# Patient Record
Sex: MALE | Race: WHITE | NOT HISPANIC OR LATINO | Employment: OTHER | URBAN - METROPOLITAN AREA
[De-identification: names, ages, dates, MRNs, and addresses within clinical notes are randomized per-mention and may not be internally consistent; named-entity substitution may affect disease eponyms.]

---

## 2017-01-03 NOTE — TELEPHONE ENCOUNTER
----- Message from Luh Antoine sent at 1/3/2017  8:48 AM CST -----  Contact: Ms Lund/   wife  Patient need refill on medication Fioricet called into [facility] [phone]   Pt experiencing headaches   Pt out of medication    Please call Ms Lund [phone]

## 2017-01-06 RX ORDER — BUTALBITAL, ACETAMINOPHEN AND CAFFEINE 50; 325; 40 MG/1; MG/1; MG/1
TABLET ORAL
Qty: 60 TABLET | Refills: 5 | Status: SHIPPED | OUTPATIENT
Start: 2017-01-06 | End: 2018-01-16 | Stop reason: SDUPTHER

## 2017-01-24 ENCOUNTER — OFFICE VISIT (OUTPATIENT)
Dept: FAMILY MEDICINE | Facility: CLINIC | Age: 47
End: 2017-01-24
Payer: COMMERCIAL

## 2017-01-24 VITALS
TEMPERATURE: 98 F | SYSTOLIC BLOOD PRESSURE: 132 MMHG | HEART RATE: 72 BPM | HEIGHT: 71 IN | RESPIRATION RATE: 20 BRPM | DIASTOLIC BLOOD PRESSURE: 100 MMHG | BODY MASS INDEX: 41.63 KG/M2 | OXYGEN SATURATION: 97 % | WEIGHT: 297.38 LBS

## 2017-01-24 DIAGNOSIS — I10 HYPERTENSION, ESSENTIAL, BENIGN: Primary | ICD-10-CM

## 2017-01-24 DIAGNOSIS — R53.83 FATIGUE, UNSPECIFIED TYPE: ICD-10-CM

## 2017-01-24 DIAGNOSIS — D64.9 ANEMIA, UNSPECIFIED TYPE: ICD-10-CM

## 2017-01-24 DIAGNOSIS — G43.009 MIGRAINE WITHOUT AURA AND WITHOUT STATUS MIGRAINOSUS, NOT INTRACTABLE: ICD-10-CM

## 2017-01-24 PROCEDURE — 99204 OFFICE O/P NEW MOD 45 MIN: CPT | Mod: S$GLB,,, | Performed by: INTERNAL MEDICINE

## 2017-01-24 PROCEDURE — 1159F MED LIST DOCD IN RCRD: CPT | Mod: S$GLB,,, | Performed by: INTERNAL MEDICINE

## 2017-01-24 PROCEDURE — 99999 PR PBB SHADOW E&M-EST. PATIENT-LVL III: CPT | Mod: PBBFAC,,, | Performed by: INTERNAL MEDICINE

## 2017-01-24 PROCEDURE — 3075F SYST BP GE 130 - 139MM HG: CPT | Mod: S$GLB,,, | Performed by: INTERNAL MEDICINE

## 2017-01-24 PROCEDURE — 3080F DIAST BP >= 90 MM HG: CPT | Mod: S$GLB,,, | Performed by: INTERNAL MEDICINE

## 2017-01-24 RX ORDER — OXYCODONE HYDROCHLORIDE 15 MG/1
TABLET, FILM COATED, EXTENDED RELEASE ORAL
Refills: 0 | COMMUNITY
Start: 2017-01-19 | End: 2017-04-28

## 2017-01-24 RX ORDER — OXYCODONE AND ACETAMINOPHEN 10; 325 MG/1; MG/1
TABLET ORAL
Refills: 0 | COMMUNITY
Start: 2017-01-04 | End: 2017-04-28

## 2017-01-24 RX ORDER — DULOXETIN HYDROCHLORIDE 60 MG/1
60 CAPSULE, DELAYED RELEASE ORAL NIGHTLY
Refills: 5 | COMMUNITY
Start: 2016-12-24 | End: 2017-06-30 | Stop reason: SDUPTHER

## 2017-01-24 RX ORDER — SUMATRIPTAN 50 MG/1
TABLET, FILM COATED ORAL
Qty: 10 TABLET | Refills: 0 | Status: SHIPPED | OUTPATIENT
Start: 2017-01-24 | End: 2017-04-28

## 2017-01-24 RX ORDER — GABAPENTIN 300 MG/1
300 CAPSULE ORAL 3 TIMES DAILY
Refills: 11 | COMMUNITY
Start: 2017-01-02 | End: 2017-04-28

## 2017-01-24 RX ORDER — HYDROCHLOROTHIAZIDE 25 MG/1
25 TABLET ORAL DAILY
Qty: 30 TABLET | Refills: 1 | Status: SHIPPED | OUTPATIENT
Start: 2017-01-24 | End: 2017-04-21 | Stop reason: SDUPTHER
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
## 2017-02-01 ENCOUNTER — LAB VISIT (OUTPATIENT)
Dept: LAB | Facility: HOSPITAL | Age: 47
End: 2017-02-01
Attending: INTERNAL MEDICINE
Payer: COMMERCIAL

## 2017-02-01 DIAGNOSIS — I10 HYPERTENSION, ESSENTIAL, BENIGN: ICD-10-CM

## 2017-02-01 DIAGNOSIS — D64.9 ANEMIA, UNSPECIFIED TYPE: ICD-10-CM

## 2017-02-01 DIAGNOSIS — R53.83 FATIGUE, UNSPECIFIED TYPE: ICD-10-CM

## 2017-02-01 LAB
25(OH)D3+25(OH)D2 SERPL-MCNC: 31 NG/ML
ALBUMIN SERPL BCP-MCNC: 4.2 G/DL
ALP SERPL-CCNC: 95 U/L
ALT SERPL W/O P-5'-P-CCNC: 15 U/L
ANION GAP SERPL CALC-SCNC: 8 MMOL/L
AST SERPL-CCNC: 21 U/L
BASOPHILS # BLD AUTO: 0.05 K/UL
BASOPHILS NFR BLD: 1 %
BILIRUB SERPL-MCNC: 0.5 MG/DL
BUN SERPL-MCNC: 15 MG/DL
CALCIUM SERPL-MCNC: 9.6 MG/DL
CHLORIDE SERPL-SCNC: 102 MMOL/L
CHOLEST/HDLC SERPL: 3.4 {RATIO}
CO2 SERPL-SCNC: 31 MMOL/L
CREAT SERPL-MCNC: 0.8 MG/DL
DIFFERENTIAL METHOD: NORMAL
EOSINOPHIL # BLD AUTO: 0.1 K/UL
EOSINOPHIL NFR BLD: 2.2 %
ERYTHROCYTE [DISTWIDTH] IN BLOOD BY AUTOMATED COUNT: 12.8 %
EST. GFR  (AFRICAN AMERICAN): >60 ML/MIN/1.73 M^2
EST. GFR  (NON AFRICAN AMERICAN): >60 ML/MIN/1.73 M^2
FERRITIN SERPL-MCNC: 82 NG/ML
FOLATE SERPL-MCNC: 15 NG/ML
GLUCOSE SERPL-MCNC: 83 MG/DL
HCT VFR BLD AUTO: 43.3 %
HDL/CHOLESTEROL RATIO: 29.1 %
HDLC SERPL-MCNC: 189 MG/DL
HDLC SERPL-MCNC: 55 MG/DL
HGB BLD-MCNC: 14.4 G/DL
IRON SERPL-MCNC: 59 UG/DL
LDLC SERPL CALC-MCNC: 110 MG/DL
LYMPHOCYTES # BLD AUTO: 1.3 K/UL
LYMPHOCYTES NFR BLD: 26.4 %
MCH RBC QN AUTO: 29.9 PG
MCHC RBC AUTO-ENTMCNC: 33.3 %
MCV RBC AUTO: 90 FL
MONOCYTES # BLD AUTO: 0.4 K/UL
MONOCYTES NFR BLD: 7.7 %
NEUTROPHILS # BLD AUTO: 3.2 K/UL
NEUTROPHILS NFR BLD: 62.7 %
NONHDLC SERPL-MCNC: 134 MG/DL
PLATELET # BLD AUTO: 285 K/UL
PMV BLD AUTO: 10.1 FL
POTASSIUM SERPL-SCNC: 3.8 MMOL/L
PROT SERPL-MCNC: 7.7 G/DL
RBC # BLD AUTO: 4.82 M/UL
SATURATED IRON: 16 %
SODIUM SERPL-SCNC: 141 MMOL/L
TOTAL IRON BINDING CAPACITY: 360 UG/DL
TRANSFERRIN SERPL-MCNC: 243 MG/DL
TRIGL SERPL-MCNC: 120 MG/DL
TSH SERPL DL<=0.005 MIU/L-ACNC: 0.63 UIU/ML
VIT B12 SERPL-MCNC: >2000 PG/ML
WBC # BLD AUTO: 5.04 K/UL

## 2017-02-01 PROCEDURE — 83540 ASSAY OF IRON: CPT

## 2017-02-01 PROCEDURE — 82728 ASSAY OF FERRITIN: CPT

## 2017-02-01 PROCEDURE — 83036 HEMOGLOBIN GLYCOSYLATED A1C: CPT

## 2017-02-01 PROCEDURE — 82306 VITAMIN D 25 HYDROXY: CPT

## 2017-02-01 PROCEDURE — 36415 COLL VENOUS BLD VENIPUNCTURE: CPT

## 2017-02-01 PROCEDURE — 82607 VITAMIN B-12: CPT

## 2017-02-01 PROCEDURE — 84466 ASSAY OF TRANSFERRIN: CPT

## 2017-02-01 PROCEDURE — 84443 ASSAY THYROID STIM HORMONE: CPT

## 2017-02-01 PROCEDURE — 80053 COMPREHEN METABOLIC PANEL: CPT

## 2017-02-01 PROCEDURE — 85025 COMPLETE CBC W/AUTO DIFF WBC: CPT

## 2017-02-01 PROCEDURE — 82746 ASSAY OF FOLIC ACID SERUM: CPT

## 2017-02-01 PROCEDURE — 80061 LIPID PANEL: CPT

## 2017-02-02 LAB
ESTIMATED AVG GLUCOSE: 88 MG/DL
HBA1C MFR BLD HPLC: 4.7 %

## 2017-02-20 DIAGNOSIS — I10 HYPERTENSION, ESSENTIAL, BENIGN: ICD-10-CM

## 2017-02-20 RX ORDER — HYDROCHLOROTHIAZIDE 25 MG/1
TABLET ORAL
Qty: 30 TABLET | OUTPATIENT
Start: 2017-02-20

## 2017-03-28 ENCOUNTER — OFFICE VISIT (OUTPATIENT)
Dept: PAIN MEDICINE | Facility: CLINIC | Age: 47
End: 2017-03-28
Attending: ANESTHESIOLOGY
Payer: COMMERCIAL

## 2017-03-28 VITALS
WEIGHT: 299.81 LBS | HEIGHT: 71 IN | RESPIRATION RATE: 20 BRPM | DIASTOLIC BLOOD PRESSURE: 98 MMHG | SYSTOLIC BLOOD PRESSURE: 148 MMHG | TEMPERATURE: 99 F | BODY MASS INDEX: 41.97 KG/M2 | HEART RATE: 58 BPM

## 2017-03-28 DIAGNOSIS — M47.816 FACET ARTHROPATHY, LUMBAR: ICD-10-CM

## 2017-03-28 DIAGNOSIS — G89.4 CHRONIC PAIN SYNDROME: ICD-10-CM

## 2017-03-28 DIAGNOSIS — M96.1 POSTLAMINECTOMY SYNDROME OF LUMBAR REGION: ICD-10-CM

## 2017-03-28 PROCEDURE — 3080F DIAST BP >= 90 MM HG: CPT | Mod: S$GLB,,, | Performed by: ANESTHESIOLOGY

## 2017-03-28 PROCEDURE — 1160F RVW MEDS BY RX/DR IN RCRD: CPT | Mod: S$GLB,,, | Performed by: ANESTHESIOLOGY

## 2017-03-28 PROCEDURE — 99999 PR PBB SHADOW E&M-EST. PATIENT-LVL IV: CPT | Mod: PBBFAC,,, | Performed by: ANESTHESIOLOGY

## 2017-03-28 PROCEDURE — 99204 OFFICE O/P NEW MOD 45 MIN: CPT | Mod: S$GLB,,, | Performed by: ANESTHESIOLOGY

## 2017-03-28 PROCEDURE — 3077F SYST BP >= 140 MM HG: CPT | Mod: S$GLB,,, | Performed by: ANESTHESIOLOGY

## 2017-03-28 NOTE — PROGRESS NOTES
Chronic Pain - New Consult    Referring Physician: Referral, Self    Chief Complaint:   Chief Complaint   Patient presents with    Low-back Pain    Leg Pain        SUBJECTIVE: Disclaimer: This note has been generated using voice-recognition software. There may be typographical errors that have been missed during proof-reading    Initial encounter:    Jaime Lund presents to the clinic for the evaluation of lower back pain and neuropathic lower extremity pain. The pain started years ago insidiously and symptoms have been worsening.    Brief history:  Patient has been in pain management with an outside provider and has been managed for his postlaminectomy syndrome with a combination of extended release gabapentin, Robaxin, meloxicam, OxyContin, oxycodone acetaminophen.  He has had limited response to caudal epidurals of the records are not available and is currently being considered for radio frequency ablation lumbar spine and is coming for second opinion.    Patient has had a history of fusion of the lumbar spine which was consultative with postprocedural MRSA infection sepsis and seizures.    Additionally patient has had history of gastric bypass in 2011 with a greater than 200 pound weight loss.  Currently BMI is 41 and on today's visit patient weighs 136 kg.  Pain Description:    The pain is located in the lower back area and radiates to bilateral lower extremities in the L4/5 distribution.      At BEST  5/10     At WORST  10/10 on the WORST day.      On average pain is rated as 7/10.     Today the pain is rated as 7/10    The pain is described as aching, sharp, tight band and constant      Symptoms interfere with daily activity, sleeping and work.     Exacerbating factors: Standing, Laying, Bending, Night Time, Morning and Getting out of bed/chair.      Mitigating factors laying down, massage, medications and rest.     Patient denies urinary incontinence, bowel incontinence and significant motor  weakness.  Patient denies any suicidal or homicidal ideations    Pain Medications:  Current:  oxycontin 15 mg  Oxycodone/acetaminophen 7.5/325 -increased from 5/325  gralise 600mg q 24 hr  cymbalta qday  meloxicam 15 mg q day  Methocarbamol 750 q day  zanaflex 2 mg q 8 PO PRN    Physical Therapy/Home Exercise: no       report:  Reviewed and consistent with medication use as prescribed.    Pain Procedures: previous caudal epidural injections, no records for review today    Chiropractor -currently for the neck, not for the lumbar spine  Acupuncture - never  TENS unit -never  Spinal decompression -previous decompression with fusion L4/5  Joint replacement -never    Imaging:   MRI lumbar spine 4/21/2016:  Post surgical changes with fusion at L4 and L5 with L4-5 intervertebral disc spacer.  Worsening mild to moderate multilevel discogenic degeneration throughout the lower thoracic and lumbar spine most at L3-4 and L5-S1 with mild to moderate left greater than right bilateral L3-4 neuroforaminal narrowing and mild L5-S1 neuroforaminal narrowing.  No significant central stenosis.  (Full report scanned into [employer])      Past Medical History:   Diagnosis Date    Bulging discs     Degenerative disc disease     Hypertension      Past Surgical History:   Procedure Laterality Date    CHOLECYSTECTOMY      gastric sleeve  2011    HERNIA REPAIR      SPINE SURGERY      lumbar fusion     Social History     Social History    Marital status:      Spouse name: N/A    Number of children: N/A    Years of education: N/A     Occupational History    Not on file.     Social History Main Topics    Smoking status: Never Smoker    Smokeless tobacco: Never Used    Alcohol use No    Drug use: No    Sexual activity: Not on file     Other Topics Concern    Not on file     Social History Narrative     Family History   Problem Relation Age of Onset    Heart disease Mother     Breast cancer Mother     Arthritis Mother      Hypertension Mother     Throat cancer Father     Hypertension Father        Review of patient's allergies indicates:   Allergen Reactions    Klonopin [clonazepam] Anxiety       Current Outpatient Prescriptions   Medication Sig    acyclovir (ZOVIRAX) 1,000 mg injection Inject 800 mg into the vein every 8 (eight) hours.    butalbital-acetaminophen-caffeine -40 mg (FIORICET, ESGIC) -40 mg per tablet TAKE ONE TABLET BY MOUTH EVERY 4 HOURS AS NEEDED    duloxetine (CYMBALTA) 30 MG capsule TAKE 1 CAPSULE BY MOUTH ONCE DAILY IN THE EVENING    duloxetine (CYMBALTA) 60 MG capsule Take 60 mg by mouth every evening.    gabapentin (GRALISE) 600 mg Tb24 Take 600 mg by mouth 2 (two) times daily.     gabapentin (NEURONTIN) 300 MG capsule Take 300 mg by mouth 3 (three) times daily.     hydrochlorothiazide (HYDRODIURIL) 25 MG tablet Take 1 tablet (25 mg total) by mouth once daily.    ibuprofen (ADVIL,MOTRIN) 800 MG tablet TAKE ONE TABLET BY MOUTH EVERY 8 HOURS WITH A MEAL    meloxicam (MOBIC) 15 MG tablet Take 15 mg by mouth once daily.    methocarbamol (ROBAXIN) 750 MG Tab     oxycodone-acetaminophen (PERCOCET)  mg per tablet TAKE 1 TABLET EVERY 6 HOURS AS NEEDED FOR PAIN MAY CAUSE DROWSINESS  NO ALCOHOL    oxycodone-acetaminophen (PERCOCET) 5-325 mg per tablet     OXYCONTIN 15 mg TR12 12 hr tablet TAKE ONE TABLET BY MOUTH EVERY TWELVE HOURS FOR PAIN    ranitidine (ZANTAC) 150 MG capsule Take 150 mg by mouth 2 (two) times daily.    sumatriptan (IMITREX) 50 MG tablet Take 1 tablet by mouth and if no resolution of headache then take 1 more tablet 2 hours later    tizanidine (ZANAFLEX) 2 MG tablet Take 2 mg by mouth 3 (three) times daily.     No current facility-administered medications for this visit.        REVIEW OF SYSTEMS:    GENERAL:  No weight loss, malaise or fevers.  HEENT:   No recent changes in vision or hearing  NECK:  Negative for lumps, no difficulty with swallowing.  RESPIRATORY:   "Negative for cough, wheezing or shortness of breath, patient denies any recent URI.  CARDIOVASCULAR:  Negative for chest pain, leg swelling or palpitations.  GI:  Negative for abdominal discomfort, blood in stools or black stools or change in bowel habits.  MUSCULOSKELETAL:  See HPI.  SKIN:  Negative for lesions, rash, and itching.  PSYCH:  No mood disorder or recent psychosocial stressors.  Patients sleep is disturbed secondary to pain.  HEMATOLOGY/LYMPHOLOGY:  Negative for prolonged bleeding, bruising easily or swollen nodes.  Patient is not currently taking any anti-coagulants  ENDO: No history of diabetes or thyroid dysfunction  NEURO:  History of headaches  All other reviewed and negative other than HPI.    OBJECTIVE:    BP (!) 148/98 (BP Location: Left arm, Patient Position: Sitting)  Pulse (!) 58  Temp 98.7 °F (37.1 °C) (Oral)   Resp 20  Ht 5' 11" (1.803 m)  Wt 136 kg (299 lb 13.2 oz)  BMI 41.82 kg/m2    PHYSICAL EXAMINATION:    GENERAL: Well appearing, in no acute distress, alert and oriented x3.  PSYCH:  Mood and affect appropriate.  SKIN: Skin color, texture, turgor normal, no rashes or lesions.  HEAD/FACE:  Normocephalic, atraumatic. Cranial nerves grossly intact.  CV: RRR with palpation of the radial artery.  PULM: No evidence of respiratory difficulty, symmetric chest rise.  BACK: Straight leg raising in the sitting and supine positions is negative to radicular pain. There is pain with palpation over the facet joints of the lumbar spine bilaterally. There is decreased range of motion with extension to 15 degrees, and facet loading maneuvers cause reproducible pain.    EXTREMITIES: Peripheral joint ROM is full and pain free without obvious instability or laxity in all four extremities. No deformities, edema, or skin discoloration. Good capillary refill.  MUSCULOSKELETAL: Hip, and knee provocative maneuvers are negative.  There is  pain with palpation over the sacroiliac joints bilaterally.  There " is no pain to palpation over the greater trochanteric bursa bilaterally.  FABERs test is positive.  FADIRs test is negative.   Bilateral lower extremity strength is normal and symmetric.  No atrophy or tone abnormalities are noted.  NEURO: Bilateral lower extremity coordination and muscle stretch reflexes are physiologic and symmetric.  Plantar response are downgoing. No clonus.  No loss of sensation is noted.  GAIT: Antalgic, ambulates without assistance     ASSESSMENT: 47 y.o. year old male with pain, consistent with     Encounter Diagnoses   Name Primary?    Postlaminectomy syndrome of lumbar region     Facet arthropathy, lumbar     Chronic pain syndrome        PLAN:     Upon review the records of previous imaging it appears that the patient is on appropriate couple of adjuvant medications and opioids.  We discussed following up with 's recommendations of radio frequency ablation for the lumbar spine.    We also emphasized the importance of continued exercises physical therapy course thickening and stretching    I expect the patient given his lack of response to caudal epidural injections radio frequency ablation to address the joints of his lower back would be the most appropriate course of action and confirmed that this is a good plan with his pain physician.    Additionally if there is limited response to any frequency ablation I recommend spinal cord simulation in the future for post laminectomy syndrome  I provided the patient with a informational DVD from [employer] to review and discuss with his pain management physician    Follow-up as needed    Greater than 25 minutes spent in total in todays visit with the patient, with more than half that time direct face to face counseling and education with the patient today. We discussed the disease process, prognosis, treatment plan, and risks and benefits.     Jeri Garza  03/28/2017

## 2017-03-28 NOTE — MR AVS SNAPSHOT
[facility]  [address]  [place] [address]  Phone: [phone]  Fax: [phone]                  Jaime Lund   3/28/2017 9:30 AM   Office Visit    Description:  Male : 1970   Provider:  Jeri Garza MD   Department:  [facility]           Reason for Visit     Low-back Pain     Leg Pain                To Do List           Goals (5 Years of Data)     None      [facility]     [facility] -  Assistance  Registered nurses in the [facility] Center provide clinical advisement, health education, appointment booking, and other advisory services.  Call for this free service at [phone].             Medications           Message regarding Medications     Verify the changes and/or additions to your medication regime listed below are the same as discussed with your clinician today.  If any of these changes or additions are incorrect, please notify your healthcare provider.             Verify that the below list of medications is an accurate representation of the medications you are currently taking.  If none reported, the list may be blank. If incorrect, please contact your healthcare provider. Carry this list with you in case of emergency.           Current Medications     acyclovir (ZOVIRAX) 1,000 mg injection Inject 800 mg into the vein every 8 (eight) hours.    butalbital-acetaminophen-caffeine -40 mg (FIORICET, ESGIC) -40 mg per tablet TAKE ONE TABLET BY MOUTH EVERY 4 HOURS AS NEEDED    duloxetine (CYMBALTA) 30 MG capsule TAKE 1 CAPSULE BY MOUTH ONCE DAILY IN THE EVENING    duloxetine (CYMBALTA) 60 MG capsule Take 60 mg by mouth every evening.    gabapentin (GRALISE) 600 mg Tb24 Take 600 mg by mouth 2 (two) times daily.     gabapentin (NEURONTIN) 300 MG capsule Take 300 mg by mouth 3 (three) times daily.     hydrochlorothiazide (HYDRODIURIL) 25 MG tablet Take 1 tablet (25 mg total) by mouth once daily.  "   ibuprofen (ADVIL,MOTRIN) 800 MG tablet TAKE ONE TABLET BY MOUTH EVERY 8 HOURS WITH A MEAL    meloxicam (MOBIC) 15 MG tablet Take 15 mg by mouth once daily.    methocarbamol (ROBAXIN) 750 MG Tab     oxycodone-acetaminophen (PERCOCET)  mg per tablet TAKE 1 TABLET EVERY 6 HOURS AS NEEDED FOR PAIN MAY CAUSE DROWSINESS  NO ALCOHOL    oxycodone-acetaminophen (PERCOCET) 5-325 mg per tablet     OXYCONTIN 15 mg TR12 12 hr tablet TAKE ONE TABLET BY MOUTH EVERY TWELVE HOURS FOR PAIN    ranitidine (ZANTAC) 150 MG capsule Take 150 mg by mouth 2 (two) times daily.    sumatriptan (IMITREX) 50 MG tablet Take 1 tablet by mouth and if no resolution of headache then take 1 more tablet 2 hours later    tizanidine (ZANAFLEX) 2 MG tablet Take 2 mg by mouth 3 (three) times daily.           Clinical Reference Information           Your Vitals Were     BP Pulse Temp Resp Height Weight    148/98 (BP Location: Left arm, Patient Position: Sitting) 58 98.7 °F (37.1 °C) (Oral) 20 5' 11" (1.803 m) 136 kg (299 lb 13.2 oz)    BMI                41.82 kg/m2          Blood Pressure          Most Recent Value    BP  (!)  148/98      Allergies as of 3/28/2017     Klonopin [Clonazepam]      Immunizations Administered on Date of Encounter - 3/28/2017     None      [facility] Sign-Up     Activating your [facility] account is as easy as 1-2-3!     1) Visit [email or web address], select Sign Up Now, enter this activation code and your date of birth, then select Next.  [email or web address]  Expires: 5/12/2017 10:20 AM      2) Create a username and password to use when you visit [facility] in the future and select a security question in case you lose your password and select Next.    3) Enter your e-mail address and click Sign Up!    Additional Information  If you have questions, please e-mail [email or web address] or call [phone] to talk to our [facility] staff. Remember, [facility] is NOT to be used for urgent needs. For medical emergencies, dial 911.       "   Language Assistance Services     ATTENTION: Language assistance services are available, free of charge. Please call [phone].      ATENCIÓN: Si habla wong, tiene a rosenbaum disposición servicios gratuitos de asistencia lingüística. Llame al [phone].     CHÚ Ý: N?u b?n nói Ti?ng Vi?t, có các d?ch v? h? tr? ngôn ng? mi?n phí dành cho b?n. G?i s? [phone].         [facility] complies with applicable Federal civil rights laws and does not discriminate on the basis of race, color, national origin, age, disability, or sex.

## 2017-04-21 DIAGNOSIS — I10 HYPERTENSION, ESSENTIAL, BENIGN: ICD-10-CM

## 2017-04-21 RX ORDER — HYDROCHLOROTHIAZIDE 25 MG/1
TABLET ORAL
Qty: 30 TABLET | Refills: 0 | Status: SHIPPED | OUTPATIENT
Start: 2017-04-21 | End: 2017-06-06 | Stop reason: SDUPTHER

## 2017-04-28 ENCOUNTER — OFFICE VISIT (OUTPATIENT)
Dept: FAMILY MEDICINE | Facility: CLINIC | Age: 47
End: 2017-04-28
Payer: COMMERCIAL

## 2017-04-28 VITALS
DIASTOLIC BLOOD PRESSURE: 80 MMHG | BODY MASS INDEX: 41.85 KG/M2 | TEMPERATURE: 99 F | WEIGHT: 298.94 LBS | HEART RATE: 69 BPM | HEIGHT: 71 IN | SYSTOLIC BLOOD PRESSURE: 130 MMHG | OXYGEN SATURATION: 98 %

## 2017-04-28 DIAGNOSIS — M79.89 PAIN AND SWELLING OF LOWER LEG, UNSPECIFIED LATERALITY: Primary | ICD-10-CM

## 2017-04-28 DIAGNOSIS — M25.469 JOINT SWELLING OF LOWER LEG: ICD-10-CM

## 2017-04-28 DIAGNOSIS — M79.669 PAIN AND SWELLING OF LOWER LEG, UNSPECIFIED LATERALITY: Primary | ICD-10-CM

## 2017-04-28 DIAGNOSIS — M47.816 FACET ARTHROPATHY, LUMBAR: ICD-10-CM

## 2017-04-28 DIAGNOSIS — G89.4 CHRONIC PAIN SYNDROME: ICD-10-CM

## 2017-04-28 DIAGNOSIS — R06.09 DOE (DYSPNEA ON EXERTION): ICD-10-CM

## 2017-04-28 PROCEDURE — 3079F DIAST BP 80-89 MM HG: CPT | Mod: S$GLB,,, | Performed by: INTERNAL MEDICINE

## 2017-04-28 PROCEDURE — 3075F SYST BP GE 130 - 139MM HG: CPT | Mod: S$GLB,,, | Performed by: INTERNAL MEDICINE

## 2017-04-28 PROCEDURE — 99999 PR PBB SHADOW E&M-EST. PATIENT-LVL III: CPT | Mod: PBBFAC,,, | Performed by: INTERNAL MEDICINE

## 2017-04-28 PROCEDURE — 1160F RVW MEDS BY RX/DR IN RCRD: CPT | Mod: S$GLB,,, | Performed by: INTERNAL MEDICINE

## 2017-04-28 PROCEDURE — 99214 OFFICE O/P EST MOD 30 MIN: CPT | Mod: S$GLB,,, | Performed by: INTERNAL MEDICINE

## 2017-04-28 RX ORDER — OXYCODONE HYDROCHLORIDE 15 MG/1
15 TABLET, FILM COATED, EXTENDED RELEASE ORAL EVERY 12 HOURS
Qty: 60 TABLET | Refills: 0 | Status: SHIPPED | OUTPATIENT
Start: 2017-04-28 | End: 2017-05-29 | Stop reason: SDUPTHER

## 2017-04-28 RX ORDER — OXYCODONE AND ACETAMINOPHEN 7.5; 325 MG/1; MG/1
1 TABLET ORAL EVERY 6 HOURS PRN
Refills: 0 | COMMUNITY
Start: 2017-04-08 | End: 2017-05-29 | Stop reason: DRUGHIGH

## 2017-04-28 RX ORDER — FUROSEMIDE 40 MG/1
40 TABLET ORAL DAILY
Qty: 30 TABLET | Refills: 1 | Status: SHIPPED | OUTPATIENT
Start: 2017-04-28 | End: 2017-05-13 | Stop reason: SDUPTHER

## 2017-04-28 NOTE — PROGRESS NOTES
SUBJECTIVE     Chief Complaint   Patient presents with    Edema       HPI  Jaime Lund is a 47 y.o. male with multiple medical diagnoses as listed in the medical history and problem list that presents for evaluation of edema in BLE x 1 week. Pt also reports SEGURA and sleeps on ~2 pillows at night. He has also gained about 30 lbs in the last month. 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     PAST MEDICAL HISTORY:  Past Medical History:   Diagnosis Date    Bulging discs     Degenerative disc disease     Hypertension        PAST SURGICAL HISTORY:  Past Surgical History:   Procedure Laterality Date    CHOLECYSTECTOMY      gastric sleeve  2011    HERNIA REPAIR      SPINE SURGERY      lumbar fusion       SOCIAL HISTORY:  Social History     Social History    Marital status:      Spouse name: N/A    Number of children: N/A    Years of education: N/A     Occupational History    Not on file.     Social History Main Topics    Smoking status: Never Smoker    Smokeless tobacco: Never Used    Alcohol use No    Drug use: No    Sexual activity: Not on file     Other Topics Concern    Not on file     Social History Narrative       FAMILY HISTORY:  Family History   Problem Relation Age of Onset    Heart disease Mother     Breast cancer Mother     Arthritis Mother     Hypertension Mother     Throat cancer Father     Hypertension Father        ALLERGIES AND MEDICATIONS: updated and reviewed.  Review of patient's allergies indicates:   Allergen Reactions    Klonopin [clonazepam] Anxiety     Current Outpatient Prescriptions   Medication Sig Dispense Refill    butalbital-acetaminophen-caffeine -40 mg (FIORICET, ESGIC) -40 mg per tablet TAKE ONE TABLET BY MOUTH EVERY 4 HOURS AS NEEDED 60 tablet 5    duloxetine (CYMBALTA) 60 MG capsule Take 60 mg by mouth every evening.  5    gabapentin (GRALISE) 600 mg Tb24 Take 600 mg by mouth 2 (two) times daily.        hydrochlorothiazide (HYDRODIURIL) 25 MG tablet TAKE ONE TABLET BY MOUTH ONCE DAILY FOR BLOOD PRESSURE AND OR FOR FLUID 30 tablet 0    ibuprofen (ADVIL,MOTRIN) 800 MG tablet TAKE ONE TABLET BY MOUTH EVERY 8 HOURS WITH A MEAL  0    meloxicam (MOBIC) 15 MG tablet Take 15 mg by mouth once daily.      methocarbamol (ROBAXIN) 750 MG Tab       oxycodone-acetaminophen (PERCOCET) 7.5-325 mg per tablet Take 1 tablet by mouth every 6 (six) hours as needed.  0    ranitidine (ZANTAC) 150 MG capsule Take 150 mg by mouth 2 (two) times daily.      tizanidine (ZANAFLEX) 2 MG tablet Take 2 mg by mouth 3 (three) times daily.  2    acyclovir (ZOVIRAX) 1,000 mg injection Inject 800 mg into the vein every 8 (eight) hours. 3200 mg 0    furosemide (LASIX) 40 MG tablet Take 1 tablet (40 mg total) by mouth once daily. 30 tablet 1    oxycodone (OXYCONTIN) 15 mg TR12 12 hr tablet Take 1 tablet (15 mg total) by mouth every 12 (twelve) hours. 60 tablet 0     No current facility-administered medications for this visit.        ROS  Review of Systems   Constitutional: Negative for chills and fever.   HENT: Negative for hearing loss and sore throat.    Eyes: Negative for visual disturbance.   Respiratory: Positive for shortness of breath. Negative for cough.    Cardiovascular: Negative for chest pain, palpitations and leg swelling.   Gastrointestinal: Negative for abdominal pain, constipation, diarrhea, nausea and vomiting.   Genitourinary: Negative for dysuria, frequency and urgency.   Musculoskeletal: Negative for arthralgias, joint swelling and myalgias.        BLE edema   Skin: Negative for rash and wound.   Neurological: Negative for headaches.   Psychiatric/Behavioral: Negative for agitation and confusion. The patient is not nervous/anxious.          OBJECTIVE     Physical Exam  Vitals:    04/28/17 1609   BP: 130/80   Pulse: 69   Temp: 98.7 °F (37.1 °C)    Body mass index is 41.69 kg/(m^2).  Weight: 135.6 kg (298 lb 15.1 oz)   Height:  "5' 11" (180.3 cm)     Physical Exam   Constitutional: He is oriented to person, place, and time. He appears well-developed and well-nourished. No distress.   HENT:   Head: Normocephalic and atraumatic.   Right Ear: External ear normal.   Left Ear: External ear normal.   Nose: Nose normal.   Mouth/Throat: Oropharynx is clear and moist.   Eyes: Conjunctivae and EOM are normal. Right eye exhibits no discharge. Left eye exhibits no discharge. No scleral icterus.   Neck: Normal range of motion. Neck supple. No JVD present. No tracheal deviation present.   Cardiovascular: Normal rate, regular rhythm, normal heart sounds and intact distal pulses.  Exam reveals no gallop and no friction rub.    No murmur heard.  Pulmonary/Chest: Effort normal and breath sounds normal. No respiratory distress. He has no wheezes.   Abdominal: Soft. Bowel sounds are normal. He exhibits no distension and no mass. There is no tenderness. There is no rebound and no guarding.   Musculoskeletal: Normal range of motion. He exhibits edema (2+ pitting edema to BLE). He exhibits no tenderness or deformity.   Neurological: He is alert and oriented to person, place, and time. He exhibits normal muscle tone. Coordination normal.   Skin: Skin is warm and dry. No rash noted. No erythema.   Psychiatric: He has a normal mood and affect. His behavior is normal. Judgment and thought content normal.         Health Maintenance       Date Due Completion Date    TETANUS VACCINE 3/8/1987 ---    Influenza Vaccine 8/1/2016 ---    Lipid Panel 2/1/2022 2/1/2017            ASSESSMENT     47 y.o. male with     1. Pain and swelling of lower leg, unspecified laterality    2. Joint swelling of lower leg    3. BMI 40.0-44.9, adult    4. Facet arthropathy, lumbar    5. Chronic pain syndrome    6. SEGURA (dyspnea on exertion)        PLAN:     1. Pain and swelling of lower leg, unspecified laterality  - 2D echo with color flow doppler; Future  - US Lower Extrem Arteries Bilat with " GISELLE; Future  - US Lower Extremity Veins Bilateral; Future  - furosemide (LASIX) 40 MG tablet; Take 1 tablet (40 mg total) by mouth once daily.  Dispense: 30 tablet; Refill: 1  - CBC auto differential; Future  - Comprehensive metabolic panel; Future  - Brain natriuretic peptide; Future    2. Joint swelling of lower leg  - As above    3. BMI 40.0-44.9, adult  - Discussed importance of eating a prudent diet and exercising    4. Facet arthropathy, lumbar  - Stable; no acute issues  - The current medical regimen is effective;  continue present plan and medications.  - Will give 1 month supply of Oxycontin as below and then pt is to f/u with Dr. Julien-Neuro for continued pain management   - oxycodone (OXYCONTIN) 15 mg TR12 12 hr tablet; Take 1 tablet (15 mg total) by mouth every 12 (twelve) hours.  Dispense: 60 tablet; Refill: 0    5. Chronic pain syndrome  - oxycodone (OXYCONTIN) 15 mg TR12 12 hr tablet; Take 1 tablet (15 mg total) by mouth every 12 (twelve) hours.  Dispense: 60 tablet; Refill: 0    6. SEGURA (dyspnea on exertion)  - 2D echo with color flow doppler; Future  - Brain natriuretic peptide; Future      RTC in 2 weeks for repeat assessment of current treatment plan     Gabby Dean MD  04/28/2017 4:27 PM        No Follow-up on file.

## 2017-05-05 ENCOUNTER — HOSPITAL ENCOUNTER (OUTPATIENT)
Dept: RADIOLOGY | Facility: HOSPITAL | Age: 47
Discharge: HOME | End: 2017-05-05
Attending: INTERNAL MEDICINE
Payer: COMMERCIAL

## 2017-05-05 ENCOUNTER — HOSPITAL ENCOUNTER (OUTPATIENT)
Dept: CARDIOLOGY | Facility: HOSPITAL | Age: 47
Discharge: HOME | End: 2017-05-05
Attending: INTERNAL MEDICINE
Payer: COMMERCIAL

## 2017-05-05 DIAGNOSIS — M79.89 PAIN AND SWELLING OF LOWER LEG, UNSPECIFIED LATERALITY: ICD-10-CM

## 2017-05-05 DIAGNOSIS — M79.669 PAIN AND SWELLING OF LOWER LEG, UNSPECIFIED LATERALITY: ICD-10-CM

## 2017-05-05 DIAGNOSIS — I73.9 PERIPHERAL ARTERY DISEASE: Primary | ICD-10-CM

## 2017-05-05 DIAGNOSIS — R06.09 DOE (DYSPNEA ON EXERTION): ICD-10-CM

## 2017-05-05 PROCEDURE — 93306 TTE W/DOPPLER COMPLETE: CPT

## 2017-05-05 PROCEDURE — 93925 LOWER EXTREMITY STUDY: CPT | Mod: 26,,, | Performed by: RADIOLOGY

## 2017-05-05 PROCEDURE — 93922 UPR/L XTREMITY ART 2 LEVELS: CPT | Mod: 26,59,, | Performed by: RADIOLOGY

## 2017-05-05 PROCEDURE — 93306 TTE W/DOPPLER COMPLETE: CPT | Mod: 26,,, | Performed by: INTERNAL MEDICINE

## 2017-05-05 PROCEDURE — 93970 EXTREMITY STUDY: CPT | Mod: TC

## 2017-05-05 PROCEDURE — 93925 LOWER EXTREMITY STUDY: CPT | Mod: TC

## 2017-05-05 PROCEDURE — 93970 EXTREMITY STUDY: CPT | Mod: 26,,, | Performed by: RADIOLOGY

## 2017-05-05 NOTE — PROGRESS NOTES
Called pt to give results of ultrasounds. Informed him that I would be sending him for f/u with Vascular Surgery. All questions/concerns addressed. Pt voiced understanding.

## 2017-05-06 LAB
DIASTOLIC DYSFUNCTION: NO
ESTIMATED PA SYSTOLIC PRESSURE: 35.28
GLOBAL PERICARDIAL EFFUSION: NORMAL
RETIRED EF AND QEF - SEE NOTES: 55 (ref 55–65)
TRICUSPID VALVE REGURGITATION: NORMAL

## 2017-05-08 ENCOUNTER — TELEPHONE (OUTPATIENT)
Dept: FAMILY MEDICINE | Facility: CLINIC | Age: 47
End: 2017-05-08

## 2017-05-08 NOTE — TELEPHONE ENCOUNTER
----- Message from Jaky Chavarria sent at 5/8/2017  4:12 PM CDT -----  REFERRAL: Pt scheduled to see Dr. Stevens on 6/6/17.

## 2017-05-13 DIAGNOSIS — M79.669 PAIN AND SWELLING OF LOWER LEG, UNSPECIFIED LATERALITY: ICD-10-CM

## 2017-05-13 DIAGNOSIS — M79.89 PAIN AND SWELLING OF LOWER LEG, UNSPECIFIED LATERALITY: ICD-10-CM

## 2017-05-15 RX ORDER — FUROSEMIDE 40 MG/1
TABLET ORAL
Qty: 30 TABLET | Refills: 0 | Status: SHIPPED | OUTPATIENT
Start: 2017-05-15 | End: 2017-08-08 | Stop reason: SDUPTHER

## 2017-05-29 ENCOUNTER — OFFICE VISIT (OUTPATIENT)
Dept: FAMILY MEDICINE | Facility: CLINIC | Age: 47
End: 2017-05-29
Payer: COMMERCIAL

## 2017-05-29 VITALS
RESPIRATION RATE: 16 BRPM | HEART RATE: 66 BPM | HEIGHT: 71 IN | DIASTOLIC BLOOD PRESSURE: 80 MMHG | OXYGEN SATURATION: 97 % | SYSTOLIC BLOOD PRESSURE: 122 MMHG | BODY MASS INDEX: 41.95 KG/M2 | TEMPERATURE: 98 F | WEIGHT: 299.63 LBS

## 2017-05-29 DIAGNOSIS — M47.816 FACET ARTHROPATHY, LUMBAR: ICD-10-CM

## 2017-05-29 DIAGNOSIS — G89.4 CHRONIC PAIN SYNDROME: ICD-10-CM

## 2017-05-29 PROCEDURE — 99999 PR PBB SHADOW E&M-EST. PATIENT-LVL III: CPT | Mod: PBBFAC,,, | Performed by: INTERNAL MEDICINE

## 2017-05-29 PROCEDURE — 99214 OFFICE O/P EST MOD 30 MIN: CPT | Mod: S$GLB,,, | Performed by: INTERNAL MEDICINE

## 2017-05-29 RX ORDER — IBUPROFEN 800 MG/1
TABLET ORAL
Qty: 90 TABLET | Refills: 1 | Status: SHIPPED | OUTPATIENT
Start: 2017-05-29 | End: 2017-06-27 | Stop reason: SDUPTHER

## 2017-05-29 RX ORDER — MELOXICAM 15 MG/1
15 TABLET ORAL DAILY
Qty: 60 TABLET | Refills: 3 | Status: SHIPPED | OUTPATIENT
Start: 2017-05-29 | End: 2017-12-04 | Stop reason: SDUPTHER

## 2017-05-29 RX ORDER — OXYCODONE HYDROCHLORIDE 15 MG/1
15 TABLET, FILM COATED, EXTENDED RELEASE ORAL EVERY 12 HOURS
Qty: 60 TABLET | Refills: 0 | Status: SHIPPED | OUTPATIENT
Start: 2017-05-29 | End: 2018-04-02 | Stop reason: DRUGHIGH

## 2017-05-29 RX ORDER — TAMSULOSIN HYDROCHLORIDE 0.4 MG/1
CAPSULE ORAL
Refills: 6 | COMMUNITY
Start: 2017-05-11 | End: 2018-08-09 | Stop reason: SDUPTHER

## 2017-05-29 RX ORDER — OXYCODONE AND ACETAMINOPHEN 10; 325 MG/1; MG/1
1 TABLET ORAL EVERY 6 HOURS PRN
Refills: 0 | COMMUNITY
Start: 2017-05-11 | End: 2018-04-19

## 2017-05-29 NOTE — PROGRESS NOTES
SUBJECTIVE     Chief Complaint   Patient presents with    Medication Refill       HPI  Jaime Lund is a 47 y.o. male with multiple medical diagnoses as listed in the medical history and problem list that presents for medication refill. Pt is out of his meds and reports having pain. He has been seen by Dr. Julien and had a CT of the lumbar spine and she believes he would benefit from Pain Management so will have him f/u with Dr. Wilkerson at [facility] for stem cell implants. Pt reports back pain is at a 7-8/10 and constant in nature. He requires 3-4 Percocet per day.     PAST MEDICAL HISTORY:  Past Medical History:   Diagnosis Date    Bulging discs     Degenerative disc disease     Hypertension        PAST SURGICAL HISTORY:  Past Surgical History:   Procedure Laterality Date    CHOLECYSTECTOMY      gastric sleeve  2011    HERNIA REPAIR      SPINE SURGERY      lumbar fusion       SOCIAL HISTORY:  Social History     Social History    Marital status: Single     Spouse name: N/A    Number of children: N/A    Years of education: N/A     Occupational History    Not on file.     Social History Main Topics    Smoking status: Never Smoker    Smokeless tobacco: Never Used    Alcohol use No    Drug use: No    Sexual activity: Not on file     Other Topics Concern    Not on file     Social History Narrative    No narrative on file       FAMILY HISTORY:  Family History   Problem Relation Age of Onset    Heart disease Mother     Breast cancer Mother     Arthritis Mother     Hypertension Mother     Throat cancer Father     Hypertension Father        ALLERGIES AND MEDICATIONS: updated and reviewed.  Review of patient's allergies indicates:   Allergen Reactions    Klonopin [clonazepam] Anxiety     Current Outpatient Prescriptions   Medication Sig Dispense Refill    butalbital-acetaminophen-caffeine -40 mg (FIORICET, ESGIC) -40 mg per tablet TAKE ONE TABLET BY MOUTH EVERY 4 HOURS AS NEEDED 60  tablet 5    duloxetine (CYMBALTA) 60 MG capsule Take 60 mg by mouth every evening.  5    furosemide (LASIX) 40 MG tablet TAKE ONE TABLET BY MOUTH ONCE DAILY FOR FLUID. 30 tablet 0    gabapentin (GRALISE) 600 mg Tb24 One tablet every morning and two tablets at night      hydrochlorothiazide (HYDRODIURIL) 25 MG tablet TAKE ONE TABLET BY MOUTH ONCE DAILY FOR BLOOD PRESSURE AND OR FOR FLUID 30 tablet 0    ibuprofen (ADVIL,MOTRIN) 800 MG tablet TAKE ONE TABLET BY MOUTH EVERY 8 HOURS WITH A MEAL 90 tablet 1    meloxicam (MOBIC) 15 MG tablet Take 1 tablet (15 mg total) by mouth once daily. 60 tablet 3    methocarbamol (ROBAXIN) 750 MG Tab       oxycodone (OXYCONTIN) 15 mg TR12 12 hr tablet Take 1 tablet (15 mg total) by mouth every 12 (twelve) hours. 60 tablet 0    oxycodone-acetaminophen (PERCOCET)  mg per tablet Take 1 tablet by mouth every 6 (six) hours as needed.  0    ranitidine (ZANTAC) 150 MG capsule Take 150 mg by mouth 2 (two) times daily.      tamsulosin (FLOMAX) 0.4 mg Cp24 TAKE 1 CAPSULE BY MOUTH ONCE DAILY FOR PROSTATE  6    tizanidine (ZANAFLEX) 2 MG tablet Take 2 mg by mouth 3 (three) times daily.  2     No current facility-administered medications for this visit.        ROS  Review of Systems   Constitutional: Negative for chills and fever.   HENT: Negative for hearing loss and sore throat.    Eyes: Negative for visual disturbance.   Respiratory: Negative for cough and shortness of breath.    Cardiovascular: Negative for chest pain, palpitations and leg swelling.   Gastrointestinal: Negative for abdominal pain, constipation, diarrhea, nausea and vomiting.   Genitourinary: Negative for dysuria, frequency and urgency.   Musculoskeletal: Positive for back pain. Negative for arthralgias, joint swelling and myalgias.   Skin: Negative for rash and wound.   Neurological: Negative for headaches.   Psychiatric/Behavioral: Negative for agitation and confusion. The patient is not nervous/anxious.   "        OBJECTIVE     Physical Exam  Vitals:    05/29/17 0802   BP: 122/80   Pulse: 66   Resp: 16   Temp: 97.6 °F (36.4 °C)    Body mass index is 41.79 kg/m².  Weight: 135.9 kg (299 lb 9.7 oz)   Height: 5' 11" (180.3 cm)     Physical Exam   Constitutional: He is oriented to person, place, and time. He appears well-developed and well-nourished. No distress.   HENT:   Head: Normocephalic and atraumatic.   Right Ear: External ear normal.   Left Ear: External ear normal.   Nose: Nose normal.   Mouth/Throat: Oropharynx is clear and moist.   Eyes: Conjunctivae and EOM are normal. Right eye exhibits no discharge. Left eye exhibits no discharge. No scleral icterus.   Neck: Normal range of motion. Neck supple. No JVD present. No tracheal deviation present.   Cardiovascular: Normal rate, regular rhythm, normal heart sounds and intact distal pulses.  Exam reveals no gallop and no friction rub.    No murmur heard.  Pulmonary/Chest: Effort normal and breath sounds normal. No respiratory distress. He has no wheezes.   Abdominal: Soft. Bowel sounds are normal. He exhibits no distension and no mass. There is no tenderness. There is no rebound and no guarding.   Musculoskeletal: Normal range of motion. He exhibits no edema, tenderness or deformity.   Neurological: He is alert and oriented to person, place, and time. He exhibits normal muscle tone. Coordination normal.   Skin: Skin is warm and dry. No rash noted. No erythema.   Psychiatric: He has a normal mood and affect. His behavior is normal. Judgment and thought content normal.         Health Maintenance       Date Due Completion Date    TETANUS VACCINE 03/08/1987 ---    Influenza Vaccine 08/01/2017 ---    Lipid Panel 02/01/2022 2/1/2017            ASSESSMENT     47 y.o. male with     1. Facet arthropathy, lumbar    2. Chronic pain syndrome        PLAN:     1. Facet arthropathy, lumbar  - Stable; no acute issues  - The current medical regimen is effective;  continue present plan " and medications.  - meloxicam (MOBIC) 15 MG tablet; Take 1 tablet (15 mg total) by mouth once daily.  Dispense: 60 tablet; Refill: 3  - ibuprofen (ADVIL,MOTRIN) 800 MG tablet; TAKE ONE TABLET BY MOUTH EVERY 8 HOURS WITH A MEAL  Dispense: 90 tablet; Refill: 1  - oxycodone (OXYCONTIN) 15 mg TR12 12 hr tablet; Take 1 tablet (15 mg total) by mouth every 12 (twelve) hours.  Dispense: 60 tablet; Refill: 0    2. Chronic pain syndrome  - Stable; no acute issues  - The current medical regimen is effective;  continue present plan and medications.  - oxycodone (OXYCONTIN) 15 mg TR12 12 hr tablet; Take 1 tablet (15 mg total) by mouth every 12 (twelve) hours.  Dispense: 60 tablet; Refill: 0  - Pt to f/u with Pain Management-Dr. Wilkerson for further management with stem cell implant        RTC in 3 months     Gabby Dean MD  05/29/2017 8:15 AM        No Follow-up on file.

## 2017-06-06 ENCOUNTER — INITIAL CONSULT (OUTPATIENT)
Dept: VASCULAR SURGERY | Facility: CLINIC | Age: 47
End: 2017-06-06
Payer: COMMERCIAL

## 2017-06-06 VITALS
DIASTOLIC BLOOD PRESSURE: 76 MMHG | SYSTOLIC BLOOD PRESSURE: 128 MMHG | BODY MASS INDEX: 43.08 KG/M2 | HEIGHT: 71 IN | WEIGHT: 307.75 LBS

## 2017-06-06 DIAGNOSIS — I10 HYPERTENSION, ESSENTIAL, BENIGN: ICD-10-CM

## 2017-06-06 DIAGNOSIS — M79.89 LEG SWELLING: ICD-10-CM

## 2017-06-06 PROCEDURE — 99244 OFF/OP CNSLTJ NEW/EST MOD 40: CPT | Mod: S$GLB,,, | Performed by: SURGERY

## 2017-06-06 PROCEDURE — 99999 PR PBB SHADOW E&M-EST. PATIENT-LVL II: CPT | Mod: PBBFAC,,, | Performed by: SURGERY

## 2017-06-06 RX ORDER — HYDROCHLOROTHIAZIDE 25 MG/1
TABLET ORAL
Qty: 30 TABLET | Refills: 2 | Status: SHIPPED | OUTPATIENT
Start: 2017-06-06 | End: 2017-08-07 | Stop reason: SDUPTHER

## 2017-06-06 NOTE — LETTER
June 7, 2017      Gabby Dean MD  [address] [address]  [address]  [place] [address]           [facility]  [address] [facility] [address]  [place] [address]  Phone: [phone]  Fax: [phone]          Patient: Jaime Lund   MR Number: [id number]   YOB: 1970   Date of Visit: 6/6/2017       Dear Dr. Gabby Dean:    Thank you for referring Jaime Lund to me for evaluation. Attached you will find relevant portions of my assessment and plan of care.    If you have questions, please do not hesitate to call me. I look forward to following Jaime Lund along with you.    Sincerely,    Therese Stevens MD    Enclosure  CC:  No Recipients    If you would like to receive this communication electronically, please contact [email or web address] or [phone] to request more information on [email or web address] Link access.    For providers and/or their staff who would like to refer a patient to [facility], please contact us through our one-stop-shop provider referral line, [facility], at [phone].    If you feel you have received this communication in error or would no longer like to receive these types of communications, please e-mail [email or web address]

## 2017-06-07 PROBLEM — M79.89 LEG SWELLING: Status: ACTIVE | Noted: 2017-06-07

## 2017-06-07 NOTE — PROGRESS NOTES
Patient ID: Jaime Lund is a 47 y.o. male.    I. HISTORY     Chief Complaint: Establish Care (consult from Dr. Dean)      HPI Jaime Lund is a 47 y.o. male referred from Dr Dean for evaluation of persistent bilateral leg pain and swelling. Pain interfers with daily activities including exercise; has attempted elevation and analgesics with minimal relief and pain persists. No history of DVT or prior venous ablation procedures. No history of venous ulceration. Denies claudication or rest pain. Has not tried compression stockings. Swelling occurs with prolonged standing or sitting and is located in the lower leg and ankles. Swelling improves with leg elevation overnight. Pain described as cramping and aching.    Works on a shrimp troller. Accompanied by his wife.    Review of Systems   Constitution: Negative.   HENT: Negative.    Eyes: Negative.    Cardiovascular: Positive for leg swelling. Negative for chest pain, claudication and dyspnea on exertion.   Respiratory: Negative for cough and shortness of breath.    Endocrine: Negative.    Hematologic/Lymphatic: Negative.    Skin: Negative for color change, nail changes and poor wound healing.   Musculoskeletal: Negative.    Gastrointestinal: Negative for abdominal pain, nausea and vomiting.   Neurological: Negative.    Psychiatric/Behavioral: Negative.    Allergic/Immunologic: Negative.        II. PHYSICAL EXAM     Physical Exam   Constitutional: He is oriented to person, place, and time. He appears well-developed and well-nourished.   BMI 42.92 kg/m².   HENT:   Head: Normocephalic and atraumatic.   Eyes: Conjunctivae and EOM are normal.   Neck: No JVD present.   Cardiovascular: Normal rate and regular rhythm.    Pulses:       Radial pulses are 2+ on the right side, and 2+ on the left side.        Dorsalis pedis pulses are 2+ on the right side, and 2+ on the left side.   Pulmonary/Chest: No respiratory distress.   Musculoskeletal: Normal range of  motion. He exhibits edema. He exhibits no tenderness.   Neurological: He is alert and oriented to person, place, and time.   Skin: Skin is warm and dry. No rash noted. No erythema.   Psychiatric: He has a normal mood and affect.   Vitals reviewed.      III. ASSESSMENT & PLAN (MEDICAL DECISION MAKING)     1. BMI 40.0-44.9, adult    2. Leg swelling        Imaging Results:  Venous duplex - No DVT, reflux not evaluated    ECHO - normal cardiac function, EF 55%    Labs reviewed - normal renal functon    Assessment/Diagnosis and Plan:  Jaime Lund is a 47 y.o. male with significant leg swelling. Normal cardiac and renal function. Swelling likely due to venous insufficiency and morbid obesity.   Recommend compression stocking and follow-up in 3 months. Discussed EVLT if symptoms persist despite compression therapy. Continue regular exercise.    Venous duplex to evaluate reflux on follow up visit.     Therese Stevens MD FACS [facility]  Vascular & Endovascular Surgery

## 2017-06-09 DIAGNOSIS — I87.2 VENOUS INSUFFICIENCY: Primary | ICD-10-CM

## 2017-06-27 DIAGNOSIS — M47.816 FACET ARTHROPATHY, LUMBAR: ICD-10-CM

## 2017-06-27 RX ORDER — IBUPROFEN 800 MG/1
TABLET ORAL
Qty: 90 TABLET | Refills: 2 | Status: SHIPPED | OUTPATIENT
Start: 2017-06-27 | End: 2017-12-04 | Stop reason: ALTCHOICE

## 2017-06-30 RX ORDER — DULOXETIN HYDROCHLORIDE 60 MG/1
60 CAPSULE, DELAYED RELEASE ORAL NIGHTLY
Qty: 30 CAPSULE | Refills: 5 | Status: SHIPPED | OUTPATIENT
Start: 2017-06-30 | End: 2017-12-14 | Stop reason: SDUPTHER

## 2017-06-30 RX ORDER — METHOCARBAMOL 500 MG/1
500 TABLET, FILM COATED ORAL 3 TIMES DAILY PRN
Qty: 60 TABLET | Refills: 2 | Status: SHIPPED | OUTPATIENT
Start: 2017-06-30 | End: 2018-03-12

## 2017-06-30 RX ORDER — GABAPENTIN 600 MG/1
600 TABLET ORAL DAILY
Qty: 90 TABLET | Refills: 3 | Status: SHIPPED | OUTPATIENT
Start: 2017-06-30 | End: 2017-08-10 | Stop reason: DRUGHIGH

## 2017-06-30 NOTE — TELEPHONE ENCOUNTER
----- Message from Nayeli Goodwin sent at 6/30/2017  9:57 AM CDT -----  Contact: Pt's wife Claudia  Pt needs a refill on methocarbamol (ROBAXIN) 750 MG Tab, gabapentin (GRALISE) 600 mg Tb24, and duloxetine (CYMBALTA) 60 MG capsule called into [facility] at [phone].        Please call pt at [phone].      Thanks!

## 2017-08-07 DIAGNOSIS — I10 HYPERTENSION, ESSENTIAL, BENIGN: ICD-10-CM

## 2017-08-07 RX ORDER — HYDROCHLOROTHIAZIDE 25 MG/1
TABLET ORAL
Qty: 30 TABLET | Refills: 5 | Status: SHIPPED | OUTPATIENT
Start: 2017-08-07 | End: 2018-03-02 | Stop reason: SDUPTHER

## 2017-08-08 DIAGNOSIS — M79.89 PAIN AND SWELLING OF LOWER LEG, UNSPECIFIED LATERALITY: ICD-10-CM

## 2017-08-08 DIAGNOSIS — M79.669 PAIN AND SWELLING OF LOWER LEG, UNSPECIFIED LATERALITY: ICD-10-CM

## 2017-08-08 RX ORDER — FUROSEMIDE 40 MG/1
TABLET ORAL
Qty: 30 TABLET | Refills: 2 | Status: SHIPPED | OUTPATIENT
Start: 2017-08-08 | End: 2017-10-05 | Stop reason: SDUPTHER

## 2017-08-10 ENCOUNTER — TELEPHONE (OUTPATIENT)
Dept: FAMILY MEDICINE | Facility: CLINIC | Age: 47
End: 2017-08-10

## 2017-08-10 DIAGNOSIS — G89.4 CHRONIC PAIN SYNDROME: Primary | ICD-10-CM

## 2017-08-10 RX ORDER — GABAPENTIN 300 MG/1
300 CAPSULE ORAL 3 TIMES DAILY
Qty: 90 CAPSULE | Refills: 5 | Status: SHIPPED | OUTPATIENT
Start: 2017-08-10 | End: 2018-03-01

## 2017-08-10 NOTE — TELEPHONE ENCOUNTER
----- Message from Dunia Rodriguez sent at 8/10/2017  2:59 PM CDT -----  Contact: spouse - Claudia  Calling regarding prescription for --  gabapentin (GRALISE) 600 mg Tb24 , He was taking 300 mg , now with the new prescription the insurance requires prior auth .    [facility] .             pts #   [phone]     LL

## 2017-10-05 DIAGNOSIS — M79.89 PAIN AND SWELLING OF LOWER LEG, UNSPECIFIED LATERALITY: ICD-10-CM

## 2017-10-05 DIAGNOSIS — M79.669 PAIN AND SWELLING OF LOWER LEG, UNSPECIFIED LATERALITY: ICD-10-CM

## 2017-10-05 RX ORDER — FUROSEMIDE 40 MG/1
TABLET ORAL
Qty: 30 TABLET | Refills: 2 | Status: SHIPPED | OUTPATIENT
Start: 2017-10-05 | End: 2018-05-15 | Stop reason: SDUPTHER

## 2017-12-04 DIAGNOSIS — M47.816 FACET ARTHROPATHY, LUMBAR: ICD-10-CM

## 2017-12-04 RX ORDER — MELOXICAM 15 MG/1
TABLET ORAL
Qty: 60 TABLET | Refills: 1 | Status: SHIPPED | OUTPATIENT
Start: 2017-12-04 | End: 2018-03-26 | Stop reason: SDUPTHER

## 2017-12-14 RX ORDER — DULOXETIN HYDROCHLORIDE 60 MG/1
CAPSULE, DELAYED RELEASE ORAL
Qty: 90 CAPSULE | Refills: 0 | Status: ON HOLD | OUTPATIENT
Start: 2017-12-14 | End: 2018-04-03 | Stop reason: SDUPTHER

## 2018-01-08 DIAGNOSIS — M79.669 PAIN AND SWELLING OF LOWER LEG, UNSPECIFIED LATERALITY: ICD-10-CM

## 2018-01-08 DIAGNOSIS — M79.89 PAIN AND SWELLING OF LOWER LEG, UNSPECIFIED LATERALITY: ICD-10-CM

## 2018-01-08 RX ORDER — FUROSEMIDE 40 MG/1
TABLET ORAL
Qty: 30 TABLET | OUTPATIENT
Start: 2018-01-08

## 2018-01-16 ENCOUNTER — TELEPHONE (OUTPATIENT)
Dept: NEUROLOGY | Facility: CLINIC | Age: 48
End: 2018-01-16

## 2018-01-16 RX ORDER — BUTALBITAL, ACETAMINOPHEN AND CAFFEINE 50; 325; 40 MG/1; MG/1; MG/1
TABLET ORAL
Qty: 60 TABLET | Refills: 11 | Status: SHIPPED | OUTPATIENT
Start: 2018-01-16 | End: 2018-01-17 | Stop reason: SDUPTHER

## 2018-01-17 NOTE — TELEPHONE ENCOUNTER
----- Message from Shannen Carr sent at 1/16/2018 10:34 AM CST -----  Contact: Pt wife Claudia Taylor is requesting to schedule an appt with Dr Holly     Annual visit    Attempted to schedule no available appts    Claudia can be reached  at [phone]    Thanks

## 2018-01-17 NOTE — TELEPHONE ENCOUNTER
----- Message from Jaylyn Stevens sent at 1/16/2018  6:09 PM CST -----  Contact: Pt wife   Pt needs a refill on butalbital-acetaminophen-caffeine -40 mg (FIORICET, ESGIC) -40 mg per tablet called into pharmacy    Pt can be reached at [phone]

## 2018-01-22 RX ORDER — BUTALBITAL, ACETAMINOPHEN AND CAFFEINE 50; 325; 40 MG/1; MG/1; MG/1
1 TABLET ORAL EVERY 4 HOURS PRN
Qty: 60 TABLET | Refills: 11 | Status: SHIPPED | OUTPATIENT
Start: 2018-01-22 | End: 2019-01-28 | Stop reason: SDUPTHER

## 2018-02-05 ENCOUNTER — TELEPHONE (OUTPATIENT)
Dept: NEUROLOGY | Facility: CLINIC | Age: 48
End: 2018-02-05

## 2018-02-05 NOTE — TELEPHONE ENCOUNTER
----- Message from Delmy Smith sent at 2/3/2018 11:08 AM CST -----  Contact: Pt's wife Claudia   Pt's wife is calling in regards to scheduling an appt for pt. The first available appt is 05/02. Per wife he needs to be seen sooner, due to terrible headaches. Pt's states that medication is working.    Pt's wife can be reached at [phone].    Thank you

## 2018-02-26 ENCOUNTER — OFFICE VISIT (OUTPATIENT)
Dept: NEUROSURGERY | Facility: CLINIC | Age: 48
End: 2018-02-26
Payer: COMMERCIAL

## 2018-02-26 VITALS
BODY MASS INDEX: 43.71 KG/M2 | DIASTOLIC BLOOD PRESSURE: 74 MMHG | HEART RATE: 58 BPM | TEMPERATURE: 98 F | SYSTOLIC BLOOD PRESSURE: 115 MMHG | HEIGHT: 71 IN | WEIGHT: 312.19 LBS

## 2018-02-26 DIAGNOSIS — G89.4 CHRONIC PAIN SYNDROME: Primary | ICD-10-CM

## 2018-02-26 DIAGNOSIS — M96.1 POSTLAMINECTOMY SYNDROME OF LUMBAR REGION: ICD-10-CM

## 2018-02-26 DIAGNOSIS — M47.816 FACET ARTHROPATHY, LUMBAR: ICD-10-CM

## 2018-02-26 PROCEDURE — 99244 OFF/OP CNSLTJ NEW/EST MOD 40: CPT | Mod: S$GLB,,, | Performed by: NEUROLOGICAL SURGERY

## 2018-02-26 PROCEDURE — 99999 PR PBB SHADOW E&M-EST. PATIENT-LVL III: CPT | Mod: PBBFAC,,, | Performed by: NEUROLOGICAL SURGERY

## 2018-02-26 NOTE — PROGRESS NOTES
History & Physical    SUBJECTIVE:     Chief Complaint: Chronic low back pain and lumbar postlaminectomy syndrome.    History of Present Illness:  Mr. Lund is a 47-year-old male who is referred to me by Dr. Jermain Wilkerson.  Past medical history is significant for hypertension, seizure, obesity, peripheral artery disease, and chronic pain.  He has had low back pain and leg pain for a significant amount of time.  He describes this pain as burning and stabbing.  It is mainly in his lower back but also encompasses his calves bilaterally.  His back pain is worse than his leg pain.  Any type of activity or movement such as lying down, sitting, standing, walking, or bending makes the pain worse.  Nothing makes the pain better.  He has been seen by vascular surgery for his calf pain.  This was determined to be non-claudication.  They recommended compression stockings which have not helped.  He has undergone multiple conservative therapies in addition to a lumbar surgery.  The surgery was not helpful for his pain and he ended up with meningitis and/or encephalitis postoperatively.  Epidural steroid injections and physical therapy have not helped either.  Ultimately, he underwent a single shot intrathecal opioid trial.  This significantly helped his pain.  States that for the first day after the trial he did not require any pain medications for either his back pain nor his leg pain.  Therefore, he is here today to discuss permanent implantation of an intrathecal pain pump.    Review of patient's allergies indicates:   Allergen Reactions    Klonopin [clonazepam] Anxiety       Current Outpatient Prescriptions   Medication Sig Dispense Refill    butalbital-acetaminophen-caffeine -40 mg (FIORICET, ESGIC) -40 mg per tablet Take 1 tablet by mouth every 4 (four) hours as needed. 60 tablet 11    DULoxetine (CYMBALTA) 60 MG capsule TAKE 1 CAPSULE BY MOUTH EVERY EVENING 90 capsule 0    furosemide (LASIX) 40 MG tablet TAKE  ONE TABLET BY MOUTH ONCE DAILY FOR FLUID 30 tablet 2    gabapentin (NEURONTIN) 300 MG capsule Take 1 capsule (300 mg total) by mouth 3 (three) times daily. 90 capsule 5    hydrochlorothiazide (HYDRODIURIL) 25 MG tablet TAKE ONE TABLET BY MOUTH ONCE DAILY FOR BLOOD PRESSURE AND OR FOR FLUID 30 tablet 5    meloxicam (MOBIC) 15 MG tablet TAKE ONE TABLET BY MOUTH ONCE DAILY WITH A MEAL FOR INFLAMMATION AND OR PAIN 60 tablet 1    methocarbamol (ROBAXIN) 500 MG Tab Take 1 tablet (500 mg total) by mouth 3 (three) times daily as needed. 60 tablet 2    oxycodone (OXYCONTIN) 15 mg TR12 12 hr tablet Take 1 tablet (15 mg total) by mouth every 12 (twelve) hours. 60 tablet 0    oxycodone-acetaminophen (PERCOCET)  mg per tablet Take 1 tablet by mouth every 6 (six) hours as needed.  0    ranitidine (ZANTAC) 150 MG capsule Take 150 mg by mouth 2 (two) times daily.      tamsulosin (FLOMAX) 0.4 mg Cp24 TAKE 1 CAPSULE BY MOUTH ONCE DAILY FOR PROSTATE  6    tizanidine (ZANAFLEX) 2 MG tablet Take 2 mg by mouth 3 (three) times daily.  2     No current facility-administered medications for this visit.        Past Medical History:   Diagnosis Date    Bulging discs     Degenerative disc disease     Hypertension      Past Surgical History:   Procedure Laterality Date    CHOLECYSTECTOMY      GASTRIC BYPASS      SLEEVE    gastric sleeve  2011    HERNIA REPAIR      SKIN SURGERY      EXCESS SKIN REMOVAL    SPINE SURGERY      lumbar fusion     Family History     Problem Relation (Age of Onset)    Arthritis Mother    Breast cancer Mother    Heart disease Mother    Hypertension Mother, Father    Throat cancer Father        Social History     Social History    Marital status: Single     Spouse name: N/A    Number of children: N/A    Years of education: N/A     Social History Main Topics    Smoking status: Never Smoker    Smokeless tobacco: Never Used    Alcohol use No    Drug use: No    Sexual activity: Not Asked  "    Other Topics Concern    None     Social History Narrative    None       Review of Systems:  Review of Systems   Constitutional: Positive for fatigue and unexpected weight change. Negative for activity change, diaphoresis and fever.   Eyes: Positive for visual disturbance.   Respiratory: Negative for cough, shortness of breath and wheezing.    Cardiovascular: Negative for chest pain and palpitations.   Gastrointestinal: Negative for abdominal distention, abdominal pain, blood in stool, constipation, diarrhea, nausea and vomiting.   Genitourinary: Negative for difficulty urinating, enuresis, frequency and urgency.   Musculoskeletal: Positive for back pain. Negative for gait problem, joint swelling, myalgias, neck pain and neck stiffness.   Skin: Negative for color change, rash and wound.   Allergic/Immunologic: Negative for environmental allergies and food allergies.   Neurological: Positive for numbness and headaches. Negative for dizziness, seizures, facial asymmetry, speech difficulty, weakness and light-headedness.   Hematological: Does not bruise/bleed easily.   Psychiatric/Behavioral: Negative for agitation, behavioral problems, dysphoric mood and hallucinations. The patient is not nervous/anxious.        OBJECTIVE:     Vital Signs  Temp: 97.6 °F (36.4 °C)  Pulse: (!) 58  BP: 115/74  Pain Score:   8 (LOWER BACK LEFT CALF)  Height: 5' 11" (180.3 cm)  Weight: (!) 141.6 kg (312 lb 2.7 oz)  Body mass index is 43.54 kg/m².      Physical Exam:  Physical Exam:    Constitutional: He appears well-developed and well-nourished. No distress.     Eyes: Pupils are equal, round, and reactive to light. Conjunctivae and EOM are normal.     Cardiovascular: Normal rate, regular rhythm, normal pulses and no edema.     Abdominal: Soft. Bowel sounds are normal.     Skin: Skin displays no rash on trunk and no rash on extremities. Skin displays no lesions on trunk and no lesions on extremities.     Psych/Behavior: He is alert. He " is oriented to person, place, and time. He has a normal mood and affect.     Musculoskeletal: Gait is normal.        Neck: Range of motion is full. There is no tenderness. Muscle strength is 5/5. Tone is normal.        Back: Range of motion is full. There is no tenderness. Muscle strength is 5/5. Tone is normal.        Right Upper Extremities: Range of motion is full. There is no tenderness. Muscle strength is 5/5. Tone is normal.        Left Upper Extremities: Range of motion is full. There is no tenderness. Muscle strength is 5/5. Tone is normal.       Right Lower Extremities: Range of motion is full. There is no tenderness. Muscle strength is 5/5. Tone is normal.        Left Lower Extremities: Range of motion is full. There is no tenderness. Muscle strength is 5/5. Tone is normal.     Neurological:        Coordination: He has a normal Romberg Test, normal finger to nose coordination and normal tandem walking coordination.        Sensory: There is no sensory deficit in the trunk. There is no sensory deficit in the extremities.        DTRs: DTRs are DTRS NORMAL AND SYMMETRICnormal and symmetric. He displays no Babinski's sign on the right side. He displays no Babinski's sign on the left side.        Cranial nerves: Cranial nerve(s) II, III, IV, V, VI, VII, VIII, IX, X, XI and XII are intact.         Diagnostic Results:  He has no updated imaging studies available for review.    ASSESSMENT/PLAN:     Mr Lnud 47-year-old male with chronic low back pain and lumbar postlaminectomy syndrome.  He has tried surgical and conservative therapies and failed.  He underwent a single shot intrathecal opioid trial which she responded very well to.  He states that all of his pain was gone for 23 hours after the trial.  He is interested in proceeding with a permanent implantation of an intrathecal catheter and pain pump.  I explained the procedure in detail to the patient as well as the risks and benefits and pros and cons.  He  wishes to proceed at this time.  He knows that there is significant risk for pump flipping given his morbid obesity and body habitus.  We will likely use a Dacron pouch.  He knows that he needs to be off of all anticoagulants for 1 week prior to surgery.  We will get all the appropriate preoperative testing and schedule surgery in the near future.  He knows he can call with any further questions or concerns.        Note dictated with voice recognition software, please excuse any grammatical errors.

## 2018-03-01 ENCOUNTER — OFFICE VISIT (OUTPATIENT)
Dept: FAMILY MEDICINE | Facility: CLINIC | Age: 48
End: 2018-03-01
Payer: COMMERCIAL

## 2018-03-01 ENCOUNTER — HOSPITAL ENCOUNTER (OUTPATIENT)
Dept: RADIOLOGY | Facility: HOSPITAL | Age: 48
Discharge: HOME | End: 2018-03-01
Attending: INTERNAL MEDICINE
Payer: COMMERCIAL

## 2018-03-01 VITALS
HEIGHT: 71 IN | BODY MASS INDEX: 42.47 KG/M2 | HEART RATE: 66 BPM | DIASTOLIC BLOOD PRESSURE: 100 MMHG | SYSTOLIC BLOOD PRESSURE: 126 MMHG | TEMPERATURE: 98 F | OXYGEN SATURATION: 97 % | WEIGHT: 303.38 LBS

## 2018-03-01 DIAGNOSIS — R07.89 CHEST TIGHTNESS: Primary | ICD-10-CM

## 2018-03-01 DIAGNOSIS — R07.89 CHEST TIGHTNESS: ICD-10-CM

## 2018-03-01 DIAGNOSIS — R34 DECREASED URINE OUTPUT: ICD-10-CM

## 2018-03-01 DIAGNOSIS — I10 ESSENTIAL HYPERTENSION: ICD-10-CM

## 2018-03-01 DIAGNOSIS — R06.02 SHORTNESS OF BREATH: ICD-10-CM

## 2018-03-01 DIAGNOSIS — R53.1 WEAKNESS: ICD-10-CM

## 2018-03-01 PROCEDURE — 3080F DIAST BP >= 90 MM HG: CPT | Mod: S$GLB,,, | Performed by: INTERNAL MEDICINE

## 2018-03-01 PROCEDURE — 3074F SYST BP LT 130 MM HG: CPT | Mod: S$GLB,,, | Performed by: INTERNAL MEDICINE

## 2018-03-01 PROCEDURE — 99999 PR PBB SHADOW E&M-EST. PATIENT-LVL III: CPT | Mod: PBBFAC,,, | Performed by: INTERNAL MEDICINE

## 2018-03-01 PROCEDURE — 99214 OFFICE O/P EST MOD 30 MIN: CPT | Mod: S$GLB,,, | Performed by: INTERNAL MEDICINE

## 2018-03-01 PROCEDURE — 71046 X-RAY EXAM CHEST 2 VIEWS: CPT | Mod: TC,FY

## 2018-03-01 PROCEDURE — 93010 ELECTROCARDIOGRAM REPORT: CPT | Mod: S$GLB,,, | Performed by: INTERNAL MEDICINE

## 2018-03-01 PROCEDURE — 93005 ELECTROCARDIOGRAM TRACING: CPT | Mod: S$GLB,,, | Performed by: INTERNAL MEDICINE

## 2018-03-01 PROCEDURE — 81003 URINALYSIS AUTO W/O SCOPE: CPT

## 2018-03-01 PROCEDURE — 71046 X-RAY EXAM CHEST 2 VIEWS: CPT | Mod: 26,,, | Performed by: RADIOLOGY

## 2018-03-01 RX ORDER — GABAPENTIN 400 MG/1
400 CAPSULE ORAL 3 TIMES DAILY
COMMUNITY
Start: 2018-02-26 | End: 2018-04-19

## 2018-03-01 NOTE — PROGRESS NOTES
SUBJECTIVE     Chief Complaint   Patient presents with    Tightness in Chest/ Difficulty Breathing    Headache    Fatigue       HPI  Jaime Lund is a 47 y.o. male with multiple medical diagnoses as listed in the medical history and problem list that presents for evaluation of Migraines x 3-4 days. Pt reports his typical migraines at the B/L occipital lobe. He has been taking Fioricet without any improvement in pain. Pt reports some difficulty breathing today associated with chest tightness. Pt also reports feeling very weak and fatigue. He has been pushing himself in the past few weeks to finish work on a boat.    PAST MEDICAL HISTORY:  Past Medical History:   Diagnosis Date    Bulging discs     Degenerative disc disease     Hypertension        PAST SURGICAL HISTORY:  Past Surgical History:   Procedure Laterality Date    CHOLECYSTECTOMY      GASTRIC BYPASS      SLEEVE    gastric sleeve  2011    HERNIA REPAIR      SKIN SURGERY      EXCESS SKIN REMOVAL    SPINE SURGERY      lumbar fusion       SOCIAL HISTORY:  Social History     Social History    Marital status: Single     Spouse name: N/A    Number of children: N/A    Years of education: N/A     Occupational History    Not on file.     Social History Main Topics    Smoking status: Never Smoker    Smokeless tobacco: Never Used    Alcohol use No    Drug use: No    Sexual activity: Not on file     Other Topics Concern    Not on file     Social History Narrative    No narrative on file       FAMILY HISTORY:  Family History   Problem Relation Age of Onset    Heart disease Mother     Breast cancer Mother     Arthritis Mother     Hypertension Mother     Throat cancer Father     Hypertension Father        ALLERGIES AND MEDICATIONS: updated and reviewed.  Review of patient's allergies indicates:   Allergen Reactions    Klonopin [clonazepam] Anxiety     Current Outpatient Prescriptions   Medication Sig Dispense Refill     butalbital-acetaminophen-caffeine -40 mg (FIORICET, ESGIC) -40 mg per tablet Take 1 tablet by mouth every 4 (four) hours as needed. 60 tablet 11    DULoxetine (CYMBALTA) 60 MG capsule TAKE 1 CAPSULE BY MOUTH EVERY EVENING 90 capsule 0    furosemide (LASIX) 40 MG tablet TAKE ONE TABLET BY MOUTH ONCE DAILY FOR FLUID 30 tablet 2    gabapentin (NEURONTIN) 400 MG capsule 3 (three) times daily.       hydrochlorothiazide (HYDRODIURIL) 25 MG tablet TAKE ONE TABLET BY MOUTH ONCE DAILY FOR BLOOD PRESSURE AND OR FOR FLUID 30 tablet 5    meloxicam (MOBIC) 15 MG tablet TAKE ONE TABLET BY MOUTH ONCE DAILY WITH A MEAL FOR INFLAMMATION AND OR PAIN 60 tablet 1    methocarbamol (ROBAXIN) 500 MG Tab Take 1 tablet (500 mg total) by mouth 3 (three) times daily as needed. 60 tablet 2    oxycodone (OXYCONTIN) 15 mg TR12 12 hr tablet Take 1 tablet (15 mg total) by mouth every 12 (twelve) hours. 60 tablet 0    oxycodone-acetaminophen (PERCOCET)  mg per tablet Take 1 tablet by mouth every 6 (six) hours as needed.  0    ranitidine (ZANTAC) 150 MG capsule Take 150 mg by mouth 2 (two) times daily.      tamsulosin (FLOMAX) 0.4 mg Cp24 TAKE 1 CAPSULE BY MOUTH ONCE DAILY FOR PROSTATE  6    tizanidine (ZANAFLEX) 2 MG tablet Take 2 mg by mouth 3 (three) times daily.  2     No current facility-administered medications for this visit.        ROS  Review of Systems   Constitutional: Negative for chills and fever.   HENT: Negative for hearing loss and sore throat.    Eyes: Negative for visual disturbance.   Respiratory: Positive for chest tightness and shortness of breath. Negative for cough.    Cardiovascular: Negative for chest pain, palpitations and leg swelling.   Gastrointestinal: Negative for abdominal pain, constipation, diarrhea, nausea and vomiting.   Genitourinary: Negative for dysuria, frequency and urgency.   Musculoskeletal: Negative for arthralgias, joint swelling and myalgias.   Skin: Negative for rash and  "wound.   Neurological: Positive for headaches.   Psychiatric/Behavioral: Negative for agitation and confusion. The patient is not nervous/anxious.          OBJECTIVE     Physical Exam  Vitals:    03/01/18 1517   BP: (!) 126/100   Pulse: 66   Temp: 97.8 °F (36.6 °C)    Body mass index is 42.31 kg/m².  Weight: (!) 137.6 kg (303 lb 5.7 oz)   Height: 5' 11" (180.3 cm)     Physical Exam   Constitutional: He is oriented to person, place, and time. He appears well-developed and well-nourished. No distress.   HENT:   Head: Normocephalic and atraumatic.   Right Ear: External ear normal.   Left Ear: External ear normal.   Nose: Nose normal.   Mouth/Throat: Oropharynx is clear and moist.   Eyes: Conjunctivae and EOM are normal. Right eye exhibits no discharge. Left eye exhibits no discharge. No scleral icterus.   Neck: Normal range of motion. Neck supple. No JVD present. No tracheal deviation present.   Cardiovascular: Normal rate, regular rhythm, normal heart sounds and intact distal pulses.  Exam reveals no gallop and no friction rub.    No murmur heard.  Pulmonary/Chest: Effort normal and breath sounds normal. No respiratory distress. He has no wheezes.   Abdominal: Soft. Bowel sounds are normal. He exhibits no distension and no mass. There is no tenderness. There is no rebound and no guarding.   Musculoskeletal: Normal range of motion. He exhibits no edema, tenderness or deformity.   Neurological: He is alert and oriented to person, place, and time. He exhibits normal muscle tone. Coordination normal.   Skin: Skin is warm and dry. No rash noted. No erythema.   Psychiatric: He has a normal mood and affect. His behavior is normal. Judgment and thought content normal.         Health Maintenance       Date Due Completion Date    Influenza Vaccine 04/26/2018 (Originally 8/1/2017) ---    TETANUS VACCINE 11/30/2018 (Originally 3/8/1987) ---    Lipid Panel 02/01/2022 2/1/2017            ASSESSMENT     47 y.o. male with     1. " Chest tightness    2. Shortness of breath    3. Weakness    4. Decreased urine output    5. Essential hypertension        PLAN:     1. Chest tightness  - Concern that current presentation is due to dehydration, but will plan for EKG and CXR to r/o acute cardiac or pulmonary pathology  - EKG 12-lead; NSR, rate 60, axis ~-10, normal intervals, +LVH, no L atrial enlargement, no ST elevation/depression  - X-Ray Chest PA And Lateral; Future  - CBC auto differential; Future  - Comprehensive metabolic panel; Future  - TSH; Future  - Brain natriuretic peptide; Future    2. Shortness of breath  - X-Ray Chest PA And Lateral; Future  - CBC auto differential; Future  - Comprehensive metabolic panel; Future  - TSH; Future  - Brain natriuretic peptide; Future    3. Weakness  - CBC auto differential; Future  - Comprehensive metabolic panel; Future  - Urinalysis  - CK; Future    4. Decreased urine output  - Pt advised to increase fluid hydration  - Urinalysis  - CK; Future    5. Essential hypertension  - BP elevated above goal of <140/90  - Monitor        RTC in 1-2 weeks for continued symptoms; go straight to ED for any acute worsening of current condition    Gabby Dean MD  03/01/2018 3:27 PM        No Follow-up on file.

## 2018-03-02 ENCOUNTER — TELEPHONE (OUTPATIENT)
Dept: FAMILY MEDICINE | Facility: CLINIC | Age: 48
End: 2018-03-02

## 2018-03-02 DIAGNOSIS — E87.6 HYPOKALEMIA: Primary | ICD-10-CM

## 2018-03-02 DIAGNOSIS — I10 HYPERTENSION, ESSENTIAL, BENIGN: ICD-10-CM

## 2018-03-02 DIAGNOSIS — E05.90 SUBCLINICAL HYPERTHYROIDISM: ICD-10-CM

## 2018-03-02 LAB
BILIRUB UR QL STRIP: ABNORMAL
CLARITY UR: CLEAR
COLOR UR: ABNORMAL
GLUCOSE UR QL STRIP: NEGATIVE
HGB UR QL STRIP: NEGATIVE
KETONES UR QL STRIP: NEGATIVE
LEUKOCYTE ESTERASE UR QL STRIP: NEGATIVE
NITRITE UR QL STRIP: NEGATIVE
PH UR STRIP: 5 [PH] (ref 5–8)
PROT UR QL STRIP: NEGATIVE
SP GR UR STRIP: >1.03 (ref 1–1.03)
URN SPEC COLLECT METH UR: ABNORMAL
UROBILINOGEN UR STRIP-ACNC: NEGATIVE EU/DL

## 2018-03-02 RX ORDER — POTASSIUM CHLORIDE 750 MG/1
10 TABLET, EXTENDED RELEASE ORAL DAILY
Qty: 90 TABLET | Refills: 0 | Status: SHIPPED | OUTPATIENT
Start: 2018-03-02 | End: 2018-04-19

## 2018-03-02 RX ORDER — HYDROCHLOROTHIAZIDE 25 MG/1
TABLET ORAL
Qty: 30 TABLET | Refills: 5 | Status: SHIPPED | OUTPATIENT
Start: 2018-03-02 | End: 2018-07-06 | Stop reason: SDUPTHER

## 2018-03-02 NOTE — TELEPHONE ENCOUNTER
----- Message from Katie Rolle sent at 3/2/2018  4:26 PM CST -----  Contact: Kat with [facility]  Which meds does doctor want patient to get? potassium chloride (KLOR-CON) 10 or Potassium 99mg over the counter?     Kat can be reached at [phone].    thanks

## 2018-03-02 NOTE — TELEPHONE ENCOUNTER
Spoke to patient advised him of his normal CXR results. Patient also would like his lab results.  He states that he is not feeling any better, he is feeling drained and would like to know what is the next step.  Please advise.

## 2018-03-02 NOTE — TELEPHONE ENCOUNTER
----- Message from Isabel Serrano sent at 3/2/2018  2:43 PM CST -----  Contact: [facility] urgent care - Asha Barry requesting pt EKG Xray report.  Fax # [phone].

## 2018-03-02 NOTE — TELEPHONE ENCOUNTER
----- Message from Deidra Schwartz sent at 3/2/2018 10:58 AM CST -----  Contact: Self   Patient is calling to discuss test results. Please call at [phone]

## 2018-03-05 NOTE — TELEPHONE ENCOUNTER
Called patient and LVM to check to see if patient got meds already  And if so which one did he get. Informed patient to give us a callback.

## 2018-03-12 ENCOUNTER — OFFICE VISIT (OUTPATIENT)
Dept: NEUROLOGY | Facility: CLINIC | Age: 48
End: 2018-03-12
Payer: COMMERCIAL

## 2018-03-12 VITALS
WEIGHT: 315 LBS | HEIGHT: 71 IN | SYSTOLIC BLOOD PRESSURE: 151 MMHG | BODY MASS INDEX: 44.1 KG/M2 | HEART RATE: 68 BPM | DIASTOLIC BLOOD PRESSURE: 91 MMHG

## 2018-03-12 DIAGNOSIS — G04.90 ENCEPHALITIS: Primary | ICD-10-CM

## 2018-03-12 PROCEDURE — 3077F SYST BP >= 140 MM HG: CPT | Mod: CPTII,S$GLB,, | Performed by: PSYCHIATRY & NEUROLOGY

## 2018-03-12 PROCEDURE — 99215 OFFICE O/P EST HI 40 MIN: CPT | Mod: S$GLB,,, | Performed by: PSYCHIATRY & NEUROLOGY

## 2018-03-12 PROCEDURE — 3080F DIAST BP >= 90 MM HG: CPT | Mod: CPTII,S$GLB,, | Performed by: PSYCHIATRY & NEUROLOGY

## 2018-03-12 PROCEDURE — 99999 PR PBB SHADOW E&M-EST. PATIENT-LVL III: CPT | Mod: PBBFAC,,, | Performed by: PSYCHIATRY & NEUROLOGY

## 2018-03-12 RX ORDER — PROPRANOLOL HYDROCHLORIDE 10 MG/1
10 TABLET ORAL 3 TIMES DAILY
Qty: 90 TABLET | Refills: 11 | Status: SHIPPED | OUTPATIENT
Start: 2018-03-12 | End: 2019-04-11 | Stop reason: SDUPTHER

## 2018-03-12 RX ORDER — AMITRIPTYLINE HYDROCHLORIDE 50 MG/1
50 TABLET, FILM COATED ORAL NIGHTLY
COMMUNITY
Start: 2018-03-05 | End: 2019-04-11 | Stop reason: SDUPTHER

## 2018-03-12 RX ORDER — METHOCARBAMOL 750 MG/1
750 TABLET, FILM COATED ORAL 2 TIMES DAILY
COMMUNITY
Start: 2018-03-06 | End: 2018-06-29 | Stop reason: ALTCHOICE

## 2018-03-12 RX ORDER — PREDNISONE 20 MG/1
20 TABLET ORAL DAILY
Qty: 3 TABLET | Refills: 0 | Status: SHIPPED | OUTPATIENT
Start: 2018-03-12 | End: 2018-03-15

## 2018-03-12 NOTE — PROGRESS NOTES
"Name: Jaime Lund  MRN: [id number]   CSN: [id number]      Date: 03/12/2018    Chief Complaint / Interval History:   - fairly new and persistent headaches  - posterior, deep, throbbing at times  - can last for days, 5-7 days in a row  - no other focal features  - making sleep worse, wasn't an issue before  - been taking fioricet 3x per day, never more but almost always 3 a day   - no    - seeing pain management     History of Present Illness (HPI):    Early march, had low grade fever.  No source found.  U/A normal.  Just feeling drained and malaise.  On March 17, he was "a little off".  Romel said he sat in the same spot for 5 hours.  That night while eating ice cream, he had a "seizure."  Head versive to R, dramatic ictal cry, had witnessed tonic and clonic movements for 1-2 minutes, then combative with no memory.  Went to ER, was in a daze for "hours".  In the ER, had a second witnessed seizure.    MRI done:        After dx with encephalitis, was sent to [place].  LP done there, confirmed HSV per report.  Got Acyclovir.    Now with some short term memory loss.  A bit more mood instability.  Some stiff neck and headache when stressed or overherated.    No current fevers, chills, sob, cp, rash, bruising, const, urinary issues, other mood changes.    Past Medical History: The patient  has a past medical history of Bulging discs; Degenerative disc disease; and Hypertension.    Social History: The patient  reports that he has never smoked. He has never used smokeless tobacco. He reports that he does not drink alcohol or use drugs.    Family History: Their family history includes Arthritis in his mother; Breast cancer in his mother; Heart disease in his mother; Hypertension in his father and mother; Throat cancer in his father.    Allergies: Klonopin [clonazepam]     Meds:   Current Outpatient Prescriptions on File Prior to Visit   Medication Sig Dispense Refill    butalbital-acetaminophen-caffeine -40 mg " "(FIORICET, ESGIC) -40 mg per tablet Take 1 tablet by mouth every 4 (four) hours as needed. 60 tablet 11    DULoxetine (CYMBALTA) 60 MG capsule TAKE 1 CAPSULE BY MOUTH EVERY EVENING 90 capsule 0    furosemide (LASIX) 40 MG tablet TAKE ONE TABLET BY MOUTH ONCE DAILY FOR FLUID 30 tablet 2    gabapentin (NEURONTIN) 400 MG capsule 3 (three) times daily.       hydroCHLOROthiazide (HYDRODIURIL) 25 MG tablet TAKE ONE TABLET BY MOUTH ONCE DAILY FOR BLOOD PRESSURE AND OR FOR FLUID 30 tablet 5    meloxicam (MOBIC) 15 MG tablet TAKE ONE TABLET BY MOUTH ONCE DAILY WITH A MEAL FOR INFLAMMATION AND OR PAIN 60 tablet 1    oxycodone (OXYCONTIN) 15 mg TR12 12 hr tablet Take 1 tablet (15 mg total) by mouth every 12 (twelve) hours. 60 tablet 0    oxycodone-acetaminophen (PERCOCET)  mg per tablet Take 1 tablet by mouth every 6 (six) hours as needed.  0    potassium chloride (KLOR-CON) 10 MEQ TbSR Take 1 tablet (10 mEq total) by mouth once daily. 90 tablet 0    ranitidine (ZANTAC) 150 MG capsule Take 150 mg by mouth 2 (two) times daily.      tamsulosin (FLOMAX) 0.4 mg Cp24 TAKE 1 CAPSULE BY MOUTH ONCE DAILY FOR PROSTATE  6    tizanidine (ZANAFLEX) 2 MG tablet Take 2 mg by mouth 3 (three) times daily.  2    [DISCONTINUED] methocarbamol (ROBAXIN) 500 MG Tab Take 1 tablet (500 mg total) by mouth 3 (three) times daily as needed. 60 tablet 2     No current facility-administered medications on file prior to visit.        Exam:  BP (!) 151/91   Pulse 68   Ht 5' 11" (1.803 m)   Wt (!) 143.9 kg (317 lb 3.9 oz)   BMI 44.25 kg/m²   Alert, oriented, conversant.  Follows commands.  Naming and repetition is intact.  Good mood, congruent affect.      Laboratory/Radiological:  - Results:  Lab Visit on 03/01/2018   Component Date Value Ref Range Status    WBC 03/01/2018 7.93  3.90 - 12.70 K/uL Final    RBC 03/01/2018 4.64  4.60 - 6.20 M/uL Final    Hemoglobin 03/01/2018 14.0  14.0 - 18.0 g/dL Final    Hematocrit 03/01/2018 " 41.0  40.0 - 54.0 % Final    MCV 03/01/2018 88  82 - 98 fL Final    MCH 03/01/2018 30.2  27.0 - 31.0 pg Final    MCHC 03/01/2018 34.1  32.0 - 36.0 g/dL Final    RDW 03/01/2018 14.2  11.5 - 14.5 % Final    Platelets 03/01/2018 320  150 - 350 K/uL Final    MPV 03/01/2018 9.4  9.2 - 12.9 fL Final    Gran # (ANC) 03/01/2018 4.6  1.8 - 7.7 K/uL Final    Lymph # 03/01/2018 2.5  1.0 - 4.8 K/uL Final    Mono # 03/01/2018 0.5  0.3 - 1.0 K/uL Final    Eos # 03/01/2018 0.2  0.0 - 0.5 K/uL Final    Baso # 03/01/2018 0.06  0.00 - 0.20 K/uL Final    Gran% 03/01/2018 57.8  38.0 - 73.0 % Final    Lymph% 03/01/2018 31.9  18.0 - 48.0 % Final    Mono% 03/01/2018 6.4  4.0 - 15.0 % Final    Eosinophil% 03/01/2018 3.0  0.0 - 8.0 % Final    Basophil% 03/01/2018 0.8  0.0 - 1.9 % Final    Differential Method 03/01/2018 Automated   Final    Sodium 03/01/2018 140  136 - 145 mmol/L Final    Potassium 03/01/2018 3.3* 3.5 - 5.1 mmol/L Final    Chloride 03/01/2018 101  95 - 110 mmol/L Final    CO2 03/01/2018 30* 23 - 29 mmol/L Final    Glucose 03/01/2018 85  70 - 110 mg/dL Final    BUN, Bld 03/01/2018 15  6 - 20 mg/dL Final    Creatinine 03/01/2018 1.0  0.5 - 1.4 mg/dL Final    Calcium 03/01/2018 9.8  8.7 - 10.5 mg/dL Final    Total Protein 03/01/2018 7.9  6.0 - 8.4 g/dL Final    Albumin 03/01/2018 4.3  3.5 - 5.2 g/dL Final    Total Bilirubin 03/01/2018 0.2  0.1 - 1.0 mg/dL Final    Alkaline Phosphatase 03/01/2018 90  55 - 135 U/L Final    AST 03/01/2018 26  10 - 40 U/L Final    ALT 03/01/2018 19  10 - 44 U/L Final    Anion Gap 03/01/2018 9  8 - 16 mmol/L Final    eGFR if African American 03/01/2018 >60  >60 mL/min/1.73 m^2 Final    eGFR if non African American 03/01/2018 >60  >60 mL/min/1.73 m^2 Final    TSH 03/01/2018 0.288* 0.400 - 4.000 uIU/mL Final    BNP 03/01/2018 15  0 - 99 pg/mL Final    CPK 03/01/2018 203* 20 - 200 U/L Final    Free T4 03/01/2018 0.93  0.71 - 1.51 ng/dL Final   Office Visit on  03/01/2018   Component Date Value Ref Range Status    Specimen UA 03/01/2018 Urine, Unspecified   Final    Color, UA 03/01/2018 Carol  Yellow, Straw, Carol Final    Appearance, UA 03/01/2018 Clear  Clear Final    pH, UA 03/01/2018 5.0  5.0 - 8.0 Final    Specific Gravity, UA 03/01/2018 >1.030* 1.005 - 1.030 Final    Protein, UA 03/01/2018 Negative  Negative Final    Glucose, UA 03/01/2018 Negative  Negative Final    Ketones, UA 03/01/2018 Negative  Negative Final    Bilirubin (UA) 03/01/2018 2+* Negative Final    Occult Blood UA 03/01/2018 Negative  Negative Final    Nitrite, UA 03/01/2018 Negative  Negative Final    Urobilinogen, UA 03/01/2018 Negative  <2.0 EU/dL Final    Leukocytes, UA 03/01/2018 Negative  Negative Final     - Independent review of images:  new images on 11/13/14:        Diagnoses:          1) HSV encephalitis.  Impressive recovery and treatment via neurology on the WB.  HAS REALLY DONE REMARKABLY WELL.  AWARE OF LONG TERM RISK OF MEMORY LOSS AND SEIZURE.    HA has gotten worse, some of which might be rebound from the other pain meds (unintentional).    Medical Decision Making:  - adding inderal 10 TID, will go to 60 ER  - prednisone 20mg x 3 days for abortive care  - exercise  - close follow-up    Jt Holly MD, MPH  Division of Movement and Memory Disorders  [facility]  [phone]

## 2018-03-13 ENCOUNTER — TELEPHONE (OUTPATIENT)
Dept: NEUROSURGERY | Facility: CLINIC | Age: 48
End: 2018-03-13

## 2018-03-13 DIAGNOSIS — M96.1 POST LAMINECTOMY SYNDROME: ICD-10-CM

## 2018-03-13 DIAGNOSIS — G89.4 CHRONIC PAIN SYNDROME: Primary | ICD-10-CM

## 2018-03-26 DIAGNOSIS — M47.816 FACET ARTHROPATHY, LUMBAR: ICD-10-CM

## 2018-03-26 RX ORDER — MELOXICAM 15 MG/1
TABLET ORAL
Qty: 60 TABLET | Refills: 0 | Status: SHIPPED | OUTPATIENT
Start: 2018-03-26 | End: 2018-06-09 | Stop reason: SDUPTHER

## 2018-03-27 ENCOUNTER — LAB VISIT (OUTPATIENT)
Dept: LAB | Facility: HOSPITAL | Age: 48
End: 2018-03-27
Attending: INTERNAL MEDICINE
Payer: COMMERCIAL

## 2018-03-27 ENCOUNTER — OFFICE VISIT (OUTPATIENT)
Dept: FAMILY MEDICINE | Facility: CLINIC | Age: 48
End: 2018-03-27
Payer: COMMERCIAL

## 2018-03-27 VITALS
HEIGHT: 71 IN | OXYGEN SATURATION: 97 % | HEART RATE: 62 BPM | DIASTOLIC BLOOD PRESSURE: 74 MMHG | BODY MASS INDEX: 43.71 KG/M2 | TEMPERATURE: 98 F | WEIGHT: 312.19 LBS | RESPIRATION RATE: 16 BRPM | SYSTOLIC BLOOD PRESSURE: 104 MMHG

## 2018-03-27 DIAGNOSIS — M96.1 POSTLAMINECTOMY SYNDROME OF LUMBAR REGION: ICD-10-CM

## 2018-03-27 DIAGNOSIS — E87.6 HYPOKALEMIA: ICD-10-CM

## 2018-03-27 DIAGNOSIS — E05.90 SUBCLINICAL HYPERTHYROIDISM: ICD-10-CM

## 2018-03-27 DIAGNOSIS — I10 ESSENTIAL HYPERTENSION: ICD-10-CM

## 2018-03-27 DIAGNOSIS — Z01.818 PRE-OP EXAM: ICD-10-CM

## 2018-03-27 DIAGNOSIS — Z01.818 PRE-OP EXAM: Primary | ICD-10-CM

## 2018-03-27 DIAGNOSIS — G89.4 CHRONIC PAIN SYNDROME: ICD-10-CM

## 2018-03-27 LAB
ANION GAP SERPL CALC-SCNC: 9 MMOL/L
APTT BLDCRRT: 27.3 SEC
BASOPHILS # BLD AUTO: 0.04 K/UL
BASOPHILS NFR BLD: 0.8 %
BILIRUB UR QL STRIP: ABNORMAL
BUN SERPL-MCNC: 15 MG/DL
CALCIUM SERPL-MCNC: 9.3 MG/DL
CHLORIDE SERPL-SCNC: 100 MMOL/L
CLARITY UR: CLEAR
CO2 SERPL-SCNC: 31 MMOL/L
COLOR UR: ABNORMAL
CREAT SERPL-MCNC: 0.9 MG/DL
DIFFERENTIAL METHOD: ABNORMAL
EOSINOPHIL # BLD AUTO: 0.3 K/UL
EOSINOPHIL NFR BLD: 6 %
ERYTHROCYTE [DISTWIDTH] IN BLOOD BY AUTOMATED COUNT: 13.9 %
EST. GFR  (AFRICAN AMERICAN): >60 ML/MIN/1.73 M^2
EST. GFR  (NON AFRICAN AMERICAN): >60 ML/MIN/1.73 M^2
GLUCOSE SERPL-MCNC: 75 MG/DL
GLUCOSE UR QL STRIP: NEGATIVE
HCT VFR BLD AUTO: 40.1 %
HGB BLD-MCNC: 13.2 G/DL
HGB UR QL STRIP: NEGATIVE
INR PPP: 1
KETONES UR QL STRIP: NEGATIVE
LEUKOCYTE ESTERASE UR QL STRIP: NEGATIVE
LYMPHOCYTES # BLD AUTO: 1.6 K/UL
LYMPHOCYTES NFR BLD: 33.1 %
MCH RBC QN AUTO: 30.1 PG
MCHC RBC AUTO-ENTMCNC: 32.9 G/DL
MCV RBC AUTO: 91 FL
MONOCYTES # BLD AUTO: 0.4 K/UL
MONOCYTES NFR BLD: 7.7 %
NEUTROPHILS # BLD AUTO: 2.5 K/UL
NEUTROPHILS NFR BLD: 52.2 %
NITRITE UR QL STRIP: NEGATIVE
PH UR STRIP: 7 [PH] (ref 5–8)
PLATELET # BLD AUTO: 315 K/UL
PMV BLD AUTO: 10.1 FL
POTASSIUM SERPL-SCNC: 3.6 MMOL/L
POTASSIUM SERPL-SCNC: 3.6 MMOL/L
PROT UR QL STRIP: NEGATIVE
PROTHROMBIN TIME: 10 SEC
RBC # BLD AUTO: 4.39 M/UL
SODIUM SERPL-SCNC: 140 MMOL/L
SP GR UR STRIP: 1.03 (ref 1–1.03)
T4 FREE SERPL-MCNC: 0.92 NG/DL
TSH SERPL DL<=0.005 MIU/L-ACNC: 0.37 UIU/ML
URN SPEC COLLECT METH UR: ABNORMAL
UROBILINOGEN UR STRIP-ACNC: NEGATIVE EU/DL
WBC # BLD AUTO: 4.81 K/UL

## 2018-03-27 PROCEDURE — 84439 ASSAY OF FREE THYROXINE: CPT

## 2018-03-27 PROCEDURE — 85730 THROMBOPLASTIN TIME PARTIAL: CPT

## 2018-03-27 PROCEDURE — 80048 BASIC METABOLIC PNL TOTAL CA: CPT

## 2018-03-27 PROCEDURE — 85025 COMPLETE CBC W/AUTO DIFF WBC: CPT

## 2018-03-27 PROCEDURE — 84443 ASSAY THYROID STIM HORMONE: CPT

## 2018-03-27 PROCEDURE — 3078F DIAST BP <80 MM HG: CPT | Mod: CPTII,S$GLB,, | Performed by: INTERNAL MEDICINE

## 2018-03-27 PROCEDURE — 36415 COLL VENOUS BLD VENIPUNCTURE: CPT | Mod: PO

## 2018-03-27 PROCEDURE — 99214 OFFICE O/P EST MOD 30 MIN: CPT | Mod: S$GLB,,, | Performed by: INTERNAL MEDICINE

## 2018-03-27 PROCEDURE — 3074F SYST BP LT 130 MM HG: CPT | Mod: CPTII,S$GLB,, | Performed by: INTERNAL MEDICINE

## 2018-03-27 PROCEDURE — 99999 PR PBB SHADOW E&M-EST. PATIENT-LVL III: CPT | Mod: PBBFAC,,, | Performed by: INTERNAL MEDICINE

## 2018-03-27 PROCEDURE — 85610 PROTHROMBIN TIME: CPT

## 2018-03-27 PROCEDURE — 81003 URINALYSIS AUTO W/O SCOPE: CPT

## 2018-03-27 RX ORDER — TIZANIDINE 2 MG/1
2 TABLET ORAL 3 TIMES DAILY
Qty: 90 TABLET | Refills: 3 | Status: SHIPPED | OUTPATIENT
Start: 2018-03-27 | End: 2018-06-28 | Stop reason: SDUPTHER

## 2018-03-27 NOTE — PROGRESS NOTES
SUBJECTIVE     Chief Complaint   Patient presents with    Pre-op Exam       HPI  Jaime Lund is a 48 y.o. male with multiple medical diagnoses as listed in the medical history and problem list that presents for evaluation for pre-op exam for upcoming surgery on 4/3/18. Pt is having a pain pump placed with Dr. Saavedra-Neurosurgery. Pt's most strenuous activity is climbing the stairs. He reports ability to climb the stairwell without any chest pain or SOB. He is without any complaints today and just needs clearance from surgery.    PAST MEDICAL HISTORY:  Past Medical History:   Diagnosis Date    Bulging discs     Degenerative disc disease     Hypertension        PAST SURGICAL HISTORY:  Past Surgical History:   Procedure Laterality Date    CHOLECYSTECTOMY      GASTRIC BYPASS      SLEEVE    gastric sleeve  2011    HERNIA REPAIR      SKIN SURGERY      EXCESS SKIN REMOVAL    SPINE SURGERY      lumbar fusion       SOCIAL HISTORY:  Social History     Social History    Marital status: Single     Spouse name: N/A    Number of children: N/A    Years of education: N/A     Occupational History    Not on file.     Social History Main Topics    Smoking status: Never Smoker    Smokeless tobacco: Never Used    Alcohol use No    Drug use: No    Sexual activity: Not on file     Other Topics Concern    Not on file     Social History Narrative    No narrative on file       FAMILY HISTORY:  Family History   Problem Relation Age of Onset    Heart disease Mother     Breast cancer Mother     Arthritis Mother     Hypertension Mother     Throat cancer Father     Hypertension Father        ALLERGIES AND MEDICATIONS: updated and reviewed.  Review of patient's allergies indicates:   Allergen Reactions    Klonopin [clonazepam] Anxiety     Current Outpatient Prescriptions   Medication Sig Dispense Refill    amitriptyline (ELAVIL) 50 MG tablet       butalbital-acetaminophen-caffeine -40 mg (FIORICET, ESGIC)  -40 mg per tablet Take 1 tablet by mouth every 4 (four) hours as needed. 60 tablet 11    DULoxetine (CYMBALTA) 60 MG capsule TAKE 1 CAPSULE BY MOUTH EVERY EVENING 90 capsule 0    furosemide (LASIX) 40 MG tablet TAKE ONE TABLET BY MOUTH ONCE DAILY FOR FLUID 30 tablet 2    gabapentin (NEURONTIN) 400 MG capsule 3 (three) times daily.       hydroCHLOROthiazide (HYDRODIURIL) 25 MG tablet TAKE ONE TABLET BY MOUTH ONCE DAILY FOR BLOOD PRESSURE AND OR FOR FLUID 30 tablet 5    meloxicam (MOBIC) 15 MG tablet TAKE ONE TABLET BY MOUTH ONCE DAILY WITH A MEAL FOR INFLAMMATION AND OR PAIN 60 tablet 0    methocarbamol (ROBAXIN) 750 MG Tab       oxycodone (OXYCONTIN) 15 mg TR12 12 hr tablet Take 1 tablet (15 mg total) by mouth every 12 (twelve) hours. 60 tablet 0    oxycodone-acetaminophen (PERCOCET)  mg per tablet Take 1 tablet by mouth every 6 (six) hours as needed.  0    potassium chloride (KLOR-CON) 10 MEQ TbSR Take 1 tablet (10 mEq total) by mouth once daily. 90 tablet 0    propranolol (INDERAL) 10 MG tablet Take 1 tablet (10 mg total) by mouth 3 (three) times daily. 90 tablet 11    ranitidine (ZANTAC) 150 MG capsule Take 150 mg by mouth 2 (two) times daily.      tamsulosin (FLOMAX) 0.4 mg Cp24 TAKE 1 CAPSULE BY MOUTH ONCE DAILY FOR PROSTATE  6    tiZANidine (ZANAFLEX) 2 MG tablet Take 1 tablet (2 mg total) by mouth 3 (three) times daily. 90 tablet 3     No current facility-administered medications for this visit.        ROS  Review of Systems   Constitutional: Negative for chills and fever.   HENT: Negative for hearing loss and sore throat.    Eyes: Negative for visual disturbance.   Respiratory: Negative for cough and shortness of breath.    Cardiovascular: Negative for chest pain, palpitations and leg swelling.   Gastrointestinal: Negative for abdominal pain, constipation, diarrhea, nausea and vomiting.   Genitourinary: Negative for dysuria, frequency and urgency.   Musculoskeletal: Positive for back  "pain. Negative for arthralgias, joint swelling and myalgias.   Skin: Negative for rash and wound.   Neurological: Negative for headaches.   Psychiatric/Behavioral: Negative for agitation and confusion. The patient is not nervous/anxious.          OBJECTIVE     Physical Exam  Vitals:    03/27/18 0810   BP: 104/74   Pulse: 62   Resp: 16   Temp: 98.2 °F (36.8 °C)    Body mass index is 43.54 kg/m².  Weight: (!) 141.6 kg (312 lb 2.7 oz)   Height: 5' 11" (180.3 cm)     Physical Exam   Constitutional: He is oriented to person, place, and time. He appears well-developed and well-nourished. No distress.   HENT:   Head: Normocephalic and atraumatic.   Right Ear: External ear normal.   Left Ear: External ear normal.   Nose: Nose normal.   Mouth/Throat: Oropharynx is clear and moist.   Eyes: Conjunctivae and EOM are normal. Right eye exhibits no discharge. Left eye exhibits no discharge. No scleral icterus.   Neck: Normal range of motion. Neck supple. No JVD present. No tracheal deviation present.   Cardiovascular: Normal rate, regular rhythm, normal heart sounds and intact distal pulses.  Exam reveals no gallop and no friction rub.    No murmur heard.  Pulmonary/Chest: Effort normal and breath sounds normal. No respiratory distress. He has no wheezes.   Abdominal: Soft. Bowel sounds are normal. He exhibits no distension and no mass. There is no tenderness. There is no rebound and no guarding.   Musculoskeletal: Normal range of motion. He exhibits no edema, tenderness or deformity.   Neurological: He is alert and oriented to person, place, and time. He exhibits normal muscle tone. Coordination normal.   Skin: Skin is warm and dry. No rash noted. No erythema.   Psychiatric: He has a normal mood and affect. His behavior is normal. Judgment and thought content normal.         Health Maintenance       Date Due Completion Date    Influenza Vaccine 04/26/2018 (Originally 8/1/2017) ---    TETANUS VACCINE 11/30/2018 (Originally " 3/8/1987) ---    Lipid Panel 02/01/2022 2/1/2017            ASSESSMENT     48 y.o. male with     1. Pre-op exam    2. Chronic pain syndrome    3. Postlaminectomy syndrome of lumbar region    4. Essential hypertension        PLAN:     1. Pre-op exam  - Pt has a functional capacity of 4-10 METS and is at low risk for an intermediate risk procedure  - Pt is clear for surgery  - APTT; Future  - Protime-INR; Future  - Urinalysis  - CBC auto differential; Future  - Basic metabolic panel; Future    2. Chronic pain syndrome  - Stable; no acute issues  - The current medical regimen is effective;  continue present plan and medications.  - tiZANidine (ZANAFLEX) 2 MG tablet; Take 1 tablet (2 mg total) by mouth 3 (three) times daily.  Dispense: 90 tablet; Refill: 3    3. Postlaminectomy syndrome of lumbar region  - Stable; no acute issues  - The current medical regimen is effective;  continue present plan and medications.  - tiZANidine (ZANAFLEX) 2 MG tablet; Take 1 tablet (2 mg total) by mouth 3 (three) times daily.  Dispense: 90 tablet; Refill: 3    4. Essential hypertension  - BP well controlled; at goal of <140/90  - The current medical regimen is effective;  continue present plan and medications.      RTC in 3 months     Gabby Dean MD  03/27/2018 8:32 AM        No Follow-up on file.

## 2018-04-02 RX ORDER — OXYCODONE HYDROCHLORIDE 30 MG/1
30 TABLET ORAL EVERY 12 HOURS
Status: ON HOLD | COMMUNITY
End: 2018-04-12

## 2018-04-02 NOTE — PRE-PROCEDURE INSTRUCTIONS
"Spoke with Patient's Wife - Claudia.  NPO, medication, and pre-op instructions reviewed.  Denies previous problems with Anesthesia.  Wife stated that he has slight problems with his short-term memory and has occasional "anger issues."   Fioricet is on Hold.  Wife stated that he is in chronic pian.  Stated that he does not have any problems with Versed.  Wife verbalized understanding of instructions.    "

## 2018-04-03 ENCOUNTER — HOSPITAL ENCOUNTER (OUTPATIENT)
Facility: HOSPITAL | Age: 48
LOS: 1 days | Discharge: HOME | End: 2018-04-03
Attending: NEUROLOGICAL SURGERY | Admitting: NEUROLOGICAL SURGERY
Payer: COMMERCIAL

## 2018-04-03 ENCOUNTER — ANESTHESIA (OUTPATIENT)
Dept: SURGERY | Facility: HOSPITAL | Age: 48
End: 2018-04-03
Payer: COMMERCIAL

## 2018-04-03 ENCOUNTER — ANESTHESIA EVENT (OUTPATIENT)
Dept: SURGERY | Facility: HOSPITAL | Age: 48
End: 2018-04-03
Payer: COMMERCIAL

## 2018-04-03 VITALS
HEIGHT: 71 IN | HEART RATE: 87 BPM | WEIGHT: 312 LBS | DIASTOLIC BLOOD PRESSURE: 94 MMHG | BODY MASS INDEX: 43.68 KG/M2 | OXYGEN SATURATION: 98 % | TEMPERATURE: 98 F | SYSTOLIC BLOOD PRESSURE: 145 MMHG | RESPIRATION RATE: 16 BRPM

## 2018-04-03 DIAGNOSIS — G89.29 CHRONIC PAIN: ICD-10-CM

## 2018-04-03 LAB
ABO + RH BLD: NORMAL
ANION GAP SERPL CALC-SCNC: 8 MMOL/L
APTT BLDCRRT: 25.4 SEC
BASOPHILS # BLD AUTO: 0.05 K/UL
BASOPHILS NFR BLD: 1 %
BLD GP AB SCN CELLS X3 SERPL QL: NORMAL
BUN SERPL-MCNC: 18 MG/DL
CALCIUM SERPL-MCNC: 9 MG/DL
CHLORIDE SERPL-SCNC: 101 MMOL/L
CO2 SERPL-SCNC: 30 MMOL/L
CREAT SERPL-MCNC: 0.7 MG/DL
DIFFERENTIAL METHOD: ABNORMAL
EOSINOPHIL # BLD AUTO: 0.3 K/UL
EOSINOPHIL NFR BLD: 5 %
ERYTHROCYTE [DISTWIDTH] IN BLOOD BY AUTOMATED COUNT: 13.3 %
EST. GFR  (AFRICAN AMERICAN): >60 ML/MIN/1.73 M^2
EST. GFR  (NON AFRICAN AMERICAN): >60 ML/MIN/1.73 M^2
GLUCOSE SERPL-MCNC: 88 MG/DL
HCT VFR BLD AUTO: 36.8 %
HGB BLD-MCNC: 12.2 G/DL
IMM GRANULOCYTES # BLD AUTO: 0.01 K/UL
IMM GRANULOCYTES NFR BLD AUTO: 0.2 %
INR PPP: 0.9
LYMPHOCYTES # BLD AUTO: 1.9 K/UL
LYMPHOCYTES NFR BLD: 38 %
MCH RBC QN AUTO: 29.9 PG
MCHC RBC AUTO-ENTMCNC: 33.2 G/DL
MCV RBC AUTO: 90 FL
MONOCYTES # BLD AUTO: 0.4 K/UL
MONOCYTES NFR BLD: 8.5 %
NEUTROPHILS # BLD AUTO: 2.4 K/UL
NEUTROPHILS NFR BLD: 47.3 %
NRBC BLD-RTO: 0 /100 WBC
PLATELET # BLD AUTO: 270 K/UL
PMV BLD AUTO: 9.5 FL
POTASSIUM SERPL-SCNC: 3.3 MMOL/L
PROTHROMBIN TIME: 10 SEC
RBC # BLD AUTO: 4.08 M/UL
SODIUM SERPL-SCNC: 139 MMOL/L
WBC # BLD AUTO: 5.05 K/UL

## 2018-04-03 PROCEDURE — 63600175 PHARM REV CODE 636 W HCPCS: Performed by: STUDENT IN AN ORGANIZED HEALTH CARE EDUCATION/TRAINING PROGRAM

## 2018-04-03 PROCEDURE — 36000707: Performed by: NEUROLOGICAL SURGERY

## 2018-04-03 PROCEDURE — 85025 COMPLETE CBC W/AUTO DIFF WBC: CPT

## 2018-04-03 PROCEDURE — 25000003 PHARM REV CODE 250

## 2018-04-03 PROCEDURE — 63600175 PHARM REV CODE 636 W HCPCS: Performed by: ANESTHESIOLOGY

## 2018-04-03 PROCEDURE — 63600175 PHARM REV CODE 636 W HCPCS: Performed by: NEUROLOGICAL SURGERY

## 2018-04-03 PROCEDURE — C1772 INFUSION PUMP, PROGRAMMABLE: HCPCS | Performed by: NEUROLOGICAL SURGERY

## 2018-04-03 PROCEDURE — 37000009 HC ANESTHESIA EA ADD 15 MINS: Performed by: NEUROLOGICAL SURGERY

## 2018-04-03 PROCEDURE — 37000008 HC ANESTHESIA 1ST 15 MINUTES: Performed by: NEUROLOGICAL SURGERY

## 2018-04-03 PROCEDURE — 25000003 PHARM REV CODE 250: Performed by: STUDENT IN AN ORGANIZED HEALTH CARE EDUCATION/TRAINING PROGRAM

## 2018-04-03 PROCEDURE — 85730 THROMBOPLASTIN TIME PARTIAL: CPT

## 2018-04-03 PROCEDURE — 27201423 OPTIME MED/SURG SUP & DEVICES STERILE SUPPLY: Performed by: NEUROLOGICAL SURGERY

## 2018-04-03 PROCEDURE — D9220A PRA ANESTHESIA: Mod: ANES,,, | Performed by: ANESTHESIOLOGY

## 2018-04-03 PROCEDURE — C1755 CATHETER, INTRASPINAL: HCPCS | Performed by: NEUROLOGICAL SURGERY

## 2018-04-03 PROCEDURE — 63600175 PHARM REV CODE 636 W HCPCS: Performed by: NURSE ANESTHETIST, CERTIFIED REGISTERED

## 2018-04-03 PROCEDURE — 36000706: Performed by: NEUROLOGICAL SURGERY

## 2018-04-03 PROCEDURE — 71000044 HC DOSC ROUTINE RECOVERY FIRST HOUR: Performed by: NEUROLOGICAL SURGERY

## 2018-04-03 PROCEDURE — 71000016 HC POSTOP RECOV ADDL HR: Performed by: NEUROLOGICAL SURGERY

## 2018-04-03 PROCEDURE — 85610 PROTHROMBIN TIME: CPT

## 2018-04-03 PROCEDURE — 71000015 HC POSTOP RECOV 1ST HR: Performed by: NEUROLOGICAL SURGERY

## 2018-04-03 PROCEDURE — 25000003 PHARM REV CODE 250: Performed by: NURSE ANESTHETIST, CERTIFIED REGISTERED

## 2018-04-03 PROCEDURE — 25000003 PHARM REV CODE 250: Performed by: NEUROLOGICAL SURGERY

## 2018-04-03 PROCEDURE — 62350 IMPLANT SPINAL CANAL CATH: CPT | Mod: 22,,, | Performed by: NEUROLOGICAL SURGERY

## 2018-04-03 PROCEDURE — 86901 BLOOD TYPING SEROLOGIC RH(D): CPT

## 2018-04-03 PROCEDURE — 80048 BASIC METABOLIC PNL TOTAL CA: CPT

## 2018-04-03 PROCEDURE — D9220A PRA ANESTHESIA: Mod: CRNA,,, | Performed by: NURSE ANESTHETIST, CERTIFIED REGISTERED

## 2018-04-03 PROCEDURE — 62362 IMPLANT SPINE INFUSION PUMP: CPT | Mod: 51,,, | Performed by: NEUROLOGICAL SURGERY

## 2018-04-03 DEVICE — PUMP PAIN CNTRL INF 40ML: Type: IMPLANTABLE DEVICE | Site: BACK | Status: FUNCTIONAL

## 2018-04-03 RX ORDER — SODIUM CHLORIDE 9 MG/ML
10 INJECTION, SOLUTION INTRAVENOUS CONTINUOUS
Status: DISCONTINUED | OUTPATIENT
Start: 2018-04-03 | End: 2018-04-03 | Stop reason: HOSPADM

## 2018-04-03 RX ORDER — MEPERIDINE HYDROCHLORIDE 50 MG/ML
12.5 INJECTION INTRAMUSCULAR; INTRAVENOUS; SUBCUTANEOUS EVERY 5 MIN PRN
Status: COMPLETED | OUTPATIENT
Start: 2018-04-03 | End: 2018-04-03

## 2018-04-03 RX ORDER — FENTANYL CITRATE 50 UG/ML
INJECTION, SOLUTION INTRAMUSCULAR; INTRAVENOUS
Status: DISCONTINUED | OUTPATIENT
Start: 2018-04-03 | End: 2018-04-03

## 2018-04-03 RX ORDER — OXYCODONE AND ACETAMINOPHEN 10; 325 MG/1; MG/1
TABLET ORAL
Status: COMPLETED
Start: 2018-04-03 | End: 2018-04-03

## 2018-04-03 RX ORDER — FENTANYL CITRATE 50 UG/ML
25 INJECTION, SOLUTION INTRAMUSCULAR; INTRAVENOUS EVERY 5 MIN PRN
Status: COMPLETED | OUTPATIENT
Start: 2018-04-03 | End: 2018-04-03

## 2018-04-03 RX ORDER — GLYCOPYRROLATE 0.2 MG/ML
INJECTION INTRAMUSCULAR; INTRAVENOUS
Status: DISCONTINUED | OUTPATIENT
Start: 2018-04-03 | End: 2018-04-03

## 2018-04-03 RX ORDER — ROCURONIUM BROMIDE 10 MG/ML
INJECTION, SOLUTION INTRAVENOUS
Status: DISCONTINUED | OUTPATIENT
Start: 2018-04-03 | End: 2018-04-03

## 2018-04-03 RX ORDER — VANCOMYCIN HYDROCHLORIDE 1 G/20ML
INJECTION, POWDER, LYOPHILIZED, FOR SOLUTION INTRAVENOUS
Status: DISCONTINUED | OUTPATIENT
Start: 2018-04-03 | End: 2018-04-03 | Stop reason: HOSPADM

## 2018-04-03 RX ORDER — DIPHENHYDRAMINE HYDROCHLORIDE 50 MG/ML
25 INJECTION INTRAMUSCULAR; INTRAVENOUS EVERY 6 HOURS PRN
Status: DISCONTINUED | OUTPATIENT
Start: 2018-04-03 | End: 2018-04-03 | Stop reason: HOSPADM

## 2018-04-03 RX ORDER — MUPIROCIN 20 MG/G
OINTMENT TOPICAL
Status: DISCONTINUED | OUTPATIENT
Start: 2018-04-03 | End: 2018-04-03 | Stop reason: HOSPADM

## 2018-04-03 RX ORDER — ONDANSETRON 2 MG/ML
4 INJECTION INTRAMUSCULAR; INTRAVENOUS ONCE AS NEEDED
Status: DISCONTINUED | OUTPATIENT
Start: 2018-04-03 | End: 2018-04-03 | Stop reason: HOSPADM

## 2018-04-03 RX ORDER — BACITRACIN 50000 [IU]/1
INJECTION, POWDER, FOR SOLUTION INTRAMUSCULAR
Status: DISCONTINUED | OUTPATIENT
Start: 2018-04-03 | End: 2018-04-03 | Stop reason: HOSPADM

## 2018-04-03 RX ORDER — MIDAZOLAM HYDROCHLORIDE 1 MG/ML
INJECTION, SOLUTION INTRAMUSCULAR; INTRAVENOUS
Status: DISCONTINUED | OUTPATIENT
Start: 2018-04-03 | End: 2018-04-03

## 2018-04-03 RX ORDER — SUCCINYLCHOLINE CHLORIDE 20 MG/ML
INJECTION INTRAMUSCULAR; INTRAVENOUS
Status: DISCONTINUED | OUTPATIENT
Start: 2018-04-03 | End: 2018-04-03

## 2018-04-03 RX ORDER — CEPHALEXIN 500 MG/1
500 CAPSULE ORAL EVERY 6 HOURS
Status: DISCONTINUED | OUTPATIENT
Start: 2018-04-03 | End: 2018-04-03 | Stop reason: HOSPADM

## 2018-04-03 RX ORDER — CEFAZOLIN SODIUM 1 G/3ML
2 INJECTION, POWDER, FOR SOLUTION INTRAMUSCULAR; INTRAVENOUS
Status: COMPLETED | OUTPATIENT
Start: 2018-04-03 | End: 2018-04-03

## 2018-04-03 RX ORDER — PROPOFOL 10 MG/ML
VIAL (ML) INTRAVENOUS
Status: DISCONTINUED | OUTPATIENT
Start: 2018-04-03 | End: 2018-04-03

## 2018-04-03 RX ORDER — OXYCODONE AND ACETAMINOPHEN 10; 325 MG/1; MG/1
TABLET ORAL
Status: DISCONTINUED
Start: 2018-04-03 | End: 2018-04-03 | Stop reason: HOSPADM

## 2018-04-03 RX ORDER — CEPHALEXIN 500 MG/1
500 CAPSULE ORAL EVERY 6 HOURS
Qty: 12 CAPSULE | Refills: 0 | Status: ON HOLD | OUTPATIENT
Start: 2018-04-03 | End: 2018-04-12

## 2018-04-03 RX ORDER — DULOXETIN HYDROCHLORIDE 60 MG/1
CAPSULE, DELAYED RELEASE ORAL
Qty: 90 CAPSULE | Refills: 1 | Status: SHIPPED | OUTPATIENT
Start: 2018-04-03 | End: 2018-06-28 | Stop reason: SDUPTHER

## 2018-04-03 RX ORDER — OXYCODONE AND ACETAMINOPHEN 10; 325 MG/1; MG/1
2 TABLET ORAL ONCE
Status: COMPLETED | OUTPATIENT
Start: 2018-04-03 | End: 2018-04-03

## 2018-04-03 RX ORDER — MUPIROCIN 20 MG/G
1 OINTMENT TOPICAL
Status: COMPLETED | OUTPATIENT
Start: 2018-04-03 | End: 2018-04-03

## 2018-04-03 RX ORDER — DEXAMETHASONE SODIUM PHOSPHATE 4 MG/ML
INJECTION, SOLUTION INTRA-ARTICULAR; INTRALESIONAL; INTRAMUSCULAR; INTRAVENOUS; SOFT TISSUE
Status: DISCONTINUED | OUTPATIENT
Start: 2018-04-03 | End: 2018-04-03

## 2018-04-03 RX ORDER — PHENYLEPHRINE HYDROCHLORIDE 10 MG/ML
INJECTION INTRAVENOUS
Status: DISCONTINUED | OUTPATIENT
Start: 2018-04-03 | End: 2018-04-03

## 2018-04-03 RX ORDER — TIZANIDINE HYDROCHLORIDE 6 MG/1
6 CAPSULE, GELATIN COATED ORAL 3 TIMES DAILY
Qty: 30 CAPSULE | Refills: 0 | Status: SHIPPED | OUTPATIENT
Start: 2018-04-03 | End: 2018-04-03

## 2018-04-03 RX ORDER — OXYCODONE AND ACETAMINOPHEN 10; 325 MG/1; MG/1
1 TABLET ORAL EVERY 4 HOURS PRN
Qty: 30 TABLET | Refills: 0 | Status: ON HOLD | OUTPATIENT
Start: 2018-04-03 | End: 2020-11-17

## 2018-04-03 RX ORDER — LIDOCAINE HCL/PF 100 MG/5ML
SYRINGE (ML) INTRAVENOUS
Status: DISCONTINUED | OUTPATIENT
Start: 2018-04-03 | End: 2018-04-03

## 2018-04-03 RX ORDER — TIZANIDINE HYDROCHLORIDE 6 MG/1
6 CAPSULE, GELATIN COATED ORAL 3 TIMES DAILY
Qty: 30 CAPSULE | Refills: 0 | Status: ON HOLD | OUTPATIENT
Start: 2018-04-03 | End: 2018-04-16

## 2018-04-03 RX ORDER — LIDOCAINE HYDROCHLORIDE 10 MG/ML
1 INJECTION, SOLUTION EPIDURAL; INFILTRATION; INTRACAUDAL; PERINEURAL ONCE
Status: COMPLETED | OUTPATIENT
Start: 2018-04-03 | End: 2018-04-03

## 2018-04-03 RX ORDER — LIDOCAINE HYDROCHLORIDE AND EPINEPHRINE 10; 10 MG/ML; UG/ML
INJECTION, SOLUTION INFILTRATION; PERINEURAL
Status: DISCONTINUED | OUTPATIENT
Start: 2018-04-03 | End: 2018-04-03 | Stop reason: HOSPADM

## 2018-04-03 RX ORDER — OXYCODONE AND ACETAMINOPHEN 10; 325 MG/1; MG/1
1 TABLET ORAL EVERY 4 HOURS PRN
Qty: 30 TABLET | Refills: 0 | Status: SHIPPED | OUTPATIENT
Start: 2018-04-03 | End: 2018-04-03

## 2018-04-03 RX ADMIN — PROPOFOL 200 MG: 10 INJECTION, EMULSION INTRAVENOUS at 07:04

## 2018-04-03 RX ADMIN — PHENYLEPHRINE HYDROCHLORIDE 300 MCG: 10 INJECTION INTRAVENOUS at 08:04

## 2018-04-03 RX ADMIN — SUCCINYLCHOLINE CHLORIDE 120 MG: 20 INJECTION, SOLUTION INTRAMUSCULAR; INTRAVENOUS at 07:04

## 2018-04-03 RX ADMIN — ROCURONIUM BROMIDE 20 MG: 10 INJECTION, SOLUTION INTRAVENOUS at 07:04

## 2018-04-03 RX ADMIN — PHENYLEPHRINE HYDROCHLORIDE 200 MCG: 10 INJECTION INTRAVENOUS at 08:04

## 2018-04-03 RX ADMIN — MIDAZOLAM HYDROCHLORIDE 2 MG: 1 INJECTION, SOLUTION INTRAMUSCULAR; INTRAVENOUS at 07:04

## 2018-04-03 RX ADMIN — DEXAMETHASONE SODIUM PHOSPHATE 4 MG: 4 INJECTION, SOLUTION INTRAMUSCULAR; INTRAVENOUS at 10:04

## 2018-04-03 RX ADMIN — FENTANYL CITRATE 25 MCG: 50 INJECTION, SOLUTION INTRAMUSCULAR; INTRAVENOUS at 03:04

## 2018-04-03 RX ADMIN — PROPOFOL 90 MG: 10 INJECTION, EMULSION INTRAVENOUS at 11:04

## 2018-04-03 RX ADMIN — FENTANYL CITRATE 50 MCG: 50 INJECTION, SOLUTION INTRAMUSCULAR; INTRAVENOUS at 07:04

## 2018-04-03 RX ADMIN — LIDOCAINE HYDROCHLORIDE 50 MG: 20 INJECTION, SOLUTION INTRAVENOUS at 08:04

## 2018-04-03 RX ADMIN — PHENYLEPHRINE HYDROCHLORIDE 100 MCG: 10 INJECTION INTRAVENOUS at 09:04

## 2018-04-03 RX ADMIN — LIDOCAINE HYDROCHLORIDE 50 MG: 20 INJECTION, SOLUTION INTRAVENOUS at 07:04

## 2018-04-03 RX ADMIN — VANCOMYCIN HYDROCHLORIDE 1.5 G: 1 INJECTION, POWDER, LYOPHILIZED, FOR SOLUTION INTRAVENOUS at 07:04

## 2018-04-03 RX ADMIN — GLYCOPYRROLATE 0.2 MG: 0.2 INJECTION, SOLUTION INTRAMUSCULAR; INTRAVENOUS at 08:04

## 2018-04-03 RX ADMIN — LIDOCAINE HYDROCHLORIDE 0.1 MG: 10 INJECTION, SOLUTION EPIDURAL; INFILTRATION; INTRACAUDAL; PERINEURAL at 06:04

## 2018-04-03 RX ADMIN — MEPERIDINE HYDROCHLORIDE 12.5 MG: 50 INJECTION INTRAMUSCULAR; INTRAVENOUS; SUBCUTANEOUS at 01:04

## 2018-04-03 RX ADMIN — MEPERIDINE HYDROCHLORIDE 12.5 MG: 50 INJECTION INTRAMUSCULAR; INTRAVENOUS; SUBCUTANEOUS at 04:04

## 2018-04-03 RX ADMIN — OXYCODONE AND ACETAMINOPHEN 2 TABLET: 10; 325 TABLET ORAL at 02:04

## 2018-04-03 RX ADMIN — FENTANYL CITRATE 50 MCG: 50 INJECTION, SOLUTION INTRAMUSCULAR; INTRAVENOUS at 11:04

## 2018-04-03 RX ADMIN — FENTANYL CITRATE 50 MCG: 50 INJECTION, SOLUTION INTRAMUSCULAR; INTRAVENOUS at 09:04

## 2018-04-03 RX ADMIN — SODIUM CHLORIDE 10 ML/HR: 0.9 INJECTION, SOLUTION INTRAVENOUS at 06:04

## 2018-04-03 RX ADMIN — CEFAZOLIN 2 G: 330 INJECTION, POWDER, FOR SOLUTION INTRAMUSCULAR; INTRAVENOUS at 07:04

## 2018-04-03 RX ADMIN — SODIUM CHLORIDE, SODIUM GLUCONATE, SODIUM ACETATE, POTASSIUM CHLORIDE, MAGNESIUM CHLORIDE, SODIUM PHOSPHATE, DIBASIC, AND POTASSIUM PHOSPHATE: .53; .5; .37; .037; .03; .012; .00082 INJECTION, SOLUTION INTRAVENOUS at 11:04

## 2018-04-03 RX ADMIN — FENTANYL CITRATE 25 MCG: 50 INJECTION, SOLUTION INTRAMUSCULAR; INTRAVENOUS at 08:04

## 2018-04-03 RX ADMIN — SODIUM CHLORIDE, SODIUM GLUCONATE, SODIUM ACETATE, POTASSIUM CHLORIDE, MAGNESIUM CHLORIDE, SODIUM PHOSPHATE, DIBASIC, AND POTASSIUM PHOSPHATE: .53; .5; .37; .037; .03; .012; .00082 INJECTION, SOLUTION INTRAVENOUS at 08:04

## 2018-04-03 RX ADMIN — MUPIROCIN 1 G: 20 OINTMENT TOPICAL at 06:04

## 2018-04-03 RX ADMIN — MEPERIDINE HYDROCHLORIDE 12.5 MG: 50 INJECTION INTRAMUSCULAR; INTRAVENOUS; SUBCUTANEOUS at 12:04

## 2018-04-03 RX ADMIN — PHENYLEPHRINE HYDROCHLORIDE 100 MCG: 10 INJECTION INTRAVENOUS at 08:04

## 2018-04-03 RX ADMIN — FENTANYL CITRATE 50 MCG: 50 INJECTION, SOLUTION INTRAMUSCULAR; INTRAVENOUS at 10:04

## 2018-04-03 NOTE — DISCHARGE INSTRUCTIONS
Understanding an Intrathecal Pain Pump Implant    An intrathecal pain pump implant is a way to relieve some kinds of long-term (chronic) pain or cancer pain. It sends pain medicine through a thin, flexible tube. The tube is inserted into the space around the spinal cord. The area between the spinal cord and the tissue (membrane) covering the cord is called the intrathecal space. This space contains a liquid called cerebrospinal fluid (CSF).  The tube is connected to a small, round pump. Both are implanted under your skin in a minor surgery. A small electronic device controls the pump. The device stays outside your body. The pump contains medicine and sends it through the tube into the CSF. The medicine reaches nerves along the spine. It helps prevent them from sending pain signals to the brain.  How to say it  in-[facility]-yevgeniy   Why an intrathecal pain pump implant is used  An intrathecal pain pump implant may be used if you have chronic pain or cancer pain from an injury or a disease. It can help ease pain when other types of pain care have not worked or have caused severe side effects. It may be used after you have tried pain medicine by pill, liquid, or injection. Or it may be used if surgery to treat the source of pain is not an option.  How an intrathecal pain pump is implanted  Before having an intrathecal pain pump implanted, this type of pain care is tested to make sure it will work for you. This is called a trial. You may have an injection of pain medicine. Or you may have a short-term (temporary) test of an intrathecal pain pump. During this procedure:  · You lie face-down on a medical table. An area in your back over your spine is numbed. You may be given medicine to relax you or make you sleep.  · The healthcare provider makes a small cut (incision) in your skin over part of your spine. He or she puts a stiff tube through the skin. It goes into the space around the spinal cord (intrathecal  space).  · The provider then puts a thin, flexible tube (catheter) through the first tube and moves it farther into the intrathecal space. Pain medicine is sent through this tube for a few days to see if it helps your pain.  If the pain relief trial works for you, a pump will be implanted. For this procedure:  · You lie face-down on a medical table. You are given medicine to relax you or make you sleep through the procedure.  · The healthcare provider removes the first catheter you had. He or she puts a new catheter under your skin.  · The provider then puts a pump about 1 inch under your skin on one side of your lower belly (abdomen). The pump is a disk about 1 inch thick and 3 inches wide. It contains medicine and has a battery that lasts for 5 to 7 years. The catheter is connected to the pump.  · The provider connects the pump to a small device outside your body. The provider uses this device to control the pump.  The pump may be programmed by the healthcare provider. Or it may be set to a certain amount. The type of medicine used in the pump will depend on your type of pain and other factors. The pump will need to be refilled with medicine when needed. This may happen every 1 to 3 months.  Risks of an intrathecal pain pump implant  · Infection  · Small growth of tissue near the catheter (granuloma)  · Mistakes in programming the device that may make the medicine dose too high or too low  · Tear in the catheter that stops medicine from getting to nerves  · Side effects of opioid or other medicine  · Damage to the nerves on the spine  · Leaking of cerebrospinal fluid (CSF) that causes headache and other symptoms  · A pocket of CSF under the skin (hygroma)  · A pocket of other fluid under the skin (seroma)  · Changes in endocrine function  · Need for higher doses of medicine over time  Date Last Reviewed: 6/1/2016  © 9729-7842 [employer]. [address] [place] [address], [place] [address]. All rights  reserved. This information is not intended as a substitute for professional medical care. Always follow your healthcare professional's instructions.

## 2018-04-03 NOTE — ANESTHESIA RELEASE NOTE
Anesthesia Release from PACU Note    Patient: Jaime Lund    Procedure(s) Performed: Procedure(s) (LRB):  IMPLANT-PUMP-INTRATHECAL (N/A)    Anesthesia type: General    Post pain: Adequate analgesia    Post assessment: no apparent anesthetic complications    Last Vitals:   Vitals:    04/03/18 1225   BP: 106/61   Pulse: 65   Resp: 13   Temp:    SpO2: 95%       Post vital signs: stable    Level of consciousness: awake    Complications: none    Airway Patency: patent    Respiratory: spontaneous    Cardiovascular: stable    Hydration: euvolemic

## 2018-04-03 NOTE — PROGRESS NOTES
Dr Sears notified patient requesting to go home. Ok per Dr Sears, will print prescriptions and bring to bedside.

## 2018-04-03 NOTE — PROGRESS NOTES
Pt continues to rate pain 10/10 resting quietly with eyes closed on stretcher. Dr. Sears notified and states he will put in an admit order when he finishes the case he is in.

## 2018-04-03 NOTE — PLAN OF CARE
Pt tolerated procedure and anesthesia well, denies nausea, pain controlled, VSS, NAD noted or reported, d/c instructions reviewed with pt and spouse, v/u. Consents with chart

## 2018-04-03 NOTE — TRANSFER OF CARE
"Anesthesia Transfer of Care Note    Patient: Jaime Lund    Procedure(s) Performed: Procedure(s) (LRB):  IMPLANT-PUMP-INTRATHECAL (N/A)    Patient location: PACU    Anesthesia Type: general    Transport from OR: Transported from OR on room air with adequate spontaneous ventilation    Post pain: adequate analgesia    Post assessment: no apparent anesthetic complications and tolerated procedure well    Post vital signs: stable    Level of consciousness: awake, alert and oriented    Nausea/Vomiting: no nausea/vomiting    Complications: none    Transfer of care protocol was followed      Last vitals:   Visit Vitals  BP (!) 145/76   Pulse 63   Temp 36.6 °C (97.8 °F) (Oral)   Resp 20   Ht 5' 11" (1.803 m)   Wt (!) 141.5 kg (312 lb)   SpO2 97%   BMI 43.52 kg/m²     "

## 2018-04-03 NOTE — BRIEF OP NOTE
[facility]  Brief Operative Note    SUMMARY     Surgery Date: 4/3/2018     Surgeon(s) and Role:     * Meena Saavedra MD - Primary     * Raffy Sears MD - Resident - Assisting        Pre-op Diagnosis:  Chronic pain syndrome [G89.4]  Post laminectomy syndrome [M96.1]    Post-op Diagnosis:  Post-Op Diagnosis Codes:     * Chronic pain syndrome [G89.4]     * Post laminectomy syndrome [M96.1]    Procedure(s) (LRB):  IMPLANT-PUMP-INTRATHECAL (N/A)    Anesthesia: General    Description of Procedure: insertion of pain pump and intrathecal catheter    Description of the findings of the procedure: radiographic insertion into thecal sac    Estimated Blood Loss: * No values recorded between 4/3/2018  8:42 AM and 4/3/2018 12:13 PM *         Specimens:   Specimen (12h ago through future)    None

## 2018-04-03 NOTE — ANESTHESIA POSTPROCEDURE EVALUATION
"Anesthesia Post Evaluation    Patient: Jaime Lund    Procedure(s) Performed: Procedure(s) (LRB):  IMPLANT-PUMP-INTRATHECAL (N/A)    Final Anesthesia Type: general  Patient location during evaluation: PACU  Patient participation: Yes- Able to Participate  Level of consciousness: awake and alert  Post-procedure vital signs: reviewed and stable  Pain management: adequate  Airway patency: patent  PONV status at discharge: No PONV  Anesthetic complications: no      Cardiovascular status: blood pressure returned to baseline  Respiratory status: unassisted  Hydration status: euvolemic  Follow-up not needed.        Visit Vitals  /61 (BP Location: Right arm, Patient Position: Lying)   Pulse 65   Temp 36.4 °C (97.5 °F) (Temporal)   Resp 13   Ht 5' 11" (1.803 m)   Wt (!) 141.5 kg (312 lb)   SpO2 95%   BMI 43.52 kg/m²       Pain/Margi Score: Pain Assessment Performed: Yes (4/3/2018 12:11 PM)  Presence of Pain: denies (4/3/2018 12:11 PM)  Pain Rating Prior to Med Admin: 10 (4/3/2018  1:01 PM)  Margi Score: 9 (4/3/2018 12:11 PM)      "

## 2018-04-03 NOTE — ANESTHESIA PREPROCEDURE EVALUATION
04/03/2018  Jaime Lund is a 48 y.o., male.    Anesthesia Evaluation         Review of Systems  Anesthesia Hx:  No problems with previous Anesthesia   Social:  Non-Smoker    Cardiovascular:   Exercise tolerance: good Hypertension Denies CAD.     Denies Angina.  Functional Capacity Can you climb two flights of stairs? ==> Yes    Pulmonary:   Denies Asthma.  Denies Recent URI.  Denies Sleep Apnea.    Renal/:  Renal/ Normal     Hepatic/GI:   Denies PUD. Denies Hiatal Hernia.  Denies GERD. Denies Liver Disease.  Denies Hepatitis.    Neurological:   Denies CVA. Denies Seizures.    Endocrine:   Denies Diabetes. Denies Hypothyroidism.        Physical Exam  General:  Well nourished, Obesity    Airway/Jaw/Neck:  Airway Findings: Mouth Opening: Normal Tongue: Normal  General Airway Assessment: Adult  Mallampati: III  Improves to II with phonation.  TM Distance: Normal, at least 6 cm  Jaw/Neck Findings:  Neck ROM: Normal ROM  Neck Findings:     Eyes/Ears/Nose:  EYES/EARS/NOSE FINDINGS: Normal   Dental:  Dental Findings: In tact   Chest/Lungs:  Chest/Lungs Findings: Clear to auscultation     Heart/Vascular:  Heart Findings: Rate: Normal  Rhythm: Regular Rhythm  Sounds: Normal        Mental Status:  Mental Status Findings:  Alert and Oriented         Anesthesia Plan  Type of Anesthesia, risks & benefits discussed:  Anesthesia Type:  general  Patient's Preference: Proceed with anesthesia understanding that the risks are very small but could be serious or life threatening.  Intra-op Monitoring Plan: standard ASA monitors  Intra-op Monitoring Plan Comments:   Post Op Pain Control Plan:   Post Op Pain Control Plan Comments:   Induction:   IV  Beta Blocker:  Patient is on a Beta-Blocker and has received one dose within the past 24 hours (No further documentation required).       Informed Consent: Patient  understands risks and agrees with Anesthesia plan.  Questions answered. Anesthesia consent signed with patient.  ASA Score: 2     Day of Surgery Review of History & Physical: I have interviewed and examined the patient. I have reviewed the patient's H&P dated:            Ready For Surgery From Anesthesia Perspective.

## 2018-04-04 NOTE — DISCHARGE SUMMARY
[facility]  Neurosurgery  Discharge Summary      Patient Name: Jaime Lund  MRN: [id number]  Admission Date: 4/3/2018  Hospital Length of Stay: 1 days  Discharge Date and Time:  04/03/2018 8:00 PM  Attending Physician: No att. providers found   Discharging Provider: Raffy Sears MD  Primary Care Provider: Gabby Dean MD     HPI: Patient presented for elective outpatient placement of pain pump and intrathecal catheter.     Procedure(s) (LRB):  IMPLANT-PUMP-INTRATHECAL (N/A)     Hospital Course: 4/3: to OR for placement of pain pump and intrathecal catheter. Tolerated the procedure well and there were no complications    Consults:     Significant Diagnostic Studies: Labs:   BMP:   Recent Labs  Lab 04/03/18  0600   GLU 88      K 3.3*      CO2 30*   BUN 18   CREATININE 0.7   CALCIUM 9.0   , CBC   Recent Labs  Lab 04/03/18  0600   WBC 5.05   HGB 12.2*   HCT 36.8*       and INR   Lab Results   Component Value Date    INR 0.9 04/03/2018    INR 1.0 03/27/2018    INR 1.0 03/17/2014       Pending Diagnostic Studies:     None        Final Active Diagnoses:    Diagnosis Date Noted POA    Chronic pain [G89.29] 04/03/2018 Yes      Problems Resolved During this Admission:    Diagnosis Date Noted Date Resolved POA      Discharged Condition: stable    Disposition: Home or Self Care    Follow Up:  Follow-up Information     Meena Saavedra MD In 2 weeks.    Specialty:  Neurosurgery  Why:  For suture removal, For wound re-check  Contact information:  [address] [place] [id number]  [phone]                 Patient Instructions:     Diet Adult Regular     Activity as tolerated     Shower on day dressing removed (No bath)     Notify your health care provider if you experience any of the following:  temperature >100.4     Notify your health care provider if you experience any of the following:  persistent nausea and vomiting or diarrhea     Notify your health care provider  if you experience any of the following:  severe uncontrolled pain     Notify your health care provider if you experience any of the following:  redness, tenderness, or signs of infection (pain, swelling, redness, odor or green/yellow discharge around incision site)     Notify your health care provider if you experience any of the following:  difficulty breathing or increased cough     Notify your health care provider if you experience any of the following:  severe persistent headache     Notify your health care provider if you experience any of the following:  worsening rash     Notify your health care provider if you experience any of the following:  increased confusion or weakness     Notify your health care provider if you experience any of the following:  persistent dizziness, light-headedness, or visual disturbances     Remove dressing in 48 hours       Medications:  Reconciled Home Medications:      Medication List      START taking these medications    cephALEXin 500 MG capsule  Commonly known as:  KEFLEX  Take 1 capsule (500 mg total) by mouth every 6 (six) hours.        CHANGE how you take these medications    furosemide 40 MG tablet  Commonly known as:  LASIX  TAKE ONE TABLET BY MOUTH ONCE DAILY FOR FLUID  What changed:  See the new instructions.     hydroCHLOROthiazide 25 MG tablet  Commonly known as:  HYDRODIURIL  TAKE ONE TABLET BY MOUTH ONCE DAILY FOR BLOOD PRESSURE AND OR FOR FLUID  What changed:  See the new instructions.     meloxicam 15 MG tablet  Commonly known as:  MOBIC  TAKE ONE TABLET BY MOUTH ONCE DAILY WITH A MEAL FOR INFLAMMATION AND OR PAIN  What changed:  See the new instructions.     * oxyCODONE-acetaminophen  mg per tablet  Commonly known as:  PERCOCET  What changed:  Another medication with the same name was added. Make sure you understand how and when to take each.     * oxyCODONE-acetaminophen  mg per tablet  Commonly known as:  PERCOCET  Take 1 tablet by mouth every 4  (four) hours as needed for Pain (.).  What changed:  You were already taking a medication with the same name, and this prescription was added. Make sure you understand how and when to take each.     potassium chloride 10 MEQ Tbsr  Commonly known as:  KLOR-CON  Take 1 tablet (10 mEq total) by mouth once daily.  What changed:  when to take this     * tiZANidine 2 MG tablet  Commonly known as:  ZANAFLEX  Take 1 tablet (2 mg total) by mouth 3 (three) times daily.  What changed:  · when to take this  · reasons to take this     * tiZANidine 6 mg capsule  Commonly known as:  ZANAFLEX  Take 1 capsule (6 mg total) by mouth 3 (three) times daily.  What changed:  You were already taking a medication with the same name, and this prescription was added. Make sure you understand how and when to take each.        * This list has 4 medication(s) that are the same as other medications prescribed for you. Read the directions carefully, and ask your doctor or other care provider to review them with you.            CONTINUE taking these medications    amitriptyline 50 MG tablet  Commonly known as:  ELAVIL     butalbital-acetaminophen-caffeine -40 mg -40 mg per tablet  Commonly known as:  FIORICET, ESGIC  Take 1 tablet by mouth every 4 (four) hours as needed.     gabapentin 400 MG capsule  Commonly known as:  NEURONTIN     methocarbamol 750 MG Tab  Commonly known as:  ROBAXIN     oxyCODONE 30 MG Tab  Commonly known as:  ROXICODONE     propranolol 10 MG tablet  Commonly known as:  INDERAL  Take 1 tablet (10 mg total) by mouth 3 (three) times daily.     ranitidine 150 MG capsule  Commonly known as:  ZANTAC     tamsulosin 0.4 mg Cp24  Commonly known as:  FLOMAX        ASK your doctor about these medications    DULoxetine 60 MG capsule  Commonly known as:  CYMBALTA  TAKE 1 CAPSULE BY MOUTH EVERY EVENING  Ask about: Which instructions should I use?           Where to Get Your Medications      These medications were sent to  [facility] - [place], [place] - [address]  [address], [place] [facility] [address]    Phone:  [phone]   · DULoxetine 60 MG capsule     You can get these medications from any pharmacy    Bring a paper prescription for each of these medications  · cephALEXin 500 MG capsule  · oxyCODONE-acetaminophen  mg per tablet  · tiZANidine 6 mg capsule         Raffy Sears MD  Neurosurgery  [facility]

## 2018-04-06 NOTE — OP NOTE
DATE OF PROCEDURE:  04/03/2018     PREOPERATIVE DIAGNOSIS:  Chronic pain syndrome and lumbar   postlaminectomy syndrome.     POSTOPERATIVE DIAGNOSIS:  Chronic pain syndrome and lumbar  postlaminectomy syndrome.     OPERATIVE PROCEDURE:  Intrathecal opioid pump placement as   well as intrathecal catheter placement with programming.     SURGEON:  Meena Saavedra M.D.     ASSISTANT: Raffy Sears M.D.     ANESTHESIA:  General.     ESTIMATED BLOOD LOSS:  25 mL     INDICATION FOR PROCEDURE:  Mr Lund is a 48-year-old male with  A long-standing history of low back pain and leg pain.  He has undergone   multiple interventions including lumbar fusion, epidural steroid injections,   and physical therapy.  None of these interventions have significantly helped   his pain.  Ultimately, he had an intrathecal opioid trial done with good   response; therefore, the decision was made to bring her to the Operating   Room for permanent inthathecal opioid pump placement.     OPERATIVE NOTE:  After obtaining informed consent, the patient was brought   into the Operating Room.  He was intubated and anesthetized by Anesthesia.    Preoperative antibiotics were given.  The patient was placed in the lateral   decubitus position with the left side down with his hips over the break in bed.    The bed was flexed, exposing the back and flank on the right side.  The back,   flank and abdomen were prepped and draped in the standard sterile fashion.  Skin   incision was made first in the back using a #15 blade.  This was carried down   to the level of the lumbodorsal fascia using Bovie electrocautery.  The spinal   needle was introduced at the L3-L4 level under fluoroscopic guidance given his   previous L4-5 fusion.  Using anatomic landmarks, the needle was introduced into   the intrathecal space.  Spinal fluid was obtained.  We then passed the catheter   up to approximately the T9 level.  The spinal needle and stylet were removed and  the anchoring  device was put into position and anchored down using 4-0 Nurolon.    At this point, however, there was no spinal fluid noted coming from the intrathecal   catheter.  We attempted to aspirate from the catheter with no return of spinal fluid.    Therefore, the decision was made to remove the anchoring device and the   catheter and reattempt intrathecal access.  This time, the spinal needle was   introduced at the L2-3 level.  We made several attempts under fluoroscopic   guidance again intrathecal access.  We did get some CSF flow, but this was not   as brisk as it was previously.  It was assumed that this was due to decompression   of CSF in the lumbar cistern, so therefore we decided to thread the catheter into   the intrathecal space.  Under fluoroscopic guidance we threaded the catheter to   the T9 level.  The spinal needle and stylet were removed.  The anchoring device   was put into position and anchored down using 4-0 Nurolon's.  This time, brisk CSF   flow was seen coming from the catheter.  Spinal fluid access was much more   difficult and time consuming in this patient given his body habitus and morbid obesity   BMI of 44.  We then turned our attention to the abdomen.  Again, skin incision was   made just underneath the ribs using a #15 blade.  A transverse incision was made   in the right upper quadrant.  We dissected down to the rectus fascia using Bovie   electrocautery.  This was more difficult than normal given the significant scarring   of the fat to the fascia from the patient's previous pannus removal.  This is also   more difficult than normal given the patient's body habitus and BMI of 44.  Ultimately,   we created a pocket for the baclofen pump above the rectus fascia.  The pump   was then prepared on the back table.  It was loaded sterile saline into a 40 mL   pump.  The catheter was then tunneled from the back to the abdomen with a skip   incision made on the right flank.  at the right flank  skip incision, the catheter was   connected to an extended connection piece using the sutureless connector piece.    This was tunneled to the abdomen and attached to the pump. The pump was placed  in a Dacron pouch above the fascia and anchored down using 2-0 silk sutures. All  wounds were copiously irrigated.  Vancomycin powder was placed in the wounds and   the wounds were closed in layers.  Sterile dressings were put in place.  The pump   was programmed to 0.0062 mg per day with no bolus. The final catheter length was   110.9 cm.  The patient was extubated by Anesthesia and brought to the Recovery   Room in stable condition.  All counts were correct at the end of the case.  This case   warranted a 22 modifier due to the patient's body habitus, morbid obesity, previous   surgery, and BMI of 44.  These factors made the case much more difficult difficult and   more time consuming than usual.

## 2018-04-09 ENCOUNTER — TELEPHONE (OUTPATIENT)
Dept: NEUROSURGERY | Facility: CLINIC | Age: 48
End: 2018-04-09

## 2018-04-09 ENCOUNTER — HOSPITAL ENCOUNTER (EMERGENCY)
Facility: HOSPITAL | Age: 48
Discharge: HOME | End: 2018-04-09
Attending: EMERGENCY MEDICINE
Payer: COMMERCIAL

## 2018-04-09 VITALS
OXYGEN SATURATION: 100 % | BODY MASS INDEX: 48.65 KG/M2 | RESPIRATION RATE: 16 BRPM | TEMPERATURE: 98 F | SYSTOLIC BLOOD PRESSURE: 152 MMHG | HEIGHT: 67 IN | DIASTOLIC BLOOD PRESSURE: 77 MMHG | WEIGHT: 310 LBS | HEART RATE: 60 BPM

## 2018-04-09 DIAGNOSIS — Z97.8 PRESENCE OF INTRATHECAL PUMP: Primary | ICD-10-CM

## 2018-04-09 DIAGNOSIS — Z98.890 S/P INSERTION OF INTRATHECAL PUMP: ICD-10-CM

## 2018-04-09 DIAGNOSIS — I10 ESSENTIAL HYPERTENSION: ICD-10-CM

## 2018-04-09 PROCEDURE — 99283 EMERGENCY DEPT VISIT LOW MDM: CPT | Mod: ,,, | Performed by: EMERGENCY MEDICINE

## 2018-04-09 PROCEDURE — 99283 EMERGENCY DEPT VISIT LOW MDM: CPT | Mod: 25

## 2018-04-09 PROCEDURE — 25000003 PHARM REV CODE 250: Performed by: EMERGENCY MEDICINE

## 2018-04-09 RX ORDER — OXYCODONE HYDROCHLORIDE 5 MG/1
15 TABLET ORAL
Status: COMPLETED | OUTPATIENT
Start: 2018-04-09 | End: 2018-04-09

## 2018-04-09 RX ADMIN — OXYCODONE HYDROCHLORIDE 15 MG: 5 TABLET ORAL at 08:04

## 2018-04-09 NOTE — TELEPHONE ENCOUNTER
----- Message from Heidi Potts sent at 4/9/2018  3:29 PM CDT -----  Contact: pt wife Claudia Lund  Pt's wife Claudia Lund would like to be called back regarding care.    Pt can be reached at [phone]

## 2018-04-09 NOTE — ED NOTES
"Pt reports "had pain pump installed on 4/3/18, 2 days ago started draining, today swollen red and stopped draining, skin above it feels tight, and you can see the outline of in in abd".     LOC: The patient is awake, alert and aware of environment with an appropriate affect, the patient is oriented x 3 and speaking appropriately.  APPEARANCE: Patient resting comfortably and in no acute distress, patient is clean and well groomed, patient's clothing is properly fastened.  SKIN: The skin is warm and dry, intact, patient has normal skin turgor and moist mucus membranes.   RESPIRATORY: Airway is open and patent, respirations are spontaneous, patient has a normal effort and rate.  CARDIAC: Patient has a normal rate and rhythm, no periphreal edema noted, capillary refill < 3 seconds. Bilateral radial pulses +2  ABDOMEN: Soft and non tender to palpation, no distention noted. Bowel sounds present  NEUROLOGIC: PERRL, facial expression is symmetrical, patient moving all extremities spontaneously, normal sensation in all extremities when touched with a finger. Follows all commands appropriately  MUSCULOSKELETAL: No obvious deformities. Full ROM in all 4 extremities.   "

## 2018-04-09 NOTE — ED PROVIDER NOTES
SCRIBE #1 NOTE: I, Larissa Bragg, am scribing for, and in the presence of,  Radha Mao MD. I have scribed the following portions of the note - Other sections scribed: HPI & ROS.        CC: Post-op Problem (pain and swelling to pain pump insertion R uppper abd, didn't get here in time to see md in clinic)      HPI: Jaime Lund is a 48 y.o. male with chronic back pain, HTN, periferal artery disease, and history of sepsis, seizures, and lumbar fusion who presents to the ED complaining of abdominal swelling above the site of implantation of his morphine pump 6 days ago. He complains of the sensation of tightness surrounding the pocket, but denies pain. There is currently no medication running through the pump, just saline. He and his significant other also deny fever, chills, discharge for the last 24 hrs, and new injury. He claims full compliance with post-op medications. He has an extensive history of post-op infections.       Past Medical History:   Diagnosis Date    Back pain, chronic     Bulging discs     Chronic pain following surgery or procedure     Degenerative disc disease     Hypertension     Hypokalemia     PAD (peripheral artery disease)     Post laminectomy syndrome     Seizures     Severe sepsis     Short-term memory loss     Thyroid disease     Subclinical hyperthyroidism     Past Surgical History:   Procedure Laterality Date    CHOLECYSTECTOMY      GASTRIC BYPASS      SLEEVE    gastric sleeve  2011    HERNIA REPAIR      pain pump      SKIN SURGERY      EXCESS SKIN REMOVAL    SPINE SURGERY      lumbar fusion     Family History   Problem Relation Age of Onset    Heart disease Mother     Breast cancer Mother     Arthritis Mother     Hypertension Mother     Throat cancer Father     Hypertension Father      No current facility-administered medications on file prior to encounter.      Current Outpatient Prescriptions on File Prior to Encounter   Medication Sig Dispense  Refill    amitriptyline (ELAVIL) 50 MG tablet Take 50 mg by mouth nightly.       butalbital-acetaminophen-caffeine -40 mg (FIORICET, ESGIC) -40 mg per tablet Take 1 tablet by mouth every 4 (four) hours as needed. 60 tablet 11    DULoxetine (CYMBALTA) 60 MG capsule TAKE 1 CAPSULE BY MOUTH EVERY EVENING 90 capsule 1    furosemide (LASIX) 40 MG tablet TAKE ONE TABLET BY MOUTH ONCE DAILY FOR FLUID (Patient taking differently: TAKE ONE TABLET BY MOUTH ONCE DAILY FOR FLUID, morning) 30 tablet 2    gabapentin (NEURONTIN) 400 MG capsule Take 400 mg by mouth 3 (three) times daily.       hydroCHLOROthiazide (HYDRODIURIL) 25 MG tablet TAKE ONE TABLET BY MOUTH ONCE DAILY FOR BLOOD PRESSURE AND OR FOR FLUID (Patient taking differently: TAKE ONE TABLET BY MOUTH ONCE DAILY FOR BLOOD PRESSURE AND OR FOR FLUID, morning) 30 tablet 5    meloxicam (MOBIC) 15 MG tablet TAKE ONE TABLET BY MOUTH ONCE DAILY WITH A MEAL FOR INFLAMMATION AND OR PAIN (Patient taking differently: TAKE ONE TABLET BY MOUTH ONCE DAILY WITH A MEAL FOR INFLAMMATION AND OR PAIN, bedtime) 60 tablet 0    methocarbamol (ROBAXIN) 750 MG Tab Take 750 mg by mouth 2 (two) times daily.       oxyCODONE (ROXICODONE) 30 MG Tab Take 30 mg by mouth every 12 (twelve) hours.      oxycodone-acetaminophen (PERCOCET)  mg per tablet Take 1 tablet by mouth every 6 (six) hours as needed (15 MG Percocet).   0    oxyCODONE-acetaminophen (PERCOCET)  mg per tablet Take 1 tablet by mouth every 4 (four) hours as needed for Pain (.). 30 tablet 0    potassium chloride (KLOR-CON) 10 MEQ TbSR Take 1 tablet (10 mEq total) by mouth once daily. (Patient taking differently: Take 10 mEq by mouth every morning. ) 90 tablet 0    propranolol (INDERAL) 10 MG tablet Take 1 tablet (10 mg total) by mouth 3 (three) times daily. 90 tablet 11    ranitidine (ZANTAC) 150 MG capsule Take 150 mg by mouth 2 (two) times daily.      tamsulosin (FLOMAX) 0.4 mg Cp24 TAKE 1 CAPSULE  BY MOUTH ONCE DAILY FOR PROSTATE, morning  6    tiZANidine (ZANAFLEX) 2 MG tablet Take 1 tablet (2 mg total) by mouth 3 (three) times daily. (Patient taking differently: Take 2 mg by mouth 3 (three) times daily as needed. ) 90 tablet 3    tiZANidine (ZANAFLEX) 6 mg capsule Take 1 capsule (6 mg total) by mouth 3 (three) times daily. 30 capsule 0    cephALEXin (KEFLEX) 500 MG capsule Take 1 capsule (500 mg total) by mouth every 6 (six) hours. 12 capsule 0     Klonopin [clonazepam]; Valium [diazepam]; and Xanax [alprazolam]  Social History     Social History    Marital status: Single     Spouse name: N/A    Number of children: N/A    Years of education: N/A     Social History Main Topics    Smoking status: Never Smoker    Smokeless tobacco: Never Used    Alcohol use No    Drug use: No    Sexual activity: Not Asked     Other Topics Concern    None     Social History Narrative    None       ROS:     Constitutional : neg  Resp neg  Cardiac  neg  Gi: (+) Area of swelling above indwelling morphine pump.   neg  Neuro neg  Heme/Immune: neg  Skin neg    PHYSICAL EXAM:  Vitals:    04/09/18 2031   BP: (!) 152/77   Pulse: 60   Resp: 16   Temp: 98 °F (36.7 °C)         PHYSICAL EXAM:   general: comfortable, in no acute distress  VS: triage VS reviewed  ABD:  ND, + normal BS, NT, + edema at the site of the right side abdominal incision w/ staples and w/ mild diego incisional erythema, no drainage, no TTP  Neuro: AAO x 3, face symmetric, speech normal          DATA & INTERVENTIONS:    LABS reviewed:  Labs Reviewed - No data to display    RADIOLOGY reviewed:  Imaging Results          X-Ray Abdomen AP 1 View (KUB) (Final result)  Result time 04/09/18 19:33:35    Final result by Avel Shanks MD (04/09/18 19:33:35)                 Impression:      Postoperative changes as described above.      Electronically signed by: Avel Shanks MD  Date:    04/09/2018  Time:    19:33             Narrative:    EXAMINATION:  XR  ABDOMEN AP 1 VIEW    CLINICAL HISTORY:  Other specified postprocedural states    TECHNIQUE:  Single AP View of the abdomen was performed.    COMPARISON:  None.    FINDINGS:  Evaluation is significantly limited by underpenetration of the x-ray beam.  There are postoperative changes overlying the lumbar spine, right flank, and right lower quadrant.  Electronic pump device is present and partially obscures the lumbar spine.  There are postoperative changes at L4-L5. visualized bowel gas pattern is unremarkable.                                MEDICATIONS/FLUIDS:  Medications   oxyCODONE immediate release tablet 15 mg (15 mg Oral Given 4/9/18 2036)         MDM:   49 yo m w/ intrathecal pump placed on 4/3/2018, finished Keflex this Sat, today he noticed swelling at the pump site after he bent to tie his shoe, no pain, no drainage    No cellulitis or drainage on my exam, no ttp    bedside US w/ minimal amount of fluid around the pump  KUB reviewed by NSGY w/ no concerns re: position of the pump  Discussed w/ neurosurg, appearance not concerning for infection, to f/u w/ NSGY in the clinic. Signs and symptoms that would require return to the ED were thoroughly reviewed with the patient and his wife    IMPRESSION:  1.) edema at the intrathecal pump site      Dispo: Discharge    Critical Care Time: N/A     Radha Mao MD  04/10/18 0102

## 2018-04-10 ENCOUNTER — TELEPHONE (OUTPATIENT)
Dept: NEUROSURGERY | Facility: CLINIC | Age: 48
End: 2018-04-10

## 2018-04-10 DIAGNOSIS — T81.40XA POSTOPERATIVE INFECTION, INITIAL ENCOUNTER: Primary | ICD-10-CM

## 2018-04-10 NOTE — DISCHARGE INSTRUCTIONS
Please return to the Emergency Department if you notice pain, redness, purulent drainage at the site of the intrathecal pump or if you develop fever, nausea, vomiting. Please follow up with your neurosurgeon, Dr. Saavedra for re-assesment

## 2018-04-10 NOTE — TELEPHONE ENCOUNTER
----- Message from Boyd Shirley MD sent at 4/10/2018  7:52 AM CDT -----  Can we please have mr. Russell come into clinic this week for follow up to check incision with Dr. Saavedra. Thank you

## 2018-04-12 ENCOUNTER — HOSPITAL ENCOUNTER (INPATIENT)
Facility: HOSPITAL | Age: 48
LOS: 4 days | Discharge: HOME | DRG: 902 | End: 2018-04-16
Attending: NEUROLOGICAL SURGERY | Admitting: NEUROLOGICAL SURGERY
Payer: COMMERCIAL

## 2018-04-12 ENCOUNTER — TELEPHONE (OUTPATIENT)
Dept: NEUROSURGERY | Facility: CLINIC | Age: 48
End: 2018-04-12

## 2018-04-12 ENCOUNTER — OFFICE VISIT (OUTPATIENT)
Dept: NEUROSURGERY | Facility: CLINIC | Age: 48
DRG: 902 | End: 2018-04-12
Payer: COMMERCIAL

## 2018-04-12 ENCOUNTER — ANESTHESIA EVENT (OUTPATIENT)
Dept: SURGERY | Facility: HOSPITAL | Age: 48
DRG: 902 | End: 2018-04-12
Payer: COMMERCIAL

## 2018-04-12 VITALS
HEART RATE: 63 BPM | DIASTOLIC BLOOD PRESSURE: 72 MMHG | HEIGHT: 67 IN | SYSTOLIC BLOOD PRESSURE: 113 MMHG | TEMPERATURE: 97 F | BODY MASS INDEX: 49.44 KG/M2 | WEIGHT: 315 LBS

## 2018-04-12 DIAGNOSIS — T81.49XA SURGICAL WOUND INFECTION: ICD-10-CM

## 2018-04-12 LAB
ANION GAP SERPL CALC-SCNC: 7 MMOL/L
BASOPHILS # BLD AUTO: 0.04 K/UL
BASOPHILS NFR BLD: 0.6 %
BUN SERPL-MCNC: 15 MG/DL
CALCIUM SERPL-MCNC: 9.1 MG/DL
CHLORIDE SERPL-SCNC: 100 MMOL/L
CO2 SERPL-SCNC: 31 MMOL/L
CREAT SERPL-MCNC: 0.7 MG/DL
DIFFERENTIAL METHOD: ABNORMAL
EOSINOPHIL # BLD AUTO: 0.3 K/UL
EOSINOPHIL NFR BLD: 3.9 %
ERYTHROCYTE [DISTWIDTH] IN BLOOD BY AUTOMATED COUNT: 12.7 %
EST. GFR  (AFRICAN AMERICAN): >60 ML/MIN/1.73 M^2
EST. GFR  (NON AFRICAN AMERICAN): >60 ML/MIN/1.73 M^2
GLUCOSE SERPL-MCNC: 85 MG/DL
HCT VFR BLD AUTO: 34 %
HGB BLD-MCNC: 11.1 G/DL
IMM GRANULOCYTES # BLD AUTO: 0.01 K/UL
IMM GRANULOCYTES NFR BLD AUTO: 0.1 %
LYMPHOCYTES # BLD AUTO: 1.6 K/UL
LYMPHOCYTES NFR BLD: 22.8 %
MCH RBC QN AUTO: 29.8 PG
MCHC RBC AUTO-ENTMCNC: 32.6 G/DL
MCV RBC AUTO: 91 FL
MONOCYTES # BLD AUTO: 0.8 K/UL
MONOCYTES NFR BLD: 11.1 %
NEUTROPHILS # BLD AUTO: 4.3 K/UL
NEUTROPHILS NFR BLD: 61.5 %
NRBC BLD-RTO: 0 /100 WBC
PLATELET # BLD AUTO: 284 K/UL
PMV BLD AUTO: 9.6 FL
POTASSIUM SERPL-SCNC: 3.6 MMOL/L
RBC # BLD AUTO: 3.72 M/UL
SODIUM SERPL-SCNC: 138 MMOL/L
WBC # BLD AUTO: 6.94 K/UL

## 2018-04-12 PROCEDURE — 87075 CULTR BACTERIA EXCEPT BLOOD: CPT

## 2018-04-12 PROCEDURE — 63600175 PHARM REV CODE 636 W HCPCS: Performed by: PHYSICIAN ASSISTANT

## 2018-04-12 PROCEDURE — 25500020 PHARM REV CODE 255: Performed by: NEUROLOGICAL SURGERY

## 2018-04-12 PROCEDURE — 87070 CULTURE OTHR SPECIMN AEROBIC: CPT

## 2018-04-12 PROCEDURE — 87176 TISSUE HOMOGENIZATION CULTR: CPT

## 2018-04-12 PROCEDURE — 85025 COMPLETE CBC W/AUTO DIFF WBC: CPT

## 2018-04-12 PROCEDURE — 87077 CULTURE AEROBIC IDENTIFY: CPT

## 2018-04-12 PROCEDURE — 87040 BLOOD CULTURE FOR BACTERIA: CPT | Mod: 59

## 2018-04-12 PROCEDURE — 99024 POSTOP FOLLOW-UP VISIT: CPT | Mod: S$GLB,,, | Performed by: PHYSICIAN ASSISTANT

## 2018-04-12 PROCEDURE — 99999 PR PBB SHADOW E&M-EST. PATIENT-LVL IV: CPT | Mod: PBBFAC,,, | Performed by: PHYSICIAN ASSISTANT

## 2018-04-12 PROCEDURE — 11000001 HC ACUTE MED/SURG PRIVATE ROOM

## 2018-04-12 PROCEDURE — 87186 SC STD MICRODIL/AGAR DIL: CPT

## 2018-04-12 PROCEDURE — 80048 BASIC METABOLIC PNL TOTAL CA: CPT

## 2018-04-12 PROCEDURE — 25000003 PHARM REV CODE 250: Performed by: PHYSICIAN ASSISTANT

## 2018-04-12 RX ORDER — PROPRANOLOL HYDROCHLORIDE 10 MG/1
10 TABLET ORAL 3 TIMES DAILY
Status: DISCONTINUED | OUTPATIENT
Start: 2018-04-12 | End: 2018-04-16 | Stop reason: HOSPADM

## 2018-04-12 RX ORDER — PANTOPRAZOLE SODIUM 40 MG/1
40 TABLET, DELAYED RELEASE ORAL DAILY
Status: DISCONTINUED | OUTPATIENT
Start: 2018-04-12 | End: 2018-04-16 | Stop reason: HOSPADM

## 2018-04-12 RX ORDER — HYDROCHLOROTHIAZIDE 25 MG/1
25 TABLET ORAL DAILY
Status: DISCONTINUED | OUTPATIENT
Start: 2018-04-12 | End: 2018-04-16 | Stop reason: HOSPADM

## 2018-04-12 RX ORDER — OXYCODONE AND ACETAMINOPHEN 7.5; 325 MG/1; MG/1
2 TABLET ORAL EVERY 4 HOURS PRN
Status: DISCONTINUED | OUTPATIENT
Start: 2018-04-12 | End: 2018-04-13

## 2018-04-12 RX ORDER — FAMOTIDINE 20 MG/1
20 TABLET, FILM COATED ORAL 2 TIMES DAILY
Status: DISCONTINUED | OUTPATIENT
Start: 2018-04-12 | End: 2018-04-16 | Stop reason: HOSPADM

## 2018-04-12 RX ORDER — IBUPROFEN 200 MG
24 TABLET ORAL
Status: DISCONTINUED | OUTPATIENT
Start: 2018-04-12 | End: 2018-04-16 | Stop reason: HOSPADM

## 2018-04-12 RX ORDER — IBUPROFEN 200 MG
16 TABLET ORAL
Status: DISCONTINUED | OUTPATIENT
Start: 2018-04-12 | End: 2018-04-16 | Stop reason: HOSPADM

## 2018-04-12 RX ORDER — DOCUSATE SODIUM 100 MG/1
100 CAPSULE, LIQUID FILLED ORAL 2 TIMES DAILY
Status: DISCONTINUED | OUTPATIENT
Start: 2018-04-12 | End: 2018-04-16 | Stop reason: HOSPADM

## 2018-04-12 RX ORDER — DULOXETIN HYDROCHLORIDE 60 MG/1
60 CAPSULE, DELAYED RELEASE ORAL NIGHTLY
Status: DISCONTINUED | OUTPATIENT
Start: 2018-04-12 | End: 2018-04-16 | Stop reason: HOSPADM

## 2018-04-12 RX ORDER — AMITRIPTYLINE HYDROCHLORIDE 50 MG/1
50 TABLET, FILM COATED ORAL NIGHTLY
Status: DISCONTINUED | OUTPATIENT
Start: 2018-04-12 | End: 2018-04-16 | Stop reason: HOSPADM

## 2018-04-12 RX ORDER — VANCOMYCIN 2 GRAM/500 ML IN 0.9 % SODIUM CHLORIDE INTRAVENOUS
15
Status: DISCONTINUED | OUTPATIENT
Start: 2018-04-12 | End: 2018-04-14

## 2018-04-12 RX ORDER — OXYCODONE AND ACETAMINOPHEN 10; 325 MG/1; MG/1
1 TABLET ORAL EVERY 6 HOURS PRN
Status: DISCONTINUED | OUTPATIENT
Start: 2018-04-12 | End: 2018-04-12

## 2018-04-12 RX ORDER — GLUCAGON 1 MG
1 KIT INJECTION
Status: DISCONTINUED | OUTPATIENT
Start: 2018-04-12 | End: 2018-04-16 | Stop reason: HOSPADM

## 2018-04-12 RX ORDER — BUTALBITAL, ACETAMINOPHEN AND CAFFEINE 50; 325; 40 MG/1; MG/1; MG/1
1 TABLET ORAL EVERY 6 HOURS PRN
Status: DISCONTINUED | OUTPATIENT
Start: 2018-04-12 | End: 2018-04-12

## 2018-04-12 RX ORDER — METHOCARBAMOL 750 MG/1
750 TABLET, FILM COATED ORAL 2 TIMES DAILY
Status: DISCONTINUED | OUTPATIENT
Start: 2018-04-12 | End: 2018-04-16 | Stop reason: HOSPADM

## 2018-04-12 RX ORDER — TAMSULOSIN HYDROCHLORIDE 0.4 MG/1
0.4 CAPSULE ORAL DAILY
Status: DISCONTINUED | OUTPATIENT
Start: 2018-04-12 | End: 2018-04-16 | Stop reason: HOSPADM

## 2018-04-12 RX ORDER — ONDANSETRON 4 MG/1
4 TABLET, ORALLY DISINTEGRATING ORAL EVERY 6 HOURS PRN
Status: DISCONTINUED | OUTPATIENT
Start: 2018-04-12 | End: 2018-04-16 | Stop reason: HOSPADM

## 2018-04-12 RX ORDER — TIZANIDINE 2 MG/1
2 TABLET ORAL 3 TIMES DAILY PRN
Status: DISCONTINUED | OUTPATIENT
Start: 2018-04-12 | End: 2018-04-16 | Stop reason: HOSPADM

## 2018-04-12 RX ORDER — GABAPENTIN 400 MG/1
400 CAPSULE ORAL 3 TIMES DAILY
Status: DISCONTINUED | OUTPATIENT
Start: 2018-04-12 | End: 2018-04-16 | Stop reason: HOSPADM

## 2018-04-12 RX ORDER — SODIUM CHLORIDE 9 MG/ML
INJECTION, SOLUTION INTRAVENOUS CONTINUOUS
Status: DISCONTINUED | OUTPATIENT
Start: 2018-04-13 | End: 2018-04-16 | Stop reason: HOSPADM

## 2018-04-12 RX ORDER — INSULIN ASPART 100 [IU]/ML
0-5 INJECTION, SOLUTION INTRAVENOUS; SUBCUTANEOUS
Status: DISCONTINUED | OUTPATIENT
Start: 2018-04-12 | End: 2018-04-16 | Stop reason: HOSPADM

## 2018-04-12 RX ADMIN — FAMOTIDINE 20 MG: 20 TABLET, FILM COATED ORAL at 08:04

## 2018-04-12 RX ADMIN — PANTOPRAZOLE SODIUM 40 MG: 40 TABLET, DELAYED RELEASE ORAL at 01:04

## 2018-04-12 RX ADMIN — PROPRANOLOL HYDROCHLORIDE 10 MG: 10 TABLET ORAL at 08:04

## 2018-04-12 RX ADMIN — OXYCODONE HYDROCHLORIDE AND ACETAMINOPHEN 2 TABLET: 7.5; 325 TABLET ORAL at 03:04

## 2018-04-12 RX ADMIN — AMITRIPTYLINE HYDROCHLORIDE 50 MG: 50 TABLET, FILM COATED ORAL at 08:04

## 2018-04-12 RX ADMIN — TAMSULOSIN HYDROCHLORIDE 0.4 MG: 0.4 CAPSULE ORAL at 03:04

## 2018-04-12 RX ADMIN — DOCUSATE SODIUM 100 MG: 100 CAPSULE, LIQUID FILLED ORAL at 01:04

## 2018-04-12 RX ADMIN — OXYCODONE HYDROCHLORIDE AND ACETAMINOPHEN 2 TABLET: 7.5; 325 TABLET ORAL at 07:04

## 2018-04-12 RX ADMIN — GABAPENTIN 400 MG: 100 CAPSULE ORAL at 08:04

## 2018-04-12 RX ADMIN — METHOCARBAMOL 750 MG: 750 TABLET ORAL at 08:04

## 2018-04-12 RX ADMIN — DULOXETINE 60 MG: 60 CAPSULE, DELAYED RELEASE ORAL at 08:04

## 2018-04-12 RX ADMIN — SODIUM CHLORIDE: 0.9 INJECTION, SOLUTION INTRAVENOUS at 11:04

## 2018-04-12 RX ADMIN — GABAPENTIN 400 MG: 100 CAPSULE ORAL at 03:04

## 2018-04-12 RX ADMIN — VANCOMYCIN HYDROCHLORIDE 2000 MG: 10 INJECTION, POWDER, LYOPHILIZED, FOR SOLUTION INTRAVENOUS at 06:04

## 2018-04-12 RX ADMIN — DOCUSATE SODIUM 100 MG: 100 CAPSULE, LIQUID FILLED ORAL at 08:04

## 2018-04-12 RX ADMIN — CEFTRIAXONE SODIUM 2 G: 2 INJECTION, POWDER, FOR SOLUTION INTRAMUSCULAR; INTRAVENOUS at 05:04

## 2018-04-12 RX ADMIN — OXYCODONE HYDROCHLORIDE AND ACETAMINOPHEN 2 TABLET: 7.5; 325 TABLET ORAL at 11:04

## 2018-04-12 RX ADMIN — IOHEXOL 100 ML: 350 INJECTION, SOLUTION INTRAVENOUS at 04:04

## 2018-04-12 NOTE — PROGRESS NOTES
[facility]  Neurosurgery    SUBJECTIVE:     History of Present Illness:  Patient is a 48 y.o. male with chronic pain syndrome & lumbar postlaminectomy syndrome, now s/p intrathecal opioid pump placement on 4/3/18 with Dr. Saavedra, who presents for evaluation of surgical incision.  He reports drainage from the lateral right incision starting yesterday that is creamy & bloody.  He did have drainage from the abdominal pump site a few days ago, but it was clear & has not happened since.  He denies fever, but woke up in a full sweat last night.  His lumbar incision has had no drainage or erythema.  The lateral & abdominal incision became erythematous a couple of days ago.    Review of patient's allergies indicates:   Allergen Reactions    Klonopin [clonazepam] Anxiety and Other (See Comments)     Becomes agitated    Valium [diazepam] Other (See Comments)     Becomes agitated    Xanax [alprazolam] Anxiety and Other (See Comments)     Becomes agitated       Past Medical History:   Diagnosis Date    Back pain, chronic     Bulging discs     Chronic pain following surgery or procedure     Degenerative disc disease     Hypertension     Hypokalemia     PAD (peripheral artery disease)     Post laminectomy syndrome     Seizures     Severe sepsis     Short-term memory loss     Thyroid disease     Subclinical hyperthyroidism     Past Surgical History:   Procedure Laterality Date    CHOLECYSTECTOMY      GASTRIC BYPASS      SLEEVE    gastric sleeve  2011    HERNIA REPAIR      pain pump      SKIN SURGERY      EXCESS SKIN REMOVAL    SPINE SURGERY      lumbar fusion     Family History   Problem Relation Age of Onset    Heart disease Mother     Breast cancer Mother     Arthritis Mother     Hypertension Mother     Throat cancer Father     Hypertension Father      Social History   Substance Use Topics    Smoking status: Never Smoker    Smokeless tobacco: Never Used    Alcohol use No        Review of  Systems:  Constitutional: no fever or chills  Eyes: no visual changes  ENT: no nasal congestion or sore throat  Respiratory: no cough or shortness of breath  Cardiovascular: no chest pain or palpitations  Gastrointestinal: no nausea or vomiting, tolerating diet  Genitourinary: no hematuria or dysuria  Integument/Breast: positive for drainage from incision  Hematologic/Lymphatic: no easy bruising or lymphadenopathy  Musculoskeletal: positive for back pain  Neurological: no seizures or tremors  Behavioral/Psych: no auditory or visual hallucinations  Endocrine: no heat or cold intolerance    OBJECTIVE:     Vital Signs (Most Recent):  Temp: 97.1 °F (36.2 °C) (04/12/18 0911)  Pulse: 63 (04/12/18 0911)  BP: 113/72 (04/12/18 0911)    Physical Exam:  General: well developed, well nourished, no distress  Head: normocephalic, atraumatic  Neurologic: Alert and oriented. Thought content appropriate  GCS: Motor: 6/Verbal: 5/Eyes: 4 GCS Total: 15  Mental Status: Awake, Alert, Oriented x 4  Language: No aphasia  Speech: No dysarthria  Cranial nerves: face symmetric, tongue midline, CN II-XII grossly intact.   Eyes: pupils equal, round, reactive to light with accommodation, EOMI. Unable to appreciate optic disc. Red reflex intact bilaterally.   Pulmonary: normal respirations, not labored, no accessory muscles used  Abdomen: soft, non-distended, not tender to palpation  Sensory: intact to light touch throughout  Motor Strength: Moves all extremities spontaneously with good tone.  Full strength upper and lower extremities. No abnormal movements seen.     Strength  Deltoids Triceps Biceps Wrist Extension Wrist Flexion Hand    Upper: R 5/5 5/5 5/5 5/5 5/5 5/5    L 5/5 5/5 5/5 5/5 5/5 5/5     Iliopsoas Quadriceps Knee  Flexion Tibialis  anterior Gastro- cnemius EHL   Lower: R 5/5 5/5 5/5 5/5 5/5 5/5    L 5/5 5/5 5/5 5/5 5/5 5/5     DTR's - 2 + and symmetric triceps, biceps, brachioradialis, patellar, & achilles  Pronator Drift: no  drift noted  Finger-to-nose: Intact bilaterally  Gomez: absent  Clonus: absent  Babinski: absent  Pulses: 2+ and symmetric radial and dorsalis pedis  Skin: warm, dry and intact, no rashes  Lumbar incision c/d/i with staples, no erythema or drainage  Lateral right incision with creamy bloody drainage when expressed.  Area of induration beneath the staples.  No fluctuance  Abdominal pump incision with staples, erythema surrounding staples.  No induration or fluctuance.  No fluid expressed.    Laboratory:  CBC - 6.9  ESR - 53  CRP - 36    Diagnostic Results:  No new imaging    ASSESSMENT/PLAN:     Patient is a 48 y.o. male with chronic pain syndrome & lumbar postlaminectomy syndrome, now s/p intrathecal opioid pump placement on 4/3/18 with Dr. Saavedra, who presents for evaluation of surgical incision  -Concern for infection at surgical incision sites laterally & abdominal  -Admit to 7th floor, plan for OR tomorrow for washout & possible pump removal  -Wound (lateral) drainage cultured today, f/u cultures  -CBC, BMP, Blood cx ordered  -Will start on broad spectrum abx once blood cx drawn  -CT A/P w/wo contrast today  -NPO at [place], case request placed  -Discussed with Dr. Saavedra    Please feel free to call with any further questions    Ashley Sena PA-C  Neurosurgery  [phone]

## 2018-04-12 NOTE — TELEPHONE ENCOUNTER
----- Message from Verónica Masters sent at 4/12/2018  1:54 PM CDT -----  Contact: Pt's Wife Mrs Lund [phone]  Pt's wife is requesting a call back from the nurse to see if they can just show up for surgery as opposed to checking into the hospital. Pt is having a concern with with the hospital and some things that are going on. They have been there for four hours and has not received any iv antibiotics and there is some confusion with his medications.    Pt may be reached at [phone].    Thank you.  LC

## 2018-04-12 NOTE — LETTER
April 12, 2018      Gabby Dean MD  [address] [address]  [address]  [place] [address]           [facility] - Neurosurgery 7th Fl  [address]  [place] [address]  Phone: [phone]          Patient: Jaime Lund   MR Number: [id number]   YOB: 1970   Date of Visit: 4/12/2018       Dear Dr. Gabby Dean:    Thank you for referring Jaime Lund to me for evaluation. Attached you will find relevant portions of my assessment and plan of care.    If you have questions, please do not hesitate to call me. I look forward to following Jaime Lund along with you.    Sincerely,    Ashley Sena PA-C    Enclosure  CC:  No Recipients    If you would like to receive this communication electronically, please contact [email or web address] or [phone] to request more information on [employer] Link access.    For providers and/or their staff who would like to refer a patient to [facility], please contact us through our one-stop-shop provider referral line, [facility] , at [phone].    If you feel you have received this communication in error or would no longer like to receive these types of communications, please e-mail [email or web address]

## 2018-04-12 NOTE — ANESTHESIA PREPROCEDURE EVALUATION
2018  Jaime Lund is a 48 y.o., male.  Pre-operative evaluation for WASHOUT of intrathecal pump with possible pump removal (N/A)    Chief Complaint:surgical wound infection    PMH:  S/p bariatric surgery  chronic pain syndrome & lumbar postlaminectomy syndrome, now s/p intrathecal opioid pump placement on 4/3/18 with Dr. Saavedra  purulent drainage from incision site  Past Surgical History:   Procedure Laterality Date    CHOLECYSTECTOMY      GASTRIC BYPASS      SLEEVE    gastric sleeve      HERNIA REPAIR      pain pump      SKIN SURGERY      EXCESS SKIN REMOVAL    SPINE SURGERY      lumbar fusion         Vital Signs Range (Last 24H):  Temp:  [36.2 °C (97.1 °F)-36.6 °C (97.8 °F)]   Pulse:  [58-63]   Resp:  [18]   BP: (113-126)/(69-72)   SpO2:  [97 %]       CBC:     Recent Labs  Lab 18  0859 18  0600 18  0839 18  1228   WBC 4.81 5.05 7.61 6.94   RBC 4.39* 4.08* 4.12* 3.72*   HGB 13.2* 12.2* 12.6* 11.1*   HCT 40.1 36.8* 37.6* 34.0*    270 287 284   MCV 91 90 91 91   MCH 30.1 29.9 30.6 29.8   MCHC 32.9 33.2 33.5 32.6       CMP:   Recent Labs  Lab 18  0859 18  0600 18  1228    139 138   K 3.6  3.6 3.3* 3.6    101 100   CO2 31* 30* 31*   BUN 15 18 15   CREATININE 0.9 0.7 0.7   GLU 75 88 85   CALCIUM 9.3 9.0 9.1       INR:    Recent Labs  Lab 18  0859 18  0600   INR 1.0 0.9   APTT 27.3 25.4         Diagnostic Studies:      EKD Echo:    CONCLUSIONS     1 - Normal left ventricular systolic function (EF 55-60%).     2 - Concentric hypertrophy.     This document has been electronically    SIGNED BY: Kelvin Menard MD On: 2017 11:47      Specimen Collected: 17 13:30 Last Resulted: 17 11:51          Anesthesia Evaluation    I have reviewed the Patient Summary Reports.    I have reviewed the Nursing  Notes.   I have reviewed the Medications.     Review of Systems  Anesthesia Hx:  No problems with previous Anesthesia    Social:  Non-Smoker, No Alcohol Use    Cardiovascular:  Cardiovascular Normal     Pulmonary:  Pulmonary Normal    Neurological:  Neurology Normal        Physical Exam  General:  Obesity    Airway/Jaw/Neck:  Airway Findings: Mouth Opening: Normal Tongue: Normal  General Airway Assessment: Good  Mallampati: II  Improves to II with phonation.  TM Distance: Normal, at least 6 cm  Jaw/Neck Findings:  Neck ROM: Normal ROM      Dental:  Dental Findings: In tact   Chest/Lungs:  Chest/Lungs Findings: Clear to auscultation, Normal Respiratory Rate     Heart/Vascular:  Heart Findings:       Mental Status:  Mental Status Findings:  Cooperative, Alert and Oriented         Anesthesia Plan  Type of Anesthesia, risks & benefits discussed:  Anesthesia Type:  general  Patient's Preference:   Intra-op Monitoring Plan: standard ASA monitors  Intra-op Monitoring Plan Comments:   Post Op Pain Control Plan:   Post Op Pain Control Plan Comments:   Induction:   IV  Beta Blocker:         Informed Consent: Patient understands risks and agrees with Anesthesia plan.  Questions answered. Anesthesia consent signed with patient.  ASA Score: 3     Day of Surgery Review of History & Physical:    H&P update referred to the surgeon.         Ready For Surgery From Anesthesia Perspective.

## 2018-04-13 ENCOUNTER — ANESTHESIA (OUTPATIENT)
Dept: SURGERY | Facility: HOSPITAL | Age: 48
DRG: 902 | End: 2018-04-13
Payer: COMMERCIAL

## 2018-04-13 LAB
ABO + RH BLD: NORMAL
ANION GAP SERPL CALC-SCNC: 6 MMOL/L
BASOPHILS # BLD AUTO: 0.05 K/UL
BASOPHILS NFR BLD: 0.8 %
BLD GP AB SCN CELLS X3 SERPL QL: NORMAL
BUN SERPL-MCNC: 14 MG/DL
CALCIUM SERPL-MCNC: 9.3 MG/DL
CHLORIDE SERPL-SCNC: 102 MMOL/L
CO2 SERPL-SCNC: 30 MMOL/L
CREAT SERPL-MCNC: 0.7 MG/DL
DIFFERENTIAL METHOD: ABNORMAL
EOSINOPHIL # BLD AUTO: 0.3 K/UL
EOSINOPHIL NFR BLD: 4.1 %
ERYTHROCYTE [DISTWIDTH] IN BLOOD BY AUTOMATED COUNT: 12.5 %
EST. GFR  (AFRICAN AMERICAN): >60 ML/MIN/1.73 M^2
EST. GFR  (NON AFRICAN AMERICAN): >60 ML/MIN/1.73 M^2
GLUCOSE SERPL-MCNC: 86 MG/DL
GRAM STN SPEC: NORMAL
HCT VFR BLD AUTO: 34.8 %
HGB BLD-MCNC: 11.5 G/DL
IMM GRANULOCYTES # BLD AUTO: 0.02 K/UL
IMM GRANULOCYTES NFR BLD AUTO: 0.3 %
INR PPP: 0.9
LYMPHOCYTES # BLD AUTO: 1.3 K/UL
LYMPHOCYTES NFR BLD: 20.2 %
MCH RBC QN AUTO: 30.3 PG
MCHC RBC AUTO-ENTMCNC: 33 G/DL
MCV RBC AUTO: 92 FL
MONOCYTES # BLD AUTO: 0.6 K/UL
MONOCYTES NFR BLD: 9 %
NEUTROPHILS # BLD AUTO: 4.3 K/UL
NEUTROPHILS NFR BLD: 65.6 %
NRBC BLD-RTO: 0 /100 WBC
PLATELET # BLD AUTO: 286 K/UL
PMV BLD AUTO: 9.7 FL
POTASSIUM SERPL-SCNC: 3.9 MMOL/L
PROTHROMBIN TIME: 9.8 SEC
RBC # BLD AUTO: 3.79 M/UL
SODIUM SERPL-SCNC: 138 MMOL/L
WBC # BLD AUTO: 6.54 K/UL

## 2018-04-13 PROCEDURE — D9220A PRA ANESTHESIA: Mod: ANES,,, | Performed by: ANESTHESIOLOGY

## 2018-04-13 PROCEDURE — 87102 FUNGUS ISOLATION CULTURE: CPT

## 2018-04-13 PROCEDURE — 71000039 HC RECOVERY, EACH ADD'L HOUR: Performed by: NEUROLOGICAL SURGERY

## 2018-04-13 PROCEDURE — 63600175 PHARM REV CODE 636 W HCPCS: Performed by: PHYSICIAN ASSISTANT

## 2018-04-13 PROCEDURE — 0JB80ZZ EXCISION OF ABDOMEN SUBCUTANEOUS TISSUE AND FASCIA, OPEN APPROACH: ICD-10-PCS | Performed by: NEUROLOGICAL SURGERY

## 2018-04-13 PROCEDURE — 36000705 HC OR TIME LEV I EA ADD 15 MIN: Performed by: NEUROLOGICAL SURGERY

## 2018-04-13 PROCEDURE — 25000003 PHARM REV CODE 250: Performed by: PHYSICIAN ASSISTANT

## 2018-04-13 PROCEDURE — 36000704 HC OR TIME LEV I 1ST 15 MIN: Performed by: NEUROLOGICAL SURGERY

## 2018-04-13 PROCEDURE — 94761 N-INVAS EAR/PLS OXIMETRY MLT: CPT

## 2018-04-13 PROCEDURE — 87070 CULTURE OTHR SPECIMN AEROBIC: CPT | Mod: 59

## 2018-04-13 PROCEDURE — 63600175 PHARM REV CODE 636 W HCPCS: Performed by: NURSE ANESTHETIST, CERTIFIED REGISTERED

## 2018-04-13 PROCEDURE — 36415 COLL VENOUS BLD VENIPUNCTURE: CPT

## 2018-04-13 PROCEDURE — 63600175 PHARM REV CODE 636 W HCPCS: Performed by: STUDENT IN AN ORGANIZED HEALTH CARE EDUCATION/TRAINING PROGRAM

## 2018-04-13 PROCEDURE — 25000003 PHARM REV CODE 250: Performed by: NURSE ANESTHETIST, CERTIFIED REGISTERED

## 2018-04-13 PROCEDURE — 37000008 HC ANESTHESIA 1ST 15 MINUTES: Performed by: NEUROLOGICAL SURGERY

## 2018-04-13 PROCEDURE — 11000001 HC ACUTE MED/SURG PRIVATE ROOM

## 2018-04-13 PROCEDURE — 0JWT0VZ REVISION OF INFUSION PUMP IN TRUNK SUBCUTANEOUS TISSUE AND FASCIA, OPEN APPROACH: ICD-10-PCS | Performed by: NEUROLOGICAL SURGERY

## 2018-04-13 PROCEDURE — 62362 IMPLANT SPINE INFUSION PUMP: CPT | Mod: 78,22,, | Performed by: NEUROLOGICAL SURGERY

## 2018-04-13 PROCEDURE — 62362 IMPLANT SPINE INFUSION PUMP: CPT | Mod: 78,AS,, | Performed by: PHYSICIAN ASSISTANT

## 2018-04-13 PROCEDURE — 87186 SC STD MICRODIL/AGAR DIL: CPT | Mod: 59

## 2018-04-13 PROCEDURE — 80048 BASIC METABOLIC PNL TOTAL CA: CPT

## 2018-04-13 PROCEDURE — 25000003 PHARM REV CODE 250: Performed by: NEUROLOGICAL SURGERY

## 2018-04-13 PROCEDURE — 37000009 HC ANESTHESIA EA ADD 15 MINS: Performed by: NEUROLOGICAL SURGERY

## 2018-04-13 PROCEDURE — C1729 CATH, DRAINAGE: HCPCS | Performed by: NEUROLOGICAL SURGERY

## 2018-04-13 PROCEDURE — 27000221 HC OXYGEN, UP TO 24 HOURS

## 2018-04-13 PROCEDURE — 63600175 PHARM REV CODE 636 W HCPCS

## 2018-04-13 PROCEDURE — 63600175 PHARM REV CODE 636 W HCPCS: Performed by: NEUROLOGICAL SURGERY

## 2018-04-13 PROCEDURE — 87015 SPECIMEN INFECT AGNT CONCNTJ: CPT

## 2018-04-13 PROCEDURE — 85025 COMPLETE CBC W/AUTO DIFF WBC: CPT

## 2018-04-13 PROCEDURE — 87205 SMEAR GRAM STAIN: CPT | Mod: 59

## 2018-04-13 PROCEDURE — D9220A PRA ANESTHESIA: Mod: CRNA,,, | Performed by: NURSE ANESTHETIST, CERTIFIED REGISTERED

## 2018-04-13 PROCEDURE — 87075 CULTR BACTERIA EXCEPT BLOOD: CPT | Mod: 59

## 2018-04-13 PROCEDURE — 87206 SMEAR FLUORESCENT/ACID STAI: CPT

## 2018-04-13 PROCEDURE — S0028 INJECTION, FAMOTIDINE, 20 MG: HCPCS | Performed by: NURSE ANESTHETIST, CERTIFIED REGISTERED

## 2018-04-13 PROCEDURE — 86901 BLOOD TYPING SEROLOGIC RH(D): CPT

## 2018-04-13 PROCEDURE — 87077 CULTURE AEROBIC IDENTIFY: CPT | Mod: 59

## 2018-04-13 PROCEDURE — 71000033 HC RECOVERY, INTIAL HOUR: Performed by: NEUROLOGICAL SURGERY

## 2018-04-13 PROCEDURE — 87116 MYCOBACTERIA CULTURE: CPT | Mod: 59

## 2018-04-13 PROCEDURE — 85610 PROTHROMBIN TIME: CPT

## 2018-04-13 PROCEDURE — 27201423 OPTIME MED/SURG SUP & DEVICES STERILE SUPPLY: Performed by: NEUROLOGICAL SURGERY

## 2018-04-13 RX ORDER — FENTANYL CITRATE 50 UG/ML
INJECTION, SOLUTION INTRAMUSCULAR; INTRAVENOUS
Status: COMPLETED
Start: 2018-04-13 | End: 2018-04-13

## 2018-04-13 RX ORDER — BACITRACIN ZINC 500 UNIT/G
OINTMENT (GRAM) TOPICAL
Status: DISCONTINUED | OUTPATIENT
Start: 2018-04-13 | End: 2018-04-13 | Stop reason: HOSPADM

## 2018-04-13 RX ORDER — HYDRALAZINE HYDROCHLORIDE 20 MG/ML
INJECTION INTRAMUSCULAR; INTRAVENOUS
Status: DISCONTINUED | OUTPATIENT
Start: 2018-04-13 | End: 2018-04-13

## 2018-04-13 RX ORDER — DEXAMETHASONE SODIUM PHOSPHATE 4 MG/ML
INJECTION, SOLUTION INTRA-ARTICULAR; INTRALESIONAL; INTRAMUSCULAR; INTRAVENOUS; SOFT TISSUE
Status: DISCONTINUED | OUTPATIENT
Start: 2018-04-13 | End: 2018-04-13

## 2018-04-13 RX ORDER — MIDAZOLAM HYDROCHLORIDE 1 MG/ML
INJECTION, SOLUTION INTRAMUSCULAR; INTRAVENOUS
Status: DISCONTINUED | OUTPATIENT
Start: 2018-04-13 | End: 2018-04-13

## 2018-04-13 RX ORDER — LABETALOL HYDROCHLORIDE 5 MG/ML
INJECTION, SOLUTION INTRAVENOUS
Status: DISCONTINUED | OUTPATIENT
Start: 2018-04-13 | End: 2018-04-13

## 2018-04-13 RX ORDER — OXYCODONE HYDROCHLORIDE 5 MG/1
10 TABLET ORAL EVERY 4 HOURS PRN
Status: DISCONTINUED | OUTPATIENT
Start: 2018-04-13 | End: 2018-04-16 | Stop reason: HOSPADM

## 2018-04-13 RX ORDER — LIDOCAINE HCL/PF 100 MG/5ML
SYRINGE (ML) INTRAVENOUS
Status: DISCONTINUED | OUTPATIENT
Start: 2018-04-13 | End: 2018-04-13

## 2018-04-13 RX ORDER — ONDANSETRON 2 MG/ML
INJECTION INTRAMUSCULAR; INTRAVENOUS
Status: DISCONTINUED | OUTPATIENT
Start: 2018-04-13 | End: 2018-04-13

## 2018-04-13 RX ORDER — FAMOTIDINE 10 MG/ML
INJECTION INTRAVENOUS
Status: DISCONTINUED | OUTPATIENT
Start: 2018-04-13 | End: 2018-04-13

## 2018-04-13 RX ORDER — SODIUM CHLORIDE 0.9 % (FLUSH) 0.9 %
3 SYRINGE (ML) INJECTION
Status: DISCONTINUED | OUTPATIENT
Start: 2018-04-13 | End: 2018-04-16 | Stop reason: HOSPADM

## 2018-04-13 RX ORDER — NEOSTIGMINE METHYLSULFATE 1 MG/ML
INJECTION, SOLUTION INTRAVENOUS
Status: DISCONTINUED | OUTPATIENT
Start: 2018-04-13 | End: 2018-04-13

## 2018-04-13 RX ORDER — FENTANYL CITRATE 50 UG/ML
25 INJECTION, SOLUTION INTRAMUSCULAR; INTRAVENOUS EVERY 5 MIN PRN
Status: COMPLETED | OUTPATIENT
Start: 2018-04-13 | End: 2018-04-13

## 2018-04-13 RX ORDER — ROCURONIUM BROMIDE 10 MG/ML
INJECTION, SOLUTION INTRAVENOUS
Status: DISCONTINUED | OUTPATIENT
Start: 2018-04-13 | End: 2018-04-13

## 2018-04-13 RX ORDER — BACITRACIN 50000 [IU]/1
INJECTION, POWDER, FOR SOLUTION INTRAMUSCULAR
Status: DISCONTINUED | OUTPATIENT
Start: 2018-04-13 | End: 2018-04-13 | Stop reason: HOSPADM

## 2018-04-13 RX ORDER — GLYCOPYRROLATE 0.2 MG/ML
INJECTION INTRAMUSCULAR; INTRAVENOUS
Status: DISCONTINUED | OUTPATIENT
Start: 2018-04-13 | End: 2018-04-13

## 2018-04-13 RX ORDER — SUCCINYLCHOLINE CHLORIDE 20 MG/ML
INJECTION INTRAMUSCULAR; INTRAVENOUS
Status: DISCONTINUED | OUTPATIENT
Start: 2018-04-13 | End: 2018-04-13

## 2018-04-13 RX ORDER — SODIUM CHLORIDE 9 MG/ML
INJECTION, SOLUTION INTRAVENOUS CONTINUOUS PRN
Status: DISCONTINUED | OUTPATIENT
Start: 2018-04-13 | End: 2018-04-13

## 2018-04-13 RX ORDER — VANCOMYCIN HYDROCHLORIDE 1 G/20ML
INJECTION, POWDER, LYOPHILIZED, FOR SOLUTION INTRAVENOUS
Status: DISCONTINUED | OUTPATIENT
Start: 2018-04-13 | End: 2018-04-13 | Stop reason: HOSPADM

## 2018-04-13 RX ORDER — ACETAMINOPHEN 10 MG/ML
INJECTION, SOLUTION INTRAVENOUS
Status: DISCONTINUED | OUTPATIENT
Start: 2018-04-13 | End: 2018-04-13

## 2018-04-13 RX ORDER — PROPOFOL 10 MG/ML
VIAL (ML) INTRAVENOUS
Status: DISCONTINUED | OUTPATIENT
Start: 2018-04-13 | End: 2018-04-13

## 2018-04-13 RX ORDER — FENTANYL CITRATE 50 UG/ML
25 INJECTION, SOLUTION INTRAMUSCULAR; INTRAVENOUS EVERY 5 MIN PRN
Status: DISCONTINUED | OUTPATIENT
Start: 2018-04-13 | End: 2018-04-13 | Stop reason: HOSPADM

## 2018-04-13 RX ORDER — FENTANYL CITRATE 50 UG/ML
INJECTION, SOLUTION INTRAMUSCULAR; INTRAVENOUS
Status: DISCONTINUED | OUTPATIENT
Start: 2018-04-13 | End: 2018-04-13

## 2018-04-13 RX ORDER — ONDANSETRON 2 MG/ML
4 INJECTION INTRAMUSCULAR; INTRAVENOUS DAILY PRN
Status: DISCONTINUED | OUTPATIENT
Start: 2018-04-13 | End: 2018-04-13 | Stop reason: HOSPADM

## 2018-04-13 RX ADMIN — FENTANYL CITRATE 25 MCG: 50 INJECTION INTRAMUSCULAR; INTRAVENOUS at 11:04

## 2018-04-13 RX ADMIN — OXYCODONE HYDROCHLORIDE 10 MG: 5 TABLET ORAL at 04:04

## 2018-04-13 RX ADMIN — DULOXETINE 60 MG: 60 CAPSULE, DELAYED RELEASE ORAL at 08:04

## 2018-04-13 RX ADMIN — PROPOFOL 50 MG: 10 INJECTION, EMULSION INTRAVENOUS at 10:04

## 2018-04-13 RX ADMIN — TIZANIDINE 2 MG: 2 TABLET ORAL at 11:04

## 2018-04-13 RX ADMIN — OXYCODONE HYDROCHLORIDE 10 MG: 5 TABLET ORAL at 11:04

## 2018-04-13 RX ADMIN — FENTANYL CITRATE 50 MCG: 50 INJECTION, SOLUTION INTRAMUSCULAR; INTRAVENOUS at 08:04

## 2018-04-13 RX ADMIN — CEFTRIAXONE SODIUM 2 G: 2 INJECTION, POWDER, FOR SOLUTION INTRAMUSCULAR; INTRAVENOUS at 05:04

## 2018-04-13 RX ADMIN — OXYCODONE HYDROCHLORIDE AND ACETAMINOPHEN 2 TABLET: 7.5; 325 TABLET ORAL at 04:04

## 2018-04-13 RX ADMIN — FAMOTIDINE 20 MG: 20 TABLET, FILM COATED ORAL at 08:04

## 2018-04-13 RX ADMIN — ROCURONIUM BROMIDE 40 MG: 10 INJECTION, SOLUTION INTRAVENOUS at 07:04

## 2018-04-13 RX ADMIN — DEXAMETHASONE SODIUM PHOSPHATE 8 MG: 4 INJECTION, SOLUTION INTRAMUSCULAR; INTRAVENOUS at 08:04

## 2018-04-13 RX ADMIN — OXYCODONE HYDROCHLORIDE 10 MG: 5 TABLET ORAL at 08:04

## 2018-04-13 RX ADMIN — GLYCOPYRROLATE 0.3 MG: 0.2 INJECTION, SOLUTION INTRAMUSCULAR; INTRAVENOUS at 10:04

## 2018-04-13 RX ADMIN — PROPOFOL 50 MG: 10 INJECTION, EMULSION INTRAVENOUS at 09:04

## 2018-04-13 RX ADMIN — ACETAMINOPHEN 1000 MG: 10 INJECTION, SOLUTION INTRAVENOUS at 08:04

## 2018-04-13 RX ADMIN — ROCURONIUM BROMIDE 20 MG: 10 INJECTION, SOLUTION INTRAVENOUS at 08:04

## 2018-04-13 RX ADMIN — DOCUSATE SODIUM 100 MG: 100 CAPSULE, LIQUID FILLED ORAL at 11:04

## 2018-04-13 RX ADMIN — VANCOMYCIN HYDROCHLORIDE 2000 MG: 10 INJECTION, POWDER, LYOPHILIZED, FOR SOLUTION INTRAVENOUS at 05:04

## 2018-04-13 RX ADMIN — PROPRANOLOL HYDROCHLORIDE 10 MG: 10 TABLET ORAL at 01:04

## 2018-04-13 RX ADMIN — SODIUM CHLORIDE: 0.9 INJECTION, SOLUTION INTRAVENOUS at 07:04

## 2018-04-13 RX ADMIN — HYDROCHLOROTHIAZIDE 25 MG: 25 TABLET ORAL at 11:04

## 2018-04-13 RX ADMIN — NEOSTIGMINE METHYLSULFATE 2.5 MG: 1 INJECTION INTRAVENOUS at 10:04

## 2018-04-13 RX ADMIN — GABAPENTIN 400 MG: 100 CAPSULE ORAL at 11:04

## 2018-04-13 RX ADMIN — CEFTRIAXONE SODIUM 2 G: 2 INJECTION, POWDER, FOR SOLUTION INTRAMUSCULAR; INTRAVENOUS at 06:04

## 2018-04-13 RX ADMIN — METHOCARBAMOL 750 MG: 750 TABLET ORAL at 08:04

## 2018-04-13 RX ADMIN — TAMSULOSIN HYDROCHLORIDE 0.4 MG: 0.4 CAPSULE ORAL at 11:04

## 2018-04-13 RX ADMIN — HYDRALAZINE HYDROCHLORIDE 5 MG: 20 INJECTION INTRAMUSCULAR; INTRAVENOUS at 10:04

## 2018-04-13 RX ADMIN — FENTANYL CITRATE 50 MCG: 50 INJECTION, SOLUTION INTRAMUSCULAR; INTRAVENOUS at 07:04

## 2018-04-13 RX ADMIN — ROCURONIUM BROMIDE 10 MG: 10 INJECTION, SOLUTION INTRAVENOUS at 07:04

## 2018-04-13 RX ADMIN — PROPRANOLOL HYDROCHLORIDE 10 MG: 10 TABLET ORAL at 08:04

## 2018-04-13 RX ADMIN — FENTANYL CITRATE 25 MCG: 50 INJECTION INTRAMUSCULAR; INTRAVENOUS at 12:04

## 2018-04-13 RX ADMIN — VANCOMYCIN HYDROCHLORIDE 2000 MG: 10 INJECTION, POWDER, LYOPHILIZED, FOR SOLUTION INTRAVENOUS at 07:04

## 2018-04-13 RX ADMIN — LABETALOL HYDROCHLORIDE 5 MG: 5 INJECTION, SOLUTION INTRAVENOUS at 10:04

## 2018-04-13 RX ADMIN — AMITRIPTYLINE HYDROCHLORIDE 50 MG: 50 TABLET, FILM COATED ORAL at 08:04

## 2018-04-13 RX ADMIN — FAMOTIDINE 20 MG: 10 INJECTION, SOLUTION INTRAVENOUS at 09:04

## 2018-04-13 RX ADMIN — PROPOFOL 150 MG: 10 INJECTION, EMULSION INTRAVENOUS at 07:04

## 2018-04-13 RX ADMIN — LIDOCAINE HYDROCHLORIDE 60 MG: 20 INJECTION, SOLUTION INTRAVENOUS at 07:04

## 2018-04-13 RX ADMIN — METHOCARBAMOL 750 MG: 750 TABLET ORAL at 11:04

## 2018-04-13 RX ADMIN — SODIUM CHLORIDE, SODIUM GLUCONATE, SODIUM ACETATE, POTASSIUM CHLORIDE, MAGNESIUM CHLORIDE, SODIUM PHOSPHATE, DIBASIC, AND POTASSIUM PHOSPHATE: .53; .5; .37; .037; .03; .012; .00082 INJECTION, SOLUTION INTRAVENOUS at 09:04

## 2018-04-13 RX ADMIN — DOCUSATE SODIUM 100 MG: 100 CAPSULE, LIQUID FILLED ORAL at 08:04

## 2018-04-13 RX ADMIN — PANTOPRAZOLE SODIUM 40 MG: 40 TABLET, DELAYED RELEASE ORAL at 11:04

## 2018-04-13 RX ADMIN — LABETALOL HYDROCHLORIDE 10 MG: 5 INJECTION, SOLUTION INTRAVENOUS at 10:04

## 2018-04-13 RX ADMIN — TIZANIDINE 2 MG: 2 TABLET ORAL at 01:04

## 2018-04-13 RX ADMIN — HYDRALAZINE HYDROCHLORIDE 5 MG: 20 INJECTION INTRAMUSCULAR; INTRAVENOUS at 09:04

## 2018-04-13 RX ADMIN — SUCCINYLCHOLINE CHLORIDE 140 MG: 20 INJECTION, SOLUTION INTRAMUSCULAR; INTRAVENOUS at 07:04

## 2018-04-13 RX ADMIN — ONDANSETRON 4 MG: 2 INJECTION INTRAMUSCULAR; INTRAVENOUS at 09:04

## 2018-04-13 RX ADMIN — GLYCOPYRROLATE 0.2 MG: 0.2 INJECTION, SOLUTION INTRAMUSCULAR; INTRAVENOUS at 08:04

## 2018-04-13 RX ADMIN — GABAPENTIN 400 MG: 100 CAPSULE ORAL at 08:04

## 2018-04-13 RX ADMIN — MIDAZOLAM HYDROCHLORIDE 2 MG: 1 INJECTION, SOLUTION INTRAMUSCULAR; INTRAVENOUS at 07:04

## 2018-04-13 NOTE — PROGRESS NOTES
Certification of Assistant at Surgery       Surgery Date: 4/13/2018     Participating Surgeons:  Surgeon(s) and Role:     * Pablo Yang MD - Primary    Procedures:  Procedure(s) (LRB):  WASHOUT of intrathecal pump (N/A)    Assistant Surgeon's Certification of Necessity:  I understand that section 1842 (b) (6) (d) of the Social Security Act generally prohibits Medicare Part B reasonable charge payment for the services of assistants at surgery in teaching hospitals when qualified residents are available to furnish such services. I certify that the services for which payment is claimed were medically necessary, and that no qualified resident was available to perform the services. I further understand that these services are subject to post-payment review by the Medicare carrier.      Nisa John PA-C    04/13/2018  10:33 AM

## 2018-04-13 NOTE — ANESTHESIA POSTPROCEDURE EVALUATION
"Anesthesia Post Evaluation    Patient: Jaime Lund    Procedure(s) Performed: Procedure(s) (LRB):  WASHOUT of intrathecal pump (N/A)    Final Anesthesia Type: general  Patient location during evaluation: PACU  Patient participation: Yes- Able to Participate  Level of consciousness: awake and alert and oriented  Post-procedure vital signs: reviewed and stable  Pain management: adequate  Airway patency: patent  PONV status at discharge: No PONV  Anesthetic complications: no      Cardiovascular status: hemodynamically stable  Respiratory status: unassisted  Hydration status: euvolemic  Follow-up not needed.        Visit Vitals  BP (!) 144/76 (BP Location: Left arm, Patient Position: Lying)   Pulse 79   Temp 36.2 °C (97.2 °F) (Oral)   Resp 16   Ht 5' 11" (1.803 m)   Wt (!) 145.2 kg (320 lb 3.2 oz)   SpO2 (!) 93%   BMI 44.66 kg/m²       Pain/Margi Score: Pain Assessment Performed: Yes (4/13/2018  1:00 PM)  Presence of Pain: complains of pain/discomfort (4/13/2018  1:00 PM)  Pain Rating Prior to Med Admin: 7 (4/13/2018 12:25 PM)  Pain Rating Post Med Admin: 6 (4/13/2018  5:03 AM)  Margi Score: 10 (4/13/2018  1:00 PM)      "

## 2018-04-13 NOTE — PLAN OF CARE
Pt AAOx4. Complaints of moderate pain to abdomen and back. PRN IV and PO pain medication administered as ordered. Neuro checks intact. VSS. Tolerating sips of water. Dressings x3 remains CDI; drains with minimal output. Safety maintained.

## 2018-04-13 NOTE — H&P (VIEW-ONLY)
[facility]  Neurosurgery    SUBJECTIVE:     History of Present Illness:  Patient is a 48 y.o. male with chronic pain syndrome & lumbar postlaminectomy syndrome, now s/p intrathecal opioid pump placement on 4/3/18 with Dr. Saavedra, who presents for evaluation of surgical incision.  He reports drainage from the lateral right incision starting yesterday that is creamy & bloody.  He did have drainage from the abdominal pump site a few days ago, but it was clear & has not happened since.  He denies fever, but woke up in a full sweat last night.  His lumbar incision has had no drainage or erythema.  The lateral & abdominal incision became erythematous a couple of days ago.    Review of patient's allergies indicates:   Allergen Reactions    Klonopin [clonazepam] Anxiety and Other (See Comments)     Becomes agitated    Valium [diazepam] Other (See Comments)     Becomes agitated    Xanax [alprazolam] Anxiety and Other (See Comments)     Becomes agitated       Past Medical History:   Diagnosis Date    Back pain, chronic     Bulging discs     Chronic pain following surgery or procedure     Degenerative disc disease     Hypertension     Hypokalemia     PAD (peripheral artery disease)     Post laminectomy syndrome     Seizures     Severe sepsis     Short-term memory loss     Thyroid disease     Subclinical hyperthyroidism     Past Surgical History:   Procedure Laterality Date    CHOLECYSTECTOMY      GASTRIC BYPASS      SLEEVE    gastric sleeve  2011    HERNIA REPAIR      pain pump      SKIN SURGERY      EXCESS SKIN REMOVAL    SPINE SURGERY      lumbar fusion     Family History   Problem Relation Age of Onset    Heart disease Mother     Breast cancer Mother     Arthritis Mother     Hypertension Mother     Throat cancer Father     Hypertension Father      Social History   Substance Use Topics    Smoking status: Never Smoker    Smokeless tobacco: Never Used    Alcohol use No        Review of  Systems:  Constitutional: no fever or chills  Eyes: no visual changes  ENT: no nasal congestion or sore throat  Respiratory: no cough or shortness of breath  Cardiovascular: no chest pain or palpitations  Gastrointestinal: no nausea or vomiting, tolerating diet  Genitourinary: no hematuria or dysuria  Integument/Breast: positive for drainage from incision  Hematologic/Lymphatic: no easy bruising or lymphadenopathy  Musculoskeletal: positive for back pain  Neurological: no seizures or tremors  Behavioral/Psych: no auditory or visual hallucinations  Endocrine: no heat or cold intolerance    OBJECTIVE:     Vital Signs (Most Recent):  Temp: 97.1 °F (36.2 °C) (04/12/18 0911)  Pulse: 63 (04/12/18 0911)  BP: 113/72 (04/12/18 0911)    Physical Exam:  General: well developed, well nourished, no distress  Head: normocephalic, atraumatic  Neurologic: Alert and oriented. Thought content appropriate  GCS: Motor: 6/Verbal: 5/Eyes: 4 GCS Total: 15  Mental Status: Awake, Alert, Oriented x 4  Language: No aphasia  Speech: No dysarthria  Cranial nerves: face symmetric, tongue midline, CN II-XII grossly intact.   Eyes: pupils equal, round, reactive to light with accommodation, EOMI. Unable to appreciate optic disc. Red reflex intact bilaterally.   Pulmonary: normal respirations, not labored, no accessory muscles used  Abdomen: soft, non-distended, not tender to palpation  Sensory: intact to light touch throughout  Motor Strength: Moves all extremities spontaneously with good tone.  Full strength upper and lower extremities. No abnormal movements seen.     Strength  Deltoids Triceps Biceps Wrist Extension Wrist Flexion Hand    Upper: R 5/5 5/5 5/5 5/5 5/5 5/5    L 5/5 5/5 5/5 5/5 5/5 5/5     Iliopsoas Quadriceps Knee  Flexion Tibialis  anterior Gastro- cnemius EHL   Lower: R 5/5 5/5 5/5 5/5 5/5 5/5    L 5/5 5/5 5/5 5/5 5/5 5/5     DTR's - 2 + and symmetric triceps, biceps, brachioradialis, patellar, & achilles  Pronator Drift: no  drift noted  Finger-to-nose: Intact bilaterally  Gomez: absent  Clonus: absent  Babinski: absent  Pulses: 2+ and symmetric radial and dorsalis pedis  Skin: warm, dry and intact, no rashes  Lumbar incision c/d/i with staples, no erythema or drainage  Lateral right incision with creamy bloody drainage when expressed.  Area of induration beneath the staples.  No fluctuance  Abdominal pump incision with staples, erythema surrounding staples.  No induration or fluctuance.  No fluid expressed.    Laboratory:  CBC - 6.9  ESR - 53  CRP - 36    Diagnostic Results:  No new imaging    ASSESSMENT/PLAN:     Patient is a 48 y.o. male with chronic pain syndrome & lumbar postlaminectomy syndrome, now s/p intrathecal opioid pump placement on 4/3/18 with Dr. Saavedra, who presents for evaluation of surgical incision  -Concern for infection at surgical incision sites laterally & abdominal  -Admit to 7th floor, plan for OR tomorrow for washout & possible pump removal  -Wound (lateral) drainage cultured today, f/u cultures  -CBC, BMP, Blood cx ordered  -Will start on broad spectrum abx once blood cx drawn  -CT A/P w/wo contrast today  -NPO at [facility], case request placed  -Discussed with Dr. Saavedra    Please feel free to call with any further questions    Ashley Sena PA-C  Neurosurgery  [phone]

## 2018-04-13 NOTE — TRANSFER OF CARE
"Anesthesia Transfer of Care Note    Patient: Jaime Lund    Procedure(s) Performed: Procedure(s) (LRB):  WASHOUT of intrathecal pump (N/A)    Patient location: PACU    Anesthesia Type: general    Transport from OR: Transported from OR on 6-10 L/min O2 by face mask with adequate spontaneous ventilation    Post pain: adequate analgesia    Post assessment: no apparent anesthetic complications and tolerated procedure well    Post vital signs: stable    Level of consciousness: awake, alert and oriented    Nausea/Vomiting: no nausea/vomiting    Complications: none    Transfer of care protocol was followed      Last vitals:   Visit Vitals  /62 (BP Location: Left arm, Patient Position: Lying)   Pulse 73   Temp 36.3 °C (97.3 °F) (Axillary)   Resp 20   Ht 5' 11" (1.803 m)   Wt (!) 145.2 kg (320 lb 3.2 oz)   SpO2 98%   BMI 44.66 kg/m²     "

## 2018-04-13 NOTE — INTERVAL H&P NOTE
The patient has been examined and the H&P has been reviewed:    I concur with the findings and no changes have occurred since H&P was written.    Anesthesia/Surgery risks, benefits and alternative options discussed and understood by patient/family.          Active Hospital Problems    Diagnosis  POA    Surgical wound infection [T81.4XXA]  Yes      Resolved Hospital Problems    Diagnosis Date Resolved POA   No resolved problems to display.

## 2018-04-13 NOTE — NURSING TRANSFER
Nursing Transfer Note      4/13/2018     Transfer to: 924    Transfer via: Stretcher    Transfer with: N/A    Transported by: PCT    Medicines sent: Yes    Chart send with patient: Yes    Notified: spouse; Report called to JANAE Nelson    Patient reassessed at: 1300

## 2018-04-13 NOTE — OP NOTE
DATE OF PROCEDURE: 4/13/2018     PREOPERATIVE DIAGNOSES:   Surgical wound infection [T81.4XXA]  Morbid obesity.     POSTOPERATIVE DIAGNOSES:   Surgical wound infection [T81.4XXA]  Morbid obesity.     PROCEDURES PERFORMED:   Procedure(s) (LRB):  WASHOUT of intrathecal pump (N/A)    Surgeon(s) and Role:     * Pablo Yagn MD - Primary    Assistant: Edward John PA-C (there was no qualified resident available for this case).    ANESTHESIA: General    ESTIMATED BLOOD LOSS: 100 cc    CLINICAL HISTORY AND INDICATIONS FOR SURGERY: Jaime Lund is a 48 y.o. male who developed signs and symptoms of a possible wound infection from a morphine pump placed several days ago. After the patient was seen in clinic with drainage from his wound, it was decided to wash the wounds with possible removal of the pump, if completely infected.     OPERATIVE PROCEDURE: The patient was brought into the Operating Room,   identified and general anesthesia was induced using endotracheal intubation. Patient was positioned in the lateral position with the right side up, on a bean bag. The areas of the previous incisions were prepped and draped under sterile conditions. Time out was carried, diego-operative antibiotics were given.     The previous skin staples were removed. The previous stitches were removed and the side wound was opened initially. There was copious amounts of red, fatty fluid coming out. We sent it for culture. At this point, we decided to open also the frontal abdominal wound. Staples were removed, previous sutures cut. There was copious amounts of red fluid (sermoma like) coming under pressure. Fluid was sent for culture. At this point, we decided to open the back wound also. There was minimal fluid collection under this wound. At this point, the pump was removed from its pocket in the abdominal wall. The aileen shirt around the pump was cut and removed.    Attention was put to debridement of all wounds. A #10  blade was used for debridement. 3 litters of bacitracin pulsovacc irrigation were used to wash out the wounds. Bipolars were used to create hemostasis.     The pump was put back in place with anchor 0 prolene sutures over the 4 securing quadrants of the pump to secure it with the abdominal fascia. We spent at least an additional 40 minutes trying to secure the pump to the abdominal wall fascia, due to the morbid obesity of the patient and large amounts of adipose tissue.  There was fat necrosis and oozing over the abdominal wall wound. We decided to place a Hemovac drain over the wound to prevent recurrence of the previous seroma. Deep fat fascia was closed using 0 Prolene.  Subcutaneous fat was closed using 2-0 Vicryl. Subcuticular tissue was approximated using 3-0 Vicryl and Skin was closed with interrupted 3-0 Nylon stiches.     The lateral abdominal wall incision was also oozing. We decided to place a TLS drain to prevent a seroma formation (considering the large amounts of fat tissue necrosis seen during opening. Deep fat fascia was closed using 2-0 Vicryl. Subcuticular tissue was approximated using 3-0 Vicryl and Skin was closed with interrupted 3-0 Nylon stiches.    The back wound was also closed. Deep fat fascia was closed using 2-0 Vicryl. Subcuticular tissue was approximated using 3-0 Vicryl and Skin was closed with interrupted 3-0 Nylon stiches.    Drains were secured in place using 2-0 vicryl sutures.     The patient was repositioned supine on the hospital bed, weaned off general   anesthesia and extubated. he  was moving both upper and lower extremities   symmetrically and strongly and was transferred to the Recovery Room in stable   condition.     Dr. Moran was present during the entire procedure. We spent an additional 1 hour in this complex wound wash out due to the morbid obesity of the patient (debridement of fat necrosis and placement of pump within the abdominal wall pocket).

## 2018-04-13 NOTE — PLAN OF CARE
Problem: Pain, Chronic (Adult)  Intervention: Manage Persistent Pain Analgesia   04/13/18 0458   Manage Acute Burn Pain   Bowel Intervention ambulation promoted;privacy promoted   Safety Interventions   Medication Review/Management medications reviewed

## 2018-04-14 LAB
ANION GAP SERPL CALC-SCNC: 10 MMOL/L
BACTERIA SPEC AEROBE CULT: NORMAL
BASOPHILS # BLD AUTO: 0.03 K/UL
BASOPHILS NFR BLD: 0.4 %
BUN SERPL-MCNC: 12 MG/DL
CALCIUM SERPL-MCNC: 9.2 MG/DL
CHLORIDE SERPL-SCNC: 103 MMOL/L
CO2 SERPL-SCNC: 26 MMOL/L
CREAT SERPL-MCNC: 0.6 MG/DL
DIFFERENTIAL METHOD: ABNORMAL
EOSINOPHIL # BLD AUTO: 0.1 K/UL
EOSINOPHIL NFR BLD: 0.6 %
ERYTHROCYTE [DISTWIDTH] IN BLOOD BY AUTOMATED COUNT: 12.3 %
EST. GFR  (AFRICAN AMERICAN): >60 ML/MIN/1.73 M^2
EST. GFR  (NON AFRICAN AMERICAN): >60 ML/MIN/1.73 M^2
GLUCOSE SERPL-MCNC: 97 MG/DL
HCT VFR BLD AUTO: 32.9 %
HGB BLD-MCNC: 11 G/DL
IMM GRANULOCYTES # BLD AUTO: 0.03 K/UL
IMM GRANULOCYTES NFR BLD AUTO: 0.4 %
LYMPHOCYTES # BLD AUTO: 1.5 K/UL
LYMPHOCYTES NFR BLD: 19.2 %
MCH RBC QN AUTO: 30.4 PG
MCHC RBC AUTO-ENTMCNC: 33.4 G/DL
MCV RBC AUTO: 91 FL
MONOCYTES # BLD AUTO: 0.6 K/UL
MONOCYTES NFR BLD: 7.7 %
NEUTROPHILS # BLD AUTO: 5.8 K/UL
NEUTROPHILS NFR BLD: 71.7 %
NRBC BLD-RTO: 0 /100 WBC
PLATELET # BLD AUTO: 300 K/UL
PMV BLD AUTO: 9.7 FL
POTASSIUM SERPL-SCNC: 3.8 MMOL/L
RBC # BLD AUTO: 3.62 M/UL
SODIUM SERPL-SCNC: 139 MMOL/L
VANCOMYCIN TROUGH SERPL-MCNC: 13.9 UG/ML
WBC # BLD AUTO: 8.03 K/UL

## 2018-04-14 PROCEDURE — 11000001 HC ACUTE MED/SURG PRIVATE ROOM

## 2018-04-14 PROCEDURE — 99255 IP/OBS CONSLTJ NEW/EST HI 80: CPT | Mod: ,,, | Performed by: INTERNAL MEDICINE

## 2018-04-14 PROCEDURE — 85025 COMPLETE CBC W/AUTO DIFF WBC: CPT

## 2018-04-14 PROCEDURE — 25000003 PHARM REV CODE 250: Performed by: STUDENT IN AN ORGANIZED HEALTH CARE EDUCATION/TRAINING PROGRAM

## 2018-04-14 PROCEDURE — 25000003 PHARM REV CODE 250: Performed by: PHYSICIAN ASSISTANT

## 2018-04-14 PROCEDURE — 80048 BASIC METABOLIC PNL TOTAL CA: CPT

## 2018-04-14 PROCEDURE — 36415 COLL VENOUS BLD VENIPUNCTURE: CPT

## 2018-04-14 PROCEDURE — 63600175 PHARM REV CODE 636 W HCPCS: Performed by: PHYSICIAN ASSISTANT

## 2018-04-14 PROCEDURE — 80202 ASSAY OF VANCOMYCIN: CPT

## 2018-04-14 RX ORDER — DIPHENHYDRAMINE HCL 25 MG
25 CAPSULE ORAL ONCE
Status: COMPLETED | OUTPATIENT
Start: 2018-04-14 | End: 2018-04-14

## 2018-04-14 RX ADMIN — METHOCARBAMOL 750 MG: 750 TABLET ORAL at 08:04

## 2018-04-14 RX ADMIN — DIPHENHYDRAMINE HYDROCHLORIDE 25 MG: 25 CAPSULE ORAL at 08:04

## 2018-04-14 RX ADMIN — AMITRIPTYLINE HYDROCHLORIDE 50 MG: 50 TABLET, FILM COATED ORAL at 08:04

## 2018-04-14 RX ADMIN — GABAPENTIN 400 MG: 100 CAPSULE ORAL at 02:04

## 2018-04-14 RX ADMIN — VANCOMYCIN HYDROCHLORIDE 2000 MG: 10 INJECTION, POWDER, LYOPHILIZED, FOR SOLUTION INTRAVENOUS at 06:04

## 2018-04-14 RX ADMIN — OXYCODONE HYDROCHLORIDE 10 MG: 5 TABLET ORAL at 08:04

## 2018-04-14 RX ADMIN — GABAPENTIN 400 MG: 100 CAPSULE ORAL at 08:04

## 2018-04-14 RX ADMIN — OXYCODONE HYDROCHLORIDE 10 MG: 5 TABLET ORAL at 12:04

## 2018-04-14 RX ADMIN — CEFTRIAXONE SODIUM 2 G: 2 INJECTION, POWDER, FOR SOLUTION INTRAMUSCULAR; INTRAVENOUS at 04:04

## 2018-04-14 RX ADMIN — PANTOPRAZOLE SODIUM 40 MG: 40 TABLET, DELAYED RELEASE ORAL at 08:04

## 2018-04-14 RX ADMIN — FAMOTIDINE 20 MG: 20 TABLET, FILM COATED ORAL at 08:04

## 2018-04-14 RX ADMIN — OXYCODONE HYDROCHLORIDE 10 MG: 5 TABLET ORAL at 04:04

## 2018-04-14 RX ADMIN — TIZANIDINE 2 MG: 2 TABLET ORAL at 06:04

## 2018-04-14 RX ADMIN — PROPRANOLOL HYDROCHLORIDE 10 MG: 10 TABLET ORAL at 02:04

## 2018-04-14 RX ADMIN — OXYCODONE HYDROCHLORIDE 10 MG: 5 TABLET ORAL at 05:04

## 2018-04-14 RX ADMIN — TAMSULOSIN HYDROCHLORIDE 0.4 MG: 0.4 CAPSULE ORAL at 08:04

## 2018-04-14 RX ADMIN — OXYCODONE HYDROCHLORIDE 10 MG: 5 TABLET ORAL at 09:04

## 2018-04-14 RX ADMIN — CEFTRIAXONE SODIUM 2 G: 2 INJECTION, POWDER, FOR SOLUTION INTRAMUSCULAR; INTRAVENOUS at 05:04

## 2018-04-14 RX ADMIN — DULOXETINE 60 MG: 60 CAPSULE, DELAYED RELEASE ORAL at 08:04

## 2018-04-14 RX ADMIN — DOCUSATE SODIUM 100 MG: 100 CAPSULE, LIQUID FILLED ORAL at 08:04

## 2018-04-14 RX ADMIN — PROPRANOLOL HYDROCHLORIDE 10 MG: 10 TABLET ORAL at 08:04

## 2018-04-14 RX ADMIN — HYDROCHLOROTHIAZIDE 25 MG: 25 TABLET ORAL at 08:04

## 2018-04-14 NOTE — PROGRESS NOTES
[facility]  Neurosurgery  Progress Note    Subjective:     History of Present Illness: No notes on file    Post-Op Info:  Procedure(s) (LRB):  WASHOUT of intrathecal pump (N/A)   1 Day Post-Op     Interval History: OR yesterday for exploration and washout.    Medications:  Continuous Infusions:   sodium chloride 0.9% 100 mL/hr at 04/12/18 2325     Scheduled Meds:   amitriptyline  50 mg Oral Nightly    cefTRIAXone (ROCEPHIN) IVPB  2 g Intravenous Q12H    docusate sodium  100 mg Oral BID    DULoxetine  60 mg Oral QHS    famotidine  20 mg Oral BID    gabapentin  400 mg Oral TID    hydroCHLOROthiazide  25 mg Oral Daily    methocarbamol  750 mg Oral BID    pantoprazole  40 mg Oral Daily    propranolol  10 mg Oral TID    tamsulosin  0.4 mg Oral Daily    vancomycin (VANCOCIN) IVPB  15 mg/kg Intravenous Q12H     PRN Meds:dextrose 50%, dextrose 50%, glucagon (human recombinant), glucose, glucose, glucose, insulin aspart U-100, ondansetron, oxyCODONE, sodium chloride 0.9%, tiZANidine     Review of Systems  Objective:     Weight: (!) 145.2 kg (320 lb 3.2 oz)  Body mass index is 44.66 kg/m².  Vital Signs (Most Recent):  Temp: 96.7 °F (35.9 °C) (04/14/18 0818)  Pulse: 62 (04/14/18 0818)  Resp: 12 (04/14/18 0818)  BP: 126/68 (04/14/18 0818)  SpO2: 99 % (04/14/18 0818) Vital Signs (24h Range):  Temp:  [96.7 °F (35.9 °C)-98.6 °F (37 °C)] 96.7 °F (35.9 °C)  Pulse:  [57-85] 62  Resp:  [10-18] 12  SpO2:  [87 %-99 %] 99 %  BP: (105-144)/(52-76) 126/68            Closed/Suction Drain 04/13/18 0922 Midline Abdomen Accordion 10 Fr. (Active)   Site Description Unable to view 4/13/2018  1:30 PM   Dressing Type [place];Gauze 4/13/2018  8:32 PM   Dressing Status Clean;Dry;Intact 4/13/2018  8:32 PM   Drainage Serosanguineous 4/13/2018  1:30 PM   Output (mL) 25 mL 4/14/2018  5:00 AM            Closed/Suction Drain 04/13/18 1002 Right Abdomen Other (Comment) 7 Fr. (Active)   Site Description Unable to view  4/13/2018  1:30 PM   Dressing Type Gauze;[place] 4/13/2018  8:32 PM   Dressing Status Clean;Dry;Intact 4/13/2018  8:32 PM   Drainage Serosanguineous 4/13/2018  1:30 PM   Status Other (Comment) 4/13/2018  1:30 PM   Output (mL) 7.5 mL 4/13/2018  5:00 PM       Neurosurgery Physical Exam    AAOx3, PERRL, EOMI, FS, TM, 5/5 throughout, SILT.  DTR 2+ throughout, no Gomez's, no clonus.   Incisions with dressings in place, drains in place    Significant Labs:    Recent Labs  Lab 04/12/18  1228 04/13/18  0459 04/14/18  0636   GLU 85 86 97    138 139   K 3.6 3.9 3.8    102 103   CO2 31* 30* 26   BUN 15 14 12   CREATININE 0.7 0.7 0.6   CALCIUM 9.1 9.3 9.2       Recent Labs  Lab 04/12/18 1228 04/13/18  0459 04/14/18  0636   WBC 6.94 6.54 8.03   HGB 11.1* 11.5* 11.0*   HCT 34.0* 34.8* 32.9*    286 300       Recent Labs  Lab 04/13/18  0459   INR 0.9     Microbiology Results (last 7 days)     Procedure Component Value Units Date/Time    Aerobic culture [[id number]] Collected:  04/13/18 0929    Order Status:  Completed Specimen:  Wound from Abdomen Updated:  04/14/18 1043     Aerobic Bacterial Culture --     GRAM NEGATIVE PRIYANK, LACTOSE   Moderate  Identification and susceptibility pending      Narrative:       1. LATERAL ABDOMEN    Aerobic culture [[id number]] Collected:  04/13/18 0930    Order Status:  Completed Specimen:  Wound from Abdomen Updated:  04/14/18 0740     Aerobic Bacterial Culture --     GRAM NEGATIVE PRIYANK, LACTOSE   Moderate  Identification and susceptibility pending      Narrative:       2. ABDOMEN    Gram stain [[id number]] Collected:  04/13/18 0929    Order Status:  Completed Specimen:  Wound from Abdomen Updated:  04/13/18 1432     Gram Stain Result Few WBC's      No organisms seen    Narrative:       1. LATERAL ABDOMEN    Gram stain [[id number]] Collected:  04/13/18 0930    Order Status:  Completed Specimen:  Wound from Abdomen Updated:  04/13/18 1431     Gram Stain Result  No WBC's      No organisms seen    Narrative:       2. ABDOMEN    Blood culture [[id number]] Collected:  04/12/18 1232    Order Status:  Completed Specimen:  Blood Updated:  04/13/18 1412     Blood Culture, Routine No Growth to date     Blood Culture, Routine No Growth to date    Blood culture [[id number]] Collected:  04/12/18 1228    Order Status:  Completed Specimen:  Blood Updated:  04/13/18 1412     Blood Culture, Routine No Growth to date     Blood Culture, Routine No Growth to date    Culture, Anaerobe [[id number]] Collected:  04/13/18 0930    Order Status:  Sent Specimen:  Wound from Abdomen Updated:  04/13/18 1054    Fungus culture [[id number]] Collected:  04/13/18 0930    Order Status:  Sent Specimen:  Wound from Abdomen Updated:  04/13/18 1053    AFB Culture & Smear [[id number]] Collected:  04/13/18 0930    Order Status:  Sent Specimen:  Wound from Abdomen Updated:  04/13/18 1053    AFB Culture & Smear [[id number]] Collected:  04/13/18 0929    Order Status:  Sent Specimen:  Wound from Abdomen Updated:  04/13/18 1052    Culture, Anaerobe [[id number]] Collected:  04/13/18 0929    Order Status:  Sent Specimen:  Wound from Abdomen Updated:  04/13/18 1051    Fungus culture [[id number]] Collected:  04/13/18 0929    Order Status:  Sent Specimen:  Wound from Abdomen Updated:  04/13/18 1051        All pertinent labs from the last 24 hours have been reviewed.    Significant Diagnostics:  I have reviewed all pertinent imaging results/findings within the past 24 hours.    Assessment/Plan:     Surgical wound infection    48M with possible infection of intrathecal pain pump now s/p washout.    -- Neurostable on exam  -- Cultures growning GNRs.  -- ID consult  -- Continue vanc and rocephin.  -- Drains to continue.  -- Pain control.  -- OK to be up.            Boyd Shirley MD  Neurosurgery  [facility]

## 2018-04-14 NOTE — ASSESSMENT & PLAN NOTE
48M with possible infection of intrathecal pain pump now s/p washout.    -- Neurostable on exam  -- Cultures growning GNRs.  -- ID consult  -- Continue vanc and rocephin.  -- Drains to continue.  -- Pain control.  -- OK to be up.

## 2018-04-14 NOTE — CONSULTS
[facility]  Infectious Disease  Consult Note    Patient Name: Jaime Lund  MRN: [id number]  Admission Date: 4/12/2018  Hospital Length of Stay: 2 days  Attending Physician: Meena Saavedra MD  Primary Care Provider: Gabby Dean MD     Isolation Status: No active isolations        Inpatient consult to Infectious Diseases  Consult performed by: RODOLFO DIAS  Consult ordered by: SARAVANAN HERRERA        Assessment/Plan:     No new Assessment & Plan notes have been filed under this hospital service since the last note was generated.  Service: Infectious Diseases      Consult received, full consult to follow    Rodolfo Dias MD

## 2018-04-14 NOTE — SUBJECTIVE & OBJECTIVE
Interval History: OR yesterday for exploration and washout.    Medications:  Continuous Infusions:   sodium chloride 0.9% 100 mL/hr at 04/12/18 2325     Scheduled Meds:   amitriptyline  50 mg Oral Nightly    cefTRIAXone (ROCEPHIN) IVPB  2 g Intravenous Q12H    docusate sodium  100 mg Oral BID    DULoxetine  60 mg Oral QHS    famotidine  20 mg Oral BID    gabapentin  400 mg Oral TID    hydroCHLOROthiazide  25 mg Oral Daily    methocarbamol  750 mg Oral BID    pantoprazole  40 mg Oral Daily    propranolol  10 mg Oral TID    tamsulosin  0.4 mg Oral Daily    vancomycin (VANCOCIN) IVPB  15 mg/kg Intravenous Q12H     PRN Meds:dextrose 50%, dextrose 50%, glucagon (human recombinant), glucose, glucose, glucose, insulin aspart U-100, ondansetron, oxyCODONE, sodium chloride 0.9%, tiZANidine     Review of Systems  Objective:     Weight: (!) 145.2 kg (320 lb 3.2 oz)  Body mass index is 44.66 kg/m².  Vital Signs (Most Recent):  Temp: 96.7 °F (35.9 °C) (04/14/18 0818)  Pulse: 62 (04/14/18 0818)  Resp: 12 (04/14/18 0818)  BP: 126/68 (04/14/18 0818)  SpO2: 99 % (04/14/18 0818) Vital Signs (24h Range):  Temp:  [96.7 °F (35.9 °C)-98.6 °F (37 °C)] 96.7 °F (35.9 °C)  Pulse:  [57-85] 62  Resp:  [10-18] 12  SpO2:  [87 %-99 %] 99 %  BP: (105-144)/(52-76) 126/68            Closed/Suction Drain 04/13/18 0922 Midline Abdomen Accordion 10 Fr. (Active)   Site Description Unable to view 4/13/2018  1:30 PM   Dressing Type [place];Gauze 4/13/2018  8:32 PM   Dressing Status Clean;Dry;Intact 4/13/2018  8:32 PM   Drainage Serosanguineous 4/13/2018  1:30 PM   Output (mL) 25 mL 4/14/2018  5:00 AM            Closed/Suction Drain 04/13/18 1002 Right Abdomen Other (Comment) 7 Fr. (Active)   Site Description Unable to view 4/13/2018  1:30 PM   Dressing Type Gauze;[place] 4/13/2018  8:32 PM   Dressing Status Clean;Dry;Intact 4/13/2018  8:32 PM   Drainage Serosanguineous 4/13/2018  1:30 PM   Status Other (Comment) 4/13/2018  1:30 PM    Output (mL) 7.5 mL 4/13/2018  5:00 PM       Neurosurgery Physical Exam    AAOx3, PERRL, EOMI, FS, TM, 5/5 throughout, SILT.  DTR 2+ throughout, no Gomez's, no clonus.   Incisions with dressings in place, drains in place    Significant Labs:    Recent Labs  Lab 04/12/18  1228 04/13/18  0459 04/14/18  0636   GLU 85 86 97    138 139   K 3.6 3.9 3.8    102 103   CO2 31* 30* 26   BUN 15 14 12   CREATININE 0.7 0.7 0.6   CALCIUM 9.1 9.3 9.2       Recent Labs  Lab 04/12/18  1228 04/13/18  0459 04/14/18  0636   WBC 6.94 6.54 8.03   HGB 11.1* 11.5* 11.0*   HCT 34.0* 34.8* 32.9*    286 300       Recent Labs  Lab 04/13/18  0459   INR 0.9     Microbiology Results (last 7 days)     Procedure Component Value Units Date/Time    Aerobic culture [[id number]] Collected:  04/13/18 0929    Order Status:  Completed Specimen:  Wound from Abdomen Updated:  04/14/18 1043     Aerobic Bacterial Culture --     GRAM NEGATIVE PRIYANK, LACTOSE   Moderate  Identification and susceptibility pending      Narrative:       1. LATERAL ABDOMEN    Aerobic culture [[id number]] Collected:  04/13/18 0930    Order Status:  Completed Specimen:  Wound from Abdomen Updated:  04/14/18 0740     Aerobic Bacterial Culture --     GRAM NEGATIVE PRIYANK, LACTOSE   Moderate  Identification and susceptibility pending      Narrative:       2. ABDOMEN    Gram stain [[id number]] Collected:  04/13/18 0929    Order Status:  Completed Specimen:  Wound from Abdomen Updated:  04/13/18 1432     Gram Stain Result Few WBC's      No organisms seen    Narrative:       1. LATERAL ABDOMEN    Gram stain [[id number]] Collected:  04/13/18 0930    Order Status:  Completed Specimen:  Wound from Abdomen Updated:  04/13/18 1431     Gram Stain Result No WBC's      No organisms seen    Narrative:       2. ABDOMEN    Blood culture [[id number]] Collected:  04/12/18 1232    Order Status:  Completed Specimen:  Blood Updated:  04/13/18 1412     Blood Culture, Routine No  Growth to date     Blood Culture, Routine No Growth to date    Blood culture [[id number]] Collected:  04/12/18 1228    Order Status:  Completed Specimen:  Blood Updated:  04/13/18 1412     Blood Culture, Routine No Growth to date     Blood Culture, Routine No Growth to date    Culture, Anaerobe [[id number]] Collected:  04/13/18 0930    Order Status:  Sent Specimen:  Wound from Abdomen Updated:  04/13/18 1054    Fungus culture [[id number]] Collected:  04/13/18 0930    Order Status:  Sent Specimen:  Wound from Abdomen Updated:  04/13/18 1053    AFB Culture & Smear [[id number]] Collected:  04/13/18 0930    Order Status:  Sent Specimen:  Wound from Abdomen Updated:  04/13/18 1053    AFB Culture & Smear [[id number]] Collected:  04/13/18 0929    Order Status:  Sent Specimen:  Wound from Abdomen Updated:  04/13/18 1052    Culture, Anaerobe [[id number]] Collected:  04/13/18 0929    Order Status:  Sent Specimen:  Wound from Abdomen Updated:  04/13/18 1051    Fungus culture [[id number]] Collected:  04/13/18 0929    Order Status:  Sent Specimen:  Wound from Abdomen Updated:  04/13/18 1051        All pertinent labs from the last 24 hours have been reviewed.    Significant Diagnostics:  I have reviewed all pertinent imaging results/findings within the past 24 hours.

## 2018-04-14 NOTE — PLAN OF CARE
Problem: Patient Care Overview  Goal: Plan of Care Review  Outcome: Ongoing (interventions implemented as appropriate)  Reviewed plan of care with patient, patient primary concern is pain control at this time. Dressings CDI, drains with small amount of sanguinous output, no acute events overnight, see flowsheets for detailed assessment information.

## 2018-04-15 LAB
ANION GAP SERPL CALC-SCNC: 7 MMOL/L
BASOPHILS # BLD AUTO: 0.05 K/UL
BASOPHILS NFR BLD: 0.6 %
BUN SERPL-MCNC: 14 MG/DL
CALCIUM SERPL-MCNC: 9.1 MG/DL
CHLORIDE SERPL-SCNC: 102 MMOL/L
CO2 SERPL-SCNC: 29 MMOL/L
CREAT SERPL-MCNC: 0.7 MG/DL
DIFFERENTIAL METHOD: ABNORMAL
EOSINOPHIL # BLD AUTO: 0.3 K/UL
EOSINOPHIL NFR BLD: 3.5 %
ERYTHROCYTE [DISTWIDTH] IN BLOOD BY AUTOMATED COUNT: 12.4 %
EST. GFR  (AFRICAN AMERICAN): >60 ML/MIN/1.73 M^2
EST. GFR  (NON AFRICAN AMERICAN): >60 ML/MIN/1.73 M^2
GLUCOSE SERPL-MCNC: 82 MG/DL
HCT VFR BLD AUTO: 32.9 %
HGB BLD-MCNC: 10.9 G/DL
IMM GRANULOCYTES # BLD AUTO: 0.01 K/UL
IMM GRANULOCYTES NFR BLD AUTO: 0.1 %
LYMPHOCYTES # BLD AUTO: 2.4 K/UL
LYMPHOCYTES NFR BLD: 31.5 %
MCH RBC QN AUTO: 29.6 PG
MCHC RBC AUTO-ENTMCNC: 33.1 G/DL
MCV RBC AUTO: 89 FL
MONOCYTES # BLD AUTO: 0.5 K/UL
MONOCYTES NFR BLD: 6.5 %
NEUTROPHILS # BLD AUTO: 4.5 K/UL
NEUTROPHILS NFR BLD: 57.8 %
NRBC BLD-RTO: 0 /100 WBC
PLATELET # BLD AUTO: 337 K/UL
PMV BLD AUTO: 9.4 FL
POTASSIUM SERPL-SCNC: 3.7 MMOL/L
RBC # BLD AUTO: 3.68 M/UL
SODIUM SERPL-SCNC: 138 MMOL/L
WBC # BLD AUTO: 7.75 K/UL

## 2018-04-15 PROCEDURE — 36415 COLL VENOUS BLD VENIPUNCTURE: CPT

## 2018-04-15 PROCEDURE — 25000003 PHARM REV CODE 250: Performed by: PHYSICIAN ASSISTANT

## 2018-04-15 PROCEDURE — 63600175 PHARM REV CODE 636 W HCPCS: Performed by: PHYSICIAN ASSISTANT

## 2018-04-15 PROCEDURE — 25000003 PHARM REV CODE 250: Performed by: NEUROLOGICAL SURGERY

## 2018-04-15 PROCEDURE — 99232 SBSQ HOSP IP/OBS MODERATE 35: CPT | Mod: ,,, | Performed by: NURSE PRACTITIONER

## 2018-04-15 PROCEDURE — 80048 BASIC METABOLIC PNL TOTAL CA: CPT

## 2018-04-15 PROCEDURE — 85025 COMPLETE CBC W/AUTO DIFF WBC: CPT

## 2018-04-15 PROCEDURE — 11000001 HC ACUTE MED/SURG PRIVATE ROOM

## 2018-04-15 RX ORDER — MELOXICAM 15 MG/1
15 TABLET ORAL NIGHTLY
Status: DISCONTINUED | OUTPATIENT
Start: 2018-04-15 | End: 2018-04-16 | Stop reason: HOSPADM

## 2018-04-15 RX ORDER — ZOLPIDEM TARTRATE 5 MG/1
5 TABLET ORAL NIGHTLY PRN
Status: DISCONTINUED | OUTPATIENT
Start: 2018-04-15 | End: 2018-04-16 | Stop reason: HOSPADM

## 2018-04-15 RX ADMIN — OXYCODONE HYDROCHLORIDE 10 MG: 5 TABLET ORAL at 10:04

## 2018-04-15 RX ADMIN — OXYCODONE HYDROCHLORIDE 10 MG: 5 TABLET ORAL at 09:04

## 2018-04-15 RX ADMIN — TAMSULOSIN HYDROCHLORIDE 0.4 MG: 0.4 CAPSULE ORAL at 08:04

## 2018-04-15 RX ADMIN — AMITRIPTYLINE HYDROCHLORIDE 50 MG: 50 TABLET, FILM COATED ORAL at 08:04

## 2018-04-15 RX ADMIN — TIZANIDINE 2 MG: 2 TABLET ORAL at 02:04

## 2018-04-15 RX ADMIN — PROPRANOLOL HYDROCHLORIDE 10 MG: 10 TABLET ORAL at 08:04

## 2018-04-15 RX ADMIN — ZOLPIDEM TARTRATE 5 MG: 5 TABLET ORAL at 10:04

## 2018-04-15 RX ADMIN — MELOXICAM 15 MG: 15 TABLET ORAL at 08:04

## 2018-04-15 RX ADMIN — OXYCODONE HYDROCHLORIDE 10 MG: 5 TABLET ORAL at 01:04

## 2018-04-15 RX ADMIN — CEFTRIAXONE SODIUM 2 G: 2 INJECTION, POWDER, FOR SOLUTION INTRAMUSCULAR; INTRAVENOUS at 05:04

## 2018-04-15 RX ADMIN — CEFTRIAXONE SODIUM 2 G: 2 INJECTION, POWDER, FOR SOLUTION INTRAMUSCULAR; INTRAVENOUS at 04:04

## 2018-04-15 RX ADMIN — FAMOTIDINE 20 MG: 20 TABLET, FILM COATED ORAL at 08:04

## 2018-04-15 RX ADMIN — GABAPENTIN 400 MG: 100 CAPSULE ORAL at 08:04

## 2018-04-15 RX ADMIN — HYDROCHLOROTHIAZIDE 25 MG: 25 TABLET ORAL at 08:04

## 2018-04-15 RX ADMIN — TIZANIDINE 2 MG: 2 TABLET ORAL at 05:04

## 2018-04-15 RX ADMIN — METHOCARBAMOL 750 MG: 750 TABLET ORAL at 08:04

## 2018-04-15 RX ADMIN — SODIUM CHLORIDE: 0.9 INJECTION, SOLUTION INTRAVENOUS at 04:04

## 2018-04-15 RX ADMIN — PANTOPRAZOLE SODIUM 40 MG: 40 TABLET, DELAYED RELEASE ORAL at 08:04

## 2018-04-15 RX ADMIN — GABAPENTIN 400 MG: 100 CAPSULE ORAL at 02:04

## 2018-04-15 RX ADMIN — OXYCODONE HYDROCHLORIDE 10 MG: 5 TABLET ORAL at 05:04

## 2018-04-15 RX ADMIN — DULOXETINE 60 MG: 60 CAPSULE, DELAYED RELEASE ORAL at 08:04

## 2018-04-15 RX ADMIN — PROPRANOLOL HYDROCHLORIDE 10 MG: 10 TABLET ORAL at 02:04

## 2018-04-15 RX ADMIN — DOCUSATE SODIUM 100 MG: 100 CAPSULE, LIQUID FILLED ORAL at 08:04

## 2018-04-15 NOTE — HPI
48-year-old male with history of morbid obesity, HTN, seizure, peripheral artery disease, chronic pain, lumbar postlaminectomy syndrome, s/p intrathecal opioid pump placement 4/3/2018 presents for surgical site infection. About a week after surgery, he had drainage from surgical site as well as swelling over the pump site.  He reports an episode of drenching sweats.  Drainage from incision growing Klebsiella. On 4/13/2018, he was taken to the OR for washout of the pump site with immediate reimplantation of the pump. Per OP note - the side wound, frontal abdominal wound, and back wound were opened - red serous fluid was drained and cultures - now growing GNR. Pump was removed, surgical site debrided and washed out. The pump was replaced and secured to the abdominal fascia. A Hemovac drain was placed over the wound to prevent recurrence of the previous seroma. Patient was placed on vanc/ceftriaxone.

## 2018-04-15 NOTE — ASSESSMENT & PLAN NOTE
48-year-old male   - Morbid obesity  - HTN  - Seizure  - Peripheral artery disease  - Chronic pain, lumbar postlaminectomy syndrome  - s/p intrathecal opioid pump placement 4/3/2018   - c/b Klebsiella surgical site infection  - s/p washout and debridement 4/12/2018    Recommendations:  - Discontinue vancomycin given no evidence of MRSA on cultures  - Continue ceftriaxone 2g IV q24 hours  - Follow-up OR ID and sens  - Will need prolonged course of antibiotics given concern for hardware infection

## 2018-04-15 NOTE — PROGRESS NOTES
[facility]  Neurosurgery  Progress Note    Subjective:     History of Present Illness: No notes on file    Post-Op Info:  Procedure(s) (LRB):  WASHOUT of intrathecal pump (N/A)   2 Days Post-Op     Interval History: NAEON. Cultures growing GNRs    Medications:  Continuous Infusions:   sodium chloride 0.9% 100 mL/hr at 04/12/18 2325     Scheduled Meds:   amitriptyline  50 mg Oral Nightly    cefTRIAXone (ROCEPHIN) IVPB  2 g Intravenous Q12H    docusate sodium  100 mg Oral BID    DULoxetine  60 mg Oral QHS    famotidine  20 mg Oral BID    gabapentin  400 mg Oral TID    hydroCHLOROthiazide  25 mg Oral Daily    methocarbamol  750 mg Oral BID    pantoprazole  40 mg Oral Daily    propranolol  10 mg Oral TID    tamsulosin  0.4 mg Oral Daily     PRN Meds:dextrose 50%, dextrose 50%, glucagon (human recombinant), glucose, glucose, glucose, insulin aspart U-100, ondansetron, oxyCODONE, sodium chloride 0.9%, tiZANidine     Review of Systems    Objective:     Weight: (!) 145.2 kg (320 lb 3.2 oz)  Body mass index is 44.66 kg/m².  Vital Signs (Most Recent):  Temp: 98.2 °F (36.8 °C) (04/15/18 0846)  Pulse: 63 (04/15/18 0846)  Resp: 17 (04/15/18 0846)  BP: 126/86 (04/15/18 0846)  SpO2: (!) 93 % (04/15/18 0846) Vital Signs (24h Range):  Temp:  [97.9 °F (36.6 °C)-98.5 °F (36.9 °C)] 98.2 °F (36.8 °C)  Pulse:  [55-66] 63  Resp:  [16-18] 17  SpO2:  [93 %-98 %] 93 %  BP: (100-141)/(55-86) 126/86            Closed/Suction Drain 04/13/18 0922 Midline Abdomen Accordion 10 Fr. (Active)   Site Description Unable to view 4/13/2018  1:30 PM   Dressing Type [place];Gauze 4/13/2018  8:32 PM   Dressing Status Clean;Dry;Intact 4/13/2018  8:32 PM   Drainage Serosanguineous 4/13/2018  1:30 PM   Output (mL) 25 mL 4/14/2018  5:00 AM            Closed/Suction Drain 04/13/18 1002 Right Abdomen Other (Comment) 7 Fr. (Active)   Site Description Unable to view 4/13/2018  1:30 PM   Dressing Type Gauze;[place] 4/13/2018   8:32 PM   Dressing Status Clean;Dry;Intact 4/13/2018  8:32 PM   Drainage Serosanguineous 4/13/2018  1:30 PM   Status Other (Comment) 4/13/2018  1:30 PM   Output (mL) 7.5 mL 4/13/2018  5:00 PM       Neurosurgery Physical Exam      AAOx3, PERRL, EOMI, FS, TM, 5/5 throughout, SILT.  DTR 2+ throughout, no Gomez's, no clonus.   Incisions with dressings in place, drains in place    Significant Labs:    Recent Labs  Lab 04/14/18  0636 04/15/18  0405   GLU 97 82    138   K 3.8 3.7    102   CO2 26 29   BUN 12 14   CREATININE 0.6 0.7   CALCIUM 9.2 9.1       Recent Labs  Lab 04/14/18  0636 04/15/18  0405   WBC 8.03 7.75   HGB 11.0* 10.9*   HCT 32.9* 32.9*    337     No results for input(s): LABPT, INR, APTT in the last 48 hours.  Microbiology Results (last 7 days)     Procedure Component Value Units Date/Time    AFB Culture & Smear [[id number]] Collected:  04/13/18 0930    Order Status:  Completed Specimen:  Wound from Abdomen Updated:  04/14/18 2127     AFB Culture & Smear Culture in progress    Narrative:       2. ABDOMEN    AFB Culture & Smear [[id number]] Collected:  04/13/18 0929    Order Status:  Completed Specimen:  Wound from Abdomen Updated:  04/14/18 2127     AFB Culture & Smear Culture in progress    Narrative:       1. LATERAL ABDOMEN    Blood culture [[id number]] Collected:  04/12/18 1232    Order Status:  Completed Specimen:  Blood Updated:  04/14/18 1412     Blood Culture, Routine No Growth to date     Blood Culture, Routine No Growth to date     Blood Culture, Routine No Growth to date    Blood culture [[id number]] Collected:  04/12/18 1228    Order Status:  Completed Specimen:  Blood Updated:  04/14/18 1412     Blood Culture, Routine No Growth to date     Blood Culture, Routine No Growth to date     Blood Culture, Routine No Growth to date    Aerobic culture [[id number]] Collected:  04/13/18 0929    Order Status:  Completed Specimen:  Wound from Abdomen Updated:  04/14/18 1043     Aerobic  Bacterial Culture --     GRAM NEGATIVE PRIYANK, LACTOSE   Moderate  Identification and susceptibility pending      Narrative:       1. LATERAL ABDOMEN    Aerobic culture [[id number]] Collected:  04/13/18 0930    Order Status:  Completed Specimen:  Wound from Abdomen Updated:  04/14/18 0740     Aerobic Bacterial Culture --     GRAM NEGATIVE PRIYANK, LACTOSE   Moderate  Identification and susceptibility pending      Narrative:       2. ABDOMEN    Gram stain [[id number]] Collected:  04/13/18 0929    Order Status:  Completed Specimen:  Wound from Abdomen Updated:  04/13/18 1432     Gram Stain Result Few WBC's      No organisms seen    Narrative:       1. LATERAL ABDOMEN    Gram stain [[id number]] Collected:  04/13/18 0930    Order Status:  Completed Specimen:  Wound from Abdomen Updated:  04/13/18 1431     Gram Stain Result No WBC's      No organisms seen    Narrative:       2. ABDOMEN    Culture, Anaerobe [[id number]] Collected:  04/13/18 0930    Order Status:  Sent Specimen:  Wound from Abdomen Updated:  04/13/18 1054    Fungus culture [[id number]] Collected:  04/13/18 0930    Order Status:  Sent Specimen:  Wound from Abdomen Updated:  04/13/18 1053    Culture, Anaerobe [[id number]] Collected:  04/13/18 0929    Order Status:  Sent Specimen:  Wound from Abdomen Updated:  04/13/18 1051    Fungus culture [[id number]] Collected:  04/13/18 0929    Order Status:  Sent Specimen:  Wound from Abdomen Updated:  04/13/18 1051        All pertinent labs from the last 24 hours have been reviewed.    Significant Diagnostics:  I have reviewed all pertinent imaging results/findings within the past 24 hours.    Assessment/Plan:     Surgical wound infection    48M with possible infection of intrathecal pain pump now s/p washout.    -- Neurostable on exam  -- Cultures growning GNRs.  -- ID consult, appreciate recs  -- Continue rocephin.  -- Drains to continue.  -- Pain control.  -- OK to be up and OOB.            Boyd Shirley,  MD  Neurosurgery  [facility]

## 2018-04-15 NOTE — CONSULTS
[facility]  Infectious Disease  Consult Note    Patient Name: Jaime Lund  MRN: [id number]  Admission Date: 4/12/2018  Hospital Length of Stay: 2 days  Attending Physician: Meena Saavedra MD  Primary Care Provider: Gabby Dean MD     Isolation Status: No active isolations    Patient information was obtained from patient, spouse/SO and ER records.      Consults  Assessment/Plan:     Surgical wound infection    48-year-old male   - Morbid obesity  - HTN  - Seizure  - Peripheral artery disease  - Chronic pain, lumbar postlaminectomy syndrome  - s/p intrathecal opioid pump placement 4/3/2018   - c/b Klebsiella surgical site infection  - s/p washout and debridement 4/12/2018    Recommendations:  - Discontinue vancomycin given no evidence of MRSA on cultures  - Continue ceftriaxone 2g IV q24 hours  - Follow-up OR ID and sens  - Will need prolonged course of antibiotics given concern for hardware infection            Thank you for your consult. I will follow-up with patient. Please contact us if you have any additional questions.    Bailey Dias MD  Infectious Disease  [facility]    Subjective:     Principal Problem: <principal problem not specified>    HPI: 48-year-old male with history of morbid obesity, HTN, seizure, peripheral artery disease, chronic pain, lumbar postlaminectomy syndrome, s/p intrathecal opioid pump placement 4/3/2018 presents for surgical site infection. About a week after surgery, he had drainage from surgical site as well as swelling over the pump site.  He reports an episode of drenching sweats.  Drainage from incision growing Klebsiella. On 4/13/2018, he was taken to the OR for washout of the pump site with immediate reimplantation of the pump. Per OP note - the side wound, frontal abdominal wound, and back wound were opened - red serous fluid was drained and cultures - now growing GNR. Pump was removed, surgical site debrided and washed out. The  pump was replaced and secured to the abdominal fascia. A Hemovac drain was placed over the wound to prevent recurrence of the previous seroma. Patient was placed on vanc/ceftriaxone.         Past Medical History:   Diagnosis Date    Back pain, chronic     Bulging discs     Chronic pain following surgery or procedure     Degenerative disc disease     Hypertension     Hypokalemia     PAD (peripheral artery disease)     Post laminectomy syndrome     Seizures     Severe sepsis     Short-term memory loss     Thyroid disease     Subclinical hyperthyroidism       Past Surgical History:   Procedure Laterality Date    CHOLECYSTECTOMY      GASTRIC BYPASS      SLEEVE    gastric sleeve  2011    HERNIA REPAIR      pain pump      SKIN SURGERY      EXCESS SKIN REMOVAL    SPINE SURGERY      lumbar fusion       Review of patient's allergies indicates:   Allergen Reactions    Klonopin [clonazepam] Anxiety and Other (See Comments)     Becomes agitated    Valium [diazepam] Other (See Comments)     Becomes agitated    Xanax [alprazolam] Anxiety and Other (See Comments)     Becomes agitated       Medications:  Prescriptions Prior to Admission   Medication Sig    amitriptyline (ELAVIL) 50 MG tablet Take 50 mg by mouth nightly.     DULoxetine (CYMBALTA) 60 MG capsule TAKE 1 CAPSULE BY MOUTH EVERY EVENING    furosemide (LASIX) 40 MG tablet TAKE ONE TABLET BY MOUTH ONCE DAILY FOR FLUID (Patient taking differently: TAKE ONE TABLET BY MOUTH ONCE DAILY FOR FLUID, morning)    gabapentin (NEURONTIN) 400 MG capsule Take 400 mg by mouth 3 (three) times daily.     hydroCHLOROthiazide (HYDRODIURIL) 25 MG tablet TAKE ONE TABLET BY MOUTH ONCE DAILY FOR BLOOD PRESSURE AND OR FOR FLUID (Patient taking differently: TAKE ONE TABLET BY MOUTH ONCE DAILY FOR BLOOD PRESSURE AND OR FOR FLUID, morning)    meloxicam (MOBIC) 15 MG tablet TAKE ONE TABLET BY MOUTH ONCE DAILY WITH A MEAL FOR INFLAMMATION AND OR PAIN (Patient taking  differently: TAKE ONE TABLET BY MOUTH ONCE DAILY WITH A MEAL FOR INFLAMMATION AND OR PAIN, bedtime)    methocarbamol (ROBAXIN) 750 MG Tab Take 750 mg by mouth 2 (two) times daily.     oxycodone-acetaminophen (PERCOCET)  mg per tablet Take 1 tablet by mouth every 6 (six) hours as needed (15 MG Percocet).     potassium chloride (KLOR-CON) 10 MEQ TbSR Take 1 tablet (10 mEq total) by mouth once daily. (Patient taking differently: Take 10 mEq by mouth every morning. )    propranolol (INDERAL) 10 MG tablet Take 1 tablet (10 mg total) by mouth 3 (three) times daily.    ranitidine (ZANTAC) 150 MG capsule Take 150 mg by mouth 2 (two) times daily.    tamsulosin (FLOMAX) 0.4 mg Cp24 TAKE 1 CAPSULE BY MOUTH ONCE DAILY FOR PROSTATE, morning    tiZANidine (ZANAFLEX) 2 MG tablet Take 1 tablet (2 mg total) by mouth 3 (three) times daily. (Patient taking differently: Take 2 mg by mouth 3 (three) times daily as needed. )    [] tiZANidine (ZANAFLEX) 6 mg capsule Take 1 capsule (6 mg total) by mouth 3 (three) times daily.    butalbital-acetaminophen-caffeine -40 mg (FIORICET, ESGIC) -40 mg per tablet Take 1 tablet by mouth every 4 (four) hours as needed.    oxyCODONE-acetaminophen (PERCOCET)  mg per tablet Take 1 tablet by mouth every 4 (four) hours as needed for Pain (.).     Antibiotics     Start     Stop Route Frequency Ordered    18 1615  cefTRIAXone (ROCEPHIN) 2 g in dextrose 5 % 50 mL IVPB      -- IV Every 12 hours (non-standard times) 18 1501        Antifungals     None        Antivirals     None           There is no immunization history for the selected administration types on file for this patient.    Family History     Problem Relation (Age of Onset)    Arthritis Mother    Breast cancer Mother    Heart disease Mother    Hypertension Mother, Father    Throat cancer Father        Social History     Social History    Marital status: Single     Spouse name: N/A    Number of  children: N/A    Years of education: N/A     Social History Main Topics    Smoking status: Never Smoker    Smokeless tobacco: Never Used    Alcohol use No    Drug use: No    Sexual activity: Not Asked     Other Topics Concern    None     Social History Narrative    None     Review of Systems   Constitutional: Negative for chills, diaphoresis and fever.   HENT: Negative for rhinorrhea and sore throat.    Respiratory: Negative for cough and shortness of breath.    Cardiovascular: Negative for chest pain and leg swelling.   Gastrointestinal: Negative for abdominal pain, diarrhea, nausea and vomiting.   Genitourinary: Negative for dysuria and hematuria.   Musculoskeletal: Negative for arthralgias and myalgias.   Skin: Negative for rash.   Neurological: Negative for headaches.     Objective:     Vital Signs (Most Recent):  Temp: 98.2 °F (36.8 °C) (04/14/18 2040)  Pulse: 60 (04/14/18 2040)  Resp: 16 (04/14/18 2040)  BP: 124/69 (04/14/18 2040)  SpO2: 95 % (04/14/18 2040) Vital Signs (24h Range):  Temp:  [96.7 °F (35.9 °C)-98.5 °F (36.9 °C)] 98.2 °F (36.8 °C)  Pulse:  [57-65] 60  Resp:  [12-18] 16  SpO2:  [91 %-99 %] 95 %  BP: (100-126)/(55-69) 124/69     Weight: (!) 145.2 kg (320 lb 3.2 oz)  Body mass index is 44.66 kg/m².    Estimated Creatinine Clearance: 217.6 mL/min (based on SCr of 0.6 mg/dL).    Physical Exam   Constitutional: He is oriented to person, place, and time. He appears well-developed and well-nourished. No distress.   HENT:   Head: Normocephalic and atraumatic.   Eyes: Conjunctivae and EOM are normal.   Neck: Normal range of motion. Neck supple.   Cardiovascular: Normal rate.    Pulmonary/Chest: Effort normal. No respiratory distress.   Abdominal: Soft. He exhibits no distension.   RLQ - incisions dressed. Drain with sanguinous fluid.   Musculoskeletal: Normal range of motion. He exhibits no edema.   Neurological: He is alert and oriented to person, place, and time.   Skin: Skin is warm and dry. No  rash noted. He is not diaphoretic. No erythema.   Psychiatric: He has a normal mood and affect. His behavior is normal.   Vitals reviewed.      Significant Labs: All pertinent labs within the past 24 hours have been reviewed.    Significant Imaging: I have reviewed all pertinent imaging results/findings within the past 24 hours.

## 2018-04-15 NOTE — SUBJECTIVE & OBJECTIVE
Interval History: POD #2 washout intrathecal pump. No acute events overnight. Afebrile, no leukocytosis.  Surgical cultures pending - preliminarily showing GNR - lactose .  ID pending.   No complaints.  Awaiting cultures for final recommendations.       Review of Systems   Constitutional: Negative for chills, diaphoresis and fever.   HENT: Negative for rhinorrhea and sore throat.    Respiratory: Negative for cough and shortness of breath.    Cardiovascular: Negative for chest pain and leg swelling.   Gastrointestinal: Negative for abdominal pain, diarrhea, nausea and vomiting.   Genitourinary: Negative for dysuria and hematuria.   Musculoskeletal: Negative for arthralgias and myalgias.   Skin: Positive for wound. Negative for rash.   Neurological: Negative for headaches.     Objective:     Vital Signs (Most Recent):  Temp: 98.2 °F (36.8 °C) (04/15/18 0846)  Pulse: 63 (04/15/18 0846)  Resp: 17 (04/15/18 0846)  BP: 126/86 (04/15/18 0846)  SpO2: (!) 93 % (04/15/18 0846) Vital Signs (24h Range):  Temp:  [97.9 °F (36.6 °C)-98.5 °F (36.9 °C)] 98.2 °F (36.8 °C)  Pulse:  [55-66] 63  Resp:  [16-18] 17  SpO2:  [93 %-98 %] 93 %  BP: (100-141)/(55-86) 126/86     Weight: (!) 145.2 kg (320 lb 3.2 oz)  Body mass index is 44.66 kg/m².    Estimated Creatinine Clearance: 186.5 mL/min (based on SCr of 0.7 mg/dL).    Physical Exam   Constitutional: He is oriented to person, place, and time. He appears well-developed and well-nourished. No distress.   HENT:   Head: Normocephalic and atraumatic.   Eyes: Conjunctivae and EOM are normal.   Neck: Normal range of motion. Neck supple.   Cardiovascular: Normal rate.    Pulmonary/Chest: Effort normal. No respiratory distress.   Abdominal: Soft. He exhibits no distension.   RLQ - incisions dressed. Drain with sero-sanguinous fluid.   Musculoskeletal: Normal range of motion. He exhibits no edema.   Neurological: He is alert and oriented to person, place, and time.   Skin: Skin is warm  and dry. No rash noted. He is not diaphoretic. No erythema.   Psychiatric: He has a normal mood and affect. His behavior is normal.   Vitals reviewed.      Significant Labs:   Blood Culture:   Recent Labs  Lab 04/12/18  1228 04/12/18  1232   LABBLOO No Growth to date  No Growth to date  No Growth to date No Growth to date  No Growth to date  No Growth to date     CBC:   Recent Labs  Lab 04/14/18  0636 04/15/18  0405   WBC 8.03 7.75   HGB 11.0* 10.9*   HCT 32.9* 32.9*    337     CMP:   Recent Labs  Lab 04/14/18  0636 04/15/18  0405    138   K 3.8 3.7    102   CO2 26 29   GLU 97 82   BUN 12 14   CREATININE 0.6 0.7   CALCIUM 9.2 9.1   ANIONGAP 10 7*   EGFRNONAA >60.0 >60.0     Wound Culture:   Recent Labs  Lab 04/12/18  1045 04/13/18  0929 04/13/18  0930   LABAERO KLEBSIELLA PNEUMONIAEModerate GRAM NEGATIVE PRIYANK, LACTOSE FERMENTERModerateIdentification and susceptibility pending GRAM NEGATIVE PRIYANK, LACTOSE FERMENTERModerateIdentification and susceptibility pending       Significant Imaging: None

## 2018-04-15 NOTE — ASSESSMENT & PLAN NOTE
48-year-old male  - Morbid obesity  - HTN  - Seizure  - Peripheral artery disease  - Chronic pain, lumbar postlaminectomy syndrome  - s/p intrathecal opioid pump placement 4/3/2018   - c/b Klebsiella surgical site infection  - s/p washout and debridement 4/12/2018    Recommendations:  - Continue ceftriaxone 2g IV q24 hours pending final ID and sensitivities.    - Follow-up OR ID and sens - preliminarily showing GNR lactose   - Will need prolonged course of antibiotics given concern for hardware infection.  Ideally will be able to go out on oral antibiotic regimen  -  Will follow up tomorrow with final recommendations.     Data reviewed and plan discussed with ID staff

## 2018-04-15 NOTE — PLAN OF CARE
Problem: Patient Care Overview  Goal: Plan of Care Review  Outcome: Ongoing (interventions implemented as appropriate)  Reviewed plan of care with patient, vanc d/c this shift, drain care, pain control, possible d/c today. Accordian drain tubing became dislodged at middle connection site x 1, replaced without adverse events, suction working appropriately - small amount of sanguinous drainage over night. Otherwise no acute events, see flowsheets for detailed assessment information, will continue to monitor.

## 2018-04-15 NOTE — SUBJECTIVE & OBJECTIVE
Past Medical History:   Diagnosis Date    Back pain, chronic     Bulging discs     Chronic pain following surgery or procedure     Degenerative disc disease     Hypertension     Hypokalemia     PAD (peripheral artery disease)     Post laminectomy syndrome     Seizures     Severe sepsis     Short-term memory loss     Thyroid disease     Subclinical hyperthyroidism       Past Surgical History:   Procedure Laterality Date    CHOLECYSTECTOMY      GASTRIC BYPASS      SLEEVE    gastric sleeve  2011    HERNIA REPAIR      pain pump      SKIN SURGERY      EXCESS SKIN REMOVAL    SPINE SURGERY      lumbar fusion       Review of patient's allergies indicates:   Allergen Reactions    Klonopin [clonazepam] Anxiety and Other (See Comments)     Becomes agitated    Valium [diazepam] Other (See Comments)     Becomes agitated    Xanax [alprazolam] Anxiety and Other (See Comments)     Becomes agitated       Medications:  Prescriptions Prior to Admission   Medication Sig    amitriptyline (ELAVIL) 50 MG tablet Take 50 mg by mouth nightly.     DULoxetine (CYMBALTA) 60 MG capsule TAKE 1 CAPSULE BY MOUTH EVERY EVENING    furosemide (LASIX) 40 MG tablet TAKE ONE TABLET BY MOUTH ONCE DAILY FOR FLUID (Patient taking differently: TAKE ONE TABLET BY MOUTH ONCE DAILY FOR FLUID, morning)    gabapentin (NEURONTIN) 400 MG capsule Take 400 mg by mouth 3 (three) times daily.     hydroCHLOROthiazide (HYDRODIURIL) 25 MG tablet TAKE ONE TABLET BY MOUTH ONCE DAILY FOR BLOOD PRESSURE AND OR FOR FLUID (Patient taking differently: TAKE ONE TABLET BY MOUTH ONCE DAILY FOR BLOOD PRESSURE AND OR FOR FLUID, morning)    meloxicam (MOBIC) 15 MG tablet TAKE ONE TABLET BY MOUTH ONCE DAILY WITH A MEAL FOR INFLAMMATION AND OR PAIN (Patient taking differently: TAKE ONE TABLET BY MOUTH ONCE DAILY WITH A MEAL FOR INFLAMMATION AND OR PAIN, bedtime)    methocarbamol (ROBAXIN) 750 MG Tab Take 750 mg by mouth 2 (two) times daily.      oxycodone-acetaminophen (PERCOCET)  mg per tablet Take 1 tablet by mouth every 6 (six) hours as needed (15 MG Percocet).     potassium chloride (KLOR-CON) 10 MEQ TbSR Take 1 tablet (10 mEq total) by mouth once daily. (Patient taking differently: Take 10 mEq by mouth every morning. )    propranolol (INDERAL) 10 MG tablet Take 1 tablet (10 mg total) by mouth 3 (three) times daily.    ranitidine (ZANTAC) 150 MG capsule Take 150 mg by mouth 2 (two) times daily.    tamsulosin (FLOMAX) 0.4 mg Cp24 TAKE 1 CAPSULE BY MOUTH ONCE DAILY FOR PROSTATE, morning    tiZANidine (ZANAFLEX) 2 MG tablet Take 1 tablet (2 mg total) by mouth 3 (three) times daily. (Patient taking differently: Take 2 mg by mouth 3 (three) times daily as needed. )    [] tiZANidine (ZANAFLEX) 6 mg capsule Take 1 capsule (6 mg total) by mouth 3 (three) times daily.    butalbital-acetaminophen-caffeine -40 mg (FIORICET, ESGIC) -40 mg per tablet Take 1 tablet by mouth every 4 (four) hours as needed.    oxyCODONE-acetaminophen (PERCOCET)  mg per tablet Take 1 tablet by mouth every 4 (four) hours as needed for Pain (.).     Antibiotics     Start     Stop Route Frequency Ordered    18 1615  cefTRIAXone (ROCEPHIN) 2 g in dextrose 5 % 50 mL IVPB      -- IV Every 12 hours (non-standard times) 18 1501        Antifungals     None        Antivirals     None           There is no immunization history for the selected administration types on file for this patient.    Family History     Problem Relation (Age of Onset)    Arthritis Mother    Breast cancer Mother    Heart disease Mother    Hypertension Mother, Father    Throat cancer Father        Social History     Social History    Marital status: Single     Spouse name: N/A    Number of children: N/A    Years of education: N/A     Social History Main Topics    Smoking status: Never Smoker    Smokeless tobacco: Never Used    Alcohol use No    Drug use: No    Sexual  activity: Not Asked     Other Topics Concern    None     Social History Narrative    None     Review of Systems   Constitutional: Negative for chills, diaphoresis and fever.   HENT: Negative for rhinorrhea and sore throat.    Respiratory: Negative for cough and shortness of breath.    Cardiovascular: Negative for chest pain and leg swelling.   Gastrointestinal: Negative for abdominal pain, diarrhea, nausea and vomiting.   Genitourinary: Negative for dysuria and hematuria.   Musculoskeletal: Negative for arthralgias and myalgias.   Skin: Negative for rash.   Neurological: Negative for headaches.     Objective:     Vital Signs (Most Recent):  Temp: 98.2 °F (36.8 °C) (04/14/18 2040)  Pulse: 60 (04/14/18 2040)  Resp: 16 (04/14/18 2040)  BP: 124/69 (04/14/18 2040)  SpO2: 95 % (04/14/18 2040) Vital Signs (24h Range):  Temp:  [96.7 °F (35.9 °C)-98.5 °F (36.9 °C)] 98.2 °F (36.8 °C)  Pulse:  [57-65] 60  Resp:  [12-18] 16  SpO2:  [91 %-99 %] 95 %  BP: (100-126)/(55-69) 124/69     Weight: (!) 145.2 kg (320 lb 3.2 oz)  Body mass index is 44.66 kg/m².    Estimated Creatinine Clearance: 217.6 mL/min (based on SCr of 0.6 mg/dL).    Physical Exam   Constitutional: He is oriented to person, place, and time. He appears well-developed and well-nourished. No distress.   HENT:   Head: Normocephalic and atraumatic.   Eyes: Conjunctivae and EOM are normal.   Neck: Normal range of motion. Neck supple.   Cardiovascular: Normal rate.    Pulmonary/Chest: Effort normal. No respiratory distress.   Abdominal: Soft. He exhibits no distension.   RLQ - incisions dressed. Drain with sanguinous fluid.   Musculoskeletal: Normal range of motion. He exhibits no edema.   Neurological: He is alert and oriented to person, place, and time.   Skin: Skin is warm and dry. No rash noted. He is not diaphoretic. No erythema.   Psychiatric: He has a normal mood and affect. His behavior is normal.   Vitals reviewed.      Significant Labs: All pertinent labs  within the past 24 hours have been reviewed.    Significant Imaging: I have reviewed all pertinent imaging results/findings within the past 24 hours.

## 2018-04-15 NOTE — PLAN OF CARE
Problem: Patient Care Overview  Goal: Plan of Care Review  Outcome: Ongoing (interventions implemented as appropriate)  Continues on IV Abx, able to make needs known, no distress noted will continue to monitor.    Problem: Fall Risk (Adult)  Goal: Absence of Falls  Patient will demonstrate the desired outcomes by discharge/transition of care.   Outcome: Ongoing (interventions implemented as appropriate)   04/15/18 7760   Fall Risk (Adult)   Absence of Falls making progress toward outcome

## 2018-04-15 NOTE — SUBJECTIVE & OBJECTIVE
Interval History: NAEON. Cultures growing GNRs    Medications:  Continuous Infusions:   sodium chloride 0.9% 100 mL/hr at 04/12/18 9835     Scheduled Meds:   amitriptyline  50 mg Oral Nightly    cefTRIAXone (ROCEPHIN) IVPB  2 g Intravenous Q12H    docusate sodium  100 mg Oral BID    DULoxetine  60 mg Oral QHS    famotidine  20 mg Oral BID    gabapentin  400 mg Oral TID    hydroCHLOROthiazide  25 mg Oral Daily    methocarbamol  750 mg Oral BID    pantoprazole  40 mg Oral Daily    propranolol  10 mg Oral TID    tamsulosin  0.4 mg Oral Daily     PRN Meds:dextrose 50%, dextrose 50%, glucagon (human recombinant), glucose, glucose, glucose, insulin aspart U-100, ondansetron, oxyCODONE, sodium chloride 0.9%, tiZANidine     Review of Systems    Objective:     Weight: (!) 145.2 kg (320 lb 3.2 oz)  Body mass index is 44.66 kg/m².  Vital Signs (Most Recent):  Temp: 98.2 °F (36.8 °C) (04/15/18 0846)  Pulse: 63 (04/15/18 0846)  Resp: 17 (04/15/18 0846)  BP: 126/86 (04/15/18 0846)  SpO2: (!) 93 % (04/15/18 0846) Vital Signs (24h Range):  Temp:  [97.9 °F (36.6 °C)-98.5 °F (36.9 °C)] 98.2 °F (36.8 °C)  Pulse:  [55-66] 63  Resp:  [16-18] 17  SpO2:  [93 %-98 %] 93 %  BP: (100-141)/(55-86) 126/86            Closed/Suction Drain 04/13/18 0922 Midline Abdomen Accordion 10 Fr. (Active)   Site Description Unable to view 4/13/2018  1:30 PM   Dressing Type [place];Gauze 4/13/2018  8:32 PM   Dressing Status Clean;Dry;Intact 4/13/2018  8:32 PM   Drainage Serosanguineous 4/13/2018  1:30 PM   Output (mL) 25 mL 4/14/2018  5:00 AM            Closed/Suction Drain 04/13/18 1002 Right Abdomen Other (Comment) 7 Fr. (Active)   Site Description Unable to view 4/13/2018  1:30 PM   Dressing Type Gauze;[place] 4/13/2018  8:32 PM   Dressing Status Clean;Dry;Intact 4/13/2018  8:32 PM   Drainage Serosanguineous 4/13/2018  1:30 PM   Status Other (Comment) 4/13/2018  1:30 PM   Output (mL) 7.5 mL 4/13/2018  5:00 PM       Neurosurgery  Physical Exam      AAOx3, PERRL, EOMI, FS, TM, 5/5 throughout, SILT.  DTR 2+ throughout, no Gomez's, no clonus.   Incisions with dressings in place, drains in place    Significant Labs:    Recent Labs  Lab 04/14/18  0636 04/15/18  0405   GLU 97 82    138   K 3.8 3.7    102   CO2 26 29   BUN 12 14   CREATININE 0.6 0.7   CALCIUM 9.2 9.1       Recent Labs  Lab 04/14/18  0636 04/15/18  0405   WBC 8.03 7.75   HGB 11.0* 10.9*   HCT 32.9* 32.9*    337     No results for input(s): LABPT, INR, APTT in the last 48 hours.  Microbiology Results (last 7 days)     Procedure Component Value Units Date/Time    AFB Culture & Smear [[id number]] Collected:  04/13/18 0930    Order Status:  Completed Specimen:  Wound from Abdomen Updated:  04/14/18 2127     AFB Culture & Smear Culture in progress    Narrative:       2. ABDOMEN    AFB Culture & Smear [[id number]] Collected:  04/13/18 0929    Order Status:  Completed Specimen:  Wound from Abdomen Updated:  04/14/18 2127     AFB Culture & Smear Culture in progress    Narrative:       1. LATERAL ABDOMEN    Blood culture [[id number]] Collected:  04/12/18 1232    Order Status:  Completed Specimen:  Blood Updated:  04/14/18 1412     Blood Culture, Routine No Growth to date     Blood Culture, Routine No Growth to date     Blood Culture, Routine No Growth to date    Blood culture [[id number]] Collected:  04/12/18 1228    Order Status:  Completed Specimen:  Blood Updated:  04/14/18 1412     Blood Culture, Routine No Growth to date     Blood Culture, Routine No Growth to date     Blood Culture, Routine No Growth to date    Aerobic culture [[id number]] Collected:  04/13/18 0929    Order Status:  Completed Specimen:  Wound from Abdomen Updated:  04/14/18 1043     Aerobic Bacterial Culture --     GRAM NEGATIVE PRIYANK, LACTOSE   Moderate  Identification and susceptibility pending      Narrative:       1. LATERAL ABDOMEN    Aerobic culture [[id number]] Collected:  04/13/18 0930     Order Status:  Completed Specimen:  Wound from Abdomen Updated:  04/14/18 0740     Aerobic Bacterial Culture --     GRAM NEGATIVE PRIYANK, LACTOSE   Moderate  Identification and susceptibility pending      Narrative:       2. ABDOMEN    Gram stain [[id number]] Collected:  04/13/18 0929    Order Status:  Completed Specimen:  Wound from Abdomen Updated:  04/13/18 1432     Gram Stain Result Few WBC's      No organisms seen    Narrative:       1. LATERAL ABDOMEN    Gram stain [[id number]] Collected:  04/13/18 0930    Order Status:  Completed Specimen:  Wound from Abdomen Updated:  04/13/18 1431     Gram Stain Result No WBC's      No organisms seen    Narrative:       2. ABDOMEN    Culture, Anaerobe [[id number]] Collected:  04/13/18 0930    Order Status:  Sent Specimen:  Wound from Abdomen Updated:  04/13/18 1054    Fungus culture [[id number]] Collected:  04/13/18 0930    Order Status:  Sent Specimen:  Wound from Abdomen Updated:  04/13/18 1053    Culture, Anaerobe [[id number]] Collected:  04/13/18 0929    Order Status:  Sent Specimen:  Wound from Abdomen Updated:  04/13/18 1051    Fungus culture [[id number]] Collected:  04/13/18 0929    Order Status:  Sent Specimen:  Wound from Abdomen Updated:  04/13/18 1051        All pertinent labs from the last 24 hours have been reviewed.    Significant Diagnostics:  I have reviewed all pertinent imaging results/findings within the past 24 hours.

## 2018-04-15 NOTE — ASSESSMENT & PLAN NOTE
48M with possible infection of intrathecal pain pump now s/p washout.    -- Neurostable on exam  -- Cultures growning GNRs.  -- ID consult, appreciate recs  -- Continue rocephin.  -- Drains to continue.  -- Pain control.  -- OK to be up and OOB.

## 2018-04-15 NOTE — PROGRESS NOTES
[facility]  Infectious Disease  Progress Note    Patient Name: Jaime Lund  MRN: [id number]  Admission Date: 4/12/2018  Length of Stay: 3 days  Attending Physician: Meena Saavedra MD  Primary Care Provider: Gabby Dean MD    Isolation Status: No active isolations  Assessment/Plan:      Surgical wound infection    48-year-old male  - Morbid obesity  - HTN  - Seizure  - Peripheral artery disease  - Chronic pain, lumbar postlaminectomy syndrome  - s/p intrathecal opioid pump placement 4/3/2018   - c/b Klebsiella surgical site infection  - s/p washout and debridement 4/12/2018    Recommendations:  - Continue ceftriaxone 2g IV q24 hours pending final ID and sensitivities.    - Follow-up OR ID and sens - preliminarily showing GNR lactose   - Will need prolonged course of antibiotics given concern for hardware infection.  Ideally will be able to go out on oral antibiotic regimen  -  Will follow up tomorrow with final recommendations.     Data reviewed and plan discussed with ID staff            Thank you.   Please call for any questions or concerns.  KATLYN Gomez, ANP-C  [phone]    Subjective:     Principal Problem:<principal problem not specified>    HPI: 48-year-old male with history of morbid obesity, HTN, seizure, peripheral artery disease, chronic pain, lumbar postlaminectomy syndrome, s/p intrathecal opioid pump placement 4/3/2018 presents for surgical site infection. About a week after surgery, he had drainage from surgical site as well as swelling over the pump site.  He reports an episode of drenching sweats.  Drainage from incision growing Klebsiella. On 4/13/2018, he was taken to the OR for washout of the pump site with immediate reimplantation of the pump. Per OP note - the side wound, frontal abdominal wound, and back wound were opened - red serous fluid was drained and cultures - now growing GNR. Pump was removed, surgical site debrided and washed out. The pump was  replaced and secured to the abdominal fascia. A Hemovac drain was placed over the wound to prevent recurrence of the previous seroma. Patient was placed on vanc/ceftriaxone.         Interval History: POD #2 washout intrathecal pump. No acute events overnight. Afebrile, no leukocytosis.  Surgical cultures pending - preliminarily showing GNR - lactose .  ID pending.   No complaints.  Awaiting cultures for final recommendations.       Review of Systems   Constitutional: Negative for chills, diaphoresis and fever.   HENT: Negative for rhinorrhea and sore throat.    Respiratory: Negative for cough and shortness of breath.    Cardiovascular: Negative for chest pain and leg swelling.   Gastrointestinal: Negative for abdominal pain, diarrhea, nausea and vomiting.   Genitourinary: Negative for dysuria and hematuria.   Musculoskeletal: Negative for arthralgias and myalgias.   Skin: Positive for wound. Negative for rash.   Neurological: Negative for headaches.     Objective:     Vital Signs (Most Recent):  Temp: 98.2 °F (36.8 °C) (04/15/18 0846)  Pulse: 63 (04/15/18 0846)  Resp: 17 (04/15/18 0846)  BP: 126/86 (04/15/18 0846)  SpO2: (!) 93 % (04/15/18 0846) Vital Signs (24h Range):  Temp:  [97.9 °F (36.6 °C)-98.5 °F (36.9 °C)] 98.2 °F (36.8 °C)  Pulse:  [55-66] 63  Resp:  [16-18] 17  SpO2:  [93 %-98 %] 93 %  BP: (100-141)/(55-86) 126/86     Weight: (!) 145.2 kg (320 lb 3.2 oz)  Body mass index is 44.66 kg/m².    Estimated Creatinine Clearance: 186.5 mL/min (based on SCr of 0.7 mg/dL).    Physical Exam   Constitutional: He is oriented to person, place, and time. He appears well-developed and well-nourished. No distress.   HENT:   Head: Normocephalic and atraumatic.   Eyes: Conjunctivae and EOM are normal.   Neck: Normal range of motion. Neck supple.   Cardiovascular: Normal rate.    Pulmonary/Chest: Effort normal. No respiratory distress.   Abdominal: Soft. He exhibits no distension.   RLQ - incisions dressed. Drain  with sero-sanguinous fluid.   Musculoskeletal: Normal range of motion. He exhibits no edema.   Neurological: He is alert and oriented to person, place, and time.   Skin: Skin is warm and dry. No rash noted. He is not diaphoretic. No erythema.   Psychiatric: He has a normal mood and affect. His behavior is normal.   Vitals reviewed.      Significant Labs:   Blood Culture:   Recent Labs  Lab 04/12/18  1228 04/12/18  1232   LABBLOO No Growth to date  No Growth to date  No Growth to date No Growth to date  No Growth to date  No Growth to date     CBC:   Recent Labs  Lab 04/14/18  0636 04/15/18  0405   WBC 8.03 7.75   HGB 11.0* 10.9*   HCT 32.9* 32.9*    337     CMP:   Recent Labs  Lab 04/14/18  0636 04/15/18  0405    138   K 3.8 3.7    102   CO2 26 29   GLU 97 82   BUN 12 14   CREATININE 0.6 0.7   CALCIUM 9.2 9.1   ANIONGAP 10 7*   EGFRNONAA >60.0 >60.0     Wound Culture:   Recent Labs  Lab 04/12/18  1045 04/13/18  0929 04/13/18  0930   LABAERO KLEBSIELLA PNEUMONIAEModerate GRAM NEGATIVE PRIYANK, LACTOSE FERMENTERModerateIdentification and susceptibility pending GRAM NEGATIVE PRIYANK, LACTOSE FERMENTERModerateIdentification and susceptibility pending       Significant Imaging: None

## 2018-04-16 VITALS
HEART RATE: 67 BPM | BODY MASS INDEX: 44.1 KG/M2 | TEMPERATURE: 98 F | RESPIRATION RATE: 18 BRPM | WEIGHT: 315 LBS | HEIGHT: 71 IN | SYSTOLIC BLOOD PRESSURE: 117 MMHG | OXYGEN SATURATION: 93 % | DIASTOLIC BLOOD PRESSURE: 56 MMHG

## 2018-04-16 LAB
ANION GAP SERPL CALC-SCNC: 13 MMOL/L
BACTERIA SPEC ANAEROBE CULT: NORMAL
BASOPHILS # BLD AUTO: 0.08 K/UL
BASOPHILS NFR BLD: 1.3 %
BUN SERPL-MCNC: 14 MG/DL
CALCIUM SERPL-MCNC: 8.9 MG/DL
CHLORIDE SERPL-SCNC: 103 MMOL/L
CO2 SERPL-SCNC: 24 MMOL/L
CREAT SERPL-MCNC: 0.7 MG/DL
DIFFERENTIAL METHOD: ABNORMAL
EOSINOPHIL # BLD AUTO: 0.3 K/UL
EOSINOPHIL NFR BLD: 4.5 %
ERYTHROCYTE [DISTWIDTH] IN BLOOD BY AUTOMATED COUNT: 12.3 %
EST. GFR  (AFRICAN AMERICAN): >60 ML/MIN/1.73 M^2
EST. GFR  (NON AFRICAN AMERICAN): >60 ML/MIN/1.73 M^2
GLUCOSE SERPL-MCNC: 75 MG/DL
HCT VFR BLD AUTO: 36.5 %
HGB BLD-MCNC: 11.6 G/DL
IMM GRANULOCYTES # BLD AUTO: 0.02 K/UL
IMM GRANULOCYTES NFR BLD AUTO: 0.3 %
LYMPHOCYTES # BLD AUTO: 2.1 K/UL
LYMPHOCYTES NFR BLD: 32.9 %
MCH RBC QN AUTO: 29.5 PG
MCHC RBC AUTO-ENTMCNC: 31.8 G/DL
MCV RBC AUTO: 93 FL
MONOCYTES # BLD AUTO: 0.6 K/UL
MONOCYTES NFR BLD: 8.8 %
NEUTROPHILS # BLD AUTO: 3.3 K/UL
NEUTROPHILS NFR BLD: 52.2 %
NRBC BLD-RTO: 0 /100 WBC
PLATELET # BLD AUTO: 327 K/UL
PMV BLD AUTO: 9.4 FL
POTASSIUM SERPL-SCNC: 3.6 MMOL/L
RBC # BLD AUTO: 3.93 M/UL
SODIUM SERPL-SCNC: 140 MMOL/L
WBC # BLD AUTO: 6.38 K/UL

## 2018-04-16 PROCEDURE — 99233 SBSQ HOSP IP/OBS HIGH 50: CPT | Mod: ,,, | Performed by: INTERNAL MEDICINE

## 2018-04-16 PROCEDURE — 80048 BASIC METABOLIC PNL TOTAL CA: CPT

## 2018-04-16 PROCEDURE — 99024 POSTOP FOLLOW-UP VISIT: CPT | Mod: ,,, | Performed by: PHYSICIAN ASSISTANT

## 2018-04-16 PROCEDURE — 25000003 PHARM REV CODE 250: Performed by: PHYSICIAN ASSISTANT

## 2018-04-16 PROCEDURE — 36415 COLL VENOUS BLD VENIPUNCTURE: CPT

## 2018-04-16 PROCEDURE — 63600175 PHARM REV CODE 636 W HCPCS: Performed by: PHYSICIAN ASSISTANT

## 2018-04-16 PROCEDURE — 85025 COMPLETE CBC W/AUTO DIFF WBC: CPT

## 2018-04-16 RX ORDER — CIPROFLOXACIN 750 MG/1
750 TABLET, FILM COATED ORAL EVERY 12 HOURS
Qty: 84 TABLET | Refills: 0 | Status: SHIPPED | OUTPATIENT
Start: 2018-04-16 | End: 2018-05-28

## 2018-04-16 RX ORDER — CIPROFLOXACIN 250 MG/1
750 TABLET, FILM COATED ORAL EVERY 12 HOURS
Status: DISCONTINUED | OUTPATIENT
Start: 2018-04-16 | End: 2018-04-16 | Stop reason: HOSPADM

## 2018-04-16 RX ORDER — TIZANIDINE HYDROCHLORIDE 6 MG/1
6 CAPSULE, GELATIN COATED ORAL 3 TIMES DAILY
Qty: 30 CAPSULE | Refills: 0 | Status: SHIPPED | OUTPATIENT
Start: 2018-04-16 | End: 2018-04-26

## 2018-04-16 RX ORDER — OXYCODONE HYDROCHLORIDE 10 MG/1
10 TABLET ORAL EVERY 4 HOURS PRN
Qty: 60 TABLET | Refills: 0 | Status: SHIPPED | OUTPATIENT
Start: 2018-04-16 | End: 2018-04-19 | Stop reason: SDUPTHER

## 2018-04-16 RX ADMIN — GABAPENTIN 400 MG: 100 CAPSULE ORAL at 03:04

## 2018-04-16 RX ADMIN — METHOCARBAMOL 750 MG: 750 TABLET ORAL at 08:04

## 2018-04-16 RX ADMIN — DOCUSATE SODIUM 100 MG: 100 CAPSULE, LIQUID FILLED ORAL at 08:04

## 2018-04-16 RX ADMIN — TAMSULOSIN HYDROCHLORIDE 0.4 MG: 0.4 CAPSULE ORAL at 08:04

## 2018-04-16 RX ADMIN — OXYCODONE HYDROCHLORIDE 10 MG: 5 TABLET ORAL at 04:04

## 2018-04-16 RX ADMIN — HYDROCHLOROTHIAZIDE 25 MG: 25 TABLET ORAL at 08:04

## 2018-04-16 RX ADMIN — FAMOTIDINE 20 MG: 20 TABLET, FILM COATED ORAL at 08:04

## 2018-04-16 RX ADMIN — PANTOPRAZOLE SODIUM 40 MG: 40 TABLET, DELAYED RELEASE ORAL at 08:04

## 2018-04-16 RX ADMIN — GABAPENTIN 400 MG: 100 CAPSULE ORAL at 08:04

## 2018-04-16 RX ADMIN — PROPRANOLOL HYDROCHLORIDE 10 MG: 10 TABLET ORAL at 03:04

## 2018-04-16 RX ADMIN — PROPRANOLOL HYDROCHLORIDE 10 MG: 10 TABLET ORAL at 08:04

## 2018-04-16 RX ADMIN — OXYCODONE HYDROCHLORIDE 10 MG: 5 TABLET ORAL at 01:04

## 2018-04-16 RX ADMIN — CEFTRIAXONE SODIUM 2 G: 2 INJECTION, POWDER, FOR SOLUTION INTRAMUSCULAR; INTRAVENOUS at 04:04

## 2018-04-16 RX ADMIN — OXYCODONE HYDROCHLORIDE 10 MG: 5 TABLET ORAL at 09:04

## 2018-04-16 NOTE — SUBJECTIVE & OBJECTIVE
Interval History: NAEON. Drain output remains low. Pt reports pain well controlled. Denies fever, n/v, numbness/tingling, or weakness. Tolerating diet and voiding appropriately.     Medications:  Continuous Infusions:   sodium chloride 0.9% 100 mL/hr at 04/15/18 1655     Scheduled Meds:   amitriptyline  50 mg Oral Nightly    ciprofloxacin HCl  750 mg Oral Q12H    docusate sodium  100 mg Oral BID    DULoxetine  60 mg Oral QHS    famotidine  20 mg Oral BID    gabapentin  400 mg Oral TID    hydroCHLOROthiazide  25 mg Oral Daily    meloxicam  15 mg Oral QHS    methocarbamol  750 mg Oral BID    pantoprazole  40 mg Oral Daily    propranolol  10 mg Oral TID    tamsulosin  0.4 mg Oral Daily     PRN Meds:dextrose 50%, dextrose 50%, glucagon (human recombinant), glucose, glucose, glucose, insulin aspart U-100, ondansetron, oxyCODONE, sodium chloride 0.9%, tiZANidine, zolpidem     Review of Systems  Objective:     Weight: (!) 145.2 kg (320 lb 3.2 oz)  Body mass index is 44.66 kg/m².  Vital Signs (Most Recent):  Temp: 97.9 °F (36.6 °C) (04/16/18 1124)  Pulse: 76 (04/16/18 1124)  Resp: 18 (04/16/18 1124)  BP: 122/78 (04/16/18 1124)  SpO2: 97 % (04/16/18 1124) Vital Signs (24h Range):  Temp:  [97.8 °F (36.6 °C)-98.9 °F (37.2 °C)] 97.9 °F (36.6 °C)  Pulse:  [60-76] 76  Resp:  [14-18] 18  SpO2:  [94 %-97 %] 97 %  BP: ()/(51-86) 122/78                           Closed/Suction Drain 04/13/18 0922 Midline Abdomen Accordion 10 Fr. (Active)   Site Description Unable to view 4/16/2018  8:36 AM   Dressing Type [place] 4/16/2018  8:36 AM   Dressing Status Clean;Dry;Intact 4/16/2018  8:36 AM   Drainage Serosanguineous 4/15/2018 10:26 PM   Output (mL) 5 mL 4/14/2018  4:00 PM            Closed/Suction Drain 04/13/18 1002 Right Abdomen Other (Comment) 7 Fr. (Active)   Site Description Unable to view 4/16/2018  8:36 AM   Dressing Type Gauze 4/16/2018  8:36 AM   Dressing Status Clean;Dry;Intact 4/16/2018  8:36 AM    Drainage Sanguineous 4/15/2018 10:26 PM   Status Other (Comment) 4/13/2018  1:30 PM   Output (mL) 25 mL 4/14/2018  4:00 PM       Neurosurgery Physical Exam  General: well developed, well nourished, no distress.   Head: normocephalic, atraumatic  Neurologic: Alert and oriented. Thought content appropriate.  GCS: Motor: 6/Verbal: 5/Eyes: 4 GCS Total: 15  Mental Status: Awake, Alert, Oriented x 4  Language: No aphasia  Speech: No dysarthria  Cranial nerves: face symmetric, tongue midline, CN II-XII grossly intact.   Eyes: pupils equal, round, reactive to light with accomodation, EOMI.  Pulmonary: normal respirations, no signs of respiratory distress  Abdomen: soft, non-distended, not tender to palpation  Sensory: intact to light touch throughout    Motor Strength:Moves all extremities spontaneously with good tone.  Full strength upper and lower extremities. No abnormal movements seen.     Strength  Deltoids Triceps Biceps Wrist Extension Wrist Flexion Hand    Upper: R 5/5 5/5 5/5 5/5 5/5 5/5    L 5/5 5/5 5/5 5/5 5/5 5/5     Iliopsoas Quadriceps Knee  Flexion Tibialis  anterior Gastro- cnemius EHL   Lower: R 5/5 5/5 5/5 5/5 5/5 5/5    L 5/5 5/5 5/5 5/5 5/5 5/5     DTR's - 2 + and symmetric in UE and LE  Pronator Drift: no drift noted  Finger-to-nose: Intact bilaterally  Gomez: absent  Clonus: absent  Babinski: absent  Pulses: 2+ and symmetric radial and dorsalis pedis.  Skin: Skin is warm, dry and intact.  Incisions c/d/i with no surrounding erythema, edema, or drainage. Skin edges well approximated with sutures.     Significant Labs:    Recent Labs  Lab 04/15/18  0405 04/16/18  0429   GLU 82 75    140   K 3.7 3.6    103   CO2 29 24   BUN 14 14   CREATININE 0.7 0.7   CALCIUM 9.1 8.9       Recent Labs  Lab 04/15/18  0405 04/16/18 0429   WBC 7.75 6.38   HGB 10.9* 11.6*   HCT 32.9* 36.5*    327     No results for input(s): LABPT, INR, APTT in the last 48 hours.  Microbiology Results (last 7  days)     Procedure Component Value Units Date/Time    Aerobic culture [[id number]]  (Susceptibility) Collected:  04/13/18 0930    Order Status:  Completed Specimen:  Wound from Abdomen Updated:  04/16/18 1450     Aerobic Bacterial Culture No growth     Aerobic Bacterial Culture --     KLEBSIELLA PNEUMONIAE  Moderate       Aerobic Bacterial Culture --     ENTEROBACTER AEROGENES  Rare  Susceptibility pending      Narrative:       2. ABDOMEN    AFB Culture & Smear [[id number]] Collected:  04/13/18 0929    Order Status:  Completed Specimen:  Wound from Abdomen Updated:  04/16/18 1447     AFB Culture & Smear Culture in progress     AFB CULTURE STAIN No acid fast bacilli seen.    Narrative:       1. LATERAL ABDOMEN    AFB Culture & Smear [[id number]] Collected:  04/13/18 0930    Order Status:  Completed Specimen:  Wound from Abdomen Updated:  04/16/18 1447     AFB Culture & Smear Culture in progress     AFB CULTURE STAIN No acid fast bacilli seen.    Narrative:       2. ABDOMEN    Culture, Anaerobe [[id number]] Collected:  04/13/18 0929    Order Status:  Completed Specimen:  Wound from Abdomen Updated:  04/16/18 1444     Anaerobic Culture Culture in progress    Narrative:       1. LATERAL ABDOMEN    Culture, Anaerobe [[id number]] Collected:  04/13/18 0930    Order Status:  Completed Specimen:  Wound from Abdomen Updated:  04/16/18 1444     Anaerobic Culture Culture in progress    Narrative:       2. ABDOMEN    Blood culture [[id number]] Collected:  04/12/18 1232    Order Status:  Completed Specimen:  Blood Updated:  04/16/18 1412     Blood Culture, Routine No Growth to date     Blood Culture, Routine No Growth to date     Blood Culture, Routine No Growth to date     Blood Culture, Routine No Growth to date     Blood Culture, Routine No Growth to date    Blood culture [[id number]] Collected:  04/12/18 1228    Order Status:  Completed Specimen:  Blood Updated:  04/16/18 1412     Blood Culture, Routine No Growth to date     Blood  Culture, Routine No Growth to date     Blood Culture, Routine No Growth to date     Blood Culture, Routine No Growth to date     Blood Culture, Routine No Growth to date    Aerobic culture [[id number]]  (Susceptibility) Collected:  04/13/18 0929    Order Status:  Completed Specimen:  Wound from Abdomen Updated:  04/16/18 1111     Aerobic Bacterial Culture --     KLEBSIELLA PNEUMONIAE  Moderate      Narrative:       1. LATERAL ABDOMEN    Gram stain [[id number]] Collected:  04/13/18 0929    Order Status:  Completed Specimen:  Wound from Abdomen Updated:  04/13/18 1432     Gram Stain Result Few WBC's      No organisms seen    Narrative:       1. LATERAL ABDOMEN    Gram stain [[id number]] Collected:  04/13/18 0930    Order Status:  Completed Specimen:  Wound from Abdomen Updated:  04/13/18 1431     Gram Stain Result No WBC's      No organisms seen    Narrative:       2. ABDOMEN    Fungus culture [[id number]] Collected:  04/13/18 0930    Order Status:  Sent Specimen:  Wound from Abdomen Updated:  04/13/18 1053    Fungus culture [[id number]] Collected:  04/13/18 0929    Order Status:  Sent Specimen:  Wound from Abdomen Updated:  04/13/18 1051        Recent Lab Results       04/16/18  0429      Immature Granulocytes 0.3     Immature Grans (Abs) 0.02  Comment:  Mild elevation in immature granulocytes is non specific and   can be seen in a variety of conditions including stress response,   acute inflammation, trauma and pregnancy. Correlation with other   laboratory and clinical findings is essential.       Anion Gap 13     Baso # 0.08     Basophil% 1.3     BUN, Bld 14     Calcium 8.9     Chloride 103     CO2 24     Creatinine 0.7     Differential Method Automated     eGFR if African American >60.0     eGFR if non  >60.0  Comment:  Calculation used to obtain the estimated glomerular filtration  rate (eGFR) is the CKD-EPI equation.        Eos # 0.3     Eosinophil% 4.5     Glucose 75     Gran # (ANC) 3.3     Gran%  52.2     Hematocrit 36.5(L)     Hemoglobin 11.6(L)     Lymph # 2.1     Lymph% 32.9     MCH 29.5     MCHC 31.8(L)     MCV 93     Mono # 0.6     Mono% 8.8     MPV 9.4     nRBC 0     Platelets 327     Potassium 3.6     RBC 3.93(L)     RDW 12.3     Sodium 140     WBC 6.38         All pertinent labs from the last 24 hours have been reviewed.    Significant Diagnostics:  I have reviewed all pertinent imaging results/findings within the past 24 hours.

## 2018-04-16 NOTE — PROGRESS NOTES
[facility]  Neurosurgery  Progress Note    Subjective:     History of Present Illness: No notes on file    Post-Op Info:  Procedure(s) (LRB):  WASHOUT of intrathecal pump (N/A)   3 Days Post-Op     Interval History: NAEON. Drain output remains low. Pt reports pain well controlled. Denies fever, n/v, numbness/tingling, or weakness. Tolerating diet and voiding appropriately.     Medications:  Continuous Infusions:   sodium chloride 0.9% 100 mL/hr at 04/15/18 1655     Scheduled Meds:   amitriptyline  50 mg Oral Nightly    ciprofloxacin HCl  750 mg Oral Q12H    docusate sodium  100 mg Oral BID    DULoxetine  60 mg Oral QHS    famotidine  20 mg Oral BID    gabapentin  400 mg Oral TID    hydroCHLOROthiazide  25 mg Oral Daily    meloxicam  15 mg Oral QHS    methocarbamol  750 mg Oral BID    pantoprazole  40 mg Oral Daily    propranolol  10 mg Oral TID    tamsulosin  0.4 mg Oral Daily     PRN Meds:dextrose 50%, dextrose 50%, glucagon (human recombinant), glucose, glucose, glucose, insulin aspart U-100, ondansetron, oxyCODONE, sodium chloride 0.9%, tiZANidine, zolpidem     Review of Systems  Objective:     Weight: (!) 145.2 kg (320 lb 3.2 oz)  Body mass index is 44.66 kg/m².  Vital Signs (Most Recent):  Temp: 97.9 °F (36.6 °C) (04/16/18 1124)  Pulse: 76 (04/16/18 1124)  Resp: 18 (04/16/18 1124)  BP: 122/78 (04/16/18 1124)  SpO2: 97 % (04/16/18 1124) Vital Signs (24h Range):  Temp:  [97.8 °F (36.6 °C)-98.9 °F (37.2 °C)] 97.9 °F (36.6 °C)  Pulse:  [60-76] 76  Resp:  [14-18] 18  SpO2:  [94 %-97 %] 97 %  BP: ()/(51-86) 122/78                           Closed/Suction Drain 04/13/18 0922 Midline Abdomen Accordion 10 Fr. (Active)   Site Description Unable to view 4/16/2018  8:36 AM   Dressing Type [place] 4/16/2018  8:36 AM   Dressing Status Clean;Dry;Intact 4/16/2018  8:36 AM   Drainage Serosanguineous 4/15/2018 10:26 PM   Output (mL) 5 mL 4/14/2018  4:00 PM            Closed/Suction  Drain 04/13/18 1002 Right Abdomen Other (Comment) 7 Fr. (Active)   Site Description Unable to view 4/16/2018  8:36 AM   Dressing Type Gauze 4/16/2018  8:36 AM   Dressing Status Clean;Dry;Intact 4/16/2018  8:36 AM   Drainage Sanguineous 4/15/2018 10:26 PM   Status Other (Comment) 4/13/2018  1:30 PM   Output (mL) 25 mL 4/14/2018  4:00 PM       Neurosurgery Physical Exam  General: well developed, well nourished, no distress.   Head: normocephalic, atraumatic  Neurologic: Alert and oriented. Thought content appropriate.  GCS: Motor: 6/Verbal: 5/Eyes: 4 GCS Total: 15  Mental Status: Awake, Alert, Oriented x 4  Language: No aphasia  Speech: No dysarthria  Cranial nerves: face symmetric, tongue midline, CN II-XII grossly intact.   Eyes: pupils equal, round, reactive to light with accomodation, EOMI.  Pulmonary: normal respirations, no signs of respiratory distress  Abdomen: soft, non-distended, not tender to palpation  Sensory: intact to light touch throughout    Motor Strength:Moves all extremities spontaneously with good tone.  Full strength upper and lower extremities. No abnormal movements seen.     Strength  Deltoids Triceps Biceps Wrist Extension Wrist Flexion Hand    Upper: R 5/5 5/5 5/5 5/5 5/5 5/5    L 5/5 5/5 5/5 5/5 5/5 5/5     Iliopsoas Quadriceps Knee  Flexion Tibialis  anterior Gastro- cnemius EHL   Lower: R 5/5 5/5 5/5 5/5 5/5 5/5    L 5/5 5/5 5/5 5/5 5/5 5/5     DTR's - 2 + and symmetric in UE and LE  Pronator Drift: no drift noted  Finger-to-nose: Intact bilaterally  Gomez: absent  Clonus: absent  Babinski: absent  Pulses: 2+ and symmetric radial and dorsalis pedis.  Skin: Skin is warm, dry and intact.  Incisions c/d/i with no surrounding erythema, edema, or drainage. Skin edges well approximated with sutures.     Significant Labs:    Recent Labs  Lab 04/15/18  0405 04/16/18  0429   GLU 82 75    140   K 3.7 3.6    103   CO2 29 24   BUN 14 14   CREATININE 0.7 0.7   CALCIUM 9.1 8.9        Recent Labs  Lab 04/15/18  0405 04/16/18  0429   WBC 7.75 6.38   HGB 10.9* 11.6*   HCT 32.9* 36.5*    327     No results for input(s): LABPT, INR, APTT in the last 48 hours.  Microbiology Results (last 7 days)     Procedure Component Value Units Date/Time    Aerobic culture [[id number]]  (Susceptibility) Collected:  04/13/18 0930    Order Status:  Completed Specimen:  Wound from Abdomen Updated:  04/16/18 1450     Aerobic Bacterial Culture No growth     Aerobic Bacterial Culture --     KLEBSIELLA PNEUMONIAE  Moderate       Aerobic Bacterial Culture --     ENTEROBACTER AEROGENES  Rare  Susceptibility pending      Narrative:       2. ABDOMEN    AFB Culture & Smear [[id number]] Collected:  04/13/18 0929    Order Status:  Completed Specimen:  Wound from Abdomen Updated:  04/16/18 1447     AFB Culture & Smear Culture in progress     AFB CULTURE STAIN No acid fast bacilli seen.    Narrative:       1. LATERAL ABDOMEN    AFB Culture & Smear [[id number]] Collected:  04/13/18 0930    Order Status:  Completed Specimen:  Wound from Abdomen Updated:  04/16/18 1447     AFB Culture & Smear Culture in progress     AFB CULTURE STAIN No acid fast bacilli seen.    Narrative:       2. ABDOMEN    Culture, Anaerobe [[id number]] Collected:  04/13/18 0929    Order Status:  Completed Specimen:  Wound from Abdomen Updated:  04/16/18 1444     Anaerobic Culture Culture in progress    Narrative:       1. LATERAL ABDOMEN    Culture, Anaerobe [[id number]] Collected:  04/13/18 0930    Order Status:  Completed Specimen:  Wound from Abdomen Updated:  04/16/18 1444     Anaerobic Culture Culture in progress    Narrative:       2. ABDOMEN    Blood culture [[id number]] Collected:  04/12/18 1232    Order Status:  Completed Specimen:  Blood Updated:  04/16/18 1412     Blood Culture, Routine No Growth to date     Blood Culture, Routine No Growth to date     Blood Culture, Routine No Growth to date     Blood Culture, Routine No Growth to date      Blood Culture, Routine No Growth to date    Blood culture [[id number]] Collected:  04/12/18 1228    Order Status:  Completed Specimen:  Blood Updated:  04/16/18 1412     Blood Culture, Routine No Growth to date     Blood Culture, Routine No Growth to date     Blood Culture, Routine No Growth to date     Blood Culture, Routine No Growth to date     Blood Culture, Routine No Growth to date    Aerobic culture [[id number]]  (Susceptibility) Collected:  04/13/18 0929    Order Status:  Completed Specimen:  Wound from Abdomen Updated:  04/16/18 1111     Aerobic Bacterial Culture --     KLEBSIELLA PNEUMONIAE  Moderate      Narrative:       1. LATERAL ABDOMEN    Gram stain [[id number]] Collected:  04/13/18 0929    Order Status:  Completed Specimen:  Wound from Abdomen Updated:  04/13/18 1432     Gram Stain Result Few WBC's      No organisms seen    Narrative:       1. LATERAL ABDOMEN    Gram stain [[id number]] Collected:  04/13/18 0930    Order Status:  Completed Specimen:  Wound from Abdomen Updated:  04/13/18 1431     Gram Stain Result No WBC's      No organisms seen    Narrative:       2. ABDOMEN    Fungus culture [[id number]] Collected:  04/13/18 0930    Order Status:  Sent Specimen:  Wound from Abdomen Updated:  04/13/18 1053    Fungus culture [[id number]] Collected:  04/13/18 0929    Order Status:  Sent Specimen:  Wound from Abdomen Updated:  04/13/18 1051        Recent Lab Results       04/16/18  0429      Immature Granulocytes 0.3     Immature Grans (Abs) 0.02  Comment:  Mild elevation in immature granulocytes is non specific and   can be seen in a variety of conditions including stress response,   acute inflammation, trauma and pregnancy. Correlation with other   laboratory and clinical findings is essential.       Anion Gap 13     Baso # 0.08     Basophil% 1.3     BUN, Bld 14     Calcium 8.9     Chloride 103     CO2 24     Creatinine 0.7     Differential Method Automated     eGFR if African American >60.0     eGFR if non   >60.0  Comment:  Calculation used to obtain the estimated glomerular filtration  rate (eGFR) is the CKD-EPI equation.        Eos # 0.3     Eosinophil% 4.5     Glucose 75     Gran # (ANC) 3.3     Gran% 52.2     Hematocrit 36.5(L)     Hemoglobin 11.6(L)     Lymph # 2.1     Lymph% 32.9     MCH 29.5     MCHC 31.8(L)     MCV 93     Mono # 0.6     Mono% 8.8     MPV 9.4     nRBC 0     Platelets 327     Potassium 3.6     RBC 3.93(L)     RDW 12.3     Sodium 140     WBC 6.38         All pertinent labs from the last 24 hours have been reviewed.    Significant Diagnostics:  I have reviewed all pertinent imaging results/findings within the past 24 hours.    Assessment/Plan:     Surgical wound infection    48M with possible infection of intrathecal pain pump now s/p washout.    -- Neurostable on exam  -- Cultures growing Klebsiella  -- ID recs: Ciprofloxacin 750 mg PO q 12 hours for minimum 6 weeks given concerns for hardware infection (estimated end date 5/24/18). Patient will need ESR, CRP, CBC and CMP to be drawn weekly with results faxed to the ID Department at  [phone] .  Will schedule follow up in ID clinic.  -- Drains removed without complication  -- Pain control.  -- OK to be up and OOB.  --Continue abdominal binder  --Stable for dc with PO abx            Erin Chakraborty PA-C  Neurosurgery  [facility]

## 2018-04-16 NOTE — ASSESSMENT & PLAN NOTE
48M with possible infection of intrathecal pain pump now s/p washout.    -- Neurostable on exam  -- Cultures growing Klebsiella  -- ID recs: Ciprofloxacin 750 mg PO q 12 hours for minimum 6 weeks given concerns for hardware infection (estimated end date 5/24/18). Patient will need ESR, CRP, CBC and CMP to be drawn weekly with results faxed to the ID Department at  [phone].  Will schedule follow up in ID clinic.  -- Drains removed without complication  -- Pain control.  -- OK to be up and OOB.  --Continue abdominal binder  --Stable for dc with PO abx

## 2018-04-16 NOTE — ASSESSMENT & PLAN NOTE
48-year-old male with history of lumbar postlaminectomy syndrome s/p intrathecal opioid pump placement 4/3/2018. Post op course complicated by surgical site infection with Klebsiella and CT findings demonstrated 3.9 x 8.2 cm fluid collection encasing the reservoir. He underwent pump removal, washout and debridement followed by replacement of same pump 4/12/2018. Per op note, copious amounts of red, fatty fluid was encountered in OR. Surgical cx again grew Klebsiella. He is currently on Ceftriaxone. Afebrile. No leukocytosis. VSS. Symptomatically improved.       Recommendations:  - Recommend Ciprofloxacin 750 mg PO q 12 hours for minimum 6 weeks given concerns for hardware infection (estimated end date 5/24/18). Assuming the pump is the source of infection, antibiotic therapy alone may not be curative of this infection and removal would be indicated.   - Patient will need ESR, CRP, CBC and CMP to be drawn weekly with results faxed to the ID Department at  [phone]  - Continue further management per neurosurgery team  - Will arrange ID follow up within a few weeks of discharge  - ID will sign off.

## 2018-04-16 NOTE — SUBJECTIVE & OBJECTIVE
Interval History: POD #3 washout of intrathecal pump. Surgical cultures are positive for Klebsiella.  He is on Ceftriaxone. Tolerating antibiotic. Afebrile, no leukocytosis.  No complaints. Drain output decreased.      Review of Systems   Constitutional: Negative for chills, diaphoresis and fever.   HENT: Negative for rhinorrhea and sore throat.    Respiratory: Negative for cough and shortness of breath.    Cardiovascular: Negative for chest pain and leg swelling.   Gastrointestinal: Negative for abdominal pain, diarrhea, nausea and vomiting.   Genitourinary: Negative for dysuria and hematuria.   Musculoskeletal: Negative for arthralgias and myalgias.   Skin: Positive for wound. Negative for rash.   Neurological: Negative for headaches.     Objective:     Vital Signs (Most Recent):  Temp: 97.9 °F (36.6 °C) (04/16/18 1124)  Pulse: 76 (04/16/18 1124)  Resp: 18 (04/16/18 1124)  BP: 122/78 (04/16/18 1124)  SpO2: 97 % (04/16/18 1124) Vital Signs (24h Range):  Temp:  [97.8 °F (36.6 °C)-98.9 °F (37.2 °C)] 97.9 °F (36.6 °C)  Pulse:  [60-76] 76  Resp:  [14-18] 18  SpO2:  [94 %-97 %] 97 %  BP: ()/(51-86) 122/78     Weight: (!) 145.2 kg (320 lb 3.2 oz)  Body mass index is 44.66 kg/m².    Estimated Creatinine Clearance: 186.5 mL/min (based on SCr of 0.7 mg/dL).    Physical Exam   Constitutional: He is oriented to person, place, and time. He appears well-developed and well-nourished. No distress.   HENT:   Head: Normocephalic and atraumatic.   Eyes: Conjunctivae are normal. No scleral icterus.   Cardiovascular: Normal rate and regular rhythm.    Pulmonary/Chest: Effort normal. No respiratory distress.   Abdominal: Soft. He exhibits no distension.   RLQ - incisions dressed. Drain with sero-sanguinous fluid.   Musculoskeletal: He exhibits no edema.   Neurological: He is alert and oriented to person, place, and time.   Skin: Skin is warm and dry. No rash noted. He is not diaphoretic. No erythema.   Psychiatric: He has a  normal mood and affect. His behavior is normal.   Vitals reviewed.      Significant Labs:   Blood Culture:     Recent Labs  Lab 04/12/18  1228 04/12/18  1232   LABBLOO No Growth to date  No Growth to date  No Growth to date  No Growth to date No Growth to date  No Growth to date  No Growth to date  No Growth to date     CBC:     Recent Labs  Lab 04/15/18  0405 04/16/18  0429   WBC 7.75 6.38   HGB 10.9* 11.6*   HCT 32.9* 36.5*    327     CMP:     Recent Labs  Lab 04/15/18  0405 04/16/18  0429    140   K 3.7 3.6    103   CO2 29 24   GLU 82 75   BUN 14 14   CREATININE 0.7 0.7   CALCIUM 9.1 8.9   ANIONGAP 7* 13   EGFRNONAA >60.0 >60.0     Wound Culture:     Recent Labs  Lab 04/12/18  1045 04/13/18  0929 04/13/18  0930   LABAERO KLEBSIELLA PNEUMONIAEModerate KLEBSIELLA PNEUMONIAEModerate No growth  KLEBSIELLA PNEUMONIAEModerate       Significant Imaging: None

## 2018-04-16 NOTE — ASSESSMENT & PLAN NOTE
48M with possible infection of intrathecal pain pump now s/p washout.    -- Neurostable on exam  -- Cultures growing Klebsiella  -- ID recs: Ciprofloxacin 750 mg PO q 12 hours for minimum 6 weeks given concerns for hardware infection (estimated end date 5/24/18). Patient will need ESR, CRP, CBC and CMP to be drawn weekly with results faxed to the ID Department at  [phone].  Will schedule follow up in ID clinic.  -- Drains removed without complication  -- Pain control.  -- OK to be up and OOB.  --Continue abdominal binder  --Stable for dc with PO abx

## 2018-04-16 NOTE — DISCHARGE SUMMARY
[facility]  Neurosurgery  Discharge Summary      Patient Name: Jaime Lund  MRN: [id number]  Admission Date: 4/12/2018  Hospital Length of Stay: 4 days  Discharge Date and Time:  04/16/2018 4:14 PM  Attending Physician: Meena Saavedra MD   Discharging Provider: Erin Chakraborty PA-C  Primary Care Provider: Gabby Dean MD    HPI:   Pt is 47 y/o male with intrathecal pain pump placed 4/03 s/p washout 4/13    Procedure(s) (LRB):  WASHOUT of intrathecal pump (N/A)     Hospital Course: 4/14: cultures growing GNR  4/15: NAEON. On abx  4/16: drains removed, ok to dc on PO abx    Consults:   Consults         Status Ordering Provider     Inpatient consult to Infectious Diseases  Once     Provider:  (Not yet assigned)    Completed SARAVANAN HERRERA     Inpatient consult to PICC team ([facility]AS)  Once     Provider:  (Not yet assigned)    Completed MEENA SAAVEDRA          Significant Diagnostic Studies: Labs:   BMP:   Recent Labs  Lab 04/15/18  0405 04/16/18  0429   GLU 82 75    140   K 3.7 3.6    103   CO2 29 24   BUN 14 14   CREATININE 0.7 0.7   CALCIUM 9.1 8.9    and CBC   Recent Labs  Lab 04/15/18  0405 04/16/18  0429   WBC 7.75 6.38   HGB 10.9* 11.6*   HCT 32.9* 36.5*    327       Pending Diagnostic Studies:     None        Final Active Diagnoses:    Diagnosis Date Noted POA    PRINCIPAL PROBLEM:  Surgical wound infection [T81.4XXA] 04/12/2018 Yes      Problems Resolved During this Admission:    Diagnosis Date Noted Date Resolved POA      Discharged Condition: good    Disposition: Home or Self Care    Follow Up: 2 wk wound check. Post op follow up with Dr. Saavedra on 5/14.    Patient Instructions:     Diet Adult Regular     Activity as tolerated     Notify your health care provider if you experience any of the following:  increased confusion or weakness     Notify your health care provider if you experience any of the following:  persistent dizziness, light-headedness, or visual  disturbances     Notify your health care provider if you experience any of the following:  worsening rash     Notify your health care provider if you experience any of the following:  severe persistent headache     Notify your health care provider if you experience any of the following:  difficulty breathing or increased cough     Notify your health care provider if you experience any of the following:  redness, tenderness, or signs of infection (pain, swelling, redness, odor or green/yellow discharge around incision site)     Notify your health care provider if you experience any of the following:  severe uncontrolled pain     Notify your health care provider if you experience any of the following:  persistent nausea and vomiting or diarrhea     Notify your health care provider if you experience any of the following:  temperature >100.4       Medications:  Reconciled Home Medications:      Medication List      START taking these medications    ciprofloxacin HCl 750 MG tablet  Commonly known as:  CIPRO  Take 1 tablet (750 mg total) by mouth every 12 (twelve) hours.     oxyCODONE 10 mg Tab immediate release tablet  Commonly known as:  ROXICODONE  Take 1 tablet (10 mg total) by mouth every 4 (four) hours as needed.        CHANGE how you take these medications    furosemide 40 MG tablet  Commonly known as:  LASIX  TAKE ONE TABLET BY MOUTH ONCE DAILY FOR FLUID  What changed:  See the new instructions.     hydroCHLOROthiazide 25 MG tablet  Commonly known as:  HYDRODIURIL  TAKE ONE TABLET BY MOUTH ONCE DAILY FOR BLOOD PRESSURE AND OR FOR FLUID  What changed:  See the new instructions.     meloxicam 15 MG tablet  Commonly known as:  MOBIC  TAKE ONE TABLET BY MOUTH ONCE DAILY WITH A MEAL FOR INFLAMMATION AND OR PAIN  What changed:  See the new instructions.     potassium chloride 10 MEQ Tbsr  Commonly known as:  KLOR-CON  Take 1 tablet (10 mEq total) by mouth once daily.  What changed:  when to take this     * tiZANidine 2  MG tablet  Commonly known as:  ZANAFLEX  Take 1 tablet (2 mg total) by mouth 3 (three) times daily.  What changed:  · when to take this  · reasons to take this     * tiZANidine 6 mg capsule  Commonly known as:  ZANAFLEX  Take 1 capsule (6 mg total) by mouth 3 (three) times daily.  What changed:  Another medication with the same name was changed. Make sure you understand how and when to take each.        * This list has 2 medication(s) that are the same as other medications prescribed for you. Read the directions carefully, and ask your doctor or other care provider to review them with you.            CONTINUE taking these medications    amitriptyline 50 MG tablet  Commonly known as:  ELAVIL  Take 50 mg by mouth nightly.     butalbital-acetaminophen-caffeine -40 mg -40 mg per tablet  Commonly known as:  FIORICET, ESGIC  Take 1 tablet by mouth every 4 (four) hours as needed.     DULoxetine 60 MG capsule  Commonly known as:  CYMBALTA  TAKE 1 CAPSULE BY MOUTH EVERY EVENING     gabapentin 400 MG capsule  Commonly known as:  NEURONTIN  Take 400 mg by mouth 3 (three) times daily.     methocarbamol 750 MG Tab  Commonly known as:  ROBAXIN  Take 750 mg by mouth 2 (two) times daily.     * oxyCODONE-acetaminophen  mg per tablet  Commonly known as:  PERCOCET  Take 1 tablet by mouth every 6 (six) hours as needed (15 MG Percocet).     * oxyCODONE-acetaminophen  mg per tablet  Commonly known as:  PERCOCET  Take 1 tablet by mouth every 4 (four) hours as needed for Pain (.).     propranolol 10 MG tablet  Commonly known as:  INDERAL  Take 1 tablet (10 mg total) by mouth 3 (three) times daily.     ranitidine 150 MG capsule  Commonly known as:  ZANTAC  Take 150 mg by mouth 2 (two) times daily.     tamsulosin 0.4 mg Cp24  Commonly known as:  FLOMAX  TAKE 1 CAPSULE BY MOUTH ONCE DAILY FOR PROSTATE, morning        * This list has 2 medication(s) that are the same as other medications prescribed for you. Read the  directions carefully, and ask your doctor or other care provider to review them with you.                Erin Chakraborty PA-C  Neurosurgery  [facility]

## 2018-04-16 NOTE — PLAN OF CARE
SW following for DC needs. SW in communication with CM.    Patient will likely need IVAB. Awaiting final ID recs.     Stephie Cooper LMSW  [id number]

## 2018-04-16 NOTE — PLAN OF CARE
Problem: Patient Care Overview  Goal: Plan of Care Review  Outcome: Ongoing (interventions implemented as appropriate)  Reviewed plan of care with patient. Patient had not been taking home medication meloxicam since admission and suspected that was causing increase in pain - order obtained for this and for sleeping pill as needed. Patient VSS throughout evening, IV fluids at 100ml/hr, no acute events overnight, see flowsheets for detailed assessment information, will continue to monitor.

## 2018-04-16 NOTE — PLAN OF CARE
CM met with patient and S/O this am to obtain discharge planning assessment. Patient is POD 3 for washout of intrathecal pump. Planned discharge is home with family - Plan (A) or home with family and home health - Plan (B).    PCP:  Gabby Dean MD     Payor:  Payor: [facility] SHIELD / Plan: [place]MO / Product Type: HMO /      Pharmacy:    [facility] [address]  [facility] [address]  Phone: [phone] Fax: [phone]       04/16/18 1255   Discharge Assessment   Assessment Type Discharge Planning Assessment   Confirmed/corrected address and phone number on facesheet? Yes   Assessment information obtained from? Patient   Communicated expected length of stay with patient/caregiver no   Prior to hospitilization cognitive status: Alert/Oriented   Prior to hospitalization functional status: Independent   Current cognitive status: Alert/Oriented   Current Functional Status: Independent   Lives With significant other   Able to Return to Prior Arrangements yes   Is patient able to care for self after discharge? Yes   Who are your caregiver(s) and their phone number(s)? Claudia Gandara - S/O [phone]   Patient's perception of discharge disposition home or selfcare   Readmission Within The Last 30 Days no previous admission in last 30 days   Patient currently being followed by outpatient case management? No   Patient currently receives any other outside agency services? No   Equipment Currently Used at Home none   Do you have any problems affording any of your prescribed medications? No   Is the patient taking medications as prescribed? yes   Does the patient have transportation home? Yes   Transportation Available family or friend will provide   Does the patient receive services at the [facility]? No   Discharge Plan A Home with family   Discharge Plan B Home with family;Home Health   Patient/Family In Agreement With Plan yes

## 2018-04-16 NOTE — DISCHARGE INSTRUCTIONS
Please follow ONLY the instructions that are checked below.    Activity Restrictions:  [x]  Return to work will be determined on an individual basis.  [x]  No lifting greater than 10 pounds.  [x]  Avoid bending and twisting the area of your surgery more than 45 degrees from neutral position in any direction.  [x]  No driving or operating machinery:  []  until cleared by your surgeon.  [x]  while taking narcotic pain medications or muscle relaxants.  []  No cervical collar, soft collar, or lumbar brace required.  [x]  Wear your abdominal binder at all times  []  Wear your brace at all times except when flat in bed.  []  Wear brace for comfort only.  [x]  Increase ambulation over the next 2 weeks   [x]  Walk on paved surfaces only. It is okay to walk up and down stairs while holding onto a side rail.      Discharge Medication/Follow-up:  [x]  Please refer to discharge medication reconciliation form.  [x]  Do not take ANY non-steroidal anti-inflammatory drugs (NSAIDS), including the following: ibuprofen, naprosyn, Aleve, Advil, Indocin, Mobic, or Celebrex for:  [x]  4 weeks  []  8 weeks  []  6 months  [x]  Prescriptions for appropriate medication will be given upon discharge.  [x]  Take docusate (Colace 100 mg): take one capsule a day as needed for constipation. You can get this over the counter.  [x]  Follow-up appointment:  [x]  10-14 days post-op for wound check by physician assistant/nurse  [x]  4-6 weeks with Md: 5/14 with Dr. Saavedra  []  with x-rays  []  without x-rays  [x]  An appointment will be mailed to you.  [x] Follow up appointment will be scheduled with Infectious Disease. Please see PCP to have ESR, CRP, CBC and CMP to be drawn weekly with results faxed to the ID Department at  [phone]    Wound Care:  []  Remove dressing or bandaid in    days.  [x]  No bandage required. Keep your incision open to the air.  [x]  You may shower on the 2nd day after your surgery. Have the force of water hit you opposite  from the incision. Pat the incision dry after your shower; do not scrub the incision.  [x]  You cannot take a bath until 8 weeks after surgery.  [x]  Apply bacitracin to incision twice a day    Call your doctor or go to the Emergency Room for any signs of infection, including: increased redness, drainage, pain, or fever (temperature ?101.5 for 24 hours). Call your doctor or go to the Emergency Room if there are any localized neurological changes; problems with speech, vision, numbness, tingling, weakness, or severe headache; or for other concerns.    Special Instructions:  [x]  No use of tobacco products.  [x]  Diet: Please eat a regular diet as tolerated.  []  Other diet:              Specific physician instructions:           Physicians need 3 days' notice for pain medicine to be refilled. Pain medicine will only be refilled between 8 AM and 5 PM, Monday through Friday, due to Food and Drug Administration regulation of documentation.    If you have any questions about this form, please call [phone].    Form No. 87824 (Revised 10/31/2013)

## 2018-04-16 NOTE — PROGRESS NOTES
[facility]  Infectious Disease  Progress Note    Patient Name: Jaime Lund  MRN: [id number]  Admission Date: 4/12/2018  Length of Stay: 4 days  Attending Physician: Meena Saavedra MD  Primary Care Provider: Gabby Dean MD    Isolation Status: No active isolations  Assessment/Plan:      Surgical wound infection    48-year-old male with history of lumbar postlaminectomy syndrome s/p intrathecal opioid pump placement 4/3/2018. Post op course complicated by surgical site infection with Klebsiella and CT findings demonstrated 3.9 x 8.2 cm fluid collection encasing the reservoir. He underwent pump removal, washout and debridement followed by replacement of same pump 4/12/2018. Per op note, copious amounts of red, fatty fluid was encountered in OR. Surgical cx again are positive for Klebsiella and now Enterobacter. He is currently on Ceftriaxone. Afebrile. No leukocytosis. VSS. Symptomatically improved.       Recommendations:  - Recommend Ciprofloxacin 750 mg PO q 12 hours for minimum 6 weeks given concerns for hardware infection (estimated end date 5/24/18). Assuming the pump is the source of infection, antibiotic therapy alone may not be curative of this infection and removal would be indicated.   - Patient will need ESR, CRP, CBC and CMP to be drawn weekly with results faxed to the ID Department at  [phone]  - Continue further management per neurosurgery team  - Will arrange ID follow up within a few weeks of discharge  - ID will sign off. Please re-consult ID for further recommendations if Enterobacter is not susceptible to Ciprofloxacin.              Please call for any questions. Thank you.  Larissa Moore PA-C  Phone: [phone]  Pager: [phone]    Subjective:     Principal Problem:<principal problem not specified>    HPI: 48-year-old male with history of morbid obesity, HTN, seizure, peripheral artery disease, chronic pain, lumbar postlaminectomy syndrome, s/p intrathecal opioid pump  placement 4/3/2018 presents for surgical site infection. About a week after surgery, he had drainage from surgical site as well as swelling over the pump site.  He reports an episode of drenching sweats.  Drainage from incision growing Klebsiella. On 4/13/2018, he was taken to the OR for washout of the pump site with immediate reimplantation of the pump. Per OP note - the side wound, frontal abdominal wound, and back wound were opened - red serous fluid was drained and cultures - now growing GNR. Pump was removed, surgical site debrided and washed out. The pump was replaced and secured to the abdominal fascia. A Hemovac drain was placed over the wound to prevent recurrence of the previous seroma. Patient was placed on vanc/ceftriaxone.         Interval History: POD #3 washout of intrathecal pump. Surgical cultures are positive for Klebsiella.  He is on Ceftriaxone. Tolerating antibiotic. Afebrile, no leukocytosis.  No complaints. Drain output decreased.      Review of Systems   Constitutional: Negative for chills, diaphoresis and fever.   HENT: Negative for rhinorrhea and sore throat.    Respiratory: Negative for cough and shortness of breath.    Cardiovascular: Negative for chest pain and leg swelling.   Gastrointestinal: Negative for abdominal pain, diarrhea, nausea and vomiting.   Genitourinary: Negative for dysuria and hematuria.   Musculoskeletal: Negative for arthralgias and myalgias.   Skin: Positive for wound. Negative for rash.   Neurological: Negative for headaches.     Objective:     Vital Signs (Most Recent):  Temp: 97.9 °F (36.6 °C) (04/16/18 1124)  Pulse: 76 (04/16/18 1124)  Resp: 18 (04/16/18 1124)  BP: 122/78 (04/16/18 1124)  SpO2: 97 % (04/16/18 1124) Vital Signs (24h Range):  Temp:  [97.8 °F (36.6 °C)-98.9 °F (37.2 °C)] 97.9 °F (36.6 °C)  Pulse:  [60-76] 76  Resp:  [14-18] 18  SpO2:  [94 %-97 %] 97 %  BP: ()/(51-86) 122/78     Weight: (!) 145.2 kg (320 lb 3.2 oz)  Body mass index is 44.66  kg/m².    Estimated Creatinine Clearance: 186.5 mL/min (based on SCr of 0.7 mg/dL).    Physical Exam   Constitutional: He is oriented to person, place, and time. He appears well-developed and well-nourished. No distress.   HENT:   Head: Normocephalic and atraumatic.   Eyes: Conjunctivae are normal. No scleral icterus.   Cardiovascular: Normal rate and regular rhythm.    Pulmonary/Chest: Effort normal. No respiratory distress.   Abdominal: Soft. He exhibits no distension.   RLQ - incisions dressed. Drain with sero-sanguinous fluid.   Musculoskeletal: He exhibits no edema.   Neurological: He is alert and oriented to person, place, and time.   Skin: Skin is warm and dry. No rash noted. He is not diaphoretic. No erythema.   Psychiatric: He has a normal mood and affect. His behavior is normal.   Vitals reviewed.      Significant Labs:   Blood Culture:     Recent Labs  Lab 04/12/18  1228 04/12/18  1232   LABBLOO No Growth to date  No Growth to date  No Growth to date  No Growth to date No Growth to date  No Growth to date  No Growth to date  No Growth to date     CBC:     Recent Labs  Lab 04/15/18  0405 04/16/18  0429   WBC 7.75 6.38   HGB 10.9* 11.6*   HCT 32.9* 36.5*    327     CMP:     Recent Labs  Lab 04/15/18  0405 04/16/18  0429    140   K 3.7 3.6    103   CO2 29 24   GLU 82 75   BUN 14 14   CREATININE 0.7 0.7   CALCIUM 9.1 8.9   ANIONGAP 7* 13   EGFRNONAA >60.0 >60.0     Wound Culture:     Recent Labs  Lab 04/12/18  1045 04/13/18  0929 04/13/18  0930   LABAERO KLEBSIELLA PNEUMONIAEModerate KLEBSIELLA PNEUMONIAEModerate No growth  KLEBSIELLA PNEUMONIAEModerate       Significant Imaging: None

## 2018-04-17 ENCOUNTER — TELEPHONE (OUTPATIENT)
Dept: NEUROSURGERY | Facility: CLINIC | Age: 48
End: 2018-04-17

## 2018-04-17 ENCOUNTER — TELEPHONE (OUTPATIENT)
Dept: SPINE | Facility: CLINIC | Age: 48
End: 2018-04-17

## 2018-04-17 LAB
BACTERIA BLD CULT: NORMAL
BACTERIA BLD CULT: NORMAL
BACTERIA SPEC AEROBE CULT: NORMAL
BACTERIA SPEC AEROBE CULT: NORMAL
BACTERIA SPEC ANAEROBE CULT: NORMAL

## 2018-04-17 NOTE — PHYSICIAN QUERY
"PT Name: Jaime Lund  MR #: [id number]    Physician Query Form - Procedure Clarification     CDS/: Salena Newberry RN               Contact information:[email or web address]  This form is a permanent document in the medical record.     Query Date: April 17, 2018  By submitting this query, we are merely seeking further clarification of documentation. Please utilize your independent clinical judgment when addressing the question(s) below.    The Medical record contains the following:     Indicators       Supporting Clinical Findings   Location in Medical Record   x Documentation of "Debridement"   Attention was put to debridement of all wounds. A #10 blade was used for debridement. 3 litters of bacitracin pulsovacc irrigation were used to wash out the wounds. Bipolars were used to create hemostasis. spent an additional 1 hour in this complex wound wash out due to the morbid obesity of the patient (debridement of fat necrosis and placement of pump within the abdominal wall pocket). OP note 4/13    Documentation of "I & D"     x EBL =  cc OP note 4/13   x Other: Surgical wound infection   Procedure(s) (LRB): washout of intrathecal pump.  OP note 4/13     Excisional debridement is a surgical removal of  nonvitalized tissue, necrosis or slough. The use of a sharp instrument does not always indicate that an excisional debridement was performed.  Non excisional debridement is the scraping away or removal of loose tissue fragments.    Provider, please specify type of procedure(s) performed:    [ xx ] Excisional Debridement (Specify site, type, depth of tissue removal ,instrument, size of wound & EBL)   * Site: (Specify)_______back and abdomen fat tissue______________________________   * Depth of tissue excised:    [ xxx ] Skin/Subcutaneous [ ] Soft tissue [ ] Fascia [ ] Muscle [ ] Bone      [  ] Non Excisional Debridement   * Depth of tissue debrided:    [  ] Skin/Subcutaneous [ ] Soft tissue [ ] " Fascia [ ] Muscle [ ] Bone        [  ] Other Procedure (Specify) ________________________________    [  ] Clinically Undetermined

## 2018-04-17 NOTE — PLAN OF CARE
Patient discharged home. The patient does not have any home needs. Family provided transportation home. Neurosurgery clinic to schedule follow up appointment.    Future Appointments  Date Time Provider Department Center   4/19/2018 11:40 AM Carly Hunt RN [facility] NEUROS7 [facility]   5/1/2018 2:00 PM REX Gallegos Jr. [facility] ID [facility]   5/14/2018 3:30 PM Meena Saavedra MD [address]   6/20/2018 11:00 AM Jt Holly MD [facility] NEURO [facility]        04/17/18 0845   Final Note   Assessment Type Final Discharge Note   Discharge Disposition Home   Hospital Follow Up  Appt(s) scheduled? (Neurosurgery clinic to [facility] follow up appointment.)   Discharge plans and expectations educations in teach back method with documentation complete? Yes

## 2018-04-17 NOTE — TELEPHONE ENCOUNTER
----- Message from Beba Cardoza MA sent at 4/17/2018  8:37 AM CDT -----  Contact: Wife  Would you lease call her to see what's the problem with the medications.    Thanks      ----- Message -----  From: Tash Santiago  Sent: 4/17/2018   8:12 AM  To: Keri Robledo Staff    Wife is calling to speak with Staff regarding medication issues.  She was notified by the Pharmacy there is a drug interaction between two medications and was advised to speak with Staff and needs to know what to do?    She can be reached at [phone].    Thank you.

## 2018-04-17 NOTE — TELEPHONE ENCOUNTER
merlin said there is an interaction b/t the zanaflex and cipro. I advised that the patient is already on 1 muscle relaxer robaxin so to stop the zanaflex and continue cipro as that is the most important medication to treat the infection

## 2018-04-17 NOTE — TELEPHONE ENCOUNTER
----- Message from Erin Chakraborty PA-C sent at 4/16/2018  4:11 PM CDT -----  Can we schedule this pt for 2 wk wound check s/p washout?     Thanks,  Erin

## 2018-04-18 ENCOUNTER — PATIENT OUTREACH (OUTPATIENT)
Dept: ADMINISTRATIVE | Facility: CLINIC | Age: 48
End: 2018-04-18

## 2018-04-18 LAB
BACTERIA SPEC AEROBE CULT: NORMAL
BACTERIA SPEC AEROBE CULT: NORMAL

## 2018-04-18 NOTE — PROGRESS NOTES
C3 nurse attempted to contact patient. No answer. The following message was left for the patient to return the call:  Good afternoon I am a nurse calling on behalf of [facility] from the Care Coordination Center.  This is a Transitional Care Call for [facility]. When you have a moment please contact us at [phone] or [phone] Monday through Friday, between the hours of 8 am to 4 pm. We look forward to speaking with you. On behalf of [facility] have a nice day.    The patient does not have a scheduled HOSFU appointment within 7-14 days post hospital discharge date 04/16/18. Message sent to Physician staff to assist with HOSFU appointment scheduling.

## 2018-04-19 ENCOUNTER — LAB VISIT (OUTPATIENT)
Dept: LAB | Facility: HOSPITAL | Age: 48
End: 2018-04-19
Payer: COMMERCIAL

## 2018-04-19 ENCOUNTER — OFFICE VISIT (OUTPATIENT)
Dept: FAMILY MEDICINE | Facility: CLINIC | Age: 48
End: 2018-04-19
Payer: COMMERCIAL

## 2018-04-19 VITALS
HEART RATE: 78 BPM | SYSTOLIC BLOOD PRESSURE: 138 MMHG | TEMPERATURE: 98 F | OXYGEN SATURATION: 97 % | WEIGHT: 313.25 LBS | BODY MASS INDEX: 43.85 KG/M2 | DIASTOLIC BLOOD PRESSURE: 90 MMHG | HEIGHT: 71 IN

## 2018-04-19 DIAGNOSIS — T14.8XXD DELAYED WOUND HEALING: ICD-10-CM

## 2018-04-19 LAB
ALBUMIN SERPL BCP-MCNC: 3.5 G/DL
ALP SERPL-CCNC: 78 U/L
ALT SERPL W/O P-5'-P-CCNC: 15 U/L
ANION GAP SERPL CALC-SCNC: 6 MMOL/L
AST SERPL-CCNC: 18 U/L
BASOPHILS # BLD AUTO: 0.07 K/UL
BASOPHILS NFR BLD: 1.1 %
BILIRUB SERPL-MCNC: 0.4 MG/DL
BUN SERPL-MCNC: 10 MG/DL
CALCIUM SERPL-MCNC: 9.9 MG/DL
CHLORIDE SERPL-SCNC: 101 MMOL/L
CO2 SERPL-SCNC: 33 MMOL/L
CREAT SERPL-MCNC: 0.8 MG/DL
CRP SERPL-MCNC: 10.5 MG/L
DIFFERENTIAL METHOD: ABNORMAL
EOSINOPHIL # BLD AUTO: 0.3 K/UL
EOSINOPHIL NFR BLD: 4.3 %
ERYTHROCYTE [DISTWIDTH] IN BLOOD BY AUTOMATED COUNT: 12.5 %
ERYTHROCYTE [SEDIMENTATION RATE] IN BLOOD BY WESTERGREN METHOD: 52 MM/HR
EST. GFR  (AFRICAN AMERICAN): >60 ML/MIN/1.73 M^2
EST. GFR  (NON AFRICAN AMERICAN): >60 ML/MIN/1.73 M^2
GLUCOSE SERPL-MCNC: 76 MG/DL
HCT VFR BLD AUTO: 37.2 %
HGB BLD-MCNC: 12.3 G/DL
LYMPHOCYTES # BLD AUTO: 1.6 K/UL
LYMPHOCYTES NFR BLD: 25.5 %
MCH RBC QN AUTO: 29.4 PG
MCHC RBC AUTO-ENTMCNC: 33.1 G/DL
MCV RBC AUTO: 89 FL
MONOCYTES # BLD AUTO: 0.6 K/UL
MONOCYTES NFR BLD: 10.2 %
NEUTROPHILS # BLD AUTO: 3.7 K/UL
NEUTROPHILS NFR BLD: 58.7 %
PLATELET # BLD AUTO: 451 K/UL
PMV BLD AUTO: 9.2 FL
POTASSIUM SERPL-SCNC: 4.1 MMOL/L
PROT SERPL-MCNC: 7.5 G/DL
RBC # BLD AUTO: 4.19 M/UL
SODIUM SERPL-SCNC: 140 MMOL/L
WBC # BLD AUTO: 6.28 K/UL

## 2018-04-19 PROCEDURE — 86140 C-REACTIVE PROTEIN: CPT

## 2018-04-19 PROCEDURE — 3080F DIAST BP >= 90 MM HG: CPT | Mod: CPTII,S$GLB,, | Performed by: INTERNAL MEDICINE

## 2018-04-19 PROCEDURE — 85025 COMPLETE CBC W/AUTO DIFF WBC: CPT

## 2018-04-19 PROCEDURE — 99215 OFFICE O/P EST HI 40 MIN: CPT | Mod: S$GLB,,, | Performed by: INTERNAL MEDICINE

## 2018-04-19 PROCEDURE — 85652 RBC SED RATE AUTOMATED: CPT

## 2018-04-19 PROCEDURE — 36415 COLL VENOUS BLD VENIPUNCTURE: CPT | Mod: PO

## 2018-04-19 PROCEDURE — 99999 PR PBB SHADOW E&M-EST. PATIENT-LVL III: CPT | Mod: PBBFAC,,, | Performed by: INTERNAL MEDICINE

## 2018-04-19 PROCEDURE — 80074 ACUTE HEPATITIS PANEL: CPT

## 2018-04-19 PROCEDURE — 80053 COMPREHEN METABOLIC PANEL: CPT

## 2018-04-19 PROCEDURE — 3075F SYST BP GE 130 - 139MM HG: CPT | Mod: CPTII,S$GLB,, | Performed by: INTERNAL MEDICINE

## 2018-04-19 RX ORDER — OXYCODONE HYDROCHLORIDE 15 MG/1
TABLET ORAL
COMMUNITY
Start: 2018-03-26 | End: 2018-10-04 | Stop reason: SDUPTHER

## 2018-04-19 RX ORDER — GABAPENTIN 400 MG/1
400 CAPSULE ORAL 3 TIMES DAILY
COMMUNITY
Start: 2017-10-10

## 2018-04-19 NOTE — PROGRESS NOTES
SUBJECTIVE     Chief Complaint   Patient presents with    Hospital Follow Up    Back Pain     lower back       HPI  Jaime Lund is a 48 y.o. male with multiple medical diagnoses as listed in the medical history and problem list that presents for follow-up after recent hospital discharge for post surgical infection. Pt had a pain pump placed and returned to the hospital 2/2 pain at surgical site. He was found to have a surgical wound infection with ID consulted. Pt was discharged with Cipro for which he has been fully compliant with meds. He denies any adverse side effects. Denies any fever, chills, or night sweats. Pt has some mild erythema at surgical site, but denies any further pain, drainage, or increased warmth to touch. Pt still reports chronic back pain for which he is taking Oxycodone with continued management per Pain Medicine.    PAST MEDICAL HISTORY:  Past Medical History:   Diagnosis Date    Back pain, chronic     Bulging discs     Chronic pain following surgery or procedure     Degenerative disc disease     Hypertension     Hypokalemia     PAD (peripheral artery disease)     Post laminectomy syndrome     Seizures     Severe sepsis     Short-term memory loss     Thyroid disease     Subclinical hyperthyroidism       PAST SURGICAL HISTORY:  Past Surgical History:   Procedure Laterality Date    CHOLECYSTECTOMY      GASTRIC BYPASS      SLEEVE    gastric sleeve  2011    HERNIA REPAIR      pain pump      SKIN SURGERY      EXCESS SKIN REMOVAL    SPINE SURGERY      lumbar fusion       SOCIAL HISTORY:  Social History     Social History    Marital status: Single     Spouse name: N/A    Number of children: N/A    Years of education: N/A     Occupational History    Not on file.     Social History Main Topics    Smoking status: Never Smoker    Smokeless tobacco: Never Used    Alcohol use No    Drug use: No    Sexual activity: Not on file     Other Topics Concern    Not on file      Social History Narrative    No narrative on file       FAMILY HISTORY:  Family History   Problem Relation Age of Onset    Heart disease Mother     Breast cancer Mother     Arthritis Mother     Hypertension Mother     Throat cancer Father     Hypertension Father        ALLERGIES AND MEDICATIONS: updated and reviewed.  Review of patient's allergies indicates:   Allergen Reactions    Klonopin [clonazepam] Anxiety and Other (See Comments)     Becomes agitated    Valium [diazepam] Other (See Comments)     Becomes agitated    Xanax [alprazolam] Anxiety and Other (See Comments)     Becomes agitated     Current Outpatient Prescriptions   Medication Sig Dispense Refill    amitriptyline (ELAVIL) 50 MG tablet Take 50 mg by mouth nightly.       butalbital-acetaminophen-caffeine -40 mg (FIORICET, ESGIC) -40 mg per tablet Take 1 tablet by mouth every 4 (four) hours as needed. 60 tablet 11    ciprofloxacin HCl (CIPRO) 750 MG tablet Take 1 tablet (750 mg total) by mouth every 12 (twelve) hours. 84 tablet 0    DULoxetine (CYMBALTA) 60 MG capsule TAKE 1 CAPSULE BY MOUTH EVERY EVENING 90 capsule 1    furosemide (LASIX) 40 MG tablet TAKE ONE TABLET BY MOUTH ONCE DAILY FOR FLUID (Patient taking differently: TAKE ONE TABLET BY MOUTH ONCE DAILY FOR FLUID, morning) 30 tablet 2    gabapentin (NEURONTIN) 400 MG capsule       hydroCHLOROthiazide (HYDRODIURIL) 25 MG tablet TAKE ONE TABLET BY MOUTH ONCE DAILY FOR BLOOD PRESSURE AND OR FOR FLUID (Patient taking differently: TAKE ONE TABLET BY MOUTH ONCE DAILY FOR BLOOD PRESSURE AND OR FOR FLUID, morning) 30 tablet 5    meloxicam (MOBIC) 15 MG tablet TAKE ONE TABLET BY MOUTH ONCE DAILY WITH A MEAL FOR INFLAMMATION AND OR PAIN (Patient taking differently: TAKE ONE TABLET BY MOUTH ONCE DAILY WITH A MEAL FOR INFLAMMATION AND OR PAIN, bedtime) 60 tablet 0    methocarbamol (ROBAXIN) 750 MG Tab Take 750 mg by mouth 2 (two) times daily.       oxyCODONE (ROXICODONE) 15  "MG Tab       oxyCODONE-acetaminophen (PERCOCET)  mg per tablet Take 1 tablet by mouth every 4 (four) hours as needed for Pain (.). 30 tablet 0    propranolol (INDERAL) 10 MG tablet Take 1 tablet (10 mg total) by mouth 3 (three) times daily. 90 tablet 11    ranitidine (ZANTAC) 150 MG capsule Take 150 mg by mouth 2 (two) times daily.      tamsulosin (FLOMAX) 0.4 mg Cp24 TAKE 1 CAPSULE BY MOUTH ONCE DAILY FOR PROSTATE, morning  6    tiZANidine (ZANAFLEX) 2 MG tablet Take 1 tablet (2 mg total) by mouth 3 (three) times daily. (Patient taking differently: Take 2 mg by mouth 3 (three) times daily as needed. ) 90 tablet 3    tiZANidine (ZANAFLEX) 6 mg capsule Take 1 capsule (6 mg total) by mouth 3 (three) times daily. 30 capsule 0     No current facility-administered medications for this visit.        ROS  Review of Systems   Constitutional: Negative for chills and fever.   HENT: Negative for hearing loss and sore throat.    Eyes: Negative for visual disturbance.   Respiratory: Negative for cough and shortness of breath.    Cardiovascular: Negative for chest pain, palpitations and leg swelling.   Gastrointestinal: Negative for abdominal pain, constipation, diarrhea, nausea and vomiting.   Genitourinary: Negative for dysuria, frequency and urgency.   Musculoskeletal: Positive for back pain. Negative for arthralgias, joint swelling and myalgias.   Skin: Positive for wound (abdominal wound healing). Negative for rash.   Neurological: Negative for headaches.   Psychiatric/Behavioral: Negative for agitation and confusion. The patient is not nervous/anxious.          OBJECTIVE     Physical Exam  Vitals:    04/19/18 1022   BP: (!) 138/90   Pulse: 78   Temp: 98.2 °F (36.8 °C)    Body mass index is 43.69 kg/m².  Weight: (!) 142.1 kg (313 lb 4.4 oz)   Height: 5' 11" (180.3 cm)     Physical Exam   Constitutional: He is oriented to person, place, and time. He appears well-developed and well-nourished. No distress.   HENT: "   Head: Normocephalic and atraumatic.   Right Ear: External ear normal.   Left Ear: External ear normal.   Nose: Nose normal.   Mouth/Throat: Oropharynx is clear and moist.   Eyes: Conjunctivae and EOM are normal. Right eye exhibits no discharge. Left eye exhibits no discharge. No scleral icterus.   Neck: Normal range of motion. Neck supple. No JVD present. No tracheal deviation present.   Pulmonary/Chest: Effort normal. No respiratory distress.   Musculoskeletal: Normal range of motion. He exhibits no edema, tenderness or deformity.   Neurological: He is alert and oriented to person, place, and time. He exhibits normal muscle tone. Coordination normal.   Skin: Skin is warm and dry. No rash noted. There is erythema (mild erythema to R flank surgical incision; no drainage or increased warmth to touch).   Sutures in place at surgical incision site   Psychiatric: He has a normal mood and affect. His behavior is normal. Judgment and thought content normal.         Health Maintenance       Date Due Completion Date    Influenza Vaccine 04/26/2018 (Originally 8/1/2017) ---    TETANUS VACCINE 11/30/2018 (Originally 3/8/1987) ---    Lipid Panel 02/01/2022 2/1/2017            ASSESSMENT     48 y.o. male with     1. Postoperative wound infection, initial encounter    2. Delayed wound healing        PLAN:     1. Postoperative wound infection, initial encounter  - Improved  - Continue Abx as previously ordered and take to completion with labs once weekly per ID recs  - Seek medical attention for any worsening erythema with red streaking, edema, drainage, pain/tenderness, fever, chills, or night sweats  - Continue management per ID and Neurosurg and Pain Medicine  - Independently reviewed all recent cultures/imaging(CT scan and xray)  - Sedimentation rate, manual; Standing  - C-reactive protein; Standing  - CBC auto differential; Standing  - Comprehensive metabolic panel; Standing    2. Delayed wound healing  - Hepatitis panel,  acute; Future        RTC in 3 months     Transitional Care Note    Family and/or Caretaker present at visit?  Yes.  Diagnostic tests reviewed/disposition: I have reviewed all completed as well as pending diagnostic tests at the time of discharge; pt aware of cultures that are still pending  Disease/illness education: Given as above at today's visit  Home health/community services discussion/referrals: Patient does not have home health established from hospital visit.  They do not need home health.  If needed, we will set up home health for the patient.   Establishment or re-establishment of referral orders for community resources: No other necessary community resources.   Discussion with other health care providers: No discussion with other health care providers necessary.         Gabby Dean MD  04/19/2018 10:45 AM        No Follow-up on file.

## 2018-04-20 LAB
BACTERIA SPEC ANAEROBE CULT: NORMAL
HAV IGM SERPL QL IA: NEGATIVE
HBV CORE IGM SERPL QL IA: NEGATIVE
HBV SURFACE AG SERPL QL IA: NEGATIVE
HCV AB SERPL QL IA: NEGATIVE

## 2018-04-23 ENCOUNTER — TELEPHONE (OUTPATIENT)
Dept: FAMILY MEDICINE | Facility: CLINIC | Age: 48
End: 2018-04-23

## 2018-04-23 NOTE — TELEPHONE ENCOUNTER
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 Please call at [phone]

## 2018-04-24 ENCOUNTER — TELEPHONE (OUTPATIENT)
Dept: INFECTIOUS DISEASES | Facility: CLINIC | Age: 48
End: 2018-04-24

## 2018-04-24 NOTE — TELEPHONE ENCOUNTER
I spoke with the patient's wife and discussed Mr. Mandujano pain pump situation.  They had been to pain management yesterday and pump was not refilled out of concern for introducing bacterium into the pump then intrathecally (person discussion with Salena Garcia PA-C at Dr. [place] office).  Wife also concerned so was not refilled. Wife reports patient feeling better.  I communicated that this is a complicated situation and I will have patient follow up with ID staff for further management.  We will notify her of appt change.    Joel Ruiz PA-C [facility]

## 2018-04-25 ENCOUNTER — TELEPHONE (OUTPATIENT)
Dept: NEUROSURGERY | Facility: CLINIC | Age: 48
End: 2018-04-25

## 2018-04-25 NOTE — TELEPHONE ENCOUNTER
----- Message from Esa Jade sent at 4/25/2018 10:36 AM CDT -----  Contact: Claudia (wife) @ [phone]  Claudia is asking to reschedule appt on 04/26 @ 2:20 PM to an earlier time that day. Pls call.

## 2018-04-26 ENCOUNTER — LAB VISIT (OUTPATIENT)
Dept: LAB | Facility: HOSPITAL | Age: 48
End: 2018-04-26
Attending: FAMILY MEDICINE
Payer: COMMERCIAL

## 2018-04-26 ENCOUNTER — CLINICAL SUPPORT (OUTPATIENT)
Dept: NEUROSURGERY | Facility: CLINIC | Age: 48
End: 2018-04-26
Payer: COMMERCIAL

## 2018-04-26 LAB
ALBUMIN SERPL BCP-MCNC: 3.9 G/DL
ALP SERPL-CCNC: 75 U/L
ALT SERPL W/O P-5'-P-CCNC: 17 U/L
ANION GAP SERPL CALC-SCNC: 8 MMOL/L
AST SERPL-CCNC: 22 U/L
BASOPHILS # BLD AUTO: 0.07 K/UL
BASOPHILS NFR BLD: 1.2 %
BILIRUB SERPL-MCNC: 0.4 MG/DL
BUN SERPL-MCNC: 13 MG/DL
CALCIUM SERPL-MCNC: 9.9 MG/DL
CHLORIDE SERPL-SCNC: 99 MMOL/L
CO2 SERPL-SCNC: 31 MMOL/L
CREAT SERPL-MCNC: 0.9 MG/DL
CRP SERPL-MCNC: 11.5 MG/L
DIFFERENTIAL METHOD: ABNORMAL
EOSINOPHIL # BLD AUTO: 0.2 K/UL
EOSINOPHIL NFR BLD: 3.8 %
ERYTHROCYTE [DISTWIDTH] IN BLOOD BY AUTOMATED COUNT: 12.8 %
ERYTHROCYTE [SEDIMENTATION RATE] IN BLOOD BY WESTERGREN METHOD: 1 MM/HR
EST. GFR  (AFRICAN AMERICAN): >60 ML/MIN/1.73 M^2
EST. GFR  (NON AFRICAN AMERICAN): >60 ML/MIN/1.73 M^2
GLUCOSE SERPL-MCNC: 66 MG/DL
HCT VFR BLD AUTO: 38.6 %
HGB BLD-MCNC: 12.8 G/DL
LYMPHOCYTES # BLD AUTO: 1.7 K/UL
LYMPHOCYTES NFR BLD: 28.9 %
MCH RBC QN AUTO: 29.8 PG
MCHC RBC AUTO-ENTMCNC: 33.2 G/DL
MCV RBC AUTO: 90 FL
MONOCYTES # BLD AUTO: 0.5 K/UL
MONOCYTES NFR BLD: 9.1 %
NEUTROPHILS # BLD AUTO: 3.3 K/UL
NEUTROPHILS NFR BLD: 57 %
PLATELET # BLD AUTO: 417 K/UL
PMV BLD AUTO: 9.8 FL
POTASSIUM SERPL-SCNC: 3.8 MMOL/L
PROT SERPL-MCNC: 7.6 G/DL
RBC # BLD AUTO: 4.3 M/UL
SODIUM SERPL-SCNC: 138 MMOL/L
WBC # BLD AUTO: 5.74 K/UL

## 2018-04-26 PROCEDURE — 86140 C-REACTIVE PROTEIN: CPT

## 2018-04-26 PROCEDURE — 85652 RBC SED RATE AUTOMATED: CPT

## 2018-04-26 PROCEDURE — 85025 COMPLETE CBC W/AUTO DIFF WBC: CPT

## 2018-04-26 PROCEDURE — 36415 COLL VENOUS BLD VENIPUNCTURE: CPT | Mod: PO

## 2018-04-26 PROCEDURE — 80053 COMPREHEN METABOLIC PANEL: CPT

## 2018-04-26 NOTE — PROGRESS NOTES
Patient seen in clinic for 2 week post op s/p Pain pump placement with Dr Saavedra on 04/03/2018 and then wound washout with Dr Moran 04/13/2018.  Pain is  6/10  / Patient wearing abdominal binder on Cipro for 4 more weeks for the infection. Pump has not been filled yet bc of the infection, but his pain management doctor is managing his pain medication      Removed abdominal binder, Incision on right side, thoracic area, and RLQ assessed. Sutures intact - removed sutures patient tolerated well, no swelling or purulent drainage, edges well approximated.     Patient was instructed as follows:    Discontinue Bacitracin after tonight.   May shower normally but pat dry after shower.   Do not submerge wound in bath tub or go swimming until released by the physician   Keep incision clean, dry and open to air as much as possible.   Patient encouraged to walk as much as possible but advised to walk with family member or friend and rest as necessary.   No lifting >10lbs.   Post-op instructions reviewed with emphasis on body mechanics.   Return to work will be determined on an individual basis.   No driving or operating machinery while taking narcotic pain medication or muscle relaxants and until cleared by a surgeon.   Continue to wear abdominal binder at all times    All questions were answered. Patient will follow up with Dr Saavedra 05/14/2018   . Patient was encouraged to call clinic with any future concerns prior to follow up appt. If any worsening symptoms, patient should report to ED.       Carly Hunt RN, BSN  Neurosurgery

## 2018-05-05 ENCOUNTER — LAB VISIT (OUTPATIENT)
Dept: LAB | Facility: HOSPITAL | Age: 48
End: 2018-05-05
Attending: INTERNAL MEDICINE
Payer: COMMERCIAL

## 2018-05-05 LAB
ALBUMIN SERPL BCP-MCNC: 3.7 G/DL
ALP SERPL-CCNC: 76 U/L
ALT SERPL W/O P-5'-P-CCNC: 15 U/L
ANION GAP SERPL CALC-SCNC: 8 MMOL/L
AST SERPL-CCNC: 20 U/L
BASOPHILS # BLD AUTO: 0.04 K/UL
BASOPHILS NFR BLD: 0.8 %
BILIRUB SERPL-MCNC: 0.5 MG/DL
BUN SERPL-MCNC: 9 MG/DL
CALCIUM SERPL-MCNC: 9.6 MG/DL
CHLORIDE SERPL-SCNC: 102 MMOL/L
CO2 SERPL-SCNC: 29 MMOL/L
CREAT SERPL-MCNC: 0.9 MG/DL
CRP SERPL-MCNC: 12.8 MG/L
DIFFERENTIAL METHOD: ABNORMAL
EOSINOPHIL # BLD AUTO: 0.2 K/UL
EOSINOPHIL NFR BLD: 3.7 %
ERYTHROCYTE [DISTWIDTH] IN BLOOD BY AUTOMATED COUNT: 12.9 %
ERYTHROCYTE [SEDIMENTATION RATE] IN BLOOD BY WESTERGREN METHOD: 28 MM/HR
EST. GFR  (AFRICAN AMERICAN): >60 ML/MIN/1.73 M^2
EST. GFR  (NON AFRICAN AMERICAN): >60 ML/MIN/1.73 M^2
GLUCOSE SERPL-MCNC: 98 MG/DL
HCT VFR BLD AUTO: 39 %
HGB BLD-MCNC: 12.8 G/DL
LYMPHOCYTES # BLD AUTO: 1 K/UL
LYMPHOCYTES NFR BLD: 20.9 %
MCH RBC QN AUTO: 29.2 PG
MCHC RBC AUTO-ENTMCNC: 32.8 G/DL
MCV RBC AUTO: 89 FL
MONOCYTES # BLD AUTO: 0.4 K/UL
MONOCYTES NFR BLD: 7.1 %
NEUTROPHILS # BLD AUTO: 3.3 K/UL
NEUTROPHILS NFR BLD: 67.5 %
PLATELET # BLD AUTO: 303 K/UL
PMV BLD AUTO: 9.4 FL
POTASSIUM SERPL-SCNC: 3.8 MMOL/L
PROT SERPL-MCNC: 7.2 G/DL
RBC # BLD AUTO: 4.38 M/UL
SODIUM SERPL-SCNC: 139 MMOL/L
WBC # BLD AUTO: 4.93 K/UL

## 2018-05-05 PROCEDURE — 36415 COLL VENOUS BLD VENIPUNCTURE: CPT

## 2018-05-05 PROCEDURE — 86140 C-REACTIVE PROTEIN: CPT

## 2018-05-05 PROCEDURE — 80053 COMPREHEN METABOLIC PANEL: CPT

## 2018-05-05 PROCEDURE — 85652 RBC SED RATE AUTOMATED: CPT

## 2018-05-05 PROCEDURE — 85025 COMPLETE CBC W/AUTO DIFF WBC: CPT

## 2018-05-11 ENCOUNTER — TELEPHONE (OUTPATIENT)
Dept: NEUROSURGERY | Facility: CLINIC | Age: 48
End: 2018-05-11

## 2018-05-11 NOTE — TELEPHONE ENCOUNTER
----- Message from Deep Nobles sent at 5/11/2018 11:08 AM CDT -----  Contact: Claudia (spouse ) @ [phone]  Caller is requesting a return call about r/s the post op to the next available, will it be ok with July which is the next available

## 2018-05-12 ENCOUNTER — LAB VISIT (OUTPATIENT)
Dept: LAB | Facility: HOSPITAL | Age: 48
End: 2018-05-12
Attending: INTERNAL MEDICINE
Payer: COMMERCIAL

## 2018-05-12 LAB
ALBUMIN SERPL BCP-MCNC: 3.9 G/DL
ALP SERPL-CCNC: 75 U/L
ALT SERPL W/O P-5'-P-CCNC: 13 U/L
ANION GAP SERPL CALC-SCNC: 9 MMOL/L
AST SERPL-CCNC: 22 U/L
BASOPHILS # BLD AUTO: 0.04 K/UL
BASOPHILS NFR BLD: 0.8 %
BILIRUB SERPL-MCNC: 0.4 MG/DL
BUN SERPL-MCNC: 15 MG/DL
CALCIUM SERPL-MCNC: 9.6 MG/DL
CHLORIDE SERPL-SCNC: 99 MMOL/L
CO2 SERPL-SCNC: 31 MMOL/L
CREAT SERPL-MCNC: 0.9 MG/DL
CRP SERPL-MCNC: 4.1 MG/L
DIFFERENTIAL METHOD: ABNORMAL
EOSINOPHIL # BLD AUTO: 0.2 K/UL
EOSINOPHIL NFR BLD: 4.9 %
ERYTHROCYTE [DISTWIDTH] IN BLOOD BY AUTOMATED COUNT: 12.8 %
ERYTHROCYTE [SEDIMENTATION RATE] IN BLOOD BY WESTERGREN METHOD: 24 MM/HR
EST. GFR  (AFRICAN AMERICAN): >60 ML/MIN/1.73 M^2
EST. GFR  (NON AFRICAN AMERICAN): >60 ML/MIN/1.73 M^2
GLUCOSE SERPL-MCNC: 128 MG/DL
HCT VFR BLD AUTO: 37.3 %
HGB BLD-MCNC: 12.5 G/DL
LYMPHOCYTES # BLD AUTO: 1.8 K/UL
LYMPHOCYTES NFR BLD: 38 %
MCH RBC QN AUTO: 29.9 PG
MCHC RBC AUTO-ENTMCNC: 33.5 G/DL
MCV RBC AUTO: 89 FL
MONOCYTES # BLD AUTO: 0.4 K/UL
MONOCYTES NFR BLD: 7.9 %
NEUTROPHILS # BLD AUTO: 2.3 K/UL
NEUTROPHILS NFR BLD: 48.4 %
PLATELET # BLD AUTO: 263 K/UL
PMV BLD AUTO: 9.2 FL
POTASSIUM SERPL-SCNC: 3.5 MMOL/L
PROT SERPL-MCNC: 7.3 G/DL
RBC # BLD AUTO: 4.18 M/UL
SODIUM SERPL-SCNC: 139 MMOL/L
WBC # BLD AUTO: 4.71 K/UL

## 2018-05-12 PROCEDURE — 86140 C-REACTIVE PROTEIN: CPT

## 2018-05-12 PROCEDURE — 85025 COMPLETE CBC W/AUTO DIFF WBC: CPT

## 2018-05-12 PROCEDURE — 85652 RBC SED RATE AUTOMATED: CPT

## 2018-05-12 PROCEDURE — 80053 COMPREHEN METABOLIC PANEL: CPT

## 2018-05-12 PROCEDURE — 36415 COLL VENOUS BLD VENIPUNCTURE: CPT

## 2018-05-15 DIAGNOSIS — M79.669 PAIN AND SWELLING OF LOWER LEG, UNSPECIFIED LATERALITY: ICD-10-CM

## 2018-05-15 DIAGNOSIS — M79.89 PAIN AND SWELLING OF LOWER LEG, UNSPECIFIED LATERALITY: ICD-10-CM

## 2018-05-15 LAB
FUNGUS SPEC CULT: NORMAL
FUNGUS SPEC CULT: NORMAL

## 2018-05-16 ENCOUNTER — OFFICE VISIT (OUTPATIENT)
Dept: INFECTIOUS DISEASES | Facility: CLINIC | Age: 48
End: 2018-05-16
Payer: COMMERCIAL

## 2018-05-16 VITALS
TEMPERATURE: 98 F | BODY MASS INDEX: 43.89 KG/M2 | WEIGHT: 313.5 LBS | HEART RATE: 59 BPM | HEIGHT: 71 IN | DIASTOLIC BLOOD PRESSURE: 80 MMHG | SYSTOLIC BLOOD PRESSURE: 134 MMHG

## 2018-05-16 PROCEDURE — 99999 PR PBB SHADOW E&M-EST. PATIENT-LVL III: CPT | Mod: PBBFAC,,, | Performed by: INTERNAL MEDICINE

## 2018-05-16 PROCEDURE — 99214 OFFICE O/P EST MOD 30 MIN: CPT | Mod: S$GLB,,, | Performed by: INTERNAL MEDICINE

## 2018-05-16 PROCEDURE — 3008F BODY MASS INDEX DOCD: CPT | Mod: CPTII,S$GLB,, | Performed by: INTERNAL MEDICINE

## 2018-05-16 RX ORDER — FUROSEMIDE 40 MG/1
TABLET ORAL
Qty: 90 TABLET | Refills: 1 | Status: SHIPPED | OUTPATIENT
Start: 2018-05-16 | End: 2018-07-26 | Stop reason: SDUPTHER

## 2018-05-16 NOTE — PROGRESS NOTES
Subjective:      Patient ID: Jaime Lund is a 48 y.o. male.    Chief Complaint:Hospital Follow Up      History of Present Illness    48-year-old male with history of lumbar postlaminectomy syndrome s/p intrathecal opioid pump placement 4/3/2018. Post op course complicated by surgical site infection with Klebsiella and CT findings demonstrated 3.9 x 8.2 cm fluid collection encasing the reservoir. He underwent pump removal, washout and debridement followed by replacement of same pump 4/12/2018.  Klebsiella was sensitive to cipro and he was discharged on oral cipro 750 bid to complete 6 weeks.  He is doing well.     Review of Systems   Constitution: Positive for decreased appetite and weakness. Negative for chills, fever, malaise/fatigue, night sweats, weight gain and weight loss.   HENT: Negative for congestion, ear pain, hearing loss, hoarse voice, sore throat and tinnitus.    Eyes: Negative for blurred vision, redness and visual disturbance.   Cardiovascular: Negative for chest pain, leg swelling and palpitations.   Respiratory: Negative for cough, hemoptysis, shortness of breath and sputum production.    Hematologic/Lymphatic: Negative for adenopathy. Does not bruise/bleed easily.   Skin: Negative for dry skin, itching, rash and suspicious lesions.   Musculoskeletal: Positive for back pain. Negative for joint pain, myalgias and neck pain.   Gastrointestinal: Negative for abdominal pain, constipation, diarrhea, heartburn, nausea and vomiting.   Genitourinary: Negative for dysuria, flank pain, frequency, hematuria, hesitancy and urgency.   Neurological: Negative for dizziness, headaches, numbness and paresthesias.   Psychiatric/Behavioral: Negative for depression and memory loss. The patient does not have insomnia and is not nervous/anxious.      Objective:   Physical Exam   Constitutional: He is oriented to person, place, and time.   Cardiovascular: Normal rate.    Pulmonary/Chest: Effort normal.    Abdominal:   Incision - well healed   Neurological: He is alert and oriented to person, place, and time.   Skin: Skin is warm and dry.   Vitals reviewed.    Assessment:       1. Postoperative wound infection, subsequent encounter          Plan:       1. Complete 6 weeks as planned.  2. Pump may be used at this time - patient is to follow with pain management - Dr. Wilkerson.   3. RTC prn.

## 2018-05-16 NOTE — PATIENT INSTRUCTIONS
Pump may be used at this time.  Patient is to complete 6 weeks of cipro as planned.  Call if any questions - [phone]

## 2018-05-23 ENCOUNTER — PATIENT MESSAGE (OUTPATIENT)
Dept: NEUROSURGERY | Facility: CLINIC | Age: 48
End: 2018-05-23

## 2018-05-23 ENCOUNTER — TELEPHONE (OUTPATIENT)
Dept: NEUROSURGERY | Facility: CLINIC | Age: 48
End: 2018-05-23

## 2018-05-23 ENCOUNTER — PATIENT MESSAGE (OUTPATIENT)
Dept: FAMILY MEDICINE | Facility: CLINIC | Age: 48
End: 2018-05-23

## 2018-05-23 NOTE — TELEPHONE ENCOUNTER
----- Message from Esa Jade sent at 5/23/2018 11:28 AM CDT -----  Needs Medical Advice    Who Called: Claudia (wife)  Communication Preference ([facility] response to Pt. (or) Call Back # and timeframe): [phone]  Additional Information: Claudia is asking to speak w/ you about getting a report stating the pt was hosptalized for the month of April. Pls call.

## 2018-05-30 ENCOUNTER — OFFICE VISIT (OUTPATIENT)
Dept: NEUROSURGERY | Facility: CLINIC | Age: 48
End: 2018-05-30
Payer: COMMERCIAL

## 2018-05-30 VITALS
TEMPERATURE: 99 F | DIASTOLIC BLOOD PRESSURE: 68 MMHG | WEIGHT: 313.88 LBS | HEART RATE: 62 BPM | BODY MASS INDEX: 43.78 KG/M2 | SYSTOLIC BLOOD PRESSURE: 129 MMHG

## 2018-05-30 DIAGNOSIS — Z96.89 S/P INSERTION OF SPINAL CORD STIMULATOR: ICD-10-CM

## 2018-05-30 DIAGNOSIS — G89.4 CHRONIC PAIN SYNDROME: Primary | ICD-10-CM

## 2018-05-30 DIAGNOSIS — T81.40XD POSTOPERATIVE INFECTION, SUBSEQUENT ENCOUNTER: ICD-10-CM

## 2018-05-30 PROCEDURE — 3008F BODY MASS INDEX DOCD: CPT | Mod: CPTII,S$GLB,, | Performed by: PHYSICIAN ASSISTANT

## 2018-05-30 PROCEDURE — 3074F SYST BP LT 130 MM HG: CPT | Mod: CPTII,S$GLB,, | Performed by: PHYSICIAN ASSISTANT

## 2018-05-30 PROCEDURE — 99999 PR PBB SHADOW E&M-EST. PATIENT-LVL III: CPT | Mod: PBBFAC,,, | Performed by: PHYSICIAN ASSISTANT

## 2018-05-30 PROCEDURE — 99213 OFFICE O/P EST LOW 20 MIN: CPT | Mod: S$GLB,,, | Performed by: PHYSICIAN ASSISTANT

## 2018-05-30 PROCEDURE — 3078F DIAST BP <80 MM HG: CPT | Mod: CPTII,S$GLB,, | Performed by: PHYSICIAN ASSISTANT

## 2018-05-30 RX ORDER — OXYCODONE HYDROCHLORIDE 30 MG/1
TABLET ORAL
Refills: 0 | COMMUNITY
Start: 2018-05-18 | End: 2018-06-07

## 2018-05-31 NOTE — PROGRESS NOTES
[facility] 7th Fl  Neurosurgery  History & Physical    Patient Name: Jaime Lund  MRN: [id number]  Primary Care Provider: Gabby Dean MD    Patient information was obtained from patient and past medical records.     Subjective:     Chief Complaint/Reason for Admission: 8 wk f/u s/p washout    History of Present Illness: Pt is 49 y/o male with chronic pain who had intrathecal pain pump placement 4/3/18 complicated with Klebsiella infection and now s/p washout and replacement of pump 4/12/18.  He presents today for 8 wk follow up appointment. He has completed his 6 wk course of oral Cipro and has been released from ID clinic. He reports he has been doing very well since discharge. He denies increased pain, fever/chills, n/v, numbness/tingling, weakness, or drainage from incision sites. He states he had his pain pump filled by his pain management doctor, Dr. Wilkerson yesterday.       Review of patient's allergies indicates:   Allergen Reactions    Klonopin [clonazepam] Anxiety and Other (See Comments)     Becomes agitated    Valium [diazepam] Other (See Comments)     Becomes agitated    Xanax [alprazolam] Anxiety and Other (See Comments)     Becomes agitated       Past Medical History:   Diagnosis Date    Back pain, chronic     Bulging discs     Chronic pain following surgery or procedure     Degenerative disc disease     Hypertension     Hypokalemia     PAD (peripheral artery disease)     Post laminectomy syndrome     Seizures     Severe sepsis     Short-term memory loss     Thyroid disease     Subclinical hyperthyroidism     Past Surgical History:   Procedure Laterality Date    CHOLECYSTECTOMY      GASTRIC BYPASS      SLEEVE    gastric sleeve  2011    HERNIA REPAIR      pain pump  04/13/2018    SKIN SURGERY      EXCESS SKIN REMOVAL    SPINE SURGERY      lumbar fusion     Family History     Problem Relation (Age of Onset)    Arthritis Mother    Breast cancer Mother    Heart  disease Mother    Hypertension Mother, Father    Throat cancer Father        Social History Main Topics    Smoking status: Never Smoker    Smokeless tobacco: Never Used    Alcohol use No    Drug use: No    Sexual activity: Yes     Partners: Female     Review of Systems   Constitutional: no fever, chills or night sweats. No changes in weight   Eyes: no visual changes   ENT: no nasal congestion or sore throat   Respiratory: no cough or shortness of breath   Cardiovascular: no chest pain or palpitations   Gastrointestinal: no nausea or vomiting   Genitourinary: no hematuria or dysuria   Integument/Breast: no rash or pruritis   Hematologic/Lymphatic: no easy bruising or lymphadenopathy   Musculoskeletal: no arthralgias or myalgias.   Neurological: no seizures or tremors   Behavioral/Psych: no auditory or visual hallucinations   Endocrine: no heat or cold intolerance     Objective:     Weight: (!) 142.4 kg (313 lb 14.4 oz)  Body mass index is 43.78 kg/m².  Vital Signs (Most Recent):  Temp: 98.5 °F (36.9 °C) (05/30/18 1526)  Pulse: 62 (05/30/18 1526)  BP: 129/68 (05/30/18 1526) Vital Signs (24h Range):  [unfilled]              Closed/Suction Drain 04/13/18 0922 Midline Abdomen Accordion 10 Fr. (Active)            Closed/Suction Drain 04/13/18 1002 Right Abdomen Other (Comment) 7 Fr. (Active)       Neurosurgery Physical Exam  General: well developed, well nourished, no distress.   Head: normocephalic, atraumatic  Neurologic: Alert and oriented. Thought content appropriate.  GCS: Motor: 6/Verbal: 5/Eyes: 4 GCS Total: 15  Mental Status: Awake, Alert, Oriented x 4  Language: No aphasia  Speech: No dysarthria  Cranial nerves: face symmetric, tongue midline, CN II-XII grossly intact.   Eyes: pupils equal, round, reactive to light with accomodation, EOMI.   Pulmonary: normal respirations, no signs of respiratory distress  Abdomen: soft, non-distended, not tender to palpation  Sensory: intact to light touch  throughout    Motor Strength:Moves all extremities spontaneously with good tone.  Full strength upper and lower extremities. No abnormal movements seen.     Strength  Deltoids Triceps Biceps Wrist Extension Wrist Flexion Hand    Upper: R 5/5 5/5 5/5 5/5 5/5 5/5    L 5/5 5/5 5/5 5/5 5/5 5/5     Iliopsoas Quadriceps Knee  Flexion Tibialis  anterior Gastro- cnemius EHL   Lower: R 5/5 5/5 5/5 5/5 5/5 5/5    L 5/5 5/5 5/5 5/5 5/5 5/5     DTR's - 2 + and symmetric in UE and LE  Pronator Drift: no drift noted  Finger-to-nose: Intact bilaterally  Gomez: absent  Clonus: absent  Pulses: 2+ and symmetric radial and dorsalis pedis.  Skin: Skin is warm, dry and intact.  Gait: normal   Lumbar, lateral, and abdominal incisions all well healed. No surrounding erythema, edema, or drainage.        Significant Diagnostics:  No new imaging    Assessment/Plan:     Pt is 47 y/o male with chronic pain who had intrathecal pain pump placement 4/3/18 complicated with Klebsiella infection and now s/p washout and replacement of pump 4/12/18. Pt has completed 6 wk course of oral Cipro and has been released from ID clinic. He has been doing well since discharge and states his pain is better controlled. He displays no signs or symptoms of infection. Continue pain pump management per Dr. Wilkerson. He will be scheduled to follow up with Dr. Saavedra in 6 wks for further evaluation. Plan was discussed with pt, and he is agreeable to plan. Pt was advised to call our clinic if they have any questions, concerns, or new/worsening symptoms.       Erin Chakraborty PA-C  Neurosurgery  [facility] 7th Fl

## 2018-06-07 ENCOUNTER — OFFICE VISIT (OUTPATIENT)
Dept: FAMILY MEDICINE | Facility: CLINIC | Age: 48
End: 2018-06-07
Payer: COMMERCIAL

## 2018-06-07 ENCOUNTER — LAB VISIT (OUTPATIENT)
Dept: LAB | Facility: HOSPITAL | Age: 48
End: 2018-06-07
Attending: INTERNAL MEDICINE
Payer: COMMERCIAL

## 2018-06-07 VITALS
HEART RATE: 69 BPM | BODY MASS INDEX: 44.1 KG/M2 | WEIGHT: 315 LBS | OXYGEN SATURATION: 97 % | DIASTOLIC BLOOD PRESSURE: 80 MMHG | TEMPERATURE: 99 F | SYSTOLIC BLOOD PRESSURE: 120 MMHG | HEIGHT: 71 IN

## 2018-06-07 DIAGNOSIS — R53.83 FATIGUE, UNSPECIFIED TYPE: ICD-10-CM

## 2018-06-07 DIAGNOSIS — M25.512 CHRONIC PAIN OF BOTH SHOULDERS: ICD-10-CM

## 2018-06-07 DIAGNOSIS — G89.29 CHRONIC PAIN OF BOTH KNEES: ICD-10-CM

## 2018-06-07 DIAGNOSIS — G89.29 CHRONIC PAIN OF BOTH SHOULDERS: Primary | ICD-10-CM

## 2018-06-07 DIAGNOSIS — M25.562 CHRONIC PAIN OF BOTH KNEES: ICD-10-CM

## 2018-06-07 DIAGNOSIS — M25.511 CHRONIC PAIN OF BOTH SHOULDERS: ICD-10-CM

## 2018-06-07 DIAGNOSIS — M25.512 CHRONIC PAIN OF BOTH SHOULDERS: Primary | ICD-10-CM

## 2018-06-07 DIAGNOSIS — G89.4 CHRONIC PAIN SYNDROME: ICD-10-CM

## 2018-06-07 DIAGNOSIS — M25.561 CHRONIC PAIN OF BOTH KNEES: ICD-10-CM

## 2018-06-07 DIAGNOSIS — M25.511 CHRONIC PAIN OF BOTH SHOULDERS: Primary | ICD-10-CM

## 2018-06-07 DIAGNOSIS — G89.29 CHRONIC PAIN OF BOTH SHOULDERS: ICD-10-CM

## 2018-06-07 LAB
ALBUMIN SERPL BCP-MCNC: 3.5 G/DL
ALP SERPL-CCNC: 80 U/L
ALT SERPL W/O P-5'-P-CCNC: 15 U/L
ANION GAP SERPL CALC-SCNC: 5 MMOL/L
AST SERPL-CCNC: 24 U/L
BASOPHILS # BLD AUTO: 0.05 K/UL
BASOPHILS NFR BLD: 1 %
BILIRUB SERPL-MCNC: 0.4 MG/DL
BUN SERPL-MCNC: 14 MG/DL
CALCIUM SERPL-MCNC: 9.4 MG/DL
CHLORIDE SERPL-SCNC: 100 MMOL/L
CK SERPL-CCNC: 76 U/L
CO2 SERPL-SCNC: 36 MMOL/L
CREAT SERPL-MCNC: 0.8 MG/DL
CRP SERPL-MCNC: 2 MG/L
DIFFERENTIAL METHOD: ABNORMAL
EOSINOPHIL # BLD AUTO: 0.2 K/UL
EOSINOPHIL NFR BLD: 3.5 %
ERYTHROCYTE [DISTWIDTH] IN BLOOD BY AUTOMATED COUNT: 13 %
ERYTHROCYTE [SEDIMENTATION RATE] IN BLOOD BY WESTERGREN METHOD: 21 MM/HR
EST. GFR  (AFRICAN AMERICAN): >60 ML/MIN/1.73 M^2
EST. GFR  (NON AFRICAN AMERICAN): >60 ML/MIN/1.73 M^2
GLUCOSE SERPL-MCNC: 92 MG/DL
HCT VFR BLD AUTO: 37.6 %
HGB BLD-MCNC: 12.2 G/DL
LYMPHOCYTES # BLD AUTO: 1.5 K/UL
LYMPHOCYTES NFR BLD: 29.5 %
MCH RBC QN AUTO: 29.4 PG
MCHC RBC AUTO-ENTMCNC: 32.4 G/DL
MCV RBC AUTO: 91 FL
MONOCYTES # BLD AUTO: 0.3 K/UL
MONOCYTES NFR BLD: 6.9 %
NEUTROPHILS # BLD AUTO: 2.9 K/UL
NEUTROPHILS NFR BLD: 59.1 %
PLATELET # BLD AUTO: 291 K/UL
PMV BLD AUTO: 10.1 FL
POTASSIUM SERPL-SCNC: 3.9 MMOL/L
PROT SERPL-MCNC: 7 G/DL
RBC # BLD AUTO: 4.15 M/UL
SODIUM SERPL-SCNC: 141 MMOL/L
WBC # BLD AUTO: 4.92 K/UL

## 2018-06-07 PROCEDURE — 3074F SYST BP LT 130 MM HG: CPT | Mod: CPTII,S$GLB,, | Performed by: INTERNAL MEDICINE

## 2018-06-07 PROCEDURE — 82746 ASSAY OF FOLIC ACID SERUM: CPT

## 2018-06-07 PROCEDURE — 85025 COMPLETE CBC W/AUTO DIFF WBC: CPT

## 2018-06-07 PROCEDURE — 86431 RHEUMATOID FACTOR QUANT: CPT

## 2018-06-07 PROCEDURE — 99213 OFFICE O/P EST LOW 20 MIN: CPT | Mod: S$GLB,,, | Performed by: INTERNAL MEDICINE

## 2018-06-07 PROCEDURE — 82607 VITAMIN B-12: CPT

## 2018-06-07 PROCEDURE — 3079F DIAST BP 80-89 MM HG: CPT | Mod: CPTII,S$GLB,, | Performed by: INTERNAL MEDICINE

## 2018-06-07 PROCEDURE — 85652 RBC SED RATE AUTOMATED: CPT

## 2018-06-07 PROCEDURE — 86200 CCP ANTIBODY: CPT

## 2018-06-07 PROCEDURE — 3008F BODY MASS INDEX DOCD: CPT | Mod: CPTII,S$GLB,, | Performed by: INTERNAL MEDICINE

## 2018-06-07 PROCEDURE — 86038 ANTINUCLEAR ANTIBODIES: CPT

## 2018-06-07 PROCEDURE — 99999 PR PBB SHADOW E&M-EST. PATIENT-LVL III: CPT | Mod: PBBFAC,,, | Performed by: INTERNAL MEDICINE

## 2018-06-07 PROCEDURE — 36415 COLL VENOUS BLD VENIPUNCTURE: CPT | Mod: PO

## 2018-06-07 PROCEDURE — 82550 ASSAY OF CK (CPK): CPT

## 2018-06-07 PROCEDURE — 86140 C-REACTIVE PROTEIN: CPT

## 2018-06-07 PROCEDURE — 80053 COMPREHEN METABOLIC PANEL: CPT

## 2018-06-07 NOTE — PROGRESS NOTES
SUBJECTIVE     Chief Complaint   Patient presents with    Bilateral Shoulder Pain    Bilateral Knee Pain    Leg Swelling       HPI  Jaime Lund is a 48 y.o. male with multiple medical diagnoses as listed in the medical history and problem list that presents for evaluation of B/L shoulder and knee pain x 3 months. Pt reports pain is burning at a 2-3/10, but increases to a 10/10 on occasion. Pain is worse at night. Denies any radiation. Denies any preceding trauma, falls, or heavy lifting. He has been taking Percocet and the pain pump with minimal improvement. Denies any associated swelling.    PAST MEDICAL HISTORY:  Past Medical History:   Diagnosis Date    Back pain, chronic     Bulging discs     Chronic pain following surgery or procedure     Degenerative disc disease     Hypertension     Hypokalemia     PAD (peripheral artery disease)     Post laminectomy syndrome     Seizures     Severe sepsis     Short-term memory loss     Thyroid disease     Subclinical hyperthyroidism       PAST SURGICAL HISTORY:  Past Surgical History:   Procedure Laterality Date    CHOLECYSTECTOMY      GASTRIC BYPASS      SLEEVE    gastric sleeve  2011    HERNIA REPAIR      pain pump  04/13/2018    SKIN SURGERY      EXCESS SKIN REMOVAL    SPINE SURGERY      lumbar fusion       SOCIAL HISTORY:  Social History     Social History    Marital status:      Spouse name: N/A    Number of children: N/A    Years of education: N/A     Occupational History    Not on file.     Social History Main Topics    Smoking status: Never Smoker    Smokeless tobacco: Never Used    Alcohol use No    Drug use: No    Sexual activity: Yes     Partners: Female     Other Topics Concern    Not on file     Social History Narrative    No narrative on file       FAMILY HISTORY:  Family History   Problem Relation Age of Onset    Heart disease Mother     Breast cancer Mother     Arthritis Mother     Hypertension Mother      Throat cancer Father     Hypertension Father        ALLERGIES AND MEDICATIONS: updated and reviewed.  Review of patient's allergies indicates:   Allergen Reactions    Klonopin [clonazepam] Anxiety and Other (See Comments)     Becomes agitated    Valium [diazepam] Other (See Comments)     Becomes agitated    Xanax [alprazolam] Anxiety and Other (See Comments)     Becomes agitated     Current Outpatient Prescriptions   Medication Sig Dispense Refill    amitriptyline (ELAVIL) 50 MG tablet Take 50 mg by mouth nightly.       butalbital-acetaminophen-caffeine -40 mg (FIORICET, ESGIC) -40 mg per tablet Take 1 tablet by mouth every 4 (four) hours as needed. 60 tablet 11    DULoxetine (CYMBALTA) 60 MG capsule TAKE 1 CAPSULE BY MOUTH EVERY EVENING 90 capsule 1    furosemide (LASIX) 40 MG tablet TAKE ONE TABLET BY MOUTH ONCE DAILY FOR FLUID. 90 tablet 1    gabapentin (NEURONTIN) 400 MG capsule       hydroCHLOROthiazide (HYDRODIURIL) 25 MG tablet TAKE ONE TABLET BY MOUTH ONCE DAILY FOR BLOOD PRESSURE AND OR FOR FLUID (Patient taking differently: TAKE ONE TABLET BY MOUTH ONCE DAILY FOR BLOOD PRESSURE AND OR FOR FLUID, morning) 30 tablet 5    meloxicam (MOBIC) 15 MG tablet TAKE ONE TABLET BY MOUTH ONCE DAILY WITH A MEAL FOR INFLAMMATION AND OR PAIN (Patient taking differently: TAKE ONE TABLET BY MOUTH ONCE DAILY WITH A MEAL FOR INFLAMMATION AND OR PAIN, bedtime) 60 tablet 0    methocarbamol (ROBAXIN) 750 MG Tab Take 750 mg by mouth 2 (two) times daily.       oxyCODONE (ROXICODONE) 15 MG Tab       propranolol (INDERAL) 10 MG tablet Take 1 tablet (10 mg total) by mouth 3 (three) times daily. 90 tablet 11    ranitidine (ZANTAC) 150 MG capsule Take 150 mg by mouth 2 (two) times daily.      tamsulosin (FLOMAX) 0.4 mg Cp24 TAKE 1 CAPSULE BY MOUTH ONCE DAILY FOR PROSTATE, morning  6    tiZANidine (ZANAFLEX) 2 MG tablet Take 1 tablet (2 mg total) by mouth 3 (three) times daily. (Patient taking differently:  "Take 2 mg by mouth 3 (three) times daily as needed. ) 90 tablet 3    oxyCODONE-acetaminophen (PERCOCET)  mg per tablet Take 1 tablet by mouth every 4 (four) hours as needed for Pain (.). 30 tablet 0     No current facility-administered medications for this visit.        ROS  Review of Systems   Constitutional: Negative for chills and fever.   HENT: Negative for hearing loss and sore throat.    Eyes: Negative for visual disturbance.   Respiratory: Negative for cough and shortness of breath.    Cardiovascular: Negative for chest pain, palpitations and leg swelling.   Gastrointestinal: Negative for abdominal pain, constipation, diarrhea, nausea and vomiting.   Genitourinary: Negative for dysuria, frequency and urgency.   Musculoskeletal: Positive for arthralgias (B/L shoulder and knee pain). Negative for joint swelling and myalgias.   Skin: Negative for rash and wound.   Neurological: Negative for headaches.   Psychiatric/Behavioral: Negative for agitation and confusion. The patient is not nervous/anxious.          OBJECTIVE     Physical Exam  Vitals:    06/07/18 0822   BP: 120/80   Pulse: 69   Temp: 98.7 °F (37.1 °C)    Body mass index is 44.18 kg/m².  Weight: (!) 143.7 kg (316 lb 12.8 oz)   Height: 5' 11" (180.3 cm)     Physical Exam   Constitutional: He is oriented to person, place, and time. He appears well-developed and well-nourished. No distress.   HENT:   Head: Normocephalic and atraumatic.   Right Ear: External ear normal.   Left Ear: External ear normal.   Nose: Nose normal.   Mouth/Throat: Oropharynx is clear and moist.   Eyes: Conjunctivae and EOM are normal. Right eye exhibits no discharge. Left eye exhibits no discharge. No scleral icterus.   Neck: Normal range of motion. Neck supple. No JVD present. No tracheal deviation present.   Cardiovascular: Normal rate, regular rhythm, normal heart sounds and intact distal pulses.  Exam reveals no gallop and no friction rub.    No murmur " heard.  Pulmonary/Chest: Effort normal and breath sounds normal. No respiratory distress. He has no wheezes.   Abdominal: Soft. Bowel sounds are normal. He exhibits no distension and no mass. There is no tenderness. There is no rebound and no guarding.   Musculoskeletal: Normal range of motion. He exhibits no edema, tenderness or deformity.   Neurological: He is alert and oriented to person, place, and time. He exhibits normal muscle tone. Coordination normal.   Skin: Skin is warm and dry. No rash noted. No erythema.   Psychiatric: He has a normal mood and affect. His behavior is normal. Judgment and thought content normal.         Health Maintenance       Date Due Completion Date    TETANUS VACCINE 11/30/2018 (Originally 3/8/1988) ---    Influenza Vaccine 08/01/2018 ---    Lipid Panel 02/01/2022 2/1/2017            ASSESSMENT     48 y.o. male with     1. Chronic pain of both shoulders    2. Chronic pain of both knees    3. Chronic pain syndrome    4. Fatigue, unspecified type        PLAN:     1. Chronic pain of both shoulders  - Possibly 2/2 chronic pain syndrome, but will r/o potential muscle vs autoimmune process with labs as below  - Continue pain meds/pump as previously directed per Pain Medicine  - Pt encouraged to apply ice packs 2-3 times daily at 10 minute intervals x 72 hours, then okay to change to heating compress with care not to burn his self; he  voiced understanding   - CK; Future  - RHEUMATOID FACTOR; Future  - CYCLIC CITRUL PEPTIDE ANTIBODY, IGG; Future  - SEDIMENTATION RATE, MANUAL; Future  - C-REACTIVE PROTEIN; Future  - MERVIN; Future    2. Chronic pain of both knees  - As above  - CK; Future  - RHEUMATOID FACTOR; Future  - CYCLIC CITRUL PEPTIDE ANTIBODY, IGG; Future  - SEDIMENTATION RATE, MANUAL; Future  - C-REACTIVE PROTEIN; Future  - MERVIN; Future  - X-Ray Knee 1 or 2 View Bilateral; Future    3. Chronic pain syndrome  - As above  - Continue management per Pain Medicine    4. Fatigue, unspecified  type  - Vitamin B12; Future  - Folate; Future        RTC in 2 weeks for repeat assessment of current treatment plan       Gabby Dean MD  06/07/2018 8:49 AM        No Follow-up on file.

## 2018-06-08 LAB
CCP AB SER IA-ACNC: 0.7 U/ML
FOLATE SERPL-MCNC: 28.7 NG/ML
RHEUMATOID FACT SERPL-ACNC: <10 IU/ML
VIT B12 SERPL-MCNC: 1378 PG/ML

## 2018-06-09 DIAGNOSIS — M47.816 FACET ARTHROPATHY, LUMBAR: ICD-10-CM

## 2018-06-09 RX ORDER — MELOXICAM 15 MG/1
TABLET ORAL
Qty: 60 TABLET | Refills: 5 | Status: SHIPPED | OUTPATIENT
Start: 2018-06-09 | End: 2019-01-14 | Stop reason: SDUPTHER

## 2018-06-11 LAB — ANA SER QL IF: NORMAL

## 2018-06-15 ENCOUNTER — PATIENT MESSAGE (OUTPATIENT)
Dept: FAMILY MEDICINE | Facility: CLINIC | Age: 48
End: 2018-06-15

## 2018-06-15 LAB
ACID FAST MOD KINY STN SPEC: NORMAL
ACID FAST MOD KINY STN SPEC: NORMAL
MYCOBACTERIUM SPEC QL CULT: NORMAL
MYCOBACTERIUM SPEC QL CULT: NORMAL

## 2018-06-28 DIAGNOSIS — M96.1 POSTLAMINECTOMY SYNDROME OF LUMBAR REGION: ICD-10-CM

## 2018-06-28 DIAGNOSIS — G89.4 CHRONIC PAIN SYNDROME: ICD-10-CM

## 2018-06-28 RX ORDER — DULOXETIN HYDROCHLORIDE 60 MG/1
CAPSULE, DELAYED RELEASE ORAL
Qty: 90 CAPSULE | Refills: 1 | Status: SHIPPED | OUTPATIENT
Start: 2018-06-28 | End: 2018-12-10 | Stop reason: SDUPTHER

## 2018-06-29 RX ORDER — TIZANIDINE 2 MG/1
TABLET ORAL
Qty: 90 TABLET | Refills: 3 | Status: SHIPPED | OUTPATIENT
Start: 2018-06-29 | End: 2018-10-19 | Stop reason: SDUPTHER

## 2018-07-06 DIAGNOSIS — I10 HYPERTENSION, ESSENTIAL, BENIGN: ICD-10-CM

## 2018-07-06 RX ORDER — HYDROCHLOROTHIAZIDE 25 MG/1
TABLET ORAL
Qty: 90 TABLET | Refills: 0 | Status: SHIPPED | OUTPATIENT
Start: 2018-07-06 | End: 2019-01-14 | Stop reason: SDUPTHER

## 2018-07-23 ENCOUNTER — OFFICE VISIT (OUTPATIENT)
Dept: NEUROSURGERY | Facility: CLINIC | Age: 48
End: 2018-07-23
Payer: COMMERCIAL

## 2018-07-23 VITALS
BODY MASS INDEX: 42.93 KG/M2 | HEART RATE: 73 BPM | WEIGHT: 306.63 LBS | HEIGHT: 71 IN | TEMPERATURE: 99 F | DIASTOLIC BLOOD PRESSURE: 74 MMHG | SYSTOLIC BLOOD PRESSURE: 134 MMHG

## 2018-07-23 DIAGNOSIS — M96.1 POSTLAMINECTOMY SYNDROME OF LUMBAR REGION: Primary | ICD-10-CM

## 2018-07-23 DIAGNOSIS — G89.4 CHRONIC PAIN SYNDROME: ICD-10-CM

## 2018-07-23 DIAGNOSIS — M47.816 FACET ARTHROPATHY, LUMBAR: ICD-10-CM

## 2018-07-23 PROCEDURE — 99999 PR PBB SHADOW E&M-EST. PATIENT-LVL III: CPT | Mod: PBBFAC,,, | Performed by: NEUROLOGICAL SURGERY

## 2018-07-23 PROCEDURE — 3075F SYST BP GE 130 - 139MM HG: CPT | Mod: CPTII,S$GLB,, | Performed by: NEUROLOGICAL SURGERY

## 2018-07-23 PROCEDURE — 3008F BODY MASS INDEX DOCD: CPT | Mod: CPTII,S$GLB,, | Performed by: NEUROLOGICAL SURGERY

## 2018-07-23 PROCEDURE — 3078F DIAST BP <80 MM HG: CPT | Mod: CPTII,S$GLB,, | Performed by: NEUROLOGICAL SURGERY

## 2018-07-23 PROCEDURE — 99213 OFFICE O/P EST LOW 20 MIN: CPT | Mod: S$GLB,,, | Performed by: NEUROLOGICAL SURGERY

## 2018-07-23 RX ORDER — BACLOFEN 10 MG/1
TABLET ORAL
Refills: 0 | COMMUNITY
Start: 2018-07-11 | End: 2019-04-11 | Stop reason: SDUPTHER

## 2018-07-23 NOTE — PROGRESS NOTES
Established Pateint    SUBJECTIVE:     History of Present Illness:  Mr. Lund is a 48-year-old male who is seeing me today in follow-up.  His last neurosurgery clinic appointment was on May 30, 2018.  He initially underwent an intrathecal pain pump placement on April 3, 2018 for chronic pain syndrome and lumbar post-laminectomy syndrome.  The pain pump was placed after successful single shot intrathecal opioid trial.  His postoperative course was complicated by a Klebsiella infection.  He underwent washout of the pump on April 12, 2018.  He completed a 6 week course of Cipro.  He did well and was [facility].  At the time of his last clinic appointment his pain pump had been filled with opioid medication.  He was doing very well from a pain standpoint.  He is here today to see me in follow-up.  He states that he got about 2 weeks of pain relief with the intrathecal opioid pump.  After that, his pain started to return.  He has had adjustments in dosing of his intrathecal pump.  He has also had increasing doses of his bolus dosing.  None of these adjustments have brought the patient any significant pain relief.  He states that his pain is just as bad if not worse as it was prior to pump implantation.  He is back on oral medications.  He was able to wean off of oral medications when he was getting pain relief from the pump initially.    Review of patient's allergies indicates:   Allergen Reactions    Klonopin [clonazepam] Anxiety and Other (See Comments)     Becomes agitated    Valium [diazepam] Other (See Comments)     Becomes agitated    Xanax [alprazolam] Anxiety and Other (See Comments)     Becomes agitated       Current Outpatient Prescriptions   Medication Sig Dispense Refill    amitriptyline (ELAVIL) 50 MG tablet Take 50 mg by mouth nightly.       baclofen (LIORESAL) 10 MG tablet TAKE 1 OR 2 TABLETS BY MOUTH THREE TIMES DAILY FOR MUSCLE SPASMS.  0    butalbital-acetaminophen-caffeine -40  mg (FIORICET, ESGIC) -40 mg per tablet Take 1 tablet by mouth every 4 (four) hours as needed. 60 tablet 11    DULoxetine (CYMBALTA) 60 MG capsule TAKE 1 CAPSULE BY MOUTH EVERY EVENING 90 capsule 1    furosemide (LASIX) 40 MG tablet TAKE ONE TABLET BY MOUTH ONCE DAILY FOR FLUID. 90 tablet 1    gabapentin (NEURONTIN) 400 MG capsule       hydroCHLOROthiazide (HYDRODIURIL) 25 MG tablet TAKE ONE TABLET BY MOUTH ONCE DAILY FOR BLOOD PRESSURE AND OR FOR FLUID 90 tablet 0    meloxicam (MOBIC) 15 MG tablet TAKE ONE TABLET BY MOUTH ONCE DAILY WITH A MEAL FOR INFLAMMATION AND OR PAIN 60 tablet 5    oxyCODONE (ROXICODONE) 15 MG Tab       oxyCODONE-acetaminophen (PERCOCET)  mg per tablet Take 1 tablet by mouth every 4 (four) hours as needed for Pain (.). 30 tablet 0    propranolol (INDERAL) 10 MG tablet Take 1 tablet (10 mg total) by mouth 3 (three) times daily. 90 tablet 11    ranitidine (ZANTAC) 150 MG capsule Take 150 mg by mouth 2 (two) times daily.      tamsulosin (FLOMAX) 0.4 mg Cp24 TAKE 1 CAPSULE BY MOUTH ONCE DAILY FOR PROSTATE, morning  6    tiZANidine (ZANAFLEX) 2 MG tablet TAKE ONE TABLET BY MOUTH THREE TIMES DAILY TO CALM MUSCLES 90 tablet 3     No current facility-administered medications for this visit.        Past Medical History:   Diagnosis Date    Back pain, chronic     Bulging discs     Chronic pain following surgery or procedure     Degenerative disc disease     Hypertension     Hypokalemia     PAD (peripheral artery disease)     Post laminectomy syndrome     Seizures     Severe sepsis     Short-term memory loss     Thyroid disease     Subclinical hyperthyroidism     Past Surgical History:   Procedure Laterality Date    CHOLECYSTECTOMY      GASTRIC BYPASS      SLEEVE    gastric sleeve  2011    HERNIA REPAIR      pain pump  04/13/2018    SKIN SURGERY      EXCESS SKIN REMOVAL    SPINE SURGERY      lumbar fusion     Family History     Problem Relation (Age of Onset)     "Arthritis Mother    Breast cancer Mother    Heart disease Mother    Hypertension Mother, Father    Throat cancer Father        Social History     Social History    Marital status:      Spouse name: N/A    Number of children: N/A    Years of education: N/A     Social History Main Topics    Smoking status: Never Smoker    Smokeless tobacco: Never Used    Alcohol use No    Drug use: No    Sexual activity: Yes     Partners: Female     Other Topics Concern    None     Social History Narrative    None       Review of Systems:  Review of Systems    OBJECTIVE:     Vital Signs  Temp: 98.7 °F (37.1 °C)  Pulse: 73  BP: 134/74  Pain Score: 10-Worst pain ever  Height: 5' 11" (180.3 cm)  Weight: (!) 139.1 kg (306 lb 9.6 oz)  Body mass index is 42.76 kg/m².    Physical Exam:  Physical Exam:    Constitutional: He appears well-developed and well-nourished. No distress.     Eyes: Pupils are equal, round, and reactive to light. Conjunctivae and EOM are normal.     Cardiovascular: Normal rate, regular rhythm, normal pulses and no edema.     Abdominal: Soft. Bowel sounds are normal.     Skin: Skin displays no rash on trunk and no rash on extremities. Skin displays no lesions on trunk and no lesions on extremities.     Psych/Behavior: He is alert. He is oriented to person, place, and time. He has a normal mood and affect.     Musculoskeletal: Gait is normal.        Neck: Range of motion is full. There is no tenderness. Muscle strength is 5/5. Tone is normal.        Back: Range of motion is full. There is no tenderness. Muscle strength is 5/5. Tone is normal.        Right Upper Extremities: Range of motion is full. There is no tenderness. Muscle strength is 5/5. Tone is normal.        Left Upper Extremities: Range of motion is full. There is no tenderness. Muscle strength is 5/5. Tone is normal.       Right Lower Extremities: Range of motion is full. There is no tenderness. Muscle strength is 5/5. Tone is normal.        " Left Lower Extremities: Range of motion is full. There is no tenderness. Muscle strength is 5/5. Tone is normal.     Neurological:        Coordination: He has a normal Romberg Test, normal finger to nose coordination and normal tandem walking coordination.        Sensory: There is no sensory deficit in the trunk. There is no sensory deficit in the extremities.        DTRs: DTRs are DTRS NORMAL AND SYMMETRICnormal and symmetric. He displays no Babinski's sign on the right side. He displays no Babinski's sign on the left side.        Cranial nerves: Cranial nerve(s) II, III, IV, V, VI, VII, VIII, IX, X, XI and XII are intact.         Diagnostic Results:  He has no updated imaging studies available for review.    ASSESSMENT/PLAN:     Mr. Lund is a 48-year-old male status post intrathecal catheter and opioid pump placement.  He also underwent washout for Klebsiella infection.  He initially got great relief of his pain after the pump was filled with opioid medication.  However, this only lasted for 2 weeks and his pain returned.  He states that it really did seem as if the pump was helping at all after the 1st 2 weeks despite increasing pump dosing as well as bolus dosing.  This story is somewhat concerning for pump failure and catheter malfunction.  Therefore, I would like to get a dye study of the intrathecal catheter and pump to rule out catheter occlusion.  We will get this set up with interventional Radiology.  I will follow up with the patient once that is complete.  We will make a further treatment plan at that time.  The patient knows he can call with any further questions or concerns in the meantime.        Note dictated with voice recognition software, please excuse any grammatical errors.

## 2018-07-25 ENCOUNTER — HOSPITAL ENCOUNTER (OUTPATIENT)
Dept: INTERVENTIONAL RADIOLOGY/VASCULAR | Facility: HOSPITAL | Age: 48
Discharge: HOME | End: 2018-07-25
Attending: NEUROLOGICAL SURGERY
Payer: COMMERCIAL

## 2018-07-25 VITALS — HEART RATE: 82 BPM | OXYGEN SATURATION: 95 % | SYSTOLIC BLOOD PRESSURE: 132 MMHG | DIASTOLIC BLOOD PRESSURE: 62 MMHG

## 2018-07-25 DIAGNOSIS — M96.1 POSTLAMINECTOMY SYNDROME OF LUMBAR REGION: ICD-10-CM

## 2018-07-25 PROCEDURE — 61070 BRAIN CANAL SHUNT PROCEDURE: CPT

## 2018-07-25 PROCEDURE — 75809 NONVASCULAR SHUNT X-RAY: CPT | Mod: TC

## 2018-07-25 PROCEDURE — 61070 BRAIN CANAL SHUNT PROCEDURE: CPT | Mod: ,,, | Performed by: PSYCHIATRY & NEUROLOGY

## 2018-07-25 PROCEDURE — 25500020 PHARM REV CODE 255: Performed by: NEUROLOGICAL SURGERY

## 2018-07-25 PROCEDURE — 75809 NONVASCULAR SHUNT X-RAY: CPT | Mod: 26,,, | Performed by: PSYCHIATRY & NEUROLOGY

## 2018-07-25 RX ADMIN — IOHEXOL 4 ML: 300 INJECTION, SOLUTION INTRAVENOUS at 11:07

## 2018-07-25 NOTE — PROGRESS NOTES
Pt tolerated dye study well. Pt given all d/c instructions, pt verbalizes understanding of this. Dressing to right side of abdomen clean dry and intact. Pt able to ambulate to lobby at this time.

## 2018-07-25 NOTE — DISCHARGE INSTRUCTIONS
For scheduling: Call Karo at [phone]    For questions or concerns call: SU[facility] MON-FRI 8 AM- 5PM [phone]. Radiology resident on call [phone].    For immediate concerns that are not emergent, you may call our radiology clinic at: [phone]

## 2018-07-26 DIAGNOSIS — M79.669 PAIN AND SWELLING OF LOWER LEG, UNSPECIFIED LATERALITY: ICD-10-CM

## 2018-07-26 DIAGNOSIS — M79.89 PAIN AND SWELLING OF LOWER LEG, UNSPECIFIED LATERALITY: ICD-10-CM

## 2018-07-26 RX ORDER — FUROSEMIDE 40 MG/1
TABLET ORAL
Qty: 90 TABLET | Refills: 1 | Status: SHIPPED | OUTPATIENT
Start: 2018-07-26 | End: 2019-01-15 | Stop reason: SDUPTHER

## 2018-08-09 RX ORDER — TAMSULOSIN HYDROCHLORIDE 0.4 MG/1
CAPSULE ORAL
Qty: 90 CAPSULE | Refills: 1 | Status: SHIPPED | OUTPATIENT
Start: 2018-08-09 | End: 2018-11-10 | Stop reason: SDUPTHER

## 2018-08-09 NOTE — TELEPHONE ENCOUNTER
----- Message from Dwayne Bills sent at 8/9/2018  3:04 PM CDT -----  Contact: Mrs. Lund [phone]  REFILL:tamsulosin (FLOMAX) 0.4 mg Cp24    PHARMACY: [facility] - [place] [id number] [place] [id number]

## 2018-10-04 ENCOUNTER — OFFICE VISIT (OUTPATIENT)
Dept: FAMILY MEDICINE | Facility: CLINIC | Age: 48
End: 2018-10-04
Payer: COMMERCIAL

## 2018-10-04 VITALS
TEMPERATURE: 98 F | WEIGHT: 295.44 LBS | HEART RATE: 57 BPM | SYSTOLIC BLOOD PRESSURE: 110 MMHG | BODY MASS INDEX: 41.36 KG/M2 | OXYGEN SATURATION: 97 % | DIASTOLIC BLOOD PRESSURE: 70 MMHG | HEIGHT: 71 IN

## 2018-10-04 DIAGNOSIS — T14.8XXA WOUND INFECTION: Primary | ICD-10-CM

## 2018-10-04 DIAGNOSIS — S61.402A OPEN WOUND OF LEFT HAND WITHOUT FOREIGN BODY, UNSPECIFIED WOUND TYPE, INITIAL ENCOUNTER: ICD-10-CM

## 2018-10-04 DIAGNOSIS — T14.8XXD WOUND HEALING, DELAYED: ICD-10-CM

## 2018-10-04 DIAGNOSIS — S61.401A OPEN WOUND OF RIGHT HAND WITHOUT FOREIGN BODY, UNSPECIFIED WOUND TYPE, INITIAL ENCOUNTER: ICD-10-CM

## 2018-10-04 DIAGNOSIS — L08.9 WOUND INFECTION: Primary | ICD-10-CM

## 2018-10-04 PROCEDURE — 99214 OFFICE O/P EST MOD 30 MIN: CPT | Mod: S$GLB,,, | Performed by: INTERNAL MEDICINE

## 2018-10-04 PROCEDURE — 3074F SYST BP LT 130 MM HG: CPT | Mod: CPTII,S$GLB,, | Performed by: INTERNAL MEDICINE

## 2018-10-04 PROCEDURE — 3008F BODY MASS INDEX DOCD: CPT | Mod: CPTII,S$GLB,, | Performed by: INTERNAL MEDICINE

## 2018-10-04 PROCEDURE — 99999 PR PBB SHADOW E&M-EST. PATIENT-LVL III: CPT | Mod: PBBFAC,,, | Performed by: INTERNAL MEDICINE

## 2018-10-04 PROCEDURE — 3078F DIAST BP <80 MM HG: CPT | Mod: CPTII,S$GLB,, | Performed by: INTERNAL MEDICINE

## 2018-10-04 RX ORDER — METHOCARBAMOL 750 MG/1
750 TABLET, FILM COATED ORAL 3 TIMES DAILY PRN
Refills: 0 | COMMUNITY
Start: 2018-09-11 | End: 2019-04-11 | Stop reason: SDUPTHER

## 2018-10-04 RX ORDER — OXYCODONE HYDROCHLORIDE 30 MG/1
TABLET ORAL
Refills: 0 | Status: ON HOLD | COMMUNITY
Start: 2018-09-10 | End: 2020-11-17

## 2018-10-04 RX ORDER — CEPHALEXIN 500 MG/1
500 CAPSULE ORAL EVERY 12 HOURS
Qty: 20 CAPSULE | Refills: 0 | Status: SHIPPED | OUTPATIENT
Start: 2018-10-04 | End: 2018-10-14

## 2018-10-04 NOTE — PROGRESS NOTES
SUBJECTIVE     Chief Complaint   Patient presents with    Wound Infection     on hands that dont heal       HPI  Jaime Lund is a 48 y.o. male with multiple medical diagnoses as listed in the medical history and problem list that presents for evaluation of B/L hand wounds x 1 month. 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 Some of the wounds have blistered over and drained serous fluid. He has not been applying/taking any meds. Denies any fever, chills, or night sweats.    PAST MEDICAL HISTORY:  Past Medical History:   Diagnosis Date    Back pain, chronic     Bulging discs     Chronic pain following surgery or procedure     Degenerative disc disease     Hypertension     Hypokalemia     PAD (peripheral artery disease)     Post laminectomy syndrome     Seizures     Severe sepsis     Short-term memory loss     Thyroid disease     Subclinical hyperthyroidism       PAST SURGICAL HISTORY:  Past Surgical History:   Procedure Laterality Date    CHOLECYSTECTOMY      GASTRIC BYPASS      SLEEVE    gastric sleeve  2011    HERNIA REPAIR      IMPLANT-PUMP-INTRATHECAL N/A 4/3/2018    Performed by Meena Saavedra MD at [facility] OR [address] [place] [address]    pain pump  04/13/2018    SKIN SURGERY      EXCESS SKIN REMOVAL    SPINE SURGERY      lumbar fusion    WASHOUT of intrathecal pump N/A 4/13/2018    Performed by Pablo Yang MD at [facility] OR [address] [place] [address]       SOCIAL HISTORY:  Social History     Socioeconomic History    Marital status:      Spouse name: Not on file    Number of children: Not on file    Years of education: Not on file    Highest education level: Not on file   Social Needs    Financial resource strain: Not on file    Food insecurity - worry: Not on file    Food insecurity - inability: Not on file    Transportation needs - medical: Not on file    Transportation needs - non-medical: Not on file   Occupational History    Not on file   Tobacco Use    Smoking status: Never Smoker     Smokeless tobacco: Never Used   Substance and Sexual Activity    Alcohol use: No    Drug use: No    Sexual activity: Yes     Partners: Female   Other Topics Concern    Not on file   Social History Narrative    Not on file       FAMILY HISTORY:  Family History   Problem Relation Age of Onset    Heart disease Mother     Breast cancer Mother     Arthritis Mother     Hypertension Mother     Throat cancer Father     Hypertension Father        ALLERGIES AND MEDICATIONS: updated and reviewed.  Review of patient's allergies indicates:   Allergen Reactions    Klonopin [clonazepam] Anxiety and Other (See Comments)     Becomes agitated    Valium [diazepam] Other (See Comments)     Becomes agitated    Xanax [alprazolam] Anxiety and Other (See Comments)     Becomes agitated     Current Outpatient Medications   Medication Sig Dispense Refill    amitriptyline (ELAVIL) 50 MG tablet Take 50 mg by mouth nightly.       baclofen (LIORESAL) 10 MG tablet TAKE 1 OR 2 TABLETS BY MOUTH THREE TIMES DAILY FOR MUSCLE SPASMS.  0    butalbital-acetaminophen-caffeine -40 mg (FIORICET, ESGIC) -40 mg per tablet Take 1 tablet by mouth every 4 (four) hours as needed. 60 tablet 11    DULoxetine (CYMBALTA) 60 MG capsule TAKE 1 CAPSULE BY MOUTH EVERY EVENING 90 capsule 1    furosemide (LASIX) 40 MG tablet TAKE ONE TABLET BY MOUTH ONCE DAILY for fluid 90 tablet 1    gabapentin (NEURONTIN) 400 MG capsule       hydroCHLOROthiazide (HYDRODIURIL) 25 MG tablet TAKE ONE TABLET BY MOUTH ONCE DAILY FOR BLOOD PRESSURE AND OR FOR FLUID 90 tablet 0    meloxicam (MOBIC) 15 MG tablet TAKE ONE TABLET BY MOUTH ONCE DAILY WITH A MEAL FOR INFLAMMATION AND OR PAIN 60 tablet 5    methocarbamol (ROBAXIN) 750 MG Tab Take 750 mg by mouth 3 (three) times daily as needed.  0    oxyCODONE (ROXICODONE) 30 MG Tab TAKE 1/2 TABLET BY MOUTH SIX TIMES A DAY FOR PAIN  0    oxyCODONE-acetaminophen (PERCOCET)  mg per tablet Take 1 tablet by  "mouth every 4 (four) hours as needed for Pain (.). 30 tablet 0    propranolol (INDERAL) 10 MG tablet Take 1 tablet (10 mg total) by mouth 3 (three) times daily. 90 tablet 11    ranitidine (ZANTAC) 150 MG capsule Take 150 mg by mouth 2 (two) times daily.      tamsulosin (FLOMAX) 0.4 mg Cap TAKE 1 CAPSULE BY MOUTH ONCE DAILY FOR PROSTATE, morning 90 capsule 1    tiZANidine (ZANAFLEX) 2 MG tablet TAKE ONE TABLET BY MOUTH THREE TIMES DAILY TO CALM MUSCLES 90 tablet 3    cephALEXin (KEFLEX) 500 MG capsule Take 1 capsule (500 mg total) by mouth every 12 (twelve) hours. for 10 days 20 capsule 0     No current facility-administered medications for this visit.        ROS  Review of Systems   Constitutional: Negative for chills and fever.   HENT: Negative for hearing loss and sore throat.    Eyes: Negative for visual disturbance.   Respiratory: Negative for cough and shortness of breath.    Cardiovascular: Negative for chest pain, palpitations and leg swelling.   Gastrointestinal: Negative for abdominal pain, constipation, diarrhea, nausea and vomiting.   Genitourinary: Negative for dysuria, frequency and urgency.   Musculoskeletal: Negative for arthralgias, joint swelling and myalgias.   Skin: Positive for wound. Negative for rash.   Neurological: Negative for headaches.   Psychiatric/Behavioral: Negative for agitation and confusion. The patient is not nervous/anxious.          OBJECTIVE     Physical Exam  Vitals:    10/04/18 0837   BP: 110/70   Pulse: (!) 57   Temp: 98.3 °F (36.8 °C)    Body mass index is 41.2 kg/m².  Weight: 134 kg (295 lb 6.7 oz)   Height: 5' 11" (180.3 cm)     Physical Exam   Constitutional: He is oriented to person, place, and time. He appears well-developed and well-nourished. No distress.   HENT:   Head: Normocephalic and atraumatic.   Right Ear: External ear normal.   Left Ear: External ear normal.   Nose: Nose normal.   Mouth/Throat: Oropharynx is clear and moist.   Eyes: Conjunctivae and EOM " are normal. Right eye exhibits no discharge. Left eye exhibits no discharge. No scleral icterus.   Neck: Normal range of motion. Neck supple. No JVD present. No tracheal deviation present.   Pulmonary/Chest: Effort normal. No respiratory distress.   Musculoskeletal: Normal range of motion. He exhibits no deformity.   Neurological: He is alert and oriented to person, place, and time. He exhibits normal muscle tone. Coordination normal.   Skin: Skin is warm and dry. No rash noted. No erythema.   B/L open wounds of hands and arms as pictured below; minimal surrounding erythema, no drainage appreciated   Psychiatric: He has a normal mood and affect. His behavior is normal. Judgment and thought content normal.                         Health Maintenance       Date Due Completion Date    Influenza Vaccine 08/01/2018 ---    TETANUS VACCINE 11/30/2018 (Originally [birth year]/1987) ---    Lipid Panel 02/01/2022 2/1/2017            ASSESSMENT     48 y.o. male with     1. Wound infection    2. Open wound of left hand without foreign body, unspecified wound type, initial encounter    3. Open wound of right hand without foreign body, unspecified wound type, initial encounter    4. Wound healing, delayed        PLAN:     1. Wound infection  - Pt advised to take Abx to completion  - cephALEXin (KEFLEX) 500 MG capsule; Take 1 capsule (500 mg total) by mouth every 12 (twelve) hours. for 10 days  Dispense: 20 capsule; Refill: 0    2. Open wound of left hand without foreign body, unspecified wound type, initial encounter  - Seek medical attention for any worsening erythema with red streaking, edema, drainage, pain/tenderness, fever, chills, or night sweats    3. Open wound of right hand without foreign body, unspecified wound type, initial encounter  - Seek medical attention for any worsening erythema with red streaking, edema, drainage, pain/tenderness, fever, chills, or night sweats    4. Wound healing, delayed  - No sign of DM2M Hep C, or  hx of smoking so unknown etiology        RTC in 1-2 weeks as needed for any acute worsening of current condition or failure to improve        Gabby Dean MD  10/04/2018 8:53 AM        No Follow-up on file.

## 2018-10-19 DIAGNOSIS — M96.1 POSTLAMINECTOMY SYNDROME OF LUMBAR REGION: ICD-10-CM

## 2018-10-19 DIAGNOSIS — G89.4 CHRONIC PAIN SYNDROME: ICD-10-CM

## 2018-10-19 RX ORDER — TIZANIDINE 2 MG/1
TABLET ORAL
Qty: 90 TABLET | Refills: 3 | Status: SHIPPED | OUTPATIENT
Start: 2018-10-19 | End: 2020-06-01

## 2018-11-13 RX ORDER — TAMSULOSIN HYDROCHLORIDE 0.4 MG/1
CAPSULE ORAL
Qty: 90 CAPSULE | Refills: 1 | Status: SHIPPED | OUTPATIENT
Start: 2018-11-13 | End: 2019-05-18 | Stop reason: SDUPTHER

## 2018-12-10 RX ORDER — DULOXETIN HYDROCHLORIDE 60 MG/1
CAPSULE, DELAYED RELEASE ORAL
Qty: 90 CAPSULE | Refills: 1 | Status: SHIPPED | OUTPATIENT
Start: 2018-12-10 | End: 2019-10-26 | Stop reason: SDUPTHER

## 2019-01-14 DIAGNOSIS — I10 HYPERTENSION, ESSENTIAL, BENIGN: ICD-10-CM

## 2019-01-14 DIAGNOSIS — M47.816 FACET ARTHROPATHY, LUMBAR: ICD-10-CM

## 2019-01-14 RX ORDER — HYDROCHLOROTHIAZIDE 25 MG/1
TABLET ORAL
Qty: 90 TABLET | Refills: 1 | Status: SHIPPED | OUTPATIENT
Start: 2019-01-14 | End: 2019-04-11

## 2019-01-14 RX ORDER — MELOXICAM 15 MG/1
TABLET ORAL
Qty: 60 TABLET | Refills: 5 | Status: SHIPPED | OUTPATIENT
Start: 2019-01-14 | End: 2019-12-16 | Stop reason: SDUPTHER

## 2019-01-15 DIAGNOSIS — M79.89 PAIN AND SWELLING OF LOWER LEG, UNSPECIFIED LATERALITY: ICD-10-CM

## 2019-01-15 DIAGNOSIS — M79.669 PAIN AND SWELLING OF LOWER LEG, UNSPECIFIED LATERALITY: ICD-10-CM

## 2019-01-15 RX ORDER — FUROSEMIDE 40 MG/1
TABLET ORAL
Qty: 90 TABLET | Refills: 1 | Status: SHIPPED | OUTPATIENT
Start: 2019-01-15 | End: 2019-06-19 | Stop reason: ALTCHOICE

## 2019-01-30 RX ORDER — BUTALBITAL, ACETAMINOPHEN AND CAFFEINE 50; 325; 40 MG/1; MG/1; MG/1
TABLET ORAL
Qty: 60 TABLET | Refills: 5 | Status: SHIPPED | OUTPATIENT
Start: 2019-01-30 | End: 2019-08-05 | Stop reason: SDUPTHER

## 2019-03-12 DIAGNOSIS — Z12.11 COLON CANCER SCREENING: ICD-10-CM

## 2019-03-20 ENCOUNTER — PATIENT MESSAGE (OUTPATIENT)
Dept: FAMILY MEDICINE | Facility: CLINIC | Age: 49
End: 2019-03-20

## 2019-03-20 DIAGNOSIS — E05.90 SUBCLINICAL HYPERTHYROIDISM: ICD-10-CM

## 2019-03-20 DIAGNOSIS — I10 ESSENTIAL HYPERTENSION: Primary | ICD-10-CM

## 2019-04-11 ENCOUNTER — LAB VISIT (OUTPATIENT)
Dept: LAB | Facility: HOSPITAL | Age: 49
End: 2019-04-11
Attending: INTERNAL MEDICINE
Payer: COMMERCIAL

## 2019-04-11 ENCOUNTER — INFUSION (OUTPATIENT)
Dept: INFUSION THERAPY | Facility: HOSPITAL | Age: 49
End: 2019-04-11
Attending: INTERNAL MEDICINE
Payer: COMMERCIAL

## 2019-04-11 ENCOUNTER — OFFICE VISIT (OUTPATIENT)
Dept: FAMILY MEDICINE | Facility: CLINIC | Age: 49
End: 2019-04-11
Payer: COMMERCIAL

## 2019-04-11 ENCOUNTER — TELEPHONE (OUTPATIENT)
Dept: FAMILY MEDICINE | Facility: CLINIC | Age: 49
End: 2019-04-11

## 2019-04-11 VITALS
HEART RATE: 56 BPM | SYSTOLIC BLOOD PRESSURE: 120 MMHG | WEIGHT: 300.5 LBS | OXYGEN SATURATION: 98 % | TEMPERATURE: 98 F | BODY MASS INDEX: 42.07 KG/M2 | DIASTOLIC BLOOD PRESSURE: 84 MMHG | HEIGHT: 71 IN

## 2019-04-11 VITALS
TEMPERATURE: 98 F | OXYGEN SATURATION: 100 % | RESPIRATION RATE: 18 BRPM | SYSTOLIC BLOOD PRESSURE: 136 MMHG | HEART RATE: 58 BPM | DIASTOLIC BLOOD PRESSURE: 71 MMHG

## 2019-04-11 DIAGNOSIS — E16.2 HYPOGLYCEMIA: ICD-10-CM

## 2019-04-11 DIAGNOSIS — I10 ESSENTIAL HYPERTENSION: ICD-10-CM

## 2019-04-11 DIAGNOSIS — E05.90 SUBCLINICAL HYPERTHYROIDISM: ICD-10-CM

## 2019-04-11 DIAGNOSIS — I73.9 PERIPHERAL ARTERY DISEASE: ICD-10-CM

## 2019-04-11 DIAGNOSIS — R53.83 FATIGUE, UNSPECIFIED TYPE: Primary | ICD-10-CM

## 2019-04-11 DIAGNOSIS — E86.0 DEHYDRATION: Primary | ICD-10-CM

## 2019-04-11 DIAGNOSIS — R56.9 SEIZURE: ICD-10-CM

## 2019-04-11 DIAGNOSIS — R19.7 DIARRHEA, UNSPECIFIED TYPE: ICD-10-CM

## 2019-04-11 DIAGNOSIS — R11.0 NAUSEA: ICD-10-CM

## 2019-04-11 LAB
ALBUMIN SERPL BCP-MCNC: 4.1 G/DL (ref 3.5–5.2)
ALBUMIN SERPL BCP-MCNC: 4.1 G/DL (ref 3.5–5.2)
ALP SERPL-CCNC: 90 U/L (ref 55–135)
ALP SERPL-CCNC: 90 U/L (ref 55–135)
ALT SERPL W/O P-5'-P-CCNC: 14 U/L (ref 10–44)
ALT SERPL W/O P-5'-P-CCNC: 14 U/L (ref 10–44)
ANION GAP SERPL CALC-SCNC: 7 MMOL/L (ref 8–16)
ANION GAP SERPL CALC-SCNC: 7 MMOL/L (ref 8–16)
AST SERPL-CCNC: 26 U/L (ref 10–40)
AST SERPL-CCNC: 26 U/L (ref 10–40)
BASOPHILS # BLD AUTO: 0.03 K/UL (ref 0–0.2)
BASOPHILS # BLD AUTO: 0.03 K/UL (ref 0–0.2)
BASOPHILS NFR BLD: 0.6 % (ref 0–1.9)
BASOPHILS NFR BLD: 0.6 % (ref 0–1.9)
BILIRUB SERPL-MCNC: 0.3 MG/DL (ref 0.1–1)
BILIRUB SERPL-MCNC: 0.3 MG/DL (ref 0.1–1)
BUN SERPL-MCNC: 8 MG/DL (ref 6–20)
BUN SERPL-MCNC: 8 MG/DL (ref 6–20)
C PEPTIDE SERPL-MCNC: 4.02 NG/ML (ref 0.78–5.19)
CALCIUM SERPL-MCNC: 10.1 MG/DL (ref 8.7–10.5)
CALCIUM SERPL-MCNC: 10.1 MG/DL (ref 8.7–10.5)
CHLORIDE SERPL-SCNC: 101 MMOL/L (ref 95–110)
CHLORIDE SERPL-SCNC: 101 MMOL/L (ref 95–110)
CHOLEST SERPL-MCNC: 204 MG/DL (ref 120–199)
CHOLEST/HDLC SERPL: 3.5 {RATIO} (ref 2–5)
CO2 SERPL-SCNC: 32 MMOL/L (ref 23–29)
CO2 SERPL-SCNC: 32 MMOL/L (ref 23–29)
CREAT SERPL-MCNC: 0.8 MG/DL (ref 0.5–1.4)
CREAT SERPL-MCNC: 0.8 MG/DL (ref 0.5–1.4)
CRP SERPL-MCNC: 1 MG/L (ref 0–8.2)
DIFFERENTIAL METHOD: ABNORMAL
DIFFERENTIAL METHOD: ABNORMAL
EOSINOPHIL # BLD AUTO: 0.1 K/UL (ref 0–0.5)
EOSINOPHIL # BLD AUTO: 0.1 K/UL (ref 0–0.5)
EOSINOPHIL NFR BLD: 2.5 % (ref 0–8)
EOSINOPHIL NFR BLD: 2.5 % (ref 0–8)
ERYTHROCYTE [DISTWIDTH] IN BLOOD BY AUTOMATED COUNT: 13.8 % (ref 11.5–14.5)
ERYTHROCYTE [DISTWIDTH] IN BLOOD BY AUTOMATED COUNT: 13.8 % (ref 11.5–14.5)
ERYTHROCYTE [SEDIMENTATION RATE] IN BLOOD BY WESTERGREN METHOD: 7 MM/HR (ref 0–10)
EST. GFR  (AFRICAN AMERICAN): >60 ML/MIN/1.73 M^2
EST. GFR  (AFRICAN AMERICAN): >60 ML/MIN/1.73 M^2
EST. GFR  (NON AFRICAN AMERICAN): >60 ML/MIN/1.73 M^2
EST. GFR  (NON AFRICAN AMERICAN): >60 ML/MIN/1.73 M^2
GLUCOSE SERPL-MCNC: 74 MG/DL (ref 70–110)
GLUCOSE SERPL-MCNC: 74 MG/DL (ref 70–110)
HCT VFR BLD AUTO: 41 % (ref 40–54)
HCT VFR BLD AUTO: 41 % (ref 40–54)
HDLC SERPL-MCNC: 59 MG/DL (ref 40–75)
HDLC SERPL: 28.9 % (ref 20–50)
HGB BLD-MCNC: 13.1 G/DL (ref 14–18)
HGB BLD-MCNC: 13.1 G/DL (ref 14–18)
LDLC SERPL CALC-MCNC: 114.6 MG/DL (ref 63–159)
LYMPHOCYTES # BLD AUTO: 1.8 K/UL (ref 1–4.8)
LYMPHOCYTES # BLD AUTO: 1.8 K/UL (ref 1–4.8)
LYMPHOCYTES NFR BLD: 34.2 % (ref 18–48)
LYMPHOCYTES NFR BLD: 34.2 % (ref 18–48)
MCH RBC QN AUTO: 29.1 PG (ref 27–31)
MCH RBC QN AUTO: 29.1 PG (ref 27–31)
MCHC RBC AUTO-ENTMCNC: 32 G/DL (ref 32–36)
MCHC RBC AUTO-ENTMCNC: 32 G/DL (ref 32–36)
MCV RBC AUTO: 91 FL (ref 82–98)
MCV RBC AUTO: 91 FL (ref 82–98)
MONOCYTES # BLD AUTO: 0.4 K/UL (ref 0.3–1)
MONOCYTES # BLD AUTO: 0.4 K/UL (ref 0.3–1)
MONOCYTES NFR BLD: 8 % (ref 4–15)
MONOCYTES NFR BLD: 8 % (ref 4–15)
NEUTROPHILS # BLD AUTO: 2.8 K/UL (ref 1.8–7.7)
NEUTROPHILS # BLD AUTO: 2.8 K/UL (ref 1.8–7.7)
NEUTROPHILS NFR BLD: 54.7 % (ref 38–73)
NEUTROPHILS NFR BLD: 54.7 % (ref 38–73)
NONHDLC SERPL-MCNC: 145 MG/DL
PLATELET # BLD AUTO: 339 K/UL (ref 150–350)
PLATELET # BLD AUTO: 339 K/UL (ref 150–350)
PMV BLD AUTO: 9.9 FL (ref 9.2–12.9)
PMV BLD AUTO: 9.9 FL (ref 9.2–12.9)
POTASSIUM SERPL-SCNC: 3.5 MMOL/L (ref 3.5–5.1)
POTASSIUM SERPL-SCNC: 3.5 MMOL/L (ref 3.5–5.1)
PROT SERPL-MCNC: 7.7 G/DL (ref 6–8.4)
PROT SERPL-MCNC: 7.7 G/DL (ref 6–8.4)
RBC # BLD AUTO: 4.5 M/UL (ref 4.6–6.2)
RBC # BLD AUTO: 4.5 M/UL (ref 4.6–6.2)
SODIUM SERPL-SCNC: 140 MMOL/L (ref 136–145)
SODIUM SERPL-SCNC: 140 MMOL/L (ref 136–145)
T4 FREE SERPL-MCNC: 0.88 NG/DL (ref 0.71–1.51)
TRIGL SERPL-MCNC: 152 MG/DL (ref 30–150)
TSH SERPL DL<=0.005 MIU/L-ACNC: 0.34 UIU/ML (ref 0.4–4)
WBC # BLD AUTO: 5.14 K/UL (ref 3.9–12.7)
WBC # BLD AUTO: 5.14 K/UL (ref 3.9–12.7)

## 2019-04-11 PROCEDURE — 84439 ASSAY OF FREE THYROXINE: CPT

## 2019-04-11 PROCEDURE — 99214 OFFICE O/P EST MOD 30 MIN: CPT | Mod: S$GLB,,, | Performed by: INTERNAL MEDICINE

## 2019-04-11 PROCEDURE — 86140 C-REACTIVE PROTEIN: CPT

## 2019-04-11 PROCEDURE — 99214 PR OFFICE/OUTPT VISIT, EST, LEVL IV, 30-39 MIN: ICD-10-PCS | Mod: S$GLB,,, | Performed by: INTERNAL MEDICINE

## 2019-04-11 PROCEDURE — 3074F SYST BP LT 130 MM HG: CPT | Mod: CPTII,S$GLB,, | Performed by: INTERNAL MEDICINE

## 2019-04-11 PROCEDURE — 3079F PR MOST RECENT DIASTOLIC BLOOD PRESSURE 80-89 MM HG: ICD-10-PCS | Mod: CPTII,S$GLB,, | Performed by: INTERNAL MEDICINE

## 2019-04-11 PROCEDURE — 85652 RBC SED RATE AUTOMATED: CPT

## 2019-04-11 PROCEDURE — 85025 COMPLETE CBC W/AUTO DIFF WBC: CPT

## 2019-04-11 PROCEDURE — 96360 HYDRATION IV INFUSION INIT: CPT

## 2019-04-11 PROCEDURE — 3074F PR MOST RECENT SYSTOLIC BLOOD PRESSURE < 130 MM HG: ICD-10-PCS | Mod: CPTII,S$GLB,, | Performed by: INTERNAL MEDICINE

## 2019-04-11 PROCEDURE — 84443 ASSAY THYROID STIM HORMONE: CPT

## 2019-04-11 PROCEDURE — 3079F DIAST BP 80-89 MM HG: CPT | Mod: CPTII,S$GLB,, | Performed by: INTERNAL MEDICINE

## 2019-04-11 PROCEDURE — 80053 COMPREHEN METABOLIC PANEL: CPT

## 2019-04-11 PROCEDURE — 99999 PR PBB SHADOW E&M-EST. PATIENT-LVL III: CPT | Mod: PBBFAC,,, | Performed by: INTERNAL MEDICINE

## 2019-04-11 PROCEDURE — 3008F BODY MASS INDEX DOCD: CPT | Mod: CPTII,S$GLB,, | Performed by: INTERNAL MEDICINE

## 2019-04-11 PROCEDURE — 99999 PR PBB SHADOW E&M-EST. PATIENT-LVL III: ICD-10-PCS | Mod: PBBFAC,,, | Performed by: INTERNAL MEDICINE

## 2019-04-11 PROCEDURE — 36415 COLL VENOUS BLD VENIPUNCTURE: CPT | Mod: PO

## 2019-04-11 PROCEDURE — 83036 HEMOGLOBIN GLYCOSYLATED A1C: CPT

## 2019-04-11 PROCEDURE — 3008F PR BODY MASS INDEX (BMI) DOCUMENTED: ICD-10-PCS | Mod: CPTII,S$GLB,, | Performed by: INTERNAL MEDICINE

## 2019-04-11 PROCEDURE — 25000003 PHARM REV CODE 250: Performed by: INTERNAL MEDICINE

## 2019-04-11 PROCEDURE — 84681 ASSAY OF C-PEPTIDE: CPT

## 2019-04-11 PROCEDURE — 80061 LIPID PANEL: CPT

## 2019-04-11 RX ORDER — ONDANSETRON 4 MG/1
4 TABLET, FILM COATED ORAL EVERY 6 HOURS PRN
Qty: 20 TABLET | Refills: 0 | Status: SHIPPED | OUTPATIENT
Start: 2019-04-11 | End: 2021-01-07

## 2019-04-11 RX ORDER — METHOCARBAMOL 500 MG/1
TABLET, FILM COATED ORAL
Refills: 2 | COMMUNITY
Start: 2019-02-07 | End: 2019-08-19

## 2019-04-11 RX ADMIN — SODIUM CHLORIDE 1000 ML: 0.9 INJECTION, SOLUTION INTRAVENOUS at 10:04

## 2019-04-11 NOTE — TELEPHONE ENCOUNTER
Spoke with pts' wife and informed her that Dr. Dean has is not in the office this afternoon  And when the results have been reviewed the pt will be contacted. Wife states that since his IV infusion he has had constant nausea and diarrhea, please advise.

## 2019-04-11 NOTE — PROGRESS NOTES
SUBJECTIVE     Chief Complaint   Patient presents with    Excessive Sweating     glucose and bp levels have been in normal range.     Fatigue     did not take htz meds today    Dehydration       HPI  Jaime Lund is a 49 y.o. male with multiple medical diagnoses as listed in the medical history and problem list that presents for evaluation of dehydration since Saturday. 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 He has now had shakes/tremors, fatigue, decreased appetite, and sweating. His blood sugars have been down to 39 at one point. Diarrhea has now resolved. +night sweats; now also resolved. Denies any fever or chills.    PAST MEDICAL HISTORY:  Past Medical History:   Diagnosis Date    Back pain, chronic     Bulging discs     Chronic pain following surgery or procedure     Degenerative disc disease     Hypertension     Hypokalemia     PAD (peripheral artery disease)     Post laminectomy syndrome     Seizures     Severe sepsis     Short-term memory loss     Thyroid disease     Subclinical hyperthyroidism       PAST SURGICAL HISTORY:  Past Surgical History:   Procedure Laterality Date    CHOLECYSTECTOMY      GASTRIC BYPASS      SLEEVE    gastric sleeve  2011    HERNIA REPAIR      IMPLANT-PUMP-INTRATHECAL N/A 4/3/2018    Per[facility] OR [address] [place] [address]    pain pump  04/13/2018    SKIN SURGERY      EXCESS SKIN REMOVAL    SPINE SURGERY      lumbar fusion    WASHOUT of intrathecal pump N/A 4/13/2018    Performed by Pablo Yang MD at [facility] OR [address] [place] [address]       SOCIAL HISTORY:  Social History     Socioeconomic History    Marital status:      Spouse name: Not on file    Number of children: Not on file    Years of education: Not on file    Highest education level: Not on file   Occupational History    Not on file   Social Needs    Financial resource strain: Not on file    Food insecurity:     Worry: Not on file      Inability: Not on file    Transportation needs:     Medical: Not on file     Non-medical: Not on file   Tobacco Use    Smoking status: Never Smoker    Smokeless tobacco: Never Used   Substance and Sexual Activity    Alcohol use: No    Drug use: No    Sexual activity: Yes     Partners: Female   Lifestyle    Physical activity:     Days per week: Not on file     Minutes per session: Not on file    Stress: Not on file   Relationships    Social connections:     Talks on phone: Not on file     Gets together: Not on file     Attends Gn[employer] service: Not on file     Active member of club or organization: Not on file     Attends meetings of clubs or organizations: Not on file     Relationship status: Not on file   Other Topics Concern    Not on file   Social History Narrative    Not on file       FAMILY HISTORY:  Family History   Problem Relation Age of Onset    Heart disease Mother     Breast cancer Mother     Arthritis Mother     Hypertension Mother     Throat cancer Father     Hypertension Father        ALLERGIES AND MEDICATIONS: updated and reviewed.  Review of patient's allergies indicates:   Allergen Reactions    Klonopin [clonazepam] Anxiety and Other (See Comments)     Becomes agitated    Valium [diazepam] Other (See Comments)     Becomes agitated    Xanax [alprazolam] Anxiety and Other (See Comments)     Becomes agitated     Current Outpatient Medications   Medication Sig Dispense Refill    butalbital-acetaminophen-caffeine -40 mg (FIORICET, ESGIC) -40 mg per tablet TAKE ONE TABLET BY MOUTH EVERY 4 HOURS AS NEEDED 60 tablet 5    DULoxetine (CYMBALTA) 60 MG capsule TAKE 1 CAPSULE BY MOUTH EVERY EVENING. 90 capsule 1    furosemide (LASIX) 40 MG tablet TAKE ONE TABLET BY MOUTH ONCE DAILY FOR FLUID. 90 tablet 1    gabapentin (NEURONTIN) 400 MG capsule       meloxicam (MOBIC) 15 MG tablet TAKE ONE TABLET BY MOUTH ONCE DAILY WITH A MEAL FOR INFLAMMATION AND OR PAIN 60 tablet 5  "   methocarbamol (ROBAXIN) 500 MG Tab TAKE 1 AND 1/2 TABLETS BY MOUTH THREE TIMES DAILY TO CALM MUSCLES  2    oxyCODONE (ROXICODONE) 30 MG Tab TAKE 1/2 TABLET BY MOUTH SIX TIMES A DAY FOR PAIN  0    oxyCODONE-acetaminophen (PERCOCET)  mg per tablet Take 1 tablet by mouth every 4 (four) hours as needed for Pain (.). 30 tablet 0    ranitidine (ZANTAC) 150 MG capsule Take 150 mg by mouth 2 (two) times daily.      tamsulosin (FLOMAX) 0.4 mg Cap TAKE 1 CAPSULE BY MOUTH ONCE DAILY IN THE MORNING for prostate. 90 capsule 1    tiZANidine (ZANAFLEX) 2 MG tablet TAKE ONE TABLET BY MOUTH THREE TIMES DAILY TO CALM MUSCLES 90 tablet 3    ondansetron (ZOFRAN) 4 MG tablet Take 1 tablet (4 mg total) by mouth every 6 (six) hours as needed. 20 tablet 0     No current facility-administered medications for this visit.        ROS  Review of Systems   Constitutional: Negative for chills and fever.   HENT: Negative for hearing loss and sore throat.    Eyes: Negative for visual disturbance.   Respiratory: Negative for cough and shortness of breath.    Cardiovascular: Negative for chest pain, palpitations and leg swelling.   Gastrointestinal: Negative for abdominal pain, constipation, diarrhea, nausea and vomiting.   Genitourinary: Negative for dysuria, frequency and urgency.   Musculoskeletal: Negative for arthralgias, joint swelling and myalgias.   Skin: Negative for rash and wound.   Neurological: Negative for headaches.   Psychiatric/Behavioral: Negative for agitation and confusion. The patient is not nervous/anxious.          OBJECTIVE     Physical Exam  Vitals:    04/11/19 0905   BP: 120/84   Pulse: (!) 56   Temp: 97.7 °F (36.5 °C)    Body mass index is 41.91 kg/m².  Weight: (!) 136.3 kg (300 lb 7.8 oz)   Height: 5' 11" (180.3 cm)     Physical Exam   Constitutional: He is oriented to person, place, and time. He appears well-developed and well-nourished. No distress.   HENT:   Head: Normocephalic and atraumatic.   Right " Ear: External ear normal.   Left Ear: External ear normal.   Nose: Nose normal.   Mouth/Throat: Oropharynx is clear and moist.   Eyes: Conjunctivae and EOM are normal. Right eye exhibits no discharge. Left eye exhibits no discharge. No scleral icterus.   Neck: Normal range of motion. Neck supple. No JVD present. No tracheal deviation present.   Cardiovascular: Normal rate, regular rhythm, normal heart sounds and intact distal pulses. Exam reveals no gallop and no friction rub.   No murmur heard.  Pulmonary/Chest: Effort normal and breath sounds normal. No respiratory distress. He has no wheezes.   Abdominal: Soft. Bowel sounds are normal. He exhibits no distension and no mass. There is no tenderness. There is no rebound and no guarding.   Musculoskeletal: Normal range of motion. He exhibits no edema, tenderness or deformity.   Neurological: He is alert and oriented to person, place, and time. He exhibits normal muscle tone. Coordination normal.   Skin: Skin is warm and dry. Capillary refill takes 2 to 3 seconds. No rash noted. No erythema.   Decreased skin turgor   Psychiatric: He has a normal mood and affect. His behavior is normal. Judgment and thought content normal.         Health Maintenance       Date Due Completion Date    TETANUS VACCINE 03/08/1987 ---    Influenza Vaccine 08/01/2018 ---    Colonoscopy 03/08/2019 ---    Lipid Panel 02/01/2022 2/1/2017            ASSESSMENT     49 y.o. male with     1. Fatigue, unspecified type    2. Hypoglycemia    3. Diarrhea, unspecified type    4. Essential hypertension    5. Seizure    6. BMI 40.0-44.9, adult    7. Peripheral artery disease    8. Nausea        PLAN:     1. Fatigue, unspecified type  - Likely 2/2 decreased po intake with excessive sweating causing dehydration  - Will discontinue HCTZ    2. Hypoglycemia  - R/O DM2  - C-PEPTIDE; Future    3. Diarrhea, unspecified type  - Pt advised to increase fluids to keep well hydrated    4. Essential hypertension  - BP  well controlled; at goal of <140/90  - Will discontinue HCTZ 2/2 dehydration    5. Seizure  - Stable; no acute issues  - The current medical regimen is effective;  continue present plan and medications.    6. BMI 40.0-44.9, adult  - Pt aware of importance of eating a prudent diet and exercising    7. Peripheral artery disease  - Stable; no acute issues  - Monitor    8. Nausea  - ondansetron (ZOFRAN) 4 MG tablet; Take 1 tablet (4 mg total) by mouth every 6 (six) hours as needed.  Dispense: 20 tablet; Refill: 0        RTC in 3-5 days as needed for any acute worsening of current condition or failure to improve       Gabby Dean MD  04/11/2019 9:22 AM        No follow-ups on file.

## 2019-04-11 NOTE — PLAN OF CARE
"Problem: Adult Inpatient Plan of Care  Goal: Plan of Care Review  Outcome: Ongoing (interventions implemented as appropriate)  Pt presented to infusion center, from Dr. Dean' office, for 1L NS. Pt reported feeling weak recently and "passing out a couple of times."  Pt thinks this happened because he "works outside and gets overheated, doesn't drink enough water, and that his BP medicine is making him lose too much fluid." VSS in infusion center. Pt tolerated fluids well. Accompanied by spouse. Ambulatory into and out of unit. Distress screening tool completed. Future appt information reviewed with pt.       "

## 2019-04-12 DIAGNOSIS — Z12.11 ENCOUNTER FOR SCREENING COLONOSCOPY: ICD-10-CM

## 2019-04-12 DIAGNOSIS — I10 HYPERTENSION, ESSENTIAL, BENIGN: ICD-10-CM

## 2019-04-12 DIAGNOSIS — E05.90 SUBCLINICAL HYPERTHYROIDISM: Primary | ICD-10-CM

## 2019-04-12 LAB
ESTIMATED AVG GLUCOSE: 94 MG/DL (ref 68–131)
HBA1C MFR BLD HPLC: 4.9 % (ref 4–5.6)

## 2019-04-12 RX ORDER — HYDROCHLOROTHIAZIDE 25 MG/1
TABLET ORAL
Qty: 90 TABLET | Refills: 1 | OUTPATIENT
Start: 2019-04-12

## 2019-04-12 NOTE — TELEPHONE ENCOUNTER
Return call to Pt and informed of Rx called into the pharmacy- Zofran. Pt states, this morning my /60, [id number] and ask that the Provider be informed.

## 2019-04-12 NOTE — TELEPHONE ENCOUNTER
----- Message from Yvette Duarte sent at 4/12/2019  8:28 AM CDT -----  Contact: rashid Taylor [phone]  Type:  Patient Returning Call    Who Called: rashid Taylor     Would the patient rather a call back or a response via [facility]? Call Back    Best Call Back Number: [phone]    Thanks......Neda

## 2019-04-14 ENCOUNTER — PATIENT MESSAGE (OUTPATIENT)
Dept: FAMILY MEDICINE | Facility: CLINIC | Age: 49
End: 2019-04-14

## 2019-04-15 DIAGNOSIS — Z12.11 ENCOUNTER FOR SCREENING COLONOSCOPY: Primary | ICD-10-CM

## 2019-04-22 ENCOUNTER — TELEPHONE (OUTPATIENT)
Dept: FAMILY MEDICINE | Facility: CLINIC | Age: 49
End: 2019-04-22

## 2019-05-03 ENCOUNTER — LAB VISIT (OUTPATIENT)
Dept: LAB | Facility: HOSPITAL | Age: 49
End: 2019-05-03
Attending: INTERNAL MEDICINE
Payer: COMMERCIAL

## 2019-05-03 DIAGNOSIS — Z12.11 COLON CANCER SCREENING: ICD-10-CM

## 2019-05-03 LAB — HEMOCCULT STL QL IA: NEGATIVE

## 2019-05-03 PROCEDURE — 82274 ASSAY TEST FOR BLOOD FECAL: CPT

## 2019-05-19 RX ORDER — TAMSULOSIN HYDROCHLORIDE 0.4 MG/1
CAPSULE ORAL
Qty: 90 CAPSULE | Refills: 1 | Status: ON HOLD | OUTPATIENT
Start: 2019-05-19 | End: 2020-11-17

## 2019-05-28 ENCOUNTER — TELEPHONE (OUTPATIENT)
Dept: FAMILY MEDICINE | Facility: CLINIC | Age: 49
End: 2019-05-28

## 2019-05-28 NOTE — TELEPHONE ENCOUNTER
----- Message from Crystal Gleason LPN sent at 5/28/2019  2:47 PM CDT -----  Regarding: Cancel Colonoscopy Case  ?        05/28/2019  Medical Record # [id number]     Dear Gabby Dean MD,    An order for the following procedure,colonoscopy    ,was placed for Jaime Lund. Several attempts have been made  to schedule your patient at [phone] ([place])  have been made unsuccessfully. A letter was also sent to the patient on (5/16/19 ). Please follow up with your client regarding scheduling their procedure.        Sincerely,  Crystal Gleason LPN [phone]

## 2019-06-11 ENCOUNTER — TELEPHONE (OUTPATIENT)
Dept: NEUROSURGERY | Facility: CLINIC | Age: 49
End: 2019-06-11

## 2019-06-11 NOTE — TELEPHONE ENCOUNTER
----- Message from Heidi Potts sent at 6/11/2019  9:53 AM CDT -----  Contact: pt wife Yony  Type:  Sooner Apoointment Request    Caller is requesting a sooner appointment.  Caller declined first available appointment listed below.  Caller will not accept being placed on the waitlist and is requesting a message be sent to doctor.  Name of Caller yony  When is the first available appointment? 8/2019  Symptoms: pain pump clog not getting medication  Would the patient rather a call back or a response via [facility]?  Call back  Best Call Back Number [phone]  Additional Information:

## 2019-06-11 NOTE — TELEPHONE ENCOUNTER
----- Message from Heidi Potts sent at 6/11/2019  9:53 AM CDT -----  Contact: pt wife Yony  Type:  Sooner Apoointment Request    Caller is requesting a sooner appointment.  Caller declined first available appointment listed below.  Caller will not accept being placed on the waitlist and is requesting a message be sent to doctor.  Name of Caller yony  When is the first available appointment? 8/2019  Symptoms: pain pump clog not getting medication  Would the patient rather a call back or a response via [facility]?  Call back  Best Call Back Number [phone]  Additional Information:

## 2019-06-11 NOTE — TELEPHONE ENCOUNTER
Patients wife has been called. Patient's wife was informed that there was no sooner appointments for her  other then 8/19. Patient's wife was informed that her  will be placed on the high priority cancellation list for a sooner appointment.

## 2019-06-19 ENCOUNTER — LAB VISIT (OUTPATIENT)
Dept: LAB | Facility: HOSPITAL | Age: 49
End: 2019-06-19
Attending: INTERNAL MEDICINE
Payer: COMMERCIAL

## 2019-06-19 ENCOUNTER — TELEPHONE (OUTPATIENT)
Dept: VASCULAR SURGERY | Facility: CLINIC | Age: 49
End: 2019-06-19

## 2019-06-19 ENCOUNTER — OFFICE VISIT (OUTPATIENT)
Dept: FAMILY MEDICINE | Facility: CLINIC | Age: 49
End: 2019-06-19
Payer: COMMERCIAL

## 2019-06-19 VITALS
RESPIRATION RATE: 18 BRPM | WEIGHT: 315 LBS | HEIGHT: 71 IN | OXYGEN SATURATION: 97 % | BODY MASS INDEX: 44.1 KG/M2 | HEART RATE: 73 BPM | DIASTOLIC BLOOD PRESSURE: 80 MMHG | TEMPERATURE: 99 F | SYSTOLIC BLOOD PRESSURE: 144 MMHG

## 2019-06-19 DIAGNOSIS — I73.9 PERIPHERAL ARTERY DISEASE: ICD-10-CM

## 2019-06-19 DIAGNOSIS — I73.9 PVD (PERIPHERAL VASCULAR DISEASE): Primary | ICD-10-CM

## 2019-06-19 DIAGNOSIS — M79.89 LEG SWELLING: Primary | ICD-10-CM

## 2019-06-19 DIAGNOSIS — I10 ESSENTIAL HYPERTENSION: ICD-10-CM

## 2019-06-19 DIAGNOSIS — M79.89 LEG SWELLING: ICD-10-CM

## 2019-06-19 LAB
ALBUMIN SERPL BCP-MCNC: 3.7 G/DL (ref 3.5–5.2)
ALP SERPL-CCNC: 92 U/L (ref 55–135)
ALT SERPL W/O P-5'-P-CCNC: 16 U/L (ref 10–44)
ANION GAP SERPL CALC-SCNC: 8 MMOL/L (ref 8–16)
AST SERPL-CCNC: 20 U/L (ref 10–40)
BILIRUB SERPL-MCNC: 0.2 MG/DL (ref 0.1–1)
BNP SERPL-MCNC: 176 PG/ML (ref 0–99)
BUN SERPL-MCNC: 12 MG/DL (ref 6–20)
CALCIUM SERPL-MCNC: 9.4 MG/DL (ref 8.7–10.5)
CHLORIDE SERPL-SCNC: 102 MMOL/L (ref 95–110)
CO2 SERPL-SCNC: 34 MMOL/L (ref 23–29)
CREAT SERPL-MCNC: 0.8 MG/DL (ref 0.5–1.4)
EST. GFR  (AFRICAN AMERICAN): >60 ML/MIN/1.73 M^2
EST. GFR  (NON AFRICAN AMERICAN): >60 ML/MIN/1.73 M^2
GLUCOSE SERPL-MCNC: 84 MG/DL (ref 70–110)
POTASSIUM SERPL-SCNC: 3.8 MMOL/L (ref 3.5–5.1)
PROT SERPL-MCNC: 7.2 G/DL (ref 6–8.4)
SODIUM SERPL-SCNC: 144 MMOL/L (ref 136–145)

## 2019-06-19 PROCEDURE — 3077F SYST BP >= 140 MM HG: CPT | Mod: CPTII,S$GLB,, | Performed by: PHYSICIAN ASSISTANT

## 2019-06-19 PROCEDURE — 99999 PR PBB SHADOW E&M-EST. PATIENT-LVL V: ICD-10-PCS | Mod: PBBFAC,,, | Performed by: PHYSICIAN ASSISTANT

## 2019-06-19 PROCEDURE — 99999 PR PBB SHADOW E&M-EST. PATIENT-LVL V: CPT | Mod: PBBFAC,,, | Performed by: PHYSICIAN ASSISTANT

## 2019-06-19 PROCEDURE — 3008F PR BODY MASS INDEX (BMI) DOCUMENTED: ICD-10-PCS | Mod: CPTII,S$GLB,, | Performed by: PHYSICIAN ASSISTANT

## 2019-06-19 PROCEDURE — 3079F DIAST BP 80-89 MM HG: CPT | Mod: CPTII,S$GLB,, | Performed by: PHYSICIAN ASSISTANT

## 2019-06-19 PROCEDURE — 3077F PR MOST RECENT SYSTOLIC BLOOD PRESSURE >= 140 MM HG: ICD-10-PCS | Mod: CPTII,S$GLB,, | Performed by: PHYSICIAN ASSISTANT

## 2019-06-19 PROCEDURE — 3079F PR MOST RECENT DIASTOLIC BLOOD PRESSURE 80-89 MM HG: ICD-10-PCS | Mod: CPTII,S$GLB,, | Performed by: PHYSICIAN ASSISTANT

## 2019-06-19 PROCEDURE — 99214 PR OFFICE/OUTPT VISIT, EST, LEVL IV, 30-39 MIN: ICD-10-PCS | Mod: S$GLB,,, | Performed by: PHYSICIAN ASSISTANT

## 2019-06-19 PROCEDURE — 36415 COLL VENOUS BLD VENIPUNCTURE: CPT

## 2019-06-19 PROCEDURE — 3008F BODY MASS INDEX DOCD: CPT | Mod: CPTII,S$GLB,, | Performed by: PHYSICIAN ASSISTANT

## 2019-06-19 PROCEDURE — 83880 ASSAY OF NATRIURETIC PEPTIDE: CPT

## 2019-06-19 PROCEDURE — 99214 OFFICE O/P EST MOD 30 MIN: CPT | Mod: S$GLB,,, | Performed by: PHYSICIAN ASSISTANT

## 2019-06-19 PROCEDURE — 80053 COMPREHEN METABOLIC PANEL: CPT

## 2019-06-19 RX ORDER — POTASSIUM CHLORIDE 750 MG/1
TABLET, EXTENDED RELEASE ORAL
Qty: 30 TABLET | Refills: 0 | Status: SHIPPED | OUTPATIENT
Start: 2019-06-19 | End: 2019-07-27 | Stop reason: SDUPTHER

## 2019-06-19 RX ORDER — TORSEMIDE 10 MG/1
10 TABLET ORAL 2 TIMES DAILY PRN
Qty: 30 TABLET | Refills: 0 | Status: ON HOLD | OUTPATIENT
Start: 2019-06-19 | End: 2020-11-28 | Stop reason: HOSPADM

## 2019-06-19 NOTE — PROGRESS NOTES
Subjective:       Patient ID: Jaime Lund is a 49 y.o. male with multiple medical diagnoses as listed in the medical history and problem list that presents for Follow-up (fluid in bilateral lower extremities)  .    Chief Complaint: Follow-up (fluid in bilateral lower extremities)      Edema   This is a chronic problem. The current episode started in the past 7 days (worse last 3-4 days; states he weighs himself daily and gained about 20 lbs ). The problem has been gradually worsening. Pertinent negatives include no abdominal pain, chest pain, chills, diaphoresis, headaches, nausea or vomiting. Associated symptoms comments:  so works on his feet all day  Does not wear compression stockings  Elevated on recliner so not above head  Granddaughter in hospital so eating out a lot like canes so not watching salt consumption   Has not drank sodas he says in the last couple days    . Treatments tried: lasix 40 mg BID for last 3-4 days  The treatment provided no relief.        Review of Systems   Constitutional: Negative for chills and diaphoresis.   Respiratory: Positive for shortness of breath. Negative for choking, chest tightness and wheezing.    Cardiovascular: Positive for leg swelling. Negative for chest pain and palpitations.   Gastrointestinal: Negative for abdominal pain, nausea and vomiting.   Neurological: Negative for headaches.         PAST MEDICAL HISTORY:  Past Medical History:   Diagnosis Date    Back pain, chronic     Bulging discs     Chronic pain following surgery or procedure     Degenerative disc disease     Hypertension     Hypokalemia     PAD (peripheral artery disease)     Post laminectomy syndrome     Seizures     Severe sepsis     Short-term memory loss     Thyroid disease     Subclinical hyperthyroidism       SOCIAL HISTORY:  Social History     Socioeconomic History    Marital status:      Spouse name: Not on file    Number of children: Not on file    Years of  education: Not on file    Highest education level: Not on file   Occupational History    Not on file   Social Needs    Financial resource strain: Not on file    Food insecurity:     Worry: Not on file     Inability: Not on file    Transportation needs:     Medical: Not on file     Non-medical: Not on file   Tobacco Use    Smoking status: Never Smoker    Smokeless tobacco: Never Used   Substance and Sexual Activity    Alcohol use: No    Drug use: No    Sexual activity: Yes     Partners: Female   Lifestyle    Physical activity:     Days per week: Not on file     Minutes per session: Not on file    Stress: To some extent   Relationships    Social connections:     Talks on phone: Not on file     Gets together: Not on file     Attends [employer] service: Not on file     Active member of club or organization: Not on file     Attends meetings of clubs or organizations: Not on file     Relationship status: Not on file   Other Topics Concern    Not on file   Social History Narrative    Not on file       ALLERGIES AND MEDICATIONS: updated and reviewed.  Review of patient's allergies indicates:   Allergen Reactions    Klonopin [clonazepam] Anxiety and Other (See Comments)     Becomes agitated    Valium [diazepam] Other (See Comments)     Becomes agitated    Xanax [alprazolam] Anxiety and Other (See Comments)     Becomes agitated     Current Outpatient Medications   Medication Sig Dispense Refill    butalbital-acetaminophen-caffeine -40 mg (FIORICET, ESGIC) -40 mg per tablet TAKE ONE TABLET BY MOUTH EVERY 4 HOURS AS NEEDED 60 tablet 5    DULoxetine (CYMBALTA) 60 MG capsule TAKE 1 CAPSULE BY MOUTH EVERY EVENING. 90 capsule 1    gabapentin (NEURONTIN) 400 MG capsule       meloxicam (MOBIC) 15 MG tablet TAKE ONE TABLET BY MOUTH ONCE DAILY WITH A MEAL FOR INFLAMMATION AND OR PAIN 60 tablet 5    methocarbamol (ROBAXIN) 500 MG Tab TAKE 1 AND 1/2 TABLETS BY MOUTH THREE TIMES DAILY TO CALM MUSCLES  2  "   ondansetron (ZOFRAN) 4 MG tablet Take 1 tablet (4 mg total) by mouth every 6 (six) hours as needed. 20 tablet 0    oxyCODONE (ROXICODONE) 30 MG Tab TAKE 1/2 TABLET BY MOUTH SIX TIMES A DAY FOR PAIN  0    ranitidine (ZANTAC) 150 MG capsule Take 150 mg by mouth 2 (two) times daily.      tamsulosin (FLOMAX) 0.4 mg Cap TAKE 1 CAPSULE BY MOUTH ONCE DAILY IN THE MORNING for prostate. 90 capsule 1    tiZANidine (ZANAFLEX) 2 MG tablet TAKE ONE TABLET BY MOUTH THREE TIMES DAILY TO CALM MUSCLES 90 tablet 3    oxyCODONE-acetaminophen (PERCOCET)  mg per tablet Take 1 tablet by mouth every 4 (four) hours as needed for Pain (.). 30 tablet 0    potassium chloride (KLOR-CON) 10 MEQ TbSR Daily prn with fluid pill 30 tablet 0    torsemide (DEMADEX) 10 MG Tab Take 1 tablet (10 mg total) by mouth 2 (two) times daily as needed. 30 tablet 0     No current facility-administered medications for this visit.          Objective:   BP (!) 144/80   Pulse 73   Temp 98.7 °F (37.1 °C) (Oral)   Resp 18   Ht 5' 11" (1.803 m)   Wt (!) 145.1 kg (319 lb 14.2 oz)   SpO2 97%   BMI 44.62 kg/m²      Physical Exam   Constitutional: He is oriented to person, place, and time. No distress.   HENT:   Head: Normocephalic and atraumatic.   Eyes: Conjunctivae and EOM are normal.   Cardiovascular: Normal rate and regular rhythm.   Pulmonary/Chest: Effort normal and breath sounds normal. No stridor. He has no decreased breath sounds. He has no wheezes. He has no rhonchi. He has no rales.   Musculoskeletal: He exhibits edema (+1 bilaterally ).   Neurological: He is alert and oriented to person, place, and time.           Assessment:       1. Leg swelling    2. Peripheral artery disease    3. Essential hypertension        Plan:       Leg swelling  -     Ambulatory referral to Vascular Surgery  -     Comprehensive metabolic panel; Future; Expected date: 06/19/2019  -     Brain natriuretic peptide; Future; Expected date: 06/19/2019  -     " torsemide (DEMADEX) 10 MG Tab; Take 1 tablet (10 mg total) by mouth 2 (two) times daily as needed.  Dispense: 30 tablet; Refill: 0  -     potassium chloride (KLOR-CON) 10 MEQ TbSR; Daily prn with fluid pill  Dispense: 30 tablet; Refill: 0  -will contact with results   Continue weights.   Given ACE wraps to provide compression.  Wear compression stockings  Legs need to be elevated above heart.   Limit sodium to 2 g a day.   Limit fluid intake to 6 cups a day.     Peripheral artery disease  -     Ambulatory referral to Vascular Surgery    Essential hypertension  Follow up in 2 weeks  States he was d/c on BP meds due to low pressure and dehydration.           No follow-ups on file.

## 2019-06-19 NOTE — TELEPHONE ENCOUNTER
Pt need to be seen on the [facility] for a urgent appointment. I booked the pt for the first available

## 2019-06-19 NOTE — PATIENT INSTRUCTIONS
Peripheral Artery Disease (PAD)     Peripheral artery disease (PAD) occurs when the arteries that carry blood to the limbs are narrowed or blocked. This is usually due to a buildup of a fatty substance called plaque in the walls of the arteries.  PAD most often affects the arteries in the legs. When these arteries are narrowed or blocked, blood flow to the legs is reduced. This can cause leg and foot pain and other symptoms. If severe enough, reduced blood flow to the legs can lead to tissue death (gangrene) and the loss of a toe, foot, or leg. Having PAD also makes it more likely that arteries in other body areas are blocked. For instance, arteries that carry blood to the heart or brain may be affected. This raises the chances of heart attack and stroke.  Risk factors  Certain factors can make PAD more likely. They include:  · Smoking  · Diabetes  · High blood pressure  · Unhealthy cholesterol levels  · Obesity  · Inactive lifestyle  · Older age  · Family history of PAD  Symptoms  Many people with PAD have no symptoms. If symptoms do occur, they can include:  · Pain in the muscles of the calves, thighs or hips that gets worse with activity and better with rest (intermittent claudication)  · Achy, tired, or heavy feeling in the legs  · Weakness, numbness, tingling, or loss of feeling in the legs  · Changes in skin color of the legs  · Sores on the legs and feet  · Cold leg, feet, or toes  · Pain the feet or toes even when lying down (rest pain)  Home care  PAD is a chronic (lifelong) condition. Treatment is focused on managing your condition and lowering your health risks. This may include doing the following:  · If you smoke, quit. This helps prevent further damage to your arteries and lowers your health risks. Ask your provider about medicines or products that can help you quit smoking. Also consider joining a stop-smoking program or support group.  · Be more active. This helps you lose weight and manage  problems such as high blood pressure and unhealthy cholesterol levels. Start a walking program if advised to by your provider. Your provider may also help you form a safe exercise program that is right for your needs.  · Make healthy eating changes. This includes eating less fat, salt, and sugar.  · Take medicines for high blood pressure, unhealthy cholesterol levels, and diabetes as directed.  · Have your blood pressure and cholesterol levels checked as often as directed.  · If you have diabetes, try to keep your blood sugar well controlled. Test your blood sugar as directed.  · If you are overweight, talk to your provider about forming a weight-loss plan.  · Watch for cuts, scrapes, or open sores on your feet. Poor blood flow to the feet may slow healing and increase the risk of infection from these problems.   Follow-up care  Follow up with your healthcare provider, or as advised. If imaging tests such as ultrasound were done, they will be reviewed by a doctor. You will be told the results and any new findings that may affect your care.  When to seek medical advice   Call your healthcare provider right away if any of these occur:  · Sudden severe pain in the legs or feet  · Sudden cold, pale or blue color in the legs or feet  · Weakness or numbness in the legs or feet that worsens  · Any sore or wound in the legs or feet that wont heal  · Weak pulse in your legs or feet  Know the Signs of Heart Attack and Stroke  People with PAD are at high risk for heart attack and stroke. Knowing the signs of these problems can help you protect your health and get help when you need it. Call 911 right away if you have any of the following:  Signs of a Heart Attack  · Chest discomfort, such as pain, aching, tightness, or pressure that lasts more than a few minutes, or that comes and goes  · Pain or discomfort in the arms, back, shoulders, neck, or jaw  · Shortness of breath  · Sweating (often a cold, clammy  sweat)  · Nausea  · Lightheadedness  Signs of a Stroke  · Sudden numbness or weakness of the face, arms, or legs, especially on one side  · Sudden confusion or trouble speaking or understanding  · Sudden trouble seeing in one or both eyes  · Sudden trouble walking, dizziness, or loss of balance  · Sudden, severe headache with no known cause   Date Last Reviewed: 9/21/2015  © 2593-9417 [employer]. [address] [place] [address], [place] [address]. All rights reserved. This information is not intended as a substitute for professional medical care. Always follow your healthcare professional's instructions.        Understanding Chronic Venous Insufficiency  Problems with the veins in the legs may lead to chronic venous insufficiency (CVI). CVI means that there is a long-term problem with the veins not being able to pump blood back to your heart. When this happens, blood stays in the legs and causes swelling and aching.   Two problems that may lead to chronic venous insufficiency are:  · Damaged valves. Valves keep blood flowing from the legs through the blood vessels and back to the heart. When the valves are damaged, blood does not flow as well.   · Deep vein thrombosis (DVT). Blood clots may form in the deep veins of the legs. This may cause pain, redness, and swelling in the legs. It may also block the flow of blood back to the heart. Seek immediate medical care if you have these symptoms.  · A blood clot in the leg can also break off and travel to the lungs. This is called pulmonary embolism (PE). In the lungs, the clot can cut off the flow of blood. This may cause chest pain, trouble breathing, sweating, a fast heartbeat, coughing (may cough up blood), and fainting. It is a medical emergency and may cause death. Call 911 if you have these symptoms.  · Healthcare providers call the two conditions, DVT and PE, venous thromboembolism (VTE).  CVI cant be cured, but you can control leg swelling to reduce the  likelihood of ulcers (sores).  Recognizing the symptoms  Be aware of the following:  · If you stand or sit with your feet down for long periods, your legs may ache or feel heavy.  · Swollen ankles are possibly the most common symptom of CVI.  · As swelling increases, the skin over your ankles may show red spots or a brownish tinge. The skin may feel leathery or scaly, and may start to itch.  · If swelling is not controlled, an ulcer (open wound) may form.  What you can do  Reduce your risk of developing ulcers by doing the following:  · Increase blood flow back to your heart by elevating your legs, exercising daily, and wearing elastic stockings.  · Boost blood flow in your legs by losing excess weight.  · If you must stand or sit in one place for a period of time, keep your blood moving by wiggling your toes, shifting your body position, and rising up on the balls of your feet.    Date Last Reviewed: 5/1/2016 © 2000-2017 [employer]. [address] [place] [address], [place] [address]. All rights reserved. This information is not intended as a substitute for professional medical care. Always follow your healthcare professional's instructions.

## 2019-06-20 ENCOUNTER — OFFICE VISIT (OUTPATIENT)
Dept: VASCULAR SURGERY | Facility: CLINIC | Age: 49
End: 2019-06-20
Payer: COMMERCIAL

## 2019-06-20 ENCOUNTER — TELEPHONE (OUTPATIENT)
Dept: VASCULAR SURGERY | Facility: CLINIC | Age: 49
End: 2019-06-20

## 2019-06-20 VITALS
SYSTOLIC BLOOD PRESSURE: 114 MMHG | HEIGHT: 71 IN | WEIGHT: 315 LBS | DIASTOLIC BLOOD PRESSURE: 80 MMHG | BODY MASS INDEX: 44.1 KG/M2 | HEART RATE: 62 BPM

## 2019-06-20 DIAGNOSIS — R60.0 BILATERAL LEG EDEMA: ICD-10-CM

## 2019-06-20 DIAGNOSIS — I87.2 VENOUS INSUFFICIENCY: Primary | ICD-10-CM

## 2019-06-20 PROCEDURE — 99244 OFF/OP CNSLTJ NEW/EST MOD 40: CPT | Mod: S$GLB,,, | Performed by: SURGERY

## 2019-06-20 PROCEDURE — 99999 PR PBB SHADOW E&M-EST. PATIENT-LVL III: ICD-10-PCS | Mod: PBBFAC,,, | Performed by: SURGERY

## 2019-06-20 PROCEDURE — 99244 PR OFFICE CONSULTATION,LEVEL IV: ICD-10-PCS | Mod: S$GLB,,, | Performed by: SURGERY

## 2019-06-20 PROCEDURE — 99999 PR PBB SHADOW E&M-EST. PATIENT-LVL III: CPT | Mod: PBBFAC,,, | Performed by: SURGERY

## 2019-06-20 NOTE — PROGRESS NOTES
Patient ID: Jaime Lund is a 49 y.o. male.    I. HISTORY     Chief Complaint: Leg Swelling and Leg Pain      HPI Jaime Lund is a 49 y.o. male referred from Dr Dean for evaluation of persistent bilateral leg pain and swelling. Pain interfers with daily activities including exercise; has attempted elevation and analgesics with minimal relief and pain persists. No history of DVT or prior venous ablation procedures. No history of venous ulceration. Denies claudication or rest pain. Has not tried compression stockings. Swelling occurs with prolonged standing or sitting and is located in the lower leg and ankles. Swelling improves with leg elevation overnight. Pain described as cramping and aching.    Works on a [employer] t[employer]. Accompanied by his wife.    Interval history:  Cont to experience BLE edema and pain.  +elevation and ACE wrapping.    Review of Systems   Constitution: Negative.   HENT: Negative.    Eyes: Negative.    Cardiovascular: Positive for leg swelling. Negative for chest pain, claudication and dyspnea on exertion.   Respiratory: Negative for cough and shortness of breath.    Endocrine: Negative.    Hematologic/Lymphatic: Negative.    Skin: Negative for color change, nail changes and poor wound healing.   Musculoskeletal: Negative.    Gastrointestinal: Negative for abdominal pain, nausea and vomiting.   Neurological: Negative.    Psychiatric/Behavioral: Negative.    Allergic/Immunologic: Negative.        II. PHYSICAL EXAM     Physical Exam   Constitutional: He is oriented to person, place, and time. He appears well-developed and well-nourished.   BMI 42.92 kg/m².   HENT:   Head: Normocephalic and atraumatic.   Eyes: Conjunctivae and EOM are normal.   Neck: No JVD present.   Cardiovascular: Normal rate and regular rhythm.   Pulses:       Radial pulses are 2+ on the right side, and 2+ on the left side.        Dorsalis pedis pulses are 2+ on the right side, and 2+ on the left side.    Pulmonary/Chest: No respiratory distress.   Musculoskeletal: Normal range of motion. He exhibits edema. He exhibits no tenderness.   Neurological: He is alert and oriented to person, place, and time.   Skin: Skin is warm and dry. No rash noted. No erythema.   Psychiatric: He has a normal mood and affect.   Vitals reviewed.      III. ASSESSMENT & PLAN (MEDICAL DECISION MAKING)     No diagnosis found.    Imaging Results:  Venous duplex - No DVT, reflux not evaluated    ECHO - normal cardiac function, EF 55%    Labs reviewed - normal renal functon    Assessment/Diagnosis and Plan:  Jaime Lund is a 49 y.o. male with significant leg swelling. Normal cardiac and renal function. Swelling in 2017 felt to be likely due to venous insufficiency and morbid obesity.     Recommend compression with Rx stockings, elevation, dietary changes associated with water and sodium intake discussed at length with patient  Continue regular exercise.  Venous duplex to evaluate reflux   RTC 3 mo    Vincent Turner MD [facility]  Vascular & Endovascular Surgery

## 2019-06-20 NOTE — PATIENT INSTRUCTIONS
Putting on Compression Stockings     Turn the stocking inside-out, then fit it over your toes and heel.          Roll the stocking up your leg.            Once stockings are on, make sure the top of the stocking is about two fingers width below the crease of the knee (or the groin if you wear thigh-high stockings).          Use equipment, such as a stocking hemalatha, or wear rubber gloves to make it easier to put on compression stockings.         Elastic compression stockings are prescribed to treat many vein problems. Wearing them may be the most important thing you do to manage your symptoms. The stockings fit tightly around your ankle, gradually reducing in pressure as they go up your legs. This helps keep blood flowing to your heart. As a result, swelling is reduced. Your healthcare provider will prescribe stockings at a safe pressure for you. He or she will also tell you how often to wear and remove the stockings. Follow these instructions closely. Also, do not buy or wear compression stockings without first seeing your healthcare provider.  Tips for wear and care  To wear stockings safely and to get the most benefit:  · Wear the length prescribed by your healthcare provider.  · Pull them to the designated height and no farther. Dont let them bunch at the top. This can restrict blood flow and increase swelling.  · Wear the stockings for the amount of time your healthcare provider recommends. Replace them when they start to feel loose. This will likely be every 3 to 6 months.  · Remove them as your healthcare provider directs. When removed, wash your legs. Then check your legs and feet for sores. Call your healthcare provider if you find a sore. Dont put the stockings back on unless your healthcare provider directs.   · Wash the stockings as instructed. They may need to be hand-washed.  Date Last Reviewed: 5/1/2016  © 3933-5544 [employer]. [address] [place] [address], [place] [address]. All rights  reserved. This information is not intended as a substitute for professional medical care. Always follow your healthcare professional's instructions.        Tips for Using Less Salt    Most people with heart problems need to eat less salt (sodium). Reducing the amount of salt you eat may help control your blood pressure. The higher your blood pressure, the greater your risk for heart disease, stroke, blindness, and kidney problems.  At the store  · Make low-salt choices by reading labels carefully. Look for the total amount of sodium per serving.  · Use more fresh food. Buy more fruits and vegetables. Select lean meats, fish, and poultry.  · Use fewer frozen, canned, and packaged foods which often contain a lot of sodium.  · Use plain frozen vegetables without sauces or toppings. These products are often low- or no-sodium.  · Opt for reduced-sodium or no-salt-added versions of canned vegetables and soups.  In the kitchen  · Don't add salt to food when you're cooking. Season with flavorings such as onion, garlic, pepper, salt-free herbal blends, and lemon or lime juice.  · Use a cookbook containing low-salt recipes. It can give you ideas for tasty meals that are healthy for your heart.  · Sprinkle salt-free herbal blends on vegetables and meat.  · Drain and rinse canned foods, such as canned beans and vegetables, before cooking or eating.  Eating out  · Tell the  you're on a low-salt diet. Ask questions about the menu.  · Order fish, chicken, and meat broiled, baked, poached, or grilled without salt, butter, or breading.  · Use lemon, pepper, and salt-free herb mixes to add flavor.  · Choose plain steamed rice, boiled noodles, and baked or boiled potatoes. Top potatoes with chives and a little sour cream.     Beware! Salt goes by many other names. Limit foods with these words listed as ingredients: salt, sodium, soy sauce, baking soda, baking powder, MSG, monosodium, Na (the chemical symbol for sodium). Some  antacids are also high in salt.   Date Last Reviewed: 6/19/2015  © 5231-7107 [employer]. [address] [place] [address], [place] [address]. All rights reserved. This information is not intended as a substitute for professional medical care. Always follow your healthcare professional's instructions.        Low-Salt Diet  This diet removes foods that are high in salt. It also limits the amount of salt you use when cooking. It is most often used for people with high blood pressure, edema (fluid retention), and kidney, liver, or heart disease.  Table salt contains the mineral sodium. Your body needs sodium to work normally. But too much sodium can make your health problems worse. Your healthcare provider is recommending a low-salt (also called low-sodium) diet for you. Your total daily allowance of salt is 1,500 to 2,300 milligrams (mg). It is less than 1 teaspoon of table salt. This means you can have only about 500 to 700 mg of sodium at each meal. People with certain health problems should limit salt intake to the lower end of the recommended range.    When you cook, dont add much salt. If you can cook without using salt, even better. Dont add salt to your food at the table.  When shopping, read food labels. Salt is often called sodium on the label. Choose foods that are salt-free, low salt, or very low salt. Note that foods with reduced salt may not lower your salt intake enough.    Beans, potatoes, and pasta  Ok: Dry beans, split peas, lentils, potatoes, rice, macaroni, pasta, spaghetti without added salt  Avoid: Potato chips, tortilla chips, and similar products  Breads and cereals  Ok: Low-sodium breads, rolls, cereals, and cakes; low-salt crackers, matzo crackers  Avoid: Salted crackers, pretzels, popcorn, Belarusian toast, pancakes, muffins  Dairy  Ok: Milk, chocolate milk, hot chocolate mix, low-salt cheeses, and yogurt  Avoid: Processed cheese and cheese spreads; Roquefort, Camembert, and cottage cheese;  buttermilk, instant breakfast drink  Desserts  Ok: Ice cream, frozen yogurt, juice bars, gelatin, cookies and pies, sugar, honey, jelly, hard candy  Avoid: Most pies, cakes and cookies prepared or processed with salt; instant pudding  Drinks  Ok: Tea, coffee, fizzy (carbonated) drinks, juices  Avoid: Flavored coffees, electrolyte replacement drinks, sports drinks  Meats  Ok: All fresh meat, fish, poultry, low-salt tuna, eggs, egg substitute  Avoid: Smoked, pickled, brine-cured, or salted meats and fish. This includes hanna, chipped beef, corned beef, hot dogs, deli meats, ham, kosher meats, salt pork, sausage, canned tuna, salted codfish, smoked salmon, herring, sardines, or anchovies.  Seasonings and spices  Ok: Most seasonings are okay. Good substitutes for salt include: fresh herb blends, hot sauce, lemon, garlic, paris, vinegar, dry mustard, parsley, cilantro, horseradish, tomato paste, regular margarine, mayonnaise, unsalted butter, cream cheese, vegetable oil, cream, low-salt salad dressing and gravy.  Avoid: Regular ketchup, relishes, pickles, soy sauce, teriyaki sauce, Worcestershire sauce, BBQ sauce, tartar sauce, meat tenderizer, chili sauce, regular gravy, regular salad dressing, salted butter  Soups  Ok: Low-salt soups and broths made with allowed foods  Avoid: Bouillon cubes, soups with smoked or salted meats, regular soup and broth  Vegetables  Ok: Most vegetables are okay; also low-salt tomato and vegetable juices  Avoid: Sauerkraut and other brine-soaked vegetables; pickles and other pickled vegetables; tomato juice, olives  Date Last Reviewed: 8/1/2016  © 5719-4920 [employer]. [address] [place] [address]. All rights reserved. This information is not intended as a substitute for professional medical care. Always follow your healthcare professional's instructions.        Low-Salt Choices  Eating salt (sodium) can make your body retain too much water. Excess water makes  your heart work harder. Canned, packaged, and frozen foods are easy to prepare, but they are often high in sodium. Here are some ideas for low-salt foods you can easily prepare yourself.    For breakfast  · Fruit or 100% fruit juice  · Whole-wheat bread or an English muffin. Compare sodium content on labels.  · Low-fat milk or yogurt  · Unsalted eggs  · Shredded wheat  · Corn tortillas  · Unsalted steamed rice  · Regular (not instant) hot cereal, made without salt  Stay away from:  · Sausage, hanna, and ham  · Flour tortillas  · Packaged muffins, pancakes, and biscuits  · Instant hot cereals  · Cottage cheese  For lunch and dinner  · Fresh fish, chicken, turkey, or meat--baked, broiled, or roasted without salt  · Dry beans, cooked without salt  · Tofu, stir-fried without salt  · Unsalted fresh fruit and vegetables, or frozen or canned fruit and vegetables with no added salt  Stay away from:  · Lunch or deli meat that is cured or smoked  · Cheese  · Tomato juice and catsup  · Canned vegetables, soups, and fish not labeled as no-salt-added or reduced sodium  · Packaged gravies and sauces  · Olives, pickles, and relish  · Bottled salad dressings  For snacks and desserts  · Yogurt  · Unsalted, air popped popcorn  · Unsalted nuts or seeds  Stay away from:  · Pies and cakes  · Packaged dessert mixes  · Pizza  · Canned and packaged puddings  · Pretzels, chips, crackers, and nuts--unless the label says unsalted  Date Last Reviewed: 6/17/2015  © 4902-7704 [employer]. [address] [place] [address], [place] [address]. All rights reserved. This information is not intended as a substitute for professional medical care. Always follow your healthcare professional's instructions.

## 2019-06-20 NOTE — LETTER
June 20, 2019      REX Laird  [address] [address]  [place] [address]           [facility] [place] [facility]  [address] [facility] [address]  [facility] [address]  Phone: [phone]  Fax: [phone]          Patient: Jaime Lund   MR Number: [id number]   YOB: 1970   Date of Visit: 6/20/2019       Dear Jaylan Burk:    Thank you for referring Jaime Lund to me for evaluation. Attached you will find relevant portions of my assessment and plan of care.    If you have questions, please do not hesitate to call me. I look forward to following Jaime Lund along with you.    Sincerely,    Vincent Turner MD    Enclosure  CC:  No Recipients    If you would like to receive this communication electronically, please contact [email or web address] or [phone] to request more information on [employer] Link access.    For providers and/or their staff who would like to refer a patient to [facility], please contact us through our one-stop-shop provider referral line, [facility], at [phone].    If you feel you have received this communication in error or would no longer like to receive these types of communications, please e-mail [email or web address]

## 2019-06-30 ENCOUNTER — PATIENT MESSAGE (OUTPATIENT)
Dept: NEUROSURGERY | Facility: CLINIC | Age: 49
End: 2019-06-30

## 2019-07-01 ENCOUNTER — HOSPITAL ENCOUNTER (OUTPATIENT)
Dept: CARDIOLOGY | Facility: HOSPITAL | Age: 49
Discharge: HOME | End: 2019-07-01
Attending: SURGERY
Payer: COMMERCIAL

## 2019-07-01 PROCEDURE — 93970 CV US LOWER VENOUS INSUFFICIENCY BILATERAL (CUPID ONLY): ICD-10-PCS | Mod: 26,,, | Performed by: SURGERY

## 2019-07-01 PROCEDURE — 93970 EXTREMITY STUDY: CPT | Mod: 26,,, | Performed by: SURGERY

## 2019-07-01 PROCEDURE — 93970 EXTREMITY STUDY: CPT | Mod: 50,TC

## 2019-07-02 LAB
LEFT GREAT SAPHENOUS DISTAL THIGH DIA: 0.4 CM
LEFT GREAT SAPHENOUS JUNCTION DIA: 0.6 CM
LEFT GREAT SAPHENOUS KNEE DIA: 0.4 CM
LEFT GREAT SAPHENOUS MIDDLE THIGH DIA: 0.4 CM
LEFT GREAT SAPHENOUS PROXIMAL CALF DIA: 0.3 CM
LEFT SMALL SAPHENOUS KNEE DIA: 0.3 CM
LEFT SMALL SAPHENOUS SPJ DIA: 0.2 CM
RIGHT GREAT SAPHENOUS DISTAL THIGH DIA: 0.5 CM
RIGHT GREAT SAPHENOUS JUNCTION DIA: 0.8 CM
RIGHT GREAT SAPHENOUS KNEE DIA: 0.5 CM
RIGHT GREAT SAPHENOUS MIDDLE THIGH DIA: 0.3 CM
RIGHT GREAT SAPHENOUS PROXIMAL CALF DIA: 0.3 CM
RIGHT SMALL SAPHENOUS KNEE DIA: 0.3 CM
RIGHT SMALL SAPHENOUS SPJ DIA: 0.3 CM

## 2019-07-03 ENCOUNTER — TELEPHONE (OUTPATIENT)
Dept: VASCULAR SURGERY | Facility: CLINIC | Age: 49
End: 2019-07-03

## 2019-07-03 NOTE — TELEPHONE ENCOUNTER
Spoke to wife. Gave results of ultrasound explaining that there were no blood clots and no vein weakness per Dr. Turner. Explained he would like him to continue the medical treatment and follow up in 3 mts. She stated she had made a appointment on line for an appointment if the office would cancel. Explained would do that for her.

## 2019-07-07 ENCOUNTER — PATIENT MESSAGE (OUTPATIENT)
Dept: FAMILY MEDICINE | Facility: CLINIC | Age: 49
End: 2019-07-07

## 2019-07-08 RX ORDER — OFLOXACIN 3 MG/ML
1 SOLUTION/ DROPS OPHTHALMIC 4 TIMES DAILY
Qty: 5 ML | Refills: 0 | Status: SHIPPED | OUTPATIENT
Start: 2019-07-08 | End: 2019-07-15

## 2019-07-17 DIAGNOSIS — M79.669 PAIN AND SWELLING OF LOWER LEG, UNSPECIFIED LATERALITY: ICD-10-CM

## 2019-07-17 DIAGNOSIS — M79.89 PAIN AND SWELLING OF LOWER LEG, UNSPECIFIED LATERALITY: ICD-10-CM

## 2019-07-17 RX ORDER — FUROSEMIDE 40 MG/1
TABLET ORAL
Qty: 90 TABLET | Refills: 1 | Status: SHIPPED | OUTPATIENT
Start: 2019-07-17 | End: 2020-01-20

## 2019-07-27 DIAGNOSIS — M79.89 LEG SWELLING: ICD-10-CM

## 2019-07-29 RX ORDER — POTASSIUM CHLORIDE 750 MG/1
TABLET, EXTENDED RELEASE ORAL
Qty: 30 TABLET | Refills: 0 | Status: SHIPPED | OUTPATIENT
Start: 2019-07-29 | End: 2019-09-25 | Stop reason: SDUPTHER

## 2019-08-05 RX ORDER — BUTALBITAL, ACETAMINOPHEN AND CAFFEINE 50; 325; 40 MG/1; MG/1; MG/1
TABLET ORAL
Qty: 60 TABLET | Refills: 5 | Status: SHIPPED | OUTPATIENT
Start: 2019-08-05 | End: 2020-06-24

## 2019-08-19 ENCOUNTER — OFFICE VISIT (OUTPATIENT)
Dept: NEUROSURGERY | Facility: CLINIC | Age: 49
End: 2019-08-19
Payer: COMMERCIAL

## 2019-08-19 VITALS
SYSTOLIC BLOOD PRESSURE: 136 MMHG | BODY MASS INDEX: 43.52 KG/M2 | WEIGHT: 310.88 LBS | HEIGHT: 71 IN | DIASTOLIC BLOOD PRESSURE: 65 MMHG | HEART RATE: 69 BPM | TEMPERATURE: 99 F

## 2019-08-19 DIAGNOSIS — G89.4 CHRONIC PAIN SYNDROME: Primary | ICD-10-CM

## 2019-08-19 DIAGNOSIS — M96.1 POSTLAMINECTOMY SYNDROME OF LUMBAR REGION: ICD-10-CM

## 2019-08-19 PROCEDURE — 3078F PR MOST RECENT DIASTOLIC BLOOD PRESSURE < 80 MM HG: ICD-10-PCS | Mod: CPTII,S$GLB,, | Performed by: NEUROLOGICAL SURGERY

## 2019-08-19 PROCEDURE — 3075F PR MOST RECENT SYSTOLIC BLOOD PRESS GE 130-139MM HG: ICD-10-PCS | Mod: CPTII,S$GLB,, | Performed by: NEUROLOGICAL SURGERY

## 2019-08-19 PROCEDURE — 3008F PR BODY MASS INDEX (BMI) DOCUMENTED: ICD-10-PCS | Mod: CPTII,S$GLB,, | Performed by: NEUROLOGICAL SURGERY

## 2019-08-19 PROCEDURE — 99214 PR OFFICE/OUTPT VISIT, EST, LEVL IV, 30-39 MIN: ICD-10-PCS | Mod: S$GLB,,, | Performed by: NEUROLOGICAL SURGERY

## 2019-08-19 PROCEDURE — 3078F DIAST BP <80 MM HG: CPT | Mod: CPTII,S$GLB,, | Performed by: NEUROLOGICAL SURGERY

## 2019-08-19 PROCEDURE — 99999 PR PBB SHADOW E&M-EST. PATIENT-LVL III: ICD-10-PCS | Mod: PBBFAC,,, | Performed by: NEUROLOGICAL SURGERY

## 2019-08-19 PROCEDURE — 99214 OFFICE O/P EST MOD 30 MIN: CPT | Mod: S$GLB,,, | Performed by: NEUROLOGICAL SURGERY

## 2019-08-19 PROCEDURE — 3008F BODY MASS INDEX DOCD: CPT | Mod: CPTII,S$GLB,, | Performed by: NEUROLOGICAL SURGERY

## 2019-08-19 PROCEDURE — 99999 PR PBB SHADOW E&M-EST. PATIENT-LVL III: CPT | Mod: PBBFAC,,, | Performed by: NEUROLOGICAL SURGERY

## 2019-08-19 PROCEDURE — 3075F SYST BP GE 130 - 139MM HG: CPT | Mod: CPTII,S$GLB,, | Performed by: NEUROLOGICAL SURGERY

## 2019-08-19 RX ORDER — METHOCARBAMOL 750 MG/1
TABLET, FILM COATED ORAL
Refills: 1 | COMMUNITY
Start: 2019-07-01 | End: 2020-06-01

## 2019-08-23 ENCOUNTER — TELEPHONE (OUTPATIENT)
Dept: NEUROSURGERY | Facility: CLINIC | Age: 49
End: 2019-08-23

## 2019-08-23 DIAGNOSIS — M96.1 POST LAMINECTOMY SYNDROME: ICD-10-CM

## 2019-08-23 DIAGNOSIS — G89.4 CHRONIC PAIN SYNDROME: Primary | ICD-10-CM

## 2019-08-28 NOTE — PROGRESS NOTES
Established Pateint    SUBJECTIVE:     History of Present Illness:  Mr. Lund is a 49-year-old male who is seeing me today in follow-up.  His last neurosurgery clinic appointment was on July 23, 2018.  He initially underwent an intrathecal pain pump placement in April 2018 for chronic pain syndrome and lumbar post-laminectomy syndrome.  The pain pump was placed after ex successful single shot intrathecal opioid trial.  His postoperative course was complicated by Klebsiella infection.  He underwent a washout of the pump in April of 2018.  He completed a 6 week course of Cipro.  He did well was [facility].  At the time of his last clinic appointment he stated that he got about 2 weeks of pain relief after the intrathecal pump was placed but despite increasing bolus dosing and the basal dose, none the the adjustments had brought the patient any significant pain relief.  A dye study was done at that time which showed patency of the intrathecal portion of the catheter.  The patient was returned to his pain management physician for follow-up.  He had been doing well until recently.  He then began complaining of increased pain.  Another dye study was performed and they were unable to aspirate from the pump.  This was suggestive of catheter occlusion.  Therefore the patient was sent to me for consideration for pump revision.  He is here today to see me in follow-up.  He continues to complain of chronic back pain and leg pain.    Review of patient's allergies indicates:   Allergen Reactions    Klonopin [clonazepam] Anxiety and Other (See Comments)     Becomes agitated    Valium [diazepam] Other (See Comments)     Becomes agitated    Xanax [alprazolam] Anxiety and Other (See Comments)     Becomes agitated       Current Outpatient Medications   Medication Sig Dispense Refill    butalbital-acetaminophen-caffeine -40 mg (FIORICET, ESGIC) -40 mg per tablet TAKE ONE TABLET BY MOUTH EVERY 4 HOURS AS  NEEDED FOR HEADACHE 60 tablet 5    DULoxetine (CYMBALTA) 60 MG capsule TAKE 1 CAPSULE BY MOUTH EVERY EVENING. 90 capsule 1    furosemide (LASIX) 40 MG tablet TAKE ONE TABLET BY MOUTH ONCE DAILY FOR FLUID. 90 tablet 1    meloxicam (MOBIC) 15 MG tablet TAKE ONE TABLET BY MOUTH ONCE DAILY WITH A MEAL FOR INFLAMMATION AND OR PAIN 60 tablet 5    methocarbamol (ROBAXIN) 750 MG Tab TAKE ONE TABLET BY MOUTH FOUR TIMES DAILY TO CALM MUSCLES  1    ondansetron (ZOFRAN) 4 MG tablet Take 1 tablet (4 mg total) by mouth every 6 (six) hours as needed. 20 tablet 0    oxyCODONE (ROXICODONE) 30 MG Tab TAKE 1/2 TABLET BY MOUTH SIX TIMES A DAY FOR PAIN  0    potassium chloride (KLOR-CON) 10 MEQ TbSR TAKE ONE TABLET BY MOUTH ONCE DAILY AS NEEDED WITH FLUID PILL 30 tablet 0    ranitidine (ZANTAC) 150 MG capsule Take 150 mg by mouth 2 (two) times daily.      tamsulosin (FLOMAX) 0.4 mg Cap TAKE 1 CAPSULE BY MOUTH ONCE DAILY IN THE MORNING for prostate. 90 capsule 1    tiZANidine (ZANAFLEX) 2 MG tablet TAKE ONE TABLET BY MOUTH THREE TIMES DAILY TO CALM MUSCLES 90 tablet 3    torsemide (DEMADEX) 10 MG Tab Take 1 tablet (10 mg total) by mouth 2 (two) times daily as needed. 30 tablet 0    gabapentin (NEURONTIN) 400 MG capsule       oxyCODONE-acetaminophen (PERCOCET)  mg per tablet Take 1 tablet by mouth every 4 (four) hours as needed for Pain (.). 30 tablet 0     No current facility-administered medications for this visit.        Past Medical History:   Diagnosis Date    Back pain, chronic     Bulging discs     Chronic pain following surgery or procedure     Degenerative disc disease     Hypertension     Hypokalemia     PAD (peripheral artery disease)     Post laminectomy syndrome     Seizures     Severe sepsis     Short-term memory loss     Thyroid disease     Subclinical hyperthyroidism     Past Surgical History:   Procedure Laterality Date    CHOLECYSTECTOMY      GASTRIC BYPASS      SLEEVE    gastric sleeve   "2011    HERNIA REPAIR      IMPLANT-PUMP-INTRATHECAL N/A 4/3/2018    Performed by [facility] at [facility] OR 2ND [facility]    pain pump  04/13/2018    SKIN SURGERY      EXCESS SKIN REMOVAL    SPINE SURGERY      lumbar fusion    WASHOUT of intrathecal pump N/A 4/13/2018    Performed by Pablo Yang MD at [facility] OR 2ND [facility]     Family History     Problem Relation (Age of Onset)    Arthritis Mother    Breast cancer Mother    Heart disease Mother    Hypertension Mother, Father    Throat cancer Father        Social History     Socioeconomic History    Marital status:      Spouse name: Not on file    Number of children: Not on file    Years of education: Not on file    Highest education level: Not on file   Occupational History    Not on file   Social Needs    Financial resource strain: Not on file    Food insecurity:     Worry: Not on file     Inability: Not on file    Transportation needs:     Medical: Not on file     Non-medical: Not on file   Tobacco Use    Smoking status: Never Smoker    Smokeless tobacco: Never Used   Substance and Sexual Activity    Alcohol use: No    Drug use: No    Sexual activity: Yes     Partners: Female   Lifestyle    Physical activity:     Days per week: Not on file     Minutes per session: Not on file    Stress: To some extent   Relationships    Social connections:     Talks on phone: Not on file     Gets together: Not on file     Attends Latter day service: Not on file     Active member of club or organization: Not on file     Attends meetings of clubs or organizations: Not on file     Relationship status: Not on file   Other Topics Concern    Not on file   Social History Narrative    Not on file       Review of Systems:  Review of Systems    OBJECTIVE:     Vital Signs  Temp: 98.7 °F (37.1 °C)  Pulse: 69  BP: 136/65  Pain Score:   8  Height: 5' 11" (180.3 cm)  Weight: (!) 141 kg (310 lb 14.4 oz)  Body mass index is 43.36 kg/m².    Physical Exam:  Physical " Exam:  Vitals reviewed.    Constitutional: He appears well-developed and well-nourished. No distress.     Eyes: Pupils are equal, round, and reactive to light. Conjunctivae and EOM are normal.     Cardiovascular: Normal rate, regular rhythm, normal pulses and no edema.     Abdominal: Soft. Bowel sounds are normal.     Skin: Skin displays no rash on trunk and no rash on extremities. Skin displays no lesions on trunk and no lesions on extremities.     Psych/Behavior: He is alert. He is oriented to person, place, and time. He has a normal mood and affect.     Musculoskeletal: Gait is normal.        Neck: Range of motion is full. There is no tenderness. Muscle strength is 5/5. Tone is normal.        Back: Range of motion is full. There is no tenderness. Muscle strength is 5/5. Tone is normal.        Right Upper Extremities: Range of motion is full. There is no tenderness. Muscle strength is 5/5. Tone is normal.        Left Upper Extremities: Range of motion is full. There is no tenderness. Muscle strength is 5/5. Tone is normal.       Right Lower Extremities: Range of motion is full. There is no tenderness. Muscle strength is 5/5. Tone is normal.        Left Lower Extremities: Range of motion is full. There is no tenderness. Muscle strength is 5/5. Tone is normal.     Neurological:        Coordination: He has a normal Romberg Test, normal finger to nose coordination and normal tandem walking coordination.        Sensory: There is no sensory deficit in the trunk. There is no sensory deficit in the extremities.        DTRs: DTRs are DTRS NORMAL AND SYMMETRICnormal and symmetric. He displays no Babinski's sign on the right side. He displays no Babinski's sign on the left side.        Cranial nerves: Cranial nerve(s) II, III, IV, V, VI, VII, VIII, IX, X, XI and XII are intact.         Diagnostic Results:  He has no updated imaging studies available for review.    ASSESSMENT/PLAN:     Mr. Lund is a 49-year-old male with an  intrathecal catheter and opioid pump.  A most recent dye study showed that the catheter was likely occluded.  Therefore the patient is here today to consider pump revision.  I do believe that this is warranted.  I explained the procedure in detail to the patient and his wife as well as the risks and benefits and present cons.  I explained that I would replace the entirety of the intrathecal portion of the catheter and reconnected to the existing pump.  The patient is concerned due to the fact that he has had and infection with every surgery that he has had.  I assured the patient that we would take extra precaution.  The patient wishes to proceed.  We will get all the appropriate preoperative testing and schedule surgery in the near future.  He knows he can call back with any further questions or concerns.        Note dictated with voice recognition software, please excuse any grammatical errors.

## 2019-09-17 NOTE — TELEPHONE ENCOUNTER
ANTICOAGULATION FOLLOW-UP CLINIC VISIT    Patient Name:  Babak Moran  Date:  2019  Contact Type:  Face to Face    SUBJECTIVE:  Patient Findings     Positives:   Change in medications (changed HCTZ to Lasix, Vanco in hospital, taking Doxycycline and Flagyl), Hospital admission (Hosp  to  for cellulitis)             OBJECTIVE    INR Protime   Date Value Ref Range Status   2019 1.8 (A) 0.86 - 1.14 Final       ASSESSMENT / PLAN  INR assessment SUB    Recheck INR In: 3 DAYS    INR Location Clinic      Anticoagulation Summary  As of 2019    INR goal:   2.0-3.0   TTR:   61.5 % (4.2 y)   INR used for dosin.8! (2019)   Warfarin maintenance plan:   10 mg (5 mg x 2) every Mon; 7.5 mg (5 mg x 1.5) all other days   Full warfarin instructions:   10 mg every Mon; 7.5 mg all other days   Weekly warfarin total:   55 mg   No change documented:   Xochitl Pavon RN   Plan last modified:   Shelley Mercado RN (2018)   Next INR check:   2019   Priority:   INR   Target end date:   Indefinite    Indications    Unspecified atrial fibrillation (Resolved) [I48.91]  Anticoagulation monitoring  INR range 2-3 [Z79.01]             Anticoagulation Episode Summary     INR check location:       Preferred lab:       Send INR reminders to:    INR    Comments:   Babak prefers to be closer to 3.0 d/t previous CVA check Q 4 weeks.Declines to reduce dose when slightly over 3.0  Needs to change PCP      Anticoagulation Care Providers     Provider Role Specialty Phone number    Alvino Yi MD [facility] [phone]            See the Encounter Report to view Anticoagulation Flowsheet and Dosing Calendar (Go to Encounters tab in chart review, and find the Anticoagulation Therapy Visit)        Xochitl Pavon RN                  Call pt's wife and reported that the Gabapentin would be continued at 300 mg po TID as previously ordered. 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 All questions/concerns addressed and she voiced understanding.

## 2019-09-20 ENCOUNTER — TELEPHONE (OUTPATIENT)
Dept: NEUROSURGERY | Facility: CLINIC | Age: 49
End: 2019-09-20

## 2019-09-20 NOTE — TELEPHONE ENCOUNTER
----- Message from Titi Deng sent at 9/20/2019  9:30 AM CDT -----  Contact: Claudia (W[place]fe) [phone]  Claudia called to speak to someone regarding the patient's upcoming surgery. Please contact her to discuss further.

## 2019-09-25 DIAGNOSIS — M79.89 LEG SWELLING: ICD-10-CM

## 2019-09-25 RX ORDER — POTASSIUM CHLORIDE 750 MG/1
TABLET, EXTENDED RELEASE ORAL
Qty: 30 TABLET | Refills: 0 | OUTPATIENT
Start: 2019-09-25

## 2019-09-25 RX ORDER — POTASSIUM CHLORIDE 750 MG/1
TABLET, EXTENDED RELEASE ORAL
Qty: 30 TABLET | Refills: 0 | Status: SHIPPED | OUTPATIENT
Start: 2019-09-25 | End: 2019-11-08 | Stop reason: SDUPTHER

## 2019-09-25 NOTE — TELEPHONE ENCOUNTER
Last refill  9/25/2019                    Last Office Visit Info:   The patient's last visit with Gabby Dean MD was on 4/11/2019.      Last CBC Results:   Lab Results   Component Value Date    WBC 5.14 04/11/2019    WBC 5.14 04/11/2019    HGB 13.1 (L) 04/11/2019    HGB 13.1 (L) 04/11/2019    HCT 41.0 04/11/2019    HCT 41.0 04/11/2019     04/11/2019     04/11/2019       Last CMP Results  Lab Results   Component Value Date     06/19/2019    K 3.8 06/19/2019     06/19/2019    CO2 34 (H) 06/19/2019    BUN 12 06/19/2019    CREATININE 0.8 06/19/2019    CALCIUM 9.4 06/19/2019    ALBUMIN 3.7 06/19/2019    AST 20 06/19/2019    ALT 16 06/19/2019       Last Lipids  Lab Results   Component Value Date    CHOL 204 (H) 04/11/2019    TRIG 152 (H) 04/11/2019    HDL 59 04/11/2019    LDLCALC 114.6 04/11/2019       Last A1C  Lab Results   Component Value Date    HGBA1C 4.9 04/11/2019       Last TSH  Lab Results   Component Value Date    TSH 0.335 (L) 04/11/2019       Last PSA  No results found for: PSA

## 2019-09-26 DIAGNOSIS — M79.89 LEG SWELLING: ICD-10-CM

## 2019-09-26 RX ORDER — POTASSIUM CHLORIDE 750 MG/1
TABLET, EXTENDED RELEASE ORAL
Qty: 30 TABLET | OUTPATIENT
Start: 2019-09-26

## 2019-10-03 ENCOUNTER — TELEPHONE (OUTPATIENT)
Dept: NEUROSURGERY | Facility: CLINIC | Age: 49
End: 2019-10-03

## 2019-10-03 NOTE — TELEPHONE ENCOUNTER
----- Message from Karina Webster sent at 10/3/2019 11:09 AM CDT -----  Contact: Claudia Lund(Spouse)  Pt's spouse called to f/u on getting surgery scheduled. [phone]

## 2019-10-28 RX ORDER — DULOXETIN HYDROCHLORIDE 60 MG/1
CAPSULE, DELAYED RELEASE ORAL
Qty: 90 CAPSULE | Refills: 1 | Status: SHIPPED | OUTPATIENT
Start: 2019-10-28 | End: 2020-01-22

## 2019-11-08 DIAGNOSIS — M79.89 LEG SWELLING: ICD-10-CM

## 2019-11-08 RX ORDER — POTASSIUM CHLORIDE 750 MG/1
TABLET, EXTENDED RELEASE ORAL
Qty: 30 TABLET | Refills: 0 | Status: SHIPPED | OUTPATIENT
Start: 2019-11-08 | End: 2020-02-09 | Stop reason: SDUPTHER

## 2019-12-16 DIAGNOSIS — M47.816 FACET ARTHROPATHY, LUMBAR: ICD-10-CM

## 2019-12-16 RX ORDER — MELOXICAM 15 MG/1
TABLET ORAL
Qty: 90 TABLET | Refills: 1 | Status: SHIPPED | OUTPATIENT
Start: 2019-12-16 | End: 2020-08-08 | Stop reason: SDUPTHER

## 2019-12-23 DIAGNOSIS — M79.89 LEG SWELLING: ICD-10-CM

## 2019-12-23 RX ORDER — POTASSIUM CHLORIDE 750 MG/1
TABLET, EXTENDED RELEASE ORAL
Qty: 30 TABLET | Refills: 0 | Status: SHIPPED | OUTPATIENT
Start: 2019-12-23 | End: 2020-03-09

## 2020-01-20 DIAGNOSIS — M79.89 PAIN AND SWELLING OF LOWER LEG, UNSPECIFIED LATERALITY: ICD-10-CM

## 2020-01-20 DIAGNOSIS — M79.669 PAIN AND SWELLING OF LOWER LEG, UNSPECIFIED LATERALITY: ICD-10-CM

## 2020-01-20 RX ORDER — FUROSEMIDE 40 MG/1
TABLET ORAL
Qty: 90 TABLET | Refills: 1 | Status: SHIPPED | OUTPATIENT
Start: 2020-01-20 | End: 2020-07-16

## 2020-01-22 ENCOUNTER — TELEPHONE (OUTPATIENT)
Dept: NEUROSURGERY | Facility: CLINIC | Age: 50
End: 2020-01-22

## 2020-01-22 DIAGNOSIS — G89.4 CHRONIC PAIN SYNDROME: Primary | ICD-10-CM

## 2020-01-22 DIAGNOSIS — M96.1 POST LAMINECTOMY SYNDROME: ICD-10-CM

## 2020-01-22 RX ORDER — DULOXETIN HYDROCHLORIDE 60 MG/1
CAPSULE, DELAYED RELEASE ORAL
Qty: 90 CAPSULE | Refills: 1 | Status: SHIPPED | OUTPATIENT
Start: 2020-01-22 | End: 2020-08-08 | Stop reason: SDUPTHER

## 2020-01-28 ENCOUNTER — PATIENT MESSAGE (OUTPATIENT)
Dept: SURGERY | Facility: HOSPITAL | Age: 50
End: 2020-01-28

## 2020-02-07 ENCOUNTER — TELEPHONE (OUTPATIENT)
Dept: NEUROSURGERY | Facility: CLINIC | Age: 50
End: 2020-02-07

## 2020-02-07 NOTE — TELEPHONE ENCOUNTER
----- Message from Esa Jade sent at 2/7/2020 10:53 AM CST -----  Contact: Mrs. Lund (wife) @ [phone]  Mrs. Lund is calling to reschedule surgery

## 2020-02-07 NOTE — TELEPHONE ENCOUNTER
Spoke to wife .he cannot get off of this job he is on and looking to reschedule for sometime in march. I will get with Beba and DR Saavedra and will get back with the patient probably monday

## 2020-02-15 ENCOUNTER — PATIENT MESSAGE (OUTPATIENT)
Dept: FAMILY MEDICINE | Facility: CLINIC | Age: 50
End: 2020-02-15

## 2020-03-03 DIAGNOSIS — I10 HYPERTENSION, ESSENTIAL, BENIGN: ICD-10-CM

## 2020-03-03 RX ORDER — HYDROCHLOROTHIAZIDE 25 MG/1
TABLET ORAL
Qty: 90 TABLET | Refills: 1 | Status: ON HOLD | OUTPATIENT
Start: 2020-03-03 | End: 2020-11-17

## 2020-03-09 DIAGNOSIS — M79.89 LEG SWELLING: ICD-10-CM

## 2020-03-09 RX ORDER — POTASSIUM CHLORIDE 750 MG/1
TABLET, EXTENDED RELEASE ORAL
Qty: 30 TABLET | Refills: 0 | Status: SHIPPED | OUTPATIENT
Start: 2020-03-09 | End: 2020-06-30

## 2020-03-18 ENCOUNTER — TELEPHONE (OUTPATIENT)
Dept: NEUROSURGERY | Facility: CLINIC | Age: 50
End: 2020-03-18

## 2020-03-18 NOTE — TELEPHONE ENCOUNTER
----- Message from Deep Nobles sent at 3/18/2020  9:15 AM CDT -----  Contact: Claudia ( spouse ) @ [phone]  Caller calling to postpone the 3-24th surgery, pls call

## 2020-04-18 DIAGNOSIS — M47.816 FACET ARTHROPATHY, LUMBAR: ICD-10-CM

## 2020-04-20 RX ORDER — MELOXICAM 15 MG/1
TABLET ORAL
Qty: 90 TABLET | Refills: 1 | OUTPATIENT
Start: 2020-04-20

## 2020-04-20 RX ORDER — DULOXETIN HYDROCHLORIDE 60 MG/1
CAPSULE, DELAYED RELEASE ORAL
Qty: 90 CAPSULE | Refills: 1 | OUTPATIENT
Start: 2020-04-20

## 2020-04-21 ENCOUNTER — OFFICE VISIT (OUTPATIENT)
Dept: NEUROSURGERY | Facility: CLINIC | Age: 50
End: 2020-04-21
Payer: COMMERCIAL

## 2020-04-21 ENCOUNTER — PATIENT OUTREACH (OUTPATIENT)
Dept: ADMINISTRATIVE | Facility: OTHER | Age: 50
End: 2020-04-21

## 2020-04-21 DIAGNOSIS — G89.4 CHRONIC PAIN SYNDROME: Primary | ICD-10-CM

## 2020-04-21 DIAGNOSIS — Z97.8 PRESENCE OF INTRATHECAL PUMP: ICD-10-CM

## 2020-04-21 PROCEDURE — 99213 PR OFFICE/OUTPT VISIT, EST, LEVL III, 20-29 MIN: ICD-10-PCS | Mod: 95,,, | Performed by: NEUROLOGICAL SURGERY

## 2020-04-21 PROCEDURE — 99213 OFFICE O/P EST LOW 20 MIN: CPT | Mod: 95,,, | Performed by: NEUROLOGICAL SURGERY

## 2020-04-21 NOTE — PROGRESS NOTES
Neurosurgery  Established Patient    SUBJECTIVE:     History of Present Illness:  Mr. Lund is a 50-year-old male who is seeing me today in follow-up.  He is referred by Dr. Saavedra. He initially underwent an intrathecal pain pump placement in April 2018 for chronic pain syndrome and lumbar post-laminectomy syndrome.    The pain pump was placed after a successful single-shot intrathecal opioid trial.  His postoperative course was complicated by Klebsiella infection.  He underwent a washout of the pump in April of 2018.  He completed a 6 week course of Cipro.  He did well was [facility].  At the time of his penultimate clinic appointment he stated that he got about 2 weeks of pain relief after the intrathecal pump was placed but despite increasing bolus dosing and the basal dose, none the the adjustments had brought the patient any significant pain relief.  A dye study was done at that time which showed patency of the intrathecal portion of the catheter.  The patient was returned to his pain management physician for follow-up.  He had been doing well until recently.  He then began complaining of increased pain.  Another dye study was performed and they were unable to aspirate from the pump.  This was suggestive of catheter occlusion.  Therefore the patient was sent to Dr. Saavedra for consideration for pump revision.  He is here today to see me in follow-up.  He continues to complain of chronic back pain and leg pain.    Since his last visit, he reports that some days the pump feels as though it's working well but he has pain at other times. He is only able to stay flat for 3 hours at time. He has not been working. He has been supplementing with PO pain medication, which he takes every 4 hours.     He does note that he has frequent history of infections with previous surgeries. 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 Saavedra. He did not have any infection with his  weight loss surgery.     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  He notes that the pump has been knocked around full times while he is working; since then, he feels that it is function has been improved.  He thinks that whatever was previously blocking the catheter may have been knocked out of the way.    Review of patient's allergies indicates:   Allergen Reactions    Klonopin [clonazepam] Anxiety and Other (See Comments)     Becomes agitated    Valium [diazepam] Other (See Comments)     Becomes agitated    Xanax [alprazolam] Anxiety and Other (See Comments)     Becomes agitated       Current Outpatient Medications   Medication Sig Dispense Refill    butalbital-acetaminophen-caffeine -40 mg (FIORICET, ESGIC) -40 mg per tablet TAKE ONE TABLET BY MOUTH EVERY 4 HOURS AS NEEDED FOR HEADACHE 60 tablet 5    DULoxetine (CYMBALTA) 60 MG capsule TAKE 1 CAPSULE BY MOUTH EVERY EVENING 90 capsule 1    furosemide (LASIX) 40 MG tablet TAKE ONE TABLET BY MOUTH ONCE DAILY FOR FLUID 90 tablet 1    gabapentin (NEURONTIN) 400 MG capsule       hydroCHLOROthiazide (HYDRODIURIL) 25 MG tablet TAKE ONE TABLET BY MOUTH ONCE DAILY FOR BLOOD PRESSURE AND/OR FOR FLUID. 90 tablet 1    meloxicam (MOBIC) 15 MG tablet TAKE ONE TABLET BY MOUTH ONCE DAILY WITH A MEAL FOR INFLAMMATION AND/OR FOR PAIN. 90 tablet 1    methocarbamol (ROBAXIN) 750 MG Tab TAKE ONE TABLET BY MOUTH FOUR TIMES DAILY TO CALM MUSCLES  1    ondansetron (ZOFRAN) 4 MG tablet Take 1 tablet (4 mg total) by mouth every 6 (six) hours as needed. 20 tablet 0    oxyCODONE (ROXICODONE) 30 MG Tab TAKE 1/2 TABLET BY MOUTH SIX TIMES A DAY FOR PAIN  0    oxyCODONE-acetaminophen (PERCOCET)  mg per tablet Take 1 tablet by mouth every 4 (four) hours as needed for Pain (.). 30 tablet 0    potassium chloride (KLOR-CON) 10 MEQ TbSR TAKE ONE TABLET BY MOUTH ONCE DAILY AS NEEDED WITH FLUID PILL 30 tablet 0    ranitidine (ZANTAC) 150  MG capsule Take 150 mg by mouth 2 (two) times daily.      tamsulosin (FLOMAX) 0.4 mg Cap TAKE 1 CAPSULE BY MOUTH ONCE DAILY IN THE MORNING for prostate. 90 capsule 1    tiZANidine (ZANAFLEX) 2 MG tablet TAKE ONE TABLET BY MOUTH THREE TIMES DAILY TO CALM MUSCLES 90 tablet 3    torsemide (DEMADEX) 10 MG Tab Take 1 tablet (10 mg total) by mouth 2 (two) times daily as needed. 30 tablet 0     No current facility-administered medications for this visit.        Past Medical History:   Diagnosis Date    Back pain, chronic     Bulging discs     Chronic pain following surgery or procedure     Degenerative disc disease     Hypertension     Hypokalemia     PAD (peripheral artery disease)     Post laminectomy syndrome     Seizures     Severe sepsis     Short-term memory loss     Thyroid disease     Subclinical hyperthyroidism     Past Surgical History:   Procedure Laterality Date    CHOLECYSTECTOMY      GASTRIC BYPASS      SLEEVE    gastric sleeve  2011    HERNIA REPAIR      pain pump  04/13/2018    SKIN SURGERY      EXCESS SKIN REMOVAL    SPINE SURGERY      lumbar fusion     Family History     Problem Relation (Age of Onset)    Arthritis Mother    Breast cancer Mother    Heart disease Mother    Hypertension Mother, Father    Throat cancer Father        Social History     Socioeconomic History    Marital status:      Spouse name: Not on file    Number of children: Not on file    Years of education: Not on file    Highest education level: Not on file   Occupational History    Not on file   Social Needs    Financial resource strain: Not on file    Food insecurity:     Worry: Not on file     Inability: Not on file    Transportation needs:     Medical: Not on file     Non-medical: Not on file   Tobacco Use    Smoking status: Never Smoker    Smokeless tobacco: Never Used   Substance and Sexual Activity    Alcohol use: No    Drug use: No    Sexual activity: Yes     Partners: Female   Lifestyle     Physical activity:     Days per week: Not on file     Minutes per session: Not on file    Stress: To some extent   Relationships    Social connections:     Talks on phone: Not on file     Gets together: Not on file     Attends Voodoo service: Not on file     Active member of club or organization: Not on file     Attends meetings of clubs or organizations: Not on file     Relationship status: Not on file   Other Topics Concern    Not on file   Social History Narrative    Not on file       Review of Systems:  Review of Systems   Constitutional: Negative for fever.   HENT: Negative for nosebleeds.    Eyes: Negative for visual disturbance.   Respiratory: Negative for shortness of breath.    Cardiovascular: Negative for chest pain.   Gastrointestinal: Negative for vomiting.   Endocrine: Negative for cold intolerance.   Genitourinary: Positive for difficulty urinating.   Musculoskeletal: Positive for back pain.   Skin: Negative for color change.   Neurological: Negative for headaches.   Hematological: Bruises/bleeds easily.   Psychiatric/Behavioral: The patient is nervous/anxious.        OBJECTIVE:     Vital Signs     There is no height or weight on file to calculate BMI.    Physical Exam:    Constitutional: He appears well-developed and well-nourished. No distress.     Eyes: EOM are normal.     Skin:   Right pump incision well healed   Midline back incision is included in previous back surgery incision      Psych/Behavior: He is alert. He is oriented to person, place, and time.     Neurological:        Cranial nerves:   Face symmetric; tongue midline     Pulmonary: unlabored respiration       ASSESSMENT/PLAN:     Jaime Lund is a 50 y.o. male with question of intrathecal pump malfunction.     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  It is about to be high season for that work.  He also feels that his pain pump has been working better since the  recent events when the catheter may have been jostled somewhat.    Given this, he will give us a call back when he is ready to schedule the procedure.  He is encouraged to contact the clinic in the interim with any questions, concerns, or adverse clinical changes.    The patient location is: at home   The chief complaint leading to consultation is: query malfunction of pain pump   Visit type: audiovisual  Total time spent with patient: 20 minutes   Each patient to whom he or she provides medical services by telemedicine is:  (1) informed of the relationship between the physician and patient and the respective role of any other health care provider with respect to management of the patient; and (2) notified that he or she may decline to receive medical services by telemedicine and may withdraw from such care at any time.    Notes: as above       Note generated with voice recognition software; please excuse any typographical errors.

## 2020-04-22 PROBLEM — Z97.8 PRESENCE OF INTRATHECAL PUMP: Status: ACTIVE | Noted: 2020-04-22

## 2020-04-28 ENCOUNTER — OFFICE VISIT (OUTPATIENT)
Dept: FAMILY MEDICINE | Facility: CLINIC | Age: 50
End: 2020-04-28
Payer: COMMERCIAL

## 2020-04-28 DIAGNOSIS — I73.9 PERIPHERAL ARTERY DISEASE: ICD-10-CM

## 2020-04-28 DIAGNOSIS — E16.2 HYPOGLYCEMIA: Primary | ICD-10-CM

## 2020-04-28 DIAGNOSIS — R56.9 SEIZURE: ICD-10-CM

## 2020-04-28 PROCEDURE — 99214 PR OFFICE/OUTPT VISIT, EST, LEVL IV, 30-39 MIN: ICD-10-PCS | Mod: 95,,, | Performed by: INTERNAL MEDICINE

## 2020-04-28 PROCEDURE — 99214 OFFICE O/P EST MOD 30 MIN: CPT | Mod: 95,,, | Performed by: INTERNAL MEDICINE

## 2020-04-28 NOTE — PROGRESS NOTES
SUBJECTIVE     No chief complaint on file.      HPI  Jaime Lund is a 50 y.o. male with multiple medical diagnoses as listed in the medical history and problem list that presents for evaluation of hypoglycemia x 1 week. Pt reports his blood sugar levels have been dropping such that his numbers have been at 64-70s. He had been drinking Monster energy drinks daily over the past month as well as continuing his Vit B12. Pt reports he'd feel sluggish, so started checking his blood sugars to note numbers in the 80s. He ate a box of [employer] with blood sugar increasing to 171, but an hour or 2 later his blood sugar was back down to 80s.     PAST MEDICAL HISTORY:  Past Medical History:   Diagnosis Date    Back pain, chronic     Bulging discs     Chronic pain following surgery or procedure     Degenerative disc disease     Hypertension     Hypokalemia     PAD (peripheral artery disease)     Post laminectomy syndrome     Seizures     Severe sepsis     Short-term memory loss     Thyroid disease     Subclinical hyperthyroidism       PAST SURGICAL HISTORY:  Past Surgical History:   Procedure Laterality Date    CHOLECYSTECTOMY      GASTRIC BYPASS      SLEEVE    gastric sleeve  2011    HERNIA REPAIR      pain pump  04/13/2018    SKIN SURGERY      EXCESS SKIN REMOVAL    SPINE SURGERY      lumbar fusion       SOCIAL HISTORY:  Social History     Socioeconomic History    Marital status:      Spouse name: Not on file    Number of children: Not on file    Years of education: Not on file    Highest education level: Not on file   Occupational History    Not on file   Social Needs    Financial resource strain: Not on file    Food insecurity:     Worry: Not on file     Inability: Not on file    Transportation needs:     Medical: Not on file     Non-medical: Not on file   Tobacco Use    Smoking status: Never Smoker    Smokeless tobacco: Never Used   Substance and Sexual Activity    Alcohol  use: No    Drug use: No    Sexual activity: Yes     Partners: Female   Lifestyle    Physical activity:     Days per week: Not on file     Minutes per session: Not on file    Stress: To some extent   Relationships    Social connections:     Talks on phone: Not on file     Gets together: Not on file     Attends Orthodoxy service: Not on file     Active member of club or organization: Not on file     Attends meetings of clubs or organizations: Not on file     Relationship status: Not on file   Other Topics Concern    Not on file   Social History Narrative    Not on file       FAMILY HISTORY:  Family History   Problem Relation Age of Onset    Heart disease Mother     Breast cancer Mother     Arthritis Mother     Hypertension Mother     Throat cancer Father     Hypertension Father        ALLERGIES AND MEDICATIONS: updated and reviewed.  Review of patient's allergies indicates:   Allergen Reactions    Klonopin [clonazepam] Anxiety and Other (See Comments)     Becomes agitated    Valium [diazepam] Other (See Comments)     Becomes agitated    Xanax [alprazolam] Anxiety and Other (See Comments)     Becomes agitated     Current Outpatient Medications   Medication Sig Dispense Refill    butalbital-acetaminophen-caffeine -40 mg (FIORICET, ESGIC) -40 mg per tablet TAKE ONE TABLET BY MOUTH EVERY 4 HOURS AS NEEDED FOR HEADACHE 60 tablet 5    DULoxetine (CYMBALTA) 60 MG capsule TAKE 1 CAPSULE BY MOUTH EVERY EVENING 90 capsule 1    furosemide (LASIX) 40 MG tablet TAKE ONE TABLET BY MOUTH ONCE DAILY FOR FLUID 90 tablet 1    gabapentin (NEURONTIN) 400 MG capsule       hydroCHLOROthiazide (HYDRODIURIL) 25 MG tablet TAKE ONE TABLET BY MOUTH ONCE DAILY FOR BLOOD PRESSURE AND/OR FOR FLUID. 90 tablet 1    meloxicam (MOBIC) 15 MG tablet TAKE ONE TABLET BY MOUTH ONCE DAILY WITH A MEAL FOR INFLAMMATION AND/OR FOR PAIN. 90 tablet 1    methocarbamol (ROBAXIN) 750 MG Tab TAKE ONE TABLET BY MOUTH FOUR TIMES  DAILY TO CALM MUSCLES  1    ondansetron (ZOFRAN) 4 MG tablet Take 1 tablet (4 mg total) by mouth every 6 (six) hours as needed. 20 tablet 0    oxyCODONE (ROXICODONE) 30 MG Tab TAKE 1/2 TABLET BY MOUTH SIX TIMES A DAY FOR PAIN  0    oxyCODONE-acetaminophen (PERCOCET)  mg per tablet Take 1 tablet by mouth every 4 (four) hours as needed for Pain (.). 30 tablet 0    potassium chloride (KLOR-CON) 10 MEQ TbSR TAKE ONE TABLET BY MOUTH ONCE DAILY AS NEEDED WITH FLUID PILL 30 tablet 0    ranitidine (ZANTAC) 150 MG capsule Take 150 mg by mouth 2 (two) times daily.      tamsulosin (FLOMAX) 0.4 mg Cap TAKE 1 CAPSULE BY MOUTH ONCE DAILY IN THE MORNING for prostate. 90 capsule 1    tiZANidine (ZANAFLEX) 2 MG tablet TAKE ONE TABLET BY MOUTH THREE TIMES DAILY TO CALM MUSCLES 90 tablet 3    torsemide (DEMADEX) 10 MG Tab Take 1 tablet (10 mg total) by mouth 2 (two) times daily as needed. 30 tablet 0     No current facility-administered medications for this visit.        ROS  Review of Systems   Constitutional: Positive for fatigue. Negative for chills and fever.   HENT: Negative for hearing loss and sore throat.    Eyes: Negative for visual disturbance.   Respiratory: Negative for cough and shortness of breath.    Cardiovascular: Negative for chest pain, palpitations and leg swelling.   Gastrointestinal: Negative for abdominal pain, constipation, diarrhea, nausea and vomiting.   Genitourinary: Negative for dysuria, frequency and urgency.   Musculoskeletal: Negative for arthralgias, joint swelling and myalgias.   Skin: Negative for rash and wound.   Neurological: Positive for light-headedness. Negative for headaches.   Psychiatric/Behavioral: Negative for agitation and confusion. The patient is not nervous/anxious.          OBJECTIVE     Physical Exam  There were no vitals filed for this visit. There is no height or weight on file to calculate BMI.            Physical Exam   Constitutional: He is oriented to person,  place, and time. He appears well-developed and well-nourished. No distress.   HENT:   Head: Normocephalic and atraumatic.   Right Ear: External ear normal.   Left Ear: External ear normal.   Nose: Nose normal.   Mouth/Throat: Oropharynx is clear and moist.   Eyes: Conjunctivae and EOM are normal. Right eye exhibits no discharge. Left eye exhibits no discharge. No scleral icterus.   Neck: Normal range of motion. Neck supple. No JVD present. No tracheal deviation present.   Pulmonary/Chest: Effort normal. No respiratory distress.   Musculoskeletal: Normal range of motion. He exhibits no deformity.   Neurological: He is alert and oriented to person, place, and time. He exhibits normal muscle tone. Coordination normal.   Skin: Skin is warm and dry. No rash noted. No erythema.   Psychiatric: He has a normal mood and affect. His behavior is normal. Judgment and thought content normal.         Health Maintenance       Date Due Completion Date    HIV Screening 03/08/1984 ---    TETANUS VACCINE 03/08/1987 ---    Shingles Vaccine (1 of 2) 03/08/2019 ---    Influenza Vaccine (1) 09/01/2019 ---    Fecal Occult Blood Test (FOBT)/[employer] 05/03/2020 5/3/2019    Lipid Panel 04/11/2024 4/11/2019            ASSESSMENT     50 y.o. male with     1. Hypoglycemia    2. Seizure    3. Peripheral artery disease    4. BMI 40.0-44.9, adult        PLAN:     1. Hypoglycemia  - Likely 2/2 new habit of drinking energy drinks daily; pt advised to discontinue and ensure he has adequate oral intake of food; monitor    2. Seizure  - Stable; no acute issues  - The current medical regimen is effective;  continue present plan and medications.  - Pt still followed by Neurology    3. Peripheral artery disease  - Stable; no acute issues  - Monitor    4. BMI 40.0-44.9, adult  - Pt aware of importance of eating a prudent diet and exercising; he reports having lost a few pounds over the past several weeks        RTC in 1-2 weeks as needed for any acute  worsening of current condition or failure to improve     Consult Start Time: 04/28/2020 08:04  Consult End Time: 04/28/2020 08:11      The patient location is: home  The chief complaint leading to consultation is: hypoglycemia  Visit type: audiovisual  Total time spent with patient: 7 min  Each patient to whom he or she provides medical services by telemedicine is:  (1) informed of the relationship between the physician and patient and the respective role of any other health care provider with respect to management of the patient; and (2) notified that he or she may decline to receive medical services by telemedicine and may withdraw from such care at any time.    Notes: As above        Gabby Dean MD  04/28/2020 8:04 AM        No follow-ups on file.

## 2020-06-26 ENCOUNTER — PATIENT OUTREACH (OUTPATIENT)
Dept: ADMINISTRATIVE | Facility: HOSPITAL | Age: 50
End: 2020-06-26

## 2020-07-27 ENCOUNTER — TELEPHONE (OUTPATIENT)
Dept: FAMILY MEDICINE | Facility: CLINIC | Age: 50
End: 2020-07-27

## 2020-07-27 NOTE — TELEPHONE ENCOUNTER
Return call to Pt's wife- Claudia, and informed her that we don't offer the Shingles vaccine in the office. That he can try his local health department and they should be able to assist him. She acknowledged udnerstanding.

## 2020-07-27 NOTE — TELEPHONE ENCOUNTER
----- Message from Dwayne Bills sent at 7/27/2020  8:57 AM CDT -----  Regarding: medical advice  Type: Patient Call Back    Who called:Claudia(wife)    What is the request in detail:Claudia, wife of the patient called to notify the staff that she has been diagnosed with shingles on Thursday. She stated that her  has never been vaccinated for chicken pox and was told that he is susceptible. She wants to know should he come in to have the shingles injection.     Can the clinic reply by [facility]?no    Would the patient rather a call back or a response via [facility]? Call back     Best call back number:[phone]

## 2020-08-08 DIAGNOSIS — M47.816 FACET ARTHROPATHY, LUMBAR: ICD-10-CM

## 2020-08-10 RX ORDER — MELOXICAM 15 MG/1
15 TABLET ORAL DAILY PRN
Qty: 90 TABLET | Refills: 1 | Status: SHIPPED | OUTPATIENT
Start: 2020-08-10 | End: 2020-11-07

## 2020-08-10 RX ORDER — DULOXETIN HYDROCHLORIDE 60 MG/1
60 CAPSULE, DELAYED RELEASE ORAL NIGHTLY
Qty: 90 CAPSULE | Refills: 1 | Status: SHIPPED | OUTPATIENT
Start: 2020-08-10 | End: 2020-11-07

## 2020-08-10 NOTE — TELEPHONE ENCOUNTER
Last Office Visit Info:   The patient's last visit with Gabby Dean MD was on 4/28/2020.    The patient's last visit in current department was on 4/28/2020.        Last CBC Results:   Lab Results   Component Value Date    WBC 5.14 04/11/2019    WBC 5.14 04/11/2019    HGB 13.1 (L) 04/11/2019    HGB 13.1 (L) 04/11/2019    HCT 41.0 04/11/2019    HCT 41.0 04/11/2019     04/11/2019     04/11/2019       Last CMP Results  Lab Results   Component Value Date     06/19/2019    K 3.8 06/19/2019     06/19/2019    CO2 34 (H) 06/19/2019    BUN 12 06/19/2019    CREATININE 0.8 06/19/2019    CALCIUM 9.4 06/19/2019    ALBUMIN 3.7 06/19/2019    AST 20 06/19/2019    ALT 16 06/19/2019       Last Lipids  Lab Results   Component Value Date    CHOL 204 (H) 04/11/2019    TRIG 152 (H) 04/11/2019    HDL 59 04/11/2019    LDLCALC 114.6 04/11/2019       Last A1C  Lab Results   Component Value Date    HGBA1C 4.9 04/11/2019       Last TSH  Lab Results   Component Value Date    TSH 0.335 (L) 04/11/2019         Current Med Refills  Medication List with Changes/Refills   Current Medications    BUTALBITAL-ACETAMINOPHEN-CAFFEINE -40 MG (FIORICET, ESGIC) -40 MG PER TABLET    TAKE ONE TABLET BY MOUTH EVERY 4 HOURS AS NEEDED FOR HEADACHE       Start Date: 6/24/2020 End Date: --    DULOXETINE (CYMBALTA) 60 MG CAPSULE    TAKE 1 CAPSULE BY MOUTH EVERY EVENING       Start Date: 1/22/2020 End Date: --    FUROSEMIDE (LASIX) 40 MG TABLET    TAKE ONE TABLET BY MOUTH ONCE DAILY FOR FLUID       Start Date: 7/16/2020 End Date: --    GABAPENTIN (NEURONTIN) 400 MG CAPSULE           Start Date: 10/10/2017End Date: --    HYDROCHLOROTHIAZIDE (HYDRODIURIL) 25 MG TABLET    TAKE ONE TABLET BY MOUTH ONCE DAILY FOR BLOOD PRESSURE AND/OR FOR FLUID.       Start Date: 3/3/2020  End Date: --    MELOXICAM (MOBIC) 15 MG TABLET    TAKE ONE TABLET BY MOUTH ONCE DAILY WITH A MEAL FOR INFLAMMATION AND/OR FOR PAIN.       Start Date: 12/16/2019End  Date: --    ONDANSETRON (ZOFRAN) 4 MG TABLET    Take 1 tablet (4 mg total) by mouth every 6 (six) hours as needed.       Start Date: 4/11/2019 End Date: --    OXYCODONE (ROXICODONE) 30 MG TAB    TAKE 1/2 TABLET BY MOUTH SIX TIMES A DAY FOR PAIN       Start Date: 9/10/2018 End Date: --    OXYCODONE-ACETAMINOPHEN (PERCOCET)  MG PER TABLET    Take 1 tablet by mouth every 4 (four) hours as needed for Pain (.).       Start Date: 4/3/2018  End Date: --    POTASSIUM CHLORIDE (KLOR-CON) 10 MEQ TBSR    TAKE ONE TABLET BY MOUTH ONCE DAILY AS NEEDED WITH FLUID PILL       Start Date: 6/30/2020 End Date: --    RANITIDINE (ZANTAC) 150 MG CAPSULE    Take 150 mg by mouth 2 (two) times daily.       Start Date: --        End Date: --    TAMSULOSIN (FLOMAX) 0.4 MG CAP    TAKE 1 CAPSULE BY MOUTH ONCE DAILY IN THE MORNING for prostate.       Start Date: 5/19/2019 End Date: --    TIZANIDINE (ZANAFLEX) 2 MG TABLET    TAKE ONE TABLET BY MOUTH THREE TIMES DAILY FOR MUSCLE SPASMS.       Start Date: 6/1/2020  End Date: --    TORSEMIDE (DEMADEX) 10 MG TAB    Take 1 tablet (10 mg total) by mouth 2 (two) times daily as needed.       Start Date: 6/19/2019 End Date: 6/18/2020

## 2020-09-16 ENCOUNTER — PATIENT OUTREACH (OUTPATIENT)
Dept: ADMINISTRATIVE | Facility: HOSPITAL | Age: 50
End: 2020-09-16

## 2020-09-16 ENCOUNTER — PATIENT MESSAGE (OUTPATIENT)
Dept: ADMINISTRATIVE | Facility: HOSPITAL | Age: 50
End: 2020-09-16

## 2020-09-16 DIAGNOSIS — Z12.11 SCREENING FOR MALIGNANT NEOPLASM OF COLON: Primary | ICD-10-CM

## 2020-09-23 ENCOUNTER — PATIENT OUTREACH (OUTPATIENT)
Dept: ADMINISTRATIVE | Facility: HOSPITAL | Age: 50
End: 2020-09-23

## 2020-10-09 ENCOUNTER — TELEPHONE (OUTPATIENT)
Dept: FAMILY MEDICINE | Facility: CLINIC | Age: 50
End: 2020-10-09

## 2020-10-09 NOTE — TELEPHONE ENCOUNTER
Pt.'s wife states she will check his BP tonight and send to the portal and also she will get him to complete the Cologuard this weekend

## 2020-10-16 LAB — GASTROCULT GAST QL: NEGATIVE

## 2020-10-27 ENCOUNTER — PATIENT OUTREACH (OUTPATIENT)
Dept: ADMINISTRATIVE | Facility: HOSPITAL | Age: 50
End: 2020-10-27

## 2020-11-16 ENCOUNTER — HOSPITAL ENCOUNTER (INPATIENT)
Facility: HOSPITAL | Age: 50
LOS: 4 days | Discharge: HOME HEALTH | DRG: 092 | End: 2020-11-20
Attending: EMERGENCY MEDICINE | Admitting: EMERGENCY MEDICINE
Payer: COMMERCIAL

## 2020-11-16 DIAGNOSIS — R07.9 CHEST PAIN: ICD-10-CM

## 2020-11-16 DIAGNOSIS — T81.49XA SURGICAL WOUND INFECTION: ICD-10-CM

## 2020-11-16 DIAGNOSIS — Z97.8 PRESENCE OF INTRATHECAL PUMP: ICD-10-CM

## 2020-11-16 DIAGNOSIS — L03.311 CELLULITIS, ABDOMINAL WALL: Primary | ICD-10-CM

## 2020-11-16 DIAGNOSIS — T81.41XA INFECTION OF SUPERFICIAL INCISIONAL SURGICAL SITE AFTER PROCEDURE, INITIAL ENCOUNTER: ICD-10-CM

## 2020-11-16 DIAGNOSIS — L02.91 ABSCESS: ICD-10-CM

## 2020-11-16 LAB
ALBUMIN SERPL BCP-MCNC: 3.2 G/DL (ref 3.5–5.2)
ALP SERPL-CCNC: 102 U/L (ref 55–135)
ALT SERPL W/O P-5'-P-CCNC: 13 U/L (ref 10–44)
ANION GAP SERPL CALC-SCNC: 11 MMOL/L (ref 8–16)
AST SERPL-CCNC: 21 U/L (ref 10–40)
BACTERIA #/AREA URNS HPF: NORMAL /HPF
BASOPHILS # BLD AUTO: 0.03 K/UL (ref 0–0.2)
BASOPHILS NFR BLD: 0.2 % (ref 0–1.9)
BILIRUB SERPL-MCNC: 0.4 MG/DL (ref 0.1–1)
BILIRUB UR QL STRIP: NEGATIVE
BUN SERPL-MCNC: 17 MG/DL (ref 6–20)
CALCIUM SERPL-MCNC: 9.4 MG/DL (ref 8.7–10.5)
CHLORIDE SERPL-SCNC: 99 MMOL/L (ref 95–110)
CLARITY UR: CLEAR
CO2 SERPL-SCNC: 28 MMOL/L (ref 23–29)
COLOR UR: YELLOW
CREAT SERPL-MCNC: 0.8 MG/DL (ref 0.5–1.4)
CTP QC/QA: YES
DIFFERENTIAL METHOD: ABNORMAL
EOSINOPHIL # BLD AUTO: 0.1 K/UL (ref 0–0.5)
EOSINOPHIL NFR BLD: 0.5 % (ref 0–8)
ERYTHROCYTE [DISTWIDTH] IN BLOOD BY AUTOMATED COUNT: 14.3 % (ref 11.5–14.5)
EST. GFR  (AFRICAN AMERICAN): >60 ML/MIN/1.73 M^2
EST. GFR  (NON AFRICAN AMERICAN): >60 ML/MIN/1.73 M^2
GLUCOSE SERPL-MCNC: 111 MG/DL (ref 70–110)
GLUCOSE UR QL STRIP: NEGATIVE
HCT VFR BLD AUTO: 31.8 % (ref 40–54)
HGB BLD-MCNC: 10.4 G/DL (ref 14–18)
HGB UR QL STRIP: NEGATIVE
HYALINE CASTS #/AREA URNS LPF: 0 /LPF
IMM GRANULOCYTES # BLD AUTO: 0.18 K/UL (ref 0–0.04)
IMM GRANULOCYTES NFR BLD AUTO: 1.2 % (ref 0–0.5)
KETONES UR QL STRIP: NEGATIVE
LACTATE SERPL-SCNC: 0.8 MMOL/L (ref 0.5–2.2)
LEUKOCYTE ESTERASE UR QL STRIP: NEGATIVE
LYMPHOCYTES # BLD AUTO: 1.3 K/UL (ref 1–4.8)
LYMPHOCYTES NFR BLD: 8.5 % (ref 18–48)
MAGNESIUM SERPL-MCNC: 2.4 MG/DL (ref 1.6–2.6)
MCH RBC QN AUTO: 28.6 PG (ref 27–31)
MCHC RBC AUTO-ENTMCNC: 32.7 G/DL (ref 32–36)
MCV RBC AUTO: 87 FL (ref 82–98)
MICROSCOPIC COMMENT: NORMAL
MONOCYTES # BLD AUTO: 1.8 K/UL (ref 0.3–1)
MONOCYTES NFR BLD: 11.8 % (ref 4–15)
NEUTROPHILS # BLD AUTO: 12.1 K/UL (ref 1.8–7.7)
NEUTROPHILS NFR BLD: 77.8 % (ref 38–73)
NITRITE UR QL STRIP: NEGATIVE
NRBC BLD-RTO: 0 /100 WBC
PH UR STRIP: 6 [PH] (ref 5–8)
PLATELET # BLD AUTO: 320 K/UL (ref 150–350)
PMV BLD AUTO: 10.4 FL (ref 9.2–12.9)
POCT GLUCOSE: 112 MG/DL (ref 70–110)
POTASSIUM SERPL-SCNC: 3.8 MMOL/L (ref 3.5–5.1)
PROT SERPL-MCNC: 7.1 G/DL (ref 6–8.4)
PROT UR QL STRIP: ABNORMAL
RBC # BLD AUTO: 3.64 M/UL (ref 4.6–6.2)
RBC #/AREA URNS HPF: 0 /HPF (ref 0–4)
SARS-COV-2 RDRP RESP QL NAA+PROBE: NEGATIVE
SODIUM SERPL-SCNC: 138 MMOL/L (ref 136–145)
SP GR UR STRIP: 1.02 (ref 1–1.03)
URN SPEC COLLECT METH UR: ABNORMAL
UROBILINOGEN UR STRIP-ACNC: NEGATIVE EU/DL
WBC # BLD AUTO: 15.49 K/UL (ref 3.9–12.7)
WBC #/AREA URNS HPF: 0 /HPF (ref 0–5)

## 2020-11-16 PROCEDURE — U0002 COVID-19 LAB TEST NON-CDC: HCPCS | Performed by: NURSE PRACTITIONER

## 2020-11-16 PROCEDURE — 87077 CULTURE AEROBIC IDENTIFY: CPT

## 2020-11-16 PROCEDURE — 81000 URINALYSIS NONAUTO W/SCOPE: CPT

## 2020-11-16 PROCEDURE — 87186 SC STD MICRODIL/AGAR DIL: CPT

## 2020-11-16 PROCEDURE — 25000003 PHARM REV CODE 250: Performed by: EMERGENCY MEDICINE

## 2020-11-16 PROCEDURE — 83735 ASSAY OF MAGNESIUM: CPT

## 2020-11-16 PROCEDURE — 12000002 HC ACUTE/MED SURGE SEMI-PRIVATE ROOM

## 2020-11-16 PROCEDURE — 99285 EMERGENCY DEPT VISIT HI MDM: CPT | Mod: 25

## 2020-11-16 PROCEDURE — 85025 COMPLETE CBC W/AUTO DIFF WBC: CPT

## 2020-11-16 PROCEDURE — 82962 GLUCOSE BLOOD TEST: CPT

## 2020-11-16 PROCEDURE — 87070 CULTURE OTHR SPECIMN AEROBIC: CPT

## 2020-11-16 PROCEDURE — 96374 THER/PROPH/DIAG INJ IV PUSH: CPT

## 2020-11-16 PROCEDURE — 80053 COMPREHEN METABOLIC PANEL: CPT

## 2020-11-16 PROCEDURE — 83605 ASSAY OF LACTIC ACID: CPT

## 2020-11-16 PROCEDURE — 87040 BLOOD CULTURE FOR BACTERIA: CPT

## 2020-11-16 PROCEDURE — 63600175 PHARM REV CODE 636 W HCPCS: Performed by: EMERGENCY MEDICINE

## 2020-11-16 RX ORDER — POTASSIUM CHLORIDE 20 MEQ/1
20 TABLET, EXTENDED RELEASE ORAL ONCE
Status: COMPLETED | OUTPATIENT
Start: 2020-11-17 | End: 2020-11-17

## 2020-11-16 RX ORDER — ONDANSETRON 2 MG/ML
4 INJECTION INTRAMUSCULAR; INTRAVENOUS
Status: DISCONTINUED | OUTPATIENT
Start: 2020-11-16 | End: 2020-11-16

## 2020-11-16 RX ADMIN — PIPERACILLIN AND TAZOBACTAM 4.5 G: 4; .5 INJECTION, POWDER, LYOPHILIZED, FOR SOLUTION INTRAVENOUS; PARENTERAL at 10:11

## 2020-11-17 ENCOUNTER — ANESTHESIA EVENT (OUTPATIENT)
Dept: SURGERY | Facility: HOSPITAL | Age: 50
DRG: 092 | End: 2020-11-17
Payer: COMMERCIAL

## 2020-11-17 PROBLEM — D63.8 ANEMIA OF CHRONIC DISEASE: Status: ACTIVE | Noted: 2020-11-17

## 2020-11-17 LAB
ALBUMIN SERPL BCP-MCNC: 2.8 G/DL (ref 3.5–5.2)
ALP SERPL-CCNC: 87 U/L (ref 55–135)
ALT SERPL W/O P-5'-P-CCNC: 10 U/L (ref 10–44)
ANION GAP SERPL CALC-SCNC: 6 MMOL/L (ref 8–16)
AST SERPL-CCNC: 15 U/L (ref 10–40)
BASOPHILS # BLD AUTO: 0.04 K/UL (ref 0–0.2)
BASOPHILS NFR BLD: 0.4 % (ref 0–1.9)
BILIRUB SERPL-MCNC: 0.4 MG/DL (ref 0.1–1)
BUN SERPL-MCNC: 12 MG/DL (ref 6–20)
CALCIUM SERPL-MCNC: 8.8 MG/DL (ref 8.7–10.5)
CHLORIDE SERPL-SCNC: 100 MMOL/L (ref 95–110)
CO2 SERPL-SCNC: 32 MMOL/L (ref 23–29)
CREAT SERPL-MCNC: 0.7 MG/DL (ref 0.5–1.4)
DIFFERENTIAL METHOD: ABNORMAL
EOSINOPHIL # BLD AUTO: 0.2 K/UL (ref 0–0.5)
EOSINOPHIL NFR BLD: 1.7 % (ref 0–8)
ERYTHROCYTE [DISTWIDTH] IN BLOOD BY AUTOMATED COUNT: 14.3 % (ref 11.5–14.5)
EST. GFR  (AFRICAN AMERICAN): >60 ML/MIN/1.73 M^2
EST. GFR  (NON AFRICAN AMERICAN): >60 ML/MIN/1.73 M^2
FOLATE SERPL-MCNC: 12.6 NG/ML (ref 4–24)
GLUCOSE SERPL-MCNC: 95 MG/DL (ref 70–110)
HCT VFR BLD AUTO: 31.5 % (ref 40–54)
HGB BLD-MCNC: 10 G/DL (ref 14–18)
IMM GRANULOCYTES # BLD AUTO: 0.05 K/UL (ref 0–0.04)
IMM GRANULOCYTES NFR BLD AUTO: 0.5 % (ref 0–0.5)
INR PPP: 0.9 (ref 0.8–1.2)
IRON SERPL-MCNC: 11 UG/DL (ref 45–160)
LYMPHOCYTES # BLD AUTO: 1.1 K/UL (ref 1–4.8)
LYMPHOCYTES NFR BLD: 10.5 % (ref 18–48)
MAGNESIUM SERPL-MCNC: 2.2 MG/DL (ref 1.6–2.6)
MCH RBC QN AUTO: 28.6 PG (ref 27–31)
MCHC RBC AUTO-ENTMCNC: 31.7 G/DL (ref 32–36)
MCV RBC AUTO: 90 FL (ref 82–98)
MONOCYTES # BLD AUTO: 1.2 K/UL (ref 0.3–1)
MONOCYTES NFR BLD: 10.9 % (ref 4–15)
NEUTROPHILS # BLD AUTO: 8.3 K/UL (ref 1.8–7.7)
NEUTROPHILS NFR BLD: 76 % (ref 38–73)
NRBC BLD-RTO: 0 /100 WBC
PHOSPHATE SERPL-MCNC: 2.7 MG/DL (ref 2.7–4.5)
PLATELET # BLD AUTO: 275 K/UL (ref 150–350)
PMV BLD AUTO: 10 FL (ref 9.2–12.9)
POCT GLUCOSE: 92 MG/DL (ref 70–110)
POCT GLUCOSE: 97 MG/DL (ref 70–110)
POTASSIUM SERPL-SCNC: 3.5 MMOL/L (ref 3.5–5.1)
PROT SERPL-MCNC: 6.5 G/DL (ref 6–8.4)
PROTHROMBIN TIME: 10 SEC (ref 9–12.5)
RBC # BLD AUTO: 3.5 M/UL (ref 4.6–6.2)
SATURATED IRON: 3 % (ref 20–50)
SODIUM SERPL-SCNC: 138 MMOL/L (ref 136–145)
TOTAL IRON BINDING CAPACITY: 349 UG/DL (ref 250–450)
TRANSFERRIN SERPL-MCNC: 236 MG/DL (ref 200–375)
VIT B12 SERPL-MCNC: >2000 PG/ML (ref 210–950)
WBC # BLD AUTO: 10.85 K/UL (ref 3.9–12.7)

## 2020-11-17 PROCEDURE — 25000003 PHARM REV CODE 250: Performed by: INTERNAL MEDICINE

## 2020-11-17 PROCEDURE — 85610 PROTHROMBIN TIME: CPT

## 2020-11-17 PROCEDURE — 82607 VITAMIN B-12: CPT

## 2020-11-17 PROCEDURE — 63600175 PHARM REV CODE 636 W HCPCS: Performed by: EMERGENCY MEDICINE

## 2020-11-17 PROCEDURE — 63600175 PHARM REV CODE 636 W HCPCS: Performed by: INTERNAL MEDICINE

## 2020-11-17 PROCEDURE — 84100 ASSAY OF PHOSPHORUS: CPT

## 2020-11-17 PROCEDURE — 86703 HIV-1/HIV-2 1 RESULT ANTBDY: CPT

## 2020-11-17 PROCEDURE — 85025 COMPLETE CBC W/AUTO DIFF WBC: CPT

## 2020-11-17 PROCEDURE — 82746 ASSAY OF FOLIC ACID SERUM: CPT

## 2020-11-17 PROCEDURE — 97161 PT EVAL LOW COMPLEX 20 MIN: CPT

## 2020-11-17 PROCEDURE — 83540 ASSAY OF IRON: CPT

## 2020-11-17 PROCEDURE — 97165 OT EVAL LOW COMPLEX 30 MIN: CPT

## 2020-11-17 PROCEDURE — 83735 ASSAY OF MAGNESIUM: CPT

## 2020-11-17 PROCEDURE — 36415 COLL VENOUS BLD VENIPUNCTURE: CPT

## 2020-11-17 PROCEDURE — 99232 SBSQ HOSP IP/OBS MODERATE 35: CPT | Mod: ,,, | Performed by: NEUROLOGICAL SURGERY

## 2020-11-17 PROCEDURE — 86060 ANTISTREPTOLYSIN O TITER: CPT

## 2020-11-17 PROCEDURE — 99232 PR SUBSEQUENT HOSPITAL CARE,LEVL II: ICD-10-PCS | Mod: ,,, | Performed by: NEUROLOGICAL SURGERY

## 2020-11-17 PROCEDURE — 20000000 HC ICU ROOM

## 2020-11-17 PROCEDURE — 80053 COMPREHEN METABOLIC PANEL: CPT

## 2020-11-17 RX ORDER — METHOCARBAMOL 750 MG/1
750 TABLET, FILM COATED ORAL 4 TIMES DAILY
COMMUNITY
End: 2020-11-30

## 2020-11-17 RX ORDER — FUROSEMIDE 40 MG/1
40 TABLET ORAL DAILY
Status: DISCONTINUED | OUTPATIENT
Start: 2020-11-17 | End: 2020-11-20 | Stop reason: HOSPADM

## 2020-11-17 RX ORDER — HYDROMORPHONE HYDROCHLORIDE 2 MG/ML
2 INJECTION, SOLUTION INTRAMUSCULAR; INTRAVENOUS; SUBCUTANEOUS
Status: COMPLETED | OUTPATIENT
Start: 2020-11-17 | End: 2020-11-17

## 2020-11-17 RX ORDER — IBUPROFEN 200 MG
24 TABLET ORAL
Status: DISCONTINUED | OUTPATIENT
Start: 2020-11-17 | End: 2020-11-20 | Stop reason: HOSPADM

## 2020-11-17 RX ORDER — HYDROMORPHONE HYDROCHLORIDE 8 MG/1
8 TABLET ORAL EVERY 6 HOURS PRN
COMMUNITY
End: 2022-04-28

## 2020-11-17 RX ORDER — MORPHINE SULFATE 4 MG/ML
4 INJECTION, SOLUTION INTRAMUSCULAR; INTRAVENOUS EVERY 4 HOURS PRN
Status: DISCONTINUED | OUTPATIENT
Start: 2020-11-17 | End: 2020-11-20 | Stop reason: HOSPADM

## 2020-11-17 RX ORDER — DULOXETIN HYDROCHLORIDE 30 MG/1
60 CAPSULE, DELAYED RELEASE ORAL NIGHTLY
Status: DISCONTINUED | OUTPATIENT
Start: 2020-11-17 | End: 2020-11-20 | Stop reason: HOSPADM

## 2020-11-17 RX ORDER — TALC
9 POWDER (GRAM) TOPICAL NIGHTLY PRN
Status: DISCONTINUED | OUTPATIENT
Start: 2020-11-17 | End: 2020-11-20 | Stop reason: HOSPADM

## 2020-11-17 RX ORDER — GABAPENTIN 400 MG/1
400 CAPSULE ORAL DAILY
Status: DISCONTINUED | OUTPATIENT
Start: 2020-11-17 | End: 2020-11-20 | Stop reason: HOSPADM

## 2020-11-17 RX ORDER — SODIUM CHLORIDE 0.9 % (FLUSH) 0.9 %
10 SYRINGE (ML) INJECTION
Status: DISCONTINUED | OUTPATIENT
Start: 2020-11-17 | End: 2020-11-20 | Stop reason: HOSPADM

## 2020-11-17 RX ORDER — TAMSULOSIN HYDROCHLORIDE 0.4 MG/1
0.4 CAPSULE ORAL DAILY
Status: DISCONTINUED | OUTPATIENT
Start: 2020-11-17 | End: 2020-11-20 | Stop reason: HOSPADM

## 2020-11-17 RX ORDER — IBUPROFEN 200 MG
16 TABLET ORAL
Status: DISCONTINUED | OUTPATIENT
Start: 2020-11-17 | End: 2020-11-20 | Stop reason: HOSPADM

## 2020-11-17 RX ORDER — GABAPENTIN 400 MG/1
800 CAPSULE ORAL NIGHTLY
Status: DISCONTINUED | OUTPATIENT
Start: 2020-11-17 | End: 2020-11-20 | Stop reason: HOSPADM

## 2020-11-17 RX ORDER — ACETAMINOPHEN 500 MG
1000 TABLET ORAL
Status: CANCELLED | OUTPATIENT
Start: 2020-11-18 | End: 2020-11-18

## 2020-11-17 RX ORDER — MORPHINE SULFATE 4 MG/ML
2 INJECTION, SOLUTION INTRAMUSCULAR; INTRAVENOUS EVERY 4 HOURS PRN
Status: DISCONTINUED | OUTPATIENT
Start: 2020-11-17 | End: 2020-11-20 | Stop reason: HOSPADM

## 2020-11-17 RX ORDER — GLUCAGON 1 MG
1 KIT INJECTION
Status: DISCONTINUED | OUTPATIENT
Start: 2020-11-17 | End: 2020-11-20 | Stop reason: HOSPADM

## 2020-11-17 RX ORDER — AMOXICILLIN 250 MG
1 CAPSULE ORAL 2 TIMES DAILY
Status: DISCONTINUED | OUTPATIENT
Start: 2020-11-17 | End: 2020-11-20 | Stop reason: HOSPADM

## 2020-11-17 RX ORDER — HYDROMORPHONE HYDROCHLORIDE 2 MG/ML
2 INJECTION, SOLUTION INTRAMUSCULAR; INTRAVENOUS; SUBCUTANEOUS
Status: DISCONTINUED | OUTPATIENT
Start: 2020-11-17 | End: 2020-11-20 | Stop reason: HOSPADM

## 2020-11-17 RX ORDER — ACETAMINOPHEN 325 MG/1
650 TABLET ORAL EVERY 4 HOURS PRN
Status: DISCONTINUED | OUTPATIENT
Start: 2020-11-17 | End: 2020-11-20 | Stop reason: HOSPADM

## 2020-11-17 RX ORDER — ONDANSETRON 2 MG/ML
4 INJECTION INTRAMUSCULAR; INTRAVENOUS EVERY 8 HOURS PRN
Status: DISCONTINUED | OUTPATIENT
Start: 2020-11-17 | End: 2020-11-20 | Stop reason: HOSPADM

## 2020-11-17 RX ORDER — TIZANIDINE 2 MG/1
2 TABLET ORAL EVERY 8 HOURS PRN
Status: DISCONTINUED | OUTPATIENT
Start: 2020-11-17 | End: 2020-11-20 | Stop reason: HOSPADM

## 2020-11-17 RX ORDER — MUPIROCIN 20 MG/G
OINTMENT TOPICAL 2 TIMES DAILY
Status: DISCONTINUED | OUTPATIENT
Start: 2020-11-17 | End: 2020-11-18

## 2020-11-17 RX ORDER — ENOXAPARIN SODIUM 100 MG/ML
40 INJECTION SUBCUTANEOUS EVERY 24 HOURS
Status: DISCONTINUED | OUTPATIENT
Start: 2020-11-17 | End: 2020-11-20 | Stop reason: HOSPADM

## 2020-11-17 RX ORDER — CETIRIZINE HYDROCHLORIDE 5 MG/1
5 TABLET ORAL DAILY
Status: DISCONTINUED | OUTPATIENT
Start: 2020-11-17 | End: 2020-11-20 | Stop reason: HOSPADM

## 2020-11-17 RX ORDER — BISACODYL 10 MG
10 SUPPOSITORY, RECTAL RECTAL DAILY PRN
Status: DISCONTINUED | OUTPATIENT
Start: 2020-11-17 | End: 2020-11-20 | Stop reason: HOSPADM

## 2020-11-17 RX ORDER — TALC
6 POWDER (GRAM) TOPICAL NIGHTLY PRN
Status: DISCONTINUED | OUTPATIENT
Start: 2020-11-17 | End: 2020-11-17

## 2020-11-17 RX ORDER — IPRATROPIUM BROMIDE AND ALBUTEROL SULFATE 2.5; .5 MG/3ML; MG/3ML
3 SOLUTION RESPIRATORY (INHALATION) EVERY 4 HOURS PRN
Status: DISCONTINUED | OUTPATIENT
Start: 2020-11-17 | End: 2020-11-20 | Stop reason: HOSPADM

## 2020-11-17 RX ADMIN — ENOXAPARIN SODIUM 40 MG: 40 INJECTION SUBCUTANEOUS at 04:11

## 2020-11-17 RX ADMIN — CETIRIZINE HYDROCHLORIDE 5 MG: 5 TABLET ORAL at 09:11

## 2020-11-17 RX ADMIN — HYDROMORPHONE HYDROCHLORIDE 2 MG: 2 INJECTION INTRAMUSCULAR; INTRAVENOUS; SUBCUTANEOUS at 07:11

## 2020-11-17 RX ADMIN — MORPHINE SULFATE 4 MG: 4 INJECTION INTRAVENOUS at 04:11

## 2020-11-17 RX ADMIN — PIPERACILLIN AND TAZOBACTAM 4.5 G: 4; .5 INJECTION, POWDER, LYOPHILIZED, FOR SOLUTION INTRAVENOUS; PARENTERAL at 07:11

## 2020-11-17 RX ADMIN — MUPIROCIN: 20 OINTMENT TOPICAL at 11:11

## 2020-11-17 RX ADMIN — PIPERACILLIN AND TAZOBACTAM 4.5 G: 4; .5 INJECTION, POWDER, LYOPHILIZED, FOR SOLUTION INTRAVENOUS; PARENTERAL at 10:11

## 2020-11-17 RX ADMIN — HYDROMORPHONE HYDROCHLORIDE 2 MG: 2 INJECTION INTRAMUSCULAR; INTRAVENOUS; SUBCUTANEOUS at 10:11

## 2020-11-17 RX ADMIN — VANCOMYCIN HYDROCHLORIDE 2500 MG: 1.5 INJECTION, POWDER, LYOPHILIZED, FOR SOLUTION INTRAVENOUS at 01:11

## 2020-11-17 RX ADMIN — DOCUSATE SODIUM 50 MG AND SENNOSIDES 8.6 MG 1 TABLET: 8.6; 5 TABLET, FILM COATED ORAL at 09:11

## 2020-11-17 RX ADMIN — FUROSEMIDE 40 MG: 40 TABLET ORAL at 01:11

## 2020-11-17 RX ADMIN — DULOXETINE 60 MG: 30 CAPSULE, DELAYED RELEASE ORAL at 09:11

## 2020-11-17 RX ADMIN — TAMSULOSIN HYDROCHLORIDE 0.4 MG: 0.4 CAPSULE ORAL at 09:11

## 2020-11-17 RX ADMIN — Medication 9 MG: at 10:11

## 2020-11-17 RX ADMIN — ONDANSETRON 4 MG: 2 INJECTION INTRAMUSCULAR; INTRAVENOUS at 04:11

## 2020-11-17 RX ADMIN — HYDROMORPHONE HYDROCHLORIDE 2 MG: 2 INJECTION INTRAMUSCULAR; INTRAVENOUS; SUBCUTANEOUS at 12:11

## 2020-11-17 RX ADMIN — POTASSIUM CHLORIDE 20 MEQ: 1500 TABLET, EXTENDED RELEASE ORAL at 12:11

## 2020-11-17 RX ADMIN — GABAPENTIN 800 MG: 400 CAPSULE ORAL at 09:11

## 2020-11-17 RX ADMIN — PIPERACILLIN AND TAZOBACTAM 4.5 G: 4; .5 INJECTION, POWDER, LYOPHILIZED, FOR SOLUTION INTRAVENOUS; PARENTERAL at 04:11

## 2020-11-17 RX ADMIN — MUPIROCIN: 20 OINTMENT TOPICAL at 09:11

## 2020-11-17 RX ADMIN — MORPHINE SULFATE 4 MG: 4 INJECTION INTRAVENOUS at 11:11

## 2020-11-17 RX ADMIN — HYDROMORPHONE HYDROCHLORIDE 2 MG: 2 INJECTION INTRAMUSCULAR; INTRAVENOUS; SUBCUTANEOUS at 06:11

## 2020-11-17 RX ADMIN — ACETAMINOPHEN 650 MG: 325 TABLET ORAL at 06:11

## 2020-11-17 RX ADMIN — GABAPENTIN 400 MG: 400 CAPSULE ORAL at 01:11

## 2020-11-17 RX ADMIN — HYDROMORPHONE HYDROCHLORIDE 2 MG: 2 INJECTION INTRAMUSCULAR; INTRAVENOUS; SUBCUTANEOUS at 03:11

## 2020-11-17 RX ADMIN — VANCOMYCIN HYDROCHLORIDE 2000 MG: 10 INJECTION, POWDER, LYOPHILIZED, FOR SOLUTION INTRAVENOUS at 02:11

## 2020-11-17 RX ADMIN — DULOXETINE 60 MG: 30 CAPSULE, DELAYED RELEASE ORAL at 04:11

## 2020-11-17 NOTE — ED NOTES
Pt's incision covered with dressing. Initials TN 11/17 at 0540. There is no drainage saturating dressing. Pt's redness around incision is marked with pen. Redness spread slightly over marked pen. Redness was marked, dated, and timed. Pt is AAOx4 and denies hallucinations. Temp 98.5. VSS. NAD noted.

## 2020-11-17 NOTE — PLAN OF CARE
Problem: Physical Therapy Goal  Goal: Physical Therapy Goal  Outcome: Met    Mr. Lund was alert, oriented, and cooperative upon PT entry and throughout evaluation. Pt's wife at beside. He reported mild soreness at bandage/incision sight located at the R abdominal area; pt had no complaints of chest pain, SOB, or dizziness throughout PT eval and pt's vitals remained stable with SPO2 WNL, HR 85-90bpm throughout tx. One BP reading was recorded at 124/65 sitting EOB. PT noted no deficits in bed mobility, ROM, MMT, or transfers and only minimal gait deficits that appear to be from hx of chronic back pain; pt ambulated ~200 ft independent with no AD. There were no complications during evaluation. With PT findings, pt is fit for home with family upon d/c from hospital.

## 2020-11-17 NOTE — SUBJECTIVE & OBJECTIVE
Past Medical History:   Diagnosis Date    Back pain, chronic     Bulging discs     Chronic pain following surgery or procedure     Degenerative disc disease     Hypertension     Hypokalemia     Obese abdomen     PAD (peripheral artery disease)     Post laminectomy syndrome     Seizures     Severe sepsis     Short-term memory loss     Surgical site infection 11/17/2020    Rt abdomen pain pump site    Thyroid disease     Subclinical hyperthyroidism       Past Surgical History:   Procedure Laterality Date    CHOLECYSTECTOMY      GASTRIC BYPASS      SLEEVE    gastric sleeve  2011    HERNIA REPAIR      pain pump Right 04/03/2018    inserted at right abdomen    pain pump site washout Right 04/13/2018    SKIN SURGERY      EXCESS SKIN REMOVAL    SPINE SURGERY      lumbar fusion       Review of patient's allergies indicates:   Allergen Reactions    Klonopin [clonazepam] Anxiety and Other (See Comments)     Becomes agitated    Valium [diazepam] Other (See Comments)     Becomes agitated    Xanax [alprazolam] Anxiety and Other (See Comments)     Becomes agitated       No current facility-administered medications on file prior to encounter.      Current Outpatient Medications on File Prior to Encounter   Medication Sig    HYDROmorphone (DILAUDID) 8 MG tablet Take 8 mg by mouth every 6 (six) hours as needed for Pain.    methocarbamoL (ROBAXIN) 750 MG Tab Take 750 mg by mouth 4 (four) times daily.    butalbital-acetaminophen-caffeine -40 mg (FIORICET, ESGIC) -40 mg per tablet TAKE ONE TABLET BY MOUTH EVERY 4 HOURS AS NEEDED FOR HEADACHE    DULoxetine (CYMBALTA) 60 MG capsule TAKE 1 CAPSULE BY MOUTH EVERY EVENING    furosemide (LASIX) 40 MG tablet TAKE ONE TABLET BY MOUTH ONCE DAILY FOR FLUID    gabapentin (NEURONTIN) 400 MG capsule 2 (two) times daily. Takes 1 400mg pill every AM, Takes 2 (400mg) at night    meloxicam (MOBIC) 15 MG tablet TAKE ONE TABLET BY MOUTH ONCE DAILY AS NEEDED FOR  PAIN    ondansetron (ZOFRAN) 4 MG tablet Take 1 tablet (4 mg total) by mouth every 6 (six) hours as needed.    potassium chloride (KLOR-CON) 10 MEQ TbSR TAKE ONE TABLET BY MOUTH ONCE DAILY AS NEEDED WITH FLUID PILL    tiZANidine (ZANAFLEX) 2 MG tablet TAKE ONE TABLET BY MOUTH THREE TIMES DAILY FOR MUSCLE SPASMS.    torsemide (DEMADEX) 10 MG Tab Take 1 tablet (10 mg total) by mouth 2 (two) times daily as needed.    [DISCONTINUED] hydroCHLOROthiazide (HYDRODIURIL) 25 MG tablet TAKE ONE TABLET BY MOUTH ONCE DAILY FOR BLOOD PRESSURE AND/OR FOR FLUID.    [DISCONTINUED] oxyCODONE (ROXICODONE) 30 MG Tab TAKE 1/2 TABLET BY MOUTH SIX TIMES A DAY FOR PAIN    [DISCONTINUED] oxyCODONE-acetaminophen (PERCOCET)  mg per tablet Take 1 tablet by mouth every 4 (four) hours as needed for Pain (.).    [DISCONTINUED] ranitidine (ZANTAC) 150 MG capsule Take 150 mg by mouth 2 (two) times daily.    [DISCONTINUED] tamsulosin (FLOMAX) 0.4 mg Cap TAKE 1 CAPSULE BY MOUTH ONCE DAILY IN THE MORNING for prostate.     Family History     Problem Relation (Age of Onset)    Arthritis Mother    Breast cancer Mother    Heart disease Mother    Hypertension Mother, Father    Throat cancer Father        Tobacco Use    Smoking status: Never Smoker    Smokeless tobacco: Never Used   Substance and Sexual Activity    Alcohol use: Yes     Comment: socially    Drug use: No    Sexual activity: Yes     Partners: Female     Review of Systems   Constitutional: Positive for fever. Negative for activity change, appetite change, chills, diaphoresis, fatigue and unexpected weight change.   HENT: Negative for congestion, dental problem and drooling.    Eyes: Negative for pain, discharge and itching.   Respiratory: Negative for apnea, cough, choking, chest tightness and shortness of breath.    Cardiovascular: Negative for chest pain.   Gastrointestinal: Positive for abdominal pain. Negative for abdominal distention, constipation, diarrhea, nausea and  vomiting.   Genitourinary: Negative for difficulty urinating, dysuria and enuresis.   Musculoskeletal: Negative for arthralgias and back pain.   Skin: Positive for color change and rash.   Psychiatric/Behavioral: Negative for agitation and behavioral problems.     Objective:     Vital Signs (Most Recent):  Temp: 98.4 °F (36.9 °C) (11/17/20 0925)  Pulse: 81 (11/17/20 0925)  Resp: 18 (11/17/20 1108)  BP: (!) 147/68 (11/17/20 0925)  SpO2: 97 % (11/17/20 0925) Vital Signs (24h Range):  Temp:  [97.9 °F (36.6 °C)-98.5 °F (36.9 °C)] 98.4 °F (36.9 °C)  Pulse:  [] 81  Resp:  [10-20] 18  SpO2:  [93 %-100 %] 97 %  BP: (111-172)/(57-99) 147/68     Weight: (!) 143.2 kg (315 lb 11.2 oz)  Body mass index is 44.03 kg/m².    Physical Exam  Vitals signs and nursing note reviewed.   Constitutional:       General: He is not in acute distress.     Appearance: Normal appearance. He is obese. He is not ill-appearing, toxic-appearing or diaphoretic.   HENT:      Head: Normocephalic and atraumatic.   Neck:      Musculoskeletal: Normal range of motion.   Cardiovascular:      Rate and Rhythm: Normal rate and regular rhythm.      Heart sounds: No murmur. No gallop.    Pulmonary:      Effort: Pulmonary effort is normal. No respiratory distress.      Breath sounds: No wheezing or rales.   Abdominal:      General: Abdomen is flat. Bowel sounds are normal. There is no distension.      Palpations: There is no mass.      Tenderness: There is no abdominal tenderness. There is no guarding.      Hernia: No hernia is present.   Skin:     General: Skin is warm.      Comments: Cellulitis of abdominal wall RLQ. Bandaged.   Neurological:      Mental Status: He is alert and oriented to person, place, and time.   Psychiatric:         Mood and Affect: Mood normal.         Behavior: Behavior normal.         Thought Content: Thought content normal.             Significant Labs:   BMP:   Recent Labs   Lab 11/17/20  0630   GLU 95      K 3.5       CO2 32*   BUN 12   CREATININE 0.7   CALCIUM 8.8   MG 2.2     CBC:   Recent Labs   Lab 11/16/20  2122 11/17/20  0630   WBC 15.49* 10.85   HGB 10.4* 10.0*   HCT 31.8* 31.5*    275       Significant Imaging:   Ultrasound abdomen pending

## 2020-11-17 NOTE — NURSING
Patient arrived to unit via stretcher, assisted to bed. Complains of pain to abdomen and chronic lower back pain. Patient in no acute distress. Plan of care including NPO status reviewed with patient. Patient verbalized understanding. Abdominal dressing saturated with tan/ light green creamy fluid with fowl odor, removed dressing, cleaned wound and redressed with gauze and island border. Redness to abdomen marked.  Bed alarm set, call light in reach. Will continue to monitor.    Wife at bedside.

## 2020-11-17 NOTE — PLAN OF CARE
11/17/20 1538   Discharge Assessment   Assessment Type Discharge Planning Assessment   Confirmed/corrected address and phone number on facesheet? Yes   Assessment information obtained from? Patient;Caregiver   Prior to hospitilization cognitive status: Alert/Oriented   Prior to hospitalization functional status: Independent   Current cognitive status: Alert/Oriented   Current Functional Status: Independent   Facility Arrived From: home   Lives With spouse   Able to Return to Prior Arrangements yes   Is patient able to care for self after discharge? Yes   Who are your caregiver(s) and their phone number(s)? Claudia Gandara [phone]   Patient's perception of discharge disposition home or selfcare   Readmission Within the Last 30 Days no previous admission in last 30 days   Patient currently being followed by outpatient case management? No   Patient currently receives any other outside agency services? No   Equipment Currently Used at Home none   Do you have any problems affording any of your prescribed medications? No   Is the patient taking medications as prescribed? yes   Does the patient have transportation home? Yes   Transportation Anticipated car, drives self;family or friend will provide   Dialysis Name and Scheduled days N/A   Does the patient receive services at the [facility]? No   Discharge Plan A Home with family   Discharge Plan B Home with family;Home Health   DME Needed Upon Discharge  none   Patient/Family in Agreement with Plan yes   Does the patient have transportation to healthcare appointments? Yes         [facility] [address]  [address]  [facility] [address]  Phone: [phone] Fax: [phone]

## 2020-11-17 NOTE — PLAN OF CARE
Problem: Occupational Therapy Goal  Goal: Occupational Therapy Goal  Description: Pt at PLOF of (I) with ADLs and functional ambulation with no AD.  No OT needs.  D/C OT.   Outcome: Met

## 2020-11-17 NOTE — HPI
Mr. Lund is a 49 yo M with chronic back pain. He has a intrathecal pain pump RLQ of abdomen. He had some surgery/adjustment for this about 2 weeks ago. He presents with redness and puss drainage since Friday.  He was started on Vanc and Zosyn. Neurosurgery was consulted. Patient denies shaking chills. Subjective fever on 11/16.  Patient resting comfortably. He is non-ill appearing and has no other complaints.

## 2020-11-17 NOTE — ASSESSMENT & PLAN NOTE
With likely abscess. Ultrasound ordered. Sepsis not present. Patient non-ill appearing. Blood cultures are NG. Vanc and Zosyn for now. Neuro surgery consulted.

## 2020-11-17 NOTE — PLAN OF CARE
Problem: Infection  Goal: Infection Symptom Resolution  Outcome: Ongoing, Progressing  Intervention: Prevent or Manage Infection  Flowsheets (Taken 11/17/2020 4146)  Fever Reduction/Comfort Measures: aerosol temperature decreased  Infection Management: aseptic technique maintained   Patients abdominal dressing noted to be saturated 3 times during shift. Changed dressing and cleaned incision. Redness border marked. IV antibiotics given per order. Patient tolerating well. Patient with continued complaints of lower back pain throughout shift. PRN medications provided. No acute distress noted. Safety maintained throughout shift.

## 2020-11-17 NOTE — HOSPITAL COURSE
Mr. Lund is a 51 yo M with chronic back pain. He has a intrathecal pain pump RLQ of abdomen. He had some surgery/adjustment for this about 2 weeks ago. He presents with redness and puss drainage since Friday.  He was started on Vanc and Zosyn. Neurosurgery was consulted. Blood cultures were sent that were NG. Patient grew out Staph A in wound cultures- MSSA. ID was consulted on 11/19 for Abx recs for home. Patient taken to surgery on 11/18 for removal of pump. ID recommended 4 weeks of IV Cefazolin.  Patient has follow up appointment on 12/7 with neurosurgery for staple removal.  Picc line placed and patient will be discharged to home with IV abx through 12/18.  Diet- low NA. Activity as tolerated. Follow up PCP in one week.   Labs to be faxed to ID.

## 2020-11-17 NOTE — PT/OT/SLP EVAL
Occupational Therapy   Evaluation and Discharge Note    Name: Jaime Lund  MRN: [id number]  Admitting Diagnosis:  Cellulitis, abdominal wall      Recommendations:     Discharge Recommendations: home, other (see comments)(with assist from wife if needed.)  Discharge Equipment Recommendations:  none  Barriers to discharge:  None    Assessment:     Jaime Lund is a 50 y.o. male with a medical diagnosis of Cellulitis, abdominal wall. At this time, patient is functioning at their prior level of function and does not require further acute OT services.     Plan:     During this hospitalization, patient does not require further acute OT services.  Please re-consult if situation changes.    · Plan of Care Reviewed with: patient, spouse    Subjective     Chief Complaint: Pt with no c/o pain.   Patient/Family Comments/goals: To go home    Occupational Profile:  Living Environment: [facility], [address] with wife  Previous level of function: Independent with ADLs and ambulation with exception of occasional assist from wife for socks/shoes when he has back pain  Roles and Routines: active; drives, employed as  and owns fence company  Equipment Used at home:  none  Assistance upon Discharge: wife    Pain/Comfort:  · Pain Rating 1: 0/10    Patients cultural, spiritual, Mandaen conflicts given the current situation:  no    Objective:     Communicated with: Nurse Lees prior to session.  Patient found HOB elevated with bed alarm, blood pressure cuff, peripheral IV, pulse ox (continuous), telemetry upon OT entry to room.    General Precautions: Standard, fall   Orthopedic Precautions:N/A   Braces: N/A     Occupational Performance:    Bed Mobility:    · Patient completed Supine to Sit with independence  · Patient completed Sit to Supine with independence    Functional Mobility/Transfers:  · Patient completed Sit <> Stand Transfer with independence  with  no assistive device   · Functional Mobility: performed  functional ambulation greater than household distances around unit with no AD    Activities of Daily Living:  · Upper Body Dressing: independence to don back gown  · Lower Body Dressing: independence to don/doff non skid socks    Cognitive/Visual Perceptual:  Cognitive/Psychosocial Skills:     -       Oriented to: Person, Place, Time and Situation   -       Follows Commands/attention:Follows multistep  commands  -       Communication: clear/fluent  -       Memory: No Deficits noted  -       Safety awareness/insight to disability: intact   -       Mood/Affect/Coping skills/emotional control: Appropriate to situation, Cooperative and Pleasant    Physical Exam:  Balance:    -       good dynamic balance  Upper Extremity Range of Motion:     -       Right Upper Extremity: WFL  -       Left Upper Extremity: WFL  Upper Extremity Strength:    -       Right Upper Extremity: WFL  -       Left Upper Extremity: WFL   Strength:    -       Right Upper Extremity: WFL  -       Left Upper Extremity: WFL  Fine Motor Coordination:    -       Intact    AMPAC 6 Click ADL:  AMPAC Total Score: 24    Treatment & Education:  OT eval completed. Pt educated on role of OT in acute care and OT POC.  Education:    Patient left walking with PT with wife, PTA and PT present    GOALS:   Multidisciplinary Problems     Occupational Therapy Goals     Not on file          Multidisciplinary Problems (Resolved)        Problem: Occupational Therapy Goal    Goal Priority Disciplines Outcome Interventions   Occupational Therapy Goal   (Resolved)     OT, PT/OT Met    Description: Pt at OF of (I) with ADLs and functional ambulation with no AD.  No OT needs.  D/C OT.                    History:     Past Medical History:   Diagnosis Date    Back pain, chronic     Bulging discs     Chronic pain following surgery or procedure     Degenerative disc disease     Hypertension     Hypokalemia     Obese abdomen     PAD (peripheral artery disease)      Post laminectomy syndrome     Seizures     Severe sepsis     Short-term memory loss     Surgical site infection 11/17/2020    Rt abdomen pain pump site    Thyroid disease     Subclinical hyperthyroidism       Past Surgical History:   Procedure Laterality Date    CHOLECYSTECTOMY      GASTRIC BYPASS      SLEEVE    gastric sleeve  2011    HERNIA REPAIR      pain pump Right 04/03/2018    inserted at right abdomen    pain pump site washout Right 04/13/2018    SKIN SURGERY      EXCESS SKIN REMOVAL    SPINE SURGERY      lumbar fusion       Time Tracking:     OT Date of Treatment: 11/17/20  OT Start Time: 1443  OT Stop Time: 1455  OT Total Time (min): 12 min    Billable Minutes:Evaluation 12 mins with PT  Total Time 12 mins    Tari Swenson, OT  11/17/2020

## 2020-11-17 NOTE — CONSULTS
NEUROSURGICAL INPATIENT CONSULTATION NOTE    DATE OF SERVICE:  11/17/2020    ATTENDING PHYSICIAN:  Raffy Headley D.O.    CONSULT REQUESTED BY:  Hospital Medicine    REASON FOR CONSULT:  Infected pain pump    HISTORY OF PRESENT ILLNESS:  50 y.o. male with long standing h/o back pain who is s/p L4-5 fusion and placement of pain pump.  Pain pump was initially placed by Dr Saavedra on 4/3/18, washed out by Dr Moran on 4/13/18, and most recently revised by Dr Wilkerson (pain medicine) after not functioning for several years.  New intrathecal catheter was replaced by old pump was kept.  Wound has been draining since surgery per patient and wife.  Yesterday patient became ill and [place] and wound started draining yellow pus. Cultures were obtained and he was placed on IV abx and currently is in ICU.  He is neuro intactand has returned to to baseline mental status.  He is worried about his back pain now that pump appears to be non-salvageable.    PAST MEDICAL HISTORY:  Active Ambulatory Problems     Diagnosis Date Noted    Essential hypertension 03/18/2014    Hypokalemia 03/18/2014    Seizure 03/18/2014    Postlaminectomy syndrome of lumbar region 03/28/2017    Facet arthropathy, lumbar 03/28/2017    Chronic pain syndrome 03/28/2017    BMI 40.0-44.9, adult 04/28/2017    Peripheral artery disease 05/05/2017    Leg swelling 06/07/2017    Subclinical hyperthyroidism 03/27/2018    Chronic pain 04/03/2018    Surgical wound infection 04/12/2018    Presence of intrathecal pump 04/22/2020     Resolved Ambulatory Problems     Diagnosis Date Noted    Altered mental status 03/18/2014    Severe sepsis 03/18/2014     Past Medical History:   Diagnosis Date    Back pain, chronic     Bulging discs     Chronic pain following surgery or procedure     Degenerative disc disease     Hypertension     Obese abdomen     PAD (peripheral artery disease)     Post laminectomy syndrome      Seizures     Short-term memory loss     Surgical site infection 11/17/2020    Thyroid disease        PAST SURGICAL HISTORY:  Past Surgical History:   Procedure Laterality Date    CHOLECYSTECTOMY      GASTRIC BYPASS      SLEEVE    gastric sleeve  2011    HERNIA REPAIR      pain pump Right 04/03/2018    inserted at right abdomen    pain pump site washout Right 04/13/2018    SKIN SURGERY      EXCESS SKIN REMOVAL    SPINE SURGERY      lumbar fusion       SOCIAL HISTORY:   Social History     Socioeconomic History    Marital status:      Spouse name: Not on file    Number of children: Not on file    Years of education: Not on file    Highest education level: Not on file   Occupational History    Not on file   Social Needs    Financial resource strain: Not on file    Food insecurity     Worry: Not on file     Inability: Not on file    Transportation needs     Medical: Not on file     Non-medical: Not on file   Tobacco Use    Smoking status: Never Smoker    Smokeless tobacco: Never Used   Substance and Sexual Activity    Alcohol use: Yes     Comment: socially    Drug use: No    Sexual activity: Yes     Partners: Female   Lifestyle    Physical activity     Days per week: Not on file     Minutes per session: Not on file    Stress: To some extent   Relationships    Social connections     Talks on phone: Not on file     Gets together: Not on file     Attends Latter day service: Not on file     Active member of club or organization: Not on file     Attends meetings of clubs or organizations: Not on file     Relationship status: Not on file   Other Topics Concern    Not on file   Social History Narrative    Not on file       FAMILY HISTORY:  Family History   Problem Relation Age of Onset    Heart disease Mother     Breast cancer Mother     Arthritis Mother     Hypertension Mother     Throat cancer Father     Hypertension Father        CURRENTS MEDICATIONS:    No current  facility-administered medications on file prior to encounter.      Current Outpatient Medications on File Prior to Encounter   Medication Sig Dispense Refill    HYDROmorphone (DILAUDID) 8 MG tablet Take 8 mg by mouth every 6 (six) hours as needed for Pain.      methocarbamoL (ROBAXIN) 750 MG Tab Take 750 mg by mouth 4 (four) times daily.      butalbital-acetaminophen-caffeine -40 mg (FIORICET, ESGIC) -40 mg per tablet TAKE ONE TABLET BY MOUTH EVERY 4 HOURS AS NEEDED FOR HEADACHE 60 tablet 5    DULoxetine (CYMBALTA) 60 MG capsule TAKE 1 CAPSULE BY MOUTH EVERY EVENING 90 capsule 0    furosemide (LASIX) 40 MG tablet TAKE ONE TABLET BY MOUTH ONCE DAILY FOR FLUID 90 tablet 1    gabapentin (NEURONTIN) 400 MG capsule 2 (two) times daily. Takes 1 400mg pill every AM, Takes 2 (400mg) at night      meloxicam (MOBIC) 15 MG tablet TAKE ONE TABLET BY MOUTH ONCE DAILY AS NEEDED FOR PAIN 90 tablet 0    ondansetron (ZOFRAN) 4 MG tablet Take 1 tablet (4 mg total) by mouth every 6 (six) hours as needed. 20 tablet 0    potassium chloride (KLOR-CON) 10 MEQ TbSR TAKE ONE TABLET BY MOUTH ONCE DAILY AS NEEDED WITH FLUID PILL 30 tablet 0    tiZANidine (ZANAFLEX) 2 MG tablet TAKE ONE TABLET BY MOUTH THREE TIMES DAILY FOR MUSCLE SPASMS. 90 tablet 3    torsemide (DEMADEX) 10 MG Tab Take 1 tablet (10 mg total) by mouth 2 (two) times daily as needed. 30 tablet 0    [DISCONTINUED] hydroCHLOROthiazide (HYDRODIURIL) 25 MG tablet TAKE ONE TABLET BY MOUTH ONCE DAILY FOR BLOOD PRESSURE AND/OR FOR FLUID. 90 tablet 1    [DISCONTINUED] oxyCODONE (ROXICODONE) 30 MG Tab TAKE 1/2 TABLET BY MOUTH SIX TIMES A DAY FOR PAIN  0    [DISCONTINUED] oxyCODONE-acetaminophen (PERCOCET)  mg per tablet Take 1 tablet by mouth every 4 (four) hours as needed for Pain (.). 30 tablet 0    [DISCONTINUED] ranitidine (ZANTAC) 150 MG capsule Take 150 mg by mouth 2 (two) times daily.      [DISCONTINUED] tamsulosin (FLOMAX) 0.4 mg Cap TAKE 1  CAPSULE BY MOUTH ONCE DAILY IN THE MORNING for prostate. 90 capsule 1        DULoxetine  60 mg Oral QHS    enoxaparin  40 mg Subcutaneous Q24H    furosemide  40 mg Oral Daily    gabapentin  400 mg Oral Daily    gabapentin  800 mg Oral QHS    mupirocin   Nasal BID    piperacillin-tazobactam (ZOSYN) IVPB  4.5 g Intravenous Q8H    senna-docusate 8.6-50 mg  1 tablet Oral BID    tamsulosin  0.4 mg Oral Daily    vancomycin (VANCOCIN) IVPB  2,000 mg Intravenous Q12H       ALLERGIES:  Review of patient's allergies indicates:   Allergen Reactions    Klonopin [clonazepam] Anxiety and Other (See Comments)     Becomes agitated    Valium [diazepam] Other (See Comments)     Becomes agitated    Xanax [alprazolam] Anxiety and Other (See Comments)     Becomes agitated       REVIEW OF SYSTEMS:  Wound draining pus  Wound is red and tender  + back pain      PHYSICAL EXAMINATION:   Vitals:    11/17/20 1556   BP:    Pulse:    Resp: 18   Temp:        Neurosurgery Physical Exam    Physical Exam:  Constitutional: Patient resting comfortably in bed. Obese.  Skin: Exposed areas are intact without abnormal markings, rashes or other lesions.  HEENT: Normocephalic. Normal conjunctivae.  Cardiovascular: Normal rate and regular rhythm.  Respiratory: Chest wall rises and falls symmetrically, without signs of respiratory distress.  Abdomen: Soft and non-tender.  Extremities: Warm and without edema. Calves supple, non-tender.  Psych/Behavior: Normal affect.    Neurological:    Mental status: Alert and oriented. Conversational and appropriate.       Cranial Nerves: VFF to confrontation. PERRL. EOMI without nystagmus. Facial STLT normal and symmetric. Strong, symmetric muscles of mastication. Facial strength full and symmetric. Hearing equal bilaterally to finger rub. Palate and uvula rise and fall normally in midline. Shoulder shrug 5/5 strength. Tongue midline.     Motor:    Upper:  Deltoids Triceps Biceps WE WF  FA    R 5/5 5/5 5/5  5/5 5/5 5/5 5/5    L 5/5 5/5 5/5 5/5 5/5 5/5 5/5      Lower:  HF KE KF DF PF EHL    R 5/5 5/5 5/5 5/5 5/5 5/5    L 5/5 5/5 5/5 5/5 5/5 5/5     Sensory: Intact sensation to light touch and pinprick in all extremities.     Reflexes:      DTR: 2+ knees and biceps symmetrically.     Gmoez's: Negative.     Babinski's: Negative.     Clonus: Negative.    Cerebellar: Finger-to-nose and rapid alternating movements normal.     Wound:  RLQ abdo wound with yellow-green pus draining once dressing removed.  +erythema and tender    DIAGNOSTIC DATA:    Recent Labs     11/16/20 2122 11/17/20  0630   HGB 10.4* 10.0*   HCT 31.8* 31.5*   MCV 87 90   WBC 15.49* 10.85    275    138   K 3.8 3.5   CL 99 100   * 95   BUN 17 12   CREATININE 0.8 0.7   CALCIUM 9.4 8.8   MG 2.4 2.2   ALT 13 10   AST 21 15   ALBUMIN 3.2* 2.8*   BILITOT 0.4 0.4       Microbiology Results (last 7 days)     Procedure Component Value Units Date/Time    Aerobic culture (Specify Source) **CANNOT BE ORDERED AS STAT** [[id number]] Collected: 11/16/20 2117    Order Status: Completed Specimen: Incision site from Abdomen Updated: 11/17/20 1108     Aerobic Bacterial Culture Insufficient incubation, culture in progress    Blood culture x two cultures. Draw prior to antibiotics. [[id number]] Collected: 11/16/20 2117    Order Status: Completed Specimen: Blood from Peripheral, Antecubital, Right Updated: 11/17/20 0512     Blood Culture, Routine No Growth to date    Narrative:      Aerobic and anaerobic    Blood culture x two cultures. Draw prior to antibiotics. [[id number]] Collected: 11/16/20 2140    Order Status: Completed Specimen: Blood from Peripheral, Forearm, Right Updated: 11/17/20 0512     Blood Culture, Routine No Growth to date    Narrative:      Aerobic and anaerobic          IMAGING:  I personally reviewed the following imaging:    No new imaging    ASSESSMENT:  50 y.o. male with infected intrathecal pain pump that was recently revised by  outside pain medicine physician (Dr Wilkerson).  I rec full removal followed by long-term abx.  I recommended against trying to salvage the pump due to the degree of infection.  I spoke to Dr Wilkerson who is in agreement and agreed to manage opioid dependence postop as outpatient.    PLAN:  1. Plan for removal of pain pump tomorrow afternoon  2. Cont abx per ID  3. NPO at midnight  4. Pain control per medicine  5. Orderd lumbar xray to understand catheter better    **Please call with questions, concerns, or changes in the patient's neurological status.        Raffy Headley D.O.   Neurosurgery

## 2020-11-17 NOTE — PT/OT/SLP EVAL
"Physical Therapy Evaluation and Discharge Note    Patient Name:  Jaime Lund   MRN:  [id number]    Recommendations:     Discharge Recommendations:  (home with family)   Discharge Equipment Recommendations: none   Barriers to discharge: None    Assessment:     Jaime Lund is a 50 y.o. male admitted with a medical diagnosis of Cellulitis, abdominal wall.  At this time, patient is functioning at their prior level of function and does not require further acute PT services.     Recent Surgery: * No surgery found *      Plan:     During this hospitalization, patient does not require further acute PT services.  Please re-consult if situation changes.      Subjective     Chief Complaint: mild soreness in R abdominal area from incision site   Patient/Family Comments/goals: Pt states he "can't be sold" on Physical Therapy and has tried it before  Pain/Comfort:  · Pain Rating 1: (pt reported mild soreness at bandage/incision site)  · Location - Side 1: Right  · Location 1: abdomen  · Pain Addressed 1: Pre-medicate for activity, Nurse notified    Patients cultural, spiritual, Religion conflicts given the current situation:      Living Environment:  Pt lives in [facility] with [address] with [facility]. Pt lives with wife.     Prior to admission, patients level of function was independent, working, and driving.  Equipment used at home: none, but pt has walk in shower, built in shower chair, and comfort high toilet. Upon discharge, patient will have assistance from self and wife prn..    Objective:     Communicated with ICU Nurse prior to session.  Patient found Supine, HOB elevated with bed alarm, blood pressure cuff, peripheral IV, pulse ox (continuous), telemetry upon PT entry to room.  Pt's wife was at bedside.    General Precautions: Standard, fall   Orthopedic Precautions:N/A   Braces: N/A     Exams:  · Cognitive Exam:  Patient is oriented to Person, Place, Time and Situation  · Gross Motor Coordination:  WFL  · Postural " Exam:  Patient presented with the following abnormalities:    · -       Rounded shoulders  · -       Forward head  · Sensation:    · -       Intact with the exception of mild impairments in light/touch of L toes  · Skin Integrity/Edema:      · -       Visible in tact.   · -   Area surrounding insicion/bandage appeared to have mild erythema but no dryness, no irritation, and no irregularities.  · RLE ROM: WNL  · RLE Strength: WNL  · LLE ROM: WNL  · LLE Strength: WNL    Mr. Lund was alert, oriented, and cooperative upon PT entry and throughout evaluation. Pt's wife at beside. He reported mild soreness at bandage/incision sight located at the R abdominal area; pt had no complaints of chest pain, SOB, or dizziness throughout PT eval and pt's vitals remained stable with SPO2 WNL, HR 85-90bpm throughout tx. One BP reading was recorded at 124/65 sitting EOB. PT noted no deficits in bed mobility, ROM, MMT, or transfers and only minimal gait deficits that appear to be from hx of chronic back pain; pt ambulated ~200 ft independent with no AD. There were no complications during evaluation. With PT findings, pt is fit for home with family upon d/c from hospital.     Functional Mobility:  · Bed Mobility:     · Supine to Sit: independence  · Transfers:     · Sit to Stand:  independence with no AD  · Bed to Chair: independence with  no AD  After ambulation  · Gait: Pt ambulated ~200 ft indpenedently with mild gait impairments which appeared to be from chronic LBP and past R ankle injry. Pt ambulated with normal gait speed but displayed decreased L steplength with decreased time spent on RLE.  Pt also demonstrated hip ER throughout gait. Pt stated he had no pain, SOB, or dizziness during ambulation.   · Balance: Seated static: good; standing static: good; dynamic ambulation: good    AM-PAC 6 CLICK MOBILITY  Total Score:24     Therapeutic Activities and Exercises: none    AM-PAC 6 CLICK MOBILITY  Total Score:24     Patient left up  in chair with all lines intact, call button in reach and wife present.    GOALS:   Multidisciplinary Problems     Physical Therapy Goals     Not on file          Multidisciplinary Problems (Resolved)        Problem: Physical Therapy Goal    Goal Priority Disciplines Outcome Goal Variances Interventions   Physical Therapy Goal   (Resolved)     PT, PT/OT Met                     History:     Past Medical History:   Diagnosis Date    Back pain, chronic     Bulging discs     Chronic pain following surgery or procedure     Degenerative disc disease     Hypertension     Hypokalemia     Obese abdomen     PAD (peripheral artery disease)     Post laminectomy syndrome     Seizures     Severe sepsis     Short-term memory loss     Surgical site infection 11/17/2020    Rt abdomen pain pump site    Thyroid disease     Subclinical hyperthyroidism       Past Surgical History:   Procedure Laterality Date    CHOLECYSTECTOMY      GASTRIC BYPASS      SLEEVE    gastric sleeve  2011    HERNIA REPAIR      pain pump Right 04/03/2018    inserted at right abdomen    pain pump site washout Right 04/13/2018    SKIN SURGERY      EXCESS SKIN REMOVAL    SPINE SURGERY      lumbar fusion       Time Tracking:     PT Received On: 12/01/20  PT Start Time: 1441     PT Stop Time: 1500  PT Total Time (min): 19 min     Billable Minutes: Evaluation 19      Phoenix Iglesias SPT  11/17/2020

## 2020-11-17 NOTE — H&P
[facility] Medicine  History & Physical    Patient Name: Jaime Lund  MRN: [id number]  Admission Date: 11/16/2020  Attending Physician: Rich Martin MD   Primary Care Provider: Gabby Dean MD         Patient information was obtained from patient and ER records.     Subjective:     Principal Problem:Cellulitis, abdominal wall    Chief Complaint:   Chief Complaint   Patient presents with    Post-op Problem     Pt c/o RLQ pain with redness, swelling and malodorous drainage from a surgical site from 2 weeks ago. Pt's wife reports pt has been experiencing confusion that began earlier tonight.        HPI: Mr. Lund is a 51 yo M with chronic back pain. He has a intrathecal pain pump RLQ of abdomen. He had some surgery/adjustment for this about 2 weeks ago. He presents with redness and puss drainage since Friday.  He was started on Vanc and Zosyn. Neurosurgery was consulted. Patient denies shaking chills. Subjective fever on 11/16.  Patient resting comfortably. He is non-ill appearing and has no other complaints.     Past Medical History:   Diagnosis Date    Back pain, chronic     Bulging discs     Chronic pain following surgery or procedure     Degenerative disc disease     Hypertension     Hypokalemia     Obese abdomen     PAD (peripheral artery disease)     Post laminectomy syndrome     Seizures     Severe sepsis     Short-term memory loss     Surgical site infection 11/17/2020    Rt abdomen pain pump site    Thyroid disease     Subclinical hyperthyroidism       Past Surgical History:   Procedure Laterality Date    CHOLECYSTECTOMY      GASTRIC BYPASS      SLEEVE    gastric sleeve  2011    HERNIA REPAIR      pain pump Right 04/03/2018    inserted at right abdomen    pain pump site washout Right 04/13/2018    SKIN SURGERY      EXCESS SKIN REMOVAL    SPINE SURGERY      lumbar fusion       Review of patient's allergies indicates:   Allergen Reactions     Klonopin [clonazepam] Anxiety and Other (See Comments)     Becomes agitated    Valium [diazepam] Other (See Comments)     Becomes agitated    Xanax [alprazolam] Anxiety and Other (See Comments)     Becomes agitated       No current facility-administered medications on file prior to encounter.      Current Outpatient Medications on File Prior to Encounter   Medication Sig    HYDROmorphone (DILAUDID) 8 MG tablet Take 8 mg by mouth every 6 (six) hours as needed for Pain.    methocarbamoL (ROBAXIN) 750 MG Tab Take 750 mg by mouth 4 (four) times daily.    butalbital-acetaminophen-caffeine -40 mg (FIORICET, ESGIC) -40 mg per tablet TAKE ONE TABLET BY MOUTH EVERY 4 HOURS AS NEEDED FOR HEADACHE    DULoxetine (CYMBALTA) 60 MG capsule TAKE 1 CAPSULE BY MOUTH EVERY EVENING    furosemide (LASIX) 40 MG tablet TAKE ONE TABLET BY MOUTH ONCE DAILY FOR FLUID    gabapentin (NEURONTIN) 400 MG capsule 2 (two) times daily. Takes 1 400mg pill every AM, Takes 2 (400mg) at night    meloxicam (MOBIC) 15 MG tablet TAKE ONE TABLET BY MOUTH ONCE DAILY AS NEEDED FOR PAIN    ondansetron (ZOFRAN) 4 MG tablet Take 1 tablet (4 mg total) by mouth every 6 (six) hours as needed.    potassium chloride (KLOR-CON) 10 MEQ TbSR TAKE ONE TABLET BY MOUTH ONCE DAILY AS NEEDED WITH FLUID PILL    tiZANidine (ZANAFLEX) 2 MG tablet TAKE ONE TABLET BY MOUTH THREE TIMES DAILY FOR MUSCLE SPASMS.    torsemide (DEMADEX) 10 MG Tab Take 1 tablet (10 mg total) by mouth 2 (two) times daily as needed.    [DISCONTINUED] hydroCHLOROthiazide (HYDRODIURIL) 25 MG tablet TAKE ONE TABLET BY MOUTH ONCE DAILY FOR BLOOD PRESSURE AND/OR FOR FLUID.    [DISCONTINUED] oxyCODONE (ROXICODONE) 30 MG Tab TAKE 1/2 TABLET BY MOUTH SIX TIMES A DAY FOR PAIN    [DISCONTINUED] oxyCODONE-acetaminophen (PERCOCET)  mg per tablet Take 1 tablet by mouth every 4 (four) hours as needed for Pain (.).    [DISCONTINUED] ranitidine (ZANTAC) 150 MG capsule Take 150 mg by  mouth 2 (two) times daily.    [DISCONTINUED] tamsulosin (FLOMAX) 0.4 mg Cap TAKE 1 CAPSULE BY MOUTH ONCE DAILY IN THE MORNING for prostate.     Family History     Problem Relation (Age of Onset)    Arthritis Mother    Breast cancer Mother    Heart disease Mother    Hypertension Mother, Father    Throat cancer Father        Tobacco Use    Smoking status: Never Smoker    Smokeless tobacco: Never Used   Substance and Sexual Activity    Alcohol use: Yes     Comment: socially    Drug use: No    Sexual activity: Yes     Partners: Female     Review of Systems   Constitutional: Positive for fever. Negative for activity change, appetite change, chills, diaphoresis, fatigue and unexpected weight change.   HENT: Negative for congestion, dental problem and drooling.    Eyes: Negative for pain, discharge and itching.   Respiratory: Negative for apnea, cough, choking, chest tightness and shortness of breath.    Cardiovascular: Negative for chest pain.   Gastrointestinal: Positive for abdominal pain. Negative for abdominal distention, constipation, diarrhea, nausea and vomiting.   Genitourinary: Negative for difficulty urinating, dysuria and enuresis.   Musculoskeletal: Negative for arthralgias and back pain.   Skin: Positive for color change and rash.   Psychiatric/Behavioral: Negative for agitation and behavioral problems.     Objective:     Vital Signs (Most Recent):  Temp: 98.4 °F (36.9 °C) (11/17/20 0925)  Pulse: 81 (11/17/20 0925)  Resp: 18 (11/17/20 1108)  BP: (!) 147/68 (11/17/20 0925)  SpO2: 97 % (11/17/20 0925) Vital Signs (24h Range):  Temp:  [97.9 °F (36.6 °C)-98.5 °F (36.9 °C)] 98.4 °F (36.9 °C)  Pulse:  [] 81  Resp:  [10-20] 18  SpO2:  [93 %-100 %] 97 %  BP: (111-172)/(57-99) 147/68     Weight: (!) 143.2 kg (315 lb 11.2 oz)  Body mass index is 44.03 kg/m².    Physical Exam  Vitals signs and nursing note reviewed.   Constitutional:       General: He is not in acute distress.     Appearance: Normal  appearance. He is obese. He is not ill-appearing, toxic-appearing or diaphoretic.   HENT:      Head: Normocephalic and atraumatic.   Neck:      Musculoskeletal: Normal range of motion.   Cardiovascular:      Rate and Rhythm: Normal rate and regular rhythm.      Heart sounds: No murmur. No gallop.    Pulmonary:      Effort: Pulmonary effort is normal. No respiratory distress.      Breath sounds: No wheezing or rales.   Abdominal:      General: Abdomen is flat. Bowel sounds are normal. There is no distension.      Palpations: There is no mass.      Tenderness: There is no abdominal tenderness. There is no guarding.      Hernia: No hernia is present.   Skin:     General: Skin is warm.      Comments: Cellulitis of abdominal wall RLQ. Bandaged.   Neurological:      Mental Status: He is alert and oriented to person, place, and time.   Psychiatric:         Mood and Affect: Mood normal.         Behavior: Behavior normal.         Thought Content: Thought content normal.             Significant Labs:   BMP:   Recent Labs   Lab 11/17/20  0630   GLU 95      K 3.5      CO2 32*   BUN 12   CREATININE 0.7   CALCIUM 8.8   MG 2.2     CBC:   Recent Labs   Lab 11/16/20  2122 11/17/20  0630   WBC 15.49* 10.85   HGB 10.4* 10.0*   HCT 31.8* 31.5*    275       Significant Imaging:   Ultrasound abdomen pending     Assessment/Plan:     * Cellulitis, abdominal wall  With likely abscess. Ultrasound ordered. Sepsis not present. Patient non-ill appearing. Blood cultures are NG. Vanc and Zosyn for now. Neuro surgery consulted.       Surgical wound infection  Intrathecal pump site infection- Abx. Neuro surgery       Anemia of chronic disease  No acute issues.       Chronic pain  As noted       Peripheral artery disease  No acute issues       BMI 40.0-44.9, adult  Body mass index is 44.03 kg/m².  Weight loss as out patient       Seizure  Not on seizure meds. Will discuss      Essential hypertension  No acute issues         VTE  Risk Mitigation (From admission, onward)         Ordered     enoxaparin injection 40 mg  Every 24 hours      11/17/20 1041     Place GUSTAVO hose  Until discontinued      11/17/20 0006     IP VTE HIGH RISK PATIENT  Once      11/17/20 0006     Place sequential compression device  Until discontinued      11/17/20 0006                Patient in ICU for border only.     Rich Pascual MD  Department of Hospital Medicine   [facility]

## 2020-11-17 NOTE — PROGRESS NOTES
Pharmacokinetic Initial Assessment: IV Vancomycin    Assessment/Plan:    Initiate intravenous vancomycin with loading dose of 2500 mg once followed by a maintenance dose of vancomycin 2000 mg IV every 12 hours  Desired empiric serum trough concentration is 10 to 20 mcg/mL  Draw vancomycin trough level 60 min prior to fourth dose on 11/18/2020 at approximately 1300  Pharmacy will continue to follow and monitor vancomycin.      Please contact pharmacy at extension [phone] with any questions regarding this assessment.     Thank you for the consult,   Dennys Torres       Patient brief summary:  Jaime Lund is a 50 y.o. male initiated on antimicrobial therapy with IV Vancomycin for treatment of suspected skin & soft tissue infection    Drug Allergies:   Review of patient's allergies indicates:   Allergen Reactions    Klonopin [clonazepam] Anxiety and Other (See Comments)     Becomes agitated    Valium [diazepam] Other (See Comments)     Becomes agitated    Xanax [alprazolam] Anxiety and Other (See Comments)     Becomes agitated       Actual Body Weight:   136.1 kg    Renal Function:   Estimated Creatinine Clearance: 153.9 mL/min (based on SCr of 0.8 mg/dL).,     Dialysis Method (if applicable):  N/A    CBC (last 72 hours):  Recent Labs   Lab Result Units 11/16/20 2122   WBC K/uL 15.49*   Hemoglobin g/dL 10.4*   Hematocrit % 31.8*   Platelets K/uL 320   Gran % % 77.8*   Lymph % % 8.5*   Mono % % 11.8   Eosinophil % % 0.5   Basophil % % 0.2   Differential Method  Automated       Metabolic Panel (last 72 hours):  Recent Labs   Lab Result Units 11/16/20 2115 11/16/20 2122   Sodium mmol/L  --  138   Potassium mmol/L  --  3.8   Chloride mmol/L  --  99   CO2 mmol/L  --  28   Glucose mg/dL  --  111*   Glucose, UA  Negative  --    BUN mg/dL  --  17   Creatinine mg/dL  --  0.8   Albumin g/dL  --  3.2*   Total Bilirubin mg/dL  --  0.4   Alkaline Phosphatase U/L  --  102   AST U/L  --  21   ALT U/L  --  13    Magnesium mg/dL  --  2.4       Drug levels (last 3 results):  No results for input(s): VANCOMYCINRA, VANCOMYCINPE, VANCOMYCINTR in the last 72 hours.    Microbiologic Results:  Microbiology Results (last 7 days)       Procedure Component Value Units Date/Time    Blood culture x two cultures. Draw prior to antibiotics. [[id number]] Collected: 11/16/20 2140    Order Status: Sent Specimen: Blood from Peripheral, Forearm, Right Updated: 11/16/20 2203    Aerobic culture (Specify Source) **CANNOT BE ORDERED AS STAT** [[id number]] Collected: 11/16/20 2117    Order Status: Sent Specimen: Incision site from Abdomen Updated: 11/16/20 2202    Blood culture x two cultures. Draw prior to antibiotics. [[id number]] Collected: 11/16/20 2117    Order Status: Sent Specimen: Blood from Peripheral, Antecubital, Right Updated: 11/16/20 2139

## 2020-11-17 NOTE — FIRST PROVIDER EVALUATION
Emergency Department TeleTriage Encounter Note      CHIEF COMPLAINT    Chief Complaint   Patient presents with    Post-op Problem     Pt c/o RLQ pain with redness, swelling and malodorous drainage from a surgical site from 2 weeks ago. Pt's wife reports pt has been experiencing confusion that began earlier tonight.       VITAL SIGNS   Initial Vitals [11/16/20 2013]   BP Pulse Resp Temp SpO2   (!) 148/69 90 18 98.1 °F (36.7 °C) 96 %      MAP       --            ALLERGIES    Review of patient's allergies indicates:   Allergen Reactions    Klonopin [clonazepam] Anxiety and Other (See Comments)     Becomes agitated    Valium [diazepam] Other (See Comments)     Becomes agitated    Xanax [alprazolam] Anxiety and Other (See Comments)     Becomes agitated       PROVIDER TRIAGE NOTE  This is a teletriage evaluation of a 50 y.o. male presenting to the ED with c/o drainage from the surgical incision site of a pain pump that was put in 2 weeks ago. Family reports drainage and pain with redness. Initial orders will be placed and care will be transferred to an alternate provider when patient is roomed for a full evaluation. Any additional orders and the final disposition will be determined by that provider.         ORDERS  Labs Reviewed   CULTURE, BLOOD   CULTURE, BLOOD   CBC W/ AUTO DIFFERENTIAL   COMPREHENSIVE METABOLIC PANEL   URINALYSIS, REFLEX TO URINE CULTURE   LACTIC ACID, PLASMA       ED Orders (720h ago, onward)    Start Ordered     Status Ordering Provider    11/17/20 0109 11/16/20 2109  Lactic acid, plasma #2  In 4 hours      Ordered KACEY ZAMBRANO    11/16/20 2110 11/16/20 2109  POCT COVID-19 Rapid Screening  Once     Comments: Possible admission      Ordered KACEY ZAMBRANO    11/16/20 2110 11/16/20 2109  POCT glucose  Once      Ordered KACEY ZAMBRANO    11/16/20 2109 11/16/20 2109  ED Preference List Used to Initiate Sepsis Orders  Until discontinued      Ordered KACEY ZAMBRANO    11/16/20 2109 11/16/20  2109  Blood culture x two cultures. Draw prior to antibiotics.  Every 15 min     Comments: Aerobic and anaerobic     Order ID Start Status Ordering Provider     [id number] 11/16/20 2109 Ordered KACEY ZAMBRANO   [id number] 11/16/20 2124 Ordered KACEY ZAMBRANO       Ordered KACEY ZAMBRANO    11/16/20 2109 11/16/20 2109  Lactic acid, plasma #1  STAT  Collect    Ordered KACEY ZAMBRANO    11/16/20 2109 11/16/20 2109  Bed rest  Until discontinued      Ordered KACEY ZAMBRANO    11/16/20 2109 11/16/20 2109  Cardiac Monitoring - Adult  Continuous     Comments: Notify Physician If:    Ordered KACEY ZAMBRANO    11/16/20 2109 11/16/20 2109  Strict intake and output  Until discontinued      Ordered KACEY ZAMBRANO    11/16/20 2021 11/16/20 2021  Saline lock IV  Once      Ordered LUIZ SOLORZANO    11/16/20 2021 11/16/20 2021  Vital signs  Every 15 min      Ordered LUIZ SOLORZANO    11/16/20 2021 11/16/20 2021  Pulse Oximetry Continuous  Continuous      Ordered LUIZ SOLORZANO    11/16/20 2021 11/16/20 2021  CBC auto differential  STAT  Collect    Ordered LUIZ SOLORZANO    11/16/20 2021 11/16/20 2021  Comprehensive metabolic panel  STAT  Collect    Ordered LUIZ SOLORZANO    11/16/20 2021 11/16/20 2021  Urinalysis, Reflex to Urine Culture Urine, Clean Catch  STAT      Ordered LUIZ SOLORZANO            Virtual Visit Note: The provider triage portion of this emergency department evaluation and documentation was performed via "[employer] a HIPAA-compliant telemedicine application, in concert with a tele-presenter in the room. A face to face patient evaluation with one of my colleagues will occur once the patient is placed in an emergency department room.      DISCLAIMER: This note was prepared with [employer] voice recognition transcription software. Garbled syntax, mangled pronouns, and other bizarre constructions may be attributed to that software system.

## 2020-11-17 NOTE — ED PROVIDER NOTES
Encounter Date: 11/16/2020       History     Chief Complaint   Patient presents with    Post-op Problem     Pt c/o RLQ pain with redness, swelling and malodorous drainage from a surgical site from 2 weeks ago. Pt's wife reports pt has been experiencing confusion that began earlier tonight.     For evaluation of drainage in redness around the surgical site.  Patient had in the revision of his pain pump 2 weeks ago and outpatient surgery clinic by pain management.  Pump at the present for 3 years.  The catheter was changed out because it was obstructed.  The device was unchanged.  The device located in the right lower quadrant abdominal wall.  He did well for the 1st few days but his wife states that he developed clear drainage from the wound site a little over week ago which has persisted.  Over the last 24 hr he developed purulent discharge and redness around the wound.  He is starting have discomfort around the wound.  He had a subjective fever this afternoon with mild confusion.  He was given Tylenol with improvement.  The redness spread across his abdominal wall.  Patient states he has a history of wound infections after the pump was placed initially 3 years ago and after lumbar spine surgery.  No history of abscesses.  He is currently not on antibiotics.  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  He denies significant headache.  Denies neck pain or back pain.  No nausea or vomiting.  No diarrhea.  No urinary symptoms.        Review of patient's allergies indicates:   Allergen Reactions    Klonopin [clonazepam] Anxiety and Other (See Comments)     Becomes agitated    Valium [diazepam] Other (See Comments)     Becomes agitated    Xanax [alprazolam] Anxiety and Other (See Comments)     Becomes agitated     Past Medical History:   Diagnosis Date    Back pain, chronic     Bulging discs     Chronic pain following surgery or procedure     Degenerative disc disease     Hypertension      Hypokalemia     PAD (peripheral artery disease)     Post laminectomy syndrome     Seizures     Severe sepsis     Short-term memory loss     Thyroid disease     Subclinical hyperthyroidism     Past Surgical History:   Procedure Laterality Date    CHOLECYSTECTOMY      GASTRIC BYPASS      SLEEVE    gastric sleeve  2011    HERNIA REPAIR      pain pump  04/13/2018    SKIN SURGERY      EXCESS SKIN REMOVAL    SPINE SURGERY      lumbar fusion     Family History   Problem Relation Age of Onset    Heart disease Mother     Breast cancer Mother     Arthritis Mother     Hypertension Mother     Throat cancer Father     Hypertension Father      Social History     Tobacco Use    Smoking status: Never Smoker    Smokeless tobacco: Never Used   Substance Use Topics    Alcohol use: No    Drug use: No     Review of Systems   Constitutional: Positive for chills, diaphoresis and fever.   HENT: Negative for congestion and sore throat.    Eyes: Negative.  Negative for visual disturbance.   Respiratory: Negative for cough and shortness of breath.    Cardiovascular: Negative for chest pain.   Gastrointestinal: Positive for abdominal pain. Negative for blood in stool, diarrhea, nausea and vomiting.        NO melena or rectal bleeding   Genitourinary: Negative for dysuria, flank pain and frequency.   Musculoskeletal: Negative for back pain.   Skin: Positive for rash and wound.   Neurological: Positive for headaches. Negative for dizziness, tremors, seizures, syncope, facial asymmetry, speech difficulty, weakness, light-headedness and numbness.        No numbness   Psychiatric/Behavioral: Positive for confusion.   All other systems reviewed and are negative.      Physical Exam     Initial Vitals [11/16/20 2013]   BP Pulse Resp Temp SpO2   (!) 148/69 90 18 98.1 °F (36.7 °C) 96 %      MAP       --         Physical Exam    Nursing note and vitals reviewed.  Constitutional: He appears well-developed and well-nourished. He  is not diaphoretic. No distress.   HENT:   Head: Normocephalic and atraumatic.   Right Ear: External ear normal.   Left Ear: External ear normal.   Nose: Nose normal.   Eyes: Conjunctivae and EOM are normal. Pupils are equal, round, and reactive to light. Right eye exhibits no discharge. Left eye exhibits no discharge. No scleral icterus.   Neck: Normal range of motion. Neck supple. No tracheal deviation present.   Cardiovascular: Normal rate, regular rhythm and normal heart sounds.   No murmur heard.  Pulmonary/Chest: Breath sounds normal. No stridor. No respiratory distress. He has no wheezes. He has no rhonchi. He has no rales.   Abdominal: Soft. He exhibits no distension and no mass. There is abdominal tenderness. There is no rebound and no guarding.   Musculoskeletal: Normal range of motion. No tenderness or edema.   Neurological: He is alert and oriented to person, place, and time. He has normal strength. No cranial nerve deficit or sensory deficit. GCS score is 15. GCS eye subscore is 4. GCS verbal subscore is 5. GCS motor subscore is 6.   Skin: Skin is warm and dry. No rash noted. There is erythema.   10 cm surgical wound over the right lower quadrant of the abdomen with dehiscence to the lateral margins of the wound with copious purulent discharge.  Mildly tender palpation.  No crepitus.  Erythema extends across the entire lower abdominal wall.   Psychiatric: He has a normal mood and affect. His behavior is normal. Judgment and thought content normal.         ED Course   Procedures  Labs Reviewed   CBC W/ AUTO DIFFERENTIAL - Abnormal; Notable for the following components:       Result Value    WBC 15.49 (*)     RBC 3.64 (*)     Hemoglobin 10.4 (*)     Hematocrit 31.8 (*)     Immature Granulocytes 1.2 (*)     Gran # (ANC) 12.1 (*)     Immature Grans (Abs) 0.18 (*)     Mono # 1.8 (*)     Gran % 77.8 (*)     Lymph % 8.5 (*)     All other components within normal limits   COMPREHENSIVE METABOLIC PANEL -  Abnormal; Notable for the following components:    Glucose 111 (*)     Albumin 3.2 (*)     All other components within normal limits   POCT GLUCOSE - Abnormal; Notable for the following components:    POCT Glucose 112 (*)     All other components within normal limits   CULTURE, BLOOD   CULTURE, BLOOD   CULTURE, AEROBIC  (SPECIFY SOURCE)   LACTIC ACID, PLASMA   MAGNESIUM   URINALYSIS, REFLEX TO URINE CULTURE   URINALYSIS MICROSCOPIC   SARS-COV-2 RDRP GENE   POCT GLUCOSE MONITORING CONTINUOUS     EKG Readings: (Independently Interpreted)   Initial Reading: No STEMI. Rhythm: Normal Sinus Rhythm. Heart Rate: 81. Ectopy: No Ectopy. Conduction: Normal. ST Segments: Normal ST Segments. T Waves: Normal. Axis: Normal.   LVH with QRS widening.  Unchanged from 03/01/2018.  No acute ischemia.       Imaging Results          X-Ray Chest 1 View (In process)                  Medical Decision Making:   Initial Assessment:   Patient presents for evaluation of postoperative infection of a pain pump site.  Erythema and purulent drainage started today.  Patient has been febrile.  Erythema has been rapidly progressing across the abdominal wall.  Patient had a brief episode of confusion associated with this fever.  Patient is alert oriented x4 at this time.  Patient has no other complaints.  Large area of cellulitis across the whole lower abdomen on exam.  Purulent discharge present from wound site.  Cultures have been sent.  Sepsis workup will be done.  Will start broad-spectrum antibiotics.  Plan for admission.  Consult surgery for washout.  Differential Diagnosis:   Abscess, cellulitis, sepsis  Clinical Tests:   Lab Tests: Ordered and Reviewed  The following lab test(s) were unremarkable: CBC, CMP and Urinalysis  Radiological Study: Ordered and Reviewed  ED Management:  2250:  No evidence sepsis on lab work.  Vital signs remain stable.  Mental status is normal.  White blood cell count is elevated.  Patient has significant cellulitis of  the lower abdomen.  Will admit for IV antibiotics.  Will consult surgery for washout of the pocket associated with the pain pump.  Discussed plan with patient and family.                             Clinical Impression:       ICD-10-CM ICD-9-CM   1. Cellulitis, abdominal wall  L03.311 682.2   2. Infection of superficial incisional surgical site after procedure, initial encounter  T81.41XA 998.59                      Disposition:   Disposition: Admitted  Condition: Stable     ED Disposition Condition    Admit                             Madan Valencia MD  11/16/20 2259       Madan Valencia MD  11/17/20 0143

## 2020-11-17 NOTE — CONSULTS
Consulted for wound abdominal wall  A 50 year old male admitted today from home with cellulitis of abdominal wall; surgical wound infection; anemia of chronic disease; chronic pain; peripheral artery disease; BMI 44.03; seizure; essential hypertension  PMH: Back pain, chronic; bulging discs; chronic pain following surgery/procedure; degenerative disc disease; HTN; hypokalemia; obese abdomen; PAD; post laminectomy syndrome; seizures; severe sepsis; short term memory loss; thyroid disease-subclinical hyperthyroidism; S/P gastric sleeve; S/P hernia repair; S/P pain pump insertion right abdomen 4/3/18; S/P pain pump site washout 4/13/18; S/P lumbar fusion  Recent adjustment of pain pump at outpatient surgery center on [address]- staples recently removed  Assessment:  Photodocumentation    Right abdominal incision- Several staples remain securing skin. Draining moderate amount purulent fluid from lateral aspect of incision when abdomen palpated below incision. Noted clearing of erythema on abdomen as marked with pens. Periwound skin denuded from tape removal.   Treatment:  Cleansed skin surrounding incision with Vashe. Applied Netrepid Cavilon No Sting Film Barrier to skin surrounding incision. Dressed with dry gauze and secured with Medipore tape.  Will assist Neurosurgery as needed with wound management.   Treatment plan discussed with patient.

## 2020-11-18 ENCOUNTER — ANESTHESIA (OUTPATIENT)
Dept: SURGERY | Facility: HOSPITAL | Age: 50
DRG: 092 | End: 2020-11-18
Payer: COMMERCIAL

## 2020-11-18 LAB
ABO + RH BLD: NORMAL
ALBUMIN SERPL BCP-MCNC: 2.6 G/DL (ref 3.5–5.2)
ALP SERPL-CCNC: 98 U/L (ref 55–135)
ALT SERPL W/O P-5'-P-CCNC: 11 U/L (ref 10–44)
ANION GAP SERPL CALC-SCNC: 7 MMOL/L (ref 8–16)
APTT BLDCRRT: 34.6 SEC (ref 21–32)
AST SERPL-CCNC: 18 U/L (ref 10–40)
BASOPHILS # BLD AUTO: 0.03 K/UL (ref 0–0.2)
BASOPHILS NFR BLD: 0.4 % (ref 0–1.9)
BILIRUB SERPL-MCNC: 0.4 MG/DL (ref 0.1–1)
BLD GP AB SCN CELLS X3 SERPL QL: NORMAL
BUN SERPL-MCNC: 8 MG/DL (ref 6–20)
CALCIUM SERPL-MCNC: 9.3 MG/DL (ref 8.7–10.5)
CHLORIDE SERPL-SCNC: 99 MMOL/L (ref 95–110)
CO2 SERPL-SCNC: 33 MMOL/L (ref 23–29)
CREAT SERPL-MCNC: 0.7 MG/DL (ref 0.5–1.4)
DIFFERENTIAL METHOD: ABNORMAL
EOSINOPHIL # BLD AUTO: 0.2 K/UL (ref 0–0.5)
EOSINOPHIL NFR BLD: 2.3 % (ref 0–8)
ERYTHROCYTE [DISTWIDTH] IN BLOOD BY AUTOMATED COUNT: 14 % (ref 11.5–14.5)
EST. GFR  (AFRICAN AMERICAN): >60 ML/MIN/1.73 M^2
EST. GFR  (NON AFRICAN AMERICAN): >60 ML/MIN/1.73 M^2
GLUCOSE SERPL-MCNC: 99 MG/DL (ref 70–110)
GRAM STN SPEC: NORMAL
GRAM STN SPEC: NORMAL
HCT VFR BLD AUTO: 31.4 % (ref 40–54)
HGB BLD-MCNC: 10.1 G/DL (ref 14–18)
HIV 1+2 AB+HIV1 P24 AG SERPL QL IA: NEGATIVE
IMM GRANULOCYTES # BLD AUTO: 0.03 K/UL (ref 0–0.04)
IMM GRANULOCYTES NFR BLD AUTO: 0.4 % (ref 0–0.5)
LYMPHOCYTES # BLD AUTO: 1.5 K/UL (ref 1–4.8)
LYMPHOCYTES NFR BLD: 19 % (ref 18–48)
MAGNESIUM SERPL-MCNC: 2.2 MG/DL (ref 1.6–2.6)
MCH RBC QN AUTO: 28.4 PG (ref 27–31)
MCHC RBC AUTO-ENTMCNC: 32.2 G/DL (ref 32–36)
MCV RBC AUTO: 88 FL (ref 82–98)
MONOCYTES # BLD AUTO: 0.9 K/UL (ref 0.3–1)
MONOCYTES NFR BLD: 11.5 % (ref 4–15)
NEUTROPHILS # BLD AUTO: 5.2 K/UL (ref 1.8–7.7)
NEUTROPHILS NFR BLD: 66.4 % (ref 38–73)
NRBC BLD-RTO: 0 /100 WBC
PHOSPHATE SERPL-MCNC: 3.6 MG/DL (ref 2.7–4.5)
PLATELET # BLD AUTO: 347 K/UL (ref 150–350)
PMV BLD AUTO: 10 FL (ref 9.2–12.9)
POTASSIUM SERPL-SCNC: 3.6 MMOL/L (ref 3.5–5.1)
PROT SERPL-MCNC: 6.6 G/DL (ref 6–8.4)
RBC # BLD AUTO: 3.56 M/UL (ref 4.6–6.2)
SODIUM SERPL-SCNC: 139 MMOL/L (ref 136–145)
VANCOMYCIN TROUGH SERPL-MCNC: 12.7 UG/ML (ref 10–22)
WBC # BLD AUTO: 7.77 K/UL (ref 3.9–12.7)

## 2020-11-18 PROCEDURE — 62355 PR REMOVE SPINAL CANAL CATHETER: ICD-10-PCS | Mod: AS,51,, | Performed by: PHYSICIAN ASSISTANT

## 2020-11-18 PROCEDURE — 25000003 PHARM REV CODE 250: Performed by: PHYSICIAN ASSISTANT

## 2020-11-18 PROCEDURE — 87070 CULTURE OTHR SPECIMN AEROBIC: CPT

## 2020-11-18 PROCEDURE — 62355 REMOVE SPINAL CANAL CATHETER: CPT | Mod: 51,,, | Performed by: NEUROLOGICAL SURGERY

## 2020-11-18 PROCEDURE — 63600175 PHARM REV CODE 636 W HCPCS: Performed by: PHYSICIAN ASSISTANT

## 2020-11-18 PROCEDURE — D9220A PRA ANESTHESIA: ICD-10-PCS | Mod: CRNA,,, | Performed by: NURSE ANESTHETIST, CERTIFIED REGISTERED

## 2020-11-18 PROCEDURE — 87186 SC STD MICRODIL/AGAR DIL: CPT

## 2020-11-18 PROCEDURE — 88300 PR  SURG PATH,GROSS,LEVEL I: ICD-10-PCS | Mod: 26,,, | Performed by: PATHOLOGY

## 2020-11-18 PROCEDURE — D9220A PRA ANESTHESIA: Mod: ANES,,, | Performed by: ANESTHESIOLOGY

## 2020-11-18 PROCEDURE — 62365 PR REMOVE, INFUSN DEVICE/PUMP: ICD-10-PCS | Mod: ,,, | Performed by: NEUROLOGICAL SURGERY

## 2020-11-18 PROCEDURE — 80202 ASSAY OF VANCOMYCIN: CPT

## 2020-11-18 PROCEDURE — 87205 SMEAR GRAM STAIN: CPT

## 2020-11-18 PROCEDURE — 63600175 PHARM REV CODE 636 W HCPCS: Performed by: NURSE ANESTHETIST, CERTIFIED REGISTERED

## 2020-11-18 PROCEDURE — D9220A PRA ANESTHESIA: Mod: CRNA,,, | Performed by: NURSE ANESTHETIST, CERTIFIED REGISTERED

## 2020-11-18 PROCEDURE — 63600175 PHARM REV CODE 636 W HCPCS: Performed by: INTERNAL MEDICINE

## 2020-11-18 PROCEDURE — 62365 PR REMOVE, INFUSN DEVICE/PUMP: ICD-10-PCS | Mod: AS,,, | Performed by: PHYSICIAN ASSISTANT

## 2020-11-18 PROCEDURE — 84100 ASSAY OF PHOSPHORUS: CPT

## 2020-11-18 PROCEDURE — 25000003 PHARM REV CODE 250: Performed by: NEUROLOGICAL SURGERY

## 2020-11-18 PROCEDURE — 88300 SURGICAL PATH GROSS: CPT | Performed by: PATHOLOGY

## 2020-11-18 PROCEDURE — 36000705 HC OR TIME LEV I EA ADD 15 MIN: Performed by: NEUROLOGICAL SURGERY

## 2020-11-18 PROCEDURE — 88300 SURGICAL PATH GROSS: CPT | Mod: 26,,, | Performed by: PATHOLOGY

## 2020-11-18 PROCEDURE — 87075 CULTR BACTERIA EXCEPT BLOOD: CPT

## 2020-11-18 PROCEDURE — 36415 COLL VENOUS BLD VENIPUNCTURE: CPT

## 2020-11-18 PROCEDURE — 37000008 HC ANESTHESIA 1ST 15 MINUTES: Performed by: NEUROLOGICAL SURGERY

## 2020-11-18 PROCEDURE — D9220A PRA ANESTHESIA: ICD-10-PCS | Mod: ANES,,, | Performed by: ANESTHESIOLOGY

## 2020-11-18 PROCEDURE — 86850 RBC ANTIBODY SCREEN: CPT

## 2020-11-18 PROCEDURE — 85025 COMPLETE CBC W/AUTO DIFF WBC: CPT

## 2020-11-18 PROCEDURE — 62355 REMOVE SPINAL CANAL CATHETER: CPT | Mod: AS,51,, | Performed by: PHYSICIAN ASSISTANT

## 2020-11-18 PROCEDURE — 85730 THROMBOPLASTIN TIME PARTIAL: CPT

## 2020-11-18 PROCEDURE — 62365 REMOVE SPINE INFUSION DEVICE: CPT | Mod: ,,, | Performed by: NEUROLOGICAL SURGERY

## 2020-11-18 PROCEDURE — 83735 ASSAY OF MAGNESIUM: CPT

## 2020-11-18 PROCEDURE — 87077 CULTURE AEROBIC IDENTIFY: CPT

## 2020-11-18 PROCEDURE — 80053 COMPREHEN METABOLIC PANEL: CPT

## 2020-11-18 PROCEDURE — 21400001 HC TELEMETRY ROOM

## 2020-11-18 PROCEDURE — 62365 REMOVE SPINE INFUSION DEVICE: CPT | Mod: AS,,, | Performed by: PHYSICIAN ASSISTANT

## 2020-11-18 PROCEDURE — 25000003 PHARM REV CODE 250: Performed by: INTERNAL MEDICINE

## 2020-11-18 PROCEDURE — 37000009 HC ANESTHESIA EA ADD 15 MINS: Performed by: NEUROLOGICAL SURGERY

## 2020-11-18 PROCEDURE — 62355 PR REMOVE SPINAL CANAL CATHETER: ICD-10-PCS | Mod: 51,,, | Performed by: NEUROLOGICAL SURGERY

## 2020-11-18 PROCEDURE — 36000704 HC OR TIME LEV I 1ST 15 MIN: Performed by: NEUROLOGICAL SURGERY

## 2020-11-18 PROCEDURE — 25000003 PHARM REV CODE 250: Performed by: NURSE ANESTHETIST, CERTIFIED REGISTERED

## 2020-11-18 RX ORDER — SUCCINYLCHOLINE CHLORIDE 20 MG/ML
INJECTION INTRAMUSCULAR; INTRAVENOUS
Status: DISCONTINUED | OUTPATIENT
Start: 2020-11-18 | End: 2020-11-18

## 2020-11-18 RX ORDER — ONDANSETRON 2 MG/ML
INJECTION INTRAMUSCULAR; INTRAVENOUS
Status: DISCONTINUED | OUTPATIENT
Start: 2020-11-18 | End: 2020-11-18

## 2020-11-18 RX ORDER — NEOSTIGMINE METHYLSULFATE 1 MG/ML
INJECTION, SOLUTION INTRAVENOUS
Status: DISCONTINUED | OUTPATIENT
Start: 2020-11-18 | End: 2020-11-18

## 2020-11-18 RX ORDER — BACITRACIN 500 [USP'U]/G
OINTMENT TOPICAL
Status: DISCONTINUED | OUTPATIENT
Start: 2020-11-18 | End: 2020-11-18 | Stop reason: HOSPADM

## 2020-11-18 RX ORDER — MIDAZOLAM HYDROCHLORIDE 1 MG/ML
INJECTION, SOLUTION INTRAMUSCULAR; INTRAVENOUS
Status: DISCONTINUED | OUTPATIENT
Start: 2020-11-18 | End: 2020-11-18

## 2020-11-18 RX ORDER — ROCURONIUM BROMIDE 10 MG/ML
INJECTION, SOLUTION INTRAVENOUS
Status: DISCONTINUED | OUTPATIENT
Start: 2020-11-18 | End: 2020-11-18

## 2020-11-18 RX ORDER — SODIUM CHLORIDE, SODIUM LACTATE, POTASSIUM CHLORIDE, CALCIUM CHLORIDE 600; 310; 30; 20 MG/100ML; MG/100ML; MG/100ML; MG/100ML
INJECTION, SOLUTION INTRAVENOUS CONTINUOUS PRN
Status: DISCONTINUED | OUTPATIENT
Start: 2020-11-18 | End: 2020-11-18

## 2020-11-18 RX ORDER — FENTANYL CITRATE 50 UG/ML
INJECTION, SOLUTION INTRAMUSCULAR; INTRAVENOUS
Status: DISCONTINUED | OUTPATIENT
Start: 2020-11-18 | End: 2020-11-18

## 2020-11-18 RX ORDER — MUPIROCIN 20 MG/G
1 OINTMENT TOPICAL 2 TIMES DAILY
Status: DISCONTINUED | OUTPATIENT
Start: 2020-11-18 | End: 2020-11-20 | Stop reason: HOSPADM

## 2020-11-18 RX ORDER — DEXAMETHASONE SODIUM PHOSPHATE 4 MG/ML
INJECTION, SOLUTION INTRA-ARTICULAR; INTRALESIONAL; INTRAMUSCULAR; INTRAVENOUS; SOFT TISSUE
Status: DISCONTINUED | OUTPATIENT
Start: 2020-11-18 | End: 2020-11-18

## 2020-11-18 RX ORDER — PROPOFOL 10 MG/ML
VIAL (ML) INTRAVENOUS
Status: DISCONTINUED | OUTPATIENT
Start: 2020-11-18 | End: 2020-11-18

## 2020-11-18 RX ORDER — LIDOCAINE HYDROCHLORIDE 20 MG/ML
INJECTION INTRAVENOUS
Status: DISCONTINUED | OUTPATIENT
Start: 2020-11-18 | End: 2020-11-18

## 2020-11-18 RX ADMIN — PIPERACILLIN AND TAZOBACTAM 4.5 G: 4; .5 INJECTION, POWDER, LYOPHILIZED, FOR SOLUTION INTRAVENOUS; PARENTERAL at 06:11

## 2020-11-18 RX ADMIN — ENOXAPARIN SODIUM 40 MG: 40 INJECTION SUBCUTANEOUS at 06:11

## 2020-11-18 RX ADMIN — GABAPENTIN 400 MG: 400 CAPSULE ORAL at 02:11

## 2020-11-18 RX ADMIN — PROPOFOL 200 MG: 10 INJECTION, EMULSION INTRAVENOUS at 11:11

## 2020-11-18 RX ADMIN — SODIUM CHLORIDE, SODIUM LACTATE, POTASSIUM CHLORIDE, AND CALCIUM CHLORIDE: .6; .31; .03; .02 INJECTION, SOLUTION INTRAVENOUS at 10:11

## 2020-11-18 RX ADMIN — ROCURONIUM BROMIDE 30 MG: 10 INJECTION, SOLUTION INTRAVENOUS at 11:11

## 2020-11-18 RX ADMIN — HYDROMORPHONE HYDROCHLORIDE 2 MG: 2 INJECTION INTRAMUSCULAR; INTRAVENOUS; SUBCUTANEOUS at 10:11

## 2020-11-18 RX ADMIN — HYDROMORPHONE HYDROCHLORIDE 2 MG: 2 INJECTION INTRAMUSCULAR; INTRAVENOUS; SUBCUTANEOUS at 08:11

## 2020-11-18 RX ADMIN — FENTANYL CITRATE 25 MCG: 50 INJECTION INTRAMUSCULAR; INTRAVENOUS at 01:11

## 2020-11-18 RX ADMIN — HYDROMORPHONE HYDROCHLORIDE 2 MG: 2 INJECTION INTRAMUSCULAR; INTRAVENOUS; SUBCUTANEOUS at 05:11

## 2020-11-18 RX ADMIN — SUCCINYLCHOLINE CHLORIDE 120 MG: 20 INJECTION, SOLUTION INTRAMUSCULAR; INTRAVENOUS at 11:11

## 2020-11-18 RX ADMIN — MIDAZOLAM HYDROCHLORIDE 2 MG: 1 INJECTION, SOLUTION INTRAMUSCULAR; INTRAVENOUS at 10:11

## 2020-11-18 RX ADMIN — Medication 9 MG: at 09:11

## 2020-11-18 RX ADMIN — NEOSTIGMINE METHYLSULFATE 5 MG: 1 INJECTION INTRAVENOUS at 12:11

## 2020-11-18 RX ADMIN — FENTANYL CITRATE 25 MCG: 50 INJECTION INTRAMUSCULAR; INTRAVENOUS at 12:11

## 2020-11-18 RX ADMIN — HYDROMORPHONE HYDROCHLORIDE 2 MG: 2 INJECTION INTRAMUSCULAR; INTRAVENOUS; SUBCUTANEOUS at 01:11

## 2020-11-18 RX ADMIN — VANCOMYCIN HYDROCHLORIDE 2000 MG: 10 INJECTION, POWDER, LYOPHILIZED, FOR SOLUTION INTRAVENOUS at 02:11

## 2020-11-18 RX ADMIN — CETIRIZINE HYDROCHLORIDE 5 MG: 5 TABLET ORAL at 02:11

## 2020-11-18 RX ADMIN — HYDROMORPHONE HYDROCHLORIDE 2 MG: 2 INJECTION INTRAMUSCULAR; INTRAVENOUS; SUBCUTANEOUS at 06:11

## 2020-11-18 RX ADMIN — DOCUSATE SODIUM 50 MG AND SENNOSIDES 8.6 MG 1 TABLET: 8.6; 5 TABLET, FILM COATED ORAL at 08:11

## 2020-11-18 RX ADMIN — PROPOFOL 100 MG: 10 INJECTION, EMULSION INTRAVENOUS at 12:11

## 2020-11-18 RX ADMIN — DEXAMETHASONE SODIUM PHOSPHATE 4 MG: 4 INJECTION, SOLUTION INTRA-ARTICULAR; INTRALESIONAL; INTRAMUSCULAR; INTRAVENOUS; SOFT TISSUE at 11:11

## 2020-11-18 RX ADMIN — ACETAMINOPHEN 650 MG: 325 TABLET ORAL at 02:11

## 2020-11-18 RX ADMIN — GABAPENTIN 800 MG: 400 CAPSULE ORAL at 08:11

## 2020-11-18 RX ADMIN — Medication 100 MG: at 11:11

## 2020-11-18 RX ADMIN — GLYCOPYRROLATE 0.6 MG: 0.2 INJECTION, SOLUTION INTRAMUSCULAR; INTRAVITREAL at 12:11

## 2020-11-18 RX ADMIN — MUPIROCIN 1 G: 20 OINTMENT TOPICAL at 08:11

## 2020-11-18 RX ADMIN — FUROSEMIDE 40 MG: 40 TABLET ORAL at 02:11

## 2020-11-18 RX ADMIN — DULOXETINE 60 MG: 30 CAPSULE, DELAYED RELEASE ORAL at 08:11

## 2020-11-18 RX ADMIN — TAMSULOSIN HYDROCHLORIDE 0.4 MG: 0.4 CAPSULE ORAL at 02:11

## 2020-11-18 RX ADMIN — ONDANSETRON 4 MG: 2 INJECTION INTRAMUSCULAR; INTRAVENOUS at 12:11

## 2020-11-18 RX ADMIN — FENTANYL CITRATE 100 MCG: 50 INJECTION INTRAMUSCULAR; INTRAVENOUS at 11:11

## 2020-11-18 RX ADMIN — TIZANIDINE 2 MG: 2 TABLET ORAL at 09:11

## 2020-11-18 RX ADMIN — DOCUSATE SODIUM 50 MG AND SENNOSIDES 8.6 MG 1 TABLET: 8.6; 5 TABLET, FILM COATED ORAL at 02:11

## 2020-11-18 NOTE — TRANSFER OF CARE
"Anesthesia Transfer of Care Note    Patient: Jaime Lund    Procedure(s) Performed: Procedure(s) (LRB):  WASHOUT  (Abdominal wound washout and removal of intrathecal pain pump) Lateral position  TO FOLLOW 2nd CASE  (Right)  REMOVAL, IMPLANT (N/A)    Patient location: ICU    Anesthesia Type: general    Transport from OR: Upon arrival to PACU/ICU, patient attached to ventilator and auscultated to confirm bilateral breath sounds and adequate TV. Continuous ECG monitoring in transport. Continuous SpO2 monitoring in transport. Transported from OR on 6-10 L/min O2 by face mask with adequate spontaneous ventilation    Post pain: adequate analgesia    Post assessment: no apparent anesthetic complications and tolerated procedure well    Post vital signs: stable    Level of consciousness: awake, alert and oriented    Nausea/Vomiting: no nausea/vomiting    Complications: none    Transfer of care protocol was followed      Last vitals:   Visit Vitals  /68 (BP Location: Left arm, Patient Position: Lying)   Pulse 68   Temp 36.6 °C (97.8 °F) (Axillary)   Resp (!) 7   Ht 5' 11" (1.803 m)   Wt (!) 142.4 kg (313 lb 15 oz)   SpO2 100%   BMI 43.79 kg/m²     "

## 2020-11-18 NOTE — NURSING
Report given to JANAE Cole. Patient resting in bed, continues to complain of pain to back. PRN medication given. No acute distress noted. Safety maintained throughout shift

## 2020-11-18 NOTE — NURSING
1051 - Patient is leaving the floor to OR for scheduled I&D of abdominal cellulitis via pt's bed. NAD noted.    1248 - report received from Gladis OR RN. Abdominal  Wall was washed out and closed with staples and aquacell. Pain pump removed and incision to mid back with staples and aquacell.    1300 - pt is back to room 268 from OR for scheduled I&D. NAD noted. VSS stable. Pt is easy awoke. Pt is on 10L via nonrebreather. Lab is drawing vancomycin trough.

## 2020-11-18 NOTE — ASSESSMENT & PLAN NOTE
With likely abscess. Ultrasound ordered. Sepsis not present. Patient non-ill appearing. Blood cultures are NG. Vanc and Zosyn for now. Neuro surgery consulted.     Staph A growing in wound cultures. Blood cx NG. Likely will be MRSA. DC zosyn. Continue Vanc.

## 2020-11-18 NOTE — BRIEF OP NOTE
[facility]  Brief Operative Note    SUMMARY     Surgery Date: 11/18/2020     Surgeon(s) and Role:     * Raffy Headley, DO - Primary    Assisting Surgeon: Valentina Rojas PA-C     Pre-op Diagnosis:  Infection of superficial incisional surgical site after procedure, initial encounter [T81.41XA]  Presence of intrathecal pump [Z97.8]  Surgical wound infection [T81.49XA]    Post-op Diagnosis:  Post-Op Diagnosis Codes:     * Infection of superficial incisional surgical site after procedure, initial encounter [T81.41XA]     * Presence of intrathecal pump [Z97.8]     * Surgical wound infection [T81.49XA]    Procedure(s) (LRB):  WASHOUT  (Abdominal wound washout and removal of intrathecal pain pump) Lateral position  TO FOLLOW 2nd CASE  (Right)  REMOVAL, IMPLANT (N/A)    Anesthesia: General    Description of Procedure: wound washout and removal of intrathecal pain pump    Description of the findings of the procedure: see full op note     Estimated Blood Loss: * No values recorded between 11/18/2020 11:37 AM and 11/18/2020 12:55 PM *             Specimens:   Specimen (12h ago, onward)    None          [id number]

## 2020-11-18 NOTE — NURSING
PER handoff received from MIKE Anand RN. Responds spontaneous and is AAO x4. Pt reports having pain to lower back, however pt received prn pain medication at 1850. Assessment completed per Doc Flowsheets; reference if needed. Fall and safety precautions maintained. Bed alarm activated and audible. Bed locked in lowest position, with side rails up x2. Call bell and personal items within reach. Will continue to monitor pt for any changes.

## 2020-11-18 NOTE — PROGRESS NOTES
Certification of Assistant at Surgery       Surgery Date: 11/18/2020     Participating Surgeons:  Surgeon(s) and Role:     * Raffy Headley, DO - Primary       Valentina Rojas PA-C - Assisting     Procedures:  Procedure(s) (LRB):  WASHOUT  (Abdominal wound washout and removal of intrathecal pain pump) Lateral position  TO FOLLOW 2nd CASE  (Right)  REMOVAL, IMPLANT (N/A)    Assistant Surgeon's Certification of Necessity:  I understand that section 1842 (b) (6) (d) of the Social Security Act generally prohibits Medicare Part B reasonable charge payment for the services of assistants at surgery in teaching hospitals when qualified residents are available to furnish such services. I certify that the services for which payment is claimed were medically necessary, and that no qualified resident was available to perform the services. I further understand that these services are subject to post-payment review by the Medicare carrier.      Valentina Rojas PA-C    11/18/2020  1:06 PM

## 2020-11-18 NOTE — PLAN OF CARE
Problem: Adult Inpatient Plan of Care  Goal: Plan of Care Review  Outcome: Ongoing, Progressing  Pt remained free of falls during current shift. Pt reported having pain to lower back and received multiple doses of prn pain medications. Pt remained NPO since midnight d/t scheduled wasout of wound later in the AM per neuro surgery. IV antibiotics are being administered per MD order. Plan of care and fall precautions reviewed with pt and verbalized understanding. Bed locked, lowered, SR up x2 and call light placed within reach.

## 2020-11-18 NOTE — ANESTHESIA POSTPROCEDURE EVALUATION
Anesthesia Post Evaluation    Patient: Jaime Lund    Procedure(s) Performed: Procedure(s) (LRB):  WASHOUT  (Abdominal wound washout and removal of intrathecal pain pump) Lateral position  TO FOLLOW 2nd CASE  (Right)  REMOVAL, IMPLANT (N/A)    Final Anesthesia Type: general    Patient location during evaluation: ICU  Patient participation: Yes- Able to Participate  Level of consciousness: awake and alert and oriented  Post-procedure vital signs: reviewed and stable  Pain management: adequate  Airway patency: patent    PONV status at discharge: No PONV  Anesthetic complications: no      Cardiovascular status: hemodynamically stable and blood pressure returned to baseline  Respiratory status: spontaneous ventilation, room air and unassisted  Hydration status: euvolemic  Follow-up not needed.          Vitals Value Taken Time   /68 11/18/20 1346   Temp 36.6 °C (97.8 °F) 11/18/20 1303   Pulse 82 11/18/20 1355   Resp 42 11/18/20 1355   SpO2 100 % 11/18/20 1355   Vitals shown include unvalidated device data.      No case tracking events are documented in the log.      Pain/Margi Score: Pain Rating Prior to Med Admin: 10 (11/18/2020  1:43 PM)

## 2020-11-18 NOTE — PLAN OF CARE
Problem: Infection  Goal: Infection Symptom Resolution  Intervention: Prevent or Manage Infection  Flowsheets (Taken 11/18/2020 1918)  Fever Reduction/Comfort Measures:   aerosol temperature decreased   lightweight bedding   lightweight clothing   medication administered  Infection Management:   aseptic technique maintained   cultures obtained and sent to lab

## 2020-11-18 NOTE — ANESTHESIA PROCEDURE NOTES
Intubation  Performed by: Asha Lofton CRNA  Authorized by: Brent Kirby MD     Intubation:     Induction:  Intravenous    Intubated:  Postinduction    Mask Ventilation:  Easy with oral airway    Attempts:  1    Attempted By:  CRNA    Method of Intubation:  Direct    Blade:  Morgan 2    Laryngeal View Grade: Grade I - full view of chords      Difficult Airway Encountered?: No      Complications:  None    Airway Device:  Oral endotracheal tube    Airway Device Size:  7.5    Style/Cuff Inflation:  Cuffed (inflated to minimal occlusive pressure)    Tube secured:  23    Secured at:  The lips    Placement Verified By:  Capnometry    Complicating Factors:  Obesity    Findings Post-Intubation:  BS equal bilateral and atraumatic/condition of teeth unchanged

## 2020-11-18 NOTE — ANESTHESIA PREPROCEDURE EVALUATION
11/17/2020  Jaime Lund is a 50 y.o., male.  Pre-operative evaluation for Procedure(s) (LRB):  WASHOUT  (Abdominal wound washout and removal of intrathecal pain pump) Lateral position  TO FOLLOW 2nd CASE  (Right)    NPO >8h instructions  METS 1-3; SEGURA and pain    Wound infection at right abdomen after surgery at [facility] to replace intrathecal pain pump by pain management Dr. Wilkerson. Pt with chronic back pain opioid dependent.     Patient Active Problem List   Diagnosis    Essential hypertension    Hypokalemia    Seizure    Postlaminectomy syndrome of lumbar region    Facet arthropathy, lumbar    Chronic pain syndrome    BMI 40.0-44.9, adult    Peripheral artery disease    Leg swelling    Subclinical hyperthyroidism    Chronic pain    Surgical wound infection    Presence of intrathecal pump    Cellulitis, abdominal wall    Anemia of chronic disease       Review of patient's allergies indicates:   Allergen Reactions    Klonopin [clonazepam] Anxiety and Other (See Comments)     Becomes agitated    Valium [diazepam] Other (See Comments)     Becomes agitated    Xanax [alprazolam] Anxiety and Other (See Comments)     Becomes agitated       No current facility-administered medications on file prior to encounter.      Current Outpatient Medications on File Prior to Encounter   Medication Sig Dispense Refill    HYDROmorphone (DILAUDID) 8 MG tablet Take 8 mg by mouth every 6 (six) hours as needed for Pain.      methocarbamoL (ROBAXIN) 750 MG Tab Take 750 mg by mouth 4 (four) times daily.      butalbital-acetaminophen-caffeine -40 mg (FIORICET, ESGIC) -40 mg per tablet TAKE ONE TABLET BY MOUTH EVERY 4 HOURS AS NEEDED FOR HEADACHE 60 tablet 5    DULoxetine (CYMBALTA) 60 MG capsule TAKE 1 CAPSULE BY MOUTH EVERY EVENING 90 capsule 0    furosemide (LASIX) 40 MG tablet TAKE ONE  TABLET BY MOUTH ONCE DAILY FOR FLUID 90 tablet 1    gabapentin (NEURONTIN) 400 MG capsule 2 (two) times daily. Takes 1 400mg pill every AM, Takes 2 (400mg) at night      meloxicam (MOBIC) 15 MG tablet TAKE ONE TABLET BY MOUTH ONCE DAILY AS NEEDED FOR PAIN 90 tablet 0    ondansetron (ZOFRAN) 4 MG tablet Take 1 tablet (4 mg total) by mouth every 6 (six) hours as needed. 20 tablet 0    potassium chloride (KLOR-CON) 10 MEQ TbSR TAKE ONE TABLET BY MOUTH ONCE DAILY AS NEEDED WITH FLUID PILL 30 tablet 0    tiZANidine (ZANAFLEX) 2 MG tablet TAKE ONE TABLET BY MOUTH THREE TIMES DAILY FOR MUSCLE SPASMS. 90 tablet 3    torsemide (DEMADEX) 10 MG Tab Take 1 tablet (10 mg total) by mouth 2 (two) times daily as needed. 30 tablet 0       Past Surgical History:   Procedure Laterality Date    CHOLECYSTECTOMY      GASTRIC BYPASS      SLEEVE    gastric sleeve  2011    HERNIA REPAIR      pain pump Right 04/03/2018    inserted at right abdomen    pain pump site washout Right 04/13/2018    SKIN SURGERY      EXCESS SKIN REMOVAL    SPINE SURGERY      lumbar fusion       Social History     Socioeconomic History    Marital status:      Spouse name: Not on file    Number of children: Not on file    Years of education: Not on file    Highest education level: Not on file   Occupational History    Not on file   Social Needs    Financial resource strain: Not on file    Food insecurity     Worry: Not on file     Inability: Not on file    Transportation needs     Medical: Not on file     Non-medical: Not on file   Tobacco Use    Smoking status: Never Smoker    Smokeless tobacco: Never Used   Substance and Sexual Activity    Alcohol use: Yes     Comment: socially    Drug use: No    Sexual activity: Yes     Partners: Female   Lifestyle    Physical activity     Days per week: Not on file     Minutes per session: Not on file    Stress: To some extent   Relationships    Social connections     Talks on phone: Not on  file     Gets together: Not on file     Attends Muslim service: Not on file     Active member of club or organization: Not on file     Attends meetings of clubs or organizations: Not on file     Relationship status: Not on file   Other Topics Concern    Not on file   Social History Narrative    Not on file     Diagnostic Studies:  Results for TJ HUITRON (MRN [id number]) as of 11/18/2020 07:59   Ref. Range 11/18/2020 04:48   WBC Latest Ref Range: 3.90 - 12.70 K/uL 7.77   RBC Latest Ref Range: 4.60 - 6.20 M/uL 3.56 (L)   Hemoglobin Latest Ref Range: 14.0 - 18.0 g/dL 10.1 (L)   Hematocrit Latest Ref Range: 40.0 - 54.0 % 31.4 (L)   MCV Latest Ref Range: 82 - 98 fL 88   MCH Latest Ref Range: 27.0 - 31.0 pg 28.4   MCHC Latest Ref Range: 32.0 - 36.0 g/dL 32.2   RDW Latest Ref Range: 11.5 - 14.5 % 14.0   Platelets Latest Ref Range: 150 - 350 K/uL 347   MPV Latest Ref Range: 9.2 - 12.9 fL 10.0   Gran % Latest Ref Range: 38.0 - 73.0 % 66.4   Lymph % Latest Ref Range: 18.0 - 48.0 % 19.0   Mono % Latest Ref Range: 4.0 - 15.0 % 11.5   Eosinophil % Latest Ref Range: 0.0 - 8.0 % 2.3   Basophil % Latest Ref Range: 0.0 - 1.9 % 0.4   Immature Granulocytes Latest Ref Range: 0.0 - 0.5 % 0.4   Gran # (ANC) Latest Ref Range: 1.8 - 7.7 K/uL 5.2   Lymph # Latest Ref Range: 1.0 - 4.8 K/uL 1.5   Mono # Latest Ref Range: 0.3 - 1.0 K/uL 0.9   Eos # Latest Ref Range: 0.0 - 0.5 K/uL 0.2   Baso # Latest Ref Range: 0.00 - 0.20 K/uL 0.03   Immature Grans (Abs) Latest Ref Range: 0.00 - 0.04 K/uL 0.03   nRBC Latest Ref Range: 0 /100 WBC 0   Differential Method Unknown Automated   Sodium Latest Ref Range: 136 - 145 mmol/L 139   Potassium Latest Ref Range: 3.5 - 5.1 mmol/L 3.6   Chloride Latest Ref Range: 95 - 110 mmol/L 99   CO2 Latest Ref Range: 23 - 29 mmol/L 33 (H)   Anion Gap Latest Ref Range: 8 - 16 mmol/L 7 (L)   BUN Latest Ref Range: 6 - 20 mg/dL 8   Creatinine Latest Ref Range: 0.5 - 1.4 mg/dL 0.7   eGFR if non African American  Latest Ref Range: >60 mL/min/1.73 m^2 >60   eGFR if  Latest Ref Range: >60 mL/min/1.73 m^2 >60   Glucose Latest Ref Range: 70 - 110 mg/dL 99   Calcium Latest Ref Range: 8.7 - 10.5 mg/dL 9.3   Phosphorus Latest Ref Range: 2.7 - 4.5 mg/dL 3.6   Magnesium Latest Ref Range: 1.6 - 2.6 mg/dL 2.2   Alkaline Phosphatase Latest Ref Range: 55 - 135 U/L 98   PROTEIN TOTAL Latest Ref Range: 6.0 - 8.4 g/dL 6.6   Albumin Latest Ref Range: 3.5 - 5.2 g/dL 2.6 (L)   BILIRUBIN TOTAL Latest Ref Range: 0.1 - 1.0 mg/dL 0.4   AST Latest Ref Range: 10 - 40 U/L 18   ALT Latest Ref Range: 10 - 44 U/L 11     EKG (11/17/20):  NSR, LVH    TTE (5/5/17):   1) LVEF 55-60%   2) Concentric hypertrophy.     Anesthesia Evaluation    I have reviewed the Patient Summary Reports.    I have reviewed the Nursing Notes.    I have reviewed the Medications.     Review of Systems  Anesthesia Hx:  No problems with previous Anesthesia  History of prior surgery of interest to airway management or planning:  Denies Personal Hx of Anesthesia complications.   Social:  Non-Smoker    Hematology/Oncology:  Hematology Normal   Oncology Normal     EENT/Dental:EENT/Dental Normal   Cardiovascular:   Exercise tolerance: poor Hypertension PVD SEGURA ECG has been reviewed.    Pulmonary:  Pulmonary Normal    Renal/:  Renal/ Normal     Hepatic/GI:   Sleeve gastrectomy 2011   Musculoskeletal:   Arthritis   Spine Disorders: lumbar Chronic Pain and Degenerative disease    Neurological:   Seizures, well controlled Pain pump site infection  Chronic Pain Syndrome   Endocrine:   Hyperthyroidism        Physical Exam  General:  Morbid Obesity    Airway/Jaw/Neck:  Airway Findings: Mouth Opening: Normal Tongue: Large  Mallampati: IV  Improves to III with phonation.  TM Distance: 4 - 6 cm  Jaw/Neck Findings:  Neck Findings:  Girth Increased     Eyes/Ears/Nose:  EYES/EARS/NOSE FINDINGS: Normal   Dental:  Dental Findings: In tact        Mental Status:  Mental Status  Findings: Normal        Anesthesia Plan  Type of Anesthesia, risks & benefits discussed:  Anesthesia Type:  general  Patient's Preference:   Intra-op Monitoring Plan: standard ASA monitors  Intra-op Monitoring Plan Comments:   Post Op Pain Control Plan: multimodal analgesia, per primary service following discharge from PACU and IV/PO Opioids PRN  Post Op Pain Control Plan Comments:   Induction:   IV  Beta Blocker:  Patient is not currently on a Beta-Blocker (No further documentation required).       Informed Consent: Patient understands risks and agrees with Anesthesia plan.  Questions answered. Anesthesia consent signed with patient.  ASA Score: 3     Day of Surgery Review of History & Physical:            Ready For Surgery From Anesthesia Perspective.

## 2020-11-18 NOTE — OP NOTE
DATE: 11/18/20    PREOP DIAGNOSIS:  1. Infected intrathecal pain pump  2. Chronic back pain   3. Prior L4-5 fusion    POSTOP DIAGNOSIS:  Same as above    OPERATION PERFORMED:  1. Incision and drainage of abdominal and lumbar wounds for washout and removal of intrathecal pain pump and catheter    SURGEON:  Raffy Headley D.O.    ASSISTANT:  Luis Alcantar    LEVEL OF INVOLVEMENT OF ATTENDING:  Full    INDICATION:  50 y.o. male with infected intrathecal pain pump that was recently revised by outside pain medicine physician (Dr Wilkerson).  I rec full removal followed by long-term abx.  I recommended against trying to salvage the pump due to the degree of infection.  I spoke to Dr Wilkerson who is in agreement and agreed to manage opioid dependence postop as outpatient.    Consents were obtained and risks, benefits, and alternatives to surgery were discussed.      PROCEDURE IN DETAIL:  The patient was correctly identified and taken to the operating room where the anesthesia team administered general endotracheal anesthesia.  The patient was positioned in a left lateral decubitus position exposing the right lower quadrant of his abdomen and the lumbar region.  All pressure points were padded.  The area was prepped and draped in typical sterile fashion.  The lumbar wound was opened with 15 blade scalpel followed by blunt dissection down to the intrathecal catheter that was anchored into the muscle and fascial layers.  The catheter anchors were freed and the intrathecal component was withdrawn from the spine and cut.  Next a lateral oblique incision was opened to free up the 2nd anchor point.  The tubing was cut and sent for specimen.  Next the abdominal wound was opened using Metzenbaum scissors.  There was ritu pus that was expressed and suctioned out.  Cultures were taken.  The pain pump was then mobilized and removed along with the remaining catheter.  The abdominal wound cavity was then irrigated with pulse lavage  and then closed in layered fashion with 0 Vicryl in the deep fascial layer followed by 2 0 Vicryl in the deep dermal layer followed by staples on the skin.  The lumbar wound was washed out with antibiotic solution.  The whole the fascia where the intrathecal catheter was was sutured closed and the wound was closed layered fashion 1st with 0 Vicryl in the deep fascial layer followed by 2 0 Vicryl in the deep dermal layer followed by staples on the skin.  Two 0 Vicryl used to close the lateral oblique incision.  The pain pump was then sent for gross microbiology.    COMPLICATIONS: none    INCISION: Abdomen, lumbar    WOUND CLASS: Contaminated    FINDINGS: Pus within abdominal wound around pain pump    DRAIN: None    CONDITION: Stable    PROGNOSIS: Good

## 2020-11-18 NOTE — SUBJECTIVE & OBJECTIVE
Interval History: No new complaints.     Review of Systems   Constitutional: Negative for activity change, appetite change, chills, diaphoresis, fatigue, fever and unexpected weight change.   HENT: Negative for congestion, dental problem and drooling.    Eyes: Negative for pain, discharge and itching.   Respiratory: Negative for apnea, cough, choking, chest tightness and shortness of breath.    Cardiovascular: Negative for chest pain.   Gastrointestinal: Positive for abdominal pain. Negative for abdominal distention, constipation, diarrhea, nausea and vomiting.   Genitourinary: Negative for difficulty urinating, dysuria and enuresis.   Musculoskeletal: Negative for arthralgias and back pain.   Skin: Positive for color change and rash.   Psychiatric/Behavioral: Negative for agitation and behavioral problems.     Objective:     Vital Signs (Most Recent):  Temp: 98 °F (36.7 °C) (11/18/20 0916)  Pulse: 76 (11/18/20 1022)  Resp: 18 (11/18/20 1032)  BP: (!) 140/58 (11/18/20 1022)  SpO2: 98 % (11/18/20 1022) Vital Signs (24h Range):  Temp:  [98 °F (36.7 °C)-99.3 °F (37.4 °C)] 98 °F (36.7 °C)  Pulse:  [] 76  Resp:  [16-68] 18  SpO2:  [81 %-100 %] 98 %  BP: (101-157)/(46-70) 140/58     Weight: (!) 142.4 kg (313 lb 15 oz)  Body mass index is 43.79 kg/m².    Intake/Output Summary (Last 24 hours) at 11/18/2020 1038  Last data filed at 11/18/2020 0916  Gross per 24 hour   Intake 1100 ml   Output 875 ml   Net 225 ml      Physical Exam  Vitals signs and nursing note reviewed.   Constitutional:       General: He is not in acute distress.     Appearance: Normal appearance. He is obese. He is not ill-appearing, toxic-appearing or diaphoretic.   HENT:      Head: Normocephalic and atraumatic.   Neck:      Musculoskeletal: Normal range of motion.   Cardiovascular:      Rate and Rhythm: Normal rate and regular rhythm.      Heart sounds: No murmur. No gallop.    Pulmonary:      Effort: Pulmonary effort is normal. No respiratory  distress.      Breath sounds: No wheezing or rales.   Abdominal:      General: Abdomen is flat. Bowel sounds are normal. There is no distension.      Palpations: There is no mass.      Tenderness: There is no abdominal tenderness. There is no guarding.      Hernia: No hernia is present.   Skin:     General: Skin is warm.      Comments: Cellulitis of abdominal wall RLQ. Bandaged.   Neurological:      Mental Status: He is alert and oriented to person, place, and time.   Psychiatric:         Mood and Affect: Mood normal.         Behavior: Behavior normal.         Thought Content: Thought content normal.         Significant Labs:   BMP:   Recent Labs   Lab 11/18/20  0448   GLU 99      K 3.6   CL 99   CO2 33*   BUN 8   CREATININE 0.7   CALCIUM 9.3   MG 2.2     CBC:   Recent Labs   Lab 11/16/20  2122 11/17/20  0630 11/18/20  0448   WBC 15.49* 10.85 7.77   HGB 10.4* 10.0* 10.1*   HCT 31.8* 31.5* 31.4*    275 347       Significant Imaging:   Ultrasound- no abscess.

## 2020-11-18 NOTE — PROGRESS NOTES
Pharmacokinetic Assessment Follow Up: IV Vancomycin    Vancomycin serum concentration assessment(s):    The trough level was drawn correctly and can be used to guide therapy at this time. The measurement is within the desired definitive target range of 10 to 20 mcg/mL.    Vancomycin Regimen Plan:    Continue regimen to Vancomycin 2000 mg IV every 12 hours with next serum trough concentration measured at 0100 prior to 4th dose on 11/20/20.    Drug levels (last 3 results):  Recent Labs   Lab Result Units 11/18/20  1307   Vancomycin-Trough ug/mL 12.7       Pharmacy will continue to follow and monitor vancomycin.    Please contact pharmacy at extension [phone] for questions regarding this assessment.    Thank you for the consult,   Larissa Camp       Patient brief summary:  Jaime Lund is a 50 y.o. male initiated on antimicrobial therapy with IV Vancomycin for treatment of skin & soft tissue infection      Drug Allergies:   Review of patient's allergies indicates:   Allergen Reactions    Klonopin [clonazepam] Anxiety and Other (See Comments)     Becomes agitated    Valium [diazepam] Other (See Comments)     Becomes agitated    Xanax [alprazolam] Anxiety and Other (See Comments)     Becomes agitated       Actual Body Weight:   142.4 kg    Renal Function:   Estimated Creatinine Clearance: 180.3 mL/min (based on SCr of 0.7 mg/dL).,     Dialysis Method (if applicable):  N/A    CBC (last 72 hours):  Recent Labs   Lab Result Units 11/16/20 2122 11/17/20 0630 11/18/20  0448   WBC K/uL 15.49* 10.85 7.77   Hemoglobin g/dL 10.4* 10.0* 10.1*   Hematocrit % 31.8* 31.5* 31.4*   Platelets K/uL 320 275 347   Gran % % 77.8* 76.0* 66.4   Lymph % % 8.5* 10.5* 19.0   Mono % % 11.8 10.9 11.5   Eosinophil % % 0.5 1.7 2.3   Basophil % % 0.2 0.4 0.4   Differential Method  Automated Automated Automated       Metabolic Panel (last 72 hours):  Recent Labs   Lab Result Units 11/16/20 2115 11/16/20 2122 11/17/20 0630 11/18/20  0448    Sodium mmol/L  --  138 138 139   Potassium mmol/L  --  3.8 3.5 3.6   Chloride mmol/L  --  99 100 99   CO2 mmol/L  --  28 32* 33*   Glucose mg/dL  --  111* 95 99   Glucose, UA  Negative  --   --   --    BUN mg/dL  --  17 12 8   Creatinine mg/dL  --  0.8 0.7 0.7   Albumin g/dL  --  3.2* 2.8* 2.6*   Total Bilirubin mg/dL  --  0.4 0.4 0.4   Alkaline Phosphatase U/L  --  102 87 98   AST U/L  --  21 15 18   ALT U/L  --  13 10 11   Magnesium mg/dL  --  2.4 2.2 2.2   Phosphorus mg/dL  --   --  2.7 3.6       Vancomycin Administrations:  vancomycin given in the last 96 hours                     vancomycin (VANCOCIN) 2,000 mg in dextrose 5 % 500 mL IVPB (mg) 2,000 mg New Bag 11/18/20 1402     2,000 mg New Bag  0219     2,000 mg New Bag 11/17/20 1400    vancomycin (VANCOCIN) 2,500 mg in dextrose 5 % 500 mL IVPB (mg) 2,500 mg New Bag 11/17/20 0151                    Microbiologic Results:  Microbiology Results (last 7 days)       Procedure Component Value Units Date/Time    Culture, Anaerobe [[id number]] Collected: 11/18/20 1155    Order Status: Sent Specimen: Wound from Abdomen Updated: 11/18/20 1319    Gram stain [[id number]] Collected: 11/18/20 1155    Order Status: Sent Specimen: Wound from Abdomen Updated: 11/18/20 1318    Aerobic culture [[id number]] Collected: 11/18/20 1155    Order Status: Sent Specimen: Wound from Abdomen Updated: 11/18/20 1318    Aerobic culture (Specify Source) **CANNOT BE ORDERED AS STAT** [[id number]]  (Abnormal) Collected: 11/16/20 2117    Order Status: Completed Specimen: Incision site from Abdomen Updated: 11/18/20 0825     Aerobic Bacterial Culture STAPHYLOCOCCUS AUREUS  Many  Susceptibility pending      Blood culture x two cultures. Draw prior to antibiotics. [[id number]] Collected: 11/16/20 2140    Order Status: Completed Specimen: Blood from Peripheral, Forearm, Right Updated: 11/17/20 0815     Blood Culture, Routine No Growth to date      No Growth to date    Narrative:      Aerobic and  anaerobic    Blood culture x two cultures. Draw prior to antibiotics. [[id number]] Collected: 11/16/20 2117    Order Status: Completed Specimen: Blood from Peripheral, Antecubital, Right Updated: 11/17/20 2303     Blood Culture, Routine No Growth to date      No Growth to date    Narrative:      Aerobic and anaerobic

## 2020-11-19 LAB
ALBUMIN SERPL BCP-MCNC: 2.7 G/DL (ref 3.5–5.2)
ALP SERPL-CCNC: 93 U/L (ref 55–135)
ALT SERPL W/O P-5'-P-CCNC: 9 U/L (ref 10–44)
ANION GAP SERPL CALC-SCNC: 12 MMOL/L (ref 8–16)
AST SERPL-CCNC: 15 U/L (ref 10–40)
BACTERIA SPEC AEROBE CULT: ABNORMAL
BASOPHILS # BLD AUTO: 0.04 K/UL (ref 0–0.2)
BASOPHILS NFR BLD: 0.5 % (ref 0–1.9)
BILIRUB SERPL-MCNC: 0.2 MG/DL (ref 0.1–1)
BUN SERPL-MCNC: 11 MG/DL (ref 6–20)
CALCIUM SERPL-MCNC: 9.3 MG/DL (ref 8.7–10.5)
CHLORIDE SERPL-SCNC: 97 MMOL/L (ref 95–110)
CO2 SERPL-SCNC: 32 MMOL/L (ref 23–29)
CREAT SERPL-MCNC: 0.8 MG/DL (ref 0.5–1.4)
DIFFERENTIAL METHOD: ABNORMAL
EOSINOPHIL # BLD AUTO: 0.1 K/UL (ref 0–0.5)
EOSINOPHIL NFR BLD: 0.9 % (ref 0–8)
ERYTHROCYTE [DISTWIDTH] IN BLOOD BY AUTOMATED COUNT: 13.7 % (ref 11.5–14.5)
EST. GFR  (AFRICAN AMERICAN): >60 ML/MIN/1.73 M^2
EST. GFR  (NON AFRICAN AMERICAN): >60 ML/MIN/1.73 M^2
FINAL PATHOLOGIC DIAGNOSIS: NORMAL
GLUCOSE SERPL-MCNC: 100 MG/DL (ref 70–110)
GROSS: NORMAL
HCT VFR BLD AUTO: 36.2 % (ref 40–54)
HGB BLD-MCNC: 11.4 G/DL (ref 14–18)
IMM GRANULOCYTES # BLD AUTO: 0.04 K/UL (ref 0–0.04)
IMM GRANULOCYTES NFR BLD AUTO: 0.5 % (ref 0–0.5)
LYMPHOCYTES # BLD AUTO: 1.5 K/UL (ref 1–4.8)
LYMPHOCYTES NFR BLD: 18.8 % (ref 18–48)
Lab: NORMAL
MAGNESIUM SERPL-MCNC: 2.4 MG/DL (ref 1.6–2.6)
MCH RBC QN AUTO: 27.6 PG (ref 27–31)
MCHC RBC AUTO-ENTMCNC: 31.5 G/DL (ref 32–36)
MCV RBC AUTO: 88 FL (ref 82–98)
MONOCYTES # BLD AUTO: 0.8 K/UL (ref 0.3–1)
MONOCYTES NFR BLD: 10.4 % (ref 4–15)
NEUTROPHILS # BLD AUTO: 5.4 K/UL (ref 1.8–7.7)
NEUTROPHILS NFR BLD: 68.9 % (ref 38–73)
NRBC BLD-RTO: 0 /100 WBC
PHOSPHATE SERPL-MCNC: 3.4 MG/DL (ref 2.7–4.5)
PLATELET # BLD AUTO: 411 K/UL (ref 150–350)
PMV BLD AUTO: 9.9 FL (ref 9.2–12.9)
POTASSIUM SERPL-SCNC: 3.7 MMOL/L (ref 3.5–5.1)
PROT SERPL-MCNC: 7 G/DL (ref 6–8.4)
RBC # BLD AUTO: 4.13 M/UL (ref 4.6–6.2)
SODIUM SERPL-SCNC: 141 MMOL/L (ref 136–145)
WBC # BLD AUTO: 7.8 K/UL (ref 3.9–12.7)

## 2020-11-19 PROCEDURE — 36415 COLL VENOUS BLD VENIPUNCTURE: CPT

## 2020-11-19 PROCEDURE — 94761 N-INVAS EAR/PLS OXIMETRY MLT: CPT

## 2020-11-19 PROCEDURE — 99223 1ST HOSP IP/OBS HIGH 75: CPT | Mod: ,,, | Performed by: INTERNAL MEDICINE

## 2020-11-19 PROCEDURE — 25000003 PHARM REV CODE 250: Performed by: PHYSICIAN ASSISTANT

## 2020-11-19 PROCEDURE — 21400001 HC TELEMETRY ROOM

## 2020-11-19 PROCEDURE — 63600175 PHARM REV CODE 636 W HCPCS: Performed by: PHYSICIAN ASSISTANT

## 2020-11-19 PROCEDURE — 99024 POSTOP FOLLOW-UP VISIT: CPT | Mod: ,,, | Performed by: PHYSICIAN ASSISTANT

## 2020-11-19 PROCEDURE — 84100 ASSAY OF PHOSPHORUS: CPT

## 2020-11-19 PROCEDURE — 85025 COMPLETE CBC W/AUTO DIFF WBC: CPT

## 2020-11-19 PROCEDURE — 99900035 HC TECH TIME PER 15 MIN (STAT)

## 2020-11-19 PROCEDURE — 80053 COMPREHEN METABOLIC PANEL: CPT

## 2020-11-19 PROCEDURE — 99024 PR POST-OP FOLLOW-UP VISIT: ICD-10-PCS | Mod: ,,, | Performed by: PHYSICIAN ASSISTANT

## 2020-11-19 PROCEDURE — 99223 PR INITIAL HOSPITAL CARE,LEVL III: ICD-10-PCS | Mod: ,,, | Performed by: INTERNAL MEDICINE

## 2020-11-19 PROCEDURE — 83735 ASSAY OF MAGNESIUM: CPT

## 2020-11-19 RX ADMIN — TIZANIDINE 2 MG: 2 TABLET ORAL at 09:11

## 2020-11-19 RX ADMIN — FUROSEMIDE 40 MG: 40 TABLET ORAL at 09:11

## 2020-11-19 RX ADMIN — MUPIROCIN 1 G: 20 OINTMENT TOPICAL at 09:11

## 2020-11-19 RX ADMIN — Medication 9 MG: at 10:11

## 2020-11-19 RX ADMIN — HYDROMORPHONE HYDROCHLORIDE 2 MG: 2 INJECTION INTRAMUSCULAR; INTRAVENOUS; SUBCUTANEOUS at 12:11

## 2020-11-19 RX ADMIN — GABAPENTIN 800 MG: 400 CAPSULE ORAL at 09:11

## 2020-11-19 RX ADMIN — DOCUSATE SODIUM 50 MG AND SENNOSIDES 8.6 MG 1 TABLET: 8.6; 5 TABLET, FILM COATED ORAL at 09:11

## 2020-11-19 RX ADMIN — GABAPENTIN 400 MG: 400 CAPSULE ORAL at 09:11

## 2020-11-19 RX ADMIN — ACETAMINOPHEN 650 MG: 325 TABLET ORAL at 03:11

## 2020-11-19 RX ADMIN — DULOXETINE 60 MG: 30 CAPSULE, DELAYED RELEASE ORAL at 09:11

## 2020-11-19 RX ADMIN — ENOXAPARIN SODIUM 40 MG: 40 INJECTION SUBCUTANEOUS at 04:11

## 2020-11-19 RX ADMIN — HYDROMORPHONE HYDROCHLORIDE 2 MG: 2 INJECTION INTRAMUSCULAR; INTRAVENOUS; SUBCUTANEOUS at 07:11

## 2020-11-19 RX ADMIN — HYDROMORPHONE HYDROCHLORIDE 2 MG: 2 INJECTION INTRAMUSCULAR; INTRAVENOUS; SUBCUTANEOUS at 09:11

## 2020-11-19 RX ADMIN — VANCOMYCIN HYDROCHLORIDE 2000 MG: 10 INJECTION, POWDER, LYOPHILIZED, FOR SOLUTION INTRAVENOUS at 03:11

## 2020-11-19 RX ADMIN — VANCOMYCIN HYDROCHLORIDE 2000 MG: 10 INJECTION, POWDER, LYOPHILIZED, FOR SOLUTION INTRAVENOUS at 02:11

## 2020-11-19 RX ADMIN — CETIRIZINE HYDROCHLORIDE 5 MG: 5 TABLET ORAL at 09:11

## 2020-11-19 RX ADMIN — TAMSULOSIN HYDROCHLORIDE 0.4 MG: 0.4 CAPSULE ORAL at 09:11

## 2020-11-19 RX ADMIN — HYDROMORPHONE HYDROCHLORIDE 2 MG: 2 INJECTION INTRAMUSCULAR; INTRAVENOUS; SUBCUTANEOUS at 02:11

## 2020-11-19 RX ADMIN — MORPHINE SULFATE 4 MG: 4 INJECTION INTRAVENOUS at 04:11

## 2020-11-19 NOTE — ASSESSMENT & PLAN NOTE
50M with h/o chronic pain with intrathecal pain pump and prior L4-5 fusion admitted with erythema and puss from abdominal wound incision from recent pain pump revision. denies systemic signs of infection. BCx 11/16 NGTD. Growing MSSA from abdominal wound swab. Surgical cultures pending. Per op note, intrathecal pain pump and catheter were removed. Currently on vanc. Id c/f abx recs.     Recommendations:   - continue vancomycin while inpatient until the staph aureus from surgical culture susceptiblities available  - picc line. Would arrange ancef for outpatient use  - duration of abx 4-6 weeks from device removal    Infection: pain pump infection    Discharge antibiotics:   Cefazolin 6gm IV continuous infusion    End date of IV antibiotics: 12/16/20 ( may be extended to 12/30/20 TBD based on clinical response)    Weekly outpatient laboratory on Monday or Tuesday while on IV antibiotics.    CBC   CMP   ESR and CRP    Fax laboratory results to [facility] at [phone] with attn: Dr Chu    Outpatient Infectious Diseases clinic follow up will be arranged and found in patient calendar.    Prior to discharge, please ensure the patient's follow-up has been scheduled.  If there is still no follow-up scheduled in Infectious Diseases clinic, please send an EPIC message to Tabitha Porras in Infectious Diseases.

## 2020-11-19 NOTE — NURSING
Bedside handoff received from nurse JANAE Barry. Patient lying in bed without any acute distress noted at this time. Safety precautions maintained.

## 2020-11-19 NOTE — ASSESSMENT & PLAN NOTE
50 y.o. male with infected intrathecal pain pump that was recently revised by outside pain medicine physician (Dr Wilkerson).  Intrathecal pain pump removed surgically on 11/18/20 by Dr. Headley. Incisions x3 closed with staples.        -Dr Wilkerson to manage opioid dependence postop as outpatient.  -Cont abx per ID  -Pain control per medicine  -Can remove dressing tomorrow. Replacement dressing not necessary but ok per patient preference.     FU in neurosurgery clinic in 2 weeks for wound check and staple removal.   Instructions reviewed with patient: avoid extreme bending or twisting that would place tension on his incisions, no lifting >10lbs, do NOT submerge incision under water for 6 weeks, ok to shower on POD#5 (11/23). No scrubbing of the incisions, pat dry when done.      Neurosurgery will sign off. Please call with questions, concerns, or changes in the patient's neurological status.

## 2020-11-19 NOTE — PLAN OF CARE
Problem: Adult Inpatient Plan of Care  Goal: Plan of Care Review  11/19/2020 1115 by Yessy Modi RN  Outcome: Ongoing, Progressing  11/19/2020 1115 by Yessy Modi RN  Outcome: Ongoing, Progressing  Goal: Patient-Specific Goal (Individualization)  11/19/2020 1115 by Yessy Modi RN  Outcome: Ongoing, Progressing  11/19/2020 1115 by Yessy Modi RN  Outcome: Ongoing, Progressing  Goal: Absence of Hospital-Acquired Illness or Injury  Outcome: Ongoing, Progressing  Goal: Optimal Comfort and Wellbeing  Outcome: Ongoing, Progressing  Goal: Readiness for Transition of Care  Outcome: Ongoing, Progressing  Goal: Rounds/Family Conference  Outcome: Ongoing, Progressing

## 2020-11-19 NOTE — PROGRESS NOTES
[facility]  Neurosurgery  Progress Note    Subjective:     History of Present Illness: 50 y.o. male with long standing h/o back pain who is s/p L4-5 fusion and placement of pain pump.  Pain pump was initially placed by Dr Saavedra on 4/3/18, washed out by Dr Moran on 4/13/18, and most recently revised by Dr Wilkerson (pain medicine) after not functioning for several years.  New intrathecal catheter was replaced by old pump was kept.  Wound has been draining since surgery per patient and wife.  Yesterday patient became ill and [place] and wound started draining yellow pus. Cultures were obtained and he was placed on IV abx and currently is in ICU.  He is neuro intactand has returned to to baseline mental status.  He is worried about his back pain now that pump appears to be non-salvageable.    Post-Op Info:  Procedure(s) (LRB):  WASHOUT  (Abdominal wound washout and removal of intrathecal pain pump) Lateral position  TO FOLLOW 2nd CASE  (Right)  REMOVAL, IMPLANT (N/A)   1 Day Post-Op     Interval History: doing well post-operatively. Passing gas, tolerating diet. Pain well controlled      Medications:  Continuous Infusions:  Scheduled Meds:   cetirizine  5 mg Oral Daily    DULoxetine  60 mg Oral QHS    enoxaparin  40 mg Subcutaneous Q24H    furosemide  40 mg Oral Daily    gabapentin  400 mg Oral Daily    gabapentin  800 mg Oral QHS    mupirocin  1 g Nasal BID    senna-docusate 8.6-50 mg  1 tablet Oral BID    tamsulosin  0.4 mg Oral Daily    vancomycin (VANCOCIN) IVPB  2,000 mg Intravenous Q12H     PRN Meds:acetaminophen, albuterol-ipratropium, bisacodyL, dextrose 50%, dextrose 50%, glucagon (human recombinant), glucose, glucose, HYDROmorphone, melatonin, morphine, morphine, ondansetron, promethazine (PHENERGAN) IVPB, sodium chloride 0.9%, sodium phosphates, tiZANidine     Review of Systems  Objective:     Weight: (!) 142.4 kg (313 lb 15 oz)  Body mass index  is 43.79 kg/m².  Vital Signs (Most Recent):  Temp: 98.9 °F (37.2 °C) (11/19/20 1148)  Pulse: 85 (11/19/20 1148)  Resp: 18 (11/19/20 1232)  BP: (!) 147/65 (11/19/20 1232)  SpO2: 96 % (11/19/20 1148) Vital Signs (24h Range):  Temp:  [97.7 °F (36.5 °C)-99.6 °F (37.6 °C)] 98.9 °F (37.2 °C)  Pulse:  [67-85] 85  Resp:  [16-22] 18  SpO2:  [90 %-98 %] 96 %  BP: (125-170)/(65-99) 147/65          Neurosurgery Physical Exam  General: well developed, well nourished, no distress  Neurologic: Alert and oriented. Thought content appropriate.  GCS: Motor: 6/Verbal: 5/Eyes: 4 GCS Total: 15  Cranial nerves: II-XII grossly intact  Neck: supple, without obvious masses or lesions  Skin: grossly intact in all 4 extremities without obvious rashes or lesions    Motor Strength: No focal numbness or weakness  5/5 BUE/BLE  Sensory: intact to light touch B/L UE and LE    Incisions x3: midline lumbar, left hip, and anterior abdomen. All skin edges fully approximated with staples. Aquacel dressing in place. No drainage.       Significant Labs:  Recent Labs   Lab 11/18/20  0448 11/19/20  0521   GLU 99 100    141   K 3.6 3.7   CL 99 97   CO2 33* 32*   BUN 8 11   CREATININE 0.7 0.8   CALCIUM 9.3 9.3   MG 2.2 2.4     Recent Labs   Lab 11/18/20  0448 11/19/20  0521   WBC 7.77 7.80   HGB 10.1* 11.4*   HCT 31.4* 36.2*    411*     Recent Labs   Lab 11/17/20  1813 11/18/20  0956   INR 0.9  --    APTT  --  34.6*     Microbiology Results (last 7 days)     Procedure Component Value Units Date/Time    Aerobic culture [[id number]]  (Abnormal) Collected: 11/18/20 1155    Order Status: Completed Specimen: Wound from Abdomen Updated: 11/19/20 0884     Aerobic Bacterial Culture STAPHYLOCOCCUS AUREUS  Moderate  Susceptibility pending      Aerobic culture (Specify Source) **CANNOT BE ORDERED AS STAT** [[id number]]  (Abnormal)  (Susceptibility) Collected: 11/16/20 2110    Order Status: Completed Specimen: Incision site from Abdomen Updated: 11/19/20 2858      Aerobic Bacterial Culture STAPHYLOCOCCUS AUREUS  Many      Blood culture x two cultures. Draw prior to antibiotics. [[id number]] Collected: 11/16/20 2117    Order Status: Completed Specimen: Blood from Peripheral, Antecubital, Right Updated: 11/18/20 2303     Blood Culture, Routine No Growth to date      No Growth to date      No Growth to date    Narrative:      Aerobic and anaerobic    Blood culture x two cultures. Draw prior to antibiotics. [[id number]] Collected: 11/16/20 2140    Order Status: Completed Specimen: Blood from Peripheral, Forearm, Right Updated: 11/18/20 2303     Blood Culture, Routine No Growth to date      No Growth to date      No Growth to date    Narrative:      Aerobic and anaerobic    Gram stain [[id number]] Collected: 11/18/20 1155    Order Status: Completed Specimen: Wound from Abdomen Updated: 11/18/20 1416     Gram Stain Result Few WBC's      Few Gram positive cocci in pairs    Culture, Anaerobe [[id number]] Collected: 11/18/20 1155    Order Status: Sent Specimen: Wound from Abdomen Updated: 11/18/20 1319            Assessment/Plan:     Surgical wound infection  50 y.o. male with infected intrathecal pain pump that was recently revised by outside pain medicine physician (Dr Wilkerson).  Intrathecal pain pump removed surgically on 11/18/20 by Dr. Headley. Incisions x3 closed with staples.        -Dr Wilkerson to manage opioid dependence postop as outpatient.  -Cont abx per ID  -Pain control per medicine  -Can remove dressing tomorrow. Replacement dressing not necessary but ok per patient preference.     FU in neurosurgery clinic in 2 weeks for wound check and staple removal.   Instructions reviewed with patient: avoid extreme bending or twisting that would place tension on his incisions, no lifting >10lbs, do NOT submerge incision under water for 6 weeks, ok to shower on POD#5 (11/23). No scrubbing of the incisions, pat dry when done.      Neurosurgery will sign off. Please call with questions,  concerns, or changes in the patient's neurological status.        Valentina Rojas PA-C  Neurosurgery  [facility]

## 2020-11-19 NOTE — HPI
"50M with h/o chronic pain with intrathecal pain pump and prior L4-5 fusion admitted with redness and drainage from abdominal wound from recent pain pump revision.denies fevers. Denies preceeding antiboitics. Says that about 10 days after procedure, area started getting red and then started draining clear yellow-bloody drainage and subsequently turned into puss. Says his wife thought he was starting to "talk out of his head" so she brought patient to ED.     S/p I&D of abdominal and lumbar wounds for washout and removal of intrathecal pain pump and catheter    Reviewed op note :  Pus within abdominal wound around pain pump    Specimen Information: Abdomen; Wound        Component 1d ago   Aerobic Bacterial Culture Abnormal   STAPHYLOCOCCUS AUREUS   Moderate   Susceptibility pending              Note admission wound cultures with MSSA. Surgical culture pending.     Patient on vanc. ID c/f abx recs  "

## 2020-11-19 NOTE — DISCHARGE INSTRUCTIONS
Please follow ONLY the instructions that are checked below.    Activity Restrictions:  [x]  No lifting greater than 10 pounds.  [x]  Avoid bending and twisting the area of your surgery more than 45 degrees from neutral position in any direction.    Discharge Medication/Follow-up:  [x]  Take docusate (Colace 100 mg): take one capsule a day as needed for constipation. You can get this over the counter.  [x]  Follow-up appointment:  [x]  2 weeks post-op for wound check by physician assistant      Wound Care:  [x]  No bandage required. Keep your incision open to the air.  [x]  You may shower on the 5th day after your surgery. Have the force of water hit you opposite from the incision. Pat the incision dry after your shower; do not scrub the incision.  [x]  You cannot take a bath until 6 weeks after surgery.      Call your doctor or go to the Emergency Room for any signs of infection, including: increased redness, drainage, pain, or fever (temperature ?101.5 for 24 hours). Call your doctor or go to the Emergency Room if there are any localized neurological changes; problems with speech, vision, numbness, tingling, weakness, or severe headache; or for other concerns.    Special Instructions:  [x]  No use of tobacco products.  [x]  Diet: Please eat a regular diet as tolerated.  []  Other diet:              Specific physician instructions:           Physicians need 3 days' notice for pain medicine to be refilled. Pain medicine will only be refilled between 8 AM and 5 PM, Monday through Friday, due to Food and Drug Administration regulation of documentation.    If you have any questions about this form, please call [phone].    Form No. 10232 (Revised 10/31/2013)

## 2020-11-19 NOTE — SUBJECTIVE & OBJECTIVE
Interval History: doing well post-operatively. Passing gas, tolerating diet. Pain well controlled      Medications:  Continuous Infusions:  Scheduled Meds:   cetirizine  5 mg Oral Daily    DULoxetine  60 mg Oral QHS    enoxaparin  40 mg Subcutaneous Q24H    furosemide  40 mg Oral Daily    gabapentin  400 mg Oral Daily    gabapentin  800 mg Oral QHS    mupirocin  1 g Nasal BID    senna-docusate 8.6-50 mg  1 tablet Oral BID    tamsulosin  0.4 mg Oral Daily    vancomycin (VANCOCIN) IVPB  2,000 mg Intravenous Q12H     PRN Meds:acetaminophen, albuterol-ipratropium, bisacodyL, dextrose 50%, dextrose 50%, glucagon (human recombinant), glucose, glucose, HYDROmorphone, melatonin, morphine, morphine, ondansetron, promethazine (PHENERGAN) IVPB, sodium chloride 0.9%, sodium phosphates, tiZANidine     Review of Systems  Objective:     Weight: (!) 142.4 kg (313 lb 15 oz)  Body mass index is 43.79 kg/m².  Vital Signs (Most Recent):  Temp: 98.9 °F (37.2 °C) (11/19/20 1148)  Pulse: 85 (11/19/20 1148)  Resp: 18 (11/19/20 1232)  BP: (!) 147/65 (11/19/20 1232)  SpO2: 96 % (11/19/20 1148) Vital Signs (24h Range):  Temp:  [97.7 °F (36.5 °C)-99.6 °F (37.6 °C)] 98.9 °F (37.2 °C)  Pulse:  [67-85] 85  Resp:  [16-22] 18  SpO2:  [90 %-98 %] 96 %  BP: (125-170)/(65-99) 147/65          Neurosurgery Physical Exam  General: well developed, well nourished, no distress  Neurologic: Alert and oriented. Thought content appropriate.  GCS: Motor: 6/Verbal: 5/Eyes: 4 GCS Total: 15  Cranial nerves: II-XII grossly intact  Neck: supple, without obvious masses or lesions  Skin: grossly intact in all 4 extremities without obvious rashes or lesions    Motor Strength: No focal numbness or weakness  5/5 BUE/BLE  Sensory: intact to light touch B/L UE and LE    Incisions x3: midline lumbar, left hip, and anterior abdomen. All skin edges fully approximated with staples. Aquacel dressing in place. No drainage.       Significant Labs:  Recent Labs   Lab  11/18/20  0448 11/19/20  0521   GLU 99 100    141   K 3.6 3.7   CL 99 97   CO2 33* 32*   BUN 8 11   CREATININE 0.7 0.8   CALCIUM 9.3 9.3   MG 2.2 2.4     Recent Labs   Lab 11/18/20  0448 11/19/20  0521   WBC 7.77 7.80   HGB 10.1* 11.4*   HCT 31.4* 36.2*    411*     Recent Labs   Lab 11/17/20  1813 11/18/20  0956   INR 0.9  --    APTT  --  34.6*     Microbiology Results (last 7 days)     Procedure Component Value Units Date/Time    Aerobic culture [[id number]]  (Abnormal) Collected: 11/18/20 1155    Order Status: Completed Specimen: Wound from Abdomen Updated: 11/19/20 0847     Aerobic Bacterial Culture STAPHYLOCOCCUS AUREUS  Moderate  Susceptibility pending      Aerobic culture (Specify Source) **CANNOT BE ORDERED AS STAT** [[id number]]  (Abnormal)  (Susceptibility) Collected: 11/16/20 2117    Order Status: Completed Specimen: Incision site from Abdomen Updated: 11/19/20 0756     Aerobic Bacterial Culture STAPHYLOCOCCUS AUREUS  Many      Blood culture x two cultures. Draw prior to antibiotics. [[id number]] Collected: 11/16/20 2117    Order Status: Completed Specimen: Blood from Peripheral, Antecubital, Right Updated: 11/18/20 2303     Blood Culture, Routine No Growth to date      No Growth to date      No Growth to date    Narrative:      Aerobic and anaerobic    Blood culture x two cultures. Draw prior to antibiotics. [[id number]] Collected: 11/16/20 2140    Order Status: Completed Specimen: Blood from Peripheral, Forearm, Right Updated: 11/18/20 2303     Blood Culture, Routine No Growth to date      No Growth to date      No Growth to date    Narrative:      Aerobic and anaerobic    Gram stain [[id number]] Collected: 11/18/20 1155    Order Status: Completed Specimen: Wound from Abdomen Updated: 11/18/20 1416     Gram Stain Result Few WBC's      Few Gram positive cocci in pairs    Culture, Anaerobe [[id number]] Collected: 11/18/20 1155    Order Status: Sent Specimen: Wound from Abdomen Updated: 11/18/20 1319

## 2020-11-19 NOTE — PROGRESS NOTES
Vancomycin consult follow-up:    Patient reviewed, renal function stable, no new levels, continue current therapy; Next levels due: trough due 11/20/2020 at 0100

## 2020-11-19 NOTE — PLAN OF CARE
11/19/20 1552   Post-Acute Status   Post-Acute Authorization Other  (TBD)   Discharge Delays None known at this time   Discharge Plan   Discharge Plan A Home with family   Discharge Plan B Home with family;Home Health     SW to pt's room to discuss d/c planning. According to pt, d/c plan is to return home with spouse. Claudia, pt's spouse, confirm she will help pt at home if needed. SW name and number placed on white board. SW encouraged pt to contact SW with questions.

## 2020-11-19 NOTE — ASSESSMENT & PLAN NOTE
With likely abscess. Ultrasound ordered. Sepsis not present. Patient non-ill appearing. Blood cultures are NG. Vanc and Zosyn for now. Neuro surgery consulted.     Staph A growing in wound cultures. Blood cx NG. Likely will be MRSA. DC zosyn. Continue Vanc.    MSSA   ID consult for Abx recs for discharge. Follow NS recs

## 2020-11-19 NOTE — SUBJECTIVE & OBJECTIVE
Interval History: doing well.     Review of Systems   Constitutional: Negative for activity change, appetite change, chills, diaphoresis, fatigue, fever and unexpected weight change.   HENT: Negative for congestion, dental problem and drooling.    Eyes: Negative for pain, discharge and itching.   Respiratory: Negative for apnea, cough, choking, chest tightness and shortness of breath.    Cardiovascular: Negative for chest pain.   Gastrointestinal: Positive for abdominal pain. Negative for abdominal distention, constipation, diarrhea, nausea and vomiting.   Genitourinary: Negative for difficulty urinating, dysuria and enuresis.   Musculoskeletal: Negative for arthralgias and back pain.   Skin: Positive for color change and rash.   Psychiatric/Behavioral: Negative for agitation and behavioral problems.     Objective:     Vital Signs (Most Recent):  Temp: 98.9 °F (37.2 °C) (11/19/20 0733)  Pulse: 83 (11/19/20 0733)  Resp: 17 (11/19/20 0733)  BP: (!) 157/80 (11/19/20 0733)  SpO2: 98 % (11/19/20 0947) Vital Signs (24h Range):  Temp:  [97.7 °F (36.5 °C)-99.6 °F (37.6 °C)] 98.9 °F (37.2 °C)  Pulse:  [64-87] 83  Resp:  [7-33] 17  SpO2:  [90 %-100 %] 98 %  BP: (125-175)/() 157/80     Weight: (!) 142.4 kg (313 lb 15 oz)  Body mass index is 43.79 kg/m².    Intake/Output Summary (Last 24 hours) at 11/19/2020 1035  Last data filed at 11/19/2020 0600  Gross per 24 hour   Intake 1200 ml   Output 950 ml   Net 250 ml      Physical Exam  Vitals signs and nursing note reviewed.   Constitutional:       General: He is not in acute distress.     Appearance: Normal appearance. He is obese. He is not ill-appearing, toxic-appearing or diaphoretic.   HENT:      Head: Normocephalic and atraumatic.   Neck:      Musculoskeletal: Normal range of motion.   Cardiovascular:      Rate and Rhythm: Normal rate and regular rhythm.      Heart sounds: No murmur. No gallop.    Pulmonary:      Effort: Pulmonary effort is normal. No respiratory  distress.      Breath sounds: No wheezing or rales.   Abdominal:      General: Abdomen is flat. Bowel sounds are normal. There is no distension.      Palpations: There is no mass.      Tenderness: There is no abdominal tenderness. There is no guarding.      Hernia: No hernia is present.   Skin:     General: Skin is warm.      Comments: Cellulitis of abdominal wall RLQ. Bandaged.   Neurological:      Mental Status: He is alert and oriented to person, place, and time.   Psychiatric:         Mood and Affect: Mood normal.         Behavior: Behavior normal.         Thought Content: Thought content normal.         Significant Labs:   BMP:   Recent Labs   Lab 11/19/20  0521         K 3.7   CL 97   CO2 32*   BUN 11   CREATININE 0.8   CALCIUM 9.3   MG 2.4     CBC:   Recent Labs   Lab 11/18/20  0448 11/19/20  0521   WBC 7.77 7.80   HGB 10.1* 11.4*   HCT 31.4* 36.2*    411*       Significant Imaging:

## 2020-11-19 NOTE — PLAN OF CARE
Problem: Adult Inpatient Plan of Care  Goal: Plan of Care Review  Outcome: Ongoing, Progressing     Problem: Bariatric Environmental Safety  Goal: Safety Maintained with Care  Outcome: Ongoing, Progressing     Problem: Infection  Goal: Infection Symptom Resolution  Outcome: Ongoing, Progressing     Problem: Fall Injury Risk  Goal: Absence of Fall and Fall-Related Injury  Outcome: Ongoing, Progressing     Problem: Skin or Soft Tissue Infection  Goal: Infection Symptom Resolution  Outcome: Ongoing, Progressing

## 2020-11-19 NOTE — CONSULTS
[facility]  Infectious Disease  Consult Note    Patient Name: Jaime Lund  MRN: [id number]  Admission Date: 11/16/2020  Hospital Length of Stay: 3 days  Attending Physician: Rich Martin MD  Primary Care Provider: Gabby Dean MD     Isolation Status: No active isolations    Patient information was obtained from patient and ER records.      Consults  Assessment/Plan:     Surgical wound infection  50M with h/o chronic pain with intrathecal pain pump and prior L4-5 fusion admitted with erythema and puss from abdominal wound incision from recent pain pump revision. denies systemic signs of infection. BCx 11/16 NGTD. Growing MSSA from abdominal wound swab. Surgical cultures pending. Per op note, intrathecal pain pump and catheter were removed. Currently on vanc. Id c/f abx recs.     Recommendations:   - continue vancomycin while inpatient until the staph aureus from surgical culture susceptiblities available  - picc line. Would arrange ancef for outpatient use  - duration of abx 4-6 weeks from device removal    Infection: pain pump infection    Discharge antibiotics:   Cefazolin 6gm IV continuous infusion    End date of IV antibiotics: 12/16/20 ( may be extended to 12/30/20 TBD based on clinical response)    Weekly outpatient laboratory on Monday or Tuesday while on IV antibiotics.    CBC   CMP   ESR and CRP    Fax laboratory results to [facility] at [phone] with attn: Dr Chu    Outpatient Infectious Diseases clinic follow up will be arranged and found in patient calendar.    Prior to discharge, please ensure the patient's follow-up has been scheduled.  If there is still no follow-up scheduled in Infectious Diseases clinic, please send an EPIC message to Tabitha Porras in Infectious Diseases.                  Thank you for your consult. I will follow-up with patient. Please contact us if you have any additional questions.    Areli Chu MD  Infectious  "Disease  [facility]    Subjective:     Principal Problem: Cellulitis, abdominal wall    HPI:   50M with h/o chronic pain with intrathecal pain pump and prior L4-5 fusion admitted with redness and drainage from abdominal wound from recent pain pump revision.denies fevers. Denies preceeding antiboitics. Says that about 10 days after procedure, area started getting red and then started draining clear yellow-bloody drainage and subsequently turned into puss. Says his wife thought he was starting to "talk out of his head" so she brought patient to ED.     S/p I&D of abdominal and lumbar wounds for washout and removal of intrathecal pain pump and catheter    Reviewed op note :  Pus within abdominal wound around pain pump    Specimen Information: Abdomen; Wound        Component 1d ago   Aerobic Bacterial Culture Abnormal   STAPHYLOCOCCUS AUREUS   Moderate   Susceptibility pending              Note admission wound cultures with MSSA. Surgical culture pending.     Patient on vanc. ID c/f abx recs    Interval History: doing well.     Review of Systems   Constitutional: Negative for activity change, appetite change, chills, diaphoresis, fatigue, fever and unexpected weight change.   HENT: Negative for congestion, dental problem and drooling.    Eyes: Negative for pain, discharge and itching.   Respiratory: Negative for apnea, cough, choking, chest tightness and shortness of breath.    Cardiovascular: Negative for chest pain.   Gastrointestinal: Positive for abdominal pain. Negative for abdominal distention, constipation, diarrhea, nausea and vomiting.   Genitourinary: Negative for difficulty urinating, dysuria and enuresis.   Musculoskeletal: Negative for arthralgias and back pain.   Skin: Positive for color change and rash.   Psychiatric/Behavioral: Negative for agitation and behavioral problems.     Objective:     Vital Signs (Most Recent):  Temp: 98.9 °F (37.2 °C) (11/19/20 1148)  Pulse: 85 (11/19/20 " 1148)  Resp: 18 (11/19/20 1232)  BP: (!) 147/65 (11/19/20 1232)  SpO2: 96 % (11/19/20 1148) Vital Signs (24h Range):  Temp:  [97.7 °F (36.5 °C)-99.6 °F (37.6 °C)] 98.9 °F (37.2 °C)  Pulse:  [67-87] 85  Resp:  [16-23] 18  SpO2:  [90 %-99 %] 96 %  BP: (125-170)/(65-99) 147/65     Weight: (!) 142.4 kg (313 lb 15 oz)  Body mass index is 43.79 kg/m².    Intake/Output Summary (Last 24 hours) at 11/19/2020 1451  Last data filed at 11/19/2020 0600  Gross per 24 hour   Intake 1200 ml   Output 950 ml   Net 250 ml      Physical Exam  Vitals signs and nursing note reviewed.   Constitutional:       General: He is not in acute distress.     Appearance: Normal appearance. He is obese. He is not ill-appearing, toxic-appearing or diaphoretic.   HENT:      Head: Normocephalic and atraumatic.   Neck:      Musculoskeletal: Normal range of motion.   Cardiovascular:      Rate and Rhythm: Normal rate and regular rhythm.      Heart sounds: No murmur. No gallop.    Pulmonary:      Effort: Pulmonary effort is normal. No respiratory distress.      Breath sounds: No wheezing or rales.   Abdominal:      General: Abdomen is flat. Bowel sounds are normal. There is no distension.      Palpations: There is no mass.      Tenderness: There is no abdominal tenderness. There is no guarding.      Hernia: No hernia is present.   Skin:     General: Skin is warm.      Comments: Redness around anterior abdominal incision and R lower back incision. No drinage on dressings   Neurological:      Mental Status: He is alert and oriented to person, place, and time.   Psychiatric:         Mood and Affect: Mood normal.         Behavior: Behavior normal.         Thought Content: Thought content normal.         Significant Labs:   BMP:   Recent Labs   Lab 11/19/20  0521         K 3.7   CL 97   CO2 32*   BUN 11   CREATININE 0.8   CALCIUM 9.3   MG 2.4     CBC:   Recent Labs   Lab 11/18/20  0448 11/19/20  0521   WBC 7.77 7.80   HGB 10.1* 11.4*   HCT 31.4*  36.2*    411*       Significant Imaging:

## 2020-11-19 NOTE — HPI
50 y.o. male with long standing h/o back pain who is s/p L4-5 fusion and placement of pain pump.  Pain pump was initially placed by Dr Saavedra on 4/3/18, washed out by Dr Moran on 4/13/18, and most recently revised by Dr Wilkerson (pain medicine) after not functioning for several years.  New intrathecal catheter was replaced by old pump was kept.  Wound has been draining since surgery per patient and wife.  Yesterday patient became ill and altered [place] and wound started draining yellow pus. Cultures were obtained and he was placed on IV abx and currently is in ICU.  He is neuro intactand has returned to to baseline mental status.  He is worried about his back pain now that pump appears to be non-salvageable.

## 2020-11-19 NOTE — PROGRESS NOTES
[facility] Hospital Medicine  Progress Note    Patient Name: Jaime Lund  MRN: [id number]  Patient Class: IP- Inpatient   Admission Date: 11/16/2020  Length of Stay: 3 days  Attending Physician: Rich Martin MD  Primary Care Provider: Gabby Dean MD        Subjective:     Principal Problem:Cellulitis, abdominal wall        HPI:  Mr. Lund is a 51 yo M with chronic back pain. He has a intrathecal pain pump RLQ of abdomen. He had some surgery/adjustment for this about 2 weeks ago. He presents with redness and puss drainage since Friday.  He was started on Vanc and Zosyn. Neurosurgery was consulted. Patient denies shaking chills. Subjective fever on 11/16.  Patient resting comfortably. He is non-ill appearing and has no other complaints.     Overview/Hospital Course:  Mr. Lund is a 51 yo M with chronic back pain. He has a intrathecal pain pump RLQ of abdomen. He had some surgery/adjustment for this about 2 weeks ago. He presents with redness and puss drainage since Friday.  He was started on Vanc and Zosyn. Neurosurgery was consulted. Blood cultures were sent that were NG. Patient grew out Staph A in wound cultures- MSSA. ID was consulted on 11/19 for Abx recs for home. Patient taken to surgery on 11/18 for removal of pump.     Interval History: doing well.     Review of Systems   Constitutional: Negative for activity change, appetite change, chills, diaphoresis, fatigue, fever and unexpected weight change.   HENT: Negative for congestion, dental problem and drooling.    Eyes: Negative for pain, discharge and itching.   Respiratory: Negative for apnea, cough, choking, chest tightness and shortness of breath.    Cardiovascular: Negative for chest pain.   Gastrointestinal: Positive for abdominal pain. Negative for abdominal distention, constipation, diarrhea, nausea and vomiting.   Genitourinary: Negative for difficulty urinating, dysuria and enuresis.   Musculoskeletal: Negative for  arthralgias and back pain.   Skin: Positive for color change and rash.   Psychiatric/Behavioral: Negative for agitation and behavioral problems.     Objective:     Vital Signs (Most Recent):  Temp: 98.9 °F (37.2 °C) (11/19/20 0733)  Pulse: 83 (11/19/20 0733)  Resp: 17 (11/19/20 0733)  BP: (!) 157/80 (11/19/20 0733)  SpO2: 98 % (11/19/20 0947) Vital Signs (24h Range):  Temp:  [97.7 °F (36.5 °C)-99.6 °F (37.6 °C)] 98.9 °F (37.2 °C)  Pulse:  [64-87] 83  Resp:  [7-33] 17  SpO2:  [90 %-100 %] 98 %  BP: (125-175)/() 157/80     Weight: (!) 142.4 kg (313 lb 15 oz)  Body mass index is 43.79 kg/m².    Intake/Output Summary (Last 24 hours) at 11/19/2020 1035  Last data filed at 11/19/2020 0600  Gross per 24 hour   Intake 1200 ml   Output 950 ml   Net 250 ml      Physical Exam  Vitals signs and nursing note reviewed.   Constitutional:       General: He is not in acute distress.     Appearance: Normal appearance. He is obese. He is not ill-appearing, toxic-appearing or diaphoretic.   HENT:      Head: Normocephalic and atraumatic.   Neck:      Musculoskeletal: Normal range of motion.   Cardiovascular:      Rate and Rhythm: Normal rate and regular rhythm.      Heart sounds: No murmur. No gallop.    Pulmonary:      Effort: Pulmonary effort is normal. No respiratory distress.      Breath sounds: No wheezing or rales.   Abdominal:      General: Abdomen is flat. Bowel sounds are normal. There is no distension.      Palpations: There is no mass.      Tenderness: There is no abdominal tenderness. There is no guarding.      Hernia: No hernia is present.   Skin:     General: Skin is warm.      Comments: Cellulitis of abdominal wall RLQ. Bandaged.   Neurological:      Mental Status: He is alert and oriented to person, place, and time.   Psychiatric:         Mood and Affect: Mood normal.         Behavior: Behavior normal.         Thought Content: Thought content normal.         Significant Labs:   BMP:   Recent Labs   Lab  11/19/20  0521         K 3.7   CL 97   CO2 32*   BUN 11   CREATININE 0.8   CALCIUM 9.3   MG 2.4     CBC:   Recent Labs   Lab 11/18/20  0448 11/19/20  0521   WBC 7.77 7.80   HGB 10.1* 11.4*   HCT 31.4* 36.2*    411*       Significant Imaging:      Assessment/Plan:      * Cellulitis, abdominal wall  With likely abscess. Ultrasound ordered. Sepsis not present. Patient non-ill appearing. Blood cultures are NG. Vanc and Zosyn for now. Neuro surgery consulted.     Staph A growing in wound cultures. Blood cx NG. Likely will be MRSA. DC zosyn. Continue Vanc.    MSSA   ID consult for Abx recs for discharge. Follow NS recs          Surgical wound infection  Intrathecal pump site infection- Abx. Neuro surgery       Anemia of chronic disease  No acute issues.       Chronic pain  As noted       Peripheral artery disease  No acute issues       BMI 40.0-44.9, adult  Body mass index is 44.03 kg/m².  Weight loss as out patient       Seizure  Not on seizure meds. Will discuss      Essential hypertension  No acute issues         VTE Risk Mitigation (From admission, onward)         Ordered     enoxaparin injection 40 mg  Every 24 hours      11/17/20 1041     Place GUSTAVO hose  Until discontinued      11/17/20 0006     IP VTE HIGH RISK PATIENT  Once      11/17/20 0006     Place sequential compression device  Until discontinued      11/17/20 0006                Discharge Planning   MIRZA:      Code Status: Full Code   Is the patient medically ready for discharge?:     Reason for patient still in hospital (select all that apply): Patient unstable  Discharge Plan A: Home with family            ID consult. Follow neurosurgery/wound care recs.  4 weeks of Cefazolin           Rich Pascual MD  Department of Hospital Medicine   [facility]

## 2020-11-19 NOTE — SUBJECTIVE & OBJECTIVE
Interval History: doing well.     Review of Systems   Constitutional: Negative for activity change, appetite change, chills, diaphoresis, fatigue, fever and unexpected weight change.   HENT: Negative for congestion, dental problem and drooling.    Eyes: Negative for pain, discharge and itching.   Respiratory: Negative for apnea, cough, choking, chest tightness and shortness of breath.    Cardiovascular: Negative for chest pain.   Gastrointestinal: Positive for abdominal pain. Negative for abdominal distention, constipation, diarrhea, nausea and vomiting.   Genitourinary: Negative for difficulty urinating, dysuria and enuresis.   Musculoskeletal: Negative for arthralgias and back pain.   Skin: Positive for color change and rash.   Psychiatric/Behavioral: Negative for agitation and behavioral problems.     Objective:     Vital Signs (Most Recent):  Temp: 98.9 °F (37.2 °C) (11/19/20 1148)  Pulse: 85 (11/19/20 1148)  Resp: 18 (11/19/20 1232)  BP: (!) 147/65 (11/19/20 1232)  SpO2: 96 % (11/19/20 1148) Vital Signs (24h Range):  Temp:  [97.7 °F (36.5 °C)-99.6 °F (37.6 °C)] 98.9 °F (37.2 °C)  Pulse:  [67-87] 85  Resp:  [16-23] 18  SpO2:  [90 %-99 %] 96 %  BP: (125-170)/(65-99) 147/65     Weight: (!) 142.4 kg (313 lb 15 oz)  Body mass index is 43.79 kg/m².    Intake/Output Summary (Last 24 hours) at 11/19/2020 1451  Last data filed at 11/19/2020 0600  Gross per 24 hour   Intake 1200 ml   Output 950 ml   Net 250 ml      Physical Exam  Vitals signs and nursing note reviewed.   Constitutional:       General: He is not in acute distress.     Appearance: Normal appearance. He is obese. He is not ill-appearing, toxic-appearing or diaphoretic.   HENT:      Head: Normocephalic and atraumatic.   Neck:      Musculoskeletal: Normal range of motion.   Cardiovascular:      Rate and Rhythm: Normal rate and regular rhythm.      Heart sounds: No murmur. No gallop.    Pulmonary:      Effort: Pulmonary effort is normal. No respiratory  distress.      Breath sounds: No wheezing or rales.   Abdominal:      General: Abdomen is flat. Bowel sounds are normal. There is no distension.      Palpations: There is no mass.      Tenderness: There is no abdominal tenderness. There is no guarding.      Hernia: No hernia is present.   Skin:     General: Skin is warm.      Comments: Redness around anterior abdominal incision and R lower back incision. No drinage on dressings   Neurological:      Mental Status: He is alert and oriented to person, place, and time.   Psychiatric:         Mood and Affect: Mood normal.         Behavior: Behavior normal.         Thought Content: Thought content normal.         Significant Labs:   BMP:   Recent Labs   Lab 11/19/20  0521         K 3.7   CL 97   CO2 32*   BUN 11   CREATININE 0.8   CALCIUM 9.3   MG 2.4     CBC:   Recent Labs   Lab 11/18/20  0448 11/19/20  0521   WBC 7.77 7.80   HGB 10.1* 11.4*   HCT 31.4* 36.2*    411*       Significant Imaging:

## 2020-11-20 VITALS
SYSTOLIC BLOOD PRESSURE: 176 MMHG | TEMPERATURE: 98 F | HEIGHT: 71 IN | OXYGEN SATURATION: 99 % | DIASTOLIC BLOOD PRESSURE: 95 MMHG | WEIGHT: 313.94 LBS | BODY MASS INDEX: 43.95 KG/M2 | HEART RATE: 88 BPM | RESPIRATION RATE: 17 BRPM

## 2020-11-20 LAB
ASO AB SERPL-ACNC: 63 IU/ML
BACTERIA SPEC AEROBE CULT: ABNORMAL
C DIFF GDH STL QL: NEGATIVE
C DIFF TOX A+B STL QL IA: NEGATIVE
VANCOMYCIN TROUGH SERPL-MCNC: 15.9 UG/ML (ref 10–22)

## 2020-11-20 PROCEDURE — 87449 NOS EACH ORGANISM AG IA: CPT

## 2020-11-20 PROCEDURE — A4216 STERILE WATER/SALINE, 10 ML: HCPCS | Performed by: INTERNAL MEDICINE

## 2020-11-20 PROCEDURE — 63600175 PHARM REV CODE 636 W HCPCS: Performed by: PHYSICIAN ASSISTANT

## 2020-11-20 PROCEDURE — 25000003 PHARM REV CODE 250: Performed by: PHYSICIAN ASSISTANT

## 2020-11-20 PROCEDURE — 25000003 PHARM REV CODE 250: Performed by: INTERNAL MEDICINE

## 2020-11-20 PROCEDURE — 63600175 PHARM REV CODE 636 W HCPCS: Performed by: INTERNAL MEDICINE

## 2020-11-20 PROCEDURE — 87324 CLOSTRIDIUM AG IA: CPT

## 2020-11-20 PROCEDURE — 99233 PR SUBSEQUENT HOSPITAL CARE,LEVL III: ICD-10-PCS | Mod: ,,, | Performed by: INTERNAL MEDICINE

## 2020-11-20 PROCEDURE — 99233 SBSQ HOSP IP/OBS HIGH 50: CPT | Mod: ,,, | Performed by: INTERNAL MEDICINE

## 2020-11-20 PROCEDURE — 80202 ASSAY OF VANCOMYCIN: CPT

## 2020-11-20 PROCEDURE — 36569 INSJ PICC 5 YR+ W/O IMAGING: CPT

## 2020-11-20 PROCEDURE — C1751 CATH, INF, PER/CENT/MIDLINE: HCPCS

## 2020-11-20 PROCEDURE — 36415 COLL VENOUS BLD VENIPUNCTURE: CPT

## 2020-11-20 RX ORDER — SODIUM CHLORIDE 0.9 % (FLUSH) 0.9 %
10 SYRINGE (ML) INJECTION EVERY 6 HOURS
Status: DISCONTINUED | OUTPATIENT
Start: 2020-11-20 | End: 2020-11-20 | Stop reason: HOSPADM

## 2020-11-20 RX ORDER — CEFAZOLIN SODIUM 1 G/3ML
1 INJECTION, POWDER, FOR SOLUTION INTRAMUSCULAR; INTRAVENOUS
Status: DISCONTINUED | OUTPATIENT
Start: 2020-11-20 | End: 2020-11-20 | Stop reason: CLARIF

## 2020-11-20 RX ORDER — CEFAZOLIN SODIUM 1 G/50ML
1 SOLUTION INTRAVENOUS
Status: DISCONTINUED | OUTPATIENT
Start: 2020-11-20 | End: 2020-11-20 | Stop reason: HOSPADM

## 2020-11-20 RX ORDER — SODIUM CHLORIDE 0.9 % (FLUSH) 0.9 %
10 SYRINGE (ML) INJECTION
Status: DISCONTINUED | OUTPATIENT
Start: 2020-11-20 | End: 2020-11-20 | Stop reason: HOSPADM

## 2020-11-20 RX ADMIN — VANCOMYCIN HYDROCHLORIDE 2000 MG: 10 INJECTION, POWDER, LYOPHILIZED, FOR SOLUTION INTRAVENOUS at 03:11

## 2020-11-20 RX ADMIN — CEFAZOLIN SODIUM 1 G: 1 SOLUTION INTRAVENOUS at 08:11

## 2020-11-20 RX ADMIN — MUPIROCIN 1 G: 20 OINTMENT TOPICAL at 08:11

## 2020-11-20 RX ADMIN — FUROSEMIDE 40 MG: 40 TABLET ORAL at 08:11

## 2020-11-20 RX ADMIN — HYDROMORPHONE HYDROCHLORIDE 2 MG: 2 INJECTION INTRAMUSCULAR; INTRAVENOUS; SUBCUTANEOUS at 03:11

## 2020-11-20 RX ADMIN — GABAPENTIN 400 MG: 400 CAPSULE ORAL at 08:11

## 2020-11-20 RX ADMIN — Medication 10 ML: at 06:11

## 2020-11-20 RX ADMIN — CETIRIZINE HYDROCHLORIDE 5 MG: 5 TABLET ORAL at 08:11

## 2020-11-20 RX ADMIN — HYDROMORPHONE HYDROCHLORIDE 2 MG: 2 INJECTION INTRAMUSCULAR; INTRAVENOUS; SUBCUTANEOUS at 10:11

## 2020-11-20 RX ADMIN — TAMSULOSIN HYDROCHLORIDE 0.4 MG: 0.4 CAPSULE ORAL at 08:11

## 2020-11-20 RX ADMIN — HYDROMORPHONE HYDROCHLORIDE 2 MG: 2 INJECTION INTRAMUSCULAR; INTRAVENOUS; SUBCUTANEOUS at 06:11

## 2020-11-20 NOTE — NURSING
Received report from MIKE Akbar RN. Pt arrived on unit from the ED dept wia w/c accompanied per nurse and family. Pt AAOx4, RR even and unlabored, PERRL with no c/o pain. Telemetry monitor in place. IV clean, dry, intact w/ Vancomycin infusing. Pt informed of md orders, oriented to environment and safety maintained with bed low side rails up x2 with nurse call bell within reach w/ bed alarm set

## 2020-11-20 NOTE — PROGRESS NOTES
WRITTEN HEALTHCARE DISCHARGE INFORMATION     Things that YOU are RESPONSIBLE for to Manage Your Care At Home:    1. Getting your prescriptions filled.  2. Taking you medications as directed. DO NOT MISS ANY DOSES!  3. Going to your follow-up doctor appointments. This is important because it allows the doctor to monitor your progress and to determine if any changes need to be made to your treatment plan.    If you are unable to make your follow up appointments, please call the number listed and reschedule this appointment.     ____________HELP AT HOME____________________    Experiencing any SIGNS or SYMPTOMS: YOU CAN    Schedule a same day appopintment with your Primary Care Doctor or  you can call [facility] Line for 24/7 assistance at [phone]    If you are experience any signs or symptoms that have become severe, Call 911 and come to your nearest Emergency Room.    Thank you for choosing [facility] and allowing us to care for you.   From your care management team: Kristin BRINK [facility] [phone]    You should receive a call from [facility] within 48-72 hours to help manage your care after discharge. Please try to make sure that you answer your phone for this important phone call.  Follow-up Information     Valentina Rojas PA-C On 12/7/2020.    Specialty: Neurosurgery  Why: For staple removal at 11:20am  Contact information:  [address] [facility] [address]  [address]  [place] [address]  [phone]             Gabby Dean MD On 12/1/2020.    Specialties: Internal Medicine, Wound Care  Why: Outpatient Services, PCP, follow-up appointment at 10:20am.   Contact information:  [address] [address]  [address]  [place] [address]  [phone]             [facility].    Contact information:  [address]  [address]  [facility] [address]  [phone]           [facility].    Specialty: Home Health Services  Why: Home Health  Contact  information:  [address]  [address]  [place] [address]  [phone]

## 2020-11-20 NOTE — PROCEDURES
"Jiame Lund is a 50 y.o. male patient.    Temp: 99.5 °F (37.5 °C) (11/19/20 2349)  Pulse: 82 (11/19/20 2349)  Resp: 16 (11/20/20 0305)  BP: (!) 149/70 (11/19/20 2349)  SpO2: 96 % (11/19/20 2349)  Weight: (!) 142.4 kg (313 lb 15 oz) (11/18/20 0426)  Height: 5' 11" (180.3 cm) (11/16/20 2013)    PICC  Date/Time: 11/20/2020 3:48 AM  Performed by: Lorenzo Santa RN  Consent Done: Yes  Time out: Immediately prior to procedure a time out was called to verify the correct patient, procedure, equipment, support staff and site/side marked as required  Indications: med administration and vascular access  Anesthesia: local infiltration  Local anesthetic: lidocaine 1% without epinephrine  Anesthetic Total (mL): 5  Preparation: skin prepped with ChloraPrep  Skin prep agent dried: skin prep agent completely dried prior to procedure  Sterile barriers: all five maximum sterile barriers used - cap, mask, sterile gown, sterile gloves, and large sterile sheet  Hand hygiene: hand hygiene performed prior to central venous catheter insertion  Location details: right basilic  Catheter type: double lumen  Catheter size: 5 Fr  Catheter Length: 45cm    Ultrasound guidance: yes  Vessel Caliber: medium, compressibility normal  Needle advanced into vessel with real time Ultrasound guidance.  Guidewire confirmed in vessel.  Sterile sheath used.  Number of attempts: 1  Post-procedure: blood return through all ports, chlorhexidine patch and sterile dressing applied            Lorenzo Santa  11/20/2020  "

## 2020-11-20 NOTE — PLAN OF CARE
Problem: Adult Inpatient Plan of Care  Goal: Plan of Care Review  Outcome: Ongoing, Progressing  Pt remained free of falls during current shift. Pt reported having pain to the back and received multiple doses of pain medication. IV antibiotic given, however pt reports it's causing GI upset. Picc line placed during shift for IV antibiotics after discharge. Plan of care and fall precautions reviewed with pt and verbalized understanding. Bed locked, lowered, SR up x2 and call light placed within reach.

## 2020-11-20 NOTE — SUBJECTIVE & OBJECTIVE
Interval History: NAEO. Getting picc line teaching now. C.diff was sent for loose stools and negative.     Review of Systems   Constitutional: Negative for activity change, appetite change, chills, diaphoresis, fatigue, fever and unexpected weight change.   HENT: Negative for congestion, dental problem and drooling.    Eyes: Negative for pain, discharge and itching.   Respiratory: Negative for apnea, cough, choking, chest tightness and shortness of breath.    Cardiovascular: Negative for chest pain.   Gastrointestinal: Positive for abdominal pain. Negative for abdominal distention, constipation, diarrhea, nausea and vomiting.   Genitourinary: Negative for difficulty urinating, dysuria and enuresis.   Musculoskeletal: Negative for arthralgias and back pain.   Skin: Positive for color change and rash.   Psychiatric/Behavioral: Negative for agitation and behavioral problems.     Objective:     Vital Signs (Most Recent):  Temp: 98.4 °F (36.9 °C) (11/20/20 1239)  Pulse: 88 (11/20/20 1239)  Resp: 17 (11/20/20 1239)  BP: (!) 176/95 (11/20/20 1239)  SpO2: 99 % (11/20/20 1239) Vital Signs (24h Range):  Temp:  [98.4 °F (36.9 °C)-99.5 °F (37.5 °C)] 98.4 °F (36.9 °C)  Pulse:  [78-88] 88  Resp:  [16-20] 17  SpO2:  [95 %-99 %] 99 %  BP: (149-176)/(70-95) 176/95     Weight: (!) 142.4 kg (313 lb 15 oz)  Body mass index is 43.79 kg/m².    Intake/Output Summary (Last 24 hours) at 11/20/2020 1247  Last data filed at 11/20/2020 0700  Gross per 24 hour   Intake 1100 ml   Output --   Net 1100 ml      Physical Exam  Vitals signs and nursing note reviewed.   Constitutional:       General: He is not in acute distress.     Appearance: Normal appearance. He is obese. He is not ill-appearing, toxic-appearing or diaphoretic.   Cardiovascular:      Heart sounds: No murmur. No gallop.    Pulmonary:      Effort: Pulmonary effort is normal. No respiratory distress.   Skin:     General: Skin is warm.      Comments: picc line. sugical dressings on  abdomen   Neurological:      Mental Status: He is alert and oriented to person, place, and time.   Psychiatric:         Mood and Affect: Mood normal.         Behavior: Behavior normal.         Thought Content: Thought content normal.         Significant Labs:   BMP:   Recent Labs   Lab 11/19/20  0521         K 3.7   CL 97   CO2 32*   BUN 11   CREATININE 0.8   CALCIUM 9.3   MG 2.4     CBC:   Recent Labs   Lab 11/19/20  0521   WBC 7.80   HGB 11.4*   HCT 36.2*   *       Significant Imaging:

## 2020-11-20 NOTE — PLAN OF CARE
"   11/20/20 0838   Post-Acute Status   Post-Acute Authorization Home Health;Medications   Home Health Status Pending Payor Review   Medication Status Pending Prior Authorization   Other Status No Post-Acute Service Needs   Patient choice form signed by patient/caregiver List with quality metrics by geographic area provided   Discharge Delays None known at this time   Discharge Plan   Discharge Plan A Home with family;Home Health     Orders received for HH; CHERYLE spoke with pt's spouse, Claudia, concerning preferences. According to Claudia, they do not have a preference for HH neither infusion company. Referral faxed to [facility] and [facility].     Pt approved by hospitals and [facility]. Teaching completed by Elva with hospitals and cleared to go home.     EDUCATION:  Pt provided with educational information on Sepsis.  Information reviewed and placed in :My Healthcare Packet" to be brought home for  use as resource after discharge.  Information included:  signs and symptoms to look for at discharge. CHERYLE instructed pt to call the doctor if experiencing symptoms that may indicate a medical emergency: CALL 911 if signs and symptoms worsen. Reminded pt things he will be responsible for to manage his healthcare at home: getting Rx filled, attending follow up appointments, and taking medication as prescribed were discussed.   Teach back method used.  All questions answered.  Patient verbalized understanding of all information.      CHERYLE provided pt with a copy of follow-up appointments. CHERYLE explained/highlighted date, time, and location of each appointment. CHERYLE provided pt with a blue folder and instructed pt to place all medical documents in blue folder. CHERYLE explained to pt the nurse will provide an AVS with diagnosis and instructed pt to place in blue folder and bring to follow-up appointment.     CHERYLE notified pt's nurse, Leesa, all CM needs have been met.     "

## 2020-11-20 NOTE — PROGRESS NOTES
Pharmacokinetic Assessment Follow Up: IV Vancomycin    Vancomycin serum concentration assessment(s):    The trough level was drawn correctly and can be used to guide therapy at this time. The measurement is within the desired definitive target range of 10 to 20 mcg/mL.    Vancomycin Regimen Plan:    Continue regimen to Vancomycin 2000 mg IV every 12 hours with next serum trough concentration measured at 02:00 prior to fourth dose on 11/22/2020    Drug levels (last 3 results):  Recent Labs   Lab Result Units 11/18/20  1307 11/20/20  0102   Vancomycin-Trough ug/mL 12.7 15.9       Pharmacy will continue to follow and monitor vancomycin.    Please contact pharmacy at extension [phone] for questions regarding this assessment.    Thank you for the consult,   Akua Dahl       Patient brief summary:  Jaime Lund is a 50 y.o. male initiated on antimicrobial therapy with IV Vancomycin for treatment of skin & soft tissue infection    The patient's current regimen is Vancomycin 2000 mg IV q12h.    Drug Allergies:   Review of patient's allergies indicates:   Allergen Reactions    Klonopin [clonazepam] Anxiety and Other (See Comments)     Becomes agitated    Valium [diazepam] Other (See Comments)     Becomes agitated    Xanax [alprazolam] Anxiety and Other (See Comments)     Becomes agitated       Actual Body Weight:   142.4 kg    Renal Function:   Estimated Creatinine Clearance: 157.8 mL/min (based on SCr of 0.8 mg/dL).,     Dialysis Method (if applicable):  N/A    CBC (last 72 hours):  Recent Labs   Lab Result Units 11/17/20  0630 11/18/20  0448 11/19/20  0521   WBC K/uL 10.85 7.77 7.80   Hemoglobin g/dL 10.0* 10.1* 11.4*   Hematocrit % 31.5* 31.4* 36.2*   Platelets K/uL 275 347 411*   Gran % % 76.0* 66.4 68.9   Lymph % % 10.5* 19.0 18.8   Mono % % 10.9 11.5 10.4   Eosinophil % % 1.7 2.3 0.9   Basophil % % 0.4 0.4 0.5   Differential Method  Automated Automated Automated       Metabolic Panel (last 72 hours):  Recent  Labs   Lab Result Units 11/17/20  0630 11/18/20  0448 11/19/20  0521   Sodium mmol/L 138 139 141   Potassium mmol/L 3.5 3.6 3.7   Chloride mmol/L 100 99 97   CO2 mmol/L 32* 33* 32*   Glucose mg/dL 95 99 100   BUN mg/dL 12 8 11   Creatinine mg/dL 0.7 0.7 0.8   Albumin g/dL 2.8* 2.6* 2.7*   Total Bilirubin mg/dL 0.4 0.4 0.2   Alkaline Phosphatase U/L 87 98 93   AST U/L 15 18 15   ALT U/L 10 11 9*   Magnesium mg/dL 2.2 2.2 2.4   Phosphorus mg/dL 2.7 3.6 3.4       Vancomycin Administrations:  vancomycin given in the last 96 hours                     vancomycin (VANCOCIN) 2,000 mg in dextrose 5 % 500 mL IVPB (mg) 2,000 mg New Bag 11/20/20 0306     2,000 mg New Bag 11/19/20 1500     2,000 mg New Bag  0228     2,000 mg New Bag 11/18/20 1402     2,000 mg New Bag  0219     2,000 mg New Bag 11/17/20 1400    vancomycin (VANCOCIN) 2,500 mg in dextrose 5 % 500 mL IVPB (mg) 2,500 mg New Bag 11/17/20 0151                    Microbiologic Results:  Microbiology Results (last 7 days)       Procedure Component Value Units Date/Time    Blood culture x two cultures. Draw prior to antibiotics. [[id number]] Collected: 11/16/20 2117    Order Status: Completed Specimen: Blood from Peripheral, Antecubital, Right Updated: 11/19/20 2303     Blood Culture, Routine No Growth to date      No Growth to date      No Growth to date      No Growth to date    Narrative:      Aerobic and anaerobic    Blood culture x two cultures. Draw prior to antibiotics. [[id number]] Collected: 11/16/20 2140    Order Status: Completed Specimen: Blood from Peripheral, Forearm, Right Updated: 11/19/20 2303     Blood Culture, Routine No Growth to date      No Growth to date      No Growth to date      No Growth to date    Narrative:      Aerobic and anaerobic    Aerobic culture [[id number]]  (Abnormal) Collected: 11/18/20 1155    Order Status: Completed Specimen: Wound from Abdomen Updated: 11/19/20 0847     Aerobic Bacterial Culture STAPHYLOCOCCUS  AUREUS  Moderate  Susceptibility pending      Aerobic culture (Specify Source) **CANNOT BE ORDERED AS STAT** [[id number]]  (Abnormal)  (Susceptibility) Collected: 11/16/20 2117    Order Status: Completed Specimen: Incision site from Abdomen Updated: 11/19/20 0756     Aerobic Bacterial Culture STAPHYLOCOCCUS AUREUS  Many      Gram stain [[id number]] Collected: 11/18/20 1155    Order Status: Completed Specimen: Wound from Abdomen Updated: 11/18/20 1416     Gram Stain Result Few WBC's      Few Gram positive cocci in pairs    Culture, Anaerobe [[id number]] Collected: 11/18/20 1155    Order Status: Sent Specimen: Wound from Abdomen Updated: 11/18/20 1319

## 2020-11-20 NOTE — CONSULTS
[facility]  Infectious Disease  Consult Note    Patient Name: Jaime Lund  MRN: [id number]  Admission Date: 11/16/2020  Hospital Length of Stay: 4 days  Attending Physician: Rich Martin MD  Primary Care Provider: Gabby Dean MD     Isolation Status: Special Contact    Patient information was obtained from patient and ER records.      Consults  Assessment/Plan:     Surgical wound infection  50M with h/o chronic pain with intrathecal pain pump and prior L4-5 fusion admitted with erythema and puss from abdominal wound incision from recent pain pump revision. denies systemic signs of infection. BCx 11/16 NGTD. Growing MSSA from abdominal wound swab and surgical cultures. Currently on vanc. Id c/f abx recs.     Recommendations:   - de-escalate to ancef  - duration of abx 4-6 weeks from device removal  - please notify ID with any new growth on cultures    Infection: pain pump infection    Discharge antibiotics:   Cefazolin 6gm IV continuous infusion    End date of IV antibiotics: 12/16/20 ( may be extended to 12/30/20 TBD based on clinical response)    Weekly outpatient laboratory on Monday or Tuesday while on IV antibiotics.    CBC   CMP   ESR and CRP    Fax laboratory results to [facility] at [phone] with attn: Dr Chu    Outpatient Infectious Diseases clinic follow up will be arranged and found in patient calendar.    Prior to discharge, please ensure the patient's follow-up has been scheduled.  If there is still no follow-up scheduled in Infectious Diseases clinic, please send an EPIC message to Tabitha Porras in Infectious Diseases.                  Thank you for your consult. I will sign off. Please contact us if you have any additional questions.    Areli Chu MD  Infectious Disease  [facility]    Subjective:     Principal Problem: Cellulitis, abdominal wall    HPI:   50M with h/o chronic pain with intrathecal pain pump and prior L4-5 fusion  "admitted with redness and drainage from abdominal wound from recent pain pump revision.denies fevers. Denies preceeding antiboitics. Says that about 10 days after procedure, area started getting red and then started draining clear yellow-bloody drainage and subsequently turned into puss. Says his wife thought he was starting to "talk out of his head" so she brought patient to ED.     S/p I&D of abdominal and lumbar wounds for washout and removal of intrathecal pain pump and catheter    Reviewed op note :  Pus within abdominal wound around pain pump    Specimen Information: Abdomen; Wound        Component 1d ago   Aerobic Bacterial Culture Abnormal   STAPHYLOCOCCUS AUREUS   Moderate   Susceptibility pending              Note admission wound cultures with MSSA. Surgical culture pending.     Patient on vanc. ID c/f abx recs    Interval History: NAEO. Getting picc line teaching now. C.diff was sent for loose stools and negative.     Review of Systems   Constitutional: Negative for activity change, appetite change, chills, diaphoresis, fatigue, fever and unexpected weight change.   HENT: Negative for congestion, dental problem and drooling.    Eyes: Negative for pain, discharge and itching.   Respiratory: Negative for apnea, cough, choking, chest tightness and shortness of breath.    Cardiovascular: Negative for chest pain.   Gastrointestinal: Positive for abdominal pain. Negative for abdominal distention, constipation, diarrhea, nausea and vomiting.   Genitourinary: Negative for difficulty urinating, dysuria and enuresis.   Musculoskeletal: Negative for arthralgias and back pain.   Skin: Positive for color change and rash.   Psychiatric/Behavioral: Negative for agitation and behavioral problems.     Objective:     Vital Signs (Most Recent):  Temp: 98.4 °F (36.9 °C) (11/20/20 1239)  Pulse: 88 (11/20/20 1239)  Resp: 17 (11/20/20 1239)  BP: (!) 176/95 (11/20/20 1239)  SpO2: 99 % (11/20/20 1239) Vital Signs (24h " Range):  Temp:  [98.4 °F (36.9 °C)-99.5 °F (37.5 °C)] 98.4 °F (36.9 °C)  Pulse:  [78-88] 88  Resp:  [16-20] 17  SpO2:  [95 %-99 %] 99 %  BP: (149-176)/(70-95) 176/95     Weight: (!) 142.4 kg (313 lb 15 oz)  Body mass index is 43.79 kg/m².    Intake/Output Summary (Last 24 hours) at 11/20/2020 1247  Last data filed at 11/20/2020 0700  Gross per 24 hour   Intake 1100 ml   Output --   Net 1100 ml      Physical Exam  Vitals signs and nursing note reviewed.   Constitutional:       General: He is not in acute distress.     Appearance: Normal appearance. He is obese. He is not ill-appearing, toxic-appearing or diaphoretic.   Cardiovascular:      Heart sounds: No murmur. No gallop.    Pulmonary:      Effort: Pulmonary effort is normal. No respiratory distress.   Skin:     General: Skin is warm.      Comments: picc line. sugical dressings on abdomen   Neurological:      Mental Status: He is alert and oriented to person, place, and time.   Psychiatric:         Mood and Affect: Mood normal.         Behavior: Behavior normal.         Thought Content: Thought content normal.         Significant Labs:   BMP:   Recent Labs   Lab 11/19/20  0521         K 3.7   CL 97   CO2 32*   BUN 11   CREATININE 0.8   CALCIUM 9.3   MG 2.4     CBC:   Recent Labs   Lab 11/19/20  0521   WBC 7.80   HGB 11.4*   HCT 36.2*   *       Significant Imaging:

## 2020-11-20 NOTE — DISCHARGE SUMMARY
[facility] Hospital Medicine  Discharge Summary       Patient Name: Jaime Lund  MRN: [id number]  Admission Date: 11/16/2020  Hospital Length of Stay: 4 days  Discharge Date and Time:  11/20/2020 7:51 AM  Attending Physician: Rich Martin MD   Discharging Provider: Rich Martin MD  Primary Care Provider: Gabby Dean MD      HPI:   Mr. Lund is a 51 yo M with chronic back pain. He has a intrathecal pain pump RLQ of abdomen. He had some surgery/adjustment for this about 2 weeks ago. He presents with redness and puss drainage since Friday.  He was started on Vanc and Zosyn. Neurosurgery was consulted. Patient denies shaking chills. Subjective fever on 11/16.  Patient resting comfortably. He is non-ill appearing and has no other complaints.     Procedure(s) (LRB):  WASHOUT  (Abdominal wound washout and removal of intrathecal pain pump) Lateral position  TO FOLLOW 2nd CASE  (Right)  REMOVAL, IMPLANT (N/A)      Hospital Course:   Mr. Lund is a 51 yo M with chronic back pain. He has a intrathecal pain pump RLQ of abdomen. He had some surgery/adjustment for this about 2 weeks ago. He presents with redness and puss drainage since Friday.  He was started on Vanc and Zosyn. Neurosurgery was consulted. Blood cultures were sent that were NG. Patient grew out Staph A in wound cultures- MSSA. ID was consulted on 11/19 for Abx recs for home. Patient taken to surgery on 11/18 for removal of pump. ID recommended 4 weeks of IV Cefazolin.  Patient has follow up appointment on 12/7 with neurosurgery for staple removal.  Picc line placed and patient will be discharged to home with IV abx through 12/18.  Diet- low NA. Activity as tolerated. Follow up PCP in one week.   Labs to be faxed to ID.      Consults:   Consults (From admission, onward)        Status Ordering Provider     Inpatient consult to Infectious Diseases  Once     Provider:  Areli Chu MD    Completed RICH MARTIN      Inpatient consult to Neurosurgery  Once     Provider:  Raffy Headley DO    Completed PRAFUL TERRY     Inpatient consult to PICC team ([facility])  Once     Provider:  (Not yet assigned)    TANNA Agosto          No new Assessment & Plan notes have been filed under this hospital service since the last note was generated.  Service: Hospital Medicine    Final Active Diagnoses:    Diagnosis Date Noted POA    PRINCIPAL PROBLEM:  Cellulitis, abdominal wall [L03.311] 11/16/2020 Yes    Surgical wound infection [T81.49XA] 04/12/2018 Yes    Anemia of chronic disease [D63.8] 11/17/2020 Yes    Chronic pain [G89.29] 04/03/2018 Yes    Peripheral artery disease [I73.9] 05/05/2017 Yes    BMI 40.0-44.9, adult [Z68.41] 04/28/2017 Not Applicable    Essential hypertension [I10] 03/18/2014 Yes    Seizure [R56.9] 03/18/2014 Yes      Problems Resolved During this Admission:       Discharged Condition: good    Disposition: Home-Health Care [facility]    Follow Up:  Follow-up Information     Valentina Rojas PA-C On 12/7/2020.    Specialty: Neurosurgery  Why: For staple removal at 11:20am  Contact information:  [address] [facility] [address]  [address]  [facility] [id number] [phone]             Gabby Dean MD In 1 week.    Specialties: Internal Medicine, Wound Care  Contact information:  [address] [address]  [address]  [place] [address] [phone]                 Patient Instructions:   No discharge procedures on file.    Significant Diagnostic Studies:    Pending Diagnostic Studies:     None         Medications:  Reconciled Home Medications:      Medication List      CONTINUE taking these medications    butalbital-acetaminophen-caffeine -40 mg -40 mg per tablet  Commonly known as: FIORICET, ESGIC  TAKE ONE TABLET BY MOUTH EVERY 4 HOURS AS NEEDED FOR HEADACHE     DULoxetine 60 MG capsule  Commonly known as: CYMBALTA  TAKE 1 CAPSULE BY MOUTH EVERY EVENING     furosemide 40 MG tablet  Commonly known as: LASIX  TAKE  ONE TABLET BY MOUTH ONCE DAILY FOR FLUID     gabapentin 400 MG capsule  Commonly known as: NEURONTIN  2 (two) times daily. Takes 1 400mg pill every AM, Takes 2 (400mg) at night     HYDROmorphone 8 MG tablet  Commonly known as: DILAUDID  Take 8 mg by mouth every 6 (six) hours as needed for Pain.     meloxicam 15 MG tablet  Commonly known as: MOBIC  TAKE ONE TABLET BY MOUTH ONCE DAILY AS NEEDED FOR PAIN     methocarbamoL 750 MG Tab  Commonly known as: ROBAXIN  Take 750 mg by mouth 4 (four) times daily.     ondansetron 4 MG tablet  Commonly known as: ZOFRAN  Take 1 tablet (4 mg total) by mouth every 6 (six) hours as needed.     potassium chloride 10 MEQ Tbsr  Commonly known as: KLOR-CON  TAKE ONE TABLET BY MOUTH ONCE DAILY AS NEEDED WITH FLUID PILL     tiZANidine 2 MG tablet  Commonly known as: ZANAFLEX  TAKE ONE TABLET BY MOUTH THREE TIMES DAILY FOR MUSCLE SPASMS.     torsemide 10 MG Tab  Commonly known as: DEMADEX  Take 1 tablet (10 mg total) by mouth 2 (two) times daily as needed.            Indwelling Lines/Drains at time of discharge:   Lines/Drains/Airways     Peripherally Inserted Central Catheter Line            PICC Double Lumen 11/20/20 0348 right basilic less than 1 day                Time spent on the discharge of patient: > 30  minutes  Patient was seen and examined on the date of discharge and determined to be suitable for discharge.         Rich Pascual MD  Department of Hospital Medicine  [facility]

## 2020-11-20 NOTE — NURSING
PER handoff received from REGGIE Modi RN. Responds spontaneous and is AAO x4. Pt reports having pain to lower back, however appears in no distress. Also, pt dressing to RLQ of abdomen has scant blood, but otherwise clean, dry and intact. Assessment completed per Doc Flowsheets; reference if needed. Fall and safety precautions maintained. Bed locked in lowest position, with side rails up x2. Call bell and personal items within reach. Will continue to monitor pt for any changes.

## 2020-11-20 NOTE — NURSING
Pt received D/C instructions and verbalized understanding w/ IV's and telemetry removed. Pt educated on signs and symptoms of wound infection and

## 2020-11-20 NOTE — ASSESSMENT & PLAN NOTE
With likely abscess. Ultrasound ordered. Sepsis not present. Patient non-ill appearing. Blood cultures are NG. Vanc and Zosyn for now. Neuro surgery consulted.     Staph A growing in wound cultures. Blood cx NG. Likely will be MRSA. DC zosyn. Continue Vanc.    MSSA   ID consult for Abx recs for discharge. Follow NS recs      MSSA from 11/16.   11/18 Staph A. (S) pending. Called lab.

## 2020-11-20 NOTE — PLAN OF CARE
11/20/20 1216   Final Note   Assessment Type Final Discharge Note   Anticipated Discharge Disposition Home-Health   What phone number can be called within the next 1-3 days to see how you are doing after discharge? [phone]   Hospital Follow Up  Appt(s) scheduled? Yes   Discharge plans and expectations educations in teach back method with documentation complete? Yes   Right Care Referral Info   Post Acute Recommendation Home-care   Referral Type Home care   Facility Name [facility]   Post-Acute Status   Post-Acute Authorization Home Health;Medications   Home Health Status Set-up Complete   Medication Status Set-up Complete   Other Status No Post-Acute Service Needs   Patient choice form signed by patient/caregiver List with quality metrics by geographic area provided   Discharge Delays None known at this time

## 2020-11-20 NOTE — PLAN OF CARE
[facility]    HOME HEALTH ORDERS  FACE TO FACE ENCOUNTER    Patient Name: Jamie Lund  YOB: 1970    PCP: Gabby Dean MD   PCP Address: [address] [place] [address] [address] [place] [id number]  PCP Phone Number: [phone]  PCP Fax: [phone]    Encounter Date: 11/20/2020    Admit to Home Health    Diagnoses: MSSA cellulitis/abscess    Active Hospital Problems    Diagnosis  POA    *Cellulitis, abdominal wall [L03.311]  Yes     Priority: 1 - High    Surgical wound infection [T81.49XA]  Yes     Priority: 2     Anemia of chronic disease [D63.8]  Yes    Chronic pain [G89.29]  Yes    Peripheral artery disease [I73.9]  Yes    BMI 40.0-44.9, adult [Z68.41]  Not Applicable    Essential hypertension [I10]  Yes    Seizure [R56.9]  Yes      Resolved Hospital Problems   No resolved problems to display.       Future Appointments   Date Time Provider Department Center   12/7/2020 11:20 AM Valentina Rojas PA-C [facility]     Follow-up Information     Valentina Rojas PA-C On 12/7/2020.    Specialty: Neurosurgery  Why: For staple removal at 11:20am  Contact information:  [address] [facility]  [address]  [place] [id number] [phone]             Gabby Dean MD In 1 week.    Specialties: Internal Medicine, Wound Care  Contact information:  [address] [place] [address]  [address]  [place] [id number]  [phone]                     I have seen and examined this patient face to face today. My clinical findings that support the need for the home health skilled services and home bound status are the following:  Medical restrictions requiring assistance of another human to leave home due to  IV infusion Needs.    Allergies:  Review of patient's allergies indicates:   Allergen Reactions    Klonopin [clonazepam] Anxiety and Other (See Comments)     Becomes agitated    Valium [diazepam] Other (See Comments)     Becomes agitated    Xanax [alprazolam] Anxiety and Other (See  Comments)     Becomes agitated       Diet: 2 gram sodium diet    Activities: activity as tolerated    Nursing:   SN to complete comprehensive assessment including routine vital signs. Instruct on disease process and s/s of complications to report to MD. Review/verify medication list sent home with the patient at time of discharge  and instruct patient/caregiver as needed. Frequency may be adjusted depending on start of care date.    Notify MD if SBP > 160 or < 90; DBP > 90 or < 50; HR > 120 or < 50; Temp > 101; Other:      CONSULTS:    Aide to provide assistance with personal care, ADLs, and vital signs.    MISCELLANEOUS CARE:  Routine Picc line care. Pull picc line after last Abx's on 12/18/20.     Fax weekly labs (Monday) to ID clinic- Attention Dr. Chu  CBC, BMP, ESR, CRP  [phone]     Medications: Review discharge medications with patient and family and provide education.      Current Discharge Medication List      CONTINUE these medications which have NOT CHANGED    Details   HYDROmorphone (DILAUDID) 8 MG tablet Take 8 mg by mouth every 6 (six) hours as needed for Pain.    Comments: Quantity prescribed more than 7 day supply? Press F2 and select one:[id number]        methocarbamoL (ROBAXIN) 750 MG Tab Take 750 mg by mouth 4 (four) times daily.      butalbital-acetaminophen-caffeine -40 mg (FIORICET, ESGIC) -40 mg per tablet TAKE ONE TABLET BY MOUTH EVERY 4 HOURS AS NEEDED FOR HEADACHE  Qty: 60 tablet, Refills: 5    Comments: This prescription was filled on 6/23/2020. Any refills authorized will be placed on file.      DULoxetine (CYMBALTA) 60 MG capsule TAKE 1 CAPSULE BY MOUTH EVERY EVENING  Qty: 90 capsule, Refills: 0    Comments: This prescription was filled on 11/5/2020. Any refills authorized will be placed on file.      furosemide (LASIX) 40 MG tablet TAKE ONE TABLET BY MOUTH ONCE DAILY FOR FLUID  Qty: 90 tablet, Refills: 1    Comments: This prescription was filled on 7/16/2020. Any refills  authorized will be placed on file.  Associated Diagnoses: Pain and swelling of lower leg, unspecified laterality      gabapentin (NEURONTIN) 400 MG capsule 2 (two) times daily. Takes 1 400mg pill every AM, Takes 2 (400mg) at night      meloxicam (MOBIC) 15 MG tablet TAKE ONE TABLET BY MOUTH ONCE DAILY AS NEEDED FOR PAIN  Qty: 90 tablet, Refills: 0    Comments: This prescription was filled on 11/5/2020. Any refills authorized will be placed on file.  Associated Diagnoses: Facet arthropathy, lumbar      ondansetron (ZOFRAN) 4 MG tablet Take 1 tablet (4 mg total) by mouth every 6 (six) hours as needed.  Qty: 20 tablet, Refills: 0    Associated Diagnoses: Nausea      potassium chloride (KLOR-CON) 10 MEQ TbSR TAKE ONE TABLET BY MOUTH ONCE DAILY AS NEEDED WITH FLUID PILL  Qty: 30 tablet, Refills: 0    Associated Diagnoses: Leg swelling      tiZANidine (ZANAFLEX) 2 MG tablet TAKE ONE TABLET BY MOUTH THREE TIMES DAILY FOR MUSCLE SPASMS.  Qty: 90 tablet, Refills: 3    Associated Diagnoses: Chronic pain syndrome; Postlaminectomy syndrome of lumbar region      torsemide (DEMADEX) 10 MG Tab Take 1 tablet (10 mg total) by mouth 2 (two) times daily as needed.  Qty: 30 tablet, Refills: 0    Associated Diagnoses: Leg swelling         STOP taking these medications       tamsulosin (FLOMAX) 0.4 mg Cap Comments:   Reason for Stopping:             Cefazolin 1gm IV q8 hours. Continue through 12/18.       I certify that this patient is confined to his home and needs intermittent skilled nursing care.

## 2020-11-20 NOTE — SUBJECTIVE & OBJECTIVE
Interval History: Wants to go home.       Review of Systems   Constitutional: Negative for activity change, appetite change, chills, diaphoresis, fatigue, fever and unexpected weight change.   HENT: Negative for congestion, dental problem and drooling.    Eyes: Negative for pain, discharge and itching.   Respiratory: Negative for apnea, cough, choking, chest tightness and shortness of breath.    Cardiovascular: Negative for chest pain.   Gastrointestinal: Positive for abdominal pain. Negative for abdominal distention, constipation, diarrhea, nausea and vomiting.   Genitourinary: Negative for difficulty urinating, dysuria and enuresis.   Musculoskeletal: Negative for arthralgias and back pain.   Skin: Positive for color change and rash.   Psychiatric/Behavioral: Negative for agitation and behavioral problems.     Objective:     Vital Signs (Most Recent):  Temp: 98.7 °F (37.1 °C) (11/20/20 0404)  Pulse: 85 (11/20/20 0404)  Resp: 18 (11/20/20 0631)  BP: (!) 170/92 (11/20/20 0404)  SpO2: 95 % (11/20/20 0404) Vital Signs (24h Range):  Temp:  [98.4 °F (36.9 °C)-99.5 °F (37.5 °C)] 98.7 °F (37.1 °C)  Pulse:  [81-87] 85  Resp:  [16-20] 18  SpO2:  [95 %-99 %] 95 %  BP: (147-170)/(65-99) 170/92     Weight: (!) 142.4 kg (313 lb 15 oz)  Body mass index is 43.79 kg/m².    Intake/Output Summary (Last 24 hours) at 11/20/2020 0707  Last data filed at 11/20/2020 0306  Gross per 24 hour   Intake 500 ml   Output --   Net 500 ml      Physical Exam  Vitals signs and nursing note reviewed.   Constitutional:       General: He is not in acute distress.     Appearance: Normal appearance. He is obese. He is not ill-appearing, toxic-appearing or diaphoretic.   HENT:      Head: Normocephalic and atraumatic.   Neck:      Musculoskeletal: Normal range of motion.   Cardiovascular:      Rate and Rhythm: Normal rate and regular rhythm.      Heart sounds: No murmur. No gallop.    Pulmonary:      Effort: Pulmonary effort is normal. No respiratory  distress.      Breath sounds: No wheezing or rales.   Abdominal:      General: Abdomen is flat. Bowel sounds are normal. There is no distension.      Palpations: There is no mass.      Tenderness: There is no abdominal tenderness. There is no guarding.      Hernia: No hernia is present.   Skin:     General: Skin is warm.      Comments: Cellulitis of abdominal wall RLQ. Bandaged.   Neurological:      Mental Status: He is alert and oriented to person, place, and time.   Psychiatric:         Mood and Affect: Mood normal.         Behavior: Behavior normal.         Thought Content: Thought content normal.         Significant Labs:   BMP:   Recent Labs   Lab 11/19/20  0521         K 3.7   CL 97   CO2 32*   BUN 11   CREATININE 0.8   CALCIUM 9.3   MG 2.4     CBC:   Recent Labs   Lab 11/19/20  0521   WBC 7.80   HGB 11.4*   HCT 36.2*   *       Significant Imaging:

## 2020-11-20 NOTE — ASSESSMENT & PLAN NOTE
50M with h/o chronic pain with intrathecal pain pump and prior L4-5 fusion admitted with erythema and puss from abdominal wound incision from recent pain pump revision. denies systemic signs of infection. BCx 11/16 NGTD. Growing MSSA from abdominal wound swab and surgical cultures. Currently on vanc. Id c/f abx recs.     Recommendations:   - de-escalate to ancef  - duration of abx 4-6 weeks from device removal  - please notify ID with any new growth on cultures    Infection: pain pump infection    Discharge antibiotics:   Cefazolin 6gm IV continuous infusion    End date of IV antibiotics: 12/16/20 ( may be extended to 12/30/20 TBD based on clinical response)    Weekly outpatient laboratory on Monday or Tuesday while on IV antibiotics.    CBC   CMP   ESR and CRP    Fax laboratory results to [facility] at [phone] with attn: Dr Chu    Outpatient Infectious Diseases clinic follow up will be arranged and found in patient calendar.    Prior to discharge, please ensure the patient's follow-up has been scheduled.  If there is still no follow-up scheduled in Infectious Diseases clinic, please send an EPIC message to Tabitha Porras in Infectious Diseases.

## 2020-11-20 NOTE — PROGRESS NOTES
[facility] Hospital Medicine  Progress Note    Patient Name: Jaime Lund  MRN: [id number]  Patient Class: IP- Inpatient   Admission Date: 11/16/2020  Length of Stay: 4 days  Attending Physician: Rich Martin MD  Primary Care Provider: Gabby Dean MD        Subjective:     Principal Problem:Cellulitis, abdominal wall        HPI:  Mr. Lund is a 49 yo M with chronic back pain. He has a intrathecal pain pump RLQ of abdomen. He had some surgery/adjustment for this about 2 weeks ago. He presents with redness and puss drainage since Friday.  He was started on Vanc and Zosyn. Neurosurgery was consulted. Patient denies shaking chills. Subjective fever on 11/16.  Patient resting comfortably. He is non-ill appearing and has no other complaints.     Overview/Hospital Course:  Mr. Lund is a 49 yo M with chronic back pain. He has a intrathecal pain pump RLQ of abdomen. He had some surgery/adjustment for this about 2 weeks ago. He presents with redness and puss drainage since Friday.  He was started on Vanc and Zosyn. Neurosurgery was consulted. Blood cultures were sent that were NG. Patient grew out Staph A in wound cultures- MSSA. ID was consulted on 11/19 for Abx recs for home. Patient taken to surgery on 11/18 for removal of pump. ID recommended 4 weeks of IV Cefazolin.      Interval History: Wants to go home.       Review of Systems   Constitutional: Negative for activity change, appetite change, chills, diaphoresis, fatigue, fever and unexpected weight change.   HENT: Negative for congestion, dental problem and drooling.    Eyes: Negative for pain, discharge and itching.   Respiratory: Negative for apnea, cough, choking, chest tightness and shortness of breath.    Cardiovascular: Negative for chest pain.   Gastrointestinal: Positive for abdominal pain. Negative for abdominal distention, constipation, diarrhea, nausea and vomiting.   Genitourinary: Negative for difficulty urinating,  dysuria and enuresis.   Musculoskeletal: Negative for arthralgias and back pain.   Skin: Positive for color change and rash.   Psychiatric/Behavioral: Negative for agitation and behavioral problems.     Objective:     Vital Signs (Most Recent):  Temp: 98.7 °F (37.1 °C) (11/20/20 0404)  Pulse: 85 (11/20/20 0404)  Resp: 18 (11/20/20 0631)  BP: (!) 170/92 (11/20/20 0404)  SpO2: 95 % (11/20/20 0404) Vital Signs (24h Range):  Temp:  [98.4 °F (36.9 °C)-99.5 °F (37.5 °C)] 98.7 °F (37.1 °C)  Pulse:  [81-87] 85  Resp:  [16-20] 18  SpO2:  [95 %-99 %] 95 %  BP: (147-170)/(65-99) 170/92     Weight: (!) 142.4 kg (313 lb 15 oz)  Body mass index is 43.79 kg/m².    Intake/Output Summary (Last 24 hours) at 11/20/2020 0707  Last data filed at 11/20/2020 0306  Gross per 24 hour   Intake 500 ml   Output --   Net 500 ml      Physical Exam  Vitals signs and nursing note reviewed.   Constitutional:       General: He is not in acute distress.     Appearance: Normal appearance. He is obese. He is not ill-appearing, toxic-appearing or diaphoretic.   HENT:      Head: Normocephalic and atraumatic.   Neck:      Musculoskeletal: Normal range of motion.   Cardiovascular:      Rate and Rhythm: Normal rate and regular rhythm.      Heart sounds: No murmur. No gallop.    Pulmonary:      Effort: Pulmonary effort is normal. No respiratory distress.      Breath sounds: No wheezing or rales.   Abdominal:      General: Abdomen is flat. Bowel sounds are normal. There is no distension.      Palpations: There is no mass.      Tenderness: There is no abdominal tenderness. There is no guarding.      Hernia: No hernia is present.   Skin:     General: Skin is warm.      Comments: Cellulitis of abdominal wall RLQ. Bandaged.   Neurological:      Mental Status: He is alert and oriented to person, place, and time.   Psychiatric:         Mood and Affect: Mood normal.         Behavior: Behavior normal.         Thought Content: Thought content normal.          Significant Labs:   BMP:   Recent Labs   Lab 11/19/20  0521         K 3.7   CL 97   CO2 32*   BUN 11   CREATININE 0.8   CALCIUM 9.3   MG 2.4     CBC:   Recent Labs   Lab 11/19/20  0521   WBC 7.80   HGB 11.4*   HCT 36.2*   *       Significant Imaging:       Assessment/Plan:      * Cellulitis, abdominal wall  With likely abscess. Ultrasound ordered. Sepsis not present. Patient non-ill appearing. Blood cultures are NG. Vanc and Zosyn for now. Neuro surgery consulted.     Staph A growing in wound cultures. Blood cx NG. Likely will be MRSA. DC zosyn. Continue Vanc.    MSSA   ID consult for Abx recs for discharge. Follow NS recs      MSSA from 11/16.   11/18 Staph A. (S) pending. Called lab.          Surgical wound infection  Intrathecal pump site infection- Abx. Neuro surgery     As above       Anemia of chronic disease  No acute issues.       Chronic pain  As noted       Peripheral artery disease  No acute issues       BMI 40.0-44.9, adult  Body mass index is 44.03 kg/m².  Weight loss as out patient       Seizure  Not on seizure meds. Will discuss      Essential hypertension  No acute issues       Diarrhea- will send off for C-diff- doubt.  Will follow even if patient is discharged and call in Rx if needed. Patient does not want to wait for results.     VTE Risk Mitigation (From admission, onward)         Ordered     enoxaparin injection 40 mg  Every 24 hours      11/17/20 1041     Place GUSTAVO hose  Until discontinued      11/17/20 0006     IP VTE HIGH RISK PATIENT  Once      11/17/20 0006     Place sequential compression device  Until discontinued      11/17/20 0006                Discharge Planning   MIRZA:      Code Status: Full Code   Is the patient medically ready for discharge?:     Reason for patient still in hospital (select all that apply): Patient unstable  Discharge Plan A: Home with family   Discharge Delays: None known at this time      Awaiting final (S) of staph A from surgery on  11/18.   Likely home later today           Rich Pascual MD  Department of Hospital Medicine   [facility]

## 2020-11-20 NOTE — PROGRESS NOTES
TN taught Symptoms and Problems for Cellulitis home care with pt and with teach back:  1. Draining pus, 2.Area gets redder. TN placed education sheet in [employer]..     Help at home will be from spouse, Claudia, assisting in pt's recovery.    Patient's preference is for [facility].     TN taught patient about things HE is responsible for when discharged TO HELP WITH HIS RECOVERY:  Particularly on how to Manage HIS Care At Home:  1. Getting his prescriptions filled.  2. Taking his medications as directed. DO NOT MISS ANY DOSES!  3. Going to his follow-up doctor appointments.   .  Pearl Nobles RN, BSN, STN CCM @TDD

## 2020-11-20 NOTE — CONSULTS
[facility]  Infectious Disease  Consult Note    Patient Name: Jaime Lund  MRN: [id number]  Admission Date: 11/16/2020  Hospital Length of Stay: 3 days  Attending Physician: Rich Martin MD  Primary Care Provider: Gabby Dean MD     Isolation Status: No active isolations        Inpatient consult to Infectious Diseases  Consult performed by: Areli Chu MD  Consult ordered by: Rich Martin MD      consult completed. See note from 11-19

## 2020-11-21 ENCOUNTER — NURSE TRIAGE (OUTPATIENT)
Dept: ADMINISTRATIVE | Facility: CLINIC | Age: 50
End: 2020-11-21

## 2020-11-21 LAB
BACTERIA BLD CULT: NORMAL
BACTERIA BLD CULT: NORMAL

## 2020-11-21 NOTE — TELEPHONE ENCOUNTER
"    Reason for Disposition   [1] Request for URGENT new prescription or refill of "essential" medication (i.e., likelihood of harm to patient if not taken) AND [2] triager unable to fill per unit policy    Protocols used: MEDICATION QUESTION CALL-A-    Wife called to speak with Dr. Martin about discharge meds since intrathecal pain pump removed. He has pain meds but he may be going through withdrawal symptoms because he is pacing the floor and saying his skin is crawling. She states she expected him to get a Rx for ativan. I told her I would have to page the hospitalist who d/c him.     I called [facility] and had her to page Dr. Martin.     Dr. Martin called back and said if there is concern he is having withdrawal symptoms he needs to go back to the ED.     Called Mrs. Lund back to give Dr. Martin disposition and she verbalized understanding.   "

## 2020-11-22 LAB — BACTERIA SPEC ANAEROBE CULT: NORMAL

## 2020-11-22 PROCEDURE — G0180 MD CERTIFICATION HHA PATIENT: HCPCS | Mod: ,,, | Performed by: INTERNAL MEDICINE

## 2020-11-22 PROCEDURE — G0180 PR HOME HEALTH MD CERTIFICATION: ICD-10-PCS | Mod: ,,, | Performed by: INTERNAL MEDICINE

## 2020-11-23 ENCOUNTER — LAB VISIT (OUTPATIENT)
Dept: LAB | Facility: HOSPITAL | Age: 50
End: 2020-11-23
Attending: INTERNAL MEDICINE
Payer: COMMERCIAL

## 2020-11-23 ENCOUNTER — PATIENT OUTREACH (OUTPATIENT)
Dept: ADMINISTRATIVE | Facility: CLINIC | Age: 50
End: 2020-11-23

## 2020-11-23 DIAGNOSIS — L03.311 CELLULITIS OF ABDOMINAL WALL: Primary | ICD-10-CM

## 2020-11-23 LAB
ALBUMIN SERPL BCP-MCNC: 3.1 G/DL (ref 3.5–5.2)
ALP SERPL-CCNC: 88 U/L (ref 55–135)
ALT SERPL W/O P-5'-P-CCNC: 13 U/L (ref 10–44)
ANION GAP SERPL CALC-SCNC: 8 MMOL/L (ref 8–16)
AST SERPL-CCNC: 14 U/L (ref 10–40)
BASOPHILS # BLD AUTO: 0.05 K/UL (ref 0–0.2)
BASOPHILS NFR BLD: 0.5 % (ref 0–1.9)
BILIRUB SERPL-MCNC: 0.2 MG/DL (ref 0.1–1)
BUN SERPL-MCNC: 9 MG/DL (ref 6–20)
CALCIUM SERPL-MCNC: 8.7 MG/DL (ref 8.7–10.5)
CHLORIDE SERPL-SCNC: 106 MMOL/L (ref 95–110)
CO2 SERPL-SCNC: 26 MMOL/L (ref 23–29)
CREAT SERPL-MCNC: 0.7 MG/DL (ref 0.5–1.4)
CRP SERPL-MCNC: 8.3 MG/L (ref 0–8.2)
DIFFERENTIAL METHOD: ABNORMAL
EOSINOPHIL # BLD AUTO: 0.1 K/UL (ref 0–0.5)
EOSINOPHIL NFR BLD: 0.7 % (ref 0–8)
ERYTHROCYTE [DISTWIDTH] IN BLOOD BY AUTOMATED COUNT: 13.5 % (ref 11.5–14.5)
ERYTHROCYTE [SEDIMENTATION RATE] IN BLOOD BY WESTERGREN METHOD: 53 MM/HR (ref 0–10)
EST. GFR  (AFRICAN AMERICAN): >60 ML/MIN/1.73 M^2
EST. GFR  (NON AFRICAN AMERICAN): >60 ML/MIN/1.73 M^2
GLUCOSE SERPL-MCNC: 88 MG/DL (ref 70–110)
HCT VFR BLD AUTO: 36.8 % (ref 40–54)
HGB BLD-MCNC: 11.8 G/DL (ref 14–18)
IMM GRANULOCYTES # BLD AUTO: 0.07 K/UL (ref 0–0.04)
IMM GRANULOCYTES NFR BLD AUTO: 0.7 % (ref 0–0.5)
LYMPHOCYTES # BLD AUTO: 1.7 K/UL (ref 1–4.8)
LYMPHOCYTES NFR BLD: 18 % (ref 18–48)
MCH RBC QN AUTO: 27.7 PG (ref 27–31)
MCHC RBC AUTO-ENTMCNC: 32.1 G/DL (ref 32–36)
MCV RBC AUTO: 86 FL (ref 82–98)
MONOCYTES # BLD AUTO: 0.5 K/UL (ref 0.3–1)
MONOCYTES NFR BLD: 5.4 % (ref 4–15)
NEUTROPHILS # BLD AUTO: 7.2 K/UL (ref 1.8–7.7)
NEUTROPHILS NFR BLD: 74.7 % (ref 38–73)
NRBC BLD-RTO: 0 /100 WBC
PLATELET # BLD AUTO: 443 K/UL (ref 150–350)
PMV BLD AUTO: 9.6 FL (ref 9.2–12.9)
POTASSIUM SERPL-SCNC: 3.7 MMOL/L (ref 3.5–5.1)
PROT SERPL-MCNC: 7 G/DL (ref 6–8.4)
RBC # BLD AUTO: 4.26 M/UL (ref 4.6–6.2)
SODIUM SERPL-SCNC: 140 MMOL/L (ref 136–145)
WBC # BLD AUTO: 9.58 K/UL (ref 3.9–12.7)

## 2020-11-23 PROCEDURE — 80053 COMPREHEN METABOLIC PANEL: CPT

## 2020-11-23 PROCEDURE — 86140 C-REACTIVE PROTEIN: CPT

## 2020-11-23 PROCEDURE — 85025 COMPLETE CBC W/AUTO DIFF WBC: CPT

## 2020-11-23 PROCEDURE — 85652 RBC SED RATE AUTOMATED: CPT

## 2020-11-23 NOTE — PATIENT INSTRUCTIONS
Sepsis     To treat sepsis, antibiotics and fluids may by given through an intravenous (IV) line.     Sepsis happens when your body responds with widespread inflammation to a bad infection or bacteremia--the presence of bacteria in your bloodstream. Sepsis can be deadly. Blood pressure may drop and the lungs and kidneys may start to fail. Emergency care for sepsis is crucial.  Risk factors  Those most at risk for sepsis are:  · Infants or older adults  · People who have an illness that weakens their immune system, such as cancer, AIDS, or diabetes  · People being treated with chemotherapy medicines or radiation, which weakens the immune system  · People who have had a transplant  · People with a very severe infection such as pneumonia, meningitis, or a urinary tract infection  When to go to the emergency department (ED)  Sepsis is an emergency. Go to the nearest ED if you have a fever with any of these symptoms:  · Chills and shaking  · Rapid heartbeat and breathing  · Trouble breathing  · Severe nausea or uncontrolled vomiting  · Confusion, disorientation, drowsiness, or dizziness  · Decreased urination  · Severe pain, including in the back or joints   What to expect in the ED  · Blood and urine tests are done to look for bacteria. They also check for organ failure.  · Blood, urine, or sputum cultures may be taken. The samples are sent to a lab. They are placed in a special container. Any bacteria should grow in 24 hours.  · X-rays or other imaging tests may be done.  A person with sepsis will be admitted to the hospital and treated with antibiotics. Treatment may also include oxygen and intravenous fluids.  Date Last Reviewed: 10/1/2016  © 1868-6244 [employer]. [address] [place] [address], [place] [address]. All rights reserved. This information is not intended as a substitute for professional medical care. Always follow your healthcare professional's instructions.

## 2020-11-25 ENCOUNTER — HOSPITAL ENCOUNTER (OUTPATIENT)
Facility: HOSPITAL | Age: 50
Discharge: HOME HEALTH | End: 2020-11-28
Attending: EMERGENCY MEDICINE | Admitting: INTERNAL MEDICINE
Payer: COMMERCIAL

## 2020-11-25 DIAGNOSIS — S30.1XXD ABDOMINAL WALL SEROMA, SUBSEQUENT ENCOUNTER: ICD-10-CM

## 2020-11-25 DIAGNOSIS — I10 ESSENTIAL HYPERTENSION: ICD-10-CM

## 2020-11-25 DIAGNOSIS — L03.90 CELLULITIS, UNSPECIFIED CELLULITIS SITE: ICD-10-CM

## 2020-11-25 DIAGNOSIS — T81.42XD INFECTION OF DEEP INCISIONAL SURGICAL SITE AFTER PROCEDURE, SUBSEQUENT ENCOUNTER: ICD-10-CM

## 2020-11-25 DIAGNOSIS — G89.29 OTHER CHRONIC PAIN: ICD-10-CM

## 2020-11-25 DIAGNOSIS — T81.42XA INFECTION OF DEEP INCISIONAL SURGICAL SITE AFTER PROCEDURE, INITIAL ENCOUNTER: Primary | ICD-10-CM

## 2020-11-25 DIAGNOSIS — L02.211 ABDOMINAL WALL ABSCESS: ICD-10-CM

## 2020-11-25 DIAGNOSIS — S30.1XXA ABDOMINAL WALL SEROMA, INITIAL ENCOUNTER: ICD-10-CM

## 2020-11-25 LAB
ALBUMIN SERPL BCP-MCNC: 3.6 G/DL (ref 3.5–5.2)
ALP SERPL-CCNC: 98 U/L (ref 55–135)
ALT SERPL W/O P-5'-P-CCNC: 10 U/L (ref 10–44)
ANION GAP SERPL CALC-SCNC: 13 MMOL/L (ref 8–16)
AST SERPL-CCNC: 20 U/L (ref 10–40)
BASOPHILS # BLD AUTO: 0.04 K/UL (ref 0–0.2)
BASOPHILS NFR BLD: 0.3 % (ref 0–1.9)
BILIRUB SERPL-MCNC: 0.3 MG/DL (ref 0.1–1)
BILIRUB UR QL STRIP: NEGATIVE
BUN SERPL-MCNC: 10 MG/DL (ref 6–20)
CALCIUM SERPL-MCNC: 9.3 MG/DL (ref 8.7–10.5)
CHLORIDE SERPL-SCNC: 104 MMOL/L (ref 95–110)
CLARITY UR REFRACT.AUTO: CLEAR
CO2 SERPL-SCNC: 23 MMOL/L (ref 23–29)
COLOR UR AUTO: YELLOW
CREAT SERPL-MCNC: 0.8 MG/DL (ref 0.5–1.4)
CRP SERPL-MCNC: 5.6 MG/L (ref 0–8.2)
CTP QC/QA: YES
DIFFERENTIAL METHOD: ABNORMAL
EOSINOPHIL # BLD AUTO: 0 K/UL (ref 0–0.5)
EOSINOPHIL NFR BLD: 0.3 % (ref 0–8)
ERYTHROCYTE [DISTWIDTH] IN BLOOD BY AUTOMATED COUNT: 13.8 % (ref 11.5–14.5)
ERYTHROCYTE [SEDIMENTATION RATE] IN BLOOD BY WESTERGREN METHOD: 67 MM/HR (ref 0–23)
EST. GFR  (AFRICAN AMERICAN): >60 ML/MIN/1.73 M^2
EST. GFR  (NON AFRICAN AMERICAN): >60 ML/MIN/1.73 M^2
GLUCOSE SERPL-MCNC: 80 MG/DL (ref 70–110)
GLUCOSE UR QL STRIP: NEGATIVE
HCT VFR BLD AUTO: 39.6 % (ref 40–54)
HGB BLD-MCNC: 12.8 G/DL (ref 14–18)
HGB UR QL STRIP: NEGATIVE
IMM GRANULOCYTES # BLD AUTO: 0.06 K/UL (ref 0–0.04)
IMM GRANULOCYTES NFR BLD AUTO: 0.5 % (ref 0–0.5)
INR PPP: 1 (ref 0.8–1.2)
KETONES UR QL STRIP: ABNORMAL
LACTATE SERPL-SCNC: 0.7 MMOL/L (ref 0.5–2.2)
LEUKOCYTE ESTERASE UR QL STRIP: NEGATIVE
LYMPHOCYTES # BLD AUTO: 2 K/UL (ref 1–4.8)
LYMPHOCYTES NFR BLD: 16.3 % (ref 18–48)
MAGNESIUM SERPL-MCNC: 2.3 MG/DL (ref 1.6–2.6)
MCH RBC QN AUTO: 27.7 PG (ref 27–31)
MCHC RBC AUTO-ENTMCNC: 32.3 G/DL (ref 32–36)
MCV RBC AUTO: 86 FL (ref 82–98)
MONOCYTES # BLD AUTO: 0.5 K/UL (ref 0.3–1)
MONOCYTES NFR BLD: 4 % (ref 4–15)
NEUTROPHILS # BLD AUTO: 9.7 K/UL (ref 1.8–7.7)
NEUTROPHILS NFR BLD: 78.6 % (ref 38–73)
NITRITE UR QL STRIP: NEGATIVE
NRBC BLD-RTO: 0 /100 WBC
PH UR STRIP: 6 [PH] (ref 5–8)
PLATELET # BLD AUTO: 439 K/UL (ref 150–350)
PMV BLD AUTO: 9.8 FL (ref 9.2–12.9)
POTASSIUM SERPL-SCNC: 3.9 MMOL/L (ref 3.5–5.1)
PROCALCITONIN SERPL IA-MCNC: 0.09 NG/ML
PROT SERPL-MCNC: 8 G/DL (ref 6–8.4)
PROT UR QL STRIP: NEGATIVE
PROTHROMBIN TIME: 11.4 SEC (ref 9–12.5)
RBC # BLD AUTO: 4.62 M/UL (ref 4.6–6.2)
SARS-COV-2 RDRP RESP QL NAA+PROBE: NEGATIVE
SODIUM SERPL-SCNC: 140 MMOL/L (ref 136–145)
SP GR UR STRIP: 1.02 (ref 1–1.03)
URN SPEC COLLECT METH UR: ABNORMAL
WBC # BLD AUTO: 12.36 K/UL (ref 3.9–12.7)

## 2020-11-25 PROCEDURE — 83735 ASSAY OF MAGNESIUM: CPT

## 2020-11-25 PROCEDURE — 85652 RBC SED RATE AUTOMATED: CPT

## 2020-11-25 PROCEDURE — 63600175 PHARM REV CODE 636 W HCPCS: Performed by: STUDENT IN AN ORGANIZED HEALTH CARE EDUCATION/TRAINING PROGRAM

## 2020-11-25 PROCEDURE — 63600175 PHARM REV CODE 636 W HCPCS: Performed by: PHYSICIAN ASSISTANT

## 2020-11-25 PROCEDURE — 83605 ASSAY OF LACTIC ACID: CPT

## 2020-11-25 PROCEDURE — U0002 COVID-19 LAB TEST NON-CDC: HCPCS | Performed by: EMERGENCY MEDICINE

## 2020-11-25 PROCEDURE — 99220 PR INITIAL OBSERVATION CARE,LEVL III: CPT | Mod: ,,, | Performed by: PHYSICIAN ASSISTANT

## 2020-11-25 PROCEDURE — 63600175 PHARM REV CODE 636 W HCPCS: Performed by: EMERGENCY MEDICINE

## 2020-11-25 PROCEDURE — 96376 TX/PRO/DX INJ SAME DRUG ADON: CPT

## 2020-11-25 PROCEDURE — 86140 C-REACTIVE PROTEIN: CPT

## 2020-11-25 PROCEDURE — G0378 HOSPITAL OBSERVATION PER HR: HCPCS

## 2020-11-25 PROCEDURE — 99024 POSTOP FOLLOW-UP VISIT: CPT | Mod: ,,, | Performed by: NEUROLOGICAL SURGERY

## 2020-11-25 PROCEDURE — 81003 URINALYSIS AUTO W/O SCOPE: CPT

## 2020-11-25 PROCEDURE — 85025 COMPLETE CBC W/AUTO DIFF WBC: CPT

## 2020-11-25 PROCEDURE — 87040 BLOOD CULTURE FOR BACTERIA: CPT | Mod: 59

## 2020-11-25 PROCEDURE — 80053 COMPREHEN METABOLIC PANEL: CPT

## 2020-11-25 PROCEDURE — 85610 PROTHROMBIN TIME: CPT

## 2020-11-25 PROCEDURE — 84145 PROCALCITONIN (PCT): CPT

## 2020-11-25 PROCEDURE — 99024 PR POST-OP FOLLOW-UP VISIT: ICD-10-PCS | Mod: ,,, | Performed by: NEUROLOGICAL SURGERY

## 2020-11-25 PROCEDURE — 99285 EMERGENCY DEPT VISIT HI MDM: CPT | Mod: 25

## 2020-11-25 PROCEDURE — 25000003 PHARM REV CODE 250: Performed by: EMERGENCY MEDICINE

## 2020-11-25 PROCEDURE — 96361 HYDRATE IV INFUSION ADD-ON: CPT

## 2020-11-25 PROCEDURE — 96374 THER/PROPH/DIAG INJ IV PUSH: CPT

## 2020-11-25 PROCEDURE — 25500020 PHARM REV CODE 255: Performed by: EMERGENCY MEDICINE

## 2020-11-25 PROCEDURE — 99220 PR INITIAL OBSERVATION CARE,LEVL III: ICD-10-PCS | Mod: ,,, | Performed by: PHYSICIAN ASSISTANT

## 2020-11-25 RX ORDER — HYDROMORPHONE HYDROCHLORIDE 2 MG/ML
1 INJECTION, SOLUTION INTRAMUSCULAR; INTRAVENOUS; SUBCUTANEOUS
Status: COMPLETED | OUTPATIENT
Start: 2020-11-25 | End: 2020-11-25

## 2020-11-25 RX ORDER — HYDROMORPHONE HYDROCHLORIDE 1 MG/ML
1 INJECTION, SOLUTION INTRAMUSCULAR; INTRAVENOUS; SUBCUTANEOUS EVERY 4 HOURS PRN
Status: DISCONTINUED | OUTPATIENT
Start: 2020-11-25 | End: 2020-11-26

## 2020-11-25 RX ORDER — SODIUM CHLORIDE 9 MG/ML
1000 INJECTION, SOLUTION INTRAVENOUS
Status: COMPLETED | OUTPATIENT
Start: 2020-11-25 | End: 2020-11-25

## 2020-11-25 RX ORDER — HYDROMORPHONE HYDROCHLORIDE 1 MG/ML
1 INJECTION, SOLUTION INTRAMUSCULAR; INTRAVENOUS; SUBCUTANEOUS
Status: COMPLETED | OUTPATIENT
Start: 2020-11-25 | End: 2020-11-25

## 2020-11-25 RX ORDER — CEFAZOLIN SODIUM 1 G/3ML
1 INJECTION, POWDER, FOR SOLUTION INTRAMUSCULAR; INTRAVENOUS
Status: ON HOLD | COMMUNITY
End: 2020-11-28 | Stop reason: HOSPADM

## 2020-11-25 RX ADMIN — HYDROMORPHONE HYDROCHLORIDE 1 MG: 1 INJECTION, SOLUTION INTRAMUSCULAR; INTRAVENOUS; SUBCUTANEOUS at 10:11

## 2020-11-25 RX ADMIN — HYDROMORPHONE HYDROCHLORIDE 1 MG: 1 INJECTION, SOLUTION INTRAMUSCULAR; INTRAVENOUS; SUBCUTANEOUS at 07:11

## 2020-11-25 RX ADMIN — HYDROMORPHONE HYDROCHLORIDE 1 MG: 2 INJECTION, SOLUTION INTRAMUSCULAR; INTRAVENOUS; SUBCUTANEOUS at 05:11

## 2020-11-25 RX ADMIN — IOHEXOL 100 ML: 350 INJECTION, SOLUTION INTRAVENOUS at 08:11

## 2020-11-25 RX ADMIN — SODIUM CHLORIDE 1000 ML: 0.9 INJECTION, SOLUTION INTRAVENOUS at 06:11

## 2020-11-25 NOTE — ED TRIAGE NOTES
Pt presents to the ED reporting a staph infection. Pt states that he had a surgery three weeks ago to remove a pain pump which caused a staph infection. Pt states that a week ago he underwent another surgery to clear the infection. Pt says that starting sunday he started running a fever on and off and wife reports that drainage has been coming out of his surgical incision cite on his abdomen. Pt has the surgical incisions, one on his stomach, back, and right side. Abdomen has a bandage on it. Abdomen is warm to touch and slightly red.

## 2020-11-25 NOTE — ED PROVIDER NOTES
Encounter Date: 11/25/2020    SCRIBE #1 NOTE: I, Tati Mueller, am scribing for, and in the presence of,  Brandon Catalan MD. I have scribed the following portions of the note - Other sections scribed: HPI, ROS.       History     Chief Complaint   Patient presents with    Cellulitis     Pt reports recent hospitalization for abdominal wall infection, discharged 5 days ago. Infection initially started getting better but then worsened again. Pain is 9/10     HPI:  This is a 50 year old male who presents to the ED with a chief complaint of cellulitis for four days. The patient states that he was hospitalized last week for an surgical site infection. The patient states that he was discharged on Friday. Per his wife, the patient began intermittently running a fever on Saturday. On Tuesday, the surgical site on his abdomen began producing drainage. He also reports an associated symptom of diarrhea. No modifying factors were noted.The wife reports that the patient had been on Vancomycin during his stay at the hospital, and he was placed on Cefazolin upon his discharge. He is allergic to Klonopin, Valium, and Xanax.    The history is provided by the patient and the spouse. No  was used.     Review of patient's allergies indicates:   Allergen Reactions    Klonopin [clonazepam] Anxiety and Other (See Comments)     Becomes agitated    Valium [diazepam] Other (See Comments)     Becomes agitated    Xanax [alprazolam] Anxiety and Other (See Comments)     Becomes agitated     Past Medical History:   Diagnosis Date    Back pain, chronic     Bulging discs     Chronic pain following surgery or procedure     Degenerative disc disease     Hypertension     Hypokalemia     Obese abdomen     PAD (peripheral artery disease)     Post laminectomy syndrome     Seizures     Severe sepsis     Short-term memory loss     Surgical site infection 11/17/2020    Rt abdomen pain pump site    Thyroid disease      Subclinical hyperthyroidism     Past Surgical History:   Procedure Laterality Date    CHOLECYSTECTOMY      GASTRIC BYPASS      SLEEVE    gastric sleeve  2011    HERNIA REPAIR      pain pump Right 04/03/2018    inserted at right abdomen    pain pump site washout Right 04/13/2018    REMOVAL OF IMPLANT N/A 11/18/2020    Procedure: REMOVAL, IMPLANT;  Surgeon: Raffy Headley DO;  Location: [facility] OR;  Service: Neurosurgery;  Laterality: N/A;  intrathecal pain pump, leads removed from mid back and generator from abdomen    SKIN SURGERY      EXCESS SKIN REMOVAL    SPINE SURGERY      lumbar fusion     Family History   Problem Relation Age of Onset    Heart disease Mother     Breast cancer Mother     Arthritis Mother     Hypertension Mother     Throat cancer Father     Hypertension Father      Social History     Tobacco Use    Smoking status: Never Smoker    Smokeless tobacco: Never Used   Substance Use Topics    Alcohol use: Yes     Comment: socially    Drug use: No     Review of Systems   Constitutional: Positive for fever.   HENT: Negative for sore throat.    Eyes: Negative for visual disturbance.   Respiratory: Negative for shortness of breath.    Cardiovascular: Negative for chest pain.   Gastrointestinal: Positive for diarrhea. Negative for abdominal pain.   Genitourinary: Negative for dysuria.   Musculoskeletal: Negative for back pain.   Skin: Positive for wound (surgical incision on left lower abdomen with discharge).   Neurological: Negative for headaches.       Physical Exam     Initial Vitals [11/25/20 1233]   BP Pulse Resp Temp SpO2   (!) 155/95 99 18 98.9 °F (37.2 °C) 100 %      MAP       --         Physical Exam    Nursing note and vitals reviewed.  Constitutional: He appears well-developed and well-nourished.   HENT:   Head: Atraumatic.   Eyes: EOM are normal. Pupils are equal, round, and reactive to light.   Neck: Normal range of motion. Neck supple. No JVD present.   Cardiovascular: Normal  rate, regular rhythm, normal heart sounds and intact distal pulses. Exam reveals no gallop and no friction rub.    No murmur heard.  Pulmonary/Chest: Breath sounds normal. No respiratory distress. He has no wheezes. He has no rhonchi. He has no rales. He exhibits no tenderness.   Abdominal: Soft. Bowel sounds are normal.   Abdominal tenderness surrounding incision site.  Staples in place.  Some erythema and induration especially to the inferior lateral incision.  There is some underlying induration/fullness consistent with potential underlying abscess.   Musculoskeletal: Normal range of motion.   Lymphadenopathy:     He has no cervical adenopathy.   Neurological: He is alert and oriented to person, place, and time. He has normal strength.   Skin: Skin is warm and dry.   Psychiatric: He has a normal mood and affect. Thought content normal.         ED Course   Procedures  Labs Reviewed   CBC W/ AUTO DIFFERENTIAL - Abnormal; Notable for the following components:       Result Value    Hemoglobin 12.8 (*)     Hematocrit 39.6 (*)     Platelets 439 (*)     Gran # (ANC) 9.7 (*)     Immature Grans (Abs) 0.06 (*)     Gran % 78.6 (*)     Lymph % 16.3 (*)     All other components within normal limits   CULTURE, BLOOD   CULTURE, BLOOD   COMPREHENSIVE METABOLIC PANEL   SARS-COV-2 RDRP GENE          Imaging Results          US Soft Tissue Abdomen (Final result)  Result time 11/25/20 15:31:22    Final result by Joao Gallagher MD (11/25/20 15:31:22)                 Impression:      1. Deep to the incision, there is a heterogeneous subcutaneous fluid collection with tract to the skin surface.  Lack of hyperemia within the surrounding soft tissues suggests this may reflect that of seroma or hematoma rather than abscess although infected collection is not excluded.  Correlation is advised.      Electronically signed by: Joao Gallagher MD  Date:    11/25/2020  Time:    15:31             Narrative:    EXAMINATION:  US SOFT TISSUE,  ABDOMEN    CLINICAL HISTORY:  evaluate for fluid collection;    TECHNIQUE:  Real-time sonography was performed of the patient's superficial soft tissue of the abdomen along incision line.    COMPARISON:  None    FINDINGS:  In the region of the incision, within the right lower quadrant superficial soft tissues, there is a complex fluid subcutaneous fluid collection measuring approximately 7.1 x 3.1 x 9.1 cm.  There is a tract from this collection to the skin surface.  No internal hypervascularity or soft tissue hypervascularity about the collection.                                            Scribe Attestation:   Scribe #1: I performed the above scribed service and the documentation accurately describes the services I performed. I attest to the accuracy of the note.     No fever.  Patient's white cell count is increased from prior with a increased left shift but still normal total white blood cell count.  Ultrasound shows 8 cm fluid collection.  Per patient's history of fever and purulence drainage I assume fluid collection that is chemo it is an abscess.  Spoke through a circulator through Dr. Headley. Transfer ED to ED to [facility] for his evaluation.         ED Course as of Dec 04 0947   Wed Nov 25, 2020   2012 Sed Rate(!): 67 [AM]   2012 Lactate, Francis: 0.7 [AM]   2012 Procalcitonin: 0.09 [AM]      ED Course User Index  [AM] Ruslan Kidd MD            Clinical Impression:     ICD-10-CM ICD-9-CM   1. Infection of deep incisional surgical site after procedure, initial encounter  T81.42XA 998.59   2. Abdominal wall abscess  L02.211 682.2                          ED Disposition Condition    Transfer to Another Facility Stable       I, Brandon Eagle, personally performed the services described in this documentation. All medical record entries made by the scribe were at my direction and in my presence. I have reviewed the chart and agree that the record reflects my personal performance and is accurate and complete.                         Brandon Catalan MD  12/04/20 0956

## 2020-11-26 PROBLEM — G89.4 CHRONIC PAIN SYNDROME: Chronic | Status: ACTIVE | Noted: 2017-03-28

## 2020-11-26 LAB
ALBUMIN SERPL BCP-MCNC: 3.3 G/DL (ref 3.5–5.2)
ALP SERPL-CCNC: 89 U/L (ref 55–135)
ALT SERPL W/O P-5'-P-CCNC: 9 U/L (ref 10–44)
ANION GAP SERPL CALC-SCNC: 11 MMOL/L (ref 8–16)
AST SERPL-CCNC: 15 U/L (ref 10–40)
BASOPHILS # BLD AUTO: 0.06 K/UL (ref 0–0.2)
BASOPHILS NFR BLD: 0.6 % (ref 0–1.9)
BILIRUB SERPL-MCNC: 0.3 MG/DL (ref 0.1–1)
BUN SERPL-MCNC: 10 MG/DL (ref 6–20)
CALCIUM SERPL-MCNC: 9 MG/DL (ref 8.7–10.5)
CHLORIDE SERPL-SCNC: 105 MMOL/L (ref 95–110)
CO2 SERPL-SCNC: 25 MMOL/L (ref 23–29)
CREAT SERPL-MCNC: 0.7 MG/DL (ref 0.5–1.4)
DIFFERENTIAL METHOD: ABNORMAL
EOSINOPHIL # BLD AUTO: 0.1 K/UL (ref 0–0.5)
EOSINOPHIL NFR BLD: 0.9 % (ref 0–8)
ERYTHROCYTE [DISTWIDTH] IN BLOOD BY AUTOMATED COUNT: 13.9 % (ref 11.5–14.5)
EST. GFR  (AFRICAN AMERICAN): >60 ML/MIN/1.73 M^2
EST. GFR  (NON AFRICAN AMERICAN): >60 ML/MIN/1.73 M^2
ESTIMATED AVG GLUCOSE: 97 MG/DL (ref 68–131)
GLUCOSE SERPL-MCNC: 91 MG/DL (ref 70–110)
HBA1C MFR BLD HPLC: 5 % (ref 4–5.6)
HCT VFR BLD AUTO: 38.7 % (ref 40–54)
HGB BLD-MCNC: 11.9 G/DL (ref 14–18)
IMM GRANULOCYTES # BLD AUTO: 0.04 K/UL (ref 0–0.04)
IMM GRANULOCYTES NFR BLD AUTO: 0.4 % (ref 0–0.5)
LYMPHOCYTES # BLD AUTO: 1.8 K/UL (ref 1–4.8)
LYMPHOCYTES NFR BLD: 19 % (ref 18–48)
MAGNESIUM SERPL-MCNC: 2.3 MG/DL (ref 1.6–2.6)
MCH RBC QN AUTO: 27.2 PG (ref 27–31)
MCHC RBC AUTO-ENTMCNC: 30.7 G/DL (ref 32–36)
MCV RBC AUTO: 88 FL (ref 82–98)
MONOCYTES # BLD AUTO: 0.5 K/UL (ref 0.3–1)
MONOCYTES NFR BLD: 5.1 % (ref 4–15)
NEUTROPHILS # BLD AUTO: 7 K/UL (ref 1.8–7.7)
NEUTROPHILS NFR BLD: 74 % (ref 38–73)
NRBC BLD-RTO: 0 /100 WBC
PHOSPHATE SERPL-MCNC: 3.7 MG/DL (ref 2.7–4.5)
PLATELET # BLD AUTO: 399 K/UL (ref 150–350)
PMV BLD AUTO: 9.5 FL (ref 9.2–12.9)
POTASSIUM SERPL-SCNC: 3.6 MMOL/L (ref 3.5–5.1)
PROT SERPL-MCNC: 7.2 G/DL (ref 6–8.4)
RBC # BLD AUTO: 4.38 M/UL (ref 4.6–6.2)
SODIUM SERPL-SCNC: 141 MMOL/L (ref 136–145)
WBC # BLD AUTO: 9.4 K/UL (ref 3.9–12.7)

## 2020-11-26 PROCEDURE — 96375 TX/PRO/DX INJ NEW DRUG ADDON: CPT

## 2020-11-26 PROCEDURE — 63600175 PHARM REV CODE 636 W HCPCS: Performed by: PHYSICIAN ASSISTANT

## 2020-11-26 PROCEDURE — 99214 PR OFFICE/OUTPT VISIT, EST, LEVL IV, 30-39 MIN: ICD-10-PCS | Mod: ,,, | Performed by: STUDENT IN AN ORGANIZED HEALTH CARE EDUCATION/TRAINING PROGRAM

## 2020-11-26 PROCEDURE — 85025 COMPLETE CBC W/AUTO DIFF WBC: CPT

## 2020-11-26 PROCEDURE — G0378 HOSPITAL OBSERVATION PER HR: HCPCS

## 2020-11-26 PROCEDURE — 96376 TX/PRO/DX INJ SAME DRUG ADON: CPT

## 2020-11-26 PROCEDURE — 99232 SBSQ HOSP IP/OBS MODERATE 35: CPT | Mod: ,,, | Performed by: HOSPITALIST

## 2020-11-26 PROCEDURE — 63600175 PHARM REV CODE 636 W HCPCS: Performed by: HOSPITALIST

## 2020-11-26 PROCEDURE — 96372 THER/PROPH/DIAG INJ SC/IM: CPT

## 2020-11-26 PROCEDURE — 83735 ASSAY OF MAGNESIUM: CPT

## 2020-11-26 PROCEDURE — 83036 HEMOGLOBIN GLYCOSYLATED A1C: CPT

## 2020-11-26 PROCEDURE — 99232 PR SUBSEQUENT HOSPITAL CARE,LEVL II: ICD-10-PCS | Mod: ,,, | Performed by: HOSPITALIST

## 2020-11-26 PROCEDURE — 36415 COLL VENOUS BLD VENIPUNCTURE: CPT

## 2020-11-26 PROCEDURE — 25000003 PHARM REV CODE 250: Performed by: HOSPITALIST

## 2020-11-26 PROCEDURE — 84100 ASSAY OF PHOSPHORUS: CPT

## 2020-11-26 PROCEDURE — 63600175 PHARM REV CODE 636 W HCPCS: Performed by: STUDENT IN AN ORGANIZED HEALTH CARE EDUCATION/TRAINING PROGRAM

## 2020-11-26 PROCEDURE — 25000003 PHARM REV CODE 250: Performed by: PHYSICIAN ASSISTANT

## 2020-11-26 PROCEDURE — 99214 OFFICE O/P EST MOD 30 MIN: CPT | Mod: ,,, | Performed by: STUDENT IN AN ORGANIZED HEALTH CARE EDUCATION/TRAINING PROGRAM

## 2020-11-26 PROCEDURE — 80053 COMPREHEN METABOLIC PANEL: CPT

## 2020-11-26 RX ORDER — PROMETHAZINE HYDROCHLORIDE 25 MG/1
25 TABLET ORAL EVERY 6 HOURS PRN
Status: DISCONTINUED | OUTPATIENT
Start: 2020-11-26 | End: 2020-11-28 | Stop reason: HOSPADM

## 2020-11-26 RX ORDER — ACETAMINOPHEN 325 MG/1
650 TABLET ORAL EVERY 4 HOURS PRN
Status: DISCONTINUED | OUTPATIENT
Start: 2020-11-26 | End: 2020-11-28 | Stop reason: HOSPADM

## 2020-11-26 RX ORDER — GABAPENTIN 400 MG/1
400 CAPSULE ORAL 2 TIMES DAILY
Status: DISCONTINUED | OUTPATIENT
Start: 2020-11-26 | End: 2020-11-28 | Stop reason: HOSPADM

## 2020-11-26 RX ORDER — IBUPROFEN 200 MG
16 TABLET ORAL
Status: DISCONTINUED | OUTPATIENT
Start: 2020-11-26 | End: 2020-11-28 | Stop reason: HOSPADM

## 2020-11-26 RX ORDER — CEFAZOLIN SODIUM 1 G/3ML
2 INJECTION, POWDER, FOR SOLUTION INTRAMUSCULAR; INTRAVENOUS
Status: DISCONTINUED | OUTPATIENT
Start: 2020-11-26 | End: 2020-11-28 | Stop reason: HOSPADM

## 2020-11-26 RX ORDER — HYDRALAZINE HYDROCHLORIDE 50 MG/1
50 TABLET, FILM COATED ORAL EVERY 6 HOURS PRN
Status: DISCONTINUED | OUTPATIENT
Start: 2020-11-26 | End: 2020-11-28 | Stop reason: HOSPADM

## 2020-11-26 RX ORDER — TALC
6 POWDER (GRAM) TOPICAL NIGHTLY PRN
Status: DISCONTINUED | OUTPATIENT
Start: 2020-11-26 | End: 2020-11-26

## 2020-11-26 RX ORDER — HYDROMORPHONE HYDROCHLORIDE 1 MG/ML
0.5 INJECTION, SOLUTION INTRAMUSCULAR; INTRAVENOUS; SUBCUTANEOUS EVERY 4 HOURS PRN
Status: DISCONTINUED | OUTPATIENT
Start: 2020-11-26 | End: 2020-11-27

## 2020-11-26 RX ORDER — TIZANIDINE 2 MG/1
2 TABLET ORAL EVERY 8 HOURS PRN
Status: DISCONTINUED | OUTPATIENT
Start: 2020-11-26 | End: 2020-11-28 | Stop reason: HOSPADM

## 2020-11-26 RX ORDER — HYDROMORPHONE HYDROCHLORIDE 2 MG/ML
2 INJECTION, SOLUTION INTRAMUSCULAR; INTRAVENOUS; SUBCUTANEOUS EVERY 4 HOURS PRN
Status: DISCONTINUED | OUTPATIENT
Start: 2020-11-26 | End: 2020-11-26

## 2020-11-26 RX ORDER — DULOXETIN HYDROCHLORIDE 30 MG/1
60 CAPSULE, DELAYED RELEASE ORAL NIGHTLY
Status: DISCONTINUED | OUTPATIENT
Start: 2020-11-26 | End: 2020-11-28 | Stop reason: HOSPADM

## 2020-11-26 RX ORDER — IBUPROFEN 200 MG
24 TABLET ORAL
Status: DISCONTINUED | OUTPATIENT
Start: 2020-11-26 | End: 2020-11-28 | Stop reason: HOSPADM

## 2020-11-26 RX ORDER — HYDROMORPHONE HYDROCHLORIDE 1 MG/ML
1 INJECTION, SOLUTION INTRAMUSCULAR; INTRAVENOUS; SUBCUTANEOUS EVERY 4 HOURS PRN
Status: DISCONTINUED | OUTPATIENT
Start: 2020-11-26 | End: 2020-11-27

## 2020-11-26 RX ORDER — GLUCAGON 1 MG
1 KIT INJECTION
Status: DISCONTINUED | OUTPATIENT
Start: 2020-11-26 | End: 2020-11-28 | Stop reason: HOSPADM

## 2020-11-26 RX ORDER — HYDROMORPHONE HYDROCHLORIDE 1 MG/ML
0.5 INJECTION, SOLUTION INTRAMUSCULAR; INTRAVENOUS; SUBCUTANEOUS ONCE
Status: COMPLETED | OUTPATIENT
Start: 2020-11-27 | End: 2020-11-27

## 2020-11-26 RX ORDER — SODIUM CHLORIDE 0.9 % (FLUSH) 0.9 %
5 SYRINGE (ML) INJECTION
Status: DISCONTINUED | OUTPATIENT
Start: 2020-11-26 | End: 2020-11-28 | Stop reason: HOSPADM

## 2020-11-26 RX ORDER — HYDROXYZINE PAMOATE 25 MG/1
25 CAPSULE ORAL EVERY 8 HOURS PRN
Status: DISCONTINUED | OUTPATIENT
Start: 2020-11-26 | End: 2020-11-28 | Stop reason: HOSPADM

## 2020-11-26 RX ORDER — METHOCARBAMOL 750 MG/1
750 TABLET, FILM COATED ORAL 4 TIMES DAILY
Status: DISCONTINUED | OUTPATIENT
Start: 2020-11-26 | End: 2020-11-28 | Stop reason: HOSPADM

## 2020-11-26 RX ORDER — BUTALBITAL, ACETAMINOPHEN AND CAFFEINE 50; 325; 40 MG/1; MG/1; MG/1
1 TABLET ORAL EVERY 4 HOURS PRN
Status: DISCONTINUED | OUTPATIENT
Start: 2020-11-26 | End: 2020-11-28 | Stop reason: HOSPADM

## 2020-11-26 RX ORDER — HEPARIN SODIUM 5000 [USP'U]/ML
7500 INJECTION, SOLUTION INTRAVENOUS; SUBCUTANEOUS EVERY 8 HOURS
Status: DISCONTINUED | OUTPATIENT
Start: 2020-11-26 | End: 2020-11-26

## 2020-11-26 RX ORDER — HYDROMORPHONE HYDROCHLORIDE 1 MG/ML
1 INJECTION, SOLUTION INTRAMUSCULAR; INTRAVENOUS; SUBCUTANEOUS EVERY 4 HOURS PRN
Status: DISCONTINUED | OUTPATIENT
Start: 2020-11-26 | End: 2020-11-26

## 2020-11-26 RX ORDER — CEFAZOLIN SODIUM 1 G/3ML
1 INJECTION, POWDER, FOR SOLUTION INTRAMUSCULAR; INTRAVENOUS
Status: DISCONTINUED | OUTPATIENT
Start: 2020-11-26 | End: 2020-11-26

## 2020-11-26 RX ORDER — ONDANSETRON 8 MG/1
8 TABLET, ORALLY DISINTEGRATING ORAL EVERY 8 HOURS PRN
Status: DISCONTINUED | OUTPATIENT
Start: 2020-11-26 | End: 2020-11-28 | Stop reason: HOSPADM

## 2020-11-26 RX ORDER — HEPARIN SODIUM 5000 [USP'U]/ML
7500 INJECTION, SOLUTION INTRAVENOUS; SUBCUTANEOUS EVERY 8 HOURS
Status: COMPLETED | OUTPATIENT
Start: 2020-11-26 | End: 2020-11-26

## 2020-11-26 RX ORDER — IPRATROPIUM BROMIDE AND ALBUTEROL SULFATE 2.5; .5 MG/3ML; MG/3ML
3 SOLUTION RESPIRATORY (INHALATION) EVERY 4 HOURS PRN
Status: DISCONTINUED | OUTPATIENT
Start: 2020-11-26 | End: 2020-11-28 | Stop reason: HOSPADM

## 2020-11-26 RX ADMIN — METHOCARBAMOL 750 MG: 750 TABLET ORAL at 09:11

## 2020-11-26 RX ADMIN — HYDROMORPHONE HYDROCHLORIDE 2 MG: 2 INJECTION INTRAMUSCULAR; INTRAVENOUS; SUBCUTANEOUS at 11:11

## 2020-11-26 RX ADMIN — GABAPENTIN 400 MG: 400 CAPSULE ORAL at 09:11

## 2020-11-26 RX ADMIN — HEPARIN SODIUM 7500 UNITS: 5000 INJECTION INTRAVENOUS; SUBCUTANEOUS at 09:11

## 2020-11-26 RX ADMIN — HEPARIN SODIUM 7500 UNITS: 5000 INJECTION INTRAVENOUS; SUBCUTANEOUS at 06:11

## 2020-11-26 RX ADMIN — HYDROMORPHONE HYDROCHLORIDE 1 MG: 1 INJECTION, SOLUTION INTRAMUSCULAR; INTRAVENOUS; SUBCUTANEOUS at 08:11

## 2020-11-26 RX ADMIN — METHOCARBAMOL 750 MG: 750 TABLET ORAL at 08:11

## 2020-11-26 RX ADMIN — METHOCARBAMOL 750 MG: 750 TABLET ORAL at 04:11

## 2020-11-26 RX ADMIN — GABAPENTIN 400 MG: 400 CAPSULE ORAL at 08:11

## 2020-11-26 RX ADMIN — DULOXETINE 60 MG: 30 CAPSULE, DELAYED RELEASE ORAL at 12:11

## 2020-11-26 RX ADMIN — HYDROXYZINE PAMOATE 25 MG: 25 CAPSULE ORAL at 04:11

## 2020-11-26 RX ADMIN — HYDROMORPHONE HYDROCHLORIDE 0.5 MG: 1 INJECTION, SOLUTION INTRAMUSCULAR; INTRAVENOUS; SUBCUTANEOUS at 04:11

## 2020-11-26 RX ADMIN — HYDROMORPHONE HYDROCHLORIDE 1 MG: 1 INJECTION, SOLUTION INTRAMUSCULAR; INTRAVENOUS; SUBCUTANEOUS at 06:11

## 2020-11-26 RX ADMIN — TRAZODONE HYDROCHLORIDE 25 MG: 50 TABLET ORAL at 09:11

## 2020-11-26 RX ADMIN — HYDROMORPHONE HYDROCHLORIDE 2 MG: 2 INJECTION INTRAMUSCULAR; INTRAVENOUS; SUBCUTANEOUS at 04:11

## 2020-11-26 RX ADMIN — METHOCARBAMOL 750 MG: 750 TABLET ORAL at 12:11

## 2020-11-26 RX ADMIN — CEFAZOLIN 1 G: 1 INJECTION, POWDER, FOR SOLUTION INTRAMUSCULAR; INTRAVENOUS at 12:11

## 2020-11-26 RX ADMIN — HYDROMORPHONE HYDROCHLORIDE 1 MG: 1 INJECTION, SOLUTION INTRAMUSCULAR; INTRAVENOUS; SUBCUTANEOUS at 09:11

## 2020-11-26 RX ADMIN — DULOXETINE 60 MG: 30 CAPSULE, DELAYED RELEASE ORAL at 10:11

## 2020-11-26 RX ADMIN — TRAZODONE HYDROCHLORIDE 25 MG: 50 TABLET ORAL at 12:11

## 2020-11-26 RX ADMIN — HEPARIN SODIUM 7500 UNITS: 5000 INJECTION INTRAVENOUS; SUBCUTANEOUS at 02:11

## 2020-11-26 RX ADMIN — METHOCARBAMOL 750 MG: 750 TABLET ORAL at 01:11

## 2020-11-26 RX ADMIN — GABAPENTIN 400 MG: 400 CAPSULE ORAL at 12:11

## 2020-11-26 RX ADMIN — CEFAZOLIN 1 G: 1 INJECTION, POWDER, FOR SOLUTION INTRAMUSCULAR; INTRAVENOUS at 08:11

## 2020-11-26 RX ADMIN — CEFAZOLIN 2 G: 1 INJECTION, POWDER, FOR SOLUTION INTRAMUSCULAR; INTRAVENOUS at 04:11

## 2020-11-26 NOTE — CONSULTS
Consult received.  Will plan for anterior abdominal fluid collection drain placement tomorrow 11/27/20.      Please NPO at midnight and hold anticoagulation.    Kirk Barahona MD, MS  Radiology  PGY-3  Pager: [phone]  [phone]

## 2020-11-26 NOTE — NURSING
Pt arrived on unit via transport. VSS. Complains of pain and is asking for sleep aid. MD notified. Trazodone given. Pain rested throughout the rest of thee night. Call light in reach,

## 2020-11-26 NOTE — SUBJECTIVE & OBJECTIVE
Past Medical History:   Diagnosis Date    Back pain, chronic     Bulging discs     Chronic pain following surgery or procedure     Degenerative disc disease     Hypertension     Hypokalemia     Obese abdomen     PAD (peripheral artery disease)     Post laminectomy syndrome     Seizures     Severe sepsis     Short-term memory loss     Surgical site infection 11/17/2020    Rt abdomen pain pump site    Thyroid disease     Subclinical hyperthyroidism       Past Surgical History:   Procedure Laterality Date    CHOLECYSTECTOMY      GASTRIC BYPASS      SLEEVE    gastric sleeve  2011    HERNIA REPAIR      pain pump Right 04/03/2018    inserted at right abdomen    pain pump site washout Right 04/13/2018    REMOVAL OF IMPLANT N/A 11/18/2020    Procedure: REMOVAL, IMPLANT;  Surgeon: Raffy Headley DO;  Location: [facility] OR;  Service: Neurosurgery;  Laterality: N/A;  intrathecal pain pump, leads removed from mid back and generator from abdomen    SKIN SURGERY      EXCESS SKIN REMOVAL    SPINE SURGERY      lumbar fusion       Review of patient's allergies indicates:   Allergen Reactions    Klonopin [clonazepam] Anxiety and Other (See Comments)     Becomes agitated    Valium [diazepam] Other (See Comments)     Becomes agitated    Xanax [alprazolam] Anxiety and Other (See Comments)     Becomes agitated       Medications:  Medications Prior to Admission   Medication Sig    butalbital-acetaminophen-caffeine -40 mg (FIORICET, ESGIC) -40 mg per tablet TAKE ONE TABLET BY MOUTH EVERY 4 HOURS AS NEEDED FOR HEADACHE    ceFAZolin (ANCEF) 1 gram injection Inject 1 g into the muscle every 8 (eight) hours.    DULoxetine (CYMBALTA) 60 MG capsule TAKE 1 CAPSULE BY MOUTH EVERY EVENING    gabapentin (NEURONTIN) 400 MG capsule 2 (two) times daily. Takes 1 400mg pill every AM, Takes 2 (400mg) at night    HYDROmorphone (DILAUDID) 8 MG tablet Take 8 mg by mouth every 6 (six) hours as needed for Pain.     meloxicam (MOBIC) 15 MG tablet TAKE ONE TABLET BY MOUTH ONCE DAILY AS NEEDED FOR PAIN    methocarbamoL (ROBAXIN) 750 MG Tab Take 750 mg by mouth 4 (four) times daily.    ondansetron (ZOFRAN) 4 MG tablet Take 1 tablet (4 mg total) by mouth every 6 (six) hours as needed.    potassium chloride (KLOR-CON) 10 MEQ TbSR TAKE ONE TABLET BY MOUTH ONCE DAILY AS NEEDED WITH FLUID PILL    tiZANidine (ZANAFLEX) 2 MG tablet TAKE ONE TABLET BY MOUTH THREE TIMES DAILY FOR MUSCLE SPASMS.    furosemide (LASIX) 40 MG tablet TAKE ONE TABLET BY MOUTH ONCE DAILY FOR FLUID (Patient taking differently: Take 40 mg by mouth daily as needed. )    torsemide (DEMADEX) 10 MG Tab Take 1 tablet (10 mg total) by mouth 2 (two) times daily as needed.     Antibiotics (From admission, onward)    Start     Stop Route Frequency Ordered    11/26/20 0030  ceFAZolin injection 1 g      -- IV Every 8 hours (non-standard times) 11/26/20 0023        Antifungals (From admission, onward)    None        Antivirals (From admission, onward)    None           There is no immunization history for the selected administration types on file for this patient.    Family History     Problem Relation (Age of Onset)    Arthritis Mother    Breast cancer Mother    Heart disease Mother    Hypertension Mother, Father    Throat cancer Father        Social History     Socioeconomic History    Marital status:      Spouse name: Not on file    Number of children: Not on file    Years of education: Not on file    Highest education level: Not on file   Occupational History    Not on file   Social Needs    Financial resource strain: Not on file    Food insecurity     Worry: Not on file     Inability: Not on file    Transportation needs     Medical: Not on file     Non-medical: Not on file   Tobacco Use    Smoking status: Never Smoker    Smokeless tobacco: Never Used   Substance and Sexual Activity    Alcohol use: Yes     Comment: socially    Drug use: No     Sexual activity: Yes     Partners: Female   Lifestyle    Physical activity     Days per week: Not on file     Minutes per session: Not on file    Stress: To some extent   Relationships    Social connections     Talks on phone: Not on file     Gets together: Not on file     Attends Religion service: Not on file     Active member of club or organization: Not on file     Attends meetings of clubs or organizations: Not on file     Relationship status: Not on file   Other Topics Concern    Not on file   Social History Narrative    Not on file     Review of Systems   Constitutional: Negative for chills and fever.   Gastrointestinal: Positive for abdominal distention.        Loose stools   Skin: Positive for rash and wound.   All other systems reviewed and are negative.    Objective:     Vital Signs (Most Recent):  Temp: 98.5 °F (36.9 °C) (11/26/20 1157)  Pulse: 86 (11/26/20 1157)  Resp: 20 (11/26/20 1157)  BP: (!) 166/82 (11/26/20 1157)  SpO2: 95 % (11/26/20 1157) Vital Signs (24h Range):  Temp:  [96.4 °F (35.8 °C)-98.9 °F (37.2 °C)] 98.5 °F (36.9 °C)  Pulse:  [64-99] 86  Resp:  [16-21] 20  SpO2:  [93 %-100 %] 95 %  BP: (139-190)/(79-97) 166/82     Weight: (!) 143.3 kg (315 lb 14.7 oz)  Body mass index is 44.06 kg/m².    Estimated Creatinine Clearance: 181 mL/min (based on SCr of 0.7 mg/dL).    Physical Exam  Constitutional:       General: He is not in acute distress.     Appearance: He is well-developed. He is obese. He is not toxic-appearing.   HENT:      Head: Normocephalic and atraumatic.      Right Ear: External ear normal.      Left Ear: External ear normal.      Nose: Nose normal.      Mouth/Throat:      Mouth: Mucous membranes are moist.      Pharynx: No oropharyngeal exudate.   Eyes:      General:         Right eye: No discharge.         Left eye: No discharge.      Conjunctiva/sclera: Conjunctivae normal.      Pupils: Pupils are equal, round, and reactive to light.   Neck:      Thyroid: No thyromegaly.    Cardiovascular:      Rate and Rhythm: Normal rate and regular rhythm.      Heart sounds: Normal heart sounds.   Pulmonary:      Effort: Pulmonary effort is normal.      Breath sounds: Normal breath sounds.   Abdominal:      General: Bowel sounds are normal. There is distension.      Palpations: Abdomen is soft.      Tenderness: There is no abdominal tenderness.   Musculoskeletal: Normal range of motion.   Lymphadenopathy:      Cervical: No cervical adenopathy.   Skin:     General: Skin is warm and dry.      Comments: Abdominal incision site with erythema/induration; serosang discharge from staples  Lumbar incision site - no erythema or induration  R PICC - no erythema or induration     Neurological:      Mental Status: He is alert and oriented to person, place, and time. Mental status is at baseline.      Motor: No weakness.         Significant Labs:   CBC:   Recent Labs   Lab 11/25/20 1248 11/26/20 0417   WBC 12.36 9.40   HGB 12.8* 11.9*   HCT 39.6* 38.7*   * 399*     CMP:   Recent Labs   Lab 11/25/20 1248 11/26/20 0417    141   K 3.9 3.6    105   CO2 23 25   GLU 80 91   BUN 10 10   CREATININE 0.8 0.7   CALCIUM 9.3 9.0   PROT 8.0 7.2   ALBUMIN 3.6 3.3*   BILITOT 0.3 0.3   ALKPHOS 98 89   AST 20 15   ALT 10 9*   ANIONGAP 13 11   EGFRNONAA >60 >60.0     Microbiology Results (last 7 days)     Procedure Component Value Units Date/Time    Blood culture #2 [[id number]] Collected: 11/25/20 1347    Order Status: Completed Specimen: Blood from Peripheral, Antecubital, Left Updated: 11/25/20 2112     Blood Culture, Routine No Growth to date    Narrative:      Blood Culture #2    Blood culture #1 [[id number]] Collected: 11/25/20 1248    Order Status: Completed Specimen: Blood from Peripheral, Antecubital, Left Updated: 11/25/20 2112     Blood Culture, Routine No Growth to date    Narrative:      Blood Culture #1          Significant Imaging: I have reviewed all pertinent imaging results/findings within  the past 24 hours.

## 2020-11-26 NOTE — ED PROVIDER NOTES
Encounter Date: 11/25/2020       History     Chief Complaint   Patient presents with    Cellulitis     Pt reports recent hospitalization for abdominal wall infection, discharged 5 days ago. Infection initially started getting better but then worsened again. Pain is 9/10     Mr Lund is an obese 51 y/o M transfer from [facility] ED with chief complaint of acute fever with concomitant fatigue for 5 days duration on PMHx of chronic back pain 2/2 degenerative disc disease complicated by post laminenctomy syndrome currently managed with dilaudid 8mg PO Q8hrs, HTN, thyroid disease, and PAD. History provided at bedside via patient and spouse.    Patient recently admitted on 11/17/2020 for post op incision infection following revision of RLQ intrathecal pain pump 2/2 catheter obstruction at beginning of 11/2020. Blood cultures were negative with wound cultures growing MSSA. ID consulted on 11/19/2020 for home antibiotic recommendations. Patient was subsequently taken to surgery on 11/18/2020 by Neurosurgery for pump removal with ID recommending 4 weeks of IV Cefazolin. Patient was discharged on 11/20/2020.    Patient states he started to develop intermittent fevers on 11/21/2020 which increased throughout the week. On 11/24/2020 patient noticed serous drainage from his surgical site. Patient describes feeling fatigued beyond baseline throughout the week and unable to perform his daily activities.                 Review of patient's allergies indicates:   Allergen Reactions    Klonopin [clonazepam] Anxiety and Other (See Comments)     Becomes agitated    Valium [diazepam] Other (See Comments)     Becomes agitated    Xanax [alprazolam] Anxiety and Other (See Comments)     Becomes agitated     Past Medical History:   Diagnosis Date    Back pain, chronic     Bulging discs     Chronic pain following surgery or procedure     Degenerative disc disease     Hypertension     Hypokalemia     Obese abdomen     PAD  (peripheral artery disease)     Post laminectomy syndrome     Seizures     Severe sepsis     Short-term memory loss     Surgical site infection 11/17/2020    Rt abdomen pain pump site    Thyroid disease     Subclinical hyperthyroidism     Past Surgical History:   Procedure Laterality Date    CHOLECYSTECTOMY      GASTRIC BYPASS      SLEEVE    gastric sleeve  2011    HERNIA REPAIR      pain pump Right 04/03/2018    inserted at right abdomen    pain pump site washout Right 04/13/2018    REMOVAL OF IMPLANT N/A 11/18/2020    Procedure: REMOVAL, IMPLANT;  Surgeon: Raffy Headley DO;  Location: [facility] OR;  Service: Neurosurgery;  Laterality: N/A;  intrathecal pain pump, leads removed from mid back and generator from abdomen    SKIN SURGERY      EXCESS SKIN REMOVAL    SPINE SURGERY      lumbar fusion     Family History   Problem Relation Age of Onset    Heart disease Mother     Breast cancer Mother     Arthritis Mother     Hypertension Mother     Throat cancer Father     Hypertension Father      Social History     Tobacco Use    Smoking status: Never Smoker    Smokeless tobacco: Never Used   Substance Use Topics    Alcohol use: Yes     Comment: socially    Drug use: No     Review of Systems   Constitutional: Positive for diaphoresis, fatigue and fever.   HENT: Negative for sore throat.    Eyes: Negative for visual disturbance.   Respiratory: Negative for cough, chest tightness and shortness of breath.    Cardiovascular: Negative for chest pain and palpitations.   Gastrointestinal: Positive for diarrhea. Negative for abdominal pain, constipation, nausea and vomiting.   Genitourinary: Negative for dysuria.   Musculoskeletal: Positive for back pain.   Skin: Positive for color change. Negative for rash.   Neurological: Negative for dizziness, weakness and light-headedness.   Hematological: Does not bruise/bleed easily.   Psychiatric/Behavioral: Negative for agitation and confusion.       Physical Exam      Initial Vitals [11/25/20 1233]   BP Pulse Resp Temp SpO2   (!) 155/95 99 18 98.9 °F (37.2 °C) 100 %      MAP       --         Physical Exam    Nursing note and vitals reviewed.  Constitutional: He appears well-developed and well-nourished. He is not diaphoretic. He is Obese . He is cooperative.  Non-toxic appearance. He appears ill. No distress.   HENT:   Head: Normocephalic and atraumatic.   Right Ear: External ear normal.   Left Ear: External ear normal.   Nose: Nose normal.   Mouth/Throat: Oropharynx is clear and moist. No oropharyngeal exudate.   Eyes: Conjunctivae and EOM are normal. Right eye exhibits no discharge. Left eye exhibits no discharge. No scleral icterus.   Neck: Normal range of motion.   Cardiovascular: Normal rate, regular rhythm and normal heart sounds. Exam reveals no gallop and no friction rub.    No murmur heard.  Pulmonary/Chest: Breath sounds normal. No stridor. No respiratory distress. He has no wheezes. He has no rales.   Abdominal: Soft. Bowel sounds are normal. He exhibits no distension and no mass. There is no abdominal tenderness. There is no rebound and no guarding.       Erythema and serous fluid present near lateral surgical site.    Musculoskeletal: Normal range of motion. No tenderness or edema.   Neurological: He is alert and oriented to person, place, and time. GCS score is 15. GCS eye subscore is 4. GCS verbal subscore is 5. GCS motor subscore is 6.   Skin: Skin is warm and dry. Capillary refill takes less than 2 seconds. No rash noted. No erythema. No pallor.   Psychiatric: He has a normal mood and affect. Thought content normal.         ED Course   Procedures  Labs Reviewed   CBC W/ AUTO DIFFERENTIAL - Abnormal; Notable for the following components:       Result Value    Hemoglobin 12.8 (*)     Hematocrit 39.6 (*)     Platelets 439 (*)     Gran # (ANC) 9.7 (*)     Immature Grans (Abs) 0.06 (*)     Gran % 78.6 (*)     Lymph % 16.3 (*)     All other components within normal  limits   URINALYSIS, REFLEX TO URINE CULTURE - Abnormal; Notable for the following components:    Ketones, UA 3+ (*)     All other components within normal limits    Narrative:     Specimen Source->Urine   SEDIMENTATION RATE - Abnormal; Notable for the following components:    Sed Rate 67 (*)     All other components within normal limits   COMPREHENSIVE METABOLIC PANEL   LACTIC ACID, PLASMA   MAGNESIUM   PROTIME-INR   PROCALCITONIN   C-REACTIVE PROTEIN   SARS-COV-2 RDRP GENE          Imaging Results           CT Abdomen Pelvis W Wo Contrast (Final result)  Result time 11/25/20 20:54:00    Final result by Joao Gallagher MD (11/25/20 20:54:00)                 Impression:      This report was flagged in Epic as abnormal.    1. Recent surgical change of the anterior abdominal wall.  Deep to the incision, is a rim enhancing air and fluid collection that appears to communicate with the skin surface.  Air within the collection may reflect air introduced from communication with the skin surface, however infected collection is not excluded.  Correlation is advised.  2. Left nonobstructive nephrolithiasis.  3. Colonic diverticulosis without diverticulitis.  4. Additional findings above.      Electronically signed by: Joao Gallagher MD  Date:    11/25/2020  Time:    20:54             Narrative:    EXAMINATION:  CT ABDOMEN PELVIS W WO CONTRAST    CLINICAL HISTORY:  Abdominal pain, fever, post-op;    TECHNIQUE:  Low dose axial images, sagittal and coronal reformations were obtained from the lung bases to the pubic symphysis before and following the IV administration of 100 mL of Omnipaque 350.  No oral contrast was given.    COMPARISON:  Ultrasound 11/25/2020, CT 04/12/2020    FINDINGS:  Images of the lower thorax are remarkable for mild dependent atelectasis.    The spleen, pancreas and right adrenal gland are unremarkable.  There is a focus of low attenuation along the medial aspect of the right hepatic lobe, may reflect  that of fatty infiltration, nonspecific.  The gallbladder is surgically absent.  There is no biliary dilation.  The portal vein, splenic vein, SMV, celiac axis and SMA all are patent.  There is a small hiatal hernia.  There is a low attenuating left adrenal nodule measuring 3.1 cm, attenuation of which suggests adenoma although incompletely characterized.  No significant abdominal lymphadenopathy.    The kidneys enhance symmetrically without hydronephrosis or right nephrolithiasis.  There is left nonobstructive nephrolithiasis.  There is punctate focus of low attenuation within the interpolar region of the left kidney, too small for characterization.  The bilateral ureters are unremarkable without calculi seen.  The urinary bladder is decompressed, the prostate is not enlarged.    There are a few scattered colonic diverticula without inflammation to suggest diverticulitis.  The terminal ileum is unremarkable.  The appendix is unremarkable.  The small bowel is grossly unremarkable.  No focal organized pelvic fluid collection.  There is atherosclerotic calcification of the aorta and its branches.    Degenerative changes are noted of the spine.  Spinal fusion hardware noted.  Spinal fusion hardware spans L4-L5 noting disc spacing material at L4-L5.  No significant inguinal lymphadenopathy.    Surgical changes are noted of the anterior abdominal wall.  Deep to the staple line, is a rim enhancing air and fluid collection tracking to the skin surface.  The collection measures approximately 11.8 x 3.8 cm.  There is minimal surrounding induration.  Given communication with the skin surface, this may account for air within the collection, differential would include infected collection.  There is some skin thickening in the region of the incision.                               US Soft Tissue Abdomen (Final result)  Result time 11/25/20 15:31:22    Final result by Joao Gallagher MD (11/25/20 15:31:22)                  Impression:      1. Deep to the incision, there is a heterogeneous subcutaneous fluid collection with tract to the skin surface.  Lack of hyperemia within the surrounding soft tissues suggests this may reflect that of seroma or hematoma rather than abscess although infected collection is not excluded.  Correlation is advised.      Electronically signed by: Joao Gallagher MD  Date:    11/25/2020  Time:    15:31             Narrative:    EXAMINATION:  US SOFT TISSUE, ABDOMEN    CLINICAL HISTORY:  evaluate for fluid collection;    TECHNIQUE:  Real-time sonography was performed of the patient's superficial soft tissue of the abdomen along incision line.    COMPARISON:  None    FINDINGS:  In the region of the incision, within the right lower quadrant superficial soft tissues, there is a complex fluid subcutaneous fluid collection measuring approximately 7.1 x 3.1 x 9.1 cm.  There is a tract from this collection to the skin surface.  No internal hypervascularity or soft tissue hypervascularity about the collection.                                 Medical Decision Making:   History:   I obtained history from: EMS provider.       <> Summary of History: Transfer from outside hospital  Old Medical Records: I decided to obtain old medical records.  Old Records Summarized: records from another hospital and records from previous admission(s).       <> Summary of Records: [place] [facility] [place]surgical evaluation of postoperative wound infection  Initial Assessment:   51 y/o M septic patient 2/2 post operative surgical infection following intra-thecal pump removal. History notable for recent hospital admission on 11/17/2020 for surgical site infection requiring pump removal and constitutional symptoms refractory to outpatient antibiotic treatment. On P/E patient is diaphoretic, afebrile, hemodynamically stable, tachycardic w/ minimal signs of surgical site infection and serous exudate. No leukocytosis or elevated  lactate. ESR mildly elevated.   Differential Diagnosis:   Differential diagnosis includes but is not limited to; abdominal wall abscess, cellulitis, erysipelas       Clinical Tests:   Lab Tests: Reviewed and Ordered  The following lab test(s) were unremarkable: CBC, CMP, PT, PTT and Lactate  Radiological Study: Reviewed  Medical Tests: Reviewed  ED Management:  CBC, CMP, PT INR, PTT, lactate, procalcitonin, magnesium, sed rate, blood cultures, urinalysis, CT abdomen pelvis with and without contrast, consult Neurosurgery  Other:   I have discussed this case with another health care provider.       <> Summary of the Discussion: Neurosurgery and hospital medicine       APC / Resident Notes:   8:10 PM  Discussed the patient with Neurosurgery; recommend CT abdomen pelvis with and without contrast, if patient requires wash out neurosurgery will admit    10:00 PM  Patient admitted to hospital medicine with Neurosurgery to follow.          Attending Attestation:   Physician Attestation Statement for Resident:  As the supervising MD   Physician Attestation Statement: I have personally seen and examined this patient.   I agree with the above history. -:   As the supervising MD I agree with the above PE.    As the supervising MD I agree with the above treatment, course, plan, and disposition.            I have reviewed and concur with the resident's history, physical, assessment, and plan.  I have personally interviewed and examined the patient at bedside.   I did supervise any and all procedures and was present for any critical portion, and was always immediately available for help and as a resource.     Complexity: High - level 5    Final diagnoses:  [T81.42XA] Infection of deep incisional surgical site after procedure, initial encounter (Primary)  [L02.211] Abdominal wall abscess  [L03.90] Cellulitis, unspecified cellulitis site  [I10] Essential hypertension  [G89.29] Other chronic pain       Kristopher Leo DO,  FAAEM    Emergency Medicine Physician  Dept of Emergency Medicine   [facility]  Spectralink: [id number]            ED Course as of Nov 27 1316   Wed Nov 25, 2020 2012 Sed Rate(!): 67 [AM]   2012 Lactate, Francis: 0.7 [AM]   2012 Procalcitonin: 0.09 [AM]      ED Course User Index  [AM] Ruslan Kidd MD            Clinical Impression:       ICD-10-CM ICD-9-CM   1. Infection of deep incisional surgical site after procedure, initial encounter  T81.42XA 998.59   2. Abdominal wall abscess  L02.211 682.2   3. Cellulitis, unspecified cellulitis site  L03.90 682.9   4. Essential hypertension  I10 401.9   5. Other chronic pain  G89.29 338.29                      Disposition:   Disposition: Placed in Observation  Condition: Fair     ED Disposition Condition    Observation                             Ruslan Kidd MD  Resident  11/25/20 220       Kristopher Leo DO  11/27/20 [id number]

## 2020-11-26 NOTE — HPI
50M with h/o chronic pain with intrathecal pain pump and prior L4-5 fusion with recent admission due to increased redness around prior pain pump s/p washout with removal of IT pain pump/cath with OR cx + MSSA (maintained on cefazolin) admitted to C for worsening abdominal drainage/redness . ID consulted for worsening wound.    Since admission, pt had imaging done that revealed rim enhancing fluid collection. Pt has been afebrile and without leukocytosis. Blcx on admit so far ngtd. Pt is currently on cefazolin. Pt states that after discharge from the hospital he noticed increased abdominal distention and reddish discharge from his abdominal wound with associated redness and pain. Denied fevers or chills. Has some loose stools that are chronic. Denied issues with prior PICC.

## 2020-11-26 NOTE — ASSESSMENT & PLAN NOTE
- Continue home Cymbalta 60mg qHS, gabapentin 400mg BID, methocarbamol 750mg QID.  - PRN Zanaflex, IV dilaudid for now secondary to uncontrolled pain.  - Normally takes dilaudid 8mg PO TID PRN.  LA  reviewed.

## 2020-11-26 NOTE — ASSESSMENT & PLAN NOTE
- Not on any BP medications at this time, although patient states that his wife generally manages his home medications.  - PRN hydralazine.

## 2020-11-26 NOTE — ASSESSMENT & PLAN NOTE
Infection of deep incisional surgical site after procedure    - WBC 12.36, trending up, but still within normal limits.  - Lactic acid 1.7, ESR 67, CRP 5.6.  - Patient afebrile, no evidence of tachycardia, hypotension.  - CT abdomen/pelvis:  1. Recent surgical change of the anterior abdominal wall.  Deep to the incision, is a rim enhancing air and fluid collection that appears to communicate with the skin surface.  Air within the collection may reflect air introduced from communication with the skin surface, however infected collection is not excluded.  Correlation is advised.  2. Left nonobstructive nephrolithiasis.  3. Colonic diverticulosis without diverticulitis.  - US abdomen:   Deep to the incision, there is a heterogeneous subcutaneous fluid collection with tract to the skin surface.  Lack of hyperemia within the surrounding soft tissues suggests this may reflect that of seroma or hematoma rather than abscess although infected collection is not excluded.  - Appreciate neurosurgery's assistance.  They recommend IR consult for possible US guided drainage.  Will consult.  - Will consult ID.  - Continue Ancef 1g TID pending ID recommendations.  Will broaden coverage if status changes.  - NPO midnight.

## 2020-11-26 NOTE — SUBJECTIVE & OBJECTIVE
Interval History: NAEON    Medications:  Continuous Infusions:  Scheduled Meds:   ceFAZolin (ANCEF) IVPB  1 g Intravenous Q8H    DULoxetine  60 mg Oral QHS    gabapentin  400 mg Oral BID    heparin (porcine)  7,500 Units Subcutaneous Q8H    methocarbamoL  750 mg Oral QID     PRN Meds:acetaminophen, albuterol-ipratropium, butalbital-acetaminophen-caffeine -40 mg, dextrose 50%, dextrose 50%, glucagon (human recombinant), glucose, glucose, INV hydrALAZINE, HYDROmorphone, HYDROmorphone, ondansetron, promethazine, sodium chloride 0.9%, tiZANidine, traZODone     Review of Systems  Objective:     Weight: (!) 143.3 kg (315 lb 14.7 oz)  Body mass index is 44.06 kg/m².  Vital Signs (Most Recent):  Temp: 97.8 °F (36.6 °C) (11/26/20 0750)  Pulse: 81 (11/26/20 0750)  Resp: 16 (11/26/20 0841)  BP: (!) 154/85 (11/26/20 0750)  SpO2: 96 % (11/26/20 0750) Vital Signs (24h Range):  Temp:  [96.4 °F (35.8 °C)-98.9 °F (37.2 °C)] 97.8 °F (36.6 °C)  Pulse:  [64-99] 81  Resp:  [16-21] 16  SpO2:  [93 %-100 %] 96 %  BP: (139-190)/(79-97) 154/85     Date 11/26/20 0700 - 11/27/20 0659   Shift 2086-2584 1550-6234 0132-1087 24 Hour Total   INTAKE   P.O. 0   0   Shift Total(mL/kg) 0(0)   0(0)   OUTPUT   Shift Total(mL/kg)       Weight (kg) 143.3 143.3 143.3 143.3                        Neurosurgery Physical Exam     AOx3  PERRL  Full strength  SILT  Abdominal wound healing well, mild erythema, nontender    Significant Labs:  Recent Labs   Lab 11/25/20  1248 11/25/20  1854 11/26/20  0417   GLU 80  --  91     --  141   K 3.9  --  3.6     --  105   CO2 23  --  25   BUN 10  --  10   CREATININE 0.8  --  0.7   CALCIUM 9.3  --  9.0   MG  --  2.3 2.3     Recent Labs   Lab 11/25/20  1248 11/26/20  0417   WBC 12.36 9.40   HGB 12.8* 11.9*   HCT 39.6* 38.7*   * 399*     Recent Labs   Lab 11/25/20  1854   INR 1.0     Microbiology Results (last 7 days)     Procedure Component Value Units Date/Time    Blood culture #2 [[id number]]  Collected: 11/25/20 1347    Order Status: Completed Specimen: Blood from Peripheral, Antecubital, Left Updated: 11/25/20 2112     Blood Culture, Routine No Growth to date    Narrative:      Blood Culture #2    Blood culture #1 [[id number]] Collected: 11/25/20 1248    Order Status: Completed Specimen: Blood from Peripheral, Antecubital, Left Updated: 11/25/20 2112     Blood Culture, Routine No Growth to date    Narrative:      Blood Culture #1        All pertinent labs from the last 24 hours have been reviewed.    Significant Diagnostics:  I have reviewed and interpreted all pertinent imaging results/findings within the past 24 hours.

## 2020-11-26 NOTE — H&P
[facility] Hospital Medicine  History & Physical    Patient Name: Jaime Lund  MRN: [id number]  Admission Date: 11/25/2020  Attending Physician: Claude Marrufo MD   Primary Care Provider: Gabby Dean MD    Hospital Medicine Team: [facility] [place] [facility] [place] Leta Chapman PA-C     Patient information was obtained from patient, past medical records and ER records.     Subjective:     Principal Problem:Abdominal wall seroma    Chief Complaint:   Chief Complaint   Patient presents with    Cellulitis     Pt reports recent hospitalization for abdominal wall infection, discharged 5 days ago. Infection initially started getting better but then worsened again. Pain is 9/10        HPI: Patient is a 50 year old gentleman with a h/o chronic pain syndrome.  He recently had his pain pump revised by an [facility] pain specialist about three weeks ago.  However, this was complicated by a wound infection.  He underwent a removal and washout by [facility].  Cultures grew MSSA and patient was discharged with Ancef.  Today, he presented to [facility] due to discharge from abdominal surgical site.  He notes a Tmax of 100.4F, but states his temperature was mainly around 99F.  He does complain of diarrhea and chronic lower back pain.  US showed a fluid collection, and [place] [facility] [place]  He denies chest pain, SOB, dizziness, palpitations, chills, N/V.    Past Medical History:   Diagnosis Date    Back pain, chronic     Bulging discs     Chronic pain following surgery or procedure     Degenerative disc disease     Hypertension     Hypokalemia     Obese abdomen     PAD (peripheral artery disease)     Post laminectomy syndrome     Seizures     Severe sepsis     Short-term memory loss     Surgical site infection 11/17/2020    Rt abdomen pain pump site    Thyroid disease     Subclinical hyperthyroidism       Past Surgical History:   Procedure Laterality Date     CHOLECYSTECTOMY      GASTRIC BYPASS      SLEEVE    gastric sleeve  2011    HERNIA REPAIR      pain pump Right 04/03/2018    inserted at right abdomen    pain pump site washout Right 04/13/2018    REMOVAL OF IMPLANT N/A 11/18/2020    Procedure: REMOVAL, IMPLANT;  Surgeon: Raffy Headley DO;  Location: [facility] OR;  Service: Neurosurgery;  Laterality: N/A;  intrathecal pain pump, leads removed from mid back and generator from abdomen    SKIN SURGERY      EXCESS SKIN REMOVAL    SPINE SURGERY      lumbar fusion       Review of patient's allergies indicates:   Allergen Reactions    Klonopin [clonazepam] Anxiety and Other (See Comments)     Becomes agitated    Valium [diazepam] Other (See Comments)     Becomes agitated    Xanax [alprazolam] Anxiety and Other (See Comments)     Becomes agitated       No current facility-administered medications on file prior to encounter.      Current Outpatient Medications on File Prior to Encounter   Medication Sig    butalbital-acetaminophen-caffeine -40 mg (FIORICET, ESGIC) -40 mg per tablet TAKE ONE TABLET BY MOUTH EVERY 4 HOURS AS NEEDED FOR HEADACHE    ceFAZolin (ANCEF) 1 gram injection Inject 1 g into the muscle every 8 (eight) hours.    DULoxetine (CYMBALTA) 60 MG capsule TAKE 1 CAPSULE BY MOUTH EVERY EVENING    gabapentin (NEURONTIN) 400 MG capsule 2 (two) times daily. Takes 1 400mg pill every AM, Takes 2 (400mg) at night    HYDROmorphone (DILAUDID) 8 MG tablet Take 8 mg by mouth every 6 (six) hours as needed for Pain.    meloxicam (MOBIC) 15 MG tablet TAKE ONE TABLET BY MOUTH ONCE DAILY AS NEEDED FOR PAIN    methocarbamoL (ROBAXIN) 750 MG Tab Take 750 mg by mouth 4 (four) times daily.    ondansetron (ZOFRAN) 4 MG tablet Take 1 tablet (4 mg total) by mouth every 6 (six) hours as needed.    potassium chloride (KLOR-CON) 10 MEQ TbSR TAKE ONE TABLET BY MOUTH ONCE DAILY AS NEEDED WITH FLUID PILL    tiZANidine (ZANAFLEX) 2 MG tablet TAKE ONE TABLET BY  MOUTH THREE TIMES DAILY FOR MUSCLE SPASMS.    furosemide (LASIX) 40 MG tablet TAKE ONE TABLET BY MOUTH ONCE DAILY FOR FLUID (Patient taking differently: Take 40 mg by mouth daily as needed. )    torsemide (DEMADEX) 10 MG Tab Take 1 tablet (10 mg total) by mouth 2 (two) times daily as needed.     Family History     Problem Relation (Age of Onset)    Arthritis Mother    Breast cancer Mother    Heart disease Mother    Hypertension Mother, Father    Throat cancer Father        Tobacco Use    Smoking status: Never Smoker    Smokeless tobacco: Never Used   Substance and Sexual Activity    Alcohol use: Yes     Comment: socially    Drug use: No    Sexual activity: Yes     Partners: Female     Review of Systems   Constitutional: Negative for activity change, appetite change, chills, diaphoresis, fatigue, fever and unexpected weight change.   HENT: Negative for congestion, rhinorrhea, sore throat, trouble swallowing and voice change.    Eyes: Negative for visual disturbance.   Respiratory: Negative for cough, choking, chest tightness, shortness of breath and wheezing.    Cardiovascular: Negative for chest pain, palpitations and leg swelling.   Gastrointestinal: Positive for diarrhea. Negative for abdominal distention, abdominal pain, anal bleeding, blood in stool, constipation, nausea and vomiting.   Endocrine: Negative for cold intolerance, heat intolerance, polydipsia and polyuria.   Genitourinary: Negative for dysuria, flank pain, frequency, hematuria and urgency.   Musculoskeletal: Positive for back pain. Negative for arthralgias, joint swelling and myalgias.   Skin: Negative for color change and rash.   Neurological: Negative for dizziness, seizures, syncope, facial asymmetry, speech difficulty, weakness, light-headedness, numbness and headaches.   Hematological: Negative for adenopathy. Does not bruise/bleed easily.   Psychiatric/Behavioral: Negative for agitation, confusion, hallucinations and suicidal ideas.      Objective:     Vital Signs (Most Recent):  Temp: 98.9 °F (37.2 °C) (11/25/20 1233)  Pulse: 64 (11/25/20 2207)  Resp: 18 (11/25/20 2222)  BP: (!) 175/84 (11/25/20 2207)  SpO2: 99 % (11/25/20 2207) Vital Signs (24h Range):  Temp:  [98.9 °F (37.2 °C)] 98.9 °F (37.2 °C)  Pulse:  [64-99] 64  Resp:  [17-21] 18  SpO2:  [97 %-100 %] 99 %  BP: (149-190)/(79-97) 175/84     Weight: (!) 142 kg (313 lb)  Body mass index is 43.65 kg/m².    Physical Exam  Constitutional:       General: He is not in acute distress.     Appearance: He is well-developed.   HENT:      Head: Normocephalic and atraumatic.   Eyes:      Pupils: Pupils are equal, round, and reactive to light.   Neck:      Musculoskeletal: Neck supple.      Thyroid: No thyromegaly.      Vascular: No carotid bruit.   Cardiovascular:      Rate and Rhythm: Normal rate and regular rhythm.      Heart sounds: No murmur. No gallop.    Pulmonary:      Effort: Pulmonary effort is normal. No respiratory distress.      Breath sounds: Normal breath sounds. No wheezing.   Abdominal:      General: Bowel sounds are normal. There is no distension.      Palpations: There is no hepatomegaly or splenomegaly.      Tenderness: There is no abdominal tenderness.      Comments: Mild redness around incision site, pinkish discharge   Musculoskeletal: Normal range of motion.   Skin:     General: Skin is warm and dry.      Findings: No rash.   Neurological:      Mental Status: He is alert and oriented to person, place, and time.      Cranial Nerves: No cranial nerve deficit.      Sensory: No sensory deficit.   Psychiatric:         Behavior: Behavior normal.           CRANIAL NERVES     CN III, IV, VI   Pupils are equal, round, and reactive to light.       Significant Labs:   Blood Culture:   Recent Labs   Lab 11/25/20  1248 11/25/20  1347   LABBLOO No Growth to date No Growth to date     CBC:   Recent Labs   Lab 11/25/20  1248   WBC 12.36   HGB 12.8*   HCT 39.6*   *     CMP:   Recent Labs    Lab 11/25/20  1248      K 3.9      CO2 23   GLU 80   BUN 10   CREATININE 0.8   CALCIUM 9.3   PROT 8.0   ALBUMIN 3.6   BILITOT 0.3   ALKPHOS 98   AST 20   ALT 10   ANIONGAP 13   EGFRNONAA >60     Lactic Acid:   Recent Labs   Lab 11/25/20  1854   LACTATE 0.7       Significant Imaging: I have reviewed all pertinent imaging results/findings within the past 24 hours.    Assessment/Plan:     * Abdominal wall seroma  Infection of deep incisional surgical site after procedure    - WBC 12.36, trending up, but still within normal limits.  - Lactic acid 1.7, ESR 67, CRP 5.6.  - Patient afebrile, no evidence of tachycardia, hypotension.  - CT abdomen/pelvis:  1. Recent surgical change of the anterior abdominal wall.  Deep to the incision, is a rim enhancing air and fluid collection that appears to communicate with the skin surface.  Air within the collection may reflect air introduced from communication with the skin surface, however infected collection is not excluded.  Correlation is advised.  2. Left nonobstructive nephrolithiasis.  3. Colonic diverticulosis without diverticulitis.  - US abdomen:   Deep to the incision, there is a heterogeneous subcutaneous fluid collection with tract to the skin surface.  Lack of hyperemia within the surrounding soft tissues suggests this may reflect that of seroma or hematoma rather than abscess although infected collection is not excluded.  - Appreciate neurosurgery's assistance.  They recommend IR consult for possible US guided drainage.  Will consult.  - Will consult ID.  - Continue Ancef 1g TID pending ID recommendations.  Will broaden coverage if status changes.  - NPO midnight.    Chronic pain syndrome  - Continue home Cymbalta 60mg qHS, gabapentin 400mg BID, methocarbamol 750mg QID.  - PRN Zanaflex, IV dilaudid for now secondary to uncontrolled pain.  - Normally takes dilaudid 8mg PO TID PRN.  LA  reviewed.      Essential hypertension  - Not on any BP medications at  this time, although patient states that his wife generally manages his home medications.  - PRN hydralazine.      VTE Risk Mitigation (From admission, onward)         Ordered     heparin (porcine) injection 7,500 Units  Every 8 hours      11/26/20 0023     Place GUSTAVO hose  Until discontinued      11/26/20 0023     IP VTE HIGH RISK PATIENT  Once      11/26/20 0023     Place sequential compression device  Until discontinued      11/26/20 0023                   Leta Chapman PA-C  Department of Hospital Medicine   [facility]

## 2020-11-26 NOTE — CONSULTS
Neurosurgery consult note    11/25/2020      SUBJECTIVE:   HPI: Jaime Lund is a 50 y.o. year old male with PMHx of HTN and chronic pain syndrome s/p IT pain pump which was revised 3 weeks by pain specialist and complicated by wound infection now s.p IT removal and wound washout by .Cx grew MSSA. He was discharged on HH ancef. He presented to the ED with pinkish/redish drainage. He reports low grade fever and some fatigue otherwise denies any other sx. No meningitis sx. NSGY consulted for further evaluation.     Active problems:  Patient Active Problem List   Diagnosis    Essential hypertension    Hypokalemia    Seizure    Postlaminectomy syndrome of lumbar region    Facet arthropathy, lumbar    Chronic pain syndrome    BMI 40.0-44.9, adult    Peripheral artery disease    Leg swelling    Subclinical hyperthyroidism    Chronic pain    Surgical wound infection    Presence of intrathecal pump    Cellulitis, abdominal wall    Anemia of chronic disease       ROS: negative except as above per HPI    PMHx:  Past Medical History:   Diagnosis Date    Back pain, chronic     Bulging discs     Chronic pain following surgery or procedure     Degenerative disc disease     Hypertension     Hypokalemia     Obese abdomen     PAD (peripheral artery disease)     Post laminectomy syndrome     Seizures     Severe sepsis     Short-term memory loss     Surgical site infection 11/17/2020    Rt abdomen pain pump site    Thyroid disease     Subclinical hyperthyroidism       PSHx:   Past Surgical History:   Procedure Laterality Date    CHOLECYSTECTOMY      GASTRIC BYPASS      SLEEVE    gastric sleeve  2011    HERNIA REPAIR      pain pump Right 04/03/2018    inserted at right abdomen    pain pump site washout Right 04/13/2018    REMOVAL OF IMPLANT N/A 11/18/2020    Procedure: REMOVAL, IMPLANT;  Surgeon: Raffy Headley DO;  Location: [facility];  Service: Neurosurgery;  Laterality: N/A;   intrathecal pain pump, leads removed from mid back and generator from abdomen    SKIN SURGERY      EXCESS SKIN REMOVAL    SPINE SURGERY      lumbar fusion       Social Hx:  Social History     Socioeconomic History    Marital status:      Spouse name: Not on file    Number of children: Not on file    Years of education: Not on file    Highest education level: Not on file   Occupational History    Not on file   Social Needs    Financial resource strain: Not on file    Food insecurity     Worry: Not on file     Inability: Not on file    Transportation needs     Medical: Not on file     Non-medical: Not on file   Tobacco Use    Smoking status: Never Smoker    Smokeless tobacco: Never Used   Substance and Sexual Activity    Alcohol use: Yes     Comment: socially    Drug use: No    Sexual activity: Yes     Partners: Female   Lifestyle    Physical activity     Days per week: Not on file     Minutes per session: Not on file    Stress: To some extent   Relationships    Social connections     Talks on phone: Not on file     Gets together: Not on file     Attends [employer] service: Not on file     Active member of club or organization: Not on file     Attends meetings of clubs or organizations: Not on file     Relationship status: Not on file   Other Topics Concern    Not on file   Social History Narrative    Not on file        reports that he has never smoked. He has never used smokeless tobacco. He reports current alcohol use. He reports that he does not use drugs.    FMHx:  Family History   Problem Relation Age of Onset    Heart disease Mother     Breast cancer Mother     Arthritis Mother     Hypertension Mother     Throat cancer Father     Hypertension Father         Allergy:  Review of patient's allergies indicates:   Allergen Reactions    Klonopin [clonazepam] Anxiety and Other (See Comments)     Becomes agitated    Valium [diazepam] Other (See Comments)     Becomes agitated    Xanax  [alprazolam] Anxiety and Other (See Comments)     Becomes agitated         Home Medication:  No current facility-administered medications on file prior to encounter.      Current Outpatient Medications on File Prior to Encounter   Medication Sig Dispense Refill    butalbital-acetaminophen-caffeine -40 mg (FIORICET, ESGIC) -40 mg per tablet TAKE ONE TABLET BY MOUTH EVERY 4 HOURS AS NEEDED FOR HEADACHE 60 tablet 5    ceFAZolin (ANCEF) 1 gram injection Inject 1 g into the muscle every 8 (eight) hours.      DULoxetine (CYMBALTA) 60 MG capsule TAKE 1 CAPSULE BY MOUTH EVERY EVENING 90 capsule 0    furosemide (LASIX) 40 MG tablet TAKE ONE TABLET BY MOUTH ONCE DAILY FOR FLUID (Patient taking differently: Take 40 mg by mouth daily as needed. ) 90 tablet 1    gabapentin (NEURONTIN) 400 MG capsule 2 (two) times daily. Takes 1 400mg pill every AM, Takes 2 (400mg) at night      HYDROmorphone (DILAUDID) 8 MG tablet Take 8 mg by mouth every 6 (six) hours as needed for Pain.      meloxicam (MOBIC) 15 MG tablet TAKE ONE TABLET BY MOUTH ONCE DAILY AS NEEDED FOR PAIN 90 tablet 0    methocarbamoL (ROBAXIN) 750 MG Tab Take 750 mg by mouth 4 (four) times daily.      ondansetron (ZOFRAN) 4 MG tablet Take 1 tablet (4 mg total) by mouth every 6 (six) hours as needed. 20 tablet 0    potassium chloride (KLOR-CON) 10 MEQ TbSR TAKE ONE TABLET BY MOUTH ONCE DAILY AS NEEDED WITH FLUID PILL 30 tablet 0    tiZANidine (ZANAFLEX) 2 MG tablet TAKE ONE TABLET BY MOUTH THREE TIMES DAILY FOR MUSCLE SPASMS. 90 tablet 3    torsemide (DEMADEX) 10 MG Tab Take 1 tablet (10 mg total) by mouth 2 (two) times daily as needed. 30 tablet 0       OBJECTIVE:     Vital Signs (Most Recent)  Temp: 98.9 °F (37.2 °C) (11/25/20 1233)  Pulse: 76 (11/25/20 2111)  Resp: 17 (11/25/20 1917)  BP: (!) 164/79 (11/25/20 2111)  SpO2: 100 % (11/25/20 2111)      Vital Signs Range (Last 24H):  Temp:  [98.9 °F (37.2 °C)]   Pulse:  [76-99]   Resp:  [17-21]   BP:  (149-178)/(79-97)   SpO2:  [97 %-100 %]       I/O:    Intake/Output Summary (Last 24 hours) at 11/25/2020 2127  Last data filed at 11/25/2020 1958  Gross per 24 hour   Intake 1000 ml   Output --   Net 1000 ml         Physical exam:   General: appears well-developed and well-nourished, no distress  MSK: moves all extremities spontaneously with good tone  Neurological: AAOX3, no drift, GCS [id number], CN II-XII grossly intact and face symm, normal speech, following simple commands, HEART, good strength throughout, SILT  Abdominal wound overall healing well. No overt sign of infection. Mild skin edges redness. Pink discharge. No tenderness          Laboratory:    Recent Results (from the past 24 hour(s))   CBC auto differential    Collection Time: 11/25/20 12:48 PM   Result Value Ref Range    WBC 12.36 3.90 - 12.70 K/uL    RBC 4.62 4.60 - 6.20 M/uL    Hemoglobin 12.8 (L) 14.0 - 18.0 g/dL    Hematocrit 39.6 (L) 40.0 - 54.0 %    MCV 86 82 - 98 fL    MCH 27.7 27.0 - 31.0 pg    MCHC 32.3 32.0 - 36.0 g/dL    RDW 13.8 11.5 - 14.5 %    Platelets 439 (H) 150 - 350 K/uL    MPV 9.8 9.2 - 12.9 fL    Immature Granulocytes 0.5 0.0 - 0.5 %    Gran # (ANC) 9.7 (H) 1.8 - 7.7 K/uL    Immature Grans (Abs) 0.06 (H) 0.00 - 0.04 K/uL    Lymph # 2.0 1.0 - 4.8 K/uL    Mono # 0.5 0.3 - 1.0 K/uL    Eos # 0.0 0.0 - 0.5 K/uL    Baso # 0.04 0.00 - 0.20 K/uL    nRBC 0 0 /100 WBC    Gran % 78.6 (H) 38.0 - 73.0 %    Lymph % 16.3 (L) 18.0 - 48.0 %    Mono % 4.0 4.0 - 15.0 %    Eosinophil % 0.3 0.0 - 8.0 %    Basophil % 0.3 0.0 - 1.9 %    Differential Method Automated    Comprehensive metabolic panel    Collection Time: 11/25/20 12:48 PM   Result Value Ref Range    Sodium 140 136 - 145 mmol/L    Potassium 3.9 3.5 - 5.1 mmol/L    Chloride 104 95 - 110 mmol/L    CO2 23 23 - 29 mmol/L    Glucose 80 70 - 110 mg/dL    BUN 10 6 - 20 mg/dL    Creatinine 0.8 0.5 - 1.4 mg/dL    Calcium 9.3 8.7 - 10.5 mg/dL    Total Protein 8.0 6.0 - 8.4 g/dL    Albumin 3.6 3.5 - 5.2  g/dL    Total Bilirubin 0.3 0.1 - 1.0 mg/dL    Alkaline Phosphatase 98 55 - 135 U/L    AST 20 10 - 40 U/L    ALT 10 10 - 44 U/L    Anion Gap 13 8 - 16 mmol/L    eGFR if African American >60 >60 mL/min/1.73 m^2    eGFR if non African American >60 >60 mL/min/1.73 m^2   Blood culture #1    Collection Time: 11/25/20 12:48 PM    Specimen: Peripheral, Antecubital, Left; Blood   Result Value Ref Range    Blood Culture, Routine No Growth to date    Blood culture #2    Collection Time: 11/25/20  1:47 PM    Specimen: Peripheral, Antecubital, Left; Blood   Result Value Ref Range    Blood Culture, Routine No Growth to date    Lactic acid, plasma    Collection Time: 11/25/20  6:54 PM   Result Value Ref Range    Lactate (Lactic Acid) 0.7 0.5 - 2.2 mmol/L   Magnesium    Collection Time: 11/25/20  6:54 PM   Result Value Ref Range    Magnesium 2.3 1.6 - 2.6 mg/dL   Protime-INR    Collection Time: 11/25/20  6:54 PM   Result Value Ref Range    Prothrombin Time 11.4 9.0 - 12.5 sec    INR 1.0 0.8 - 1.2   Procalcitonin    Collection Time: 11/25/20  6:54 PM   Result Value Ref Range    Procalcitonin 0.09 <0.25 ng/mL   Sedimentation rate    Collection Time: 11/25/20  6:54 PM   Result Value Ref Range    Sed Rate 67 (H) 0 - 23 mm/Hr   C-reactive protein    Collection Time: 11/25/20  6:54 PM   Result Value Ref Range    CRP 5.6 0.0 - 8.2 mg/L   Urinalysis, Reflex to Urine Culture Urine, Clean Catch    Collection Time: 11/25/20  7:46 PM    Specimen: Urine   Result Value Ref Range    Specimen UA Urine, Clean Catch     Color, UA Yellow Yellow, Straw, Carol    Appearance, UA Clear Clear    pH, UA 6.0 5.0 - 8.0    Specific Gravity, UA 1.020 1.005 - 1.030    Protein, UA Negative Negative    Glucose, UA Negative Negative    Ketones, UA 3+ (A) Negative    Bilirubin (UA) Negative Negative    Occult Blood UA Negative Negative    Nitrite, UA Negative Negative    Leukocytes, UA Negative Negative       EKG/Monitor:  Results for orders placed or performed in  visit on 11/17/20   EKG 12-lead    Collection Time: 11/17/20  1:32 AM    Narrative    Test Reason : R07.9    Vent. Rate : 081 BPM     Atrial Rate : 081 BPM     P-R Int : 156 ms          QRS Dur : 118 ms      QT Int : 404 ms       P-R-T Axes : 051 005 036 degrees     QTc Int : 469 ms    Normal sinus rhythm  LVH with QRS widening  Abnormal ECG  When compared with ECG of 01-MAR-2018 17:20,  Significant changes have occurred  Confirmed by Florinda ADHIKARI, Linda ROSEN ([id number]) on 11/19/2020 8:56:53 PM    Referred By: [email or web address]   SELF           Confirmed By:Linda Neely MD       Imaging:  Imaging Results           CT Abdomen Pelvis W Wo Contrast (Final result)  Result time 11/25/20 20:54:00    Final result by Joao Gallagher MD (11/25/20 20:54:00)                 Impression:      This report was flagged in Epic as abnormal.    1. Recent surgical change of the anterior abdominal wall.  Deep to the incision, is a rim enhancing air and fluid collection that appears to communicate with the skin surface.  Air within the collection may reflect air introduced from communication with the skin surface, however infected collection is not excluded.  Correlation is advised.  2. Left nonobstructive nephrolithiasis.  3. Colonic diverticulosis without diverticulitis.  4. Additional findings above.      Electronically signed by: Joao Gallagher MD  Date:    11/25/2020  Time:    20:54             Narrative:    EXAMINATION:  CT ABDOMEN PELVIS W WO CONTRAST    CLINICAL HISTORY:  Abdominal pain, fever, post-op;    TECHNIQUE:  Low dose axial images, sagittal and coronal reformations were obtained from the lung bases to the pubic symphysis before and following the IV administration of 100 mL of Omnipaque 350.  No oral contrast was given.    COMPARISON:  Ultrasound 11/25/2020, CT 04/12/2020    FINDINGS:  Images of the lower thorax are remarkable for mild dependent atelectasis.    The spleen, pancreas and right adrenal gland are unremarkable.  There is a  focus of low attenuation along the medial aspect of the right hepatic lobe, may reflect that of fatty infiltration, nonspecific.  The gallbladder is surgically absent.  There is no biliary dilation.  The portal vein, splenic vein, SMV, celiac axis and SMA all are patent.  There is a small hiatal hernia.  There is a low attenuating left adrenal nodule measuring 3.1 cm, attenuation of which suggests adenoma although incompletely characterized.  No significant abdominal lymphadenopathy.    The kidneys enhance symmetrically without hydronephrosis or right nephrolithiasis.  There is left nonobstructive nephrolithiasis.  There is punctate focus of low attenuation within the interpolar region of the left kidney, too small for characterization.  The bilateral ureters are unremarkable without calculi seen.  The urinary bladder is decompressed, the prostate is not enlarged.    There are a few scattered colonic diverticula without inflammation to suggest diverticulitis.  The terminal ileum is unremarkable.  The appendix is unremarkable.  The small bowel is grossly unremarkable.  No focal organized pelvic fluid collection.  There is atherosclerotic calcification of the aorta and its branches.    Degenerative changes are noted of the spine.  Spinal fusion hardware noted.  Spinal fusion hardware spans L4-L5 noting disc spacing material at L4-L5.  No significant inguinal lymphadenopathy.    Surgical changes are noted of the anterior abdominal wall.  Deep to the staple line, is a rim enhancing air and fluid collection tracking to the skin surface.  The collection measures approximately 11.8 x 3.8 cm.  There is minimal surrounding induration.  Given communication with the skin surface, this may account for air within the collection, differential would include infected collection.  There is some skin thickening in the region of the incision.                               US Soft Tissue Abdomen (Final result)  Result time 11/25/20  15:31:22    Final result by Joao Gallagher MD (11/25/20 15:31:22)                 Impression:      1. Deep to the incision, there is a heterogeneous subcutaneous fluid collection with tract to the skin surface.  Lack of hyperemia within the surrounding soft tissues suggests this may reflect that of seroma or hematoma rather than abscess although infected collection is not excluded.  Correlation is advised.      Electronically signed by: Joao Gallagher MD  Date:    11/25/2020  Time:    15:31             Narrative:    EXAMINATION:  US SOFT TISSUE, ABDOMEN    CLINICAL HISTORY:  evaluate for fluid collection;    TECHNIQUE:  Real-time sonography was performed of the patient's superficial soft tissue of the abdomen along incision line.    COMPARISON:  None    FINDINGS:  In the region of the incision, within the right lower quadrant superficial soft tissues, there is a complex fluid subcutaneous fluid collection measuring approximately 7.1 x 3.1 x 9.1 cm.  There is a tract from this collection to the skin surface.  No internal hypervascularity or soft tissue hypervascularity about the collection.                                   ASSESSMENT/PLAN:   Jaime Lund is a 50 y.o. male with chronic pain syndrome s/p IT pain pump which was revised 3 weeks complicated by wound infection now s.p IT removal and wound washout on ancef. He represented with pink drainage, low grade fever and some fatigue. WBC WNL. CRP trending down and procal WNL. ESR still pending. US and CT likely concerning for seroma/hematoma. NSGY consulted for further evaluation.     -- No neurosurgical intervention necessary   -- Admit to HM for further evaluation and IR drainge   -- IR consult for US IR drainage  -- ID consult for further recommendation   -- Trend infection markers weekly  -- Continue abx per ID and HM   -- Will continue to follow    Discussed with attending staff Dr.Keen Nj Tena MD  11/25/2020  9:27 PM

## 2020-11-26 NOTE — ASSESSMENT & PLAN NOTE
Recently admitted for MSSA surgical site infection with subsequent removal of IT pain pump now with rim enhancing fluid collection on CT. Currently on cefazolin (from prior admission) - tolerating well. Denies issues with PICC. Abdominal wound with induration/drainage/erythema. Plan for IR drainage tomorrow.     Recommendations:  -continue targeted therapy with known MSSA with cefazolin  -pending IR drainage - please obtain cultures  -follow up cx data

## 2020-11-26 NOTE — ASSESSMENT & PLAN NOTE
Jaime Lund is a 50 y.o. male with chronic pain syndrome s/p IT pain pump which was revised 3 weeks complicated by wound infection now s.p IT removal and wound washout on ancef. He represented with pink drainage, low grade fever and some fatigue. WBC WNL. CRP trending down and procal WNL. ESR still pending. US and CT likely concerning for seroma/hematoma. NSGY consulted for further evaluation.      -- No neurosurgical intervention necessary   -- Admit to HM for further evaluation and IR drainge   -- IR consult for US IR drainage  -- ID consult for further recommendation   -- Trend infection markers weekly  -- Continue abx per ID and HM   -- Will continue to follow

## 2020-11-26 NOTE — PROGRESS NOTES
[facility] Hospital Medicine  Progress Note    Primary Team: [facility] MED D  Admit Date: 11/25/2020    Subjective:      HPI: Patient is a 50 year old gentleman with a h/o chronic pain syndrome.  He recently had his pain pump revised by an [facility] pain specialist about three weeks ago.  However, this was complicated by a wound infection.  He underwent a removal and washout by [facility] neurosurgery.  Cultures grew MSSA and patient was discharged with Ancef.  Today, he presented to [facility] due to discharge from abdominal surgical site.  He notes a Tmax of 100.4F, but states his temperature was mainly around 99F.  He does complain of diarrhea and chronic lower back pain.  US showed a fluid collection, and he was transferred to [facility] for neurosurgery evaluation.  He denies chest pain, SOB, dizziness, palpitations, chills, N/V.    Interval History:  No AEON. Afebrile since prior to admission. IR planning abdominal fluid collection drainage tomorrow.     ROS:  As per HPI  General: no fever, no chills, no weight loss, no fatigue  Cardiovascular: no chest pain, no orthopnea, no dyspnea  Respiratory: no cough, no wheezes, no SOB  All other systems reviewed & are negative.     Past Medical History:   Diagnosis Date    Back pain, chronic     Bulging discs     Chronic pain following surgery or procedure     Degenerative disc disease     Hypertension     Hypokalemia     Obese abdomen     PAD (peripheral artery disease)     Post laminectomy syndrome     Seizures     Severe sepsis     Short-term memory loss     Surgical site infection 11/17/2020    Rt abdomen pain pump site    Thyroid disease     Subclinical hyperthyroidism     Past Surgical History:   Procedure Laterality Date    CHOLECYSTECTOMY      GASTRIC BYPASS      SLEEVE    gastric sleeve  2011    HERNIA REPAIR      pain pump Right 04/03/2018    inserted at right abdomen    pain pump site washout Right 04/13/2018    REMOVAL OF IMPLANT  "N/A 2020    Procedure: REMOVAL, IMPLANT;  Surgeon: Raffy Headley DO;  Location: [facility] OR;  Service: Neurosurgery;  Laterality: N/A;  intrathecal pain pump, leads removed from mid back and generator from abdomen    SKIN SURGERY      EXCESS SKIN REMOVAL    SPINE SURGERY      lumbar fusion         Objective:   Last 24 Hour Vital Signs:  BP  Min: 139/87  Max: 190/84  Temp  Av.8 °F (36.6 °C)  Min: 96.4 °F (35.8 °C)  Max: 98.6 °F (37 °C)  Pulse  Av.7  Min: 64  Max: 93  Resp  Av.5  Min: 16  Max: 21  SpO2  Av.1 %  Min: 93 %  Max: 100 %  Height  Av' 11" (180.3 cm)  Min: 5' 11" (180.3 cm)  Max: 5' 11" (180.3 cm)  Weight  Av.3 kg (315 lb 14.7 oz)  Min: 143.3 kg (315 lb 14.7 oz)  Max: 143.3 kg (315 lb 14.7 oz)  I/O last 3 completed shifts:  In: 1000 [I.V.:1000]  Out: -     Physical Examination:  GEN: AAOx3, NAD  HEENT: NCAT, MMM, PERRL, EOMI, oropharynx clear  CV: RRR, no m/r, no S3/S4  RESP: CTAB, no wheezes/crackles, no increased WOB  ABD: soft, NT, normoactive BS, no organomegaly, distended, abdominal incision with staples intact and serosanguinous drainage & surrounding induration. No fluctuance noted  EXTR: no c/c/e, intact distal pulses x 4  NEURO: PERRL, EOMI, moving all four extremities, intact sensation to light touch, no focal deficits  SKIN: no rashes, lesions, or color changes  PSYCH: normal affect    Laboratory:  I have reviewed all pertinent lab results/findings within the past 24 hours.    Radiology:  I have reviewed all pertinent imaging results/findings within the past 24 hours.    Current Medications:     Infusions:       Scheduled:   ceFAZolin (ANCEF) IVPB  2 g Intravenous Q8H    DULoxetine  60 mg Oral QHS    gabapentin  400 mg Oral BID    heparin (porcine)  7,500 Units Subcutaneous Q8H    methocarbamoL  750 mg Oral QID        PRN:  acetaminophen, albuterol-ipratropium, butalbital-acetaminophen-caffeine -40 mg, dextrose 50%, dextrose 50%, glucagon (human " recombinant), glucose, glucose, INV hydrALAZINE, HYDROmorphone, HYDROmorphone, hydrOXYzine pamoate, ondansetron, promethazine, sodium chloride 0.9%, tiZANidine, traZODone    Prior records reviewed.         Assessment/Plan:     Jaime Lund is a 50 y.o.male with    Abdominal wall seroma  Infection of deep incisional surgical site after procedure     - WBC WNL  - Lactic acid 1.7, ESR 67, CRP 5.6.  - Patient afebrile, no evidence of tachycardia, hypotension.  - CT abdomen/pelvis:  1. Recent surgical change of the anterior abdominal wall.  Deep to the incision, is a rim enhancing air and fluid collection that appears to communicate with the skin surface.  Air within the collection may reflect air introduced from communication with the skin surface, however infected collection is not excluded.  Correlation is advised.  2. Left nonobstructive nephrolithiasis.  3. Colonic diverticulosis without diverticulitis.  - US abdomen:   Deep to the incision, there is a heterogeneous subcutaneous fluid collection with tract to the skin surface.  Lack of hyperemia within the surrounding soft tissues suggests this may reflect that of seroma or hematoma rather than abscess although infected collection is not excluded.  - Appreciate neurosurgery's assistance.  They recommend IR consult for possible US guided drainage. NPO at MN for procedure tomorrow - please obtain cultures. Hold AC after tonight.   - Discussed with ID - Continue Ancef 1g TID     Chronic pain syndrome  - Continue home Cymbalta 60mg qHS, gabapentin 400mg BID, methocarbamol 750mg QID.  - PRN Zanaflex, IV dilaudid for now secondary to uncontrolled pain. Wean meds  - Normally takes dilaudid 8mg PO TID PRN.  LA  reviewed.        Essential hypertension  - Not on any BP medications at this time, although patient states that his wife generally manages his home medications.  - PRN hydralazine.    PPX:   VTE Risk Mitigation (From admission, onward)         Ordered      heparin (porcine) injection 7,500 Units  Every 8 hours      11/26/20 1308     Place GUSTAVO hose  Until discontinued      11/26/20 0023     IP VTE HIGH RISK PATIENT  Once      11/26/20 0023     Place sequential compression device  Until discontinued      11/26/20 0023                  Discharge Planning   MIRZA: 11/28/2020     Code Status: Full Code   Is the patient medically ready for discharge?:     Reason for patient still in hospital (select all that apply): Patient trending condition, Treatment and Consult recommendations                 Letitia Jesus MD  [facility] Medicine Staff  [facility]

## 2020-11-26 NOTE — HPI
Jaime Lund is a 50 y.o. year old male with PMHx of HTN and chronic pain syndrome s/p IT pain pump which was revised 3 weeks by pain specialist and complicated by wound infection now s.p IT removal and wound washout by .Cx grew MSSA. He was discharged on HH ancef. He presented to the ED with pinkish/redish drainage. He reports low grade fever and some fatigue otherwise denies any other sx. No meningitis sx. NSGY consulted for further evaluation.

## 2020-11-26 NOTE — SUBJECTIVE & OBJECTIVE
Past Medical History:   Diagnosis Date    Back pain, chronic     Bulging discs     Chronic pain following surgery or procedure     Degenerative disc disease     Hypertension     Hypokalemia     Obese abdomen     PAD (peripheral artery disease)     Post laminectomy syndrome     Seizures     Severe sepsis     Short-term memory loss     Surgical site infection 11/17/2020    Rt abdomen pain pump site    Thyroid disease     Subclinical hyperthyroidism       Past Surgical History:   Procedure Laterality Date    CHOLECYSTECTOMY      GASTRIC BYPASS      SLEEVE    gastric sleeve  2011    HERNIA REPAIR      pain pump Right 04/03/2018    inserted at right abdomen    pain pump site washout Right 04/13/2018    REMOVAL OF IMPLANT N/A 11/18/2020    Procedure: REMOVAL, IMPLANT;  Surgeon: Raffy Headley DO;  Location: [facility] OR;  Service: Neurosurgery;  Laterality: N/A;  intrathecal pain pump, leads removed from mid back and generator from abdomen    SKIN SURGERY      EXCESS SKIN REMOVAL    SPINE SURGERY      lumbar fusion       Review of patient's allergies indicates:   Allergen Reactions    Klonopin [clonazepam] Anxiety and Other (See Comments)     Becomes agitated    Valium [diazepam] Other (See Comments)     Becomes agitated    Xanax [alprazolam] Anxiety and Other (See Comments)     Becomes agitated       No current facility-administered medications on file prior to encounter.      Current Outpatient Medications on File Prior to Encounter   Medication Sig    butalbital-acetaminophen-caffeine -40 mg (FIORICET, ESGIC) -40 mg per tablet TAKE ONE TABLET BY MOUTH EVERY 4 HOURS AS NEEDED FOR HEADACHE    ceFAZolin (ANCEF) 1 gram injection Inject 1 g into the muscle every 8 (eight) hours.    DULoxetine (CYMBALTA) 60 MG capsule TAKE 1 CAPSULE BY MOUTH EVERY EVENING    gabapentin (NEURONTIN) 400 MG capsule 2 (two) times daily. Takes 1 400mg pill every AM, Takes 2 (400mg) at night    HYDROmorphone  (DILAUDID) 8 MG tablet Take 8 mg by mouth every 6 (six) hours as needed for Pain.    meloxicam (MOBIC) 15 MG tablet TAKE ONE TABLET BY MOUTH ONCE DAILY AS NEEDED FOR PAIN    methocarbamoL (ROBAXIN) 750 MG Tab Take 750 mg by mouth 4 (four) times daily.    ondansetron (ZOFRAN) 4 MG tablet Take 1 tablet (4 mg total) by mouth every 6 (six) hours as needed.    potassium chloride (KLOR-CON) 10 MEQ TbSR TAKE ONE TABLET BY MOUTH ONCE DAILY AS NEEDED WITH FLUID PILL    tiZANidine (ZANAFLEX) 2 MG tablet TAKE ONE TABLET BY MOUTH THREE TIMES DAILY FOR MUSCLE SPASMS.    furosemide (LASIX) 40 MG tablet TAKE ONE TABLET BY MOUTH ONCE DAILY FOR FLUID (Patient taking differently: Take 40 mg by mouth daily as needed. )    torsemide (DEMADEX) 10 MG Tab Take 1 tablet (10 mg total) by mouth 2 (two) times daily as needed.     Family History     Problem Relation (Age of Onset)    Arthritis Mother    Breast cancer Mother    Heart disease Mother    Hypertension Mother, Father    Throat cancer Father        Tobacco Use    Smoking status: Never Smoker    Smokeless tobacco: Never Used   Substance and Sexual Activity    Alcohol use: Yes     Comment: socially    Drug use: No    Sexual activity: Yes     Partners: Female     Review of Systems   Constitutional: Negative for activity change, appetite change, chills, diaphoresis, fatigue, fever and unexpected weight change.   HENT: Negative for congestion, rhinorrhea, sore throat, trouble swallowing and voice change.    Eyes: Negative for visual disturbance.   Respiratory: Negative for cough, choking, chest tightness, shortness of breath and wheezing.    Cardiovascular: Negative for chest pain, palpitations and leg swelling.   Gastrointestinal: Positive for diarrhea. Negative for abdominal distention, abdominal pain, anal bleeding, blood in stool, constipation, nausea and vomiting.   Endocrine: Negative for cold intolerance, heat intolerance, polydipsia and polyuria.   Genitourinary:  Negative for dysuria, flank pain, frequency, hematuria and urgency.   Musculoskeletal: Positive for back pain. Negative for arthralgias, joint swelling and myalgias.   Skin: Negative for color change and rash.   Neurological: Negative for dizziness, seizures, syncope, facial asymmetry, speech difficulty, weakness, light-headedness, numbness and headaches.   Hematological: Negative for adenopathy. Does not bruise/bleed easily.   Psychiatric/Behavioral: Negative for agitation, confusion, hallucinations and suicidal ideas.     Objective:     Vital Signs (Most Recent):  Temp: 98.9 °F (37.2 °C) (11/25/20 1233)  Pulse: 64 (11/25/20 2207)  Resp: 18 (11/25/20 2222)  BP: (!) 175/84 (11/25/20 2207)  SpO2: 99 % (11/25/20 2207) Vital Signs (24h Range):  Temp:  [98.9 °F (37.2 °C)] 98.9 °F (37.2 °C)  Pulse:  [64-99] 64  Resp:  [17-21] 18  SpO2:  [97 %-100 %] 99 %  BP: (149-190)/(79-97) 175/84     Weight: (!) 142 kg (313 lb)  Body mass index is 43.65 kg/m².    Physical Exam  Constitutional:       General: He is not in acute distress.     Appearance: He is well-developed.   HENT:      Head: Normocephalic and atraumatic.   Eyes:      Pupils: Pupils are equal, round, and reactive to light.   Neck:      Musculoskeletal: Neck supple.      Thyroid: No thyromegaly.      Vascular: No carotid bruit.   Cardiovascular:      Rate and Rhythm: Normal rate and regular rhythm.      Heart sounds: No murmur. No gallop.    Pulmonary:      Effort: Pulmonary effort is normal. No respiratory distress.      Breath sounds: Normal breath sounds. No wheezing.   Abdominal:      General: Bowel sounds are normal. There is no distension.      Palpations: There is no hepatomegaly or splenomegaly.      Tenderness: There is no abdominal tenderness.      Comments: Mild redness around incision site, pinkish discharge   Musculoskeletal: Normal range of motion.   Skin:     General: Skin is warm and dry.      Findings: No rash.   Neurological:      Mental Status: He  is alert and oriented to person, place, and time.      Cranial Nerves: No cranial nerve deficit.      Sensory: No sensory deficit.   Psychiatric:         Behavior: Behavior normal.           CRANIAL NERVES     CN III, IV, VI   Pupils are equal, round, and reactive to light.       Significant Labs:   Blood Culture:   Recent Labs   Lab 11/25/20  1248 11/25/20  1347   LABBLOO No Growth to date No Growth to date     CBC:   Recent Labs   Lab 11/25/20  1248   WBC 12.36   HGB 12.8*   HCT 39.6*   *     CMP:   Recent Labs   Lab 11/25/20  1248      K 3.9      CO2 23   GLU 80   BUN 10   CREATININE 0.8   CALCIUM 9.3   PROT 8.0   ALBUMIN 3.6   BILITOT 0.3   ALKPHOS 98   AST 20   ALT 10   ANIONGAP 13   EGFRNONAA >60     Lactic Acid:   Recent Labs   Lab 11/25/20  1854   LACTATE 0.7       Significant Imaging: I have reviewed all pertinent imaging results/findings within the past 24 hours.

## 2020-11-26 NOTE — HPI
Patient is a 50 year old gentleman with a h/o chronic pain syndrome.  He recently had his pain pump revised by an [facility] pain specialist about three weeks ago.  However, this was complicated by a wound infection.  He underwent a removal and washout by [facility].  Cultures grew MSSA and patient was discharged with Ancef.  Today, he presented to [facility] due to discharge from abdominal surgical site.  He notes a Tmax of 100.4F, but states his temperature was mainly around 99F.  He does complain of diarrhea and chronic lower back pain.  US showed a fluid collection, and he was transferred to [facility] for neurosurgery evaluation.  He denies chest pain, SOB, dizziness, palpitations, chills, N/V.

## 2020-11-26 NOTE — PROGRESS NOTES
[facility]  Neurosurgery  Progress Note    Subjective:     History of Present Illness: Jaime Lund is a 50 y.o. year old male with PMHx of HTN and chronic pain syndrome s/p IT pain pump which was revised 3 weeks by pain specialist and complicated by wound infection now s.p IT removal and wound washout by .Cx grew MSSA. He was discharged on HH ancef. He presented to the ED with pinkish/redish drainage. He reports low grade fever and some fatigue otherwise denies any other sx. No meningitis sx. NSGY consulted for further evaluation.     Post-Op Info:  * No surgery found *         Interval History: NAEON    Medications:  Continuous Infusions:  Scheduled Meds:   ceFAZolin (ANCEF) IVPB  1 g Intravenous Q8H    DULoxetine  60 mg Oral QHS    gabapentin  400 mg Oral BID    heparin (porcine)  7,500 Units Subcutaneous Q8H    methocarbamoL  750 mg Oral QID     PRN Meds:acetaminophen, albuterol-ipratropium, butalbital-acetaminophen-caffeine -40 mg, dextrose 50%, dextrose 50%, glucagon (human recombinant), glucose, glucose, INV hydrALAZINE, HYDROmorphone, HYDROmorphone, ondansetron, promethazine, sodium chloride 0.9%, tiZANidine, traZODone     Review of Systems  Objective:     Weight: (!) 143.3 kg (315 lb 14.7 oz)  Body mass index is 44.06 kg/m².  Vital Signs (Most Recent):  Temp: 97.8 °F (36.6 °C) (11/26/20 0750)  Pulse: 81 (11/26/20 0750)  Resp: 16 (11/26/20 0841)  BP: (!) 154/85 (11/26/20 0750)  SpO2: 96 % (11/26/20 0750) Vital Signs (24h Range):  Temp:  [96.4 °F (35.8 °C)-98.9 °F (37.2 °C)] 97.8 °F (36.6 °C)  Pulse:  [64-99] 81  Resp:  [16-21] 16  SpO2:  [93 %-100 %] 96 %  BP: (139-190)/(79-97) 154/85     Date 11/26/20 0700 - 11/27/20 0659   Shift 7246-9740 2435-7641 3826-6082 24 Hour Total   INTAKE   P.O. 0   0   Shift Total(mL/kg) 0(0)   0(0)   OUTPUT   Shift Total(mL/kg)       Weight (kg) 143.3 143.3 143.3 143.3                        Neurosurgery Physical Exam      AOx3  PERRL  Full strength  SILT  Abdominal wound healing well, mild erythema, nontender    Significant Labs:  Recent Labs   Lab 11/25/20  1248 11/25/20  1854 11/26/20 0417   GLU 80  --  91     --  141   K 3.9  --  3.6     --  105   CO2 23  --  25   BUN 10  --  10   CREATININE 0.8  --  0.7   CALCIUM 9.3  --  9.0   MG  --  2.3 2.3     Recent Labs   Lab 11/25/20  1248 11/26/20  0417   WBC 12.36 9.40   HGB 12.8* 11.9*   HCT 39.6* 38.7*   * 399*     Recent Labs   Lab 11/25/20 1854   INR 1.0     Microbiology Results (last 7 days)     Procedure Component Value Units Date/Time    Blood culture #2 [[id number]] Collected: 11/25/20 1347    Order Status: Completed Specimen: Blood from Peripheral, Antecubital, Left Updated: 11/25/20 2112     Blood Culture, Routine No Growth to date    Narrative:      Blood Culture #2    Blood culture #1 [[id number]] Collected: 11/25/20 1248    Order Status: Completed Specimen: Blood from Peripheral, Antecubital, Left Updated: 11/25/20 2112     Blood Culture, Routine No Growth to date    Narrative:      Blood Culture #1        All pertinent labs from the last 24 hours have been reviewed.    Significant Diagnostics:  I have reviewed and interpreted all pertinent imaging results/findings within the past 24 hours.    Assessment/Plan:     * Abdominal wall seroma  Jaime Lund is a 50 y.o. male with chronic pain syndrome s/p IT pain pump which was revised 3 weeks complicated by wound infection now s.p IT removal and wound washout on ancef. He represented with pink drainage, low grade fever and some fatigue. WBC WNL. CRP trending down and procal WNL. ESR still pending. US and CT likely concerning for seroma/hematoma. NSGY consulted for further evaluation.      -- No neurosurgical intervention necessary   -- Admit to  for further evaluation and IR drainge   -- IR consult for US IR drainage  -- ID consult for further recommendation   -- Trend infection markers weekly  --  Continue abx per ID and HM   -- Will continue to follow        Cristina Silver MD  Neurosurgery  [facility]

## 2020-11-26 NOTE — CONSULTS
[facility]  Infectious Disease  Consult Note    Patient Name: Jaime Lund  MRN: [id number]  Admission Date: 11/25/2020  Hospital Length of Stay: 0 days  Attending Physician: Letitia Jesus MD  Primary Care Provider: Gabby Dean MD     Isolation Status: No active isolations    Patient information was obtained from patient, past medical records and ER records.      Consults  Assessment/Plan:     Infection of deep incisional surgical site after procedure  Recently admitted for MSSA surgical site infection with subsequent removal of IT pain pump now with rim enhancing fluid collection on CT. Currently on cefazolin (from prior admission) - tolerating well. Denies issues with PICC. Abdominal wound with induration/drainage/erythema. Plan for IR drainage tomorrow.     Recommendations:  -continue targeted therapy with known MSSA with cefazolin  -pending IR drainage - please obtain cultures  -follow up cx data         Thank you for your consult. I will follow-up with patient. Please contact us if you have any additional questions.    Jaylyn Middleton MD  Infectious Disease  [facility]    Subjective:     Principal Problem: Abdominal wall seroma    HPI: 50M with h/o chronic pain with intrathecal pain pump and prior L4-5 fusion with recent admission due to increased redness around prior pain pump s/p washout with removal of IT pain pump/cath with OR cx + MSSA (maintained on cefazolin) admitted to C for worsening abdominal drainage/redness . ID consulted for worsening wound.    Since admission, pt had imaging done that revealed rim enhancing fluid collection. Pt has been afebrile and without leukocytosis. Blcx on admit so far ngtd. Pt is currently on cefazolin. Pt states that after discharge from the hospital he noticed increased abdominal distention and reddish discharge from his abdominal wound with associated redness and pain. Denied fevers or chills. Has some loose stools  that are chronic. Denied issues with prior PICC.           Past Medical History:   Diagnosis Date    Back pain, chronic     Bulging discs     Chronic pain following surgery or procedure     Degenerative disc disease     Hypertension     Hypokalemia     Obese abdomen     PAD (peripheral artery disease)     Post laminectomy syndrome     Seizures     Severe sepsis     Short-term memory loss     Surgical site infection 11/17/2020    Rt abdomen pain pump site    Thyroid disease     Subclinical hyperthyroidism       Past Surgical History:   Procedure Laterality Date    CHOLECYSTECTOMY      GASTRIC BYPASS      SLEEVE    gastric sleeve  2011    HERNIA REPAIR      pain pump Right 04/03/2018    inserted at right abdomen    pain pump site washout Right 04/13/2018    REMOVAL OF IMPLANT N/A 11/18/2020    Procedure: REMOVAL, IMPLANT;  Surgeon: Raffy Headley DO;  Location: [facility];  Service: Neurosurgery;  Laterality: N/A;  intrathecal pain pump, leads removed from mid back and generator from abdomen    SKIN SURGERY      EXCESS SKIN REMOVAL    SPINE SURGERY      lumbar fusion       Review of patient's allergies indicates:   Allergen Reactions    Klonopin [clonazepam] Anxiety and Other (See Comments)     Becomes agitated    Valium [diazepam] Other (See Comments)     Becomes agitated    Xanax [alprazolam] Anxiety and Other (See Comments)     Becomes agitated       Medications:  Medications Prior to Admission   Medication Sig    butalbital-acetaminophen-caffeine -40 mg (FIORICET, ESGIC) -40 mg per tablet TAKE ONE TABLET BY MOUTH EVERY 4 HOURS AS NEEDED FOR HEADACHE    ceFAZolin (ANCEF) 1 gram injection Inject 1 g into the muscle every 8 (eight) hours.    DULoxetine (CYMBALTA) 60 MG capsule TAKE 1 CAPSULE BY MOUTH EVERY EVENING    gabapentin (NEURONTIN) 400 MG capsule 2 (two) times daily. Takes 1 400mg pill every AM, Takes 2 (400mg) at night    HYDROmorphone (DILAUDID) 8 MG tablet Take 8 mg  by mouth every 6 (six) hours as needed for Pain.    meloxicam (MOBIC) 15 MG tablet TAKE ONE TABLET BY MOUTH ONCE DAILY AS NEEDED FOR PAIN    methocarbamoL (ROBAXIN) 750 MG Tab Take 750 mg by mouth 4 (four) times daily.    ondansetron (ZOFRAN) 4 MG tablet Take 1 tablet (4 mg total) by mouth every 6 (six) hours as needed.    potassium chloride (KLOR-CON) 10 MEQ TbSR TAKE ONE TABLET BY MOUTH ONCE DAILY AS NEEDED WITH FLUID PILL    tiZANidine (ZANAFLEX) 2 MG tablet TAKE ONE TABLET BY MOUTH THREE TIMES DAILY FOR MUSCLE SPASMS.    furosemide (LASIX) 40 MG tablet TAKE ONE TABLET BY MOUTH ONCE DAILY FOR FLUID (Patient taking differently: Take 40 mg by mouth daily as needed. )    torsemide (DEMADEX) 10 MG Tab Take 1 tablet (10 mg total) by mouth 2 (two) times daily as needed.     Antibiotics (From admission, onward)    Start     Stop Route Frequency Ordered    11/26/20 0030  ceFAZolin injection 1 g      -- IV Every 8 hours (non-standard times) 11/26/20 0023        Antifungals (From admission, onward)    None        Antivirals (From admission, onward)    None           There is no immunization history for the selected administration types on file for this patient.    Family History     Problem Relation (Age of Onset)    Arthritis Mother    Breast cancer Mother    Heart disease Mother    Hypertension Mother, Father    Throat cancer Father        Social History     Socioeconomic History    Marital status:      Spouse name: Not on file    Number of children: Not on file    Years of education: Not on file    Highest education level: Not on file   Occupational History    Not on file   Social Needs    Financial resource strain: Not on file    Food insecurity     Worry: Not on file     Inability: Not on file    Transportation needs     Medical: Not on file     Non-medical: Not on file   Tobacco Use    Smoking status: Never Smoker    Smokeless tobacco: Never Used   Substance and Sexual Activity    Alcohol  use: Yes     Comment: socially    Drug use: No    Sexual activity: Yes     Partners: Female   Lifestyle    Physical activity     Days per week: Not on file     Minutes per session: Not on file    Stress: To some extent   Relationships    Social connections     Talks on phone: Not on file     Gets together: Not on file     Attends Faith service: Not on file     Active member of club or organization: Not on file     Attends meetings of clubs or organizations: Not on file     Relationship status: Not on file   Other Topics Concern    Not on file   Social History Narrative    Not on file     Review of Systems   Constitutional: Negative for chills and fever.   Gastrointestinal: Positive for abdominal distention.        Loose stools   Skin: Positive for rash and wound.   All other systems reviewed and are negative.    Objective:     Vital Signs (Most Recent):  Temp: 98.5 °F (36.9 °C) (11/26/20 1157)  Pulse: 86 (11/26/20 1157)  Resp: 20 (11/26/20 1157)  BP: (!) 166/82 (11/26/20 1157)  SpO2: 95 % (11/26/20 1157) Vital Signs (24h Range):  Temp:  [96.4 °F (35.8 °C)-98.9 °F (37.2 °C)] 98.5 °F (36.9 °C)  Pulse:  [64-99] 86  Resp:  [16-21] 20  SpO2:  [93 %-100 %] 95 %  BP: (139-190)/(79-97) 166/82     Weight: (!) 143.3 kg (315 lb 14.7 oz)  Body mass index is 44.06 kg/m².    Estimated Creatinine Clearance: 181 mL/min (based on SCr of 0.7 mg/dL).    Physical Exam  Constitutional:       General: He is not in acute distress.     Appearance: He is well-developed. He is obese. He is not toxic-appearing.   HENT:      Head: Normocephalic and atraumatic.      Right Ear: External ear normal.      Left Ear: External ear normal.      Nose: Nose normal.      Mouth/Throat:      Mouth: Mucous membranes are moist.      Pharynx: No oropharyngeal exudate.   Eyes:      General:         Right eye: No discharge.         Left eye: No discharge.      Conjunctiva/sclera: Conjunctivae normal.      Pupils: Pupils are equal, round, and reactive  to light.   Neck:      Thyroid: No thyromegaly.   Cardiovascular:      Rate and Rhythm: Normal rate and regular rhythm.      Heart sounds: Normal heart sounds.   Pulmonary:      Effort: Pulmonary effort is normal.      Breath sounds: Normal breath sounds.   Abdominal:      General: Bowel sounds are normal. There is distension.      Palpations: Abdomen is soft.      Tenderness: There is no abdominal tenderness.   Musculoskeletal: Normal range of motion.   Lymphadenopathy:      Cervical: No cervical adenopathy.   Skin:     General: Skin is warm and dry.      Comments: Abdominal incision site with erythema/induration; serosang discharge from staples  Lumbar incision site - no erythema or induration  R PICC - no erythema or induration     Neurological:      Mental Status: He is alert and oriented to person, place, and time. Mental status is at baseline.      Motor: No weakness.         Significant Labs:   CBC:   Recent Labs   Lab 11/25/20 1248 11/26/20 0417   WBC 12.36 9.40   HGB 12.8* 11.9*   HCT 39.6* 38.7*   * 399*     CMP:   Recent Labs   Lab 11/25/20 1248 11/26/20 0417    141   K 3.9 3.6    105   CO2 23 25   GLU 80 91   BUN 10 10   CREATININE 0.8 0.7   CALCIUM 9.3 9.0   PROT 8.0 7.2   ALBUMIN 3.6 3.3*   BILITOT 0.3 0.3   ALKPHOS 98 89   AST 20 15   ALT 10 9*   ANIONGAP 13 11   EGFRNONAA >60 >60.0     Microbiology Results (last 7 days)     Procedure Component Value Units Date/Time    Blood culture #2 [[id number]] Collected: 11/25/20 1347    Order Status: Completed Specimen: Blood from Peripheral, Antecubital, Left Updated: 11/25/20 2112     Blood Culture, Routine No Growth to date    Narrative:      Blood Culture #2    Blood culture #1 [[id number]] Collected: 11/25/20 1248    Order Status: Completed Specimen: Blood from Peripheral, Antecubital, Left Updated: 11/25/20 2112     Blood Culture, Routine No Growth to date    Narrative:      Blood Culture #1          Significant Imaging: I have  reviewed all pertinent imaging results/findings within the past 24 hours.

## 2020-11-26 NOTE — ED TRIAGE NOTES
Jaime Lund, a 50 y.o. male presents to the ED via personal transportation from home w/ complaint of infection in RUQ of abd where he had pain pump removed    Triage note:  Chief Complaint   Patient presents with    Cellulitis     Pt reports recent hospitalization for abdominal wall infection, discharged 5 days ago. Infection initially started getting better but then worsened again. Pain is 9/10     Review of patient's allergies indicates:   Allergen Reactions    Klonopin [clonazepam] Anxiety and Other (See Comments)     Becomes agitated    Valium [diazepam] Other (See Comments)     Becomes agitated    Xanax [alprazolam] Anxiety and Other (See Comments)     Becomes agitated     Past Medical History:   Diagnosis Date    Back pain, chronic     Bulging discs     Chronic pain following surgery or procedure     Degenerative disc disease     Hypertension     Hypokalemia     Obese abdomen     PAD (peripheral artery disease)     Post laminectomy syndrome     Seizures     Severe sepsis     Short-term memory loss     Surgical site infection 11/17/2020    Rt abdomen pain pump site    Thyroid disease     Subclinical hyperthyroidism

## 2020-11-26 NOTE — ED NOTES
Transport came to  pt and pt stated that he wanted to speak the hospitalist prior to going to room.  RN spoke with Dr Ragsdale regarding pt's concerns related to being admitted prior to a holiday if there is no change in the POC.  Dr Ragsdale stated that he will reach out to hospitalist to see if they could speak with pt.  RN met with pt and pt's wife and explained that the concern is that if pt goes home tonight, he could get very sick as the infection could spread.  Pt and pt's wife decided that pt will agree to be admitted tonight.  RN reached out to Dr Ragsdale regarding pt's agreement.

## 2020-11-27 LAB
ALBUMIN SERPL BCP-MCNC: 3.1 G/DL (ref 3.5–5.2)
ALP SERPL-CCNC: 89 U/L (ref 55–135)
ALT SERPL W/O P-5'-P-CCNC: 14 U/L (ref 10–44)
ANION GAP SERPL CALC-SCNC: 9 MMOL/L (ref 8–16)
AST SERPL-CCNC: 24 U/L (ref 10–40)
BASOPHILS # BLD AUTO: 0.06 K/UL (ref 0–0.2)
BASOPHILS NFR BLD: 0.8 % (ref 0–1.9)
BILIRUB SERPL-MCNC: 0.3 MG/DL (ref 0.1–1)
BUN SERPL-MCNC: 9 MG/DL (ref 6–20)
CALCIUM SERPL-MCNC: 9.2 MG/DL (ref 8.7–10.5)
CHLORIDE SERPL-SCNC: 106 MMOL/L (ref 95–110)
CO2 SERPL-SCNC: 27 MMOL/L (ref 23–29)
CREAT SERPL-MCNC: 0.7 MG/DL (ref 0.5–1.4)
DIFFERENTIAL METHOD: ABNORMAL
EOSINOPHIL # BLD AUTO: 0.1 K/UL (ref 0–0.5)
EOSINOPHIL NFR BLD: 1.1 % (ref 0–8)
ERYTHROCYTE [DISTWIDTH] IN BLOOD BY AUTOMATED COUNT: 14 % (ref 11.5–14.5)
EST. GFR  (AFRICAN AMERICAN): >60 ML/MIN/1.73 M^2
EST. GFR  (NON AFRICAN AMERICAN): >60 ML/MIN/1.73 M^2
GLUCOSE SERPL-MCNC: 98 MG/DL (ref 70–110)
GRAM STN SPEC: NORMAL
GRAM STN SPEC: NORMAL
HCT VFR BLD AUTO: 38.8 % (ref 40–54)
HGB BLD-MCNC: 11.9 G/DL (ref 14–18)
IMM GRANULOCYTES # BLD AUTO: 0.04 K/UL (ref 0–0.04)
IMM GRANULOCYTES NFR BLD AUTO: 0.5 % (ref 0–0.5)
LYMPHOCYTES # BLD AUTO: 1.5 K/UL (ref 1–4.8)
LYMPHOCYTES NFR BLD: 19.6 % (ref 18–48)
MAGNESIUM SERPL-MCNC: 2.2 MG/DL (ref 1.6–2.6)
MCH RBC QN AUTO: 27.4 PG (ref 27–31)
MCHC RBC AUTO-ENTMCNC: 30.7 G/DL (ref 32–36)
MCV RBC AUTO: 89 FL (ref 82–98)
MONOCYTES # BLD AUTO: 0.4 K/UL (ref 0.3–1)
MONOCYTES NFR BLD: 5.8 % (ref 4–15)
NEUTROPHILS # BLD AUTO: 5.4 K/UL (ref 1.8–7.7)
NEUTROPHILS NFR BLD: 72.2 % (ref 38–73)
NRBC BLD-RTO: 0 /100 WBC
PLATELET # BLD AUTO: 369 K/UL (ref 150–350)
PMV BLD AUTO: 9.5 FL (ref 9.2–12.9)
POTASSIUM SERPL-SCNC: 3.8 MMOL/L (ref 3.5–5.1)
PROT SERPL-MCNC: 6.9 G/DL (ref 6–8.4)
RBC # BLD AUTO: 4.35 M/UL (ref 4.6–6.2)
SODIUM SERPL-SCNC: 142 MMOL/L (ref 136–145)
WBC # BLD AUTO: 7.54 K/UL (ref 3.9–12.7)

## 2020-11-27 PROCEDURE — 99024 POSTOP FOLLOW-UP VISIT: CPT | Mod: ,,, | Performed by: NEUROLOGICAL SURGERY

## 2020-11-27 PROCEDURE — 87205 SMEAR GRAM STAIN: CPT

## 2020-11-27 PROCEDURE — 25000003 PHARM REV CODE 250: Performed by: HOSPITALIST

## 2020-11-27 PROCEDURE — G0378 HOSPITAL OBSERVATION PER HR: HCPCS

## 2020-11-27 PROCEDURE — 36415 COLL VENOUS BLD VENIPUNCTURE: CPT

## 2020-11-27 PROCEDURE — 87102 FUNGUS ISOLATION CULTURE: CPT

## 2020-11-27 PROCEDURE — 99024 PR POST-OP FOLLOW-UP VISIT: ICD-10-PCS | Mod: ,,, | Performed by: NEUROLOGICAL SURGERY

## 2020-11-27 PROCEDURE — 96375 TX/PRO/DX INJ NEW DRUG ADDON: CPT

## 2020-11-27 PROCEDURE — 96376 TX/PRO/DX INJ SAME DRUG ADON: CPT

## 2020-11-27 PROCEDURE — 63600175 PHARM REV CODE 636 W HCPCS: Performed by: STUDENT IN AN ORGANIZED HEALTH CARE EDUCATION/TRAINING PROGRAM

## 2020-11-27 PROCEDURE — 25000003 PHARM REV CODE 250: Performed by: PHYSICIAN ASSISTANT

## 2020-11-27 PROCEDURE — 63600175 PHARM REV CODE 636 W HCPCS: Performed by: NURSE PRACTITIONER

## 2020-11-27 PROCEDURE — 87075 CULTR BACTERIA EXCEPT BLOOD: CPT

## 2020-11-27 PROCEDURE — 99214 PR OFFICE/OUTPT VISIT, EST, LEVL IV, 30-39 MIN: ICD-10-PCS | Mod: ,,, | Performed by: PHYSICIAN ASSISTANT

## 2020-11-27 PROCEDURE — 99214 OFFICE O/P EST MOD 30 MIN: CPT | Mod: ,,, | Performed by: PHYSICIAN ASSISTANT

## 2020-11-27 PROCEDURE — 87070 CULTURE OTHR SPECIMN AEROBIC: CPT

## 2020-11-27 PROCEDURE — 63600175 PHARM REV CODE 636 W HCPCS: Performed by: RADIOLOGY

## 2020-11-27 PROCEDURE — 94761 N-INVAS EAR/PLS OXIMETRY MLT: CPT | Mod: 59

## 2020-11-27 PROCEDURE — 80053 COMPREHEN METABOLIC PANEL: CPT

## 2020-11-27 PROCEDURE — 83735 ASSAY OF MAGNESIUM: CPT

## 2020-11-27 PROCEDURE — 99225 PR SUBSEQUENT OBSERVATION CARE,LEVEL II: ICD-10-PCS | Mod: ,,, | Performed by: HOSPITALIST

## 2020-11-27 PROCEDURE — 99225 PR SUBSEQUENT OBSERVATION CARE,LEVEL II: CPT | Mod: ,,, | Performed by: HOSPITALIST

## 2020-11-27 PROCEDURE — 85025 COMPLETE CBC W/AUTO DIFF WBC: CPT

## 2020-11-27 PROCEDURE — 63600175 PHARM REV CODE 636 W HCPCS: Performed by: HOSPITALIST

## 2020-11-27 RX ORDER — HYDROCODONE BITARTRATE AND ACETAMINOPHEN 10; 325 MG/1; MG/1
1 TABLET ORAL EVERY 6 HOURS PRN
Status: DISCONTINUED | OUTPATIENT
Start: 2020-11-27 | End: 2020-11-28 | Stop reason: HOSPADM

## 2020-11-27 RX ORDER — DIPHENHYDRAMINE HYDROCHLORIDE 50 MG/ML
INJECTION INTRAMUSCULAR; INTRAVENOUS CODE/TRAUMA/SEDATION MEDICATION
Status: COMPLETED | OUTPATIENT
Start: 2020-11-27 | End: 2020-11-27

## 2020-11-27 RX ORDER — HYDROMORPHONE HYDROCHLORIDE 2 MG/1
4 TABLET ORAL EVERY 6 HOURS PRN
Status: DISCONTINUED | OUTPATIENT
Start: 2020-11-27 | End: 2020-11-28 | Stop reason: HOSPADM

## 2020-11-27 RX ORDER — HYDROMORPHONE HYDROCHLORIDE 2 MG/1
8 TABLET ORAL EVERY 6 HOURS PRN
Status: DISCONTINUED | OUTPATIENT
Start: 2020-11-27 | End: 2020-11-28 | Stop reason: HOSPADM

## 2020-11-27 RX ADMIN — HYDROMORPHONE HYDROCHLORIDE 0.5 MG: 1 INJECTION, SOLUTION INTRAMUSCULAR; INTRAVENOUS; SUBCUTANEOUS at 12:11

## 2020-11-27 RX ADMIN — TIZANIDINE 2 MG: 2 TABLET ORAL at 09:11

## 2020-11-27 RX ADMIN — METHOCARBAMOL 750 MG: 750 TABLET ORAL at 08:11

## 2020-11-27 RX ADMIN — GABAPENTIN 400 MG: 400 CAPSULE ORAL at 09:11

## 2020-11-27 RX ADMIN — GABAPENTIN 400 MG: 400 CAPSULE ORAL at 08:11

## 2020-11-27 RX ADMIN — CEFAZOLIN 2 G: 1 INJECTION, POWDER, FOR SOLUTION INTRAMUSCULAR; INTRAVENOUS at 08:11

## 2020-11-27 RX ADMIN — HYDROMORPHONE HYDROCHLORIDE 0.5 MG: 1 INJECTION, SOLUTION INTRAMUSCULAR; INTRAVENOUS; SUBCUTANEOUS at 06:11

## 2020-11-27 RX ADMIN — TRAZODONE HYDROCHLORIDE 25 MG: 50 TABLET ORAL at 09:11

## 2020-11-27 RX ADMIN — METHOCARBAMOL 750 MG: 750 TABLET ORAL at 06:11

## 2020-11-27 RX ADMIN — DULOXETINE 60 MG: 30 CAPSULE, DELAYED RELEASE ORAL at 09:11

## 2020-11-27 RX ADMIN — CEFAZOLIN 2 G: 1 INJECTION, POWDER, FOR SOLUTION INTRAMUSCULAR; INTRAVENOUS at 04:11

## 2020-11-27 RX ADMIN — HYDROMORPHONE HYDROCHLORIDE 1 MG: 1 INJECTION, SOLUTION INTRAMUSCULAR; INTRAVENOUS; SUBCUTANEOUS at 04:11

## 2020-11-27 RX ADMIN — DIPHENHYDRAMINE HYDROCHLORIDE 50 MG: 50 INJECTION INTRAMUSCULAR; INTRAVENOUS at 11:11

## 2020-11-27 RX ADMIN — CEFAZOLIN 2 G: 1 INJECTION, POWDER, FOR SOLUTION INTRAMUSCULAR; INTRAVENOUS at 11:11

## 2020-11-27 RX ADMIN — HYDROMORPHONE HYDROCHLORIDE 8 MG: 2 TABLET ORAL at 06:11

## 2020-11-27 RX ADMIN — CEFAZOLIN 2 G: 1 INJECTION, POWDER, FOR SOLUTION INTRAMUSCULAR; INTRAVENOUS at 12:11

## 2020-11-27 RX ADMIN — HYDROCODONE BITARTRATE AND ACETAMINOPHEN 1 TABLET: 10; 325 TABLET ORAL at 04:11

## 2020-11-27 RX ADMIN — HYDROMORPHONE HYDROCHLORIDE 1 MG: 1 INJECTION, SOLUTION INTRAMUSCULAR; INTRAVENOUS; SUBCUTANEOUS at 08:11

## 2020-11-27 RX ADMIN — HYDROCODONE BITARTRATE AND ACETAMINOPHEN 1 TABLET: 10; 325 TABLET ORAL at 11:11

## 2020-11-27 RX ADMIN — HYDROMORPHONE HYDROCHLORIDE 8 MG: 2 TABLET ORAL at 11:11

## 2020-11-27 RX ADMIN — METHOCARBAMOL 750 MG: 750 TABLET ORAL at 02:11

## 2020-11-27 NOTE — PLAN OF CARE
Patient tolerated procedure. ptaient was not a full sedate due to his allergies. He tolerated well reports no pain at this time. Patient to be taken back to floor by transport

## 2020-11-27 NOTE — SUBJECTIVE & OBJECTIVE
Interval History: NAEON. Underwent IR drainage today. Serosanguinous drainage noted in bulb. Overall feeling better, Eager to go home. Reports multiple hx of staph infections in the past.    Review of Systems   Constitutional: Negative for chills and fever.   Gastrointestinal: Positive for abdominal distention.        Loose stools   Skin: Positive for rash and wound.   All other systems reviewed and are negative.    Objective:     Vital Signs (Most Recent):  Temp: 99.4 °F (37.4 °C) (11/27/20 1144)  Pulse: 76 (11/27/20 1144)  Resp: 18 (11/27/20 1149)  BP: (!) 160/86 (11/27/20 1144)  SpO2: 95 % (11/27/20 1144) Vital Signs (24h Range):  Temp:  [97.2 °F (36.2 °C)-99.4 °F (37.4 °C)] 99.4 °F (37.4 °C)  Pulse:  [72-96] 76  Resp:  [16-20] 18  SpO2:  [95 %-99 %] 95 %  BP: (113-160)/(58-86) 160/86     Weight: (!) 143.3 kg (315 lb 14.7 oz)  Body mass index is 44.06 kg/m².    Estimated Creatinine Clearance: 181 mL/min (based on SCr of 0.7 mg/dL).    Physical Exam  Constitutional:       General: He is not in acute distress.     Appearance: He is well-developed. He is obese. He is not toxic-appearing.   HENT:      Head: Normocephalic and atraumatic.      Right Ear: External ear normal.      Left Ear: External ear normal.      Nose: Nose normal.   Eyes:      General:         Right eye: No discharge.         Left eye: No discharge.      Conjunctiva/sclera: Conjunctivae normal.   Neck:      Thyroid: No thyromegaly.   Cardiovascular:      Rate and Rhythm: Normal rate and regular rhythm.   Pulmonary:      Effort: Pulmonary effort is normal.      Breath sounds: Normal breath sounds.   Abdominal:      General: Bowel sounds are normal. There is distension.      Palpations: Abdomen is soft.      Tenderness: There is no abdominal tenderness.   Lymphadenopathy:      Cervical: No cervical adenopathy.   Skin:     General: Skin is warm and dry.      Comments: Abdominal incision site with erythema/induration; serosang discharge from  staples  Drain placed. Serosanguinous drainage noted in bulb.  Lumbar incision site - no erythema or induration  R PICC - no erythema or induration     Neurological:      Mental Status: He is alert and oriented to person, place, and time. Mental status is at baseline.      Motor: No weakness.         Significant Labs: All pertinent labs within the past 24 hours have been reviewed.    Significant Imaging: I have reviewed all pertinent imaging results/findings within the past 24 hours.

## 2020-11-27 NOTE — PROGRESS NOTES
[facility] Hospital Medicine  Progress Note    Primary Team: [facility] HOSP MED D  Admit Date: 11/25/2020    Subjective:      HPI: Patient is a 50 year old gentleman with a h/o chronic pain syndrome.  He recently had his pain pump revised by an [facility] pain specialist about three weeks ago.  However, this was complicated by a wound infection.  He underwent a removal and washout by [facility] neurosurgery.  Cultures grew MSSA and patient was discharged with Ancef.  Today, he presented to [facility] due to discharge from abdominal surgical site.  He notes a Tmax of 100.4F, but states his temperature was mainly around 99F.  He does complain of diarrhea and chronic lower back pain.  US showed a fluid collection, and he was transferred to [facility] for neurosurgery evaluation.  He denies chest pain, SOB, dizziness, palpitations, chills, N/V.    Interval History:  No AEON. Reports pain not controlled. Wife requesting to change regimen to home po dilaudid. Undergoing IR procedure today.     ROS:  As per HPI  General: no fever, no chills, no weight loss, no fatigue  Cardiovascular: no chest pain, no orthopnea, no dyspnea  Respiratory: no cough, no wheezes, no SOB  All other systems reviewed & are negative.     Past Medical History:   Diagnosis Date    Back pain, chronic     Bulging discs     Chronic pain following surgery or procedure     Degenerative disc disease     Hypertension     Hypokalemia     Obese abdomen     PAD (peripheral artery disease)     Post laminectomy syndrome     Seizures     Severe sepsis     Short-term memory loss     Surgical site infection 11/17/2020    Rt abdomen pain pump site    Thyroid disease     Subclinical hyperthyroidism     Past Surgical History:   Procedure Laterality Date    CHOLECYSTECTOMY      GASTRIC BYPASS      SLEEVE    gastric sleeve  2011    HERNIA REPAIR      pain pump Right 04/03/2018    inserted at right abdomen    pain pump site washout Right 04/13/2018     REMOVAL OF IMPLANT N/A 2020    Procedure: REMOVAL, IMPLANT;  Surgeon: Raffy Headley DO;  Location: [facility] OR;  Service: Neurosurgery;  Laterality: N/A;  intrathecal pain pump, leads removed from mid back and generator from abdomen    SKIN SURGERY      EXCESS SKIN REMOVAL    SPINE SURGERY      lumbar fusion         Objective:   Last 24 Hour Vital Signs:  BP  Min: 113/58  Max: 160/86  Temp  Av.1 °F (36.7 °C)  Min: 97.2 °F (36.2 °C)  Max: 99.4 °F (37.4 °C)  Pulse  Av.8  Min: 72  Max: 96  Resp  Av.3  Min: 16  Max: 20  SpO2  Av.5 %  Min: 95 %  Max: 99 %  I/O last 3 completed shifts:  In: 1250 [P.O.:250; I.V.:1000]  Out: -     Physical Examination:  GEN: AAOx3, NAD  HEENT: NCAT, MMM, PERRL, EOMI, oropharynx clear  CV: RRR, no m/r, no S3/S4  RESP: CTAB, no wheezes/crackles, no increased WOB  ABD: soft, NT, normoactive BS, no organomegaly, distended, abdominal incision with staples intact and serosanguinous drainage & surrounding induration. No fluctuance noted  EXTR: no c/c/e, intact distal pulses x 4  NEURO: PERRL, EOMI, moving all four extremities, intact sensation to light touch, no focal deficits  SKIN: no rashes, lesions, or color changes  PSYCH: normal affect    Laboratory:  I have reviewed all pertinent lab results/findings within the past 24 hours.    Radiology:  I have reviewed all pertinent imaging results/findings within the past 24 hours.    Current Medications:     Infusions:       Scheduled:   ceFAZolin (ANCEF) IVPB  2 g Intravenous Q8H    DULoxetine  60 mg Oral QHS    gabapentin  400 mg Oral BID    methocarbamoL  750 mg Oral QID        PRN:  acetaminophen, albuterol-ipratropium, butalbital-acetaminophen-caffeine -40 mg, dextrose 50%, dextrose 50%, glucagon (human recombinant), glucose, glucose, INV hydrALAZINE, HYDROcodone-acetaminophen, HYDROmorphone, HYDROmorphone, hydrOXYzine pamoate, ondansetron, promethazine, sodium chloride 0.9%, tiZANidine, traZODone    Prior  records reviewed.         Assessment/Plan:     Jaime Lund is a 50 y.o.male with    Abdominal wall seroma  Infection of deep incisional surgical site after procedure     - WBC WNL  - Lactic acid 1.7, ESR 67, CRP 5.6.  - Patient afebrile, no evidence of tachycardia, hypotension.  - CT abdomen/pelvis:  1. Recent surgical change of the anterior abdominal wall.  Deep to the incision, is a rim enhancing air and fluid collection that appears to communicate with the skin surface.  Air within the collection may reflect air introduced from communication with the skin surface, however infected collection is not excluded.  Correlation is advised.  2. Left nonobstructive nephrolithiasis.  3. Colonic diverticulosis without diverticulitis.  - US abdomen:   Deep to the incision, there is a heterogeneous subcutaneous fluid collection with tract to the skin surface.  Lack of hyperemia within the surrounding soft tissues suggests this may reflect that of seroma or hematoma rather than abscess although infected collection is not excluded.  - Appreciate neurosurgery's assistance.  They recommend IR consult for possible US guided drainage.  - Discussed with ID - Continue Ancef 1g TID  - IR drainage today     Chronic pain syndrome  - Continue home Cymbalta 60mg qHS, gabapentin 400mg BID, methocarbamol 750mg QID.  - Normally takes dilaudid 8mg PO TID PRN.  LA  reviewed. Resume home meds        Essential hypertension  - Not on any BP medications at this time, although patient states that his wife generally manages his home medications.  - PRN hydralazine.    PPX:   VTE Risk Mitigation (From admission, onward)         Ordered     Place GUSTAVO hose  Until discontinued      11/26/20 0023     IP VTE HIGH RISK PATIENT  Once      11/26/20 0023     Place sequential compression device  Until discontinued      11/26/20 0023                  Discharge Planning   MIRZA: 11/29/2020     Code Status: Full Code   Is the patient medically ready for  discharge?:     Reason for patient still in hospital (select all that apply): Patient trending condition, Treatment and Consult recommendations  Discharge Plan A: Home with family, Home Health              Letitia Jesus MD  [facility] Medicine Staff  [facility]

## 2020-11-27 NOTE — ASSESSMENT & PLAN NOTE
Recently admitted for MSSA surgical site infection with subsequent removal of IT pain pump on outpatient IV Cefazolin now admitted with induration and erythema around abdominal wound found to have rim enhancing fluid collection on CT deep to staple line and tracking to skin surface. Underwent IR drainage and drain placement today. Doing well post procedure. Afebrile. HDS. Awaiting culture data to return.    Recommendations:  - Continue Cefazolin 2 g IV q 8 hours  - Follow IR cultures and gram stain to guide antibiotics  - Plan to continue antibiotic therapy for 4-6 weeks from day of drain placement (tentative end date of antibiotics: 12/25/20 - 1/8/21)  - Ok to discharge tomorrow from an ID standpoint prior to culture data finalizing and can tailor antibiotic therapy as an outpatient if needed.  - ID will follow up tomorrow.

## 2020-11-27 NOTE — PLAN OF CARE
Problem: Adult Inpatient Plan of Care  Goal: Plan of Care Review  Outcome: Ongoing, Progressing     Problem: Pain Acute  Goal: Optimal Pain Control  Outcome: Ongoing, Progressing     VSS, no acute distress noted.  Pain was mildly control during shift.  POC was reviewed he stated understanding.  Will continue monitoring.

## 2020-11-27 NOTE — SUBJECTIVE & OBJECTIVE
Interval History: NAEON    Medications:  Continuous Infusions:  Scheduled Meds:   ceFAZolin (ANCEF) IVPB  2 g Intravenous Q8H    DULoxetine  60 mg Oral QHS    gabapentin  400 mg Oral BID    methocarbamoL  750 mg Oral QID     PRN Meds:acetaminophen, albuterol-ipratropium, butalbital-acetaminophen-caffeine -40 mg, dextrose 50%, dextrose 50%, glucagon (human recombinant), glucose, glucose, INV hydrALAZINE, HYDROcodone-acetaminophen, HYDROmorphone, HYDROmorphone, hydrOXYzine pamoate, ondansetron, promethazine, sodium chloride 0.9%, tiZANidine, traZODone     Review of Systems  Objective:     Weight: (!) 143.3 kg (315 lb 14.7 oz)  Body mass index is 44.06 kg/m².  Vital Signs (Most Recent):  Temp: 99.4 °F (37.4 °C) (11/27/20 1144)  Pulse: 76 (11/27/20 1144)  Resp: 18 (11/27/20 1149)  BP: (!) 160/86 (11/27/20 1144)  SpO2: 95 % (11/27/20 1144) Vital Signs (24h Range):  Temp:  [97.2 °F (36.2 °C)-99.4 °F (37.4 °C)] 99.4 °F (37.4 °C)  Pulse:  [72-96] 76  Resp:  [16-20] 18  SpO2:  [95 %-99 %] 95 %  BP: (113-160)/(58-86) 160/86     Date 11/27/20 0700 - 11/28/20 0659   Shift 7313-2973 1364-0973 6441-6335 24 Hour Total   INTAKE   P.O. 0   0   Shift Total(mL/kg) 0(0)   0(0)   OUTPUT   Shift Total(mL/kg)       Weight (kg) 143.3 143.3 143.3 143.3                        Neurosurgery Physical Exam     AOx3  PERRL  EOMI  Full strength  SILT  Abdominal incision with bandage intact  Abdomen nontender    Significant Labs:  Recent Labs   Lab 11/25/20  1248 11/25/20  1854 11/26/20  0417 11/27/20  0606   GLU 80  --  91 98     --  141 142   K 3.9  --  3.6 3.8     --  105 106   CO2 23  --  25 27   BUN 10  --  10 9   CREATININE 0.8  --  0.7 0.7   CALCIUM 9.3  --  9.0 9.2   MG  --  2.3 2.3 2.2     Recent Labs   Lab 11/25/20  1248 11/26/20  0417 11/27/20  0606   WBC 12.36 9.40 7.54   HGB 12.8* 11.9* 11.9*   HCT 39.6* 38.7* 38.8*   * 399* 369*     Recent Labs   Lab 11/25/20  1854   INR 1.0     Microbiology Results (last  7 days)     Procedure Component Value Units Date/Time    Culture, Anaerobe [[id number]] Collected: 11/27/20 1112    Order Status: Sent Specimen: Abscess from Abdomen Updated: 11/27/20 1112    Aerobic culture [[id number]] Collected: 11/27/20 1112    Order Status: Sent Specimen: Abscess from Abdomen Updated: 11/27/20 1112    Gram stain [[id number]] Collected: 11/27/20 1112    Order Status: Sent Specimen: Abscess from Abdomen Updated: 11/27/20 1112    Blood culture #2 [[id number]] Collected: 11/25/20 1347    Order Status: Completed Specimen: Blood from Peripheral, Antecubital, Left Updated: 11/26/20 1503     Blood Culture, Routine No Growth to date      No Growth to date    Narrative:      Blood Culture #2    Blood culture #1 [[id number]] Collected: 11/25/20 1248    Order Status: Completed Specimen: Blood from Peripheral, Antecubital, Left Updated: 11/26/20 1503     Blood Culture, Routine No Growth to date      No Growth to date    Narrative:      Blood Culture #1        All pertinent labs from the last 24 hours have been reviewed.    Significant Diagnostics:  I have reviewed and interpreted all pertinent imaging results/findings within the past 24 hours.

## 2020-11-27 NOTE — PLAN OF CARE
11/27/20 1041   Post-Acute Status   Post-Acute Authorization Home Health;Medications   Home Health Status Referrals Sent     Referrals faxed to [facility] and [facility]. CM will continue to follow.     Santana Marques RN Case Manager   #[phone].

## 2020-11-27 NOTE — H&P
Inpatient Radiology Pre-procedure Note    History of Present Illness:  Jaime Lund is a 50 y.o. male with history of chronic pain syndrome s/p baclofen pump removal d/t infection. Presents with anterior abdominal wall fluid collection aspiration and possible drain placement.     Admission H&P reviewed.  Past Medical History:   Diagnosis Date    Back pain, chronic     Bulging discs     Chronic pain following surgery or procedure     Degenerative disc disease     Hypertension     Hypokalemia     Obese abdomen     PAD (peripheral artery disease)     Post laminectomy syndrome     Seizures     Severe sepsis     Short-term memory loss     Surgical site infection 11/17/2020    Rt abdomen pain pump site    Thyroid disease     Subclinical hyperthyroidism     Past Surgical History:   Procedure Laterality Date    CHOLECYSTECTOMY      GASTRIC BYPASS      SLEEVE    gastric sleeve  2011    HERNIA REPAIR      pain pump Right 04/03/2018    inserted at right abdomen    pain pump site washout Right 04/13/2018    REMOVAL OF IMPLANT N/A 11/18/2020    Procedure: REMOVAL, IMPLANT;  Surgeon: Raffy Headley DO;  Location: [facility] OR;  Service: Neurosurgery;  Laterality: N/A;  intrathecal pain pump, leads removed from mid back and generator from abdomen    SKIN SURGERY      EXCESS SKIN REMOVAL    SPINE SURGERY      lumbar fusion       Review of Systems:   As documented in primary team H&P    Home Meds:   Prior to Admission medications    Medication Sig Start Date End Date Taking? Authorizing Provider   butalbital-acetaminophen-caffeine -40 mg (FIORICET, ESGIC) -40 mg per tablet TAKE ONE TABLET BY MOUTH EVERY 4 HOURS AS NEEDED FOR HEADACHE 6/24/20  Yes Jt Holly MD   ceFAZolin (ANCEF) 1 gram injection Inject 1 g into the muscle every 8 (eight) hours.   Yes Historical Provider   DULoxetine (CYMBALTA) 60 MG capsule TAKE 1 CAPSULE BY MOUTH EVERY EVENING 11/7/20  Yes Gabby Dean MD    gabapentin (NEURONTIN) 400 MG capsule 2 (two) times daily. Takes 1 400mg pill every AM, Takes 2 (400mg) at night 10/10/17  Yes Historical Provider   HYDROmorphone (DILAUDID) 8 MG tablet Take 8 mg by mouth every 6 (six) hours as needed for Pain.   Yes Historical Provider   meloxicam (MOBIC) 15 MG tablet TAKE ONE TABLET BY MOUTH ONCE DAILY AS NEEDED FOR PAIN 11/7/20  Yes Gabby Dean MD   methocarbamoL (ROBAXIN) 750 MG Tab Take 750 mg by mouth 4 (four) times daily.   Yes Historical Provider   ondansetron (ZOFRAN) 4 MG tablet Take 1 tablet (4 mg total) by mouth every 6 (six) hours as needed. 4/11/19  Yes Gabby Dean MD   potassium chloride (KLOR-CON) 10 MEQ TbSR TAKE ONE TABLET BY MOUTH ONCE DAILY AS NEEDED WITH FLUID PILL 10/20/20  Yes REX Laird   tiZANidine (ZANAFLEX) 2 MG tablet TAKE ONE TABLET BY MOUTH THREE TIMES DAILY FOR MUSCLE SPASMS. 6/1/20  Yes Gabby Dean MD   furosemide (LASIX) 40 MG tablet TAKE ONE TABLET BY MOUTH ONCE DAILY FOR FLUID  Patient taking differently: Take 40 mg by mouth daily as needed.  7/16/20   Gabby Dean MD   torsemide (DEMADEX) 10 MG Tab Take 1 tablet (10 mg total) by mouth 2 (two) times daily as needed. 6/19/19 11/23/20  REX Laird     Scheduled Meds:    ceFAZolin (ANCEF) IVPB  2 g Intravenous Q8H    DULoxetine  60 mg Oral QHS    gabapentin  400 mg Oral BID    methocarbamoL  750 mg Oral QID     Continuous Infusions:   PRN Meds:acetaminophen, albuterol-ipratropium, butalbital-acetaminophen-caffeine -40 mg, dextrose 50%, dextrose 50%, glucagon (human recombinant), glucose, glucose, INV hydrALAZINE, HYDROcodone-acetaminophen, HYDROmorphone, HYDROmorphone, hydrOXYzine pamoate, ondansetron, promethazine, sodium chloride 0.9%, tiZANidine, traZODone  Anticoagulants/Antiplatelets: no anticoagulation    Allergies:   Review of patient's allergies indicates:   Allergen Reactions    Klonopin [clonazepam] Anxiety and Other (See Comments)     Becomes agitated     Valium [diazepam] Other (See Comments)     Becomes agitated    Xanax [alprazolam] Anxiety and Other (See Comments)     Becomes agitated     Sedation Hx: have not been any systemic reactions    Labs:  Recent Labs   Lab 11/25/20  1854   INR 1.0       Recent Labs   Lab 11/27/20  0606   WBC 7.54   HGB 11.9*   HCT 38.8*   MCV 89   *      Recent Labs   Lab 11/27/20 0606   GLU 98      K 3.8      CO2 27   BUN 9   CREATININE 0.7   CALCIUM 9.2   MG 2.2   ALT 14   AST 24   ALBUMIN 3.1*   BILITOT 0.3         Vitals:  Temp: 98.7 °F (37.1 °C) (11/27/20 0754)  Pulse: 80 (11/27/20 0754)  Resp: 18 (11/27/20 0856)  BP: 134/70 (11/27/20 0754)  SpO2: 96 % (11/27/20 0754)     Physical Exam:  ASA: 3  Mallampati: 2    General: no acute distress  Mental Status: alert and oriented to person, place and time  HEENT: normocephalic, atraumatic  Chest: unlabored breathing  Heart: regular heart rate  Abdomen: nondistended  Extremity: moves all extremities    Plan: abdominal wall fluid collection aspiration with possible drain placement.    Sedation Plan: up to moderate.    Zay Vee MD MSCR  PGY-2 Radiology Resident

## 2020-11-27 NOTE — PLAN OF CARE
Pt arrived to  for abdominal drain. Pt oriented to unit and staff. Plan of care reviewed with patient, patient verbalizes understanding. Comfort measures utilized. Pt safely transferred from stretcher to procedural table. Fall risk reviewed with patient, fall risk interventions maintained. Safety strap applied, positioner pillows utilized to minimize pressure points. Blankets applied. Pt prepped and draped utilizing standard sterile technique. Patient placed on continuous monitoring, as required by sedation policy. Timeouts completed utilizing standard universal time-out, per department and facility policy. RN to remain at bedside, continuous monitoring maintained. Pt resting comfortably. Denies pain/discomfort. Will continue to monitor. See flow sheets for monitoring, medication administration, and updates.     Patient allergic to versed. Will not be receiving any in this procedure.

## 2020-11-27 NOTE — PROGRESS NOTES
[facility]  Infectious Disease  Progress Note    Patient Name: Jaime Lund  MRN: [id number]  Admission Date: 11/25/2020  Length of Stay: 0 days  Attending Physician: Letitia Jesus MD  Primary Care Provider: Gabby Dean MD    Isolation Status: No active isolations  Assessment/Plan:      Infection of deep incisional surgical site after procedure  Recently admitted for MSSA surgical site infection with subsequent removal of IT pain pump on outpatient IV Cefazolin now admitted with induration and erythema around abdominal wound found to have rim enhancing fluid collection on CT deep to staple line and tracking to skin surface. Underwent IR drainage and drain placement today. Doing well post procedure. Afebrile. HDS. Awaiting culture data to return.    Recommendations:  - Continue Cefazolin 2 g IV q 8 hours  - Follow IR cultures and gram stain to guide antibiotics  - Plan to continue antibiotic therapy for 4-6 weeks from day of drain placement (tentative end date of antibiotics: 12/25/20 - 1/8/21)  - Ok to discharge tomorrow from an ID standpoint prior to culture data finalizing and can tailor antibiotic therapy as an outpatient if needed.  - ID will follow up tomorrow.         Please call for any questions. Thank you.  Larissa Moore PA-C  Phone: [phone]  Pager: [phone]      Subjective:     Principal Problem:Abdominal wall seroma    HPI: 50M with h/o chronic pain with intrathecal pain pump and prior L4-5 fusion with recent admission due to increased redness around prior pain pump s/p washout with removal of IT pain pump/cath with OR cx + MSSA (maintained on cefazolin) admitted to [facility] for worsening abdominal drainage/redness . ID consulted for worsening wound.    Since admission, pt had imaging done that revealed rim enhancing fluid collection. Pt has been afebrile and without leukocytosis. Blcx on admit so far ngtd. Pt is currently on cefazolin. Pt states that after discharge from the  hospital he noticed increased abdominal distention and reddish discharge from his abdominal wound with associated redness and pain. Denied fevers or chills. Has some loose stools that are chronic. Denied issues with prior PICC.         Interval History: DEMARCUSEON. Underwent IR drainage today. Serosanguinous drainage noted in bulb. Overall feeling better, Eager to go home. Reports multiple hx of staph infections in the past.    Review of Systems   Constitutional: Negative for chills and fever.   Gastrointestinal: Positive for abdominal distention.        Loose stools   Skin: Positive for rash and wound.   All other systems reviewed and are negative.    Objective:     Vital Signs (Most Recent):  Temp: 99.4 °F (37.4 °C) (11/27/20 1144)  Pulse: 76 (11/27/20 1144)  Resp: 18 (11/27/20 1149)  BP: (!) 160/86 (11/27/20 1144)  SpO2: 95 % (11/27/20 1144) Vital Signs (24h Range):  Temp:  [97.2 °F (36.2 °C)-99.4 °F (37.4 °C)] 99.4 °F (37.4 °C)  Pulse:  [72-96] 76  Resp:  [16-20] 18  SpO2:  [95 %-99 %] 95 %  BP: (113-160)/(58-86) 160/86     Weight: (!) 143.3 kg (315 lb 14.7 oz)  Body mass index is 44.06 kg/m².    Estimated Creatinine Clearance: 181 mL/min (based on SCr of 0.7 mg/dL).    Physical Exam  Constitutional:       General: He is not in acute distress.     Appearance: He is well-developed. He is obese. He is not toxic-appearing.   HENT:      Head: Normocephalic and atraumatic.      Right Ear: External ear normal.      Left Ear: External ear normal.      Nose: Nose normal.   Eyes:      General:         Right eye: No discharge.         Left eye: No discharge.      Conjunctiva/sclera: Conjunctivae normal.   Neck:      Thyroid: No thyromegaly.   Cardiovascular:      Rate and Rhythm: Normal rate and regular rhythm.   Pulmonary:      Effort: Pulmonary effort is normal.      Breath sounds: Normal breath sounds.   Abdominal:      General: Bowel sounds are normal. There is distension.      Palpations: Abdomen is soft.      Tenderness:  There is no abdominal tenderness.   Lymphadenopathy:      Cervical: No cervical adenopathy.   Skin:     General: Skin is warm and dry.      Comments: Abdominal incision site with erythema/induration; serosang discharge from staples  Drain placed. Serosanguinous drainage noted in bulb.  Lumbar incision site - no erythema or induration  R PICC - no erythema or induration     Neurological:      Mental Status: He is alert and oriented to person, place, and time. Mental status is at baseline.      Motor: No weakness.         Significant Labs: All pertinent labs within the past 24 hours have been reviewed.    Significant Imaging: I have reviewed all pertinent imaging results/findings within the past 24 hours.

## 2020-11-27 NOTE — ASSESSMENT & PLAN NOTE
Jaime Lund is a 50 y.o. male with chronic pain syndrome s/p IT pain pump which was revised 3 weeks complicated by wound infection now s.p IT removal and wound washout on ancef. He represented with pink drainage, low grade fever and some fatigue. WBC WNL. CRP trending down and procal WNL. ESR still pending. US and CT likely concerning for seroma/hematoma. NSGY consulted for further evaluation.     IR drainage today      -- No neurosurgical intervention necessary   -- Admit to HM for further evaluation and IR drainge   -- IR consult for US IR drainage  -- ID consult for further recommendation   -- Trend infection markers weekly  -- Continue abx per ID and HM   -- Will continue to follow

## 2020-11-27 NOTE — PROGRESS NOTES
[facility]  Neurosurgery  Progress Note    Subjective:     History of Present Illness: Jaime Lund is a 50 y.o. year old male with PMHx of HTN and chronic pain syndrome s/p IT pain pump which was revised 3 weeks by pain specialist and complicated by wound infection now s.p IT removal and wound washout by .Cx grew MSSA. He was discharged on HH ancef. He presented to the ED with pinkish/redish drainage. He reports low grade fever and some fatigue otherwise denies any other sx. No meningitis sx. NSGY consulted for further evaluation.     Post-Op Info:  * No surgery found *         Interval History: NAEON    Medications:  Continuous Infusions:  Scheduled Meds:   ceFAZolin (ANCEF) IVPB  2 g Intravenous Q8H    DULoxetine  60 mg Oral QHS    gabapentin  400 mg Oral BID    methocarbamoL  750 mg Oral QID     PRN Meds:acetaminophen, albuterol-ipratropium, butalbital-acetaminophen-caffeine -40 mg, dextrose 50%, dextrose 50%, glucagon (human recombinant), glucose, glucose, INV hydrALAZINE, HYDROcodone-acetaminophen, HYDROmorphone, HYDROmorphone, hydrOXYzine pamoate, ondansetron, promethazine, sodium chloride 0.9%, tiZANidine, traZODone     Review of Systems  Objective:     Weight: (!) 143.3 kg (315 lb 14.7 oz)  Body mass index is 44.06 kg/m².  Vital Signs (Most Recent):  Temp: 99.4 °F (37.4 °C) (11/27/20 1144)  Pulse: 76 (11/27/20 1144)  Resp: 18 (11/27/20 1149)  BP: (!) 160/86 (11/27/20 1144)  SpO2: 95 % (11/27/20 1144) Vital Signs (24h Range):  Temp:  [97.2 °F (36.2 °C)-99.4 °F (37.4 °C)] 99.4 °F (37.4 °C)  Pulse:  [72-96] 76  Resp:  [16-20] 18  SpO2:  [95 %-99 %] 95 %  BP: (113-160)/(58-86) 160/86     Date 11/27/20 0700 - 11/28/20 0659   Shift 4057-0170 9836-7750 2663-1461 24 Hour Total   INTAKE   P.O. 0   0   Shift Total(mL/kg) 0(0)   0(0)   OUTPUT   Shift Total(mL/kg)       Weight (kg) 143.3 143.3 143.3 143.3                        Neurosurgery Physical Exam      AOx3  PERRL  EOMI  Full strength  SILT  Abdominal incision with bandage intact  Abdomen nontender    Significant Labs:  Recent Labs   Lab 11/25/20  1248 11/25/20  1854 11/26/20 0417 11/27/20  0606   GLU 80  --  91 98     --  141 142   K 3.9  --  3.6 3.8     --  105 106   CO2 23  --  25 27   BUN 10  --  10 9   CREATININE 0.8  --  0.7 0.7   CALCIUM 9.3  --  9.0 9.2   MG  --  2.3 2.3 2.2     Recent Labs   Lab 11/25/20  1248 11/26/20 0417 11/27/20  0606   WBC 12.36 9.40 7.54   HGB 12.8* 11.9* 11.9*   HCT 39.6* 38.7* 38.8*   * 399* 369*     Recent Labs   Lab 11/25/20 1854   INR 1.0     Microbiology Results (last 7 days)     Procedure Component Value Units Date/Time    Culture, Anaerobe [[id number]] Collected: 11/27/20 1112    Order Status: Sent Specimen: Abscess from Abdomen Updated: 11/27/20 1112    Aerobic culture [[id number]] Collected: 11/27/20 1112    Order Status: Sent Specimen: Abscess from Abdomen Updated: 11/27/20 1112    Gram stain [[id number]] Collected: 11/27/20 1112    Order Status: Sent Specimen: Abscess from Abdomen Updated: 11/27/20 1112    Blood culture #2 [[id number]] Collected: 11/25/20 1347    Order Status: Completed Specimen: Blood from Peripheral, Antecubital, Left Updated: 11/26/20 1503     Blood Culture, Routine No Growth to date      No Growth to date    Narrative:      Blood Culture #2    Blood culture #1 [[id number]] Collected: 11/25/20 1248    Order Status: Completed Specimen: Blood from Peripheral, Antecubital, Left Updated: 11/26/20 1503     Blood Culture, Routine No Growth to date      No Growth to date    Narrative:      Blood Culture #1        All pertinent labs from the last 24 hours have been reviewed.    Significant Diagnostics:  I have reviewed and interpreted all pertinent imaging results/findings within the past 24 hours.    Assessment/Plan:     * Abdominal wall seroma  Jaime Vincent Lund is a 50 y.o. male with chronic pain syndrome s/p IT pain pump which was  revised 3 weeks complicated by wound infection now s.p IT removal and wound washout on ancef. He represented with pink drainage, low grade fever and some fatigue. WBC WNL. CRP trending down and procal WNL. ESR still pending. US and CT likely concerning for seroma/hematoma. NSGY consulted for further evaluation.     IR drainage today      -- No neurosurgical intervention necessary   -- Admit to HM for further evaluation and IR drainge   -- IR consult for US IR drainage  -- ID consult for further recommendation   -- Trend infection markers weekly  -- Continue abx per ID and HM   -- Will continue to follow        Cristina Silver MD  Neurosurgery  [facility]

## 2020-11-27 NOTE — PLAN OF CARE
CM met with patient at the bedside to discuss discharge planning assessment. Pt lives with family and has transportation at discharge. Pt was current with [facility] prior to admission. Pt verified PCP and Pharmacy. CM will continue to follow for discharge needs.         11/27/20 0746   Discharge Assessment   Assessment Type Discharge Planning Assessment   Confirmed/corrected address and phone number on facesheet? Yes   Assessment information obtained from? Patient   Expected Length of Stay (days) 2   Communicated expected length of stay with patient/caregiver yes   Prior to hospitilization cognitive status: Alert/Oriented   Prior to hospitalization functional status: Independent   Current cognitive status: Alert/Oriented   Current Functional Status: Independent   Lives With spouse;child(dianna), dependent   Able to Return to Prior Arrangements yes   Is patient able to care for self after discharge? Yes   Readmission Within the Last 30 Days previous discharge plan unsuccessful   If yes, most recent facility name: [facility]   Patient currently being followed by outpatient case management? No   Patient currently receives any other outside agency services? Yes   Name and contact number of agency or person providing outside services [facility]   Is it the patient/care giver preference to resume care with the current outside agency? Yes   Equipment Currently Used at Home none   Do you have any problems affording any of your prescribed medications? No   Is the patient taking medications as prescribed? yes   Does the patient have transportation home? Yes   Transportation Anticipated family or friend will provide   Does the patient receive services at the [facility]? No   Discharge Plan A Home with family;Home Health   Discharge Plan B Home with family;Home Health   DME Needed Upon Discharge  none   Patient/Family in Agreement with Plan yes   Readmission Questionnaire    At the time of your discharge, did someone talk to you about what your health problems were? Yes   At the time of discharge, did someone talk to you about what to watch out for regarding worsening of your health problem? Yes   At the time of discharge, did someone talk to you about what to do if you experienced worsening of your health problem? Yes   At the time of discharge, did someone talk to you about which medication to take when you left the hospital and which ones to stop taking? Yes   At the time of discharge, did someone talk to you about when and where to follow up with a doctor after you left the hospital? Yes     Santana Marques RN Case Manager   #[phone]

## 2020-11-28 VITALS
WEIGHT: 315 LBS | TEMPERATURE: 98 F | SYSTOLIC BLOOD PRESSURE: 132 MMHG | HEART RATE: 87 BPM | BODY MASS INDEX: 44.1 KG/M2 | OXYGEN SATURATION: 100 % | HEIGHT: 71 IN | RESPIRATION RATE: 20 BRPM | DIASTOLIC BLOOD PRESSURE: 64 MMHG

## 2020-11-28 PROCEDURE — 25000003 PHARM REV CODE 250: Performed by: HOSPITALIST

## 2020-11-28 PROCEDURE — 25000003 PHARM REV CODE 250: Performed by: PHYSICIAN ASSISTANT

## 2020-11-28 PROCEDURE — 99217 PR OBSERVATION CARE DISCHARGE: ICD-10-PCS | Mod: ,,, | Performed by: HOSPITALIST

## 2020-11-28 PROCEDURE — G0378 HOSPITAL OBSERVATION PER HR: HCPCS

## 2020-11-28 PROCEDURE — 96376 TX/PRO/DX INJ SAME DRUG ADON: CPT

## 2020-11-28 PROCEDURE — 99214 OFFICE O/P EST MOD 30 MIN: CPT | Mod: ,,, | Performed by: PHYSICIAN ASSISTANT

## 2020-11-28 PROCEDURE — 99214 PR OFFICE/OUTPT VISIT, EST, LEVL IV, 30-39 MIN: ICD-10-PCS | Mod: ,,, | Performed by: PHYSICIAN ASSISTANT

## 2020-11-28 PROCEDURE — 99217 PR OBSERVATION CARE DISCHARGE: CPT | Mod: ,,, | Performed by: HOSPITALIST

## 2020-11-28 PROCEDURE — 63600175 PHARM REV CODE 636 W HCPCS: Performed by: STUDENT IN AN ORGANIZED HEALTH CARE EDUCATION/TRAINING PROGRAM

## 2020-11-28 RX ORDER — CEFAZOLIN SODIUM 1 G/3ML
2 INJECTION, POWDER, FOR SOLUTION INTRAMUSCULAR; INTRAVENOUS EVERY 8 HOURS
Start: 2020-11-28 | End: 2021-04-26

## 2020-11-28 RX ADMIN — HYDROCODONE BITARTRATE AND ACETAMINOPHEN 1 TABLET: 10; 325 TABLET ORAL at 11:11

## 2020-11-28 RX ADMIN — METHOCARBAMOL 750 MG: 750 TABLET ORAL at 01:11

## 2020-11-28 RX ADMIN — METHOCARBAMOL 750 MG: 750 TABLET ORAL at 09:11

## 2020-11-28 RX ADMIN — HYDROMORPHONE HYDROCHLORIDE 8 MG: 2 TABLET ORAL at 04:11

## 2020-11-28 RX ADMIN — CEFAZOLIN 2 G: 1 INJECTION, POWDER, FOR SOLUTION INTRAMUSCULAR; INTRAVENOUS at 09:11

## 2020-11-28 RX ADMIN — GABAPENTIN 400 MG: 400 CAPSULE ORAL at 09:11

## 2020-11-28 NOTE — PLAN OF CARE
Pt discharged per MD orders.  Tele discontinued and returned to station.  IV discontinued; catheter tip intact x.  Medication list and prescriptions reviewed; prescriptions sent to pt preferred pharmacy and printed prescriptions provided.  Pt verbalizes understanding of all written and verbal discharge instructions.  Pt awaiting family/escort arrival.  Will continue to monitor.

## 2020-11-28 NOTE — ASSESSMENT & PLAN NOTE
Jaime Lund is a 50 y.o. male with chronic pain syndrome s/p IT pain pump which was revised 3 weeks complicated by wound infection now s.p IT removal and wound washout on ancef. He represented with pink drainage, low grade fever and some fatigue. WBC WNL. CRP trending down and procal WNL. ESR still pending. US and CT likely concerning for seroma/hematoma. NSGY consulted for further evaluation.     IR drain placed yesterday. Cx w/staph aureus.      -- No neurosurgical intervention necessary   -- Continue Hm admission   -- IR consult for US IR drainage, f/up cx  -- ID consult for further recommendation - continue Ancef  -- Trend infection markers weekly  -- Continue abx per ID and HM   -- Will continue to follow

## 2020-11-28 NOTE — PLAN OF CARE
1050  CM was informed by Dr. Jesus that the patient is medically stable to discharge with HH & IV abx. Patient was previous receiving HH care from [facility] & IV abx from [facility]. Referral to both companies previously sent. HH orders sent to [facility]. CM was informed by Jh ([phone]) w/[facility] & Alvin ([phone]) w/[facility] that both companies will resume care. [facility] scheduled to visit the patient on 12/1/2020 & the patient already has IV cefepime doses at home.     1120  Patient awake & alert in bed with spouse, Claudia Gandara ([phone]), at the bedside when CM rounded. RUE PICC & ERIKA drain intact. Patient & spouse in agreement with discharge plan. Claudia stated that she will provide transportation at time of discharge. KENDAL informed nurse Claudia ([phone]) of discharge status.

## 2020-11-28 NOTE — SUBJECTIVE & OBJECTIVE
Interval History: NAEON. Afebrile. IR cx NGTD. Wife at bedside. Patient eager to go home. All questions answered.     Review of Systems   Constitutional: Negative for chills and fever.   Gastrointestinal: Positive for abdominal distention.        Loose stools   Skin: Positive for rash and wound.   All other systems reviewed and are negative.    Objective:     Vital Signs (Most Recent):  Temp: 97.9 °F (36.6 °C) (11/28/20 1155)  Pulse: 87 (11/28/20 1155)  Resp: 20 (11/28/20 1155)  BP: 132/64 (11/28/20 1155)  SpO2: 100 % (11/28/20 1155) Vital Signs (24h Range):  Temp:  [96.8 °F (36 °C)-99.7 °F (37.6 °C)] 97.9 °F (36.6 °C)  Pulse:  [68-87] 87  Resp:  [16-20] 20  SpO2:  [96 %-100 %] 100 %  BP: (113-158)/(61-88) 132/64     Weight: (!) 143.3 kg (315 lb 14.7 oz)  Body mass index is 44.06 kg/m².    Estimated Creatinine Clearance: 181 mL/min (based on SCr of 0.7 mg/dL).    Physical Exam  Constitutional:       General: He is not in acute distress.     Appearance: He is well-developed. He is obese. He is not toxic-appearing.   HENT:      Head: Normocephalic and atraumatic.      Right Ear: External ear normal.      Left Ear: External ear normal.      Nose: Nose normal.   Eyes:      General:         Right eye: No discharge.         Left eye: No discharge.      Conjunctiva/sclera: Conjunctivae normal.   Neck:      Thyroid: No thyromegaly.   Cardiovascular:      Rate and Rhythm: Normal rate and regular rhythm.   Pulmonary:      Effort: Pulmonary effort is normal.      Breath sounds: Normal breath sounds.   Abdominal:      General: Bowel sounds are normal. There is distension.      Palpations: Abdomen is soft.      Tenderness: There is no abdominal tenderness.   Lymphadenopathy:      Cervical: No cervical adenopathy.   Skin:     General: Skin is warm and dry.      Comments: Abdominal incision site with erythema/induration; serosang discharge from staples  Drain placed. Serosanguinous drainage noted in bulb.  Lumbar incision site -  no erythema or induration  R PICC - no erythema or induration     Neurological:      Mental Status: He is alert and oriented to person, place, and time. Mental status is at baseline.      Motor: No weakness.         Significant Labs: All pertinent labs within the past 24 hours have been reviewed.    Significant Imaging: I have reviewed all pertinent imaging results/findings within the past 24 hours.

## 2020-11-28 NOTE — PROGRESS NOTES
[facility]  Infectious Disease  Progress Note    Patient Name: Jaime Lund  MRN: [id number]  Admission Date: 11/25/2020  Length of Stay: 0 days  Attending Physician: Letitia Jesus MD  Primary Care Provider: Gabby Dean MD    Isolation Status: No active isolations  Assessment/Plan:      Infection of deep incisional surgical site after procedure  Recently admitted for MSSA surgical site infection with subsequent removal of IT pain pump 11/18 on outpatient IV Cefazolin now admitted with induration and erythema around abdominal wound found to have rim enhancing fluid collection on CT deep to staple line and tracking to skin surface. NSGY consulted and did not recommend surgical intervention. Patient underwent IR drainage and drain placement yesterday. Gram stain w/ many WBC, no organisms. Cx NGTD. He is on Cefazolin. Afebrile. No leukocytosis. HDS.     Recommendations:  Cefazolin 2 g IV q 8 hours for 4-6 weeks from day of drain placement    End date of IV antibiotics: 12/25/20 - 1/8/21 (ultimate duration TBD based on clinical response)    Weekly outpatient laboratory on Monday or Tuesday while on IV antibiotics.    CBC   CMP   ESR and CRP    Fax laboratory results to [facility] at [phone] with attn: Larissa Moore PA-C    Outpatient Infectious Diseases clinic follow up will be arranged and found in patient calendar. Will follow cultures as an outpatient and tailor antibiotic therapy as needed. Please arrange repeat imaging in a few weeks for reassessment of this collection.    Prior to discharge, please ensure the patient's follow-up has been scheduled.  If there is still no follow-up scheduled in Infectious Diseases clinic, please send an EPIC message to Tabitha Porras in Infectious Diseases.    ID will sign off.      Please call for any questions. Thank you.  Larissa Moore PA-C  Phone: [phone]  Pager: [phone]    Subjective:     Principal Problem:Abdominal wall  seroma    HPI: 50M with h/o chronic pain with intrathecal pain pump and prior L4-5 fusion with recent admission due to increased redness around prior pain pump s/p washout with removal of IT pain pump/cath with OR cx + MSSA (maintained on cefazolin) admitted to [facility] for worsening abdominal drainage/redness . ID consulted for worsening wound.    Since admission, pt had imaging done that revealed rim enhancing fluid collection. Pt has been afebrile and without leukocytosis. Blcx on admit so far ngtd. Pt is currently on cefazolin. Pt states that after discharge from the hospital he noticed increased abdominal distention and reddish discharge from his abdominal wound with associated redness and pain. Denied fevers or chills. Has some loose stools that are chronic. Denied issues with prior PICC.         Interval History: NAEON. Afebrile. IR cx NGTD. Wife at bedside. Patient eager to go home. All questions answered.     Review of Systems   Constitutional: Negative for chills and fever.   Gastrointestinal: Positive for abdominal distention.        Loose stools   Skin: Positive for rash and wound.   All other systems reviewed and are negative.    Objective:     Vital Signs (Most Recent):  Temp: 97.9 °F (36.6 °C) (11/28/20 1155)  Pulse: 87 (11/28/20 1155)  Resp: 20 (11/28/20 1155)  BP: 132/64 (11/28/20 1155)  SpO2: 100 % (11/28/20 1155) Vital Signs (24h Range):  Temp:  [96.8 °F (36 °C)-99.7 °F (37.6 °C)] 97.9 °F (36.6 °C)  Pulse:  [68-87] 87  Resp:  [16-20] 20  SpO2:  [96 %-100 %] 100 %  BP: (113-158)/(61-88) 132/64     Weight: (!) 143.3 kg (315 lb 14.7 oz)  Body mass index is 44.06 kg/m².    Estimated Creatinine Clearance: 181 mL/min (based on SCr of 0.7 mg/dL).    Physical Exam  Constitutional:       General: He is not in acute distress.     Appearance: He is well-developed. He is obese. He is not toxic-appearing.   HENT:      Head: Normocephalic and atraumatic.      Right Ear: External ear normal.      Left Ear: External  ear normal.      Nose: Nose normal.   Eyes:      General:         Right eye: No discharge.         Left eye: No discharge.      Conjunctiva/sclera: Conjunctivae normal.   Neck:      Thyroid: No thyromegaly.   Cardiovascular:      Rate and Rhythm: Normal rate and regular rhythm.   Pulmonary:      Effort: Pulmonary effort is normal.      Breath sounds: Normal breath sounds.   Abdominal:      General: Bowel sounds are normal. There is distension.      Palpations: Abdomen is soft.      Tenderness: There is no abdominal tenderness.   Lymphadenopathy:      Cervical: No cervical adenopathy.   Skin:     General: Skin is warm and dry.      Comments: Abdominal incision site with erythema/induration; serosang discharge from staples  Drain placed. Serosanguinous drainage noted in bulb.  Lumbar incision site - no erythema or induration  R PICC - no erythema or induration     Neurological:      Mental Status: He is alert and oriented to person, place, and time. Mental status is at baseline.      Motor: No weakness.         Significant Labs: All pertinent labs within the past 24 hours have been reviewed.    Significant Imaging: I have reviewed all pertinent imaging results/findings within the past 24 hours.

## 2020-11-28 NOTE — PLAN OF CARE
Problem: Fall Injury Risk  Goal: Absence of Fall and Fall-Related Injury  Outcome: Ongoing, Progressing     Problem: Adult Inpatient Plan of Care  Goal: Plan of Care Review  Outcome: Ongoing, Progressing  Goal: Patient-Specific Goal (Individualization)  Outcome: Ongoing, Progressing  Goal: Absence of Hospital-Acquired Illness or Injury  Outcome: Ongoing, Progressing  Goal: Optimal Comfort and Wellbeing  Outcome: Ongoing, Progressing  Goal: Readiness for Transition of Care  Outcome: Ongoing, Progressing  Goal: Rounds/Family Conference  Outcome: Ongoing, Progressing     Problem: Bariatric Environmental Safety  Goal: Safety Maintained with Care  Outcome: Ongoing, Progressing     Problem: Infection  Goal: Infection Symptom Resolution  Outcome: Ongoing, Progressing     Problem: Pain Acute  Goal: Optimal Pain Control  Outcome: Ongoing, Progressing

## 2020-11-28 NOTE — SUBJECTIVE & OBJECTIVE
Interval History: NAEON. ERIKA with 105. Cx with staph aureus. Bandage c/d/i.     Medications:  Continuous Infusions:  Scheduled Meds:   ceFAZolin (ANCEF) IVPB  2 g Intravenous Q8H    DULoxetine  60 mg Oral QHS    gabapentin  400 mg Oral BID    methocarbamoL  750 mg Oral QID     PRN Meds:acetaminophen, albuterol-ipratropium, butalbital-acetaminophen-caffeine -40 mg, dextrose 50%, dextrose 50%, glucagon (human recombinant), glucose, glucose, INV hydrALAZINE, HYDROcodone-acetaminophen, HYDROmorphone, HYDROmorphone, hydrOXYzine pamoate, ondansetron, promethazine, sodium chloride 0.9%, tiZANidine, traZODone     Review of Systems  Objective:     Weight: (!) 143.3 kg (315 lb 14.7 oz)  Body mass index is 44.06 kg/m².  Vital Signs (Most Recent):  Temp: 97.8 °F (36.6 °C) (11/28/20 0709)  Pulse: 75 (11/28/20 0709)  Resp: 16 (11/28/20 0709)  BP: (!) 158/88 (11/28/20 0709)  SpO2: 96 % (11/28/20 0709) Vital Signs (24h Range):  Temp:  [96.8 °F (36 °C)-99.7 °F (37.6 °C)] 97.8 °F (36.6 °C)  Pulse:  [68-85] 75  Resp:  [16-20] 16  SpO2:  [95 %-99 %] 96 %  BP: (113-160)/(61-88) 158/88                          Closed/Suction Drain 11/27/20 0900 Right RLQ Bulb (Active)   Site Description Unable to view 11/27/20 2105   Dressing Type Gauze 11/27/20 2105   Dressing Status Clean;Dry;Intact 11/27/20 2105   Dressing Intervention Integrity maintained 11/27/20 2105   Drainage Bloody 11/27/20 2105   Status To bulb suction 11/27/20 2105   Output (mL) 30 mL 11/28/20 0619       Neurosurgery Physical Exam   AOx3  PERRL  EOMI  Full strength  SILT  Abdominal incision with bandage intact  Abdomen nontender    Significant Labs:  Recent Labs   Lab 11/27/20 0606   GLU 98      K 3.8      CO2 27   BUN 9   CREATININE 0.7   CALCIUM 9.2   MG 2.2     Recent Labs   Lab 11/27/20 0606   WBC 7.54   HGB 11.9*   HCT 38.8*   *     No results for input(s): LABPT, INR, APTT in the last 48 hours.  Microbiology Results (last 7 days)      Procedure Component Value Units Date/Time    Gram stain [[id number]] Collected: 11/27/20 1112    Order Status: Completed Specimen: Abscess from Abdomen Updated: 11/27/20 1836     Gram Stain Result Many WBC's       No organisms seen    Fungus culture [[id number]] Collected: 11/27/20 1112    Order Status: No result Specimen: Abscess from Abdomen Updated: 11/27/20 1616    Blood culture #2 [[id number]] Collected: 11/25/20 1347    Order Status: Completed Specimen: Blood from Peripheral, Antecubital, Left Updated: 11/27/20 1503     Blood Culture, Routine No Growth to date      No Growth to date      No Growth to date    Narrative:      Blood Culture #2    Blood culture #1 [[id number]] Collected: 11/25/20 1248    Order Status: Completed Specimen: Blood from Peripheral, Antecubital, Left Updated: 11/27/20 1503     Blood Culture, Routine No Growth to date      No Growth to date      No Growth to date    Narrative:      Blood Culture #1    Fungus culture [[id number]]     Order Status: Completed Specimen: Abdominal     Aerobic culture [[id number]] Collected: 11/27/20 1112    Order Status: Sent Specimen: Abscess from Abdomen Updated: 11/27/20 1342    Culture, Anaerobe [[id number]] Collected: 11/27/20 1112    Order Status: Sent Specimen: Abscess from Abdomen Updated: 11/27/20 1342          Significant Diagnostics:  Ir Abscess Drainage Subdiaphragm    Result Date: 11/27/2020  Percutaneous placement of a 10 Slovenian Slovenian drainage catheter into ventral subcutaneous soft tissues, yielding 10 mL of serosanguinous fluid. Plan: Follow-up culture and output.  Recommend flushing the drain with 10 mL normal saline twice daily.  Once tube output is less than 20 mL per day for 2 consecutive days, consider reimaging prior to drain removal. Attestation: Signer name: Olegario Butcher I attest that I was present for the entire procedure. I reviewed the stored images and agree with the report as written. Electronically signed by: Olegario Butcher  Date:    11/27/2020 Time:    14:58

## 2020-11-28 NOTE — HOSPITAL COURSE
Abdominal wall seroma  Infection of deep incisional surgical site after procedure     - WBC WNL  - Lactic acid 1.7, ESR 67, CRP 5.6.  - Patient afebrile, no evidence of tachycardia, hypotension.  - CT abdomen/pelvis:  1. Recent surgical change of the anterior abdominal wall.  Deep to the incision, is a rim enhancing air and fluid collection that appears to communicate with the skin surface.  Air within the collection may reflect air introduced from communication with the skin surface, however infected collection is not excluded.  Correlation is advised.  2. Left nonobstructive nephrolithiasis.  3. Colonic diverticulosis without diverticulitis.  - US abdomen:   Deep to the incision, there is a heterogeneous subcutaneous fluid collection with tract to the skin surface.  Lack of hyperemia within the surrounding soft tissues suggests this may reflect that of seroma or hematoma rather than abscess although infected collection is not excluded.  - Appreciate neurosurgery's assistance.  They recommend IR consult  US guided drainage. Patient is s/p drain placement 11/27. Cultures NGTD.   - Discussed with ID - Continue Ancef 2g q8 for 4-6 weeks from date of drain placement  - Has upcoming PCP and ID f/u. Repeat imaging in 3 weeks prior to drain removal. IR referral placed for drain follow up.      Chronic pain syndrome  - Continue home Cymbalta 60mg qHS, gabapentin 400mg BID, methocarbamol 750mg QID.  - Normally takes dilaudid 8mg PO TID PRN.  LA  reviewed. Resume home meds        Essential hypertension  - Not on any BP medications at this time, although patient states that his wife generally manages his home medications.

## 2020-11-28 NOTE — DISCHARGE SUMMARY
[facility] Hospital Medicine  Discharge Summary      Patient Name: Jaime Lund  MRN: [id number]  Admission Date: 11/25/2020  Hospital Length of Stay: 3 days  Discharge Date and Time:  11/28/2020 11:57 AM  Attending Physician: Letitia Jesus MD   Discharging Provider: Letitia Jesus MD  Primary Care Provider: Gabby Dean MD  Hospital Medicine Team: [facility] HOSP MED D Letitia Jesus MD    HPI:   Patient is a 50 year old gentleman with a h/o chronic pain syndrome.  He recently had his pain pump revised by an [facility] pain specialist about three weeks ago.  However, this was complicated by a wound infection.  He underwent a removal and washout by [facility].  Cultures grew MSSA and patient was discharged with Ancef.  Today, he presented to [facility] due to discharge from abdominal surgical site.  He notes a Tmax of 100.4F, but states his temperature was mainly around 99F.  He does complain of diarrhea and chronic lower back pain.  US showed a fluid collection, and he was transferred to [facility] for neurosurgery evaluation.  He denies chest pain, SOB, dizziness, palpitations, chills, N/V.    * No surgery found *      Hospital Course:   Abdominal wall seroma  Infection of deep incisional surgical site after procedure     - WBC WNL  - Lactic acid 1.7, ESR 67, CRP 5.6.  - Patient afebrile, no evidence of tachycardia, hypotension.  - CT abdomen/pelvis:  1. Recent surgical change of the anterior abdominal wall.  Deep to the incision, is a rim enhancing air and fluid collection that appears to communicate with the skin surface.  Air within the collection may reflect air introduced from communication with the skin surface, however infected collection is not excluded.  Correlation is advised.  2. Left nonobstructive nephrolithiasis.  3. Colonic diverticulosis without diverticulitis.  - US abdomen:   Deep to the incision, there is a heterogeneous subcutaneous fluid collection with tract to the skin surface.   Lack of hyperemia within the surrounding soft tissues suggests this may reflect that of seroma or hematoma rather than abscess although infected collection is not excluded.  - Appreciate neurosurgery's assistance.  They recommend IR consult  US guided drainage. Patient is s/p drain placement 11/27. Cultures NGTD.   - Discussed with ID - Continue Ancef 2g q8 for 4-6 weeks from date of drain placement  - Has upcoming PCP and ID f/u. Repeat imaging in 3 weeks prior to drain removal. IR referral placed for drain follow up.      Chronic pain syndrome  - Continue home Cymbalta 60mg qHS, gabapentin 400mg BID, methocarbamol 750mg QID.  - Normally takes dilaudid 8mg PO TID PRN.  LA  reviewed. Resume home meds        Essential hypertension  - Not on any BP medications at this time, although patient states that his wife generally manages his home medications.     Consults:   Consults (From admission, onward)        Status Ordering Provider     Inpatient consult to Infectious Diseases  Once     Provider:  (Not yet assigned)    Completed LINA LOPEZ     Inpatient consult to Interventional Radiology  Once     Provider:  (Not yet assigned)    Completed LINA LOPEZ     Inpatient consult to Neurosurgery  Once     Provider:  (Not yet assigned)    Completed KAREN TRIPLETT          No new Assessment & Plan notes have been filed under this hospital service since the last note was generated.  Service: Hospital Medicine    Final Active Diagnoses:    Diagnosis Date Noted POA    PRINCIPAL PROBLEM:  Abdominal wall seroma [S30.1XXA] 11/25/2020 Yes    Cellulitis [L03.90]  Unknown    Infection of deep incisional surgical site after procedure [T81.42XA] 11/25/2020 Yes    Chronic pain syndrome [G89.4] 03/28/2017 Yes     Chronic    Essential hypertension [I10] 03/18/2014 Yes     Chronic      Problems Resolved During this Admission:       Discharged Condition: good    Disposition: Home or Self Care    Follow  Up:  Follow-up Information     [facility] On 12/1/2020.    Why: will provide home health services  Contact information:  [address]  [facility] [id number] [phone]           [facility] On 11/28/2020.    Why: will provide IV antibiotics  Contact information:  [address]  [address]  [facility] [address]  [phone]               Patient Instructions:      CT Abdomen Pelvis W Wo Contrast   Standing Status: Future Standing Exp. Date: 11/28/21     Order Specific Question Answer Comments   Is the patient allergic to iodine or contrast? No    Is the patient on ANY Metformin drug such as Glucophage/Glucovance?           Should be off drug 48 hours after contrast. Check renal function before restart. No    History of Kidney Disease - including: decreased kidney function, dialysis, kidney transplay, single kidney, kidney cancer, kidney surgery? None    Does the patient have high blood pressure requiring medical treatment? Yes    Diabetes? No    May the Radiologist modify the order per protocol to meet the clinical needs of the patient? Yes    Oral/Rectal Contrast instructions: NO Oral Contrast    Special CT ABD Protocol Request? Routine      Ambulatory referral/consult to Interventional Radiology   Standing Status: Future   Referral Priority: Routine Referral Type: Consultation   Referral Reason: Specialty Services Required   Requested Specialty: Interventional Radiology   Number of Visits Requested: 1     Notify your health care provider if you experience any of the following:  temperature >100.4     Notify your health care provider if you experience any of the following:  severe uncontrolled pain     Notify your health care provider if you experience any of the following:  persistent dizziness, light-headedness, or visual disturbances     Notify your health care provider if you experience any of the following:  increased confusion or weakness      Activity as tolerated       Significant Diagnostic Studies: Labs:   CMP   Recent Labs   Lab 11/27/20  0606      K 3.8      CO2 27   GLU 98   BUN 9   CREATININE 0.7   CALCIUM 9.2   PROT 6.9   ALBUMIN 3.1*   BILITOT 0.3   ALKPHOS 89   AST 24   ALT 14   ANIONGAP 9   ESTGFRAFRICA >60.0   EGFRNONAA >60.0    and CBC   Recent Labs   Lab 11/27/20  0606   WBC 7.54   HGB 11.9*   HCT 38.8*   *       Pending Diagnostic Studies:     None         Medications:  Reconciled Home Medications:      Medication List      CHANGE how you take these medications    ceFAZolin 1 gram injection  Commonly known as: ANCEF  Inject 2,000 mg (2 g total) into the vein every 8 (eight) hours.  What changed:   · how much to take  · how to take this  · when to take this     furosemide 40 MG tablet  Commonly known as: LASIX  TAKE ONE TABLET BY MOUTH ONCE DAILY FOR FLUID  What changed: See the new instructions.        CONTINUE taking these medications    butalbital-acetaminophen-caffeine -40 mg -40 mg per tablet  Commonly known as: FIORICET, ESGIC  TAKE ONE TABLET BY MOUTH EVERY 4 HOURS AS NEEDED FOR HEADACHE     DULoxetine 60 MG capsule  Commonly known as: CYMBALTA  TAKE 1 CAPSULE BY MOUTH EVERY EVENING     gabapentin 400 MG capsule  Commonly known as: NEURONTIN  2 (two) times daily. Takes 1 400mg pill every AM, Takes 2 (400mg) at night     HYDROmorphone 8 MG tablet  Commonly known as: DILAUDID  Take 8 mg by mouth every 6 (six) hours as needed for Pain.     meloxicam 15 MG tablet  Commonly known as: MOBIC  TAKE ONE TABLET BY MOUTH ONCE DAILY AS NEEDED FOR PAIN     methocarbamoL 750 MG Tab  Commonly known as: ROBAXIN  Take 750 mg by mouth 4 (four) times daily.     ondansetron 4 MG tablet  Commonly known as: ZOFRAN  Take 1 tablet (4 mg total) by mouth every 6 (six) hours as needed.     potassium chloride 10 MEQ Tbsr  Commonly known as: KLOR-CON  TAKE ONE TABLET BY MOUTH ONCE DAILY AS NEEDED WITH FLUID PILL     tiZANidine  2 MG tablet  Commonly known as: ZANAFLEX  TAKE ONE TABLET BY MOUTH THREE TIMES DAILY FOR MUSCLE SPASMS.        STOP taking these medications    torsemide 10 MG Tab  Commonly known as: DEMADEX            Indwelling Lines/Drains at time of discharge:   Lines/Drains/Airways     Peripherally Inserted Central Catheter Line            PICC Double Lumen 11/20/20 0348 right basilic 8 days          Drain                 Closed/Suction Drain 11/27/20 0900 Right RLQ Bulb 1 day                Time spent on the discharge of patient: 29 minutes  Patient was seen and examined on the date of discharge and determined to be suitable for discharge.         Letitia Jesus MD  Department of Hospital Medicine  [facility]

## 2020-11-28 NOTE — ASSESSMENT & PLAN NOTE
Recently admitted for MSSA surgical site infection with subsequent removal of IT pain pump 11/18 on outpatient IV Cefazolin now admitted with induration and erythema around abdominal wound found to have rim enhancing fluid collection on CT deep to staple line and tracking to skin surface. NSGY consulted and did not recommend surgical intervention. Patient underwent IR drainage and drain placement yesterday. Gram stain w/ many WBC, no organisms. Cx NGTD. He is on Cefazolin. Afebrile. No leukocytosis. HDS.     Recommendations:  Cefazolin 2 g IV q 8 hours for 4-6 weeks from day of drain placement    End date of IV antibiotics: 12/25/20 - 1/8/21 (ultimate duration TBD based on clinical response)    Weekly outpatient laboratory on Monday or Tuesday while on IV antibiotics.    CBC   CMP   ESR and CRP    Fax laboratory results to [facility] at [phone] with attn: Larissa Moore PA-C    Outpatient Infectious Diseases clinic follow up will be arranged and found in patient calendar. Will follow cultures as an outpatient and tailor antibiotic therapy as needed.     Please arrange repeat imaging in a few weeks for reassessment of this collection.    Prior to discharge, please ensure the patient's follow-up has been scheduled.  If there is still no follow-up scheduled in Infectious Diseases clinic, please send an EPIC message to Tabitha Porras in Infectious Diseases.    ID will sign off.

## 2020-11-28 NOTE — PLAN OF CARE
[facility]    HOME HEALTH ORDERS  FACE TO FACE ENCOUNTER    Patient Name: Jaime Lund  YOB: 1970    PCP: Gabby Dean MD   PCP Address: [address] [place] [address] [address] [place] [address]  PCP Phone Number: [phone]  PCP Fax: [phone]    Encounter Date: 11/28/2020    Admit to Home Health    Diagnoses:  Active Hospital Problems    Diagnosis  POA    *Abdominal wall seroma [S30.1XXA]  Yes    Cellulitis [L03.90]  Unknown    Infection of deep incisional surgical site after procedure [T81.42XA]  Yes    Chronic pain syndrome [G89.4]  Yes     Chronic    Essential hypertension [I10]  Yes     Chronic      Resolved Hospital Problems   No resolved problems to display.       Future Appointments   Date Time Provider Department Center   12/1/2020 10:20 AM Gabby Dean MD [facility] MED [place]   12/7/2020 11:20 AM Valentina Rojas PA-C [facility]   12/16/2020 10:00 AM Cherry Pickard MD [facility] [address]           I have seen and examined this patient face to face today. My clinical findings that support the need for the home health skilled services and home bound status are the following:  Medical restrictions requiring assistance of another human to leave home due to  IV infusion Needs.    Allergies:  Review of patient's allergies indicates:   Allergen Reactions    Klonopin [clonazepam] Anxiety and Other (See Comments)     Becomes agitated    Valium [diazepam] Other (See Comments)     Becomes agitated    Xanax [alprazolam] Anxiety and Other (See Comments)     Becomes agitated       Diet: 2 gram sodium diet    Activities: activity as tolerated    Nursing:   SN to complete comprehensive assessment including routine vital signs. Instruct on disease process and s/s of complications to report to MD. Review/verify medication list sent home with the patient at time of discharge  and instruct patient/caregiver as needed. Frequency may be adjusted  depending on start of care date.    Notify MD if SBP > 160 or < 90; DBP > 90 or < 50; HR > 120 or < 50; Temp > 101; Other:           MISCELLANEOUS CARE:  Home Infusion Therapy:   SN to perform Infusion Therapy/Central Line Care.  Review Central Line Care & Central Line Flush with patient.    Administer (drug and dose): IV cefazolin 2 g q8h, end date 1/8/21  Last dose given: today 9am                        Home dose due: 1700    Scrub the Hub: Prior to accessing the line, always perform a 30 second alcohol scrub  Each lumen of the central line is to be flushed at least daily with 10 mL Normal Saline and 3 mL Heparin flush (10 units/mL)  Skilled Nurse (SN) may draw blood from IV access  Blood Draw Procedure:   - Aspirate at least 5 mL of blood   - Discard   - Obtain specimen   - Change injection cap   - Flush with 20 mL Normal Saline followed by a                 3-5 mL Heparin flush (10 units/mL)  Central :   - Sterile dressing changes are done weekly and as needed.   - Use chlor-hexadine scrub to cleanse site, apply Biopatch to insertion site,       apply securement device dressing   - Injection caps are changed weekly and after EVERY lab draw.   - If sterile gauze is under dressing to control oozing,                 dressing change must be performed every 24 hours until gauze is not needed.      Medications: Review discharge medications with patient and family and provide education.      Current Discharge Medication List      CONTINUE these medications which have CHANGED    Details   ceFAZolin (ANCEF) 1 gram injection Inject 2,000 mg (2 g total) into the vein every 8 (eight) hours.  Qty:           CONTINUE these medications which have NOT CHANGED    Details   butalbital-acetaminophen-caffeine -40 mg (FIORICET, ESGIC) -40 mg per tablet TAKE ONE TABLET BY MOUTH EVERY 4 HOURS AS NEEDED FOR HEADACHE  Qty: 60 tablet, Refills: 5    Comments: This prescription was filled on 6/23/2020. Any  refills authorized will be placed on file.      DULoxetine (CYMBALTA) 60 MG capsule TAKE 1 CAPSULE BY MOUTH EVERY EVENING  Qty: 90 capsule, Refills: 0    Comments: This prescription was filled on 11/5/2020. Any refills authorized will be placed on file.      gabapentin (NEURONTIN) 400 MG capsule 2 (two) times daily. Takes 1 400mg pill every AM, Takes 2 (400mg) at night      HYDROmorphone (DILAUDID) 8 MG tablet Take 8 mg by mouth every 6 (six) hours as needed for Pain.    Comments: Quantity prescribed more than 7 day supply? Press F2 and select one:[id number]        meloxicam (MOBIC) 15 MG tablet TAKE ONE TABLET BY MOUTH ONCE DAILY AS NEEDED FOR PAIN  Qty: 90 tablet, Refills: 0    Comments: This prescription was filled on 11/5/2020. Any refills authorized will be placed on file.  Associated Diagnoses: Facet arthropathy, lumbar      methocarbamoL (ROBAXIN) 750 MG Tab Take 750 mg by mouth 4 (four) times daily.      ondansetron (ZOFRAN) 4 MG tablet Take 1 tablet (4 mg total) by mouth every 6 (six) hours as needed.  Qty: 20 tablet, Refills: 0    Associated Diagnoses: Nausea      potassium chloride (KLOR-CON) 10 MEQ TbSR TAKE ONE TABLET BY MOUTH ONCE DAILY AS NEEDED WITH FLUID PILL  Qty: 30 tablet, Refills: 0    Associated Diagnoses: Leg swelling      tiZANidine (ZANAFLEX) 2 MG tablet TAKE ONE TABLET BY MOUTH THREE TIMES DAILY FOR MUSCLE SPASMS.  Qty: 90 tablet, Refills: 3    Associated Diagnoses: Chronic pain syndrome; Postlaminectomy syndrome of lumbar region      furosemide (LASIX) 40 MG tablet TAKE ONE TABLET BY MOUTH ONCE DAILY FOR FLUID  Qty: 90 tablet, Refills: 1    Comments: This prescription was filled on 7/16/2020. Any refills authorized will be placed on file.  Associated Diagnoses: Pain and swelling of lower leg, unspecified laterality         STOP taking these medications       torsemide (DEMADEX) 10 MG Tab Comments:   Reason for Stopping:               I certify that this patient is confined to his home and needs  intermittent skilled nursing care.

## 2020-11-28 NOTE — NURSING
Patient's ERIKA drain output was 30mL of sanguineus flood. IV antibiotics administered through PICC. PO pain medications given to patient. Patient denies and stress. Sleep aid provided per mar. Plan of care reviewed with the patient and the possibility of going home today.

## 2020-11-28 NOTE — PROGRESS NOTES
[facility]  Neurosurgery  Progress Note    Subjective:     History of Present Illness: Jaime Lund is a 50 y.o. year old male with PMHx of HTN and chronic pain syndrome s/p IT pain pump which was revised 3 weeks by pain specialist and complicated by wound infection now s.p IT removal and wound washout by .Cx grew MSSA. He was discharged on HH ancef. He presented to the ED with pinkish/redish drainage. He reports low grade fever and some fatigue otherwise denies any other sx. No meningitis sx. NSGY consulted for further evaluation.     Post-Op Info:  * No surgery found *         Interval History: NAEON. ERIKA with 105. Cx with staph aureus. Bandage c/d/i.     Medications:  Continuous Infusions:  Scheduled Meds:   ceFAZolin (ANCEF) IVPB  2 g Intravenous Q8H    DULoxetine  60 mg Oral QHS    gabapentin  400 mg Oral BID    methocarbamoL  750 mg Oral QID     PRN Meds:acetaminophen, albuterol-ipratropium, butalbital-acetaminophen-caffeine -40 mg, dextrose 50%, dextrose 50%, glucagon (human recombinant), glucose, glucose, INV hydrALAZINE, HYDROcodone-acetaminophen, HYDROmorphone, HYDROmorphone, hydrOXYzine pamoate, ondansetron, promethazine, sodium chloride 0.9%, tiZANidine, traZODone     Review of Systems  Objective:     Weight: (!) 143.3 kg (315 lb 14.7 oz)  Body mass index is 44.06 kg/m².  Vital Signs (Most Recent):  Temp: 97.8 °F (36.6 °C) (11/28/20 0709)  Pulse: 75 (11/28/20 0709)  Resp: 16 (11/28/20 0709)  BP: (!) 158/88 (11/28/20 0709)  SpO2: 96 % (11/28/20 0709) Vital Signs (24h Range):  Temp:  [96.8 °F (36 °C)-99.7 °F (37.6 °C)] 97.8 °F (36.6 °C)  Pulse:  [68-85] 75  Resp:  [16-20] 16  SpO2:  [95 %-99 %] 96 %  BP: (113-160)/(61-88) 158/88                          Closed/Suction Drain 11/27/20 0900 Right RLQ Bulb (Active)   Site Description Unable to view 11/27/20 2105   Dressing Type Gauze 11/27/20 2105   Dressing Status Clean;Dry;Intact 11/27/20 2105   Dressing  Intervention Integrity maintained 11/27/20 2105   Drainage Bloody 11/27/20 2105   Status To bulb suction 11/27/20 2105   Output (mL) 30 mL 11/28/20 0619       Neurosurgery Physical Exam   AOx3  PERRL  EOMI  Full strength  SILT  Abdominal incision with bandage intact  Abdomen nontender    Significant Labs:  Recent Labs   Lab 11/27/20 0606   GLU 98      K 3.8      CO2 27   BUN 9   CREATININE 0.7   CALCIUM 9.2   MG 2.2     Recent Labs   Lab 11/27/20  0606   WBC 7.54   HGB 11.9*   HCT 38.8*   *     No results for input(s): LABPT, INR, APTT in the last 48 hours.  Microbiology Results (last 7 days)     Procedure Component Value Units Date/Time    Gram stain [[id number]] Collected: 11/27/20 1112    Order Status: Completed Specimen: Abscess from Abdomen Updated: 11/27/20 1836     Gram Stain Result Many WBC's       No organisms seen    Fungus culture [[id number]] Collected: 11/27/20 1112    Order Status: No result Specimen: Abscess from Abdomen Updated: 11/27/20 1616    Blood culture #2 [[id number]] Collected: 11/25/20 1347    Order Status: Completed Specimen: Blood from Peripheral, Antecubital, Left Updated: 11/27/20 1503     Blood Culture, Routine No Growth to date      No Growth to date      No Growth to date    Narrative:      Blood Culture #2    Blood culture #1 [[id number]] Collected: 11/25/20 1248    Order Status: Completed Specimen: Blood from Peripheral, Antecubital, Left Updated: 11/27/20 1503     Blood Culture, Routine No Growth to date      No Growth to date      No Growth to date    Narrative:      Blood Culture #1    Fungus culture [[id number]]     Order Status: Completed Specimen: Abdominal     Aerobic culture [[id number]] Collected: 11/27/20 1112    Order Status: Sent Specimen: Abscess from Abdomen Updated: 11/27/20 1342    Culture, Anaerobe [[id number]] Collected: 11/27/20 1112    Order Status: Sent Specimen: Abscess from Abdomen Updated: 11/27/20 1342          Significant Diagnostics:  Ir  Abscess Drainage Subdiaphragm    Result Date: 11/27/2020  Percutaneous placement of a 10 [place]ander drainage catheter into ventral subcutaneous soft tissues, yielding 10 mL of serosanguinous fluid. Plan: Follow-up culture and output.  Recommend flushing the drain with 10 mL normal saline twice daily.  Once tube output is less than 20 mL per day for 2 consecutive days, consider reimaging prior to drain removal. Attestation: Signer name: Olegario Butcher I attest that I was present for the entire procedure. I reviewed the stored images and agree with the report as written. Electronically signed by: Olegario Butcher Date:    11/27/2020 Time:    14:58      Assessment/Plan:     * Abdominal wall seroma  Jaime Lund is a 50 y.o. male with chronic pain syndrome s/p IT pain pump which was revised 3 weeks complicated by wound infection now s.p IT removal and wound washout on ancef. He represented with pink drainage, low grade fever and some fatigue. WBC WNL. CRP trending down and procal WNL. ESR still pending. US and CT likely concerning for seroma/hematoma. NSGY consulted for further evaluation.     IR drain placed yesterday. Cx w/staph aureus.      -- No neurosurgical intervention necessary   -- Continue Hm admission   -- IR consult for US IR drainage, f/up cx  -- ID consult for further recommendation - continue Ancef  -- Trend infection markers weekly  -- Continue abx per ID and HM   -- Will continue to follow        Meena De La Cruz MD  Neurosurgery  [facility]

## 2020-11-30 ENCOUNTER — LAB VISIT (OUTPATIENT)
Dept: LAB | Facility: HOSPITAL | Age: 50
End: 2020-11-30
Attending: INTERNAL MEDICINE
Payer: COMMERCIAL

## 2020-11-30 DIAGNOSIS — L03.311 CELLULITIS OF ABDOMINAL WALL: Primary | ICD-10-CM

## 2020-11-30 LAB
ALBUMIN SERPL BCP-MCNC: 3.2 G/DL (ref 3.5–5.2)
ALP SERPL-CCNC: 85 U/L (ref 55–135)
ALT SERPL W/O P-5'-P-CCNC: 15 U/L (ref 10–44)
ANION GAP SERPL CALC-SCNC: 8 MMOL/L (ref 8–16)
AST SERPL-CCNC: 17 U/L (ref 10–40)
BACTERIA BLD CULT: NORMAL
BACTERIA BLD CULT: NORMAL
BACTERIA SPEC AEROBE CULT: NO GROWTH
BASOPHILS # BLD AUTO: 0.08 K/UL (ref 0–0.2)
BASOPHILS NFR BLD: 1.3 % (ref 0–1.9)
BILIRUB SERPL-MCNC: 0.3 MG/DL (ref 0.1–1)
BUN SERPL-MCNC: 7 MG/DL (ref 6–20)
CALCIUM SERPL-MCNC: 8.8 MG/DL (ref 8.7–10.5)
CHLORIDE SERPL-SCNC: 105 MMOL/L (ref 95–110)
CO2 SERPL-SCNC: 27 MMOL/L (ref 23–29)
CREAT SERPL-MCNC: 0.7 MG/DL (ref 0.5–1.4)
CRP SERPL-MCNC: 2.7 MG/L (ref 0–8.2)
DIFFERENTIAL METHOD: ABNORMAL
EOSINOPHIL # BLD AUTO: 0.1 K/UL (ref 0–0.5)
EOSINOPHIL NFR BLD: 1.6 % (ref 0–8)
ERYTHROCYTE [DISTWIDTH] IN BLOOD BY AUTOMATED COUNT: 14.1 % (ref 11.5–14.5)
ERYTHROCYTE [SEDIMENTATION RATE] IN BLOOD BY WESTERGREN METHOD: 16 MM/HR (ref 0–10)
EST. GFR  (AFRICAN AMERICAN): >60 ML/MIN/1.73 M^2
EST. GFR  (NON AFRICAN AMERICAN): >60 ML/MIN/1.73 M^2
GLUCOSE SERPL-MCNC: 78 MG/DL (ref 70–110)
HCT VFR BLD AUTO: 35 % (ref 40–54)
HGB BLD-MCNC: 11.3 G/DL (ref 14–18)
IMM GRANULOCYTES # BLD AUTO: 0.02 K/UL (ref 0–0.04)
IMM GRANULOCYTES NFR BLD AUTO: 0.3 % (ref 0–0.5)
LYMPHOCYTES # BLD AUTO: 1.5 K/UL (ref 1–4.8)
LYMPHOCYTES NFR BLD: 24.1 % (ref 18–48)
MCH RBC QN AUTO: 27.8 PG (ref 27–31)
MCHC RBC AUTO-ENTMCNC: 32.3 G/DL (ref 32–36)
MCV RBC AUTO: 86 FL (ref 82–98)
MONOCYTES # BLD AUTO: 0.4 K/UL (ref 0.3–1)
MONOCYTES NFR BLD: 6.4 % (ref 4–15)
NEUTROPHILS # BLD AUTO: 4.2 K/UL (ref 1.8–7.7)
NEUTROPHILS NFR BLD: 66.3 % (ref 38–73)
NRBC BLD-RTO: 0 /100 WBC
PLATELET # BLD AUTO: 449 K/UL (ref 150–350)
PMV BLD AUTO: 11.1 FL (ref 9.2–12.9)
POTASSIUM SERPL-SCNC: 4.2 MMOL/L (ref 3.5–5.1)
PROT SERPL-MCNC: 6.7 G/DL (ref 6–8.4)
RBC # BLD AUTO: 4.06 M/UL (ref 4.6–6.2)
SODIUM SERPL-SCNC: 140 MMOL/L (ref 136–145)
WBC # BLD AUTO: 6.36 K/UL (ref 3.9–12.7)

## 2020-11-30 PROCEDURE — 80053 COMPREHEN METABOLIC PANEL: CPT

## 2020-11-30 PROCEDURE — 85025 COMPLETE CBC W/AUTO DIFF WBC: CPT

## 2020-11-30 PROCEDURE — 85652 RBC SED RATE AUTOMATED: CPT

## 2020-11-30 PROCEDURE — 86140 C-REACTIVE PROTEIN: CPT

## 2020-12-01 ENCOUNTER — TELEPHONE (OUTPATIENT)
Dept: INFECTIOUS DISEASES | Facility: CLINIC | Age: 50
End: 2020-12-01

## 2020-12-01 ENCOUNTER — OFFICE VISIT (OUTPATIENT)
Dept: FAMILY MEDICINE | Facility: CLINIC | Age: 50
End: 2020-12-01
Payer: COMMERCIAL

## 2020-12-01 VITALS
WEIGHT: 302.69 LBS | TEMPERATURE: 98 F | HEIGHT: 71 IN | BODY MASS INDEX: 42.38 KG/M2 | HEART RATE: 82 BPM | SYSTOLIC BLOOD PRESSURE: 132 MMHG | DIASTOLIC BLOOD PRESSURE: 88 MMHG | OXYGEN SATURATION: 97 %

## 2020-12-01 DIAGNOSIS — S30.1XXD ABDOMINAL WALL SEROMA, SUBSEQUENT ENCOUNTER: ICD-10-CM

## 2020-12-01 DIAGNOSIS — T81.42XD INFECTION OF DEEP INCISIONAL SURGICAL SITE AFTER PROCEDURE, SUBSEQUENT ENCOUNTER: Primary | ICD-10-CM

## 2020-12-01 PROCEDURE — 99214 OFFICE O/P EST MOD 30 MIN: CPT | Mod: S$GLB,,, | Performed by: PHYSICIAN ASSISTANT

## 2020-12-01 PROCEDURE — 3075F SYST BP GE 130 - 139MM HG: CPT | Mod: CPTII,S$GLB,, | Performed by: PHYSICIAN ASSISTANT

## 2020-12-01 PROCEDURE — 99999 PR PBB SHADOW E&M-EST. PATIENT-LVL IV: ICD-10-PCS | Mod: PBBFAC,,, | Performed by: PHYSICIAN ASSISTANT

## 2020-12-01 PROCEDURE — 3079F PR MOST RECENT DIASTOLIC BLOOD PRESSURE 80-89 MM HG: ICD-10-PCS | Mod: CPTII,S$GLB,, | Performed by: PHYSICIAN ASSISTANT

## 2020-12-01 PROCEDURE — 3075F PR MOST RECENT SYSTOLIC BLOOD PRESS GE 130-139MM HG: ICD-10-PCS | Mod: CPTII,S$GLB,, | Performed by: PHYSICIAN ASSISTANT

## 2020-12-01 PROCEDURE — 3008F PR BODY MASS INDEX (BMI) DOCUMENTED: ICD-10-PCS | Mod: CPTII,S$GLB,, | Performed by: PHYSICIAN ASSISTANT

## 2020-12-01 PROCEDURE — 99999 PR PBB SHADOW E&M-EST. PATIENT-LVL IV: CPT | Mod: PBBFAC,,, | Performed by: PHYSICIAN ASSISTANT

## 2020-12-01 PROCEDURE — 3079F DIAST BP 80-89 MM HG: CPT | Mod: CPTII,S$GLB,, | Performed by: PHYSICIAN ASSISTANT

## 2020-12-01 PROCEDURE — 3008F BODY MASS INDEX DOCD: CPT | Mod: CPTII,S$GLB,, | Performed by: PHYSICIAN ASSISTANT

## 2020-12-01 PROCEDURE — 99214 PR OFFICE/OUTPT VISIT, EST, LEVL IV, 30-39 MIN: ICD-10-PCS | Mod: S$GLB,,, | Performed by: PHYSICIAN ASSISTANT

## 2020-12-01 RX ORDER — TRAZODONE HYDROCHLORIDE 50 MG/1
TABLET ORAL
COMMUNITY
Start: 2020-11-24 | End: 2022-05-07

## 2020-12-01 RX ORDER — METHOCARBAMOL 750 MG/1
TABLET, FILM COATED ORAL
COMMUNITY
End: 2021-02-28

## 2020-12-01 RX ORDER — HYDROCHLOROTHIAZIDE 25 MG/1
TABLET ORAL
COMMUNITY
End: 2021-01-07

## 2020-12-01 NOTE — TELEPHONE ENCOUNTER
----- Message from Larissa Moore PA-C sent at 11/30/2020  3:34 PM CST -----  Please call Mr. Lund and inform him nothing new grew on his cultures and the plan is to continue his current antibiotic.

## 2020-12-01 NOTE — TELEPHONE ENCOUNTER
Called patient and informed no new growths from culture and continue on abx treatment. He stated understood.

## 2020-12-01 NOTE — PROGRESS NOTES
Transitional Care Note  Subjective:       Patient ID: Jaime Lund is a 50 y.o. male.  Chief Complaint: Hospital Follow Up and Suture / Staple Removal    Family and/or Caretaker present at visit?  No.  Diagnostic tests reviewed/disposition: sed rate improved; other labs stable  Disease/illness education: patient aware of follow ups and states he has had this issue in the [facility]   Home health/community services discussion/referrals: Patient has home health established at [facility].   Establishment or re-establishment of referral orders for community resources: No other necessary community resources.   Discussion with other health care providers: will forward note to PCP.     HPI     Pt here for hospital follow x2. He had his obstructed pain pump adjusted which was placed 3 years ago. After this procedure he developed a cellulitis and was taking to the OR with neurosurgery for a washout. He wad d/c with 4 weeks of cefazolin after being started on vanco and zosyn in the hospital. He returned to the hospital a few days after discharge due to drainage and he was seen by both IR and ID. Drained placed. Pt has been feeling good since his discharge. No fever. Regular BMs. Drainage getting lighter in color.     He has follow up appointments scheduled for  1. Post op staple removal with neurosurgery  2. Repeat CT for drain  3. ID appt   No appointment for IR but referral seen in chart.     Review of Systems   Constitutional: Negative for chills, fatigue and fever.   Gastrointestinal: Negative for abdominal pain, diarrhea, nausea and vomiting.   Skin: Positive for wound. Negative for rash.        Staples  Drain in place        Objective:      Physical Exam  Constitutional:       Appearance: He is obese.   HENT:      Head: Normocephalic and atraumatic.      Right Ear: External ear normal.      Left Ear: External ear normal.   Abdominal:       Neurological:      Mental Status: He is alert and oriented to person, place, and  time.   Psychiatric:         Mood and Affect: Mood normal.         Behavior: Behavior normal.         Assessment:       1. Infection of deep incisional surgical site after procedure, subsequent encounter    2. Abdominal wall seroma, subsequent encounter        Plan:       Infection of deep incisional surgical site after procedure, subsequent encounter  The current medical regimen is effective;  continue present plan and medications.  Number given to hospital referrals from [facility] to schedule IR appointment.   Keep appointments for stable removal, repeat imaging, and with ID.   The current medical regimen is effective;  continue present plan and medications.    Abdominal wall seroma, subsequent encounter  The current medical regimen is effective;  continue present plan and medications.

## 2020-12-01 NOTE — Clinical Note
This is just FYI. Let me know if there is anything else we need to do. His labs looked stable with improved sed rate.

## 2020-12-02 ENCOUNTER — PATIENT MESSAGE (OUTPATIENT)
Dept: FAMILY MEDICINE | Facility: CLINIC | Age: 50
End: 2020-12-02

## 2020-12-02 LAB — BACTERIA SPEC ANAEROBE CULT: NORMAL

## 2020-12-03 ENCOUNTER — EXTERNAL HOME HEALTH (OUTPATIENT)
Dept: HOME HEALTH SERVICES | Facility: HOSPITAL | Age: 50
End: 2020-12-03
Payer: COMMERCIAL

## 2020-12-03 ENCOUNTER — PATIENT OUTREACH (OUTPATIENT)
Dept: ADMINISTRATIVE | Facility: OTHER | Age: 50
End: 2020-12-03

## 2020-12-03 ENCOUNTER — TELEPHONE (OUTPATIENT)
Dept: INFECTIOUS DISEASES | Facility: CLINIC | Age: 50
End: 2020-12-03

## 2020-12-03 DIAGNOSIS — T36.95XA ANTIBIOTIC-INDUCED YEAST INFECTION: Primary | ICD-10-CM

## 2020-12-03 DIAGNOSIS — B37.9 ANTIBIOTIC-INDUCED YEAST INFECTION: Primary | ICD-10-CM

## 2020-12-03 RX ORDER — FLUCONAZOLE 150 MG/1
150 TABLET ORAL
Qty: 3 TABLET | Refills: 0 | Status: SHIPPED | OUTPATIENT
Start: 2020-12-03 | End: 2020-12-10

## 2020-12-03 NOTE — TELEPHONE ENCOUNTER
----- Message from Larissa Moore PA-C sent at 12/3/2020 10:50 AM CST -----  Contact: pt (wife)  Please call Mr. Lund and see if he is able to come in to clinic and be seen so an ID provider can take a look at his rash. It looks like we have some openings this afternoon.  ----- Message -----  From: Domi Padilla MA  Sent: 12/3/2020   9:37 AM CST  To: Larissa Moore PA-C    Pt said he was told to notify you if he was to breakout in a rash he says he has it on his legs and its spreading to his buttocks. He is asking for medication for this, please advise.   ----- Message -----  From: Escobar Stevens  Sent: 12/3/2020   8:42 AM CST  To: Oscar GASPAR Staff    Calling to speak with nurse or Dr in regards to a prescription     Call back: [phone]

## 2020-12-04 ENCOUNTER — CLINICAL SUPPORT (OUTPATIENT)
Dept: INFECTIOUS DISEASES | Facility: CLINIC | Age: 50
End: 2020-12-04
Payer: COMMERCIAL

## 2020-12-04 ENCOUNTER — OFFICE VISIT (OUTPATIENT)
Dept: INFECTIOUS DISEASES | Facility: CLINIC | Age: 50
End: 2020-12-04
Payer: COMMERCIAL

## 2020-12-04 VITALS
HEIGHT: 71 IN | BODY MASS INDEX: 43 KG/M2 | HEART RATE: 64 BPM | WEIGHT: 307.13 LBS | DIASTOLIC BLOOD PRESSURE: 90 MMHG | SYSTOLIC BLOOD PRESSURE: 147 MMHG | TEMPERATURE: 98 F

## 2020-12-04 DIAGNOSIS — T81.49XA SURGICAL WOUND INFECTION: Primary | ICD-10-CM

## 2020-12-04 DIAGNOSIS — B37.9 YEAST INFECTION: ICD-10-CM

## 2020-12-04 PROCEDURE — 99999 PR PBB SHADOW E&M-EST. PATIENT-LVL III: CPT | Mod: PBBFAC,,, | Performed by: INTERNAL MEDICINE

## 2020-12-04 PROCEDURE — 90686 FLU VACCINE (QUAD) GREATER THAN OR EQUAL TO 3YO PRESERVATIVE FREE IM: ICD-10-PCS | Mod: S$GLB,,, | Performed by: INTERNAL MEDICINE

## 2020-12-04 PROCEDURE — 1125F AMNT PAIN NOTED PAIN PRSNT: CPT | Mod: S$GLB,,, | Performed by: INTERNAL MEDICINE

## 2020-12-04 PROCEDURE — 90471 IMMUNIZATION ADMIN: CPT | Mod: S$GLB,,, | Performed by: INTERNAL MEDICINE

## 2020-12-04 PROCEDURE — 90686 IIV4 VACC NO PRSV 0.5 ML IM: CPT | Mod: S$GLB,,, | Performed by: INTERNAL MEDICINE

## 2020-12-04 PROCEDURE — 99214 OFFICE O/P EST MOD 30 MIN: CPT | Mod: 25,S$GLB,, | Performed by: INTERNAL MEDICINE

## 2020-12-04 PROCEDURE — 3008F PR BODY MASS INDEX (BMI) DOCUMENTED: ICD-10-PCS | Mod: CPTII,S$GLB,, | Performed by: INTERNAL MEDICINE

## 2020-12-04 PROCEDURE — 3008F BODY MASS INDEX DOCD: CPT | Mod: CPTII,S$GLB,, | Performed by: INTERNAL MEDICINE

## 2020-12-04 PROCEDURE — 3080F PR MOST RECENT DIASTOLIC BLOOD PRESSURE >= 90 MM HG: ICD-10-PCS | Mod: CPTII,S$GLB,, | Performed by: INTERNAL MEDICINE

## 2020-12-04 PROCEDURE — 90471 FLU VACCINE (QUAD) GREATER THAN OR EQUAL TO 3YO PRESERVATIVE FREE IM: ICD-10-PCS | Mod: S$GLB,,, | Performed by: INTERNAL MEDICINE

## 2020-12-04 PROCEDURE — 3077F SYST BP >= 140 MM HG: CPT | Mod: CPTII,S$GLB,, | Performed by: INTERNAL MEDICINE

## 2020-12-04 PROCEDURE — 3080F DIAST BP >= 90 MM HG: CPT | Mod: CPTII,S$GLB,, | Performed by: INTERNAL MEDICINE

## 2020-12-04 PROCEDURE — 3077F PR MOST RECENT SYSTOLIC BLOOD PRESSURE >= 140 MM HG: ICD-10-PCS | Mod: CPTII,S$GLB,, | Performed by: INTERNAL MEDICINE

## 2020-12-04 PROCEDURE — 99999 PR PBB SHADOW E&M-EST. PATIENT-LVL III: ICD-10-PCS | Mod: PBBFAC,,, | Performed by: INTERNAL MEDICINE

## 2020-12-04 PROCEDURE — 99214 PR OFFICE/OUTPT VISIT, EST, LEVL IV, 30-39 MIN: ICD-10-PCS | Mod: 25,S$GLB,, | Performed by: INTERNAL MEDICINE

## 2020-12-04 PROCEDURE — 1125F PR PAIN SEVERITY QUANTIFIED, PAIN PRESENT: ICD-10-PCS | Mod: S$GLB,,, | Performed by: INTERNAL MEDICINE

## 2020-12-04 RX ORDER — FLUCONAZOLE 200 MG/1
200 TABLET ORAL DAILY
Qty: 14 TABLET | Refills: 0 | Status: SHIPPED | OUTPATIENT
Start: 2020-12-04 | End: 2020-12-18

## 2020-12-04 RX ORDER — NYSTATIN 100000 [USP'U]/G
POWDER TOPICAL
Qty: 1 BOTTLE | Refills: 5 | Status: SHIPPED | OUTPATIENT
Start: 2020-12-04 | End: 2021-01-07

## 2020-12-04 NOTE — PROGRESS NOTES
Infectious Diseases Clinic Note    Subjective:       Patient ID: Jaime Lund is a 50 y.o. male.    Chief Complaint: No chief complaint on file.    HPI    51 y/o M h/o chronic back pain with intrathecal pain pump and prior L4-5 fusion with recent admission due to increased redness around prior pain pump s/p washout with removal of IT pain pump/cath with OR cx + MSSA (maintained on cefazolin) admitted to [facility] 11/18 for worsening abdominal drainage/redness found to have rim enhancing fluid collection on CT deep to staple line and tracking to skin surface. NSGY consulted and did not recommend surgical intervention. Patient underwent IR drainage and drain placement 11/27 with Gram stain w/ many WBC, no organisms. Cx NGTD. Sent home to finish 4-6 weeks of cefazolin from day of drain placement (12/25-1/8) repeat CT ordered.  He is here now bc has developed rash in groin.  Itching. Improving with nystatin cream    Past Medical History:   Diagnosis Date    Back pain, chronic     Bulging discs     Chronic pain following surgery or procedure     Degenerative disc disease     Hypertension     Hypokalemia     Obese abdomen     PAD (peripheral artery disease)     Post laminectomy syndrome     Seizures     Severe sepsis     Short-term memory loss     Surgical site infection 11/17/2020    Rt abdomen pain pump site    Thyroid disease     Subclinical hyperthyroidism       Social History     Socioeconomic History    Marital status:      Spouse name: Not on file    Number of children: Not on file    Years of education: Not on file    Highest education level: Not on file   Occupational History    Not on file   Social Needs    Financial resource strain: Not on file    Food insecurity     Worry: Not on file     Inability: Not on file    Transportation needs     Medical: Not on file     Non-medical: Not on file   Tobacco Use    Smoking status: Never Smoker    Smokeless tobacco: Never Used   Substance  and Sexual Activity    Alcohol use: Yes     Comment: socially    Drug use: No    Sexual activity: Yes     Partners: Female   Lifestyle    Physical activity     Days per week: Not on file     Minutes per session: Not on file    Stress: To some extent   Relationships    Social connections     Talks on phone: Not on file     Gets together: Not on file     Attends Church service: Not on file     Active member of club or organization: Not on file     Attends meetings of clubs or organizations: Not on file     Relationship status: Not on file   Other Topics Concern    Not on file   Social History Narrative    Not on file         Current Outpatient Medications:     butalbital-acetaminophen-caffeine -40 mg (FIORICET, ESGIC) -40 mg per tablet, TAKE ONE TABLET BY MOUTH EVERY 4 HOURS AS NEEDED FOR HEADACHE, Disp: 60 tablet, Rfl: 5    ceFAZolin (ANCEF) 1 gram injection, Inject 2,000 mg (2 g total) into the vein every 8 (eight) hours., Disp:  , Rfl:     DULoxetine (CYMBALTA) 60 MG capsule, TAKE 1 CAPSULE BY MOUTH EVERY EVENING, Disp: 90 capsule, Rfl: 0    fluconazole (DIFLUCAN) 150 MG Tab, Take 1 tablet (150 mg total) by mouth every 72 hours. for 3 doses, Disp: 3 tablet, Rfl: 0    furosemide (LASIX) 40 MG tablet, TAKE ONE TABLET BY MOUTH ONCE DAILY FOR FLUID (Patient taking differently: Take 40 mg by mouth daily as needed. ), Disp: 90 tablet, Rfl: 1    gabapentin (NEURONTIN) 400 MG capsule, 2 (two) times daily. Takes 1 400mg pill every AM, Takes 2 (400mg) at night, Disp: , Rfl:     hydroCHLOROthiazide (HYDRODIURIL) 25 MG tablet, hydrochlorothiazide 25 mg tablet, Disp: , Rfl:     HYDROmorphone (DILAUDID) 8 MG tablet, Take 8 mg by mouth every 6 (six) hours as needed for Pain., Disp: , Rfl:     meloxicam (MOBIC) 15 MG tablet, TAKE ONE TABLET BY MOUTH ONCE DAILY AS NEEDED FOR PAIN, Disp: 90 tablet, Rfl: 0    methocarbamoL (ROBAXIN) 750 MG Tab, methocarbamol 750 mg tablet, Disp: , Rfl:      ondansetron (ZOFRAN) 4 MG tablet, Take 1 tablet (4 mg total) by mouth every 6 (six) hours as needed., Disp: 20 tablet, Rfl: 0    potassium chloride (KLOR-CON) 10 MEQ TbSR, TAKE ONE TABLET BY MOUTH ONCE DAILY AS NEEDED WITH FLUID PILL, Disp: 30 tablet, Rfl: 0    tiZANidine (ZANAFLEX) 2 MG tablet, TAKE ONE TABLET BY MOUTH THREE TIMES DAILY FOR MUSCLE SPASMS., Disp: 90 tablet, Rfl: 0    traZODone (DESYREL) 50 MG tablet, TAKE ONE TO TWO TABLETS BY MOUTH AT BEDTIME, Disp: , Rfl:     Review of Systems   Constitutional: Negative for activity change, appetite change, chills, fatigue and fever.   HENT: Negative for congestion, dental problem, mouth sores and sinus pressure.    Eyes: Negative for pain, redness and visual disturbance.   Respiratory: Negative for cough, shortness of breath and wheezing.    Cardiovascular: Negative for chest pain and leg swelling.   Gastrointestinal: Negative for abdominal distention, abdominal pain, diarrhea, nausea and vomiting.   Endocrine: Negative for polyuria.   Genitourinary: Negative for decreased urine volume, dysuria and frequency.   Musculoskeletal: Negative for joint swelling.   Skin: Positive for rash. Negative for color change.   Allergic/Immunologic: Negative for food allergies.   Neurological: Negative for dizziness, weakness and headaches.   Hematological: Negative for adenopathy.   Psychiatric/Behavioral: Negative for agitation and confusion. The patient is not nervous/anxious.            Objective:      There were no vitals filed for this visit.  Physical Exam  Constitutional:       Appearance: He is well-developed.   HENT:      Head: Normocephalic and atraumatic.   Eyes:      Conjunctiva/sclera: Conjunctivae normal.   Neck:      Musculoskeletal: Neck supple.   Cardiovascular:      Rate and Rhythm: Normal rate and regular rhythm.      Heart sounds: Normal heart sounds. No murmur.   Pulmonary:      Effort: Pulmonary effort is normal. No respiratory distress.      Breath  sounds: Normal breath sounds. No wheezing.   Abdominal:      General: Bowel sounds are normal. There is no distension.      Palpations: Abdomen is soft.      Tenderness: There is no abdominal tenderness.      Comments: Drain in place minimal serosang drainage   Musculoskeletal: Normal range of motion.         General: No tenderness.   Lymphadenopathy:      Cervical: No cervical adenopathy.   Skin:     General: Skin is warm and dry.      Findings: No rash.      Comments: R PICC c/d/i, groin erythema peeling rash   Neurological:      Mental Status: He is alert and oriented to person, place, and time.      Coordination: Coordination normal.   Psychiatric:         Behavior: Behavior normal.             Assessment/Plan:       No diagnosis found.  diagnosis yeast infection    51 y/o M h/o chronic back pain with intrathecal pain pump and prior L4-5 fusion with recent admission due to increased redness around prior pain pump s/p washout with removal of IT pain pump/cath with OR cx + MSSA (maintained on cefazolin) [place] [facility] [place] for worsening abdominal drainage/redness found to have rim enhancing fluid collection on CT deep to staple line and tracking to skin surface. NSGY consulted and did not recommend surgical intervention. Patient underwent IR drainage and drain placement 11/27 with Gram stain w/ many WBC, no organisms. Cx NGTD. Sent home to finish 4-6 weeks of cefazolin from day of drain placement (12/25-1/8) repeat CT ordered.  He is here now bc has developed rash in groin.  Itching. Improving with nystatin cream  - Groin yeast infection, nystatin powder and fluconazole, will also give him fluc to take later if develops again given he has few weeks of IV abx remaining  - plan discussed with patient and wife at length

## 2020-12-07 ENCOUNTER — OFFICE VISIT (OUTPATIENT)
Dept: NEUROSURGERY | Facility: CLINIC | Age: 50
End: 2020-12-07
Payer: COMMERCIAL

## 2020-12-07 ENCOUNTER — HOSPITAL ENCOUNTER (OUTPATIENT)
Dept: RADIOLOGY | Facility: HOSPITAL | Age: 50
Discharge: HOME | End: 2020-12-07
Attending: HOSPITALIST
Payer: COMMERCIAL

## 2020-12-07 VITALS
BODY MASS INDEX: 43.33 KG/M2 | TEMPERATURE: 99 F | HEIGHT: 71 IN | SYSTOLIC BLOOD PRESSURE: 146 MMHG | WEIGHT: 309.5 LBS | HEART RATE: 72 BPM | OXYGEN SATURATION: 99 % | DIASTOLIC BLOOD PRESSURE: 76 MMHG

## 2020-12-07 DIAGNOSIS — S30.1XXA ABDOMINAL WALL SEROMA, INITIAL ENCOUNTER: ICD-10-CM

## 2020-12-07 DIAGNOSIS — T81.42XD INFECTION OF DEEP INCISIONAL SURGICAL SITE AFTER PROCEDURE, SUBSEQUENT ENCOUNTER: Primary | ICD-10-CM

## 2020-12-07 DIAGNOSIS — Z97.8 PRESENCE OF INTRATHECAL PUMP: Primary | ICD-10-CM

## 2020-12-07 DIAGNOSIS — T81.42XD INFECTION OF DEEP INCISIONAL SURGICAL SITE AFTER PROCEDURE, SUBSEQUENT ENCOUNTER: ICD-10-CM

## 2020-12-07 PROCEDURE — 99024 PR POST-OP FOLLOW-UP VISIT: ICD-10-PCS | Mod: S$GLB,,, | Performed by: PHYSICIAN ASSISTANT

## 2020-12-07 PROCEDURE — 25500020 PHARM REV CODE 255: Performed by: HOSPITALIST

## 2020-12-07 PROCEDURE — 99024 POSTOP FOLLOW-UP VISIT: CPT | Mod: S$GLB,,, | Performed by: PHYSICIAN ASSISTANT

## 2020-12-07 PROCEDURE — 74178 CT ABD&PLV WO CNTR FLWD CNTR: CPT | Mod: 26,,, | Performed by: RADIOLOGY

## 2020-12-07 PROCEDURE — 74178 CT ABD&PLV WO CNTR FLWD CNTR: CPT | Mod: TC

## 2020-12-07 PROCEDURE — 1125F AMNT PAIN NOTED PAIN PRSNT: CPT | Mod: S$GLB,,, | Performed by: PHYSICIAN ASSISTANT

## 2020-12-07 PROCEDURE — 3008F PR BODY MASS INDEX (BMI) DOCUMENTED: ICD-10-PCS | Mod: CPTII,S$GLB,, | Performed by: PHYSICIAN ASSISTANT

## 2020-12-07 PROCEDURE — 99999 PR PBB SHADOW E&M-EST. PATIENT-LVL IV: CPT | Mod: PBBFAC,,, | Performed by: PHYSICIAN ASSISTANT

## 2020-12-07 PROCEDURE — 3008F BODY MASS INDEX DOCD: CPT | Mod: CPTII,S$GLB,, | Performed by: PHYSICIAN ASSISTANT

## 2020-12-07 PROCEDURE — 99999 PR PBB SHADOW E&M-EST. PATIENT-LVL IV: ICD-10-PCS | Mod: PBBFAC,,, | Performed by: PHYSICIAN ASSISTANT

## 2020-12-07 PROCEDURE — 1125F PR PAIN SEVERITY QUANTIFIED, PAIN PRESENT: ICD-10-PCS | Mod: S$GLB,,, | Performed by: PHYSICIAN ASSISTANT

## 2020-12-07 PROCEDURE — 74178 CT ABDOMEN PELVIS W WO CONTRAST: ICD-10-PCS | Mod: 26,,, | Performed by: RADIOLOGY

## 2020-12-07 RX ADMIN — IOHEXOL 100 ML: 350 INJECTION, SOLUTION INTRAVENOUS at 08:12

## 2020-12-07 NOTE — PROGRESS NOTES
Wound Check   Neurosurgery     Jaime Lund is a 50 y.o. male who presents to clinic today for 2 week wound check, s/p wound washout and removal of intrathecal pain pump.  Denies fevers, chills, night sweats or N/V. ID is managing long term IV ABx for MSSA infection. PICC line in place. Following discharge from the hospital, he presented to the ED with c/o continued drainage. IR drained the wound and placed a drain. He underwent CT w/wo contrast this am to determine if drain can be removed or needs to be repositioned. He reports a significant decrease in drain output over the past few days. .     Since second discharge, he reports no drainage from his incisions. He removed his own staples from his lumbar spine and right hip.     Physical Exam:   General: well developed, well nourished, no distress  Neurologic: Alert and oriented. Thought content appropriate.   GCS: Motor: 6/Verbal: 5/Eyes: 4 GCS Total: 15   Mental Status: Awake, Alert, Oriented x3   Cranial nerves: face symmetric, tongue midline, pupils equal, round, reactive to light with accomodation, EOMI.   Motor Strength: moves all extremities with good strength and tone   Sensation: response to light touch throughout  No gait disturbances     Incisions x3: midline lumbar, right hip, and anterior abdomen. All skin edges fully approximated. Staples from anterior abdomen removed in clinic. (patient removed staples from back and hip himself at home prior to visit). Small pinhole sized area of drainage noted to anterior abdominal incision. After expressing a few drops of clear drainage, no further drainage was obtained.     Vitals:    12/07/20 1150   BP: (!) 146/76   Pulse: 72   Temp: 98.9 °F (37.2 °C)             Assessment/Plan:   Jaime Lund is a 50 y.o. male who presents for 2 week wound check, s/p wound washout and removal of intrathecal pain pump.     -Keep incision open to air   -Can shower and get incision wet, just pat dry and no vigorous  scrubbing. Do not submerge incision for another 4 weeks.   -No lifting more than 10 lbs or excessive bending/twisting.   -Follow up with Dr. Headley in 4 weeks   -FU with IR re: drain placement/removal  -FU with ID re: IV Abx  -Encouraged patient to call if they have any questions or concerns prior to next follow up appt        Valentina Rojas PA-C  [facility]  Department of Neurosurgery  [phone]

## 2020-12-08 ENCOUNTER — LAB VISIT (OUTPATIENT)
Dept: LAB | Facility: HOSPITAL | Age: 50
End: 2020-12-08
Payer: COMMERCIAL

## 2020-12-08 DIAGNOSIS — T81.42XA DEEP POSTOPERATIVE WOUND INFECTION: Primary | ICD-10-CM

## 2020-12-08 LAB
ALBUMIN SERPL BCP-MCNC: 3.2 G/DL (ref 3.5–5.2)
ALP SERPL-CCNC: 82 U/L (ref 55–135)
ALT SERPL W/O P-5'-P-CCNC: 9 U/L (ref 10–44)
ANION GAP SERPL CALC-SCNC: 10 MMOL/L (ref 8–16)
AST SERPL-CCNC: 20 U/L (ref 10–40)
BASOPHILS # BLD AUTO: 0.04 K/UL (ref 0–0.2)
BASOPHILS NFR BLD: 0.9 % (ref 0–1.9)
BILIRUB SERPL-MCNC: 0.2 MG/DL (ref 0.1–1)
BUN SERPL-MCNC: 8 MG/DL (ref 6–20)
CALCIUM SERPL-MCNC: 8.8 MG/DL (ref 8.7–10.5)
CHLORIDE SERPL-SCNC: 104 MMOL/L (ref 95–110)
CO2 SERPL-SCNC: 26 MMOL/L (ref 23–29)
CREAT SERPL-MCNC: 0.7 MG/DL (ref 0.5–1.4)
CRP SERPL-MCNC: 8.6 MG/L (ref 0–8.2)
DIFFERENTIAL METHOD: ABNORMAL
EOSINOPHIL # BLD AUTO: 0.3 K/UL (ref 0–0.5)
EOSINOPHIL NFR BLD: 5.8 % (ref 0–8)
ERYTHROCYTE [DISTWIDTH] IN BLOOD BY AUTOMATED COUNT: 13.9 % (ref 11.5–14.5)
ERYTHROCYTE [SEDIMENTATION RATE] IN BLOOD BY WESTERGREN METHOD: 43 MM/HR (ref 0–23)
EST. GFR  (AFRICAN AMERICAN): >60 ML/MIN/1.73 M^2
EST. GFR  (NON AFRICAN AMERICAN): >60 ML/MIN/1.73 M^2
GLUCOSE SERPL-MCNC: 89 MG/DL (ref 70–110)
HCT VFR BLD AUTO: 36.2 % (ref 40–54)
HGB BLD-MCNC: 10.8 G/DL (ref 14–18)
IMM GRANULOCYTES # BLD AUTO: 0 K/UL (ref 0–0.04)
IMM GRANULOCYTES NFR BLD AUTO: 0 % (ref 0–0.5)
LYMPHOCYTES # BLD AUTO: 1.1 K/UL (ref 1–4.8)
LYMPHOCYTES NFR BLD: 24.4 % (ref 18–48)
MCH RBC QN AUTO: 27.6 PG (ref 27–31)
MCHC RBC AUTO-ENTMCNC: 29.8 G/DL (ref 32–36)
MCV RBC AUTO: 92 FL (ref 82–98)
MONOCYTES # BLD AUTO: 0.4 K/UL (ref 0.3–1)
MONOCYTES NFR BLD: 8.3 % (ref 4–15)
NEUTROPHILS # BLD AUTO: 2.7 K/UL (ref 1.8–7.7)
NEUTROPHILS NFR BLD: 60.6 % (ref 38–73)
NRBC BLD-RTO: 0 /100 WBC
PLATELET # BLD AUTO: 330 K/UL (ref 150–350)
PMV BLD AUTO: 11.2 FL (ref 9.2–12.9)
POTASSIUM SERPL-SCNC: 4.2 MMOL/L (ref 3.5–5.1)
PROT SERPL-MCNC: 6.7 G/DL (ref 6–8.4)
RBC # BLD AUTO: 3.92 M/UL (ref 4.6–6.2)
SODIUM SERPL-SCNC: 140 MMOL/L (ref 136–145)
WBC # BLD AUTO: 4.47 K/UL (ref 3.9–12.7)

## 2020-12-08 PROCEDURE — 80053 COMPREHEN METABOLIC PANEL: CPT

## 2020-12-08 PROCEDURE — 85652 RBC SED RATE AUTOMATED: CPT

## 2020-12-08 PROCEDURE — 85025 COMPLETE CBC W/AUTO DIFF WBC: CPT

## 2020-12-08 PROCEDURE — 86140 C-REACTIVE PROTEIN: CPT

## 2020-12-09 ENCOUNTER — HOSPITAL ENCOUNTER (OUTPATIENT)
Dept: INTERVENTIONAL RADIOLOGY/VASCULAR | Facility: HOSPITAL | Age: 50
Discharge: HOME | End: 2020-12-09
Attending: FAMILY MEDICINE
Payer: COMMERCIAL

## 2020-12-09 ENCOUNTER — HOSPITAL ENCOUNTER (OUTPATIENT)
Facility: HOSPITAL | Age: 50
Discharge: HOME | End: 2020-12-09
Attending: RADIOLOGY | Admitting: RADIOLOGY
Payer: COMMERCIAL

## 2020-12-09 VITALS
DIASTOLIC BLOOD PRESSURE: 85 MMHG | TEMPERATURE: 97 F | RESPIRATION RATE: 16 BRPM | HEART RATE: 66 BPM | SYSTOLIC BLOOD PRESSURE: 165 MMHG | OXYGEN SATURATION: 97 %

## 2020-12-09 VITALS — DIASTOLIC BLOOD PRESSURE: 88 MMHG | SYSTOLIC BLOOD PRESSURE: 160 MMHG

## 2020-12-09 DIAGNOSIS — Z97.8 PRESENCE OF INTRATHECAL PUMP: ICD-10-CM

## 2020-12-09 DIAGNOSIS — L03.311 CELLULITIS, ABDOMINAL WALL: ICD-10-CM

## 2020-12-09 DIAGNOSIS — T81.42XD INFECTION OF DEEP INCISIONAL SURGICAL SITE AFTER PROCEDURE, SUBSEQUENT ENCOUNTER: ICD-10-CM

## 2020-12-09 DIAGNOSIS — T81.42XD INFECTION OF DEEP INCISIONAL SURGICAL SITE AFTER PROCEDURE, SUBSEQUENT ENCOUNTER: Primary | ICD-10-CM

## 2020-12-09 DIAGNOSIS — T81.49XA SURGICAL WOUND INFECTION: ICD-10-CM

## 2020-12-09 PROCEDURE — 49423 IR ABSCESS TUBE CHECK (XPD): ICD-10-PCS | Mod: ,,, | Performed by: RADIOLOGY

## 2020-12-09 PROCEDURE — 75984 XRAY CONTROL CATHETER CHANGE: CPT | Mod: 26,,, | Performed by: RADIOLOGY

## 2020-12-09 PROCEDURE — 49423 EXCHANGE DRAINAGE CATHETER: CPT | Mod: ,,, | Performed by: RADIOLOGY

## 2020-12-09 PROCEDURE — 75984 IR ABSCESS TUBE CHECK (XPD): ICD-10-PCS | Mod: 26,,, | Performed by: RADIOLOGY

## 2020-12-09 PROCEDURE — 75984 XRAY CONTROL CATHETER CHANGE: CPT | Mod: TC

## 2020-12-09 PROCEDURE — 49423 EXCHANGE DRAINAGE CATHETER: CPT

## 2020-12-09 NOTE — DISCHARGE SUMMARY
Radiology Discharge Summary      Hospital Course: No complications    Admit Date: 12/9/2020  Discharge Date: 12/09/2020     Instructions Given to Patient: Yes    Diet: Resume prior diet     Activity: activity as tolerated    Description of Condition on Discharge: Good    Vital Signs (Most Recent): BP: (!) 160/88 (12/09/20 1045)    Discharge Disposition: Home    Discharge Diagnosis:  50 y.o. male with persistent subcutaneous abscess in the anterior lower abdominal wall with drain catheter in place s/p successful fluoroscopic-guided evaluation/contrast injection of pre-existing 10-Fr abscess drainage catheter showing tube remains in good position within a persistent anterior abdominal wall subcutaneous abscess. Now s/p successful subsequent exchange/up-sizing to a new 12-Fr abscess drainage catheter with local anesthetic only. Patient tolerated the procedure well. No immediate post-procedural complications noted.     Thank you for considering IR for the care of your patient.     Yamil Farooq MD  Interventional Radiology

## 2020-12-09 NOTE — DISCHARGE INSTRUCTIONS
BATHING:  ? As before  DRESSING:      ACTIVITY LEVEL: If you have received sedation or an anesthetic, you may feel sleepy for several hours. Rest until you are more awake. Gradually resume your normal activities      DIET: You may resume your home diet. If nausea is present, increase your diet gradually with fluids and bland foods.    Medications: Pain medication should be taken only if needed and as directed. If antibiotics are prescribed, the medication should be taken until completed. You will be given an updated list of you medications.    CALL THE DOCTOR:   For any obvious bleeding (some dried blood over the incision is normal).     Redness, swelling, foul smell around incision or fever over 101.  Shortness of breath.  Persistent pain or nausea not relieved by medication.  Call  [phone]     to speak with an Interventional Radiologist    If any unusual problems or difficulties occur contact your doctor. If you cannot contact your doctor but feel your signs and symptoms warrant a physicians attention return to the emergency room.  Fall Prevention  Millions of people fall every year and injure themselves. You may have had anesthesia or sedation which may increase your risk of falling. You may have health issues that put you at an increased risk of falling.     Here are ways to reduce your risk of falling.  ·   · Make your home safe by keeping walkways clear of objects you may trip over.  · Use non-slip pads under rugs. Do not use area rugs or small throw rugs.  · Use non-slip mats in bathtubs and showers.  · Install handrails and lights on staircases.  · Do not walk in poorly lit areas.  · Do not stand on chairs or wobbly ladders.  · Use caution when reaching overhead or looking upward. This position can cause a loss of balance.  · Be sure your shoes fit properly, have non-slip bottoms and are in good condition.   · Wear shoes both inside and out. Avoid going barefoot or wearing slippers.  · Be cautious when  going up and down stairs, curbs, and when walking on uneven sidewalks.  · If your balance is poor, consider using a cane or walker.  · If your fall was related to alcohol use, stop or limit alcohol intake.   · If your fall was related to use of sleeping medicines, talk to your doctor about this. You may need to reduce your dosage at bedtime if you awaken during the night to go to the bathroom.    · To reduce the need for nighttime bathroom trips:  ¨ Avoid drinking fluids for several hours before going to bed  ¨ Empty your bladder before going to bed  ¨ Men can keep a urinal at the bedside  · Stay as active as you can. Balance, flexibility, strength, and endurance all come from exercise. They all play a role in preventing falls. Ask your healthcare provider which types of activity are right for you.  · Get your vision checked on a regular basis.  · If you have pets, know where they are before you stand up or walk so you don't trip over them.  · Use night lights.

## 2020-12-09 NOTE — NURSING
Pt in procedure for Drain exchange using lidocaine only. Pt tolerated procedure well. No sedation or pain meds used. Report given to JANAE Westfall

## 2020-12-09 NOTE — BRIEF OP NOTE
Radiology Post-Procedure Note    Pre Op Diagnosis: Persistent anterior abdominal wall subcutaneous abscess  Post Op Diagnosis: Same    Procedure: 1.  Fluoroscopic-guided evaluation/contrast injection of pre-existing 10-Fr with subsequent exchange/up-sizing to a new 12-Fr abscess drainage catheter     Procedure performed by: Yamil Farooq MD    Written Informed Consent Obtained: Yes  Specimen Removed: NO  Estimated Blood Loss: none    Findings:   Successful fluoroscopic-guided evaluation/contrast injection of pre-existing 10-Fr abscess drainage catheter showing tube remains in good position within a persistent anterior abdominal wall subcutaneous abscess s/p successful subsequent exchange/up-sizing to a new 12-Fr abscess drainage catheter with local anesthetic only. Patient tolerated the procedure well. No immediate post-procedural complications noted.     Thank you for considering IR for the care of your patient.     Yamil Farooq MD  Interventional Radiology

## 2020-12-09 NOTE — H&P
Radiology History & Physical      SUBJECTIVE:     Chief Complaint: Persistent anterior abdominal wall subcutaneous abscess    History of Present Illness:  Jaime Lund is a 50 y.o. male with pertinent PMHx of chronic LBP with intrathecal pain pump complicated by deep tissue infection +MSSA s/p removal of pain pump.     Pt tx with cefazolin however, returned to [facility] 11/18 with worsening redness and drainage from the surgical incision with imaging showing development abscess at surgical site of recent pain pump excision s/p IR placement of a percutaneous abscess drainage catheter 11/27/20 and ID recs for 4-6 weeks of cefazolin from day of drain placement (12/25-1/8).    Pt reports significant decreased drain output of the the last few days so, new CT obtained to assess for resolution of the collection and need for repositioning vs up-sizing vs removal of the drainage catheter. CT 12/7/20 shows persistent 2.8 x 5.7 x 4.2-cm abscess with catheter remaining in good position suggesting need for catheter evaluation and potential up-sizing.    A new outpatient IR consult received for fluoroscopic-guided evaluation and likely exchange for a new, larger-bore drainage catheter.     Past Medical History:   Diagnosis Date    Back pain, chronic     Bulging discs     Chronic pain following surgery or procedure     Degenerative disc disease     Hypertension     Hypokalemia     Obese abdomen     PAD (peripheral artery disease)     Post laminectomy syndrome     Seizures     Severe sepsis     Short-term memory loss     Surgical site infection 11/17/2020    Rt abdomen pain pump site    Thyroid disease     Subclinical hyperthyroidism     Past Surgical History:   Procedure Laterality Date    CHOLECYSTECTOMY      GASTRIC BYPASS      SLEEVE    gastric sleeve  2011    HERNIA REPAIR      pain pump Right 04/03/2018    inserted at right abdomen    pain pump site washout Right 04/13/2018    REMOVAL OF IMPLANT N/A  11/18/2020    Procedure: REMOVAL, IMPLANT;  Surgeon: Raffy Headley DO;  Location: [facility] OR;  Service: Neurosurgery;  Laterality: N/A;  intrathecal pain pump, leads removed from mid back and generator from abdomen    SKIN SURGERY      EXCESS SKIN REMOVAL    SPINE SURGERY      lumbar fusion     Home Meds:   Prior to Admission medications    Medication Sig Start Date End Date Taking? Authorizing Provider   butalbital-acetaminophen-caffeine -40 mg (FIORICET, ESGIC) -40 mg per tablet TAKE ONE TABLET BY MOUTH EVERY 4 HOURS AS NEEDED FOR HEADACHE 6/24/20   Jt Holly MD   ceFAZolin (ANCEF) 1 gram injection Inject 2,000 mg (2 g total) into the vein every 8 (eight) hours. 11/28/20   Letitia Jesus MD   DULoxetine (CYMBALTA) 60 MG capsule TAKE 1 CAPSULE BY MOUTH EVERY EVENING 11/7/20   Gabby Dean MD   fluconazole (DIFLUCAN) 150 MG Tab Take 1 tablet (150 mg total) by mouth every 72 hours. for 3 doses 12/3/20 12/10/20  Gabby Daen MD   fluconazole (DIFLUCAN) 200 MG Tab Take 1 tablet (200 mg total) by mouth once daily. for 14 days 12/4/20 12/18/20  Nav Stone MD   furosemide (LASIX) 40 MG tablet TAKE ONE TABLET BY MOUTH ONCE DAILY FOR FLUID  Patient taking differently: Take 40 mg by mouth daily as needed.  7/16/20   Gabby Dean MD   gabapentin (NEURONTIN) 400 MG capsule 2 (two) times daily. Takes 1 400mg pill every AM, Takes 2 (400mg) at night 10/10/17   Historical Provider   hydroCHLOROthiazide (HYDRODIURIL) 25 MG tablet hydrochlorothiazide 25 mg tablet    Historical Provider   HYDROmorphone (DILAUDID) 8 MG tablet Take 8 mg by mouth every 6 (six) hours as needed for Pain.    Historical Provider   meloxicam (MOBIC) 15 MG tablet TAKE ONE TABLET BY MOUTH ONCE DAILY AS NEEDED FOR PAIN 11/7/20   Gabby Dean MD   methocarbamoL (ROBAXIN) 750 MG Tab methocarbamol 750 mg tablet    Historical Provider   nystatin (MYCOSTATIN) powder Apply to affected area 3 times daily 12/4/20 12/4/21   Nav Stone MD   ondansetron (ZOFRAN) 4 MG tablet Take 1 tablet (4 mg total) by mouth every 6 (six) hours as needed. 4/11/19   Gabby Dean MD   potassium chloride (KLOR-CON) 10 MEQ TbSR TAKE ONE TABLET BY MOUTH ONCE DAILY AS NEEDED WITH FLUID PILL 10/20/20   REX Laird   tiZANidine (ZANAFLEX) 2 MG tablet TAKE ONE TABLET BY MOUTH THREE TIMES DAILY FOR MUSCLE SPASMS. 11/30/20   Gabby Dean MD   traZODone (DESYREL) 50 MG tablet TAKE ONE TO TWO TABLETS BY MOUTH AT BEDTIME 11/24/20   Historical Provider     Anticoagulants/Antiplatelets: no anticoagulation    Allergies:   Review of patient's allergies indicates:   Allergen Reactions    Klonopin [clonazepam] Anxiety and Other (See Comments)     Becomes agitated    Valium [diazepam] Other (See Comments)     Becomes agitated    Xanax [alprazolam] Anxiety and Other (See Comments)     Becomes agitated     Sedation History:  no adverse reactions    Review of Systems:   Hematological: no known coagulopathies  Respiratory: no cough, shortness of breath, or wheezing  Cardiovascular: no chest pain or dyspnea on exertion  Gastrointestinal: no abdominal pain, change in bowel habits, or black or bloody stools  Genito-Urinary: no dysuria, trouble voiding, or hematuria  Musculoskeletal: negative  Neurological: no TIA or stroke symptoms       OBJECTIVE:     Vital Signs (Most Recent)     Physical Exam:  ASA: II  Mallampati: II    General: no acute distress  Mental Status: alert and oriented to person, place and time  HEENT: normocephalic, atraumatic  Chest: unlabored breathing  Heart: regular heart rate  Abdomen: nondistended  Extremity: moves all extremities    Laboratory  Lab Results   Component Value Date    INR 1.0 11/25/2020       Lab Results   Component Value Date    WBC 4.47 12/08/2020    HGB 10.8 (L) 12/08/2020    HCT 36.2 (L) 12/08/2020    MCV 92 12/08/2020     12/08/2020      Lab Results   Component Value Date    GLU 89 12/08/2020     12/08/2020     K 4.2 12/08/2020     12/08/2020    CO2 26 12/08/2020    BUN 8 12/08/2020    CREATININE 0.7 12/08/2020    CALCIUM 8.8 12/08/2020    MG 2.2 11/27/2020    ALT 9 (L) 12/08/2020    AST 20 12/08/2020    ALBUMIN 3.2 (L) 12/08/2020    BILITOT 0.2 12/08/2020     ASSESSMENT/PLAN:     50 y.o. male with persistent 2.8 x 5.7 x 4.2-cm abscess subcutaneous abscess in the anterior lower abdominal wall with drain catheter in place suggesting need for catheter evaluation and potential up-sizing.    1. Persistent anterior abdominal wall subcutaneous abscess - Will attempt fluoroscopic-guided evaluation and likely exchange for a new, larger-bore abscess drainage catheter under moderate conscious sedation.     Risks (including, but not limited to, pain, bleeding, infection, damage to nearby structures, failure to obtain sufficient material for a diagnosis, the need for additional procedures, and death), benefits, and alternatives were discussed with the patient. All questions were answered to the best of my abilities. The patient wishes to proceed with the procedure. Written informed consent was obtained.    Thank you for considering IR for the care of your patient.     Yamil Farooq MD  Interventional Radiology

## 2020-12-11 ENCOUNTER — PATIENT MESSAGE (OUTPATIENT)
Dept: OTHER | Facility: OTHER | Age: 50
End: 2020-12-11

## 2020-12-15 ENCOUNTER — OFFICE VISIT (OUTPATIENT)
Dept: INFECTIOUS DISEASES | Facility: CLINIC | Age: 50
End: 2020-12-15
Payer: COMMERCIAL

## 2020-12-15 VITALS
TEMPERATURE: 98 F | HEIGHT: 71 IN | HEART RATE: 71 BPM | WEIGHT: 312.38 LBS | SYSTOLIC BLOOD PRESSURE: 145 MMHG | BODY MASS INDEX: 43.73 KG/M2 | DIASTOLIC BLOOD PRESSURE: 90 MMHG

## 2020-12-15 DIAGNOSIS — A49.01 MSSA (METHICILLIN SUSCEPTIBLE STAPHYLOCOCCUS AUREUS) INFECTION: ICD-10-CM

## 2020-12-15 DIAGNOSIS — T81.49XA SURGICAL WOUND INFECTION: Primary | ICD-10-CM

## 2020-12-15 PROCEDURE — 99213 PR OFFICE/OUTPT VISIT, EST, LEVL III, 20-29 MIN: ICD-10-PCS | Mod: S$GLB,,, | Performed by: INTERNAL MEDICINE

## 2020-12-15 PROCEDURE — 3080F DIAST BP >= 90 MM HG: CPT | Mod: CPTII,S$GLB,, | Performed by: INTERNAL MEDICINE

## 2020-12-15 PROCEDURE — 3077F PR MOST RECENT SYSTOLIC BLOOD PRESSURE >= 140 MM HG: ICD-10-PCS | Mod: CPTII,S$GLB,, | Performed by: INTERNAL MEDICINE

## 2020-12-15 PROCEDURE — 3008F PR BODY MASS INDEX (BMI) DOCUMENTED: ICD-10-PCS | Mod: CPTII,S$GLB,, | Performed by: INTERNAL MEDICINE

## 2020-12-15 PROCEDURE — 3080F PR MOST RECENT DIASTOLIC BLOOD PRESSURE >= 90 MM HG: ICD-10-PCS | Mod: CPTII,S$GLB,, | Performed by: INTERNAL MEDICINE

## 2020-12-15 PROCEDURE — 99999 PR PBB SHADOW E&M-EST. PATIENT-LVL IV: ICD-10-PCS | Mod: PBBFAC,,, | Performed by: INTERNAL MEDICINE

## 2020-12-15 PROCEDURE — 1125F PR PAIN SEVERITY QUANTIFIED, PAIN PRESENT: ICD-10-PCS | Mod: S$GLB,,, | Performed by: INTERNAL MEDICINE

## 2020-12-15 PROCEDURE — 3008F BODY MASS INDEX DOCD: CPT | Mod: CPTII,S$GLB,, | Performed by: INTERNAL MEDICINE

## 2020-12-15 PROCEDURE — 1125F AMNT PAIN NOTED PAIN PRSNT: CPT | Mod: S$GLB,,, | Performed by: INTERNAL MEDICINE

## 2020-12-15 PROCEDURE — 99213 OFFICE O/P EST LOW 20 MIN: CPT | Mod: S$GLB,,, | Performed by: INTERNAL MEDICINE

## 2020-12-15 PROCEDURE — 99999 PR PBB SHADOW E&M-EST. PATIENT-LVL IV: CPT | Mod: PBBFAC,,, | Performed by: INTERNAL MEDICINE

## 2020-12-15 PROCEDURE — 3077F SYST BP >= 140 MM HG: CPT | Mod: CPTII,S$GLB,, | Performed by: INTERNAL MEDICINE

## 2020-12-16 ENCOUNTER — PATIENT MESSAGE (OUTPATIENT)
Dept: INFECTIOUS DISEASES | Facility: CLINIC | Age: 50
End: 2020-12-16

## 2020-12-16 ENCOUNTER — LAB VISIT (OUTPATIENT)
Dept: LAB | Facility: HOSPITAL | Age: 50
End: 2020-12-16
Attending: INTERNAL MEDICINE
Payer: COMMERCIAL

## 2020-12-16 ENCOUNTER — PATIENT MESSAGE (OUTPATIENT)
Dept: ADMINISTRATIVE | Facility: OTHER | Age: 50
End: 2020-12-16

## 2020-12-16 DIAGNOSIS — T81.42XA DEEP POSTOPERATIVE WOUND INFECTION: Primary | ICD-10-CM

## 2020-12-16 LAB
ALBUMIN SERPL BCP-MCNC: 3.5 G/DL (ref 3.5–5.2)
ALP SERPL-CCNC: 97 U/L (ref 55–135)
ALT SERPL W/O P-5'-P-CCNC: 6 U/L (ref 10–44)
ANION GAP SERPL CALC-SCNC: 9 MMOL/L (ref 8–16)
AST SERPL-CCNC: 19 U/L (ref 10–40)
BASOPHILS # BLD AUTO: 0.04 K/UL (ref 0–0.2)
BASOPHILS NFR BLD: 0.8 % (ref 0–1.9)
BILIRUB SERPL-MCNC: 0.3 MG/DL (ref 0.1–1)
BUN SERPL-MCNC: 10 MG/DL (ref 6–20)
CALCIUM SERPL-MCNC: 9.3 MG/DL (ref 8.7–10.5)
CHLORIDE SERPL-SCNC: 102 MMOL/L (ref 95–110)
CO2 SERPL-SCNC: 27 MMOL/L (ref 23–29)
CREAT SERPL-MCNC: 0.7 MG/DL (ref 0.5–1.4)
CRP SERPL-MCNC: 8.1 MG/L (ref 0–8.2)
DIFFERENTIAL METHOD: ABNORMAL
EOSINOPHIL # BLD AUTO: 0.1 K/UL (ref 0–0.5)
EOSINOPHIL NFR BLD: 2.3 % (ref 0–8)
ERYTHROCYTE [DISTWIDTH] IN BLOOD BY AUTOMATED COUNT: 13.5 % (ref 11.5–14.5)
ERYTHROCYTE [SEDIMENTATION RATE] IN BLOOD BY WESTERGREN METHOD: 41 MM/HR (ref 0–23)
EST. GFR  (AFRICAN AMERICAN): >60 ML/MIN/1.73 M^2
EST. GFR  (NON AFRICAN AMERICAN): >60 ML/MIN/1.73 M^2
GLUCOSE SERPL-MCNC: 72 MG/DL (ref 70–110)
HCT VFR BLD AUTO: 37.7 % (ref 40–54)
HGB BLD-MCNC: 11.5 G/DL (ref 14–18)
IMM GRANULOCYTES # BLD AUTO: 0.01 K/UL (ref 0–0.04)
IMM GRANULOCYTES NFR BLD AUTO: 0.2 % (ref 0–0.5)
LYMPHOCYTES # BLD AUTO: 1.2 K/UL (ref 1–4.8)
LYMPHOCYTES NFR BLD: 22 % (ref 18–48)
MCH RBC QN AUTO: 27.3 PG (ref 27–31)
MCHC RBC AUTO-ENTMCNC: 30.5 G/DL (ref 32–36)
MCV RBC AUTO: 90 FL (ref 82–98)
MONOCYTES # BLD AUTO: 0.4 K/UL (ref 0.3–1)
MONOCYTES NFR BLD: 7.9 % (ref 4–15)
NEUTROPHILS # BLD AUTO: 3.6 K/UL (ref 1.8–7.7)
NEUTROPHILS NFR BLD: 66.8 % (ref 38–73)
NRBC BLD-RTO: 0 /100 WBC
PLATELET # BLD AUTO: 234 K/UL (ref 150–350)
PMV BLD AUTO: 10.8 FL (ref 9.2–12.9)
POTASSIUM SERPL-SCNC: 4.3 MMOL/L (ref 3.5–5.1)
PROT SERPL-MCNC: 7.3 G/DL (ref 6–8.4)
RBC # BLD AUTO: 4.21 M/UL (ref 4.6–6.2)
SODIUM SERPL-SCNC: 138 MMOL/L (ref 136–145)
WBC # BLD AUTO: 5.32 K/UL (ref 3.9–12.7)

## 2020-12-16 PROCEDURE — 85652 RBC SED RATE AUTOMATED: CPT

## 2020-12-16 PROCEDURE — 80053 COMPREHEN METABOLIC PANEL: CPT

## 2020-12-16 PROCEDURE — 85025 COMPLETE CBC W/AUTO DIFF WBC: CPT

## 2020-12-16 PROCEDURE — 86140 C-REACTIVE PROTEIN: CPT

## 2020-12-18 ENCOUNTER — TELEPHONE (OUTPATIENT)
Dept: INTERVENTIONAL RADIOLOGY/VASCULAR | Facility: HOSPITAL | Age: 50
End: 2020-12-18

## 2020-12-18 DIAGNOSIS — T81.42XD INFECTION OF DEEP INCISIONAL SURGICAL SITE AFTER PROCEDURE, SUBSEQUENT ENCOUNTER: Primary | ICD-10-CM

## 2020-12-21 ENCOUNTER — HOSPITAL ENCOUNTER (OUTPATIENT)
Dept: RADIOLOGY | Facility: HOSPITAL | Age: 50
Discharge: HOME | End: 2020-12-21
Attending: FAMILY MEDICINE
Payer: COMMERCIAL

## 2020-12-21 DIAGNOSIS — T81.42XD INFECTION OF DEEP INCISIONAL SURGICAL SITE AFTER PROCEDURE, SUBSEQUENT ENCOUNTER: ICD-10-CM

## 2020-12-21 PROCEDURE — 25500020 PHARM REV CODE 255: Performed by: FAMILY MEDICINE

## 2020-12-21 PROCEDURE — 74177 CT ABD & PELVIS W/CONTRAST: CPT | Mod: 26,,, | Performed by: RADIOLOGY

## 2020-12-21 PROCEDURE — 74177 CT ABDOMEN PELVIS WITH CONTRAST: ICD-10-PCS | Mod: 26,,, | Performed by: RADIOLOGY

## 2020-12-21 PROCEDURE — 74177 CT ABD & PELVIS W/CONTRAST: CPT | Mod: TC

## 2020-12-21 RX ADMIN — IOHEXOL 15 ML: 300 INJECTION, SOLUTION INTRAVENOUS at 05:12

## 2020-12-21 RX ADMIN — IOHEXOL 100 ML: 350 INJECTION, SOLUTION INTRAVENOUS at 05:12

## 2020-12-22 ENCOUNTER — PATIENT MESSAGE (OUTPATIENT)
Dept: NEUROSURGERY | Facility: CLINIC | Age: 50
End: 2020-12-22

## 2020-12-22 DIAGNOSIS — T81.49XA SURGICAL WOUND INFECTION: Primary | ICD-10-CM

## 2020-12-24 ENCOUNTER — OFFICE VISIT (OUTPATIENT)
Dept: INTERVENTIONAL RADIOLOGY/VASCULAR | Facility: CLINIC | Age: 50
End: 2020-12-24
Payer: COMMERCIAL

## 2020-12-24 VITALS
WEIGHT: 308.63 LBS | BODY MASS INDEX: 43.21 KG/M2 | HEIGHT: 71 IN | HEART RATE: 67 BPM | DIASTOLIC BLOOD PRESSURE: 93 MMHG | SYSTOLIC BLOOD PRESSURE: 152 MMHG

## 2020-12-24 DIAGNOSIS — Z09 FOLLOW UP: ICD-10-CM

## 2020-12-24 DIAGNOSIS — T81.49XA SURGICAL WOUND INFECTION: Primary | ICD-10-CM

## 2020-12-24 PROCEDURE — 3008F PR BODY MASS INDEX (BMI) DOCUMENTED: ICD-10-PCS | Mod: CPTII,S$GLB,, | Performed by: FAMILY MEDICINE

## 2020-12-24 PROCEDURE — 99999 PR PBB SHADOW E&M-EST. PATIENT-LVL III: CPT | Mod: PBBFAC,,, | Performed by: FAMILY MEDICINE

## 2020-12-24 PROCEDURE — 3080F DIAST BP >= 90 MM HG: CPT | Mod: CPTII,S$GLB,, | Performed by: FAMILY MEDICINE

## 2020-12-24 PROCEDURE — 99213 PR OFFICE/OUTPT VISIT, EST, LEVL III, 20-29 MIN: ICD-10-PCS | Mod: S$GLB,,, | Performed by: FAMILY MEDICINE

## 2020-12-24 PROCEDURE — 3080F PR MOST RECENT DIASTOLIC BLOOD PRESSURE >= 90 MM HG: ICD-10-PCS | Mod: CPTII,S$GLB,, | Performed by: FAMILY MEDICINE

## 2020-12-24 PROCEDURE — 3077F SYST BP >= 140 MM HG: CPT | Mod: CPTII,S$GLB,, | Performed by: FAMILY MEDICINE

## 2020-12-24 PROCEDURE — 3077F PR MOST RECENT SYSTOLIC BLOOD PRESSURE >= 140 MM HG: ICD-10-PCS | Mod: CPTII,S$GLB,, | Performed by: FAMILY MEDICINE

## 2020-12-24 PROCEDURE — 99213 OFFICE O/P EST LOW 20 MIN: CPT | Mod: S$GLB,,, | Performed by: FAMILY MEDICINE

## 2020-12-24 PROCEDURE — 3008F BODY MASS INDEX DOCD: CPT | Mod: CPTII,S$GLB,, | Performed by: FAMILY MEDICINE

## 2020-12-24 PROCEDURE — 99999 PR PBB SHADOW E&M-EST. PATIENT-LVL III: ICD-10-PCS | Mod: PBBFAC,,, | Performed by: FAMILY MEDICINE

## 2020-12-24 NOTE — PROGRESS NOTES
"Subjective:       Patient ID: Jaime Lund is a 50 y.o. male.    Chief Complaint: Follow-up (drain)    Patient here for drain removal. Drain was placed on 11/27/2020 to alleviate an abnormal fluid collection noted after surgery. Recent CT scan on 12/21/2020 noted "Continued decrease in size of anterior abdominal wall fluid collection subcutaneous soft tissues, with cavity collapsed around pigtail catheter." Patient reports draining less than 10cc per day. He has complaints of discomfort when the tube moves with daily activities.     Review of Systems   Constitutional: Negative for activity change, appetite change, chills, fatigue and fever.   Gastrointestinal: Negative for abdominal distention and abdominal pain.   Integumentary:  Positive for wound (insertion site of tube). Negative for color change, pallor and rash.         Objective:      Physical Exam  Constitutional:       General: He is not in acute distress.     Appearance: He is well-developed. He is not diaphoretic.   HENT:      Head: Normocephalic and atraumatic.   Pulmonary:      Effort: Pulmonary effort is normal. No respiratory distress.   Neurological:      Mental Status: He is alert and oriented to person, place, and time.   Psychiatric:         Behavior: Behavior normal.         Thought Content: Thought content normal.         Judgment: Judgment normal.         Assessment:       1. Surgical wound infection    2. Follow up        Plan:         Reviewed CT scan with Dr. Guevara. OK to remove drain. Pigtail released and drain removed without difficulty. Gauze and mepore dressing applied. Patient tolerated well. Avoid soaking (i.e. hot tubs, swimming, baths) until skin is completely healed. Keep bandage dry for 24 hours. Monitor for s/s of infection such as fever, chills, redness, pus draining from site. Ok to shower after 24 hours, but keep area clean and dry. Patient verbalized understanding and agreement.  "

## 2020-12-28 LAB — FUNGUS SPEC CULT: NORMAL

## 2020-12-29 ENCOUNTER — DOCUMENT SCAN (OUTPATIENT)
Dept: HOME HEALTH SERVICES | Facility: HOSPITAL | Age: 50
End: 2020-12-29
Payer: COMMERCIAL

## 2020-12-30 ENCOUNTER — TELEPHONE (OUTPATIENT)
Dept: INFECTIOUS DISEASES | Facility: CLINIC | Age: 50
End: 2020-12-30

## 2020-12-30 NOTE — TELEPHONE ENCOUNTER
----- Message from Cherry Pickard MD sent at 12/30/2020  9:32 AM CST -----  Contact: Wendy with [facility] -[phone]  Patient should be home bound. Agree that he should be discharged from home health if not home bound. Please arrange for CBC plus CMP and dressing change with his infusion company.  ----- Message -----  From: Rola Marlow MA  Sent: 12/28/2020   3:59 PM CST  To: Cherry Pickard MD      ----- Message -----  From: Renae Khan MA  Sent: 12/28/2020   3:47 PM CST  To: Neeraj Carey Staff    Wendy with [facility] -[phone]     Nurse went to pt's home as ordered to do labs and to change the bandage on his Pic line, pt was notified by his wife that he is out deer hunting for the week.  The nurse said that he is supposed to be home bound and that they will need to discharge him.

## 2020-12-30 NOTE — TELEPHONE ENCOUNTER
Called [facility] and informed cory that patient is not home bound due to going deer hunting and will need weekly labs and dressing changes at there facility due to home health discharging because not homebound. Cory stated will set up with patient.

## 2021-01-04 ENCOUNTER — LAB VISIT (OUTPATIENT)
Dept: LAB | Facility: HOSPITAL | Age: 51
End: 2021-01-04
Attending: INTERNAL MEDICINE
Payer: COMMERCIAL

## 2021-01-04 DIAGNOSIS — I10 ESSENTIAL HYPERTENSION, MALIGNANT: Primary | ICD-10-CM

## 2021-01-04 LAB
ALBUMIN SERPL BCP-MCNC: 3.6 G/DL (ref 3.5–5.2)
ALP SERPL-CCNC: 100 U/L (ref 55–135)
ALT SERPL W/O P-5'-P-CCNC: 6 U/L (ref 10–44)
ANION GAP SERPL CALC-SCNC: 8 MMOL/L (ref 8–16)
AST SERPL-CCNC: 19 U/L (ref 10–40)
BASOPHILS # BLD AUTO: 0.04 K/UL (ref 0–0.2)
BASOPHILS NFR BLD: 0.8 % (ref 0–1.9)
BILIRUB SERPL-MCNC: 0.3 MG/DL (ref 0.1–1)
BUN SERPL-MCNC: 10 MG/DL (ref 6–20)
CALCIUM SERPL-MCNC: 8.9 MG/DL (ref 8.7–10.5)
CHLORIDE SERPL-SCNC: 105 MMOL/L (ref 95–110)
CO2 SERPL-SCNC: 27 MMOL/L (ref 23–29)
CREAT SERPL-MCNC: 0.8 MG/DL (ref 0.5–1.4)
CRP SERPL-MCNC: 1.9 MG/L (ref 0–8.2)
DIFFERENTIAL METHOD: ABNORMAL
EOSINOPHIL # BLD AUTO: 0.1 K/UL (ref 0–0.5)
EOSINOPHIL NFR BLD: 1.4 % (ref 0–8)
ERYTHROCYTE [DISTWIDTH] IN BLOOD BY AUTOMATED COUNT: 13.7 % (ref 11.5–14.5)
ERYTHROCYTE [SEDIMENTATION RATE] IN BLOOD BY WESTERGREN METHOD: 8 MM/HR (ref 0–10)
EST. GFR  (AFRICAN AMERICAN): >60 ML/MIN/1.73 M^2
EST. GFR  (NON AFRICAN AMERICAN): >60 ML/MIN/1.73 M^2
GLUCOSE SERPL-MCNC: 75 MG/DL (ref 70–110)
HCT VFR BLD AUTO: 35.7 % (ref 40–54)
HGB BLD-MCNC: 11.1 G/DL (ref 14–18)
IMM GRANULOCYTES # BLD AUTO: 0.01 K/UL (ref 0–0.04)
IMM GRANULOCYTES NFR BLD AUTO: 0.2 % (ref 0–0.5)
LYMPHOCYTES # BLD AUTO: 1.3 K/UL (ref 1–4.8)
LYMPHOCYTES NFR BLD: 25.9 % (ref 18–48)
MCH RBC QN AUTO: 27.1 PG (ref 27–31)
MCHC RBC AUTO-ENTMCNC: 31.1 G/DL (ref 32–36)
MCV RBC AUTO: 87 FL (ref 82–98)
MONOCYTES # BLD AUTO: 0.4 K/UL (ref 0.3–1)
MONOCYTES NFR BLD: 7.1 % (ref 4–15)
NEUTROPHILS # BLD AUTO: 3.4 K/UL (ref 1.8–7.7)
NEUTROPHILS NFR BLD: 64.6 % (ref 38–73)
NRBC BLD-RTO: 0 /100 WBC
PLATELET # BLD AUTO: 315 K/UL (ref 150–350)
PMV BLD AUTO: 10.5 FL (ref 9.2–12.9)
POTASSIUM SERPL-SCNC: 3.8 MMOL/L (ref 3.5–5.1)
PROT SERPL-MCNC: 6.7 G/DL (ref 6–8.4)
RBC # BLD AUTO: 4.1 M/UL (ref 4.6–6.2)
SODIUM SERPL-SCNC: 140 MMOL/L (ref 136–145)
WBC # BLD AUTO: 5.18 K/UL (ref 3.9–12.7)

## 2021-01-04 PROCEDURE — 80053 COMPREHEN METABOLIC PANEL: CPT

## 2021-01-04 PROCEDURE — 86140 C-REACTIVE PROTEIN: CPT

## 2021-01-04 PROCEDURE — 85025 COMPLETE CBC W/AUTO DIFF WBC: CPT

## 2021-01-04 PROCEDURE — 85652 RBC SED RATE AUTOMATED: CPT

## 2021-01-07 ENCOUNTER — OFFICE VISIT (OUTPATIENT)
Dept: FAMILY MEDICINE | Facility: CLINIC | Age: 51
End: 2021-01-07
Payer: COMMERCIAL

## 2021-01-07 ENCOUNTER — PATIENT OUTREACH (OUTPATIENT)
Dept: ADMINISTRATIVE | Facility: OTHER | Age: 51
End: 2021-01-07

## 2021-01-07 VITALS
OXYGEN SATURATION: 97 % | SYSTOLIC BLOOD PRESSURE: 140 MMHG | HEART RATE: 69 BPM | TEMPERATURE: 98 F | BODY MASS INDEX: 42.72 KG/M2 | DIASTOLIC BLOOD PRESSURE: 90 MMHG | WEIGHT: 305.13 LBS | HEIGHT: 71 IN

## 2021-01-07 DIAGNOSIS — I10 ESSENTIAL HYPERTENSION: Primary | Chronic | ICD-10-CM

## 2021-01-07 DIAGNOSIS — F39 MOOD DISORDER: ICD-10-CM

## 2021-01-07 PROCEDURE — 3080F DIAST BP >= 90 MM HG: CPT | Mod: CPTII,S$GLB,, | Performed by: INTERNAL MEDICINE

## 2021-01-07 PROCEDURE — 3008F PR BODY MASS INDEX (BMI) DOCUMENTED: ICD-10-PCS | Mod: CPTII,S$GLB,, | Performed by: INTERNAL MEDICINE

## 2021-01-07 PROCEDURE — 3077F PR MOST RECENT SYSTOLIC BLOOD PRESSURE >= 140 MM HG: ICD-10-PCS | Mod: CPTII,S$GLB,, | Performed by: INTERNAL MEDICINE

## 2021-01-07 PROCEDURE — 99214 PR OFFICE/OUTPT VISIT, EST, LEVL IV, 30-39 MIN: ICD-10-PCS | Mod: S$GLB,,, | Performed by: INTERNAL MEDICINE

## 2021-01-07 PROCEDURE — 3008F BODY MASS INDEX DOCD: CPT | Mod: CPTII,S$GLB,, | Performed by: INTERNAL MEDICINE

## 2021-01-07 PROCEDURE — 3080F PR MOST RECENT DIASTOLIC BLOOD PRESSURE >= 90 MM HG: ICD-10-PCS | Mod: CPTII,S$GLB,, | Performed by: INTERNAL MEDICINE

## 2021-01-07 PROCEDURE — 99999 PR PBB SHADOW E&M-EST. PATIENT-LVL IV: CPT | Mod: PBBFAC,,, | Performed by: INTERNAL MEDICINE

## 2021-01-07 PROCEDURE — 1125F PR PAIN SEVERITY QUANTIFIED, PAIN PRESENT: ICD-10-PCS | Mod: S$GLB,,, | Performed by: INTERNAL MEDICINE

## 2021-01-07 PROCEDURE — 3077F SYST BP >= 140 MM HG: CPT | Mod: CPTII,S$GLB,, | Performed by: INTERNAL MEDICINE

## 2021-01-07 PROCEDURE — 99999 PR PBB SHADOW E&M-EST. PATIENT-LVL IV: ICD-10-PCS | Mod: PBBFAC,,, | Performed by: INTERNAL MEDICINE

## 2021-01-07 PROCEDURE — 99214 OFFICE O/P EST MOD 30 MIN: CPT | Mod: S$GLB,,, | Performed by: INTERNAL MEDICINE

## 2021-01-07 PROCEDURE — 1125F AMNT PAIN NOTED PAIN PRSNT: CPT | Mod: S$GLB,,, | Performed by: INTERNAL MEDICINE

## 2021-01-07 RX ORDER — OLMESARTAN MEDOXOMIL 20 MG/1
20 TABLET ORAL NIGHTLY
Qty: 30 TABLET | Refills: 0 | Status: SHIPPED | OUTPATIENT
Start: 2021-01-07 | End: 2021-02-02

## 2021-01-07 RX ORDER — ESCITALOPRAM OXALATE 10 MG/1
10 TABLET ORAL DAILY
Qty: 30 TABLET | Refills: 0 | Status: SHIPPED | OUTPATIENT
Start: 2021-01-07 | End: 2021-04-26

## 2021-01-08 ENCOUNTER — OFFICE VISIT (OUTPATIENT)
Dept: INFECTIOUS DISEASES | Facility: CLINIC | Age: 51
End: 2021-01-08
Payer: COMMERCIAL

## 2021-01-08 ENCOUNTER — INFUSION (OUTPATIENT)
Dept: INFECTIOUS DISEASES | Facility: HOSPITAL | Age: 51
End: 2021-01-08
Attending: INTERNAL MEDICINE
Payer: COMMERCIAL

## 2021-01-08 VITALS
HEIGHT: 71 IN | WEIGHT: 315 LBS | SYSTOLIC BLOOD PRESSURE: 155 MMHG | TEMPERATURE: 98 F | DIASTOLIC BLOOD PRESSURE: 93 MMHG | HEART RATE: 88 BPM | BODY MASS INDEX: 44.1 KG/M2

## 2021-01-08 DIAGNOSIS — T81.49XA SURGICAL WOUND INFECTION: ICD-10-CM

## 2021-01-08 DIAGNOSIS — A49.01 MSSA (METHICILLIN SUSCEPTIBLE STAPHYLOCOCCUS AUREUS) INFECTION: Primary | ICD-10-CM

## 2021-01-08 DIAGNOSIS — L03.311 CELLULITIS, ABDOMINAL WALL: ICD-10-CM

## 2021-01-08 PROCEDURE — 99213 OFFICE O/P EST LOW 20 MIN: CPT | Mod: S$GLB,,, | Performed by: PHYSICIAN ASSISTANT

## 2021-01-08 PROCEDURE — 3008F BODY MASS INDEX DOCD: CPT | Mod: CPTII,S$GLB,, | Performed by: PHYSICIAN ASSISTANT

## 2021-01-08 PROCEDURE — 3077F PR MOST RECENT SYSTOLIC BLOOD PRESSURE >= 140 MM HG: ICD-10-PCS | Mod: CPTII,S$GLB,, | Performed by: PHYSICIAN ASSISTANT

## 2021-01-08 PROCEDURE — 1126F AMNT PAIN NOTED NONE PRSNT: CPT | Mod: S$GLB,,, | Performed by: PHYSICIAN ASSISTANT

## 2021-01-08 PROCEDURE — 99999 PR PBB SHADOW E&M-EST. PATIENT-LVL III: CPT | Mod: PBBFAC,,, | Performed by: PHYSICIAN ASSISTANT

## 2021-01-08 PROCEDURE — 3008F PR BODY MASS INDEX (BMI) DOCUMENTED: ICD-10-PCS | Mod: CPTII,S$GLB,, | Performed by: PHYSICIAN ASSISTANT

## 2021-01-08 PROCEDURE — 1126F PR PAIN SEVERITY QUANTIFIED, NO PAIN PRESENT: ICD-10-PCS | Mod: S$GLB,,, | Performed by: PHYSICIAN ASSISTANT

## 2021-01-08 PROCEDURE — 99213 PR OFFICE/OUTPT VISIT, EST, LEVL III, 20-29 MIN: ICD-10-PCS | Mod: S$GLB,,, | Performed by: PHYSICIAN ASSISTANT

## 2021-01-08 PROCEDURE — 3080F DIAST BP >= 90 MM HG: CPT | Mod: CPTII,S$GLB,, | Performed by: PHYSICIAN ASSISTANT

## 2021-01-08 PROCEDURE — 99999 PR PBB SHADOW E&M-EST. PATIENT-LVL III: ICD-10-PCS | Mod: PBBFAC,,, | Performed by: PHYSICIAN ASSISTANT

## 2021-01-08 PROCEDURE — 3077F SYST BP >= 140 MM HG: CPT | Mod: CPTII,S$GLB,, | Performed by: PHYSICIAN ASSISTANT

## 2021-01-08 PROCEDURE — 3080F PR MOST RECENT DIASTOLIC BLOOD PRESSURE >= 90 MM HG: ICD-10-PCS | Mod: CPTII,S$GLB,, | Performed by: PHYSICIAN ASSISTANT

## 2021-01-08 RX ORDER — MUPIROCIN 20 MG/G
OINTMENT TOPICAL
Qty: 22 G | Refills: 1 | Status: SHIPPED | OUTPATIENT
Start: 2021-01-08 | End: 2021-02-24

## 2021-01-27 DIAGNOSIS — M25.571 RIGHT ANKLE PAIN, UNSPECIFIED CHRONICITY: Primary | ICD-10-CM

## 2021-01-29 ENCOUNTER — OFFICE VISIT (OUTPATIENT)
Dept: ORTHOPEDICS | Facility: CLINIC | Age: 51
End: 2021-01-29
Payer: COMMERCIAL

## 2021-01-29 VITALS
SYSTOLIC BLOOD PRESSURE: 124 MMHG | WEIGHT: 310.88 LBS | RESPIRATION RATE: 18 BRPM | BODY MASS INDEX: 43.52 KG/M2 | OXYGEN SATURATION: 98 % | DIASTOLIC BLOOD PRESSURE: 80 MMHG | HEART RATE: 67 BPM | HEIGHT: 71 IN

## 2021-01-29 DIAGNOSIS — M25.571 RIGHT ANKLE PAIN, UNSPECIFIED CHRONICITY: ICD-10-CM

## 2021-01-29 DIAGNOSIS — M19.071 ARTHRITIS OF ANKLE, RIGHT: Primary | ICD-10-CM

## 2021-01-29 PROCEDURE — 1125F AMNT PAIN NOTED PAIN PRSNT: CPT | Mod: S$GLB,,, | Performed by: ORTHOPAEDIC SURGERY

## 2021-01-29 PROCEDURE — 3079F PR MOST RECENT DIASTOLIC BLOOD PRESSURE 80-89 MM HG: ICD-10-PCS | Mod: CPTII,S$GLB,, | Performed by: ORTHOPAEDIC SURGERY

## 2021-01-29 PROCEDURE — 3079F DIAST BP 80-89 MM HG: CPT | Mod: CPTII,S$GLB,, | Performed by: ORTHOPAEDIC SURGERY

## 2021-01-29 PROCEDURE — 99999 PR PBB SHADOW E&M-EST. PATIENT-LVL IV: CPT | Mod: PBBFAC,,, | Performed by: ORTHOPAEDIC SURGERY

## 2021-01-29 PROCEDURE — 3074F PR MOST RECENT SYSTOLIC BLOOD PRESSURE < 130 MM HG: ICD-10-PCS | Mod: CPTII,S$GLB,, | Performed by: ORTHOPAEDIC SURGERY

## 2021-01-29 PROCEDURE — 99204 PR OFFICE/OUTPT VISIT, NEW, LEVL IV, 45-59 MIN: ICD-10-PCS | Mod: 25,S$GLB,, | Performed by: ORTHOPAEDIC SURGERY

## 2021-01-29 PROCEDURE — 99204 OFFICE O/P NEW MOD 45 MIN: CPT | Mod: 25,S$GLB,, | Performed by: ORTHOPAEDIC SURGERY

## 2021-01-29 PROCEDURE — 1125F PR PAIN SEVERITY QUANTIFIED, PAIN PRESENT: ICD-10-PCS | Mod: S$GLB,,, | Performed by: ORTHOPAEDIC SURGERY

## 2021-01-29 PROCEDURE — 3074F SYST BP LT 130 MM HG: CPT | Mod: CPTII,S$GLB,, | Performed by: ORTHOPAEDIC SURGERY

## 2021-01-29 PROCEDURE — 3008F BODY MASS INDEX DOCD: CPT | Mod: CPTII,S$GLB,, | Performed by: ORTHOPAEDIC SURGERY

## 2021-01-29 PROCEDURE — 3008F PR BODY MASS INDEX (BMI) DOCUMENTED: ICD-10-PCS | Mod: CPTII,S$GLB,, | Performed by: ORTHOPAEDIC SURGERY

## 2021-01-29 PROCEDURE — 99999 PR PBB SHADOW E&M-EST. PATIENT-LVL IV: ICD-10-PCS | Mod: PBBFAC,,, | Performed by: ORTHOPAEDIC SURGERY

## 2021-02-26 ENCOUNTER — PATIENT MESSAGE (OUTPATIENT)
Dept: FAMILY MEDICINE | Facility: CLINIC | Age: 51
End: 2021-02-26

## 2021-02-26 DIAGNOSIS — G89.4 CHRONIC PAIN SYNDROME: ICD-10-CM

## 2021-02-26 DIAGNOSIS — M96.1 POSTLAMINECTOMY SYNDROME OF LUMBAR REGION: ICD-10-CM

## 2021-02-28 RX ORDER — TIZANIDINE HYDROCHLORIDE 6 MG/1
6 CAPSULE, GELATIN COATED ORAL 3 TIMES DAILY
Qty: 30 CAPSULE | Refills: 0 | OUTPATIENT
Start: 2021-02-28 | End: 2021-03-10

## 2021-02-28 RX ORDER — TIZANIDINE 2 MG/1
TABLET ORAL
Qty: 90 TABLET | Refills: 0 | Status: SHIPPED | OUTPATIENT
Start: 2021-02-28 | End: 2022-05-26 | Stop reason: SDUPTHER

## 2021-03-03 ENCOUNTER — TELEPHONE (OUTPATIENT)
Dept: CARDIOLOGY | Facility: CLINIC | Age: 51
End: 2021-03-03

## 2021-04-06 ENCOUNTER — PATIENT MESSAGE (OUTPATIENT)
Dept: ADMINISTRATIVE | Facility: HOSPITAL | Age: 51
End: 2021-04-06

## 2021-04-16 ENCOUNTER — PATIENT MESSAGE (OUTPATIENT)
Dept: RESEARCH | Facility: HOSPITAL | Age: 51
End: 2021-04-16

## 2021-04-24 ENCOUNTER — PATIENT OUTREACH (OUTPATIENT)
Dept: ADMINISTRATIVE | Facility: OTHER | Age: 51
End: 2021-04-24

## 2021-04-26 ENCOUNTER — OFFICE VISIT (OUTPATIENT)
Dept: ORTHOPEDICS | Facility: CLINIC | Age: 51
End: 2021-04-26
Payer: COMMERCIAL

## 2021-04-26 VITALS
SYSTOLIC BLOOD PRESSURE: 122 MMHG | BODY MASS INDEX: 44.1 KG/M2 | HEIGHT: 71 IN | WEIGHT: 315 LBS | HEART RATE: 73 BPM | OXYGEN SATURATION: 97 % | RESPIRATION RATE: 18 BRPM | DIASTOLIC BLOOD PRESSURE: 80 MMHG

## 2021-04-26 DIAGNOSIS — M19.071 ARTHRITIS OF ANKLE, RIGHT: Primary | ICD-10-CM

## 2021-04-26 DIAGNOSIS — M25.571 RIGHT ANKLE PAIN, UNSPECIFIED CHRONICITY: ICD-10-CM

## 2021-04-26 PROCEDURE — 99999 PR PBB SHADOW E&M-EST. PATIENT-LVL IV: ICD-10-PCS | Mod: PBBFAC,,, | Performed by: ORTHOPAEDIC SURGERY

## 2021-04-26 PROCEDURE — 99999 PR PBB SHADOW E&M-EST. PATIENT-LVL IV: CPT | Mod: PBBFAC,,, | Performed by: ORTHOPAEDIC SURGERY

## 2021-04-26 PROCEDURE — 20605 INTERMEDIATE JOINT ASPIRATION/INJECTION: R ANKLE: ICD-10-PCS | Mod: RT,S$GLB,, | Performed by: ORTHOPAEDIC SURGERY

## 2021-04-26 PROCEDURE — 1125F AMNT PAIN NOTED PAIN PRSNT: CPT | Mod: S$GLB,,, | Performed by: ORTHOPAEDIC SURGERY

## 2021-04-26 PROCEDURE — 1125F PR PAIN SEVERITY QUANTIFIED, PAIN PRESENT: ICD-10-PCS | Mod: S$GLB,,, | Performed by: ORTHOPAEDIC SURGERY

## 2021-04-26 PROCEDURE — 99213 OFFICE O/P EST LOW 20 MIN: CPT | Mod: 25,S$GLB,, | Performed by: ORTHOPAEDIC SURGERY

## 2021-04-26 PROCEDURE — 3008F BODY MASS INDEX DOCD: CPT | Mod: CPTII,S$GLB,, | Performed by: ORTHOPAEDIC SURGERY

## 2021-04-26 PROCEDURE — 20605 DRAIN/INJ JOINT/BURSA W/O US: CPT | Mod: RT,S$GLB,, | Performed by: ORTHOPAEDIC SURGERY

## 2021-04-26 PROCEDURE — 99213 PR OFFICE/OUTPT VISIT, EST, LEVL III, 20-29 MIN: ICD-10-PCS | Mod: 25,S$GLB,, | Performed by: ORTHOPAEDIC SURGERY

## 2021-04-26 PROCEDURE — 3008F PR BODY MASS INDEX (BMI) DOCUMENTED: ICD-10-PCS | Mod: CPTII,S$GLB,, | Performed by: ORTHOPAEDIC SURGERY

## 2021-04-26 RX ORDER — TRIAMCINOLONE ACETONIDE 40 MG/ML
40 INJECTION, SUSPENSION INTRA-ARTICULAR; INTRAMUSCULAR
Status: DISCONTINUED | OUTPATIENT
Start: 2021-04-26 | End: 2021-04-26 | Stop reason: HOSPADM

## 2021-04-26 RX ADMIN — TRIAMCINOLONE ACETONIDE 40 MG: 40 INJECTION, SUSPENSION INTRA-ARTICULAR; INTRAMUSCULAR at 12:04

## 2021-10-05 ENCOUNTER — PATIENT MESSAGE (OUTPATIENT)
Dept: FAMILY MEDICINE | Facility: CLINIC | Age: 51
End: 2021-10-05

## 2021-10-07 ENCOUNTER — TELEPHONE (OUTPATIENT)
Dept: FAMILY MEDICINE | Facility: CLINIC | Age: 51
End: 2021-10-07

## 2021-10-18 ENCOUNTER — TELEPHONE (OUTPATIENT)
Dept: FAMILY MEDICINE | Facility: CLINIC | Age: 51
End: 2021-10-18

## 2021-10-18 ENCOUNTER — PATIENT OUTREACH (OUTPATIENT)
Dept: ADMINISTRATIVE | Facility: HOSPITAL | Age: 51
End: 2021-10-18

## 2021-11-01 ENCOUNTER — PATIENT OUTREACH (OUTPATIENT)
Dept: ADMINISTRATIVE | Facility: HOSPITAL | Age: 51
End: 2021-11-01
Payer: COMMERCIAL

## 2021-11-01 RX ORDER — MORPHINE SULFATE 60 MG/1
TABLET, FILM COATED, EXTENDED RELEASE ORAL
COMMUNITY
Start: 2021-10-04 | End: 2023-01-09

## 2021-11-01 RX ORDER — TIZANIDINE HYDROCHLORIDE 2 MG/1
CAPSULE, GELATIN COATED ORAL
COMMUNITY
Start: 2021-01-01 | End: 2022-02-07 | Stop reason: SDUPTHER

## 2021-11-01 RX ORDER — METHYLPREDNISOLONE 4 MG/1
TABLET ORAL
COMMUNITY
Start: 2021-08-17 | End: 2022-02-07 | Stop reason: ALTCHOICE

## 2021-11-01 RX ORDER — OXYCODONE HYDROCHLORIDE 30 MG/1
30 TABLET ORAL
COMMUNITY
Start: 2021-10-04 | End: 2022-02-07

## 2021-11-01 RX ORDER — METHOCARBAMOL 750 MG/1
750 TABLET, FILM COATED ORAL 4 TIMES DAILY PRN
COMMUNITY
Start: 2021-09-07 | End: 2022-05-27

## 2021-11-01 RX ORDER — MORPHINE SULFATE 60 MG/1
CAPSULE, EXTENDED RELEASE ORAL
COMMUNITY
Start: 2021-05-03 | End: 2022-02-07

## 2021-11-01 RX ORDER — IBUPROFEN 800 MG/1
TABLET ORAL
COMMUNITY
End: 2022-02-07

## 2021-11-18 ENCOUNTER — TELEPHONE (OUTPATIENT)
Dept: FAMILY MEDICINE | Facility: CLINIC | Age: 51
End: 2021-11-18
Payer: COMMERCIAL

## 2021-11-19 ENCOUNTER — OFFICE VISIT (OUTPATIENT)
Dept: FAMILY MEDICINE | Facility: CLINIC | Age: 51
End: 2021-11-19
Payer: COMMERCIAL

## 2021-11-19 DIAGNOSIS — G89.4 CHRONIC PAIN SYNDROME: Chronic | ICD-10-CM

## 2021-11-19 DIAGNOSIS — I73.9 PERIPHERAL ARTERY DISEASE: ICD-10-CM

## 2021-11-19 DIAGNOSIS — R56.9 SEIZURE: ICD-10-CM

## 2021-11-19 DIAGNOSIS — F39 MOOD DISORDER: ICD-10-CM

## 2021-11-19 DIAGNOSIS — L02.413 ABSCESS OF RIGHT FOREARM: Primary | ICD-10-CM

## 2021-11-19 DIAGNOSIS — I10 ESSENTIAL HYPERTENSION: Chronic | ICD-10-CM

## 2021-11-19 DIAGNOSIS — M96.1 POSTLAMINECTOMY SYNDROME OF LUMBAR REGION: ICD-10-CM

## 2021-11-19 PROCEDURE — 4010F ACE/ARB THERAPY RXD/TAKEN: CPT | Mod: CPTII,95,, | Performed by: NURSE PRACTITIONER

## 2021-11-19 PROCEDURE — 1160F RVW MEDS BY RX/DR IN RCRD: CPT | Mod: CPTII,95,, | Performed by: NURSE PRACTITIONER

## 2021-11-19 PROCEDURE — 1160F PR REVIEW ALL MEDS BY PRESCRIBER/CLIN PHARMACIST DOCUMENTED: ICD-10-PCS | Mod: CPTII,95,, | Performed by: NURSE PRACTITIONER

## 2021-11-19 PROCEDURE — 99213 PR OFFICE/OUTPT VISIT, EST, LEVL III, 20-29 MIN: ICD-10-PCS | Mod: 95,,, | Performed by: NURSE PRACTITIONER

## 2021-11-19 PROCEDURE — 4010F PR ACE/ARB THEARPY RXD/TAKEN: ICD-10-PCS | Mod: CPTII,95,, | Performed by: NURSE PRACTITIONER

## 2021-11-19 PROCEDURE — 1159F MED LIST DOCD IN RCRD: CPT | Mod: CPTII,95,, | Performed by: NURSE PRACTITIONER

## 2021-11-19 PROCEDURE — 99213 OFFICE O/P EST LOW 20 MIN: CPT | Mod: 95,,, | Performed by: NURSE PRACTITIONER

## 2021-11-19 PROCEDURE — 1159F PR MEDICATION LIST DOCUMENTED IN MEDICAL RECORD: ICD-10-PCS | Mod: CPTII,95,, | Performed by: NURSE PRACTITIONER

## 2021-11-19 RX ORDER — SULFAMETHOXAZOLE AND TRIMETHOPRIM 800; 160 MG/1; MG/1
1 TABLET ORAL 2 TIMES DAILY
Qty: 20 TABLET | Refills: 0 | Status: SHIPPED | OUTPATIENT
Start: 2021-11-19 | End: 2021-11-29

## 2022-01-12 DIAGNOSIS — I10 ESSENTIAL HYPERTENSION: Chronic | ICD-10-CM

## 2022-01-12 RX ORDER — OLMESARTAN MEDOXOMIL 20 MG/1
TABLET ORAL
Qty: 30 TABLET | Refills: 0 | Status: SHIPPED | OUTPATIENT
Start: 2022-01-12 | End: 2022-01-21 | Stop reason: SDUPTHER

## 2022-01-12 NOTE — TELEPHONE ENCOUNTER
Care Due:                  Date            Visit Type   Department     Provider  --------------------------------------------------------------------------------                                             [facility]                                           MEDICINE/INTERN  Last Visit: 01-      None         EUGENIO Dean  Next Visit: None Scheduled  None         None Found                                                            Last  Test          Frequency    Reason                     Performed    Due Date  --------------------------------------------------------------------------------    Office Visit  12 months..  olmesartan...............  01- 01-    CMP.........  12 months..  olmesartan...............  Not Found    Overdue    Powered by [employer]. Reference number: [id number].   1/12/2022 8:34:02 AM CST

## 2022-01-20 ENCOUNTER — TELEPHONE (OUTPATIENT)
Dept: FAMILY MEDICINE | Facility: CLINIC | Age: 52
End: 2022-01-20
Payer: COMMERCIAL

## 2022-01-21 ENCOUNTER — PATIENT MESSAGE (OUTPATIENT)
Dept: FAMILY MEDICINE | Facility: CLINIC | Age: 52
End: 2022-01-21

## 2022-01-21 ENCOUNTER — OFFICE VISIT (OUTPATIENT)
Dept: FAMILY MEDICINE | Facility: CLINIC | Age: 52
End: 2022-01-21
Payer: COMMERCIAL

## 2022-01-21 VITALS — HEART RATE: 60 BPM | DIASTOLIC BLOOD PRESSURE: 94 MMHG | SYSTOLIC BLOOD PRESSURE: 166 MMHG

## 2022-01-21 DIAGNOSIS — I73.9 PERIPHERAL ARTERY DISEASE: ICD-10-CM

## 2022-01-21 DIAGNOSIS — G89.4 CHRONIC PAIN SYNDROME: Chronic | ICD-10-CM

## 2022-01-21 DIAGNOSIS — R56.9 SEIZURE: ICD-10-CM

## 2022-01-21 DIAGNOSIS — R51.9 OCCIPITAL HEADACHE: ICD-10-CM

## 2022-01-21 DIAGNOSIS — I10 ESSENTIAL HYPERTENSION: Primary | Chronic | ICD-10-CM

## 2022-01-21 DIAGNOSIS — F39 MOOD DISORDER: ICD-10-CM

## 2022-01-21 PROBLEM — T85.738A: Status: ACTIVE | Noted: 2022-01-21

## 2022-01-21 PROCEDURE — 3080F PR MOST RECENT DIASTOLIC BLOOD PRESSURE >= 90 MM HG: ICD-10-PCS | Mod: CPTII,95,, | Performed by: NURSE PRACTITIONER

## 2022-01-21 PROCEDURE — 99214 PR OFFICE/OUTPT VISIT, EST, LEVL IV, 30-39 MIN: ICD-10-PCS | Mod: 95,,, | Performed by: NURSE PRACTITIONER

## 2022-01-21 PROCEDURE — 1160F PR REVIEW ALL MEDS BY PRESCRIBER/CLIN PHARMACIST DOCUMENTED: ICD-10-PCS | Mod: CPTII,95,, | Performed by: NURSE PRACTITIONER

## 2022-01-21 PROCEDURE — 3080F DIAST BP >= 90 MM HG: CPT | Mod: CPTII,95,, | Performed by: NURSE PRACTITIONER

## 2022-01-21 PROCEDURE — 99214 OFFICE O/P EST MOD 30 MIN: CPT | Mod: 95,,, | Performed by: NURSE PRACTITIONER

## 2022-01-21 PROCEDURE — 3077F SYST BP >= 140 MM HG: CPT | Mod: CPTII,95,, | Performed by: NURSE PRACTITIONER

## 2022-01-21 PROCEDURE — 3077F PR MOST RECENT SYSTOLIC BLOOD PRESSURE >= 140 MM HG: ICD-10-PCS | Mod: CPTII,95,, | Performed by: NURSE PRACTITIONER

## 2022-01-21 PROCEDURE — 1159F PR MEDICATION LIST DOCUMENTED IN MEDICAL RECORD: ICD-10-PCS | Mod: CPTII,95,, | Performed by: NURSE PRACTITIONER

## 2022-01-21 PROCEDURE — 4010F ACE/ARB THERAPY RXD/TAKEN: CPT | Mod: CPTII,95,, | Performed by: NURSE PRACTITIONER

## 2022-01-21 PROCEDURE — 4010F PR ACE/ARB THEARPY RXD/TAKEN: ICD-10-PCS | Mod: CPTII,95,, | Performed by: NURSE PRACTITIONER

## 2022-01-21 PROCEDURE — 1160F RVW MEDS BY RX/DR IN RCRD: CPT | Mod: CPTII,95,, | Performed by: NURSE PRACTITIONER

## 2022-01-21 PROCEDURE — 1159F MED LIST DOCD IN RCRD: CPT | Mod: CPTII,95,, | Performed by: NURSE PRACTITIONER

## 2022-01-21 RX ORDER — OLMESARTAN MEDOXOMIL 40 MG/1
40 TABLET ORAL DAILY
Qty: 90 TABLET | Refills: 1 | Status: SHIPPED | OUTPATIENT
Start: 2022-01-21 | End: 2022-05-31 | Stop reason: SDUPTHER

## 2022-01-21 NOTE — PROGRESS NOTES
"The patient location is: [facility]  The chief complaint leading to consultation is: hypertension.    Visit type: audiovisual initially, changed to audio only due to technical difficulties with sound during audiovisual visit.    Face to Face time with patient: 20  30 minutes of total time spent on the encounter, which includes face to face time and non-face to face time preparing to see the patient (eg, review of tests), Obtaining and/or reviewing separately obtained history, Documenting clinical information in the electronic or other health record, Independently interpreting results (not separately reported) and communicating results to the patient/family/caregiver, or Care coordination (not separately reported).         Each patient to whom he or she provides medical services by telemedicine is:  (1) informed of the relationship between the physician and patient and the respective role of any other health care provider with respect to management of the patient; and (2) notified that he or she may decline to receive medical services by telemedicine and may withdraw from such care at any time.    Notes:   History of Present Illness   Jaime Lund is a 51 y.o. man with medical history as listed below with virtual visit today for evaluation of HA x 1 week. He reports intermittent HAs that improve with or without medication. HA are located at the back of the head and described as a throbbing with neck soreness. He denies associated blurred vision, nausea, vomiting, numbness, tingling, focal weakness, change in speech, or other associated symptoms. He reports checking his blood pressure at home which has ranged 150-160's/90's mmHg since HAs began. He also reports taking Fioricet which helps improve the HAs. He thought his HAs may be related to sinuses, and he tried Tylenol sinus and Sudafed which did relieve the HA. He was evaluated by chiropractor for neck pain who "cracked neck" with minimal " improvement in HAs. He has taken several at home COVID-19 tests that have returned negative. He has no additional complaints and is otherwise healthy on today's visit.    Past Medical History:   Diagnosis Date    Back pain, chronic     Bulging discs     Chronic pain following surgery or procedure     Degenerative disc disease     Hypertension     Hypokalemia     Obese abdomen     PAD (peripheral artery disease)     Post laminectomy syndrome     Seizures     Severe sepsis     Short-term memory loss     Surgical site infection 11/17/2020    Rt abdomen pain pump site    Thyroid disease     Subclinical hyperthyroidism       Past Surgical History:   Procedure Laterality Date    CHOLECYSTECTOMY      GASTRIC BYPASS      SLEEVE    gastric sleeve  2011    HERNIA REPAIR      pain pump Right 04/03/2018    inserted at right abdomen    pain pump site washout Right 04/13/2018    REMOVAL OF IMPLANT N/A 11/18/2020    Procedure: REMOVAL, IMPLANT;  Surgeon: Raffy Headley DO;  Location: [facility] OR;  Service: Neurosurgery;  Laterality: N/A;  intrathecal pain pump, leads removed from mid back and generator from abdomen    SKIN SURGERY      EXCESS SKIN REMOVAL    SPINE SURGERY      lumbar fusion       Social History     Socioeconomic History    Marital status:    Tobacco Use    Smoking status: Never Smoker    Smokeless tobacco: Never Used   Substance and Sexual Activity    Alcohol use: Yes     Comment: socially    Drug use: No    Sexual activity: Yes     Partners: Female       Family History   Problem Relation Age of Onset    Heart disease Mother     Breast cancer Mother     Arthritis Mother     Hypertension Mother     Throat cancer Father     Hypertension Father        Review of Systems  Review of Systems   Constitutional: Negative for fever.   HENT: Negative for congestion, ear pain, sinus pain and sore throat.    Eyes: Negative for blurred vision, double vision and photophobia.   Respiratory:  Negative for shortness of breath.    Cardiovascular: Negative for chest pain and palpitations.   Gastrointestinal: Negative for nausea and vomiting.   Musculoskeletal: Positive for back pain and neck pain. Negative for falls.   Neurological: Positive for headaches. Negative for dizziness and loss of consciousness.     A complete review of systems was otherwise negative.    Physical Exam  BP (!) 166/94 (BP Location: Left arm) Comment: patient home cuff  Pulse 60   General appearance: alert, appears stated age, cooperative and no distress  Lungs: respirations are even and unlabored  Skin: no visible rash or lesions  Neurologic: Grossly normal, no focal neurological deficits    Assessment/Plan  Essential hypertension  BP home readings elevated x 1 week, unsure if BP was elevated before onset of HA.  Increase Benicar to 40 mg daily.  Stop Sudafed, although BP was elevated before he started Sudafed this is likely worsening poor BP control.  Monitor BP over next several days, will follow-up with home readings.  If no improvement in BP or HAs recommend in office visit.  ER precautions discussed.  -     olmesartan (BENICAR) 40 MG tablet; Take 1 tablet (40 mg total) by mouth once daily.  Dispense: 90 tablet; Refill: 1    Occipital headache  Possibly BP related.  Improve BP control with increased dose.  Stop Sudafed.  Continue Fioricet, NSAID, or Tylenol PRN- discussed potential for medication overuse HA.  If no improvement in BP or HAs recommend in office visit.  ER precautions discussed.    BMI 40.0-44.9, adult  The patient is asked to make an attempt to improve diet and exercise patterns to aid in medical management of this problem.    Peripheral artery disease  The current medical regimen is effective;  continue present plan and medications.    Mood disorder  The current medical regimen is effective;  continue present plan and medications.    Seizure  The current medical regimen is effective;  continue present plan and  medications.    Chronic pain syndrome  The current medical regimen is effective;  continue present plan and medications.    Patient has verbalized understanding and is in agreement with plan of care.    Follow up in about 5 days (around 1/26/2022) for BP follow-up.

## 2022-01-24 ENCOUNTER — PATIENT MESSAGE (OUTPATIENT)
Dept: FAMILY MEDICINE | Facility: CLINIC | Age: 52
End: 2022-01-24
Payer: COMMERCIAL

## 2022-02-07 ENCOUNTER — OFFICE VISIT (OUTPATIENT)
Dept: FAMILY MEDICINE | Facility: CLINIC | Age: 52
End: 2022-02-07
Payer: COMMERCIAL

## 2022-02-07 VITALS
SYSTOLIC BLOOD PRESSURE: 152 MMHG | DIASTOLIC BLOOD PRESSURE: 92 MMHG | BODY MASS INDEX: 44.1 KG/M2 | HEIGHT: 71 IN | TEMPERATURE: 98 F | HEART RATE: 86 BPM | RESPIRATION RATE: 20 BRPM | WEIGHT: 315 LBS | OXYGEN SATURATION: 98 %

## 2022-02-07 DIAGNOSIS — T85.738A INFECTION AND INFLAMMATORY REACTION DUE TO OTHER NERVOUS SYSTEM DEVICE, IMPLANT OR GRAFT, INITIAL ENCOUNTER: ICD-10-CM

## 2022-02-07 DIAGNOSIS — R53.83 FATIGUE, UNSPECIFIED TYPE: ICD-10-CM

## 2022-02-07 DIAGNOSIS — I10 ESSENTIAL HYPERTENSION: Chronic | ICD-10-CM

## 2022-02-07 DIAGNOSIS — Z00.00 ANNUAL PHYSICAL EXAM: Primary | ICD-10-CM

## 2022-02-07 PROCEDURE — 3077F PR MOST RECENT SYSTOLIC BLOOD PRESSURE >= 140 MM HG: ICD-10-PCS | Mod: CPTII,S$GLB,, | Performed by: INTERNAL MEDICINE

## 2022-02-07 PROCEDURE — 3080F PR MOST RECENT DIASTOLIC BLOOD PRESSURE >= 90 MM HG: ICD-10-PCS | Mod: CPTII,S$GLB,, | Performed by: INTERNAL MEDICINE

## 2022-02-07 PROCEDURE — 99999 PR PBB SHADOW E&M-EST. PATIENT-LVL III: CPT | Mod: PBBFAC,,, | Performed by: INTERNAL MEDICINE

## 2022-02-07 PROCEDURE — 4010F PR ACE/ARB THEARPY RXD/TAKEN: ICD-10-PCS | Mod: CPTII,S$GLB,, | Performed by: INTERNAL MEDICINE

## 2022-02-07 PROCEDURE — 99999 PR PBB SHADOW E&M-EST. PATIENT-LVL III: ICD-10-PCS | Mod: PBBFAC,,, | Performed by: INTERNAL MEDICINE

## 2022-02-07 PROCEDURE — 3008F BODY MASS INDEX DOCD: CPT | Mod: CPTII,S$GLB,, | Performed by: INTERNAL MEDICINE

## 2022-02-07 PROCEDURE — 3080F DIAST BP >= 90 MM HG: CPT | Mod: CPTII,S$GLB,, | Performed by: INTERNAL MEDICINE

## 2022-02-07 PROCEDURE — 99213 PR OFFICE/OUTPT VISIT, EST, LEVL III, 20-29 MIN: ICD-10-PCS | Mod: 25,S$GLB,, | Performed by: INTERNAL MEDICINE

## 2022-02-07 PROCEDURE — 1159F MED LIST DOCD IN RCRD: CPT | Mod: CPTII,S$GLB,, | Performed by: INTERNAL MEDICINE

## 2022-02-07 PROCEDURE — 99213 OFFICE O/P EST LOW 20 MIN: CPT | Mod: 25,S$GLB,, | Performed by: INTERNAL MEDICINE

## 2022-02-07 PROCEDURE — 4010F ACE/ARB THERAPY RXD/TAKEN: CPT | Mod: CPTII,S$GLB,, | Performed by: INTERNAL MEDICINE

## 2022-02-07 PROCEDURE — 1160F PR REVIEW ALL MEDS BY PRESCRIBER/CLIN PHARMACIST DOCUMENTED: ICD-10-PCS | Mod: CPTII,S$GLB,, | Performed by: INTERNAL MEDICINE

## 2022-02-07 PROCEDURE — 3077F SYST BP >= 140 MM HG: CPT | Mod: CPTII,S$GLB,, | Performed by: INTERNAL MEDICINE

## 2022-02-07 PROCEDURE — 3008F PR BODY MASS INDEX (BMI) DOCUMENTED: ICD-10-PCS | Mod: CPTII,S$GLB,, | Performed by: INTERNAL MEDICINE

## 2022-02-07 PROCEDURE — 1159F PR MEDICATION LIST DOCUMENTED IN MEDICAL RECORD: ICD-10-PCS | Mod: CPTII,S$GLB,, | Performed by: INTERNAL MEDICINE

## 2022-02-07 PROCEDURE — 99396 PR PREVENTIVE VISIT,EST,40-64: ICD-10-PCS | Mod: S$GLB,,, | Performed by: INTERNAL MEDICINE

## 2022-02-07 PROCEDURE — 1160F RVW MEDS BY RX/DR IN RCRD: CPT | Mod: CPTII,S$GLB,, | Performed by: INTERNAL MEDICINE

## 2022-02-07 PROCEDURE — 99396 PREV VISIT EST AGE 40-64: CPT | Mod: S$GLB,,, | Performed by: INTERNAL MEDICINE

## 2022-02-07 RX ORDER — AMLODIPINE BESYLATE 5 MG/1
5 TABLET ORAL NIGHTLY
Qty: 30 TABLET | Refills: 0 | Status: SHIPPED | OUTPATIENT
Start: 2022-02-07 | End: 2022-02-25

## 2022-02-07 NOTE — PROGRESS NOTES
Health Maintenance Due   Topic Date Due    TETANUS VACCINE  Consult with pcp       Shingles Vaccine (1 of 2) Hx chickenpox, notified can get vaccine at pharmacy.       Influenza Vaccine (1) Okay to get today       COVID-19 Vaccine (3 - Booster for Moderna series) 09/25/2021

## 2022-02-07 NOTE — PROGRESS NOTES
SUBJECTIVE     Chief Complaint   Patient presents with    Annual Exam       HPI  Jaime Lund is a 51 y.o. male with multiple medical diagnoses as listed in the medical history and problem list that presents for annual exam. Pt has been doing well since his last visit. He has a good appetite and eats well. He does not exercise. He sleeps for ~8-10 hours nightly. Pt does take OTC supplements, which is a Vit B complex, zinc, probiotic and turmeric. He does not have any current stressors. Pt is UTD on age appropriate CA screening.    PAST MEDICAL HISTORY:  Past Medical History:   Diagnosis Date    Back pain, chronic     Bulging discs     Chronic pain following surgery or procedure     Degenerative disc disease     Hypertension     Hypokalemia     Obese abdomen     PAD (peripheral artery disease)     Post laminectomy syndrome     Seizures     Severe sepsis     Short-term memory loss     Surgical site infection 11/17/2020    Rt abdomen pain pump site    Thyroid disease     Subclinical hyperthyroidism       PAST SURGICAL HISTORY:  Past Surgical History:   Procedure Laterality Date    CHOLECYSTECTOMY      GASTRIC BYPASS      SLEEVE    gastric sleeve  2011    HERNIA REPAIR      pain pump Right 04/03/2018    inserted at right abdomen    pain pump site washout Right 04/13/2018    REMOVAL OF IMPLANT N/A 11/18/2020    Procedure: REMOVAL, IMPLANT;  Surgeon: Raffy Headley DO;  Location: [facility];  Service: Neurosurgery;  Laterality: N/A;  intrathecal pain pump, leads removed from mid back and generator from abdomen    SKIN SURGERY      EXCESS SKIN REMOVAL    SPINE SURGERY      lumbar fusion       SOCIAL HISTORY:  Social History     Socioeconomic History    Marital status:    Tobacco Use    Smoking status: Never Smoker    Smokeless tobacco: Never Used   Substance and Sexual Activity    Alcohol use: Yes     Comment: socially    Drug use: No    Sexual activity: Yes     Partners: Female        FAMILY HISTORY:  Family History   Problem Relation Age of Onset    Heart disease Mother     Breast cancer Mother     Arthritis Mother     Hypertension Mother     Throat cancer Father     Hypertension Father        ALLERGIES AND MEDICATIONS: updated and reviewed.  Review of patient's allergies indicates:   Allergen Reactions    Klonopin [clonazepam] Anxiety and Other (See Comments)     Becomes agitated    Valium [diazepam] Other (See Comments)     Becomes agitated    Xanax [alprazolam] Anxiety and Other (See Comments)     Becomes agitated     Current Outpatient Medications   Medication Sig Dispense Refill    butalbital-acetaminophen-caffeine -40 mg (FIORICET, ESGIC) -40 mg per tablet TAKE ONE TABLET BY MOUTH EVERY 4 HOURS AS NEEDED FOR HEADACHE 60 tablet 5    DULoxetine (CYMBALTA) 60 MG capsule TAKE 1 CAPSULE BY MOUTH EVERY EVENING 90 capsule 1    furosemide (LASIX) 40 MG tablet TAKE ONE TABLET BY MOUTH ONCE DAILY FOR FLUID 90 tablet 0    gabapentin (NEURONTIN) 400 MG capsule 2 (two) times daily. Takes 1 400mg pill every AM, Takes 2 (400mg) at night      HYDROmorphone (DILAUDID) 8 MG tablet Take 8 mg by mouth every 6 (six) hours as needed for Pain.      meloxicam (MOBIC) 15 MG tablet TAKE ONE TABLET BY MOUTH ONCE DAILY AS NEEDED FOR PAIN 90 tablet 0    methocarbamoL (ROBAXIN) 750 MG Tab Take 750 mg by mouth 4 (four) times daily as needed.      morphine (MS CONTIN) 60 MG 12 hr tablet SMARTSI Tablet(s) By Mouth Every 12 Hours PRN      mupirocin (BACTROBAN) 2 % ointment APPLY TO AFFECTED AREA THREE TIMES DAILY 22 g 0    olmesartan (BENICAR) 40 MG tablet Take 1 tablet (40 mg total) by mouth once daily. 90 tablet 1    potassium chloride (KLOR-CON) 10 MEQ TbSR TAKE ONE TABLET BY MOUTH ONCE DAILY AS NEEDED WITH FLUID PILL 30 tablet 0    tiZANidine (ZANAFLEX) 2 MG tablet TAKE ONE TABLET BY MOUTH THREE TIMES DAILY FOR MUSCLE SPASMS. 90 tablet 0    traZODone (DESYREL) 50 MG tablet  "TAKE ONE TO TWO TABLETS BY MOUTH AT BEDTIME      amLODIPine (NORVASC) 5 MG tablet Take 1 tablet (5 mg total) by mouth every evening. 30 tablet 0     No current facility-administered medications for this visit.       ROS  Review of Systems   Constitutional: Negative for chills and fever.   HENT: Negative for hearing loss and sore throat.    Eyes: Negative for visual disturbance.   Respiratory: Negative for cough and shortness of breath.    Cardiovascular: Negative for chest pain, palpitations and leg swelling.   Gastrointestinal: Negative for abdominal pain, constipation, diarrhea, nausea and vomiting.   Genitourinary: Negative for dysuria, frequency and urgency.   Musculoskeletal: Negative for arthralgias, joint swelling, myalgias and neck pain.   Skin: Negative for rash and wound.   Neurological: Positive for headaches.   Psychiatric/Behavioral: Negative for agitation and confusion. The patient is not nervous/anxious.          OBJECTIVE     Physical Exam  Vitals:    02/07/22 0828   BP: (!) 152/92   Pulse:    Resp:    Temp:     Body mass index is 45.29 kg/m².  Weight: (!) 147.3 kg (324 lb 11.8 oz)   Height: 5' 11" (180.3 cm)     Physical Exam  Constitutional:       General: He is not in acute distress.     Appearance: He is well-developed and well-nourished.   HENT:      Head: Normocephalic and atraumatic.      Right Ear: External ear normal.      Left Ear: External ear normal.      Nose: Nose normal.      Mouth/Throat:      Mouth: Oropharynx is clear and moist.   Eyes:      General: No scleral icterus.        Right eye: No discharge.         Left eye: No discharge.      Extraocular Movements: EOM normal.      Conjunctiva/sclera: Conjunctivae normal.   Neck:      Vascular: No JVD.      Trachea: No tracheal deviation.   Cardiovascular:      Rate and Rhythm: Normal rate and regular rhythm.      Pulses: Intact distal pulses.      Heart sounds: Normal heart sounds. No murmur heard.  No friction rub. No gallop.  "   Pulmonary:      Effort: Pulmonary effort is normal. No respiratory distress.      Breath sounds: Normal breath sounds. No wheezing.   Abdominal:      General: Bowel sounds are normal. There is no distension.      Palpations: Abdomen is soft. There is no mass.      Tenderness: There is no abdominal tenderness. There is no guarding or rebound.   Musculoskeletal:         General: No tenderness, deformity or edema. Normal range of motion.      Cervical back: Normal range of motion and neck supple.   Skin:     General: Skin is warm and dry.      Findings: No erythema or rash.   Neurological:      Mental Status: He is alert and oriented to person, place, and time.      Motor: No abnormal muscle tone.      Coordination: Coordination normal.   Psychiatric:         Mood and Affect: Mood and affect normal.         Behavior: Behavior normal.         Thought Content: Thought content normal.         Judgment: Judgment normal.           Health Maintenance       Date Due Completion Date    TETANUS VACCINE Never done ---    Shingles Vaccine (1 of 2) Never done ---    Influenza Vaccine (1) 09/01/2021 12/4/2020    COVID-19 Vaccine (3 - Booster for Moderna series) 09/25/2021 3/25/2021    Colorectal Cancer Screening 10/16/2023 10/16/2020    Lipid Panel 04/11/2024 4/11/2019            ASSESSMENT     51 y.o. male with     1. Annual physical exam    2. Essential hypertension    3. Fatigue, unspecified type    4. Infection and inflammatory reaction due to other nervous system device, implant or graft, initial encounter    5. BMI 45.0-49.9, adult        PLAN:     1. Annual physical exam  - Counseled on age appropriate medical preventative services, including age appropriate cancer screenings, over all nutritional health, need for a consistent exercise regimen and an over all push towards maintaining a vigorous and active lifestyle.  Counseled on age appropriate vaccines and discussed upcoming health care needs based on age/gender.  Spent  time with patient counseling on need for a good patient/doctor relationship moving forward.  Discussed use of common OTC medications and supplements.  Discussed common dietary aids and use of caffeine and the need for good sleep hygiene and stress management.  - CBC Auto Differential; Future  - Comprehensive Metabolic Panel; Future  - Hemoglobin A1C; Future  - TSH; Future  - Lipid Panel; Future    2. Essential hypertension  - amLODIPine (NORVASC) 5 MG tablet; Take 1 tablet (5 mg total) by mouth every evening.  Dispense: 30 tablet; Refill: 0  - Hypertension Digital Medicine (HDMP) Enrollment Order  - Hypertension Digital Medicine (HDMP): Assign Onboarding Questionnaires    3. Fatigue, unspecified type  - Testosterone; Future    4. Infection and inflammatory reaction due to other nervous system device, implant or graft, initial encounter  - Stable; no acute issues    5. BMI 45.0-49.9, adult  - Pt aware of importance of eating a prudent diet and exercising        RTC in 6 months     Gabby Dean MD  02/07/2022 8:51 AM        No follow-ups on file.
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
## 2022-02-10 ENCOUNTER — PATIENT MESSAGE (OUTPATIENT)
Dept: FAMILY MEDICINE | Facility: CLINIC | Age: 52
End: 2022-02-10
Payer: COMMERCIAL

## 2022-02-11 ENCOUNTER — PATIENT MESSAGE (OUTPATIENT)
Dept: FAMILY MEDICINE | Facility: CLINIC | Age: 52
End: 2022-02-11
Payer: COMMERCIAL

## 2022-02-17 ENCOUNTER — PATIENT MESSAGE (OUTPATIENT)
Dept: FAMILY MEDICINE | Facility: CLINIC | Age: 52
End: 2022-02-17
Payer: COMMERCIAL

## 2022-02-17 DIAGNOSIS — S61.219A INFECTED CUT OF FINGER: Primary | ICD-10-CM

## 2022-02-17 DIAGNOSIS — L08.9 INFECTED CUT OF FINGER: Primary | ICD-10-CM

## 2022-02-17 RX ORDER — CEPHALEXIN 500 MG/1
500 CAPSULE ORAL EVERY 12 HOURS
Qty: 14 CAPSULE | Refills: 0 | Status: SHIPPED | OUTPATIENT
Start: 2022-02-17 | End: 2022-02-24

## 2022-02-25 ENCOUNTER — PATIENT MESSAGE (OUTPATIENT)
Dept: FAMILY MEDICINE | Facility: CLINIC | Age: 52
End: 2022-02-25
Payer: COMMERCIAL

## 2022-02-25 DIAGNOSIS — I10 ESSENTIAL HYPERTENSION: Primary | Chronic | ICD-10-CM

## 2022-02-25 RX ORDER — AMLODIPINE BESYLATE 10 MG/1
10 TABLET ORAL DAILY
Qty: 90 TABLET | Refills: 0 | Status: SHIPPED | OUTPATIENT
Start: 2022-02-25 | End: 2022-06-06

## 2022-03-02 ENCOUNTER — LAB VISIT (OUTPATIENT)
Dept: LAB | Facility: HOSPITAL | Age: 52
End: 2022-03-02
Payer: COMMERCIAL

## 2022-03-02 DIAGNOSIS — E29.1 TESTOSTERONE DEFICIENCY IN MALE: Primary | ICD-10-CM

## 2022-03-02 DIAGNOSIS — Z00.00 ANNUAL PHYSICAL EXAM: ICD-10-CM

## 2022-03-02 DIAGNOSIS — R53.83 FATIGUE, UNSPECIFIED TYPE: ICD-10-CM

## 2022-03-02 LAB
ALBUMIN SERPL BCP-MCNC: 3.7 G/DL (ref 3.5–5.2)
ALP SERPL-CCNC: 91 U/L (ref 55–135)
ALT SERPL W/O P-5'-P-CCNC: 15 U/L (ref 10–44)
ANION GAP SERPL CALC-SCNC: 9 MMOL/L (ref 8–16)
AST SERPL-CCNC: 22 U/L (ref 10–40)
BASOPHILS # BLD AUTO: 0.05 K/UL (ref 0–0.2)
BASOPHILS NFR BLD: 1 % (ref 0–1.9)
BILIRUB SERPL-MCNC: 0.4 MG/DL (ref 0.1–1)
BUN SERPL-MCNC: 16 MG/DL (ref 6–20)
CALCIUM SERPL-MCNC: 9.1 MG/DL (ref 8.7–10.5)
CHLORIDE SERPL-SCNC: 104 MMOL/L (ref 95–110)
CHOLEST SERPL-MCNC: 186 MG/DL (ref 120–199)
CHOLEST/HDLC SERPL: 3.4 {RATIO} (ref 2–5)
CO2 SERPL-SCNC: 26 MMOL/L (ref 23–29)
CREAT SERPL-MCNC: 0.7 MG/DL (ref 0.5–1.4)
DIFFERENTIAL METHOD: ABNORMAL
EOSINOPHIL # BLD AUTO: 0.3 K/UL (ref 0–0.5)
EOSINOPHIL NFR BLD: 5.3 % (ref 0–8)
ERYTHROCYTE [DISTWIDTH] IN BLOOD BY AUTOMATED COUNT: 13.2 % (ref 11.5–14.5)
EST. GFR  (AFRICAN AMERICAN): >60 ML/MIN/1.73 M^2
EST. GFR  (NON AFRICAN AMERICAN): >60 ML/MIN/1.73 M^2
ESTIMATED AVG GLUCOSE: 91 MG/DL (ref 68–131)
GLUCOSE SERPL-MCNC: 100 MG/DL (ref 70–110)
HBA1C MFR BLD: 4.8 % (ref 4–5.6)
HCT VFR BLD AUTO: 38 % (ref 40–54)
HDLC SERPL-MCNC: 54 MG/DL (ref 40–75)
HDLC SERPL: 29 % (ref 20–50)
HGB BLD-MCNC: 12 G/DL (ref 14–18)
IMM GRANULOCYTES # BLD AUTO: 0 K/UL (ref 0–0.04)
IMM GRANULOCYTES NFR BLD AUTO: 0 % (ref 0–0.5)
LDLC SERPL CALC-MCNC: 98 MG/DL (ref 63–159)
LYMPHOCYTES # BLD AUTO: 1.3 K/UL (ref 1–4.8)
LYMPHOCYTES NFR BLD: 26.5 % (ref 18–48)
MCH RBC QN AUTO: 28.8 PG (ref 27–31)
MCHC RBC AUTO-ENTMCNC: 31.6 G/DL (ref 32–36)
MCV RBC AUTO: 91 FL (ref 82–98)
MONOCYTES # BLD AUTO: 0.3 K/UL (ref 0.3–1)
MONOCYTES NFR BLD: 6.2 % (ref 4–15)
NEUTROPHILS # BLD AUTO: 3 K/UL (ref 1.8–7.7)
NEUTROPHILS NFR BLD: 61 % (ref 38–73)
NONHDLC SERPL-MCNC: 132 MG/DL
NRBC BLD-RTO: 0 /100 WBC
PLATELET # BLD AUTO: 274 K/UL (ref 150–450)
PMV BLD AUTO: 9.5 FL (ref 9.2–12.9)
POTASSIUM SERPL-SCNC: 4.1 MMOL/L (ref 3.5–5.1)
PROT SERPL-MCNC: 7.2 G/DL (ref 6–8.4)
RBC # BLD AUTO: 4.16 M/UL (ref 4.6–6.2)
SODIUM SERPL-SCNC: 139 MMOL/L (ref 136–145)
TESTOST SERPL-MCNC: 260 NG/DL (ref 304–1227)
TRIGL SERPL-MCNC: 170 MG/DL (ref 30–150)
TSH SERPL DL<=0.005 MIU/L-ACNC: 0.41 UIU/ML (ref 0.4–4)
WBC # BLD AUTO: 4.86 K/UL (ref 3.9–12.7)

## 2022-03-02 PROCEDURE — 83036 HEMOGLOBIN GLYCOSYLATED A1C: CPT | Performed by: INTERNAL MEDICINE

## 2022-03-02 PROCEDURE — 80053 COMPREHEN METABOLIC PANEL: CPT | Performed by: INTERNAL MEDICINE

## 2022-03-02 PROCEDURE — 85025 COMPLETE CBC W/AUTO DIFF WBC: CPT | Performed by: INTERNAL MEDICINE

## 2022-03-02 PROCEDURE — 80061 LIPID PANEL: CPT | Performed by: INTERNAL MEDICINE

## 2022-03-02 PROCEDURE — 36415 COLL VENOUS BLD VENIPUNCTURE: CPT | Performed by: INTERNAL MEDICINE

## 2022-03-02 PROCEDURE — 84403 ASSAY OF TOTAL TESTOSTERONE: CPT | Performed by: INTERNAL MEDICINE

## 2022-03-02 PROCEDURE — 84443 ASSAY THYROID STIM HORMONE: CPT | Performed by: INTERNAL MEDICINE

## 2022-03-03 NOTE — PROGRESS NOTES
"Subjective:       Jaime Lund is a 51 y.o. male who is a new patient who was referred by Dr Dean  for evaluation of low T.      He reported fatigue and muscle ache ("achy all the time" - chronic back pain) to PCP therefore T level checked and noted to be mildly low. No PSA in records. Denies ED or libido issues. Denies prostate issues. Grandfather with prostate cancer.     T 3/22 - 260    PMH:  HTN, obesity, chronic pain - sees pain management.        The following portions of the patient's history were reviewed and updated as appropriate: allergies, current medications, past family history, past medical history, past social history, past surgical history and problem list.    Review of Systems  Twelve point review of systems completed. Pertinent positive and negatives listed in HPI.      Objective:    Vitals: Ht 5' 11" (1.803 m)   Wt (!) 143.8 kg (317 lb)   BMI 44.21 kg/m²     Physical Exam   General: well developed, well nourished in no acute distress  Head: normocephalic, atraumatic  Neck: supple, trachea midline, no obvious enlargement of thyroid  HEENT: EOMI, mucus membranes moist, sclera anicteric, no hearing impairment  Lungs: symmetric expansion, non-labored breathing  Skin: no rashes or lesions  Neuro: alert and oriented x 3, no gross deficits  Psych: normal judgment and insight, normal mood/affect and non-anxious  Genitourinary:   deferred   ANDREIA: deferred      Lab Review   Urine analysis today in clinic shows +protein trace     Lab Results   Component Value Date    WBC 4.86 03/02/2022    HGB 12.0 (L) 03/02/2022    HCT 38.0 (L) 03/02/2022    MCV 91 03/02/2022     03/02/2022     Lab Results   Component Value Date    CREATININE 0.7 03/02/2022    BUN 16 03/02/2022     No results found for: PSA  No results found for: PSADIAG    Imaging  NA     Assessment/Plan:      1. Low testosterone in male    - Discussed hypogonadism, common symptoms, treatment options, and the risks and benefits of " treatment.  His symptoms and blood tests support the diagnosis and therefore treatment is appropriate.   - Discussed the various risks associated with TRT, specifically a possible increase in risk of heart disease, MI, CVA, PE, DVT. Also discussed the issues related to testosterone replacement and prostate cancer. TRT does not increase his risk of developing cancer. Recommend annual ANDREIA and PSA for PCa screening. TRT recommended to stop if abnormal ANDREIA or elevated PSA.   - Treatment options reviewed: regular injections, topical treatments including gels and creams, and implants such as TestoPel.  Risks and benefits of each were reviewed specifically T fluctuations with q2-4week injections and risk of transfer of medication to other with topical application.     - Will start TRT - prefers self-injection (works as , difficult to make appts). Start with 400mg IM q4 weeks.    - PSA, T today. PSA, T in 3 months.     2. Fatigue, unspecified type     - Discussed multifactorial etiology        Follow up in 3 months with labs

## 2022-03-04 ENCOUNTER — PATIENT OUTREACH (OUTPATIENT)
Dept: ADMINISTRATIVE | Facility: OTHER | Age: 52
End: 2022-03-04
Payer: COMMERCIAL

## 2022-03-04 DIAGNOSIS — I10 ESSENTIAL HYPERTENSION: Chronic | ICD-10-CM

## 2022-03-04 RX ORDER — OLMESARTAN MEDOXOMIL 20 MG/1
TABLET ORAL
Qty: 30 TABLET | Refills: 0 | OUTPATIENT
Start: 2022-03-04

## 2022-03-04 RX ORDER — AMLODIPINE BESYLATE 5 MG/1
TABLET ORAL
Qty: 30 TABLET | Refills: 0 | OUTPATIENT
Start: 2022-03-04

## 2022-03-04 NOTE — TELEPHONE ENCOUNTER
No new care gaps identified.  Powered by [employer] by [employer]. Reference number: [id number].   3/04/2022 7:52:39 AM CST

## 2022-03-07 ENCOUNTER — PATIENT MESSAGE (OUTPATIENT)
Dept: FAMILY MEDICINE | Facility: CLINIC | Age: 52
End: 2022-03-07
Payer: COMMERCIAL

## 2022-03-07 ENCOUNTER — OFFICE VISIT (OUTPATIENT)
Dept: UROLOGY | Facility: CLINIC | Age: 52
End: 2022-03-07
Payer: COMMERCIAL

## 2022-03-07 ENCOUNTER — LAB VISIT (OUTPATIENT)
Dept: LAB | Facility: HOSPITAL | Age: 52
End: 2022-03-07
Attending: UROLOGY
Payer: COMMERCIAL

## 2022-03-07 VITALS — HEIGHT: 71 IN | WEIGHT: 315 LBS | BODY MASS INDEX: 44.1 KG/M2

## 2022-03-07 DIAGNOSIS — L08.9 INFECTED PUNCTURE WOUND OF FINGER, INITIAL ENCOUNTER: Primary | ICD-10-CM

## 2022-03-07 DIAGNOSIS — R79.89 LOW TESTOSTERONE IN MALE: Primary | ICD-10-CM

## 2022-03-07 DIAGNOSIS — S61.239A INFECTED PUNCTURE WOUND OF FINGER, INITIAL ENCOUNTER: Primary | ICD-10-CM

## 2022-03-07 DIAGNOSIS — R53.83 FATIGUE, UNSPECIFIED TYPE: ICD-10-CM

## 2022-03-07 DIAGNOSIS — R79.89 LOW TESTOSTERONE IN MALE: ICD-10-CM

## 2022-03-07 LAB
COMPLEXED PSA SERPL-MCNC: 0.17 NG/ML (ref 0–4)
TESTOST SERPL-MCNC: 160 NG/DL (ref 304–1227)

## 2022-03-07 PROCEDURE — 4010F PR ACE/ARB THEARPY RXD/TAKEN: ICD-10-PCS | Mod: CPTII,S$GLB,, | Performed by: UROLOGY

## 2022-03-07 PROCEDURE — 1159F PR MEDICATION LIST DOCUMENTED IN MEDICAL RECORD: ICD-10-PCS | Mod: CPTII,S$GLB,, | Performed by: UROLOGY

## 2022-03-07 PROCEDURE — 3044F PR MOST RECENT HEMOGLOBIN A1C LEVEL <7.0%: ICD-10-PCS | Mod: CPTII,S$GLB,, | Performed by: UROLOGY

## 2022-03-07 PROCEDURE — 84403 ASSAY OF TOTAL TESTOSTERONE: CPT | Performed by: UROLOGY

## 2022-03-07 PROCEDURE — 99999 PR PBB SHADOW E&M-EST. PATIENT-LVL IV: ICD-10-PCS | Mod: PBBFAC,,, | Performed by: UROLOGY

## 2022-03-07 PROCEDURE — 1160F RVW MEDS BY RX/DR IN RCRD: CPT | Mod: CPTII,S$GLB,, | Performed by: UROLOGY

## 2022-03-07 PROCEDURE — 99204 PR OFFICE/OUTPT VISIT, NEW, LEVL IV, 45-59 MIN: ICD-10-PCS | Mod: S$GLB,,, | Performed by: UROLOGY

## 2022-03-07 PROCEDURE — 3008F BODY MASS INDEX DOCD: CPT | Mod: CPTII,S$GLB,, | Performed by: UROLOGY

## 2022-03-07 PROCEDURE — 4010F ACE/ARB THERAPY RXD/TAKEN: CPT | Mod: CPTII,S$GLB,, | Performed by: UROLOGY

## 2022-03-07 PROCEDURE — 1160F PR REVIEW ALL MEDS BY PRESCRIBER/CLIN PHARMACIST DOCUMENTED: ICD-10-PCS | Mod: CPTII,S$GLB,, | Performed by: UROLOGY

## 2022-03-07 PROCEDURE — 3044F HG A1C LEVEL LT 7.0%: CPT | Mod: CPTII,S$GLB,, | Performed by: UROLOGY

## 2022-03-07 PROCEDURE — 99999 PR PBB SHADOW E&M-EST. PATIENT-LVL IV: CPT | Mod: PBBFAC,,, | Performed by: UROLOGY

## 2022-03-07 PROCEDURE — 3008F PR BODY MASS INDEX (BMI) DOCUMENTED: ICD-10-PCS | Mod: CPTII,S$GLB,, | Performed by: UROLOGY

## 2022-03-07 PROCEDURE — 1159F MED LIST DOCD IN RCRD: CPT | Mod: CPTII,S$GLB,, | Performed by: UROLOGY

## 2022-03-07 PROCEDURE — 99204 OFFICE O/P NEW MOD 45 MIN: CPT | Mod: S$GLB,,, | Performed by: UROLOGY

## 2022-03-07 PROCEDURE — 36415 COLL VENOUS BLD VENIPUNCTURE: CPT | Performed by: UROLOGY

## 2022-03-07 PROCEDURE — 84153 ASSAY OF PSA TOTAL: CPT | Performed by: UROLOGY

## 2022-03-07 RX ORDER — SYRINGE W-NEEDLE,DISPOSAB,3 ML 25GX5/8"
1 SYRINGE, EMPTY DISPOSABLE MISCELLANEOUS
Qty: 100 EACH | Refills: 2 | Status: SHIPPED | OUTPATIENT
Start: 2022-03-07

## 2022-03-07 RX ORDER — TESTOSTERONE CYPIONATE 200 MG/ML
400 INJECTION, SOLUTION INTRAMUSCULAR
Qty: 10 ML | Refills: 5 | Status: SHIPPED | OUTPATIENT
Start: 2022-03-07 | End: 2022-11-07

## 2022-03-07 RX ORDER — DOXYCYCLINE HYCLATE 100 MG
100 TABLET ORAL 2 TIMES DAILY
Qty: 14 TABLET | Refills: 0 | Status: SHIPPED | OUTPATIENT
Start: 2022-03-07 | End: 2022-03-14

## 2022-03-15 ENCOUNTER — PATIENT MESSAGE (OUTPATIENT)
Dept: ADMINISTRATIVE | Facility: OTHER | Age: 52
End: 2022-03-15
Payer: COMMERCIAL

## 2022-03-17 ENCOUNTER — PATIENT MESSAGE (OUTPATIENT)
Dept: FAMILY MEDICINE | Facility: CLINIC | Age: 52
End: 2022-03-17
Payer: COMMERCIAL

## 2022-03-29 NOTE — TELEPHONE ENCOUNTER
No new care gaps identified.  Powered by [employer]. Reference number: [id number].   3/29/2022 8:07:34 AM CDT

## 2022-03-30 RX ORDER — DULOXETIN HYDROCHLORIDE 60 MG/1
CAPSULE, DELAYED RELEASE ORAL
Qty: 90 CAPSULE | Refills: 3 | Status: SHIPPED | OUTPATIENT
Start: 2022-03-30 | End: 2023-05-30 | Stop reason: SDUPTHER

## 2022-03-30 NOTE — TELEPHONE ENCOUNTER
Refill Routing Note   Medication(s) are not appropriate for processing by [facility] for the following reason(s):      - Required vitals are abnormal    [facility] action(s):  Defer          --->Care Gap information included in message below if applicable.   Medication reconciliation completed: No   Automatic Epic Generated Protocol Data:        Requested Prescriptions   Pending Prescriptions Disp Refills    DULoxetine (CYMBALTA) 60 MG capsule [Pharmacy Med Name: duloxetine 60 mg capsule,delayed release] 90 capsule 3     Sig: TAKE 1 CAPSULE BY MOUTH EVERY EVENING FOR MOOD        Psychiatry: Antidepressants - SNRI Failed - 3/29/2022  8:07 AM        Failed - Last BP in normal range within 360 days     BP Readings from Last 3 Encounters:   02/07/22 (!) 152/92   01/21/22 (!) 166/94   04/26/21 122/80                 Failed - Matches previous order         Previous Authorizing Provider: Gabby Dean MD (DULoxetine (CYMBALTA) 60 MG capsule)  Previous Pharmacy: [facility] - [place] [id number]            Passed - Patient is at least 18 years old        Passed - Valid encounter within last 15 months     Recent Visits  Date Type Provider Dept   02/07/22 Office Visit Gabby Dean MD [facility] Family Medicine/Internal Med   01/07/21 Office Visit Gabby Dean MD [facility] Family Medicine/Internal Med   04/28/20 Office Visit Gabby Dean MD [facility] Family Medicine/Internal Med   Showing recent visits within past 720 days and meeting all other requirements  Future Appointments  No visits were found meeting these conditions.  Showing future appointments within next 150 days and meeting all other requirements        Future Appointments                In 2 months LAB,  HOSPITAL [facility] [place] - Lab, [facility]    In 2 months Leslie Wilcox MD [facility] [place] - Urology, [facility]             Passed - No ED/Hospital visits since last PCP visit     Last PCP Visit: 2/7/2022 Last Admission: 12/9/2020 Last ED  Visit: 11/25/2020          Passed - Cr is 1.39 or below and within 360 days       Lab Results   Component Value Date    CREATININE 0.7 03/02/2022    CREATININE 0.8 01/04/2021    CREATININE 0.7 12/21/2020              Passed - eGFR is 30 or above and within 360 days     Lab Results   Component Value Date    EGFRNONAA >60 03/02/2022    EGFRNONAA >60 01/04/2021    EGFRNONAA >60 12/21/2020                       Appointments  past 12m or future 3m with PCP    Date Provider   Last Visit   2/7/2022 Gabby Dean MD   Next Visit   Visit date not found Gabby Dean MD   ED visits in past 90 days: 0        Note composed:8:15 AM 03/30/2022

## 2022-05-07 ENCOUNTER — HOSPITAL ENCOUNTER (INPATIENT)
Facility: HOSPITAL | Age: 52
LOS: 2 days | Discharge: HOME | DRG: 917 | End: 2022-05-09
Attending: EMERGENCY MEDICINE | Admitting: EMERGENCY MEDICINE
Payer: COMMERCIAL

## 2022-05-07 DIAGNOSIS — N17.9 ACUTE KIDNEY INJURY: Primary | ICD-10-CM

## 2022-05-07 DIAGNOSIS — R07.9 CHEST PAIN: ICD-10-CM

## 2022-05-07 DIAGNOSIS — E86.0 DEHYDRATION: ICD-10-CM

## 2022-05-07 DIAGNOSIS — T40.2X5A: ICD-10-CM

## 2022-05-07 DIAGNOSIS — I95.9 HYPOTENSION: ICD-10-CM

## 2022-05-07 PROBLEM — S30.1XXA ABDOMINAL WALL SEROMA: Status: RESOLVED | Noted: 2020-11-25 | Resolved: 2022-05-07

## 2022-05-07 PROBLEM — F11.90 CHRONIC, CONTINUOUS USE OF OPIOIDS: Status: ACTIVE | Noted: 2022-05-07

## 2022-05-07 PROBLEM — M79.89 LEG SWELLING: Status: RESOLVED | Noted: 2017-06-07 | Resolved: 2022-05-07

## 2022-05-07 PROBLEM — E66.01 MORBID OBESITY: Status: ACTIVE | Noted: 2017-04-28

## 2022-05-07 PROBLEM — T81.49XA SURGICAL WOUND INFECTION: Status: RESOLVED | Noted: 2018-04-12 | Resolved: 2022-05-07

## 2022-05-07 PROBLEM — L03.311 CELLULITIS, ABDOMINAL WALL: Status: RESOLVED | Noted: 2020-11-16 | Resolved: 2022-05-07

## 2022-05-07 PROBLEM — T81.42XA INFECTION OF DEEP INCISIONAL SURGICAL SITE AFTER PROCEDURE: Status: RESOLVED | Noted: 2020-11-25 | Resolved: 2022-05-07

## 2022-05-07 PROBLEM — E87.29 HIGH ANION GAP METABOLIC ACIDOSIS: Status: ACTIVE | Noted: 2022-05-07

## 2022-05-07 LAB
ALBUMIN SERPL BCP-MCNC: 3.4 G/DL (ref 3.5–5.2)
ALLENS TEST: ABNORMAL
ALP SERPL-CCNC: 69 U/L (ref 55–135)
ALT SERPL W/O P-5'-P-CCNC: 19 U/L (ref 10–44)
ANION GAP SERPL CALC-SCNC: 18 MMOL/L (ref 8–16)
ANION GAP SERPL CALC-SCNC: 19 MMOL/L (ref 8–16)
AST SERPL-CCNC: 29 U/L (ref 10–40)
BACTERIA #/AREA URNS HPF: ABNORMAL /HPF
BASOPHILS # BLD AUTO: 0.04 K/UL (ref 0–0.2)
BASOPHILS NFR BLD: 0.3 % (ref 0–1.9)
BILIRUB SERPL-MCNC: 0.2 MG/DL (ref 0.1–1)
BILIRUB UR QL STRIP: NEGATIVE
BUN SERPL-MCNC: 38 MG/DL (ref 6–20)
BUN SERPL-MCNC: 38 MG/DL (ref 6–20)
CALCIUM SERPL-MCNC: 7.7 MG/DL (ref 8.7–10.5)
CALCIUM SERPL-MCNC: 8 MG/DL (ref 8.7–10.5)
CHLORIDE SERPL-SCNC: 100 MMOL/L (ref 95–110)
CHLORIDE SERPL-SCNC: 99 MMOL/L (ref 95–110)
CK SERPL-CCNC: 733 U/L (ref 20–200)
CLARITY UR: ABNORMAL
CO2 SERPL-SCNC: 17 MMOL/L (ref 23–29)
CO2 SERPL-SCNC: 18 MMOL/L (ref 23–29)
COLOR UR: YELLOW
CREAT SERPL-MCNC: 5.3 MG/DL (ref 0.5–1.4)
CREAT SERPL-MCNC: 5.4 MG/DL (ref 0.5–1.4)
CREAT UR-MCNC: 105.9 MG/DL (ref 23–375)
CREAT UR-MCNC: 105.9 MG/DL (ref 23–375)
CTP QC/QA: YES
DELSYS: ABNORMAL
DIFFERENTIAL METHOD: ABNORMAL
EOSINOPHIL # BLD AUTO: 0.3 K/UL (ref 0–0.5)
EOSINOPHIL NFR BLD: 2.1 % (ref 0–8)
ERYTHROCYTE [DISTWIDTH] IN BLOOD BY AUTOMATED COUNT: 14.8 % (ref 11.5–14.5)
EST. GFR  (AFRICAN AMERICAN): 13 ML/MIN/1.73 M^2
EST. GFR  (AFRICAN AMERICAN): 13 ML/MIN/1.73 M^2
EST. GFR  (NON AFRICAN AMERICAN): 11 ML/MIN/1.73 M^2
EST. GFR  (NON AFRICAN AMERICAN): 11 ML/MIN/1.73 M^2
FLOW: 2.5
GLUCOSE SERPL-MCNC: 100 MG/DL (ref 70–110)
GLUCOSE SERPL-MCNC: 86 MG/DL (ref 70–110)
GLUCOSE UR QL STRIP: NEGATIVE
GRAN CASTS #/AREA URNS LPF: 14 /LPF
HCO3 UR-SCNC: 21.8 MMOL/L (ref 24–28)
HCT VFR BLD AUTO: 35.6 % (ref 40–54)
HGB BLD-MCNC: 10.9 G/DL (ref 14–18)
HGB UR QL STRIP: ABNORMAL
HYALINE CASTS #/AREA URNS LPF: 74 /LPF
IMM GRANULOCYTES # BLD AUTO: 0.03 K/UL (ref 0–0.04)
IMM GRANULOCYTES NFR BLD AUTO: 0.3 % (ref 0–0.5)
KETONES UR QL STRIP: NEGATIVE
LACTATE SERPL-SCNC: 1.7 MMOL/L (ref 0.5–2.2)
LEUKOCYTE ESTERASE UR QL STRIP: ABNORMAL
LYMPHOCYTES # BLD AUTO: 1.7 K/UL (ref 1–4.8)
LYMPHOCYTES NFR BLD: 13.8 % (ref 18–48)
MCH RBC QN AUTO: 27 PG (ref 27–31)
MCHC RBC AUTO-ENTMCNC: 30.6 G/DL (ref 32–36)
MCV RBC AUTO: 88 FL (ref 82–98)
MICROSCOPIC COMMENT: ABNORMAL
MODE: ABNORMAL
MONOCYTES # BLD AUTO: 1.3 K/UL (ref 0.3–1)
MONOCYTES NFR BLD: 11 % (ref 4–15)
NEUTROPHILS # BLD AUTO: 8.7 K/UL (ref 1.8–7.7)
NEUTROPHILS NFR BLD: 72.5 % (ref 38–73)
NITRITE UR QL STRIP: NEGATIVE
NRBC BLD-RTO: 0 /100 WBC
PCO2 BLDA: 50.1 MMHG (ref 35–45)
PH SMN: 7.25 [PH] (ref 7.35–7.45)
PH UR STRIP: 6 [PH] (ref 5–8)
PLATELET # BLD AUTO: 385 K/UL (ref 150–450)
PMV BLD AUTO: 10.6 FL (ref 9.2–12.9)
PO2 BLDA: 98 MMHG (ref 80–100)
POC BE: -6 MMOL/L
POC SATURATED O2: 96 % (ref 95–100)
POC TCO2: 23 MMOL/L (ref 23–27)
POCT GLUCOSE: 107 MG/DL (ref 70–110)
POTASSIUM SERPL-SCNC: 4.5 MMOL/L (ref 3.5–5.1)
POTASSIUM SERPL-SCNC: 4.9 MMOL/L (ref 3.5–5.1)
PROT SERPL-MCNC: 7.1 G/DL (ref 6–8.4)
PROT UR QL STRIP: ABNORMAL
PROT UR-MCNC: 69 MG/DL
PROT/CREAT UR: 0.65 MG/G{CREAT} (ref 0–0.2)
RBC # BLD AUTO: 4.04 M/UL (ref 4.6–6.2)
RBC #/AREA URNS HPF: 0 /HPF (ref 0–4)
SAMPLE: ABNORMAL
SARS-COV-2 RDRP RESP QL NAA+PROBE: NEGATIVE
SITE: ABNORMAL
SODIUM SERPL-SCNC: 135 MMOL/L (ref 136–145)
SODIUM SERPL-SCNC: 136 MMOL/L (ref 136–145)
SODIUM UR-SCNC: 63 MMOL/L (ref 20–250)
SP GR UR STRIP: 1.02 (ref 1–1.03)
TROPONIN I SERPL DL<=0.01 NG/ML-MCNC: 0.06 NG/ML (ref 0–0.03)
TROPONIN I SERPL DL<=0.01 NG/ML-MCNC: 0.06 NG/ML (ref 0–0.03)
URN SPEC COLLECT METH UR: ABNORMAL
UROBILINOGEN UR STRIP-ACNC: NEGATIVE EU/DL
WBC # BLD AUTO: 11.96 K/UL (ref 3.9–12.7)
WBC #/AREA URNS HPF: 22 /HPF (ref 0–5)
WBC CLUMPS URNS QL MICRO: ABNORMAL

## 2022-05-07 PROCEDURE — 82962 GLUCOSE BLOOD TEST: CPT

## 2022-05-07 PROCEDURE — 82803 BLOOD GASES ANY COMBINATION: CPT

## 2022-05-07 PROCEDURE — 96360 HYDRATION IV INFUSION INIT: CPT

## 2022-05-07 PROCEDURE — 25000003 PHARM REV CODE 250: Performed by: HOSPITALIST

## 2022-05-07 PROCEDURE — 81000 URINALYSIS NONAUTO W/SCOPE: CPT | Performed by: EMERGENCY MEDICINE

## 2022-05-07 PROCEDURE — U0002 COVID-19 LAB TEST NON-CDC: HCPCS | Performed by: EMERGENCY MEDICINE

## 2022-05-07 PROCEDURE — 63600175 PHARM REV CODE 636 W HCPCS: Performed by: PHYSICIAN ASSISTANT

## 2022-05-07 PROCEDURE — 36600 WITHDRAWAL OF ARTERIAL BLOOD: CPT

## 2022-05-07 PROCEDURE — 20000000 HC ICU ROOM

## 2022-05-07 PROCEDURE — 84484 ASSAY OF TROPONIN QUANT: CPT | Mod: 91 | Performed by: EMERGENCY MEDICINE

## 2022-05-07 PROCEDURE — 93010 ELECTROCARDIOGRAM REPORT: CPT | Mod: ,,, | Performed by: INTERNAL MEDICINE

## 2022-05-07 PROCEDURE — 63600175 PHARM REV CODE 636 W HCPCS: Performed by: HOSPITALIST

## 2022-05-07 PROCEDURE — 93005 ELECTROCARDIOGRAM TRACING: CPT

## 2022-05-07 PROCEDURE — 99900035 HC TECH TIME PER 15 MIN (STAT)

## 2022-05-07 PROCEDURE — 80048 BASIC METABOLIC PNL TOTAL CA: CPT | Mod: XB | Performed by: HOSPITALIST

## 2022-05-07 PROCEDURE — 83605 ASSAY OF LACTIC ACID: CPT | Performed by: EMERGENCY MEDICINE

## 2022-05-07 PROCEDURE — 84156 ASSAY OF PROTEIN URINE: CPT | Performed by: HOSPITALIST

## 2022-05-07 PROCEDURE — 93010 EKG 12-LEAD: ICD-10-PCS | Mod: ,,, | Performed by: INTERNAL MEDICINE

## 2022-05-07 PROCEDURE — 63600175 PHARM REV CODE 636 W HCPCS: Performed by: EMERGENCY MEDICINE

## 2022-05-07 PROCEDURE — 80053 COMPREHEN METABOLIC PANEL: CPT | Performed by: EMERGENCY MEDICINE

## 2022-05-07 PROCEDURE — 36415 COLL VENOUS BLD VENIPUNCTURE: CPT | Performed by: HOSPITALIST

## 2022-05-07 PROCEDURE — A4216 STERILE WATER/SALINE, 10 ML: HCPCS | Performed by: HOSPITALIST

## 2022-05-07 PROCEDURE — 85025 COMPLETE CBC W/AUTO DIFF WBC: CPT | Performed by: EMERGENCY MEDICINE

## 2022-05-07 PROCEDURE — 99291 CRITICAL CARE FIRST HOUR: CPT | Mod: 25

## 2022-05-07 PROCEDURE — 25000003 PHARM REV CODE 250: Performed by: EMERGENCY MEDICINE

## 2022-05-07 PROCEDURE — 84484 ASSAY OF TROPONIN QUANT: CPT | Performed by: INTERNAL MEDICINE

## 2022-05-07 PROCEDURE — 25000003 PHARM REV CODE 250: Performed by: PHYSICIAN ASSISTANT

## 2022-05-07 PROCEDURE — 82550 ASSAY OF CK (CPK): CPT | Performed by: HOSPITALIST

## 2022-05-07 PROCEDURE — 87086 URINE CULTURE/COLONY COUNT: CPT | Performed by: EMERGENCY MEDICINE

## 2022-05-07 PROCEDURE — 96361 HYDRATE IV INFUSION ADD-ON: CPT

## 2022-05-07 PROCEDURE — 84300 ASSAY OF URINE SODIUM: CPT | Performed by: HOSPITALIST

## 2022-05-07 RX ORDER — NALOXONE HCL 0.4 MG/ML
0.4 VIAL (ML) INJECTION
Status: DISCONTINUED | OUTPATIENT
Start: 2022-05-07 | End: 2022-05-07

## 2022-05-07 RX ORDER — NALOXONE HCL 0.4 MG/ML
0.4 VIAL (ML) INJECTION
Status: DISCONTINUED | OUTPATIENT
Start: 2022-05-07 | End: 2022-05-09 | Stop reason: HOSPADM

## 2022-05-07 RX ORDER — SODIUM CHLORIDE 0.9 % (FLUSH) 0.9 %
10 SYRINGE (ML) INJECTION EVERY 8 HOURS
Status: DISCONTINUED | OUTPATIENT
Start: 2022-05-07 | End: 2022-05-09 | Stop reason: HOSPADM

## 2022-05-07 RX ORDER — SODIUM CHLORIDE 9 MG/ML
INJECTION, SOLUTION INTRAVENOUS CONTINUOUS
Status: DISCONTINUED | OUTPATIENT
Start: 2022-05-07 | End: 2022-05-08

## 2022-05-07 RX ORDER — NALOXONE HCL 0.4 MG/ML
0.02 VIAL (ML) INJECTION
Status: CANCELLED | OUTPATIENT
Start: 2022-05-07

## 2022-05-07 RX ORDER — NALOXONE HCL 0.4 MG/ML
0.02 VIAL (ML) INJECTION
Status: DISCONTINUED | OUTPATIENT
Start: 2022-05-07 | End: 2022-05-07

## 2022-05-07 RX ORDER — SODIUM CHLORIDE, SODIUM LACTATE, POTASSIUM CHLORIDE, CALCIUM CHLORIDE 600; 310; 30; 20 MG/100ML; MG/100ML; MG/100ML; MG/100ML
1000 INJECTION, SOLUTION INTRAVENOUS CONTINUOUS
Status: DISCONTINUED | OUTPATIENT
Start: 2022-05-07 | End: 2022-05-07

## 2022-05-07 RX ORDER — HEPARIN SODIUM 5000 [USP'U]/ML
5000 INJECTION, SOLUTION INTRAVENOUS; SUBCUTANEOUS EVERY 8 HOURS
Status: DISCONTINUED | OUTPATIENT
Start: 2022-05-07 | End: 2022-05-09 | Stop reason: HOSPADM

## 2022-05-07 RX ORDER — ONDANSETRON 2 MG/ML
4 INJECTION INTRAMUSCULAR; INTRAVENOUS EVERY 8 HOURS PRN
Status: DISCONTINUED | OUTPATIENT
Start: 2022-05-07 | End: 2022-05-09 | Stop reason: HOSPADM

## 2022-05-07 RX ORDER — METHOCARBAMOL 750 MG/1
750 TABLET, FILM COATED ORAL 4 TIMES DAILY PRN
Status: DISCONTINUED | OUTPATIENT
Start: 2022-05-07 | End: 2022-05-08

## 2022-05-07 RX ORDER — TALC
6 POWDER (GRAM) TOPICAL NIGHTLY PRN
Status: DISCONTINUED | OUTPATIENT
Start: 2022-05-07 | End: 2022-05-09 | Stop reason: HOSPADM

## 2022-05-07 RX ORDER — NALOXONE HCL 0.4 MG/ML
0.4 VIAL (ML) INJECTION
Status: COMPLETED | OUTPATIENT
Start: 2022-05-07 | End: 2022-05-07

## 2022-05-07 RX ORDER — OXYCODONE HYDROCHLORIDE 15 MG/1
15 TABLET ORAL EVERY 6 HOURS PRN
Status: DISCONTINUED | OUTPATIENT
Start: 2022-05-07 | End: 2022-05-07

## 2022-05-07 RX ORDER — HEPARIN SODIUM 5000 [USP'U]/ML
5000 INJECTION, SOLUTION INTRAVENOUS; SUBCUTANEOUS EVERY 8 HOURS
Status: CANCELLED | OUTPATIENT
Start: 2022-05-07

## 2022-05-07 RX ORDER — CEPHALEXIN 500 MG/1
CAPSULE ORAL
COMMUNITY
Start: 2022-03-13 | End: 2022-05-07

## 2022-05-07 RX ORDER — OXYCODONE HYDROCHLORIDE 30 MG/1
30 TABLET ORAL
COMMUNITY
Start: 2022-04-26

## 2022-05-07 RX ORDER — ACETAMINOPHEN 325 MG/1
650 TABLET ORAL EVERY 4 HOURS PRN
Status: DISCONTINUED | OUTPATIENT
Start: 2022-05-07 | End: 2022-05-09 | Stop reason: HOSPADM

## 2022-05-07 RX ORDER — SODIUM CHLORIDE 0.9 % (FLUSH) 0.9 %
10 SYRINGE (ML) INJECTION EVERY 8 HOURS
Status: CANCELLED | OUTPATIENT
Start: 2022-05-07

## 2022-05-07 RX ADMIN — NALXONE HYDROCHLORIDE 0.4 MG/HR: 0.4 INJECTION INTRAMUSCULAR; INTRAVENOUS; SUBCUTANEOUS at 11:05

## 2022-05-07 RX ADMIN — Medication 10 ML: at 03:05

## 2022-05-07 RX ADMIN — NALXONE HYDROCHLORIDE 0.2 MG: 0.4 INJECTION INTRAMUSCULAR; INTRAVENOUS; SUBCUTANEOUS at 06:05

## 2022-05-07 RX ADMIN — SODIUM CHLORIDE, SODIUM LACTATE, POTASSIUM CHLORIDE, AND CALCIUM CHLORIDE 1000 ML: .6; .31; .03; .02 INJECTION, SOLUTION INTRAVENOUS at 11:05

## 2022-05-07 RX ADMIN — SODIUM CHLORIDE 1000 ML: 0.9 INJECTION, SOLUTION INTRAVENOUS at 09:05

## 2022-05-07 RX ADMIN — SODIUM CHLORIDE, SODIUM LACTATE, POTASSIUM CHLORIDE, AND CALCIUM CHLORIDE 1000 ML: .6; .31; .03; .02 INJECTION, SOLUTION INTRAVENOUS at 01:05

## 2022-05-07 RX ADMIN — SODIUM CHLORIDE: 0.9 INJECTION, SOLUTION INTRAVENOUS at 03:05

## 2022-05-07 RX ADMIN — NALXONE HYDROCHLORIDE 0.4 MG: 0.4 INJECTION INTRAMUSCULAR; INTRAVENOUS; SUBCUTANEOUS at 02:05

## 2022-05-07 NOTE — ED PROVIDER NOTES
"Encounter Date: 5/7/2022    SCRIBE #1 NOTE: I, Escobar Urbina, am scribing for, and in the presence of,  Nav Barajas Jr., MD. I have scribed the following portions of the note - Other sections scribed: HPI, ROS.       History     Chief Complaint   Patient presents with    Dizziness     Patient reports dizzy that started yesterday around 9AM seeing double vision, weakness, unable to urinated last urinated around 7PM.  States low BP at home 76/43 this morning. Per spouse, states was confused and talking crazy this morning around 2AM.      52 y.o. male, with a pertinent past medical history of hypertension, hypokalemia, peripheral artery disease, post laminectomy syndrome, seizures, severe sepsis, short-term memory loss, and thyroid disease presents to the ED with dizziness that began yesterday. 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 Pt reports feeling weak and dehydrated but notes that he was drinking a lot of fluid. Pt states that he is currently having a headache and bilateral neck pain. Pt states that he was also seeing spots in his vision yesterday. Pt's spouse states the pt has been "speaking out his head and not making sense". Pt denies a history of heart problems. No other exacerbating or alleviating factors. Patient denies nausea, vomiting, body aches, chills, fever, chest pain, shortness of breath, cough, abdominal pain, or other associated symptoms.       The history is provided by the patient and the spouse. No  was used.     Review of patient's allergies indicates:   Allergen Reactions    Klonopin [clonazepam] Anxiety and Other (See Comments)     Becomes agitated    Valium [diazepam] Other (See Comments)     Becomes agitated    Xanax [alprazolam] Anxiety and Other (See Comments)     Becomes agitated     Past Medical History:   Diagnosis Date    Back pain, chronic     Bulging discs     Chronic pain following surgery or " procedure     Degenerative disc disease     Hypertension     Hypokalemia     Obese abdomen     PAD (peripheral artery disease)     Post laminectomy syndrome     Seizures     Severe sepsis     Short-term memory loss     Surgical site infection 11/17/2020    Rt abdomen pain pump site    Thyroid disease     Subclinical hyperthyroidism     Past Surgical History:   Procedure Laterality Date    CHOLECYSTECTOMY      GASTRIC BYPASS      SLEEVE    gastric sleeve  2011    HERNIA REPAIR      pain pump Right 04/03/2018    inserted at right abdomen    pain pump site washout Right 04/13/2018    REMOVAL OF IMPLANT N/A 11/18/2020    Procedure: REMOVAL, IMPLANT;  Surgeon: Raffy Headley DO;  Location: [facility] OR;  Service: Neurosurgery;  Laterality: N/A;  intrathecal pain pump, leads removed from mid back and generator from abdomen    SKIN SURGERY      EXCESS SKIN REMOVAL    SPINE SURGERY      lumbar fusion     Family History   Problem Relation Age of Onset    Heart disease Mother     Breast cancer Mother     Arthritis Mother     Hypertension Mother     Throat cancer Father     Hypertension Father      Social History     Tobacco Use    Smoking status: Never Smoker    Smokeless tobacco: Never Used   Substance Use Topics    Alcohol use: Yes     Comment: socially    Drug use: No     Review of Systems   Constitutional: Negative for chills and fever.   HENT: Negative for congestion, rhinorrhea and sore throat.    Eyes: Positive for visual disturbance.   Respiratory: Negative for cough and shortness of breath.    Cardiovascular: Negative for chest pain.   Gastrointestinal: Negative for abdominal pain, diarrhea, nausea and vomiting.   Genitourinary: Negative for dysuria, frequency and hematuria.   Musculoskeletal: Positive for neck pain. Negative for back pain and myalgias.   Skin: Negative for rash.   Neurological: Positive for dizziness, weakness and headaches.   Psychiatric/Behavioral:        (+) behavior  change       Physical Exam     Initial Vitals   BP Pulse Resp Temp SpO2   05/07/22 0844 05/07/22 0844 05/07/22 0857 05/07/22 0909 05/07/22 0844   (!) 72/37 90 (!) 25 98.1 °F (36.7 °C) (!) 86 %      MAP       --                Physical Exam    Nursing note and vitals reviewed.  Constitutional: He appears well-developed and well-nourished. He is not diaphoretic. No distress.   HENT:   Head: Normocephalic and atraumatic.   Right Ear: External ear normal.   Left Ear: External ear normal.   Nose: Nose normal.   Mouth/Throat: Oropharynx is clear and moist.   Eyes: Conjunctivae and EOM are normal. Pupils are equal, round, and reactive to light. Right eye exhibits no discharge. Left eye exhibits no discharge. No scleral icterus.   Neck: Neck supple. No JVD present.   Normal range of motion.  Cardiovascular: Normal rate, regular rhythm, normal heart sounds and intact distal pulses. Exam reveals no gallop and no friction rub.    No murmur heard.  Pulmonary/Chest: Breath sounds normal. No stridor. No respiratory distress. He has no wheezes. He has no rhonchi. He has no rales. He exhibits no tenderness.   Abdominal: Abdomen is soft. Bowel sounds are normal. He exhibits no distension and no mass. There is no abdominal tenderness.   Abdomen soft and nontender to palpation.  No hernias or masses. There is no rebound and no guarding.   Musculoskeletal:         General: No tenderness or edema. Normal range of motion.      Cervical back: Normal range of motion and neck supple.     Neurological: He is alert and oriented to person, place, and time. He has normal strength. No cranial nerve deficit or sensory deficit.   Patient has no focal neurologic deficits.  He is falling asleep easily.  He occasionally has difficulty expressing himself.  There is no dysarthria.  No cranial nerve deficits.  Moving all extremities spontaneously and equally.   Skin: Skin is warm and dry. No rash noted. No erythema. No pallor.   Psychiatric: He has a  normal mood and affect. His behavior is normal. Judgment and thought content normal.         ED Course   Critical Care    Date/Time: 5/7/2022 3:16 PM  Performed by: Nav Barajas Jr., MD  Authorized by: Danielle Mccarty MD   Direct patient critical care time: 10 minutes  Additional history critical care time: 10 minutes  Ordering / reviewing critical care time: 5 minutes  Documentation critical care time: 5 minutes  Consulting other physicians critical care time: 5 minutes  Consult with family critical care time: 5 minutes  Total critical care time (exclusive of procedural time) : 40 minutes  Critical care was necessary to treat or prevent imminent or life-threatening deterioration of the following conditions: CNS failure or compromise, renal failure and dehydration.  Critical care was time spent personally by me on the following activities: development of treatment plan with patient or surrogate, discussions with consultants, interpretation of cardiac output measurements, evaluation of patient's response to treatment, examination of patient, obtaining history from patient or surrogate, ordering and performing treatments and interventions, ordering and review of laboratory studies, ordering and review of radiographic studies, pulse oximetry, re-evaluation of patient's condition and review of old charts.        Labs Reviewed   CBC W/ AUTO DIFFERENTIAL - Abnormal; Notable for the following components:       Result Value    RBC 4.04 (*)     Hemoglobin 10.9 (*)     Hematocrit 35.6 (*)     MCHC 30.6 (*)     RDW 14.8 (*)     Gran # (ANC) 8.7 (*)     Mono # 1.3 (*)     Lymph % 13.8 (*)     All other components within normal limits   COMPREHENSIVE METABOLIC PANEL - Abnormal; Notable for the following components:    CO2 18 (*)     BUN 38 (*)     Creatinine 5.4 (*)     Calcium 8.0 (*)     Albumin 3.4 (*)     Anion Gap 19 (*)     eGFR if  13 (*)     eGFR if non  11 (*)     All other  components within normal limits   TROPONIN I - Abnormal; Notable for the following components:    Troponin I 0.058 (*)     All other components within normal limits   TROPONIN I - Abnormal; Notable for the following components:    Troponin I 0.055 (*)     All other components within normal limits   LACTIC ACID, PLASMA   URINALYSIS, REFLEX TO URINE CULTURE   SODIUM, URINE, RANDOM   PROTEIN / CREATININE RATIO, URINE   CREATININE, URINE, RANDOM   CK   BASIC METABOLIC PANEL   SARS-COV-2 RDRP GENE   POCT GLUCOSE     EKG Readings: (Independently Interpreted)   Initial Reading: No STEMI. Ectopy: No Ectopy.   EKG reviewed and interpreted by me shows sinus rhythm at a rate of 89. KY, QRS, QTC intervals within normal limits.  There is normal axis.  There is normal R-wave progression across the precordium.  There are no ST segment or T-wave ischemic findings.      Repeat EKG reveals sinus rhythm at a rate of 95 beats per minute.  The KY, QRS, QTC intervals are grossly within normal limits.  There is normal axis.  There is normal R-wave progression.  There are no ST segment or T-wave ischemic findings.         Imaging Results          CT Head Without Contrast (Final result)  Result time 05/07/22 09:56:42    Final result by Riri Carmen MD (05/07/22 09:56:42)                 Impression:      Remote encephalomalacia involving the left anterior temporal lobe.  No new parenchymal hypoattenuation is seen to suggest an acute infarction and no intracranial hemorrhage is seen.      Electronically signed by: Riri Carmen MD  Date:    05/07/2022  Time:    09:56             Narrative:    EXAMINATION:  CT HEAD WITHOUT CONTRAST    CLINICAL HISTORY:  Mental status change, unknown cause;    TECHNIQUE:  Low dose axial images were obtained through the head.  Coronal and sagittal reformations were also performed. Contrast was not administered.    COMPARISON:  11/13/2014    FINDINGS:  Encephalomalacia in the left anterior temporal lobe,  stable.  No new parenchymal hypoattenuation is seen to suggest an acute infarction.  No intracranial hemorrhage is seen.  Ventricles are normal in size and configuration without hydrocephalus.  No extra-axial fluid collections.  The visualized paranasal sinuses including the mastoid air cells are clear.                               X-Ray Chest AP Portable (Final result)  Result time 05/07/22 09:48:34    Final result by Riri Carmen MD (05/07/22 09:48:34)                 Impression:      No acute abnormality.      Electronically signed by: Riri Carmen MD  Date:    05/07/2022  Time:    09:48             Narrative:    EXAMINATION:  XR CHEST AP PORTABLE    CLINICAL HISTORY:  Hypotension, unspecified    TECHNIQUE:  Single frontal view of the chest was performed.    COMPARISON:  11/20/2020    FINDINGS:  The lungs are clear with normal appearance of pulmonary vasculature. No pleural effusion. No evident pneumothorax.    The cardiac silhouette is mildly enlarged..  The hilar and mediastinal contours are unremarkable.    Bones are intact.                              X-Rays:   Independently Interpreted Readings:   Other Readings:  Head CT reveals no evidence of hemorrhage or mass effect.      Chest x-ray reveals no evidence of infiltrate, consolidation, pleural effusion, pulmonary edema, or pneumothorax.    Medications   sodium chloride 0.9% flush 10 mL (10 mLs Intravenous Given 5/7/22 1510)   melatonin tablet 6 mg (has no administration in time range)   naloxone 0.4 mg/mL injection 0.02 mg (has no administration in time range)   heparin (porcine) injection 5,000 Units (has no administration in time range)   acetaminophen tablet 650 mg (has no administration in time range)   ondansetron injection 4 mg (has no administration in time range)   0.9%  NaCl infusion ( Intravenous New Bag 5/7/22 1510)   methocarbamoL tablet 750 mg (has no administration in time range)   oxyCODONE immediate release tablet 15 mg (has no  administration in time range)   sodium chloride 0.9% bolus 1,000 mL (0 mLs Intravenous Stopped 5/7/22 1114)   sodium chloride 0.9% bolus 1,000 mL (0 mLs Intravenous Stopped 5/7/22 1114)   lactated ringers bolus 1,000 mL (0 mLs Intravenous Stopped 5/7/22 1214)   lactated ringers bolus 1,000 mL (0 mLs Intravenous Stopped 5/7/22 1215)   naloxone 0.4 mg/mL injection 0.4 mg (0.4 mg Intravenous Given 5/7/22 1404)     Medical Decision Making:   History:   Old Medical Records: I decided to obtain old medical records.  ED Management:  This is the emergent evaluation of a 52-year-old male who presents emergency department for evaluation of generalized weakness since yesterday and decreased urination.  Differential diagnosis at the time of initial evaluation included, but was not limited to:  Dehydration, urinary tract infection, pneumonia.  The patient appeared to be volume depleted.  He did not have a fever.  There is no leukocytosis.  The patient received several boluses of IV fluids in the emergency department.  Blood pressure was initially improving but then he became more lethargic and was difficult to arouse.  He has acute kidney injury, which is likely due to hypovolemia.  I was concerned he might be retaining carbon dioxide and was unclear why he stopped responding to IV fluids.  There was no evidence of infection on any of his labs.  Plan had been to place him in the ICU and place a central line.  However, after discussion with ICU physician, decision made to give the patient a dose of naloxone.  He was very responsive this.  He woke up immediately and again conversing.  He is alert.  His blood pressures improved significantly.  I have put him on maintenance fluids.  I have discussed the case with Dr. Mccarty who agreed to take the patient floor.  Patient is on morphine and also gabapentin.  I suspect that with this acute kidney injury, he had accumulation of toxic metabolites from these medications.  I have  explained this to the patient and his wife.  They state he has not taken his medications since yesterday afternoon, which makes it more likely that his acute kidney injury is the cause of the adverse effects from these medications.  Patient is stable for the floor.  He has been seen in the emergency department by the admitting hospitalist.  He did complain of chest pain and had initial elevated troponin.  However, a repeat EKG and repeat troponin are unchanged.            Scribe Attestation:   Scribe #1: I performed the above scribed service and the documentation accurately describes the services I performed. I attest to the accuracy of the note.                 Clinical Impression:   Final diagnoses:  [I95.9] Hypotension  [N17.9] Acute kidney injury (Primary)  [T40.2X5A] Adverse effect of morphinan opioid  [E86.0] Dehydration          ED Disposition Condition    Admit           I, Nav Barajas MD, personally performed the services described in this documentation. All medical record entries made by the scribe were at my direction and in my presence. I have reviewed the chart and agree that the record reflects my personal performance and is accurate and complete.       Nav Barajas Jr., MD  05/07/22 [id number]

## 2022-05-07 NOTE — ED NOTES
Was informed and stated that he is aware of the pt's elevated troponin and other abnormal labs (bun, creatinine)   Trilobed Flap Text: The defect edges were debeveled with a #15 scalpel blade.  Given the location of the defect and the proximity to free margins a trilobed flap was deemed most appropriate.  Using a sterile surgical marker, an appropriate trilobed flap drawn around the defect.    The area thus outlined was incised deep to adipose tissue with a #15 scalpel blade.  The skin margins were undermined to an appropriate distance in all directions utilizing iris scissors.

## 2022-05-07 NOTE — PLAN OF CARE
Cross Cover progress note    Subjective:   Called to bedside for decreased mental status. Admitted today for DEBRA with associated hypotension. Suspected caused by dehydration and decreased BP and mental status due to decreased renal clearance of home medications. Initially improved with narcan in ED    Objective  HR: 100 BP 93/52 Respirations 15 O2 saturation 82% on RA  General: appears stated age, somnolent  Cardiac: RRR  Pulmonary: sonorous respirations  Abdomen: soft non-tender  Neuro: oriented to person place and time with significant verbal and physical stimualtion. Quickly falls back asleep    Plan  Encephalopathy  Suspect metabolic and caused by reduced renal clearance of home opiates  Given Narcan with immediate arousal and clearing of altered sensorium BP improved to 130s systolic.   Placed on supplemental oxygen  Will continue PRN narcan and IVF for improving renal function    ADDENDUM:  Developed Fever and tachycardia. Will check blood cultures and lactic acid.  ABG results reviewed with 7.246 / 50.1 / 98 / -6 / 21.8 on 2.5L oxygen  Will start naloxone infusion and move to ICU for CO2 narcosis.     Brent Fish PA-C  Department of Hospital Medicine   [facility]    Critical care time spent on the evaluation and treatment of severe organ dysfunction, review of pertinent labs and imaging studies, discussions with consulting providers and discussions with patient/family: 35 minutes.

## 2022-05-07 NOTE — ED TRIAGE NOTES
Pt. Complains of dizziness, double vision, and seeing spots that started yesterday evening. Pt's co worker informed the wife that he noticed the pt. Acting different after working a while in the heat. Pt. States that he experienced bilateral neck pain and a headache after coming out of the sun. Pt's wife states that she noticed the pt. Acting a little confused which started this morning. Pt. Would start talking about something that happened in the past and then dose off in the middle of a sentence. Pt. Has not been urinating. Pt's wife states that the last time she noticed that he urinated was at 1900 last night and it was a couple drops. Pt. Has been drinking pedialyte and water. Pt. Is also nauseated. Pt. Complains of right ankle and lower back pain secondary to previous surgeries. Pt. Rates his pain at an 8/10 on the pain scale.

## 2022-05-07 NOTE — CARE UPDATE
RAPID RESPONSE NURSE PROACTIVE ROUNDING NOTE       Time of Visit:     Admit Date: 2022  LOS: 0  Code Status: Full Code   Date of Visit: 2022  : 1970  Age: 52 y.o.  Sex: male  Race: White  Bed: W316/W316 A:   MRN: [id number]  Was the patient discharged from an ICU this admission? No   Was the patient discharged from a PACU within last 24 hours? No   Did the patient receive conscious sedation/general anesthesia in last 24 hours? No   Was the patient in the ED within the past 24 hours? Yes   Was the patient on NIPPV within the past 24 hours? No   Attending Physician: Danielle Mccarty MD  Primary Service: Hospitalist   Time spent at the bedside: 30 - 45 min    SITUATION    Notified by charge RN via phone call  Reason for alert: very drowsy     Diagnosis: DEBRA (acute kidney injury)   has a past medical history of Back pain, chronic, Bulging discs, Chronic pain following surgery or procedure, Degenerative disc disease, Hypertension, Hypokalemia, Obese abdomen, PAD (peripheral artery disease), Post laminectomy syndrome, Seizures, Severe sepsis, Short-term memory loss, Surgical site infection, and Thyroid disease.    Last Vitals:  Temp: 98.6 °F (37 °C) (1710)  Pulse: 100 (1710)  Resp: 18 (1710)  BP: 93/52 (1710)  SpO2: 96 % (1710)    24 Hour Vitals Range:  Temp:  [98.1 °F (36.7 °C)-98.6 °F (37 °C)]   Pulse:  []   Resp:  [9-40]   BP: ()/(28-75)   SpO2:  [61 %-100 %]     Clinical Issues: Neuro    ASSESSMENT/INTERVENTIONS    Up on assessment pt very drowsy and unable to stay awake,pleasetly confused, VSS notified PA, Naloxone given pt respond well remain awake, wife at the bedside,     RECOMMENDATIONS  Naloxone, Telemetry monitor    Discussed plan of care with charge RNMiracle     PROVIDER ESCALATION    Physician escalation: Yes    Orders received and case discussed with REX Armijo.    Disposition:Remain in room 316    FOLLOW UP    Call back the Rapid Response  Nurse,  Nilsa at [phone] for additional questions or concerns.

## 2022-05-07 NOTE — Clinical Note
Diagnosis: Hypotension [684114]   Admitting Provider:: WENDY RODRIGUEZ JR [[id number]]   Future Attending Provider: JOAQUIN RAMIREZ [[id number]]   Requested Bed Type: Standard [1]   Reason for IP Medical Treatment  (Clinical interventions that can only be accomplished in the IP setting? ) :: IV fluids, further diagnostic evaluation   Estimated Length of Stay:: 2 midnights   I certify that Inpatient services for greater than or equal to 2 midnights are medically necessary:: Yes   Plans for Post-Acute care--if anticipated (pick the single best option):: A. No post acute care anticipated at this time   Special Needs:: No Special Needs [1]

## 2022-05-07 NOTE — ED NOTES
Called for CVA rule out no provider came, with BP being low transferred to room, notified charge nurse, she was in patients room setting up monitors.

## 2022-05-07 NOTE — ASSESSMENT & PLAN NOTE
On chronic opioids + gabapentin at home. Concern that these have not been metabolized correctly in setting of acute renal failure. Discussed with patient that he may experience some symptoms of withdrawal

## 2022-05-07 NOTE — ASSESSMENT & PLAN NOTE
Presented with DEBRA. Cr on admit 5.4 from baseline Cr 0.7. Suspect due to dehydration + hypotension which is from decreased PO intake and accumulation of home opioids + gabapentin in setting of decreased renal clearance   - now able to make urine  - Check FENa, UA, CK, UPC, Renal US  - continue IVF  - repeat BMP now  - hold olmesartan, lasix, KCl   - Nephrology consulted  - Avoid nephrotoxins, renal dose all meds.   - Monitor

## 2022-05-07 NOTE — PHARMACY MED REC
"Admission Medication History     The home medication history was taken by Miracle Appiah CPhT.    You may go to "Admission" then "Reconcile Home Medications" tabs to review and/or act upon these items.      The home medication list has been updated by the Pharmacy department.    Please read ALL comments highlighted in yellow.    Please address this information as you see fit.     Feel free to contact us if you have any questions or require assistance.      The medications listed below were removed from the home medication list. Please reorder if appropriate:  Patient reports no longer taking the following medication(s):   Desyrel 50 mg tab      Medications listed below were obtained from: Patient/family and Analytic software- [employer]  (Not in a hospital admission)        Miracle Appiah CPhT.  [phone]                    .          "

## 2022-05-07 NOTE — HPI
Mr Jaime Lund is a 52 y.o. man with HTN, chronic pain on chronic opioids who presents with altered mental status. 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 He has been very busy at work and has not been eating or drinking as he should. He says this is because he is so busy. He is working outside in the heat. His wife has noticed his urine becoming more concentrated. Yesterday he barely ate or drank anything. He has continued to take his home opioid medications and gabapentin as prescribed-- long acting morphine 60mg BID, oxycodone 30 Q4, fioricet BID, gabapentin 400 TID. His wife noticed he was altered this morning and brought him in for evaluation. He was found to have severe DEBRA, previously normal renal function 2 years ago, and hypotension with lowest SBP in 60s. He was given multiple L of IVF and still hypotensive. He was then given narcan with improvement in mental status and resolution of hypotension. He now says repeatedly that he is not abusing his medications, and his wife confirms that he is taking them as prescribed with last doses yesterday afternoon. He has not been able to make urine yet. He is having tremors.

## 2022-05-07 NOTE — SUBJECTIVE & OBJECTIVE
Past Medical History:   Diagnosis Date    Back pain, chronic     Bulging discs     Chronic pain following surgery or procedure     Degenerative disc disease     Hypertension     Hypokalemia     Obese abdomen     PAD (peripheral artery disease)     Post laminectomy syndrome     Seizures     Severe sepsis     Short-term memory loss     Surgical site infection 11/17/2020    Rt abdomen pain pump site    Thyroid disease     Subclinical hyperthyroidism       Past Surgical History:   Procedure Laterality Date    CHOLECYSTECTOMY      GASTRIC BYPASS      SLEEVE    gastric sleeve  2011    HERNIA REPAIR      pain pump Right 04/03/2018    inserted at right abdomen    pain pump site washout Right 04/13/2018    REMOVAL OF IMPLANT N/A 11/18/2020    Procedure: REMOVAL, IMPLANT;  Surgeon: Raffy Headley DO;  Location: [facility] OR;  Service: Neurosurgery;  Laterality: N/A;  intrathecal pain pump, leads removed from mid back and generator from abdomen    SKIN SURGERY      EXCESS SKIN REMOVAL    SPINE SURGERY      lumbar fusion       Review of patient's allergies indicates:   Allergen Reactions    Klonopin [clonazepam] Anxiety and Other (See Comments)     Becomes agitated    Valium [diazepam] Other (See Comments)     Becomes agitated    Xanax [alprazolam] Anxiety and Other (See Comments)     Becomes agitated       No current facility-administered medications on file prior to encounter.     Current Outpatient Medications on File Prior to Encounter   Medication Sig    amLODIPine (NORVASC) 10 MG tablet Take 1 tablet (10 mg total) by mouth once daily.    butalbital-acetaminophen-caffeine -40 mg (FIORICET, ESGIC) -40 mg per tablet TAKE ONE TABLET BY MOUTH EVERY 4 HOURS AS NEEDED FOR HEADACHE    DULoxetine (CYMBALTA) 60 MG capsule TAKE 1 CAPSULE BY MOUTH EVERY EVENING FOR MOOD    furosemide (LASIX) 40 MG tablet TAKE ONE TABLET BY MOUTH ONCE DAILY FOR FLUID (Patient taking differently: Take 40 mg by mouth as needed.)    gabapentin  "(NEURONTIN) 400 MG capsule Take 400 mg by mouth 3 (three) times daily. Takes 1 400mg pill every AM, Takes 2 (400mg) at night    meloxicam (MOBIC) 15 MG tablet Take 1 tablet (15 mg total) by mouth once daily.    methocarbamoL (ROBAXIN) 750 MG Tab Take 750 mg by mouth 4 (four) times daily as needed.    morphine (MS CONTIN) 60 MG 12 hr tablet SMARTSI Tablet(s) By Mouth Every 12 Hours PRN    mupirocin (BACTROBAN) 2 % ointment APPLY TO AFFECTED AREA THREE TIMES DAILY    olmesartan (BENICAR) 40 MG tablet Take 1 tablet (40 mg total) by mouth once daily.    potassium chloride (KLOR-CON) 10 MEQ TbSR TAKE ONE TABLET BY MOUTH ONCE DAILY AS NEEDED WITH FLUID PILL    testosterone cypionate (DEPOTESTOTERONE CYPIONATE) 200 mg/mL injection Inject 2 mLs (400 mg total) into the muscle every 28 days.    tiZANidine (ZANAFLEX) 2 MG tablet TAKE ONE TABLET BY MOUTH THREE TIMES DAILY FOR MUSCLE SPASMS.    oxyCODONE (ROXICODONE) 30 MG Tab TAKE ONE TABLET BY MOUTH FIVE TIMES DAILY AS NEEDED FOR PAIN.    syringe with needle (SYRINGE 3CC/22GX1") 3 mL 22 gauge x 1" Syrg 1 Units by Misc.(Non-Drug; Combo Route) route every 28 days.    [DISCONTINUED] cephALEXin (KEFLEX) 500 MG capsule     [DISCONTINUED] traZODone (DESYREL) 50 MG tablet TAKE ONE TO TWO TABLETS BY MOUTH AT BEDTIME     Family History       Problem Relation (Age of Onset)    Arthritis Mother    Breast cancer Mother    Heart disease Mother    Hypertension Mother, Father    Throat cancer Father          Tobacco Use    Smoking status: Never Smoker    Smokeless tobacco: Never Used   Substance and Sexual Activity    Alcohol use: Yes     Comment: socially    Drug use: No    Sexual activity: Yes     Partners: Female     Review of Systems   Constitutional:  Negative for chills and fever.   Respiratory:  Negative for shortness of breath.    Cardiovascular:  Negative for chest pain.   Gastrointestinal:  Negative for abdominal pain, nausea and vomiting.   Genitourinary:  Positive for " decreased urine volume and difficulty urinating.   Musculoskeletal:  Positive for arthralgias and back pain.   Skin:  Negative for wound.   Neurological:  Positive for tremors. Negative for dizziness, weakness, light-headedness, numbness and headaches.   Psychiatric/Behavioral:  Positive for confusion.    Objective:     Vital Signs (Most Recent):  Temp: 98.1 °F (36.7 °C) (05/07/22 0909)  Pulse: 103 (05/07/22 1513)  Resp: (!) 40 (05/07/22 1513)  BP: 129/60 (05/07/22 1513)  SpO2: 97 % (05/07/22 1513)   Vital Signs (24h Range):  Temp:  [98.1 °F (36.7 °C)] 98.1 °F (36.7 °C)  Pulse:  [] 103  Resp:  [9-40] 40  SpO2:  [61 %-100 %] 97 %  BP: ()/(28-75) 129/60     Weight: 136.1 kg (300 lb)  Body mass index is 41.84 kg/m².    Physical Exam  Vitals and nursing note reviewed.   Constitutional:       General: He is not in acute distress.     Appearance: He is obese. He is ill-appearing. He is not toxic-appearing.   HENT:      Head: Normocephalic and atraumatic.      Nose: Nose normal.      Mouth/Throat:      Mouth: Mucous membranes are moist.   Cardiovascular:      Rate and Rhythm: Regular rhythm. Tachycardia present.      Pulses: Normal pulses.      Heart sounds: Normal heart sounds. No murmur heard.    No gallop.   Pulmonary:      Effort: Pulmonary effort is normal. No respiratory distress.      Breath sounds: Normal breath sounds. No wheezing or rales.      Comments: Room air  Abdominal:      General: Bowel sounds are normal. There is no distension.      Palpations: Abdomen is soft.      Tenderness: There is no abdominal tenderness. There is no guarding.   Musculoskeletal:      Right lower leg: No edema.      Left lower leg: No edema.   Skin:     General: Skin is warm and dry.   Neurological:      Mental Status: He is alert and oriented to person, place, and time.      Comments: Occasional jerking movements, not rhythmic           Significant Labs: All pertinent labs within the past 24 hours have been  reviewed.    Significant Imaging: I have reviewed all pertinent imaging results/findings within the past 24 hours.

## 2022-05-07 NOTE — ASSESSMENT & PLAN NOTE
Hypotensive, not responding to fluids, but then did respond to narcan. BP now appropriate. This contributes to DEBRA  - hold BP Rx  - PRN narcan available

## 2022-05-07 NOTE — ASSESSMENT & PLAN NOTE
Body mass index is 41.84 kg/m². Morbid obesity complicates all aspects of disease management from diagnostic modalities to treatment. Weight loss encouraged and health benefits explained to patient.

## 2022-05-07 NOTE — H&P
[facility] Medicine  History & Physical    Patient Name: Jaime Lund  MRN: [id number]  Patient Class: IP- Inpatient  Admission Date: 5/7/2022  Attending Physician: Danielle Mccarty MD   Primary Care Provider: Gabby Dean MD         Patient information was obtained from patient, past medical records and ER records.     Subjective:     Principal Problem:DEBRA (acute kidney injury)    Chief Complaint:   Chief Complaint   Patient presents with    Dizziness     Patient reports dizzy that started yesterday around 9AM seeing double vision, weakness, unable to urinated last urinated around 7PM.  States low BP at home 76/43 this morning. Per spouse, states was confused and talking crazy this morning around 2AM.         HPI: Mr Jaime Lund is a 52 y.o. man with HTN, chronic pain on chronic opioids who presents with altered mental status. 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 He has been very busy at work and has not been eating or drinking as he should. He says this is because he is so busy. He is working outside in the heat. His wife has noticed his urine becoming more concentrated. Yesterday he barely ate or drank anything. He has continued to take his home opioid medications and gabapentin as prescribed-- long acting morphine 60mg BID, oxycodone 30 Q4, fioricet BID, gabapentin 400 TID. His wife noticed he was altered this morning and brought him in for evaluation. He was found to have severe DEBRA, previously normal renal function 2 years ago, and hypotension with lowest SBP in 60s. He was given multiple L of IVF and still hypotensive. He was then given narcan with improvement in mental status and resolution of hypotension. He now says repeatedly that he is not abusing his medications, and his wife confirms that he is taking them as prescribed with last doses yesterday afternoon. He has not been able to make urine yet. He is  having tremors.       Past Medical History:   Diagnosis Date    Back pain, chronic     Bulging discs     Chronic pain following surgery or procedure     Degenerative disc disease     Hypertension     Hypokalemia     Obese abdomen     PAD (peripheral artery disease)     Post laminectomy syndrome     Seizures     Severe sepsis     Short-term memory loss     Surgical site infection 11/17/2020    Rt abdomen pain pump site    Thyroid disease     Subclinical hyperthyroidism       Past Surgical History:   Procedure Laterality Date    CHOLECYSTECTOMY      GASTRIC BYPASS      SLEEVE    gastric sleeve  2011    HERNIA REPAIR      pain pump Right 04/03/2018    inserted at right abdomen    pain pump site washout Right 04/13/2018    REMOVAL OF IMPLANT N/A 11/18/2020    Procedure: REMOVAL, IMPLANT;  Surgeon: Raffy Headley DO;  Location: [facility] OR;  Service: Neurosurgery;  Laterality: N/A;  intrathecal pain pump, leads removed from mid back and generator from abdomen    SKIN SURGERY      EXCESS SKIN REMOVAL    SPINE SURGERY      lumbar fusion       Review of patient's allergies indicates:   Allergen Reactions    Klonopin [clonazepam] Anxiety and Other (See Comments)     Becomes agitated    Valium [diazepam] Other (See Comments)     Becomes agitated    Xanax [alprazolam] Anxiety and Other (See Comments)     Becomes agitated       No current facility-administered medications on file prior to encounter.     Current Outpatient Medications on File Prior to Encounter   Medication Sig    amLODIPine (NORVASC) 10 MG tablet Take 1 tablet (10 mg total) by mouth once daily.    butalbital-acetaminophen-caffeine -40 mg (FIORICET, ESGIC) -40 mg per tablet TAKE ONE TABLET BY MOUTH EVERY 4 HOURS AS NEEDED FOR HEADACHE    DULoxetine (CYMBALTA) 60 MG capsule TAKE 1 CAPSULE BY MOUTH EVERY EVENING FOR MOOD    furosemide (LASIX) 40 MG tablet TAKE ONE TABLET BY MOUTH ONCE DAILY FOR FLUID (Patient taking  "differently: Take 40 mg by mouth as needed.)    gabapentin (NEURONTIN) 400 MG capsule Take 400 mg by mouth 3 (three) times daily. Takes 1 400mg pill every AM, Takes 2 (400mg) at night    meloxicam (MOBIC) 15 MG tablet Take 1 tablet (15 mg total) by mouth once daily.    methocarbamoL (ROBAXIN) 750 MG Tab Take 750 mg by mouth 4 (four) times daily as needed.    morphine (MS CONTIN) 60 MG 12 hr tablet SMARTSI Tablet(s) By Mouth Every 12 Hours PRN    mupirocin (BACTROBAN) 2 % ointment APPLY TO AFFECTED AREA THREE TIMES DAILY    olmesartan (BENICAR) 40 MG tablet Take 1 tablet (40 mg total) by mouth once daily.    potassium chloride (KLOR-CON) 10 MEQ TbSR TAKE ONE TABLET BY MOUTH ONCE DAILY AS NEEDED WITH FLUID PILL    testosterone cypionate (DEPOTESTOTERONE CYPIONATE) 200 mg/mL injection Inject 2 mLs (400 mg total) into the muscle every 28 days.    tiZANidine (ZANAFLEX) 2 MG tablet TAKE ONE TABLET BY MOUTH THREE TIMES DAILY FOR MUSCLE SPASMS.    oxyCODONE (ROXICODONE) 30 MG Tab TAKE ONE TABLET BY MOUTH FIVE TIMES DAILY AS NEEDED FOR PAIN.    syringe with needle (SYRINGE 3CC/22GX1") 3 mL 22 gauge x 1" Syrg 1 Units by Misc.(Non-Drug; Combo Route) route every 28 days.    [DISCONTINUED] cephALEXin (KEFLEX) 500 MG capsule     [DISCONTINUED] traZODone (DESYREL) 50 MG tablet TAKE ONE TO TWO TABLETS BY MOUTH AT BEDTIME     Family History       Problem Relation (Age of Onset)    Arthritis Mother    Breast cancer Mother    Heart disease Mother    Hypertension Mother, Father    Throat cancer Father          Tobacco Use    Smoking status: Never Smoker    Smokeless tobacco: Never Used   Substance and Sexual Activity    Alcohol use: Yes     Comment: socially    Drug use: No    Sexual activity: Yes     Partners: Female     Review of Systems   Constitutional:  Negative for chills and fever.   Respiratory:  Negative for shortness of breath.    Cardiovascular:  Negative for chest pain.   Gastrointestinal:  Negative for " abdominal pain, nausea and vomiting.   Genitourinary:  Positive for decreased urine volume and difficulty urinating.   Musculoskeletal:  Positive for arthralgias and back pain.   Skin:  Negative for wound.   Neurological:  Positive for tremors. Negative for dizziness, weakness, light-headedness, numbness and headaches.   Psychiatric/Behavioral:  Positive for confusion.    Objective:     Vital Signs (Most Recent):  Temp: 98.1 °F (36.7 °C) (05/07/22 0909)  Pulse: 103 (05/07/22 1513)  Resp: (!) 40 (05/07/22 1513)  BP: 129/60 (05/07/22 1513)  SpO2: 97 % (05/07/22 1513)   Vital Signs (24h Range):  Temp:  [98.1 °F (36.7 °C)] 98.1 °F (36.7 °C)  Pulse:  [] 103  Resp:  [9-40] 40  SpO2:  [61 %-100 %] 97 %  BP: ()/(28-75) 129/60     Weight: 136.1 kg (300 lb)  Body mass index is 41.84 kg/m².    Physical Exam  Vitals and nursing note reviewed.   Constitutional:       General: He is not in acute distress.     Appearance: He is obese. He is ill-appearing. He is not toxic-appearing.   HENT:      Head: Normocephalic and atraumatic.      Nose: Nose normal.      Mouth/Throat:      Mouth: Mucous membranes are moist.   Cardiovascular:      Rate and Rhythm: Regular rhythm. Tachycardia present.      Pulses: Normal pulses.      Heart sounds: Normal heart sounds. No murmur heard.    No gallop.   Pulmonary:      Effort: Pulmonary effort is normal. No respiratory distress.      Breath sounds: Normal breath sounds. No wheezing or rales.      Comments: Room air  Abdominal:      General: Bowel sounds are normal. There is no distension.      Palpations: Abdomen is soft.      Tenderness: There is no abdominal tenderness. There is no guarding.   Musculoskeletal:      Right lower leg: No edema.      Left lower leg: No edema.   Skin:     General: Skin is warm and dry.   Neurological:      Mental Status: He is alert and oriented to person, place, and time.      Comments: Occasional jerking movements, not rhythmic           Significant  Labs: All pertinent labs within the past 24 hours have been reviewed.    Significant Imaging: I have reviewed all pertinent imaging results/findings within the past 24 hours.    Assessment/Plan:     * DEBRA (acute kidney injury)  Presented with DEBRA. Cr on admit 5.4 from baseline Cr 0.7. Suspect due to dehydration + hypotension which is from decreased PO intake and accumulation of home opioids + gabapentin in setting of decreased renal clearance   - now able to make urine  - Check FENa, UA, CK, UPC, Renal US  - continue IVF  - repeat BMP now  - hold olmesartan, lasix, KCl   - Nephrology consulted  - Avoid nephrotoxins, renal dose all meds.   - Monitor         Chronic, continuous use of opioids   queried.   - hold morphine  - PRN oxycodone available, decreased dose    Also hold fioricet, gabapentin      High anion gap metabolic acidosis  Due to renal failure       Hypotension  Hypotensive, not responding to fluids, but then did respond to narcan. BP now appropriate. This contributes to DEBRA  - hold BP Rx  - PRN narcan available       Anemia of chronic disease  Stable, no signs of bleeding       Morbid obesity  Body mass index is 41.84 kg/m². Morbid obesity complicates all aspects of disease management from diagnostic modalities to treatment. Weight loss encouraged and health benefits explained to patient.         Chronic pain syndrome  On chronic opioids + gabapentin at home. Concern that these have not been metabolized correctly in setting of acute renal failure. Discussed with patient that he may experience some symptoms of withdrawal       Essential hypertension  BP now appropriate after previously hypotensive  - continue to hold BP Rx        VTE Risk Mitigation (From admission, onward)         Ordered     heparin (porcine) injection 5,000 Units  Every 8 hours         05/07/22 1428     IP VTE HIGH RISK PATIENT  Once         05/07/22 1428     Place sequential compression device  Until discontinued         05/07/22  [id number]                   Danielle Mccarty MD  Department of [facility] Medicine   [facility] [place] Tele[place]

## 2022-05-07 NOTE — ED NOTES
Pt. Awakened. Pt. Asked how long he had been asleep. Prior to giving narcan, the pt. Was hard to wake up. Pt's wife states that the pt. Is usually like this and wanted him to get tested for sleep apnea, but the pt. Did not want to.

## 2022-05-07 NOTE — ASSESSMENT & PLAN NOTE
queried.   - hold morphine  - PRN oxycodone available, decreased dose    Also hold fioricet, gabapentin

## 2022-05-08 PROBLEM — G92.9 ENCEPHALOPATHY, TOXIC: Status: ACTIVE | Noted: 2022-05-08

## 2022-05-08 PROBLEM — N39.0 SEPSIS SECONDARY TO UTI: Status: ACTIVE | Noted: 2022-05-08

## 2022-05-08 PROBLEM — A41.9 SEPSIS SECONDARY TO UTI: Status: ACTIVE | Noted: 2022-05-08

## 2022-05-08 LAB
ANION GAP SERPL CALC-SCNC: 9 MMOL/L (ref 8–16)
BUN SERPL-MCNC: 23 MG/DL (ref 6–20)
CALCIUM SERPL-MCNC: 8.4 MG/DL (ref 8.7–10.5)
CHLORIDE SERPL-SCNC: 107 MMOL/L (ref 95–110)
CK SERPL-CCNC: 374 U/L (ref 20–200)
CO2 SERPL-SCNC: 22 MMOL/L (ref 23–29)
CREAT SERPL-MCNC: 1.3 MG/DL (ref 0.5–1.4)
CREAT UR-MCNC: 66.7 MG/DL (ref 23–375)
ERYTHROCYTE [DISTWIDTH] IN BLOOD BY AUTOMATED COUNT: 14.9 % (ref 11.5–14.5)
EST. GFR  (AFRICAN AMERICAN): >60 ML/MIN/1.73 M^2
EST. GFR  (NON AFRICAN AMERICAN): >60 ML/MIN/1.73 M^2
GLUCOSE SERPL-MCNC: 91 MG/DL (ref 70–110)
HCT VFR BLD AUTO: 35.5 % (ref 40–54)
HGB BLD-MCNC: 10.7 G/DL (ref 14–18)
LACTATE SERPL-SCNC: 0.6 MMOL/L (ref 0.5–2.2)
MCH RBC QN AUTO: 26.6 PG (ref 27–31)
MCHC RBC AUTO-ENTMCNC: 30.1 G/DL (ref 32–36)
MCV RBC AUTO: 88 FL (ref 82–98)
PLATELET # BLD AUTO: 337 K/UL (ref 150–450)
PMV BLD AUTO: 10.1 FL (ref 9.2–12.9)
POCT GLUCOSE: 111 MG/DL (ref 70–110)
POCT GLUCOSE: 129 MG/DL (ref 70–110)
POTASSIUM SERPL-SCNC: 4.4 MMOL/L (ref 3.5–5.1)
RBC # BLD AUTO: 4.02 M/UL (ref 4.6–6.2)
SODIUM SERPL-SCNC: 138 MMOL/L (ref 136–145)
SODIUM UR-SCNC: 46 MMOL/L (ref 20–250)
WBC # BLD AUTO: 10.17 K/UL (ref 3.9–12.7)

## 2022-05-08 PROCEDURE — 63600175 PHARM REV CODE 636 W HCPCS: Performed by: INTERNAL MEDICINE

## 2022-05-08 PROCEDURE — 84300 ASSAY OF URINE SODIUM: CPT | Performed by: INTERNAL MEDICINE

## 2022-05-08 PROCEDURE — 87040 BLOOD CULTURE FOR BACTERIA: CPT | Performed by: PHYSICIAN ASSISTANT

## 2022-05-08 PROCEDURE — 25000003 PHARM REV CODE 250: Performed by: HOSPITALIST

## 2022-05-08 PROCEDURE — 20000000 HC ICU ROOM

## 2022-05-08 PROCEDURE — 25000003 PHARM REV CODE 250: Performed by: INTERNAL MEDICINE

## 2022-05-08 PROCEDURE — 85027 COMPLETE CBC AUTOMATED: CPT | Performed by: INTERNAL MEDICINE

## 2022-05-08 PROCEDURE — 83605 ASSAY OF LACTIC ACID: CPT | Performed by: PHYSICIAN ASSISTANT

## 2022-05-08 PROCEDURE — 63600175 PHARM REV CODE 636 W HCPCS: Performed by: HOSPITALIST

## 2022-05-08 PROCEDURE — 82570 ASSAY OF URINE CREATININE: CPT | Performed by: INTERNAL MEDICINE

## 2022-05-08 PROCEDURE — 36415 COLL VENOUS BLD VENIPUNCTURE: CPT | Performed by: HOSPITALIST

## 2022-05-08 PROCEDURE — 80048 BASIC METABOLIC PNL TOTAL CA: CPT | Performed by: INTERNAL MEDICINE

## 2022-05-08 PROCEDURE — 80048 BASIC METABOLIC PNL TOTAL CA: CPT | Performed by: HOSPITALIST

## 2022-05-08 PROCEDURE — A4216 STERILE WATER/SALINE, 10 ML: HCPCS | Performed by: HOSPITALIST

## 2022-05-08 PROCEDURE — 82550 ASSAY OF CK (CPK): CPT | Performed by: HOSPITALIST

## 2022-05-08 PROCEDURE — 94761 N-INVAS EAR/PLS OXIMETRY MLT: CPT

## 2022-05-08 RX ORDER — GABAPENTIN 400 MG/1
400 CAPSULE ORAL 3 TIMES DAILY
Status: DISCONTINUED | OUTPATIENT
Start: 2022-05-08 | End: 2022-05-09 | Stop reason: HOSPADM

## 2022-05-08 RX ORDER — MELOXICAM 7.5 MG/1
15 TABLET ORAL DAILY
Status: DISCONTINUED | OUTPATIENT
Start: 2022-05-08 | End: 2022-05-09 | Stop reason: HOSPADM

## 2022-05-08 RX ORDER — MORPHINE SULFATE 4 MG/ML
2 INJECTION, SOLUTION INTRAMUSCULAR; INTRAVENOUS ONCE
Status: COMPLETED | OUTPATIENT
Start: 2022-05-08 | End: 2022-05-08

## 2022-05-08 RX ORDER — DULOXETIN HYDROCHLORIDE 30 MG/1
60 CAPSULE, DELAYED RELEASE ORAL NIGHTLY
Status: DISCONTINUED | OUTPATIENT
Start: 2022-05-08 | End: 2022-05-09 | Stop reason: HOSPADM

## 2022-05-08 RX ORDER — OXYCODONE HYDROCHLORIDE 5 MG/1
5 TABLET ORAL EVERY 4 HOURS PRN
Status: DISCONTINUED | OUTPATIENT
Start: 2022-05-08 | End: 2022-05-08

## 2022-05-08 RX ORDER — MUPIROCIN 20 MG/G
OINTMENT TOPICAL 2 TIMES DAILY
Status: DISCONTINUED | OUTPATIENT
Start: 2022-05-08 | End: 2022-05-09 | Stop reason: HOSPADM

## 2022-05-08 RX ORDER — HYDRALAZINE HYDROCHLORIDE 20 MG/ML
10 INJECTION INTRAMUSCULAR; INTRAVENOUS EVERY 6 HOURS PRN
Status: DISCONTINUED | OUTPATIENT
Start: 2022-05-08 | End: 2022-05-09 | Stop reason: HOSPADM

## 2022-05-08 RX ORDER — CLONIDINE HYDROCHLORIDE 0.1 MG/1
0.2 TABLET ORAL ONCE
Status: COMPLETED | OUTPATIENT
Start: 2022-05-08 | End: 2022-05-08

## 2022-05-08 RX ORDER — OXYCODONE HYDROCHLORIDE 5 MG/1
10 TABLET ORAL EVERY 6 HOURS PRN
Status: DISCONTINUED | OUTPATIENT
Start: 2022-05-08 | End: 2022-05-09 | Stop reason: HOSPADM

## 2022-05-08 RX ORDER — AMLODIPINE BESYLATE 5 MG/1
10 TABLET ORAL DAILY
Status: DISCONTINUED | OUTPATIENT
Start: 2022-05-08 | End: 2022-05-09 | Stop reason: HOSPADM

## 2022-05-08 RX ORDER — HALOPERIDOL 5 MG/ML
5 INJECTION INTRAMUSCULAR ONCE
Status: COMPLETED | OUTPATIENT
Start: 2022-05-08 | End: 2022-05-08

## 2022-05-08 RX ORDER — HYDROCODONE BITARTRATE AND ACETAMINOPHEN 5; 325 MG/1; MG/1
1 TABLET ORAL EVERY 6 HOURS PRN
Status: DISCONTINUED | OUTPATIENT
Start: 2022-05-08 | End: 2022-05-08

## 2022-05-08 RX ADMIN — CLONIDINE HYDROCHLORIDE 0.2 MG: 0.1 TABLET ORAL at 07:05

## 2022-05-08 RX ADMIN — MORPHINE SULFATE 2 MG: 4 INJECTION INTRAVENOUS at 03:05

## 2022-05-08 RX ADMIN — AMLODIPINE BESYLATE 10 MG: 5 TABLET ORAL at 05:05

## 2022-05-08 RX ADMIN — Medication 10 ML: at 05:05

## 2022-05-08 RX ADMIN — OXYCODONE 10 MG: 5 TABLET ORAL at 10:05

## 2022-05-08 RX ADMIN — Medication 10 ML: at 09:05

## 2022-05-08 RX ADMIN — HALOPERIDOL LACTATE 5 MG: 5 INJECTION, SOLUTION INTRAMUSCULAR at 11:05

## 2022-05-08 RX ADMIN — HEPARIN SODIUM 5000 UNITS: 5000 INJECTION INTRAVENOUS; SUBCUTANEOUS at 05:05

## 2022-05-08 RX ADMIN — MUPIROCIN: 20 OINTMENT TOPICAL at 10:05

## 2022-05-08 RX ADMIN — CEFTRIAXONE 1 G: 1 INJECTION, SOLUTION INTRAVENOUS at 02:05

## 2022-05-08 RX ADMIN — HEPARIN SODIUM 5000 UNITS: 5000 INJECTION INTRAVENOUS; SUBCUTANEOUS at 02:05

## 2022-05-08 RX ADMIN — NALXONE HYDROCHLORIDE 0.2 MG/HR: 0.4 INJECTION INTRAMUSCULAR; INTRAVENOUS; SUBCUTANEOUS at 06:05

## 2022-05-08 RX ADMIN — OXYCODONE 5 MG: 5 TABLET ORAL at 09:05

## 2022-05-08 RX ADMIN — HEPARIN SODIUM 5000 UNITS: 5000 INJECTION INTRAVENOUS; SUBCUTANEOUS at 09:05

## 2022-05-08 RX ADMIN — HYDROCODONE BITARTRATE AND ACETAMINOPHEN 1 TABLET: 5; 325 TABLET ORAL at 02:05

## 2022-05-08 RX ADMIN — MELOXICAM 15 MG: 7.5 TABLET ORAL at 05:05

## 2022-05-08 RX ADMIN — MUPIROCIN: 20 OINTMENT TOPICAL at 09:05

## 2022-05-08 RX ADMIN — Medication 10 ML: at 02:05

## 2022-05-08 RX ADMIN — OXYCODONE 10 MG: 5 TABLET ORAL at 04:05

## 2022-05-08 RX ADMIN — GABAPENTIN 400 MG: 400 CAPSULE ORAL at 09:05

## 2022-05-08 RX ADMIN — CEFTRIAXONE 1 G: 1 INJECTION, SOLUTION INTRAVENOUS at 01:05

## 2022-05-08 RX ADMIN — Medication 6 MG: at 10:05

## 2022-05-08 RX ADMIN — SODIUM CHLORIDE: 0.9 INJECTION, SOLUTION INTRAVENOUS at 01:05

## 2022-05-08 RX ADMIN — DULOXETINE 60 MG: 30 CAPSULE, DELAYED RELEASE ORAL at 09:05

## 2022-05-08 NOTE — HOSPITAL COURSE
Mr Lund presented with acute encephalopathy and hypotension. Also presented with acute renal failure and shock. Was given a total of 4L of isotonic fluids but remained in shock. Started on abx. CT brain without acute pathology. Once meds were reviewed, realized that he is on large doses of opioids and gabapentin. A trial of narcan was given with quick improvement in mental status and blood pressure. Patient's shock resolved and no longer encephalopathic. While on floor, patient became encephalopathic again and hypercapnic. Also with fever. Given another dose of narcan which, again, helped with encephalopathy and BP. There was concern for needing more frequent observation in case he would need narcan again. He was therefore transferred to ICU for narcan gtt. Overall, cause of acute encephalopathy and hypotension is unintentional opioid overdose and gabapentin overdose due to decreased renal clearance due to DEBRA from dehydration. Discussed hydration. Prescribed narcan. Recommended discussing decreasing opioids after discussion with prescribing provider.

## 2022-05-08 NOTE — NURSING
RAPID RESPONSE NURSE PROACTIVE ROUNDING NOTE       Time of Visit:     Admit Date: 2022  LOS: 1  Code Status: Full Code   Date of Visit: 2022  : 1970  Age: 52 y.o.  Sex: male  Race: White  Bed: W264/W264 A:   MRN: [id number]  Was the patient discharged from an ICU this admission? No   Was the patient discharged from a PACU within last 24 hours? No   Did the patient receive conscious sedation/general anesthesia in last 24 hours? No   Was the patient in the ED within the past 24 hours? Yes   Was the patient on NIPPV within the past 24 hours? No   Attending Physician: Jaylyn Rivera MD  Primary Service: Hospitalist   Time spent at the bedside: < 15 min    SITUATION    Notified by Epic patient alert  Reason for alert: MEWS of 5, hard to arouse    Diagnosis: DEBRA (acute kidney injury)   has a past medical history of Back pain, chronic, Bulging discs, Chronic pain following surgery or procedure, Degenerative disc disease, Hypertension, Hypokalemia, Obese abdomen, PAD (peripheral artery disease), Post laminectomy syndrome, Seizures, Severe sepsis, Short-term memory loss, Surgical site infection, and Thyroid disease.    Last Vitals:  Temp: 99.2 °F (37.3 °C) ( 2300)  Pulse: 110 ( 0015)  Resp: 23 ( 0015)  BP: 121/59 ( 0015)  SpO2: 98 % ( 0015)    24 Hour Vitals Range:  Temp:  [98.1 °F (36.7 °C)-100.9 °F (38.3 °C)]   Pulse:  []   Resp:  [9-40]   BP: ()/(28-75)   SpO2:  [61 %-100 %]     Clinical Issues: Neuro    ASSESSMENT/INTERVENTIONS    Pt found lying in bed asleep, easy to arouse however pt quickly falls back asleep mid conversation, pt is only oriented to self and cannot provide background, wife at bedside answering questions. REX Ernandez alerted of neuro status. ABG ordered and decision was made to transfer patient to ICU for narcan drip.     RECOMMENDATIONS    Monitor response to narcan, continue to monitor BUN/Cr    Discussed plan of care with bedside RN,  Bonnie    PROVIDER ESCALATION    Physician escalation: Yes    Orders received and case discussed with REX Ernandez.    Disposition:Tx in ICU bed 264    FOLLOW UP    Call back the Rapid Response Nurse, David Reveles at [phone] for additional questions or concerns.

## 2022-05-08 NOTE — PROVIDER TRANSFER
ICU transfer note    Mr Lund is a 51 yo male with chronic back and right ankle pain on opioids, muscle relaxants and gabapentin who presented with AMS, ARF and sepsis. He was in shock while in the ED which persisted despite 4L of fluids. AMS and shock resolved with narcan therefore admitted to floor. Overnight he needed narcan again therefore transferred to ICU on narcan drip. He is doing well and starting on low dose norco for now. ARF resolved with fluids. Home in 1-2 days. Needs referral to PM&R

## 2022-05-08 NOTE — EICU
Rounding (Video Assessment):  No    Intervention Initiated From:  Bedside    Yi Communicated with Bedside Nurse regarding:  Labs    Nurse Notified:  No    Doctor Notified:  Yes    Comments: bedside nurse ask for md to order am labs. Informed Dr. aJmes.

## 2022-05-08 NOTE — NURSING
Received patient from room 316 to  at approximately 2252. 2L NC in place, patient transferred to ICU bed, attached to ICU monitoring system, sinus tach with PVCs noted  on the monitor, patient is very drowsy, open eyes spontaneously, delay noted from patient when attempting to answer questions, attempts to answer questions but falls asleep right after answering questions.  Complete skin assessment completed, no wounds noted.

## 2022-05-08 NOTE — PROGRESS NOTES
[facility] Medicine  Progress Note    Patient Name: Jaime Lund  MRN: [id number]  Patient Class: IP- Inpatient   Admission Date: 5/7/2022  Length of Stay: 1 days  Attending Physician: Jaylyn Rivera MD  Primary Care Provider: Gabby Dean MD        Subjective:     Principal Problem:DEBRA (acute kidney injury)        HPI:  Mr Jaime Lund is a 52 y.o. man with HTN, chronic pain on chronic opioids who presents with altered mental status. 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 He has been very busy at work and has not been eating or drinking as he should. He says this is because he is so busy. He is working outside in the heat. His wife has noticed his urine becoming more concentrated. Yesterday he barely ate or drank anything. He has continued to take his home opioid medications and gabapentin as prescribed-- long acting morphine 60mg BID, oxycodone 30 Q4, fioricet BID, gabapentin 400 TID. His wife noticed he was altered this morning and brought him in for evaluation. He was found to have severe DEBRA, previously normal renal function 2 years ago, and hypotension with lowest SBP in 60s. He was given multiple L of IVF and still hypotensive. He was then given narcan with improvement in mental status and resolution of hypotension. He now says repeatedly that he is not abusing his medications, and his wife confirms that he is taking them as prescribed with last doses yesterday afternoon. He has not been able to make urine yet. He is having tremors.       Overview/Hospital Course:  Mr Lund presented with acute encephalopathy and sepsis. Also presented with acute renal failure and shock. Was given a total of 4L of isotonic fluids but remained in shock. Started on abx. CT brain without acute pathology. Once meds were reviewed, a trial of narcan was given. Patient's shock resolved and no longer encephalopathic. He was  admitted to floor for management of sepsis. Source suspected to be urine. While on floor, patient became encephalopathic again and hypercapnic. Also with fever. Given another dose of narcan which, again, helped with encephalopathy and BP. There was concern for needing more frequent observation in case he would need narcan again. He was therefore transferred to ICU for narcan drip. Did well overnight and no longer requiring narcan. Renal failure quickly resolved, suggesting pre-renal etiology. Resumed pain meds but at lower dose to treat pain and avoid withdrawals. Stepped down to floor in stable condition.       Interval History: clinically improved and no longer requiring narcan. BP stable. Is AAO x 3    Review of Systems   Constitutional: Negative.    Respiratory: Negative.     Cardiovascular: Negative.    Gastrointestinal: Negative.    Musculoskeletal:  Positive for back pain.   Objective:     Vital Signs (Most Recent):  Temp: 98.1 °F (36.7 °C) (05/08/22 0715)  Pulse: 90 (05/08/22 0915)  Resp: 20 (05/08/22 0921)  BP: (!) 152/78 (05/08/22 0915)  SpO2: 98 % (05/08/22 0915)   Vital Signs (24h Range):  Temp:  [98.1 °F (36.7 °C)-100.9 °F (38.3 °C)] 98.1 °F (36.7 °C)  Pulse:  [] 90  Resp:  [11-40] 20  SpO2:  [61 %-100 %] 98 %  BP: ()/(42-87) 152/78     Weight: (!) 143.8 kg (317 lb 0.3 oz)  Body mass index is 44.22 kg/m².    Intake/Output Summary (Last 24 hours) at 5/8/2022 1031  Last data filed at 5/8/2022 0921  Gross per 24 hour   Intake 6007.96 ml   Output 3875 ml   Net 2132.96 ml      Physical Exam  Vitals and nursing note reviewed.   Constitutional:       General: He is not in acute distress.     Appearance: He is not toxic-appearing.   Cardiovascular:      Rate and Rhythm: Normal rate and regular rhythm.   Pulmonary:      Breath sounds: Normal breath sounds.   Abdominal:      General: Bowel sounds are normal.      Palpations: Abdomen is soft.   Musculoskeletal:         General: Swelling (right ankle.  Non tender) and deformity (right ankle) present.      Right lower leg: No edema.      Left lower leg: No edema.   Skin:     General: Skin is warm.      Findings: No erythema.   Neurological:      Mental Status: He is alert and oriented to person, place, and time.   Psychiatric:         Mood and Affect: Mood normal.         Behavior: Behavior normal.         Thought Content: Thought content normal.         Judgment: Judgment normal.       Significant Labs: All pertinent labs within the past 24 hours have been reviewed.    Significant Imaging: I have reviewed all pertinent imaging results/findings within the past 24 hours.  I have reviewed and interpreted all pertinent imaging results/findings within the past 24 hours.      Assessment/Plan:      * DEBRA (acute kidney injury)  Likely pre-renal since it resolved with intravenous fluids  No need for renal ultrasound at this time  Start a diet. BMP in AM        Sepsis secondary to UTI  Met criteria with fever, tachycardia and abnormal UA  Aspiration PNA not ruled out. Ceftriaxone can cover for both  F/u Urine and Blood cultures.       Encephalopathy, toxic  Likely due to use of opiates, muscle relaxants and gabapentin at home in setting of acute renal failure.   Encephalpathy resolved. No longer requires narcan      Chronic, continuous use of opioids   queried.   As above      High anion gap metabolic acidosis  Due to renal failure   AG normal. Met acidosis very mild      Hypotension  Likely due to combination of sepsis and opiate use  Hypotension resolved       Anemia of chronic disease  Stable, no signs of bleeding       Morbid obesity  Body mass index is 44.22 kg/m². Morbid obesity complicates all aspects of disease management from diagnostic modalities to treatment. Weight loss encouraged and health benefits explained to patient.         Chronic pain syndrome  On chronic opioids + gabapentin at home. Concern that these had not been metabolized correctly in setting of  acute renal failure. Takes opiates for back and right ankle pain. Is very active at home and perform physical activity quite often. Uses ankle brace but not a back brace. Recommend outpatient PT/OT and eval by PM&R specialist.   As he is at risk for withdrawals and renal function now normalized, will start low dose norco. Monitor       Essential hypertension  BP now appropriate after previously hypotensive  - continue to hold BP Rx. Treat pain first      VTE Risk Mitigation (From admission, onward)         Ordered     heparin (porcine) injection 5,000 Units  Every 8 hours         05/07/22 1428     IP VTE HIGH RISK PATIENT  Once         05/07/22 1428     Place sequential compression device  Until discontinued         05/07/22 1428                Discharge Planning   MIRZA:      Code Status: Full Code   Is the patient medically ready for discharge?:     Reason for patient still in hospital (select all that apply): Treatment           Discussed with patient and wife at bedside. Ok for step down.     Critical care time spent on the evaluation and treatment of severe organ dysfunction, review of pertinent labs and imaging studies, discussions with consulting providers and discussions with patient/family: > 35 minutes.      Jaylyn Hinton MD  Department of Hospital Medicine   [facility] [place] - Intensive Care

## 2022-05-08 NOTE — NURSING
Patient remains awake, alert and oriented, NS at 100 and Narcan drip remains in place as per Physician orders. Room air, sinus rhythm with PVCs on the monitor. Complaints of pain 10/10 to right ankle and back, Physician aware of patient complaints of pain.   No acute distress noted at this time.

## 2022-05-08 NOTE — CONSULTS
"                                         Renal ICU Consult    Date of Admission:  5/7/2022  8:51 AM        Chief Complaint:   Chief Complaint   Patient presents with    Dizziness     Patient reports dizzy that started yesterday around 9AM seeing double vision, weakness, unable to urinated last urinated around 7PM.  States low BP at home 76/43 this morning. Per spouse, states was confused and talking crazy this morning around 2AM.        HPI: 52 y.o. man with Hx. Of HTN, PAD, and chronic back pain on chronic opioids who presented to the ED with altered mental status.   As per H&P: "He has been very busy at work and has not been eating or drinking as he should. He says this is because he is so busy. He is working outside in the heat. His wife has noticed his urine becoming more concentrated. Yesterday he barely ate or drank anything. He has continued to take his home opioid medications and gabapentin as prescribed-- long acting morphine 60mg BID, oxycodone 30 Q4, fioricet BID, gabapentin 400 TID. His wife noticed he was altered this morning and brought him in for evaluation. He was found to have severe DEBRA, previously normal renal function 2 years ago, and hypotension with lowest SBP in 60s. He was given multiple L of IVF and still hypotensive. He was then given narcan with improvement in mental status and resolution of hypotension. He now says repeatedly that he is not abusing his medications, and his wife confirms that he is taking them as prescribed with last doses yesterday afternoon. He has not been able to make urine yet. He is having tremors".  Initial Labs: creatinine 5.3, BUN 38, Na+ 135, CO2- 17,     PMH:  Past Medical History:   Diagnosis Date    Back pain, chronic     Bulging discs     Chronic pain following surgery or procedure     Degenerative disc disease     Hypertension     Hypokalemia     Obese abdomen     PAD (peripheral artery disease)     Post laminectomy syndrome     Seizures  "    Severe sepsis     Short-term memory loss     Surgical site infection 11/17/2020    Rt abdomen pain pump site    Thyroid disease     Subclinical hyperthyroidism       PSH:  Past Surgical History:   Procedure Laterality Date    CHOLECYSTECTOMY      GASTRIC BYPASS      SLEEVE    gastric sleeve  2011    HERNIA REPAIR      pain pump Right 04/03/2018    inserted at right abdomen    pain pump site washout Right 04/13/2018    REMOVAL OF IMPLANT N/A 11/18/2020    Procedure: REMOVAL, IMPLANT;  Surgeon: Raffy Headley DO;  Location: [facility] OR;  Service: Neurosurgery;  Laterality: N/A;  intrathecal pain pump, leads removed from mid back and generator from abdomen    SKIN SURGERY      EXCESS SKIN REMOVAL    SPINE SURGERY      lumbar fusion       Allergies:  Review of patient's allergies indicates:   Allergen Reactions    Klonopin [clonazepam] Anxiety and Other (See Comments)     Becomes agitated    Valium [diazepam] Other (See Comments)     Becomes agitated    Xanax [alprazolam] Anxiety and Other (See Comments)     Becomes agitated       No current facility-administered medications on file prior to encounter.     Current Outpatient Medications on File Prior to Encounter   Medication Sig Dispense Refill    amLODIPine (NORVASC) 10 MG tablet Take 1 tablet (10 mg total) by mouth once daily. 90 tablet 0    butalbital-acetaminophen-caffeine -40 mg (FIORICET, ESGIC) -40 mg per tablet TAKE ONE TABLET BY MOUTH EVERY 4 HOURS AS NEEDED FOR HEADACHE 60 tablet 5    DULoxetine (CYMBALTA) 60 MG capsule TAKE 1 CAPSULE BY MOUTH EVERY EVENING FOR MOOD 90 capsule 3    furosemide (LASIX) 40 MG tablet TAKE ONE TABLET BY MOUTH ONCE DAILY FOR FLUID (Patient taking differently: Take 40 mg by mouth as needed.) 90 tablet 0    gabapentin (NEURONTIN) 400 MG capsule Take 400 mg by mouth 3 (three) times daily. Takes 1 400mg pill every AM, Takes 2 (400mg) at night      meloxicam (MOBIC) 15 MG tablet Take 1 tablet (15 mg  "total) by mouth once daily. 90 tablet 0    methocarbamoL (ROBAXIN) 750 MG Tab Take 750 mg by mouth 4 (four) times daily as needed.      morphine (MS CONTIN) 60 MG 12 hr tablet SMARTSI Tablet(s) By Mouth Every 12 Hours PRN      mupirocin (BACTROBAN) 2 % ointment APPLY TO AFFECTED AREA THREE TIMES DAILY 22 g 0    olmesartan (BENICAR) 40 MG tablet Take 1 tablet (40 mg total) by mouth once daily. 90 tablet 1    potassium chloride (KLOR-CON) 10 MEQ TbSR TAKE ONE TABLET BY MOUTH ONCE DAILY AS NEEDED WITH FLUID PILL 30 tablet 0    testosterone cypionate (DEPOTESTOTERONE CYPIONATE) 200 mg/mL injection Inject 2 mLs (400 mg total) into the muscle every 28 days. 10 mL 5    tiZANidine (ZANAFLEX) 2 MG tablet TAKE ONE TABLET BY MOUTH THREE TIMES DAILY FOR MUSCLE SPASMS. 90 tablet 0    oxyCODONE (ROXICODONE) 30 MG Tab TAKE ONE TABLET BY MOUTH FIVE TIMES DAILY AS NEEDED FOR PAIN.      syringe with needle (SYRINGE 3CC/22GX1") 3 mL 22 gauge x 1" Syrg 1 Units by Misc.(Non-Drug; Combo Route) route every 28 days. 100 each 2       Medications:  Current Facility-Administered Medications   Medication Dose Route Frequency Provider Last Rate Last Admin    0.9%  NaCl infusion   Intravenous Continuous Danielle Mccarty  mL/hr at 22 0626 Rate Verify at 22 0626    acetaminophen tablet 650 mg  650 mg Oral Q4H PRN Danielle Mccarty MD        cefTRIAXone (ROCEPHIN) 1 g/50 mL D5W IVPB  1 g Intravenous Q12H Susanne James MD   Stopped at 22 0217    heparin (porcine) injection 5,000 Units  5,000 Units Subcutaneous Q8H Danielle Mccarty MD   5,000 Units at 22 0536    melatonin tablet 6 mg  6 mg Oral Nightly PRN Danielle Mccarty MD        methocarbamoL tablet 750 mg  750 mg Oral QID PRN Danielle Mccarty MD        naloxone (NARCAN) 4 mg in dextrose 5 % 100 mL infusion  0.2 mg/hr Intravenous Continuous Susanne James MD 5 mL/hr at 22 0631 0.2 mg/hr at 22 0631    naloxone 0.4 mg/mL injection " 0.4 mg  0.4 mg Intravenous PRN Brent Fish PA-C        ondansetron injection 4 mg  4 mg Intravenous Q8H PRN Danielle Mccarty MD        sodium chloride 0.9% flush 10 mL  10 mL Intravenous Q8H Danielle Mccarty MD   10 mL at 05/08/22 0535       FamHx:  Family History   Problem Relation Age of Onset    Heart disease Mother     Breast cancer Mother     Arthritis Mother     Hypertension Mother     Throat cancer Father     Hypertension Father        SocHx:  Social History     Socioeconomic History    Marital status:    Tobacco Use    Smoking status: Never Smoker    Smokeless tobacco: Never Used   Substance and Sexual Activity    Alcohol use: Yes     Comment: socially    Drug use: No    Sexual activity: Yes     Partners: Female           Review of Systems: see H&P       Physical Exam: n/a  Vitals:   Vitals:    05/08/22 0715   BP: 107/64   Pulse: 96   Resp: 16   Temp:        I/O last 3 completed shifts:  In: 5858 [I.V.:1813.3; IV Piggyback:4044.6]  Out: 3275 [Urine:3275]  No intake/output data recorded.      Laboratories:    Recent Labs   Lab 05/07/22  0937   WBC 11.96   RBC 4.04*   HGB 10.9*   HCT 35.6*      MCV 88   MCH 27.0   MCHC 30.6*       Recent Labs   Lab 05/07/22  0937 05/07/22  1351   CALCIUM 8.0* 7.7*   PROT 7.1  --     135*   K 4.5 4.9   CO2 18* 17*   CL 99 100   BUN 38* 38*   CREATININE 5.4* 5.3*   ALKPHOS 69  --    ALT 19  --    AST 29  --    BILITOT 0.2  --        Recent Labs   Lab 05/07/22  1530   COLORU Yellow   SPECGRAV 1.020   PHUR 6.0   PROTEINUA 2+*   BACTERIA Rare       Microbiology Results (last 7 days)     Procedure Component Value Units Date/Time    Blood culture [[id number]] Collected: 05/08/22 0043    Order Status: Sent Specimen: Blood Updated: 05/08/22 0049    Blood culture [[id number]] Collected: 05/08/22 0043    Order Status: Sent Specimen: Blood Updated: 05/08/22 0049    Urine culture [[id number]] Collected: 05/07/22 1530    Order Status: No result Specimen:  Urine Updated: 05/07/22 1610            Diagnostic Tests:    CXR:    FINDINGS:   The lungs are clear with normal appearance of pulmonary vasculature. No pleural effusion. No evident pneumothorax.     The cardiac silhouette is mildly enlarged..  The hilar and mediastinal contours are unremarkable.     Bones are intact.    Impression:       No acute abnormality.      CT Head:    FINDINGS:   Encephalomalacia in the left anterior temporal lobe, stable.  No new parenchymal hypoattenuation is seen to suggest an acute infarction.  No intracranial hemorrhage is seen.  Ventricles are normal in size and configuration without hydrocephalus.  No extra-axial fluid collections.  The visualized paranasal sinuses including the mastoid air cells are clear.    Impression:       Remote encephalomalacia involving the left anterior temporal lobe.  No new parenchymal hypoattenuation is seen to suggest an acute infarction and no intracranial hemorrhage is seen.       Electronically signed by: Riri Carmen MD   Date: 05/07/2022   Time: 09:56             Assessment:    53 y/o male admitted with:    - Pre-renal DEBRA due to hypovolemia/hypotension RESOLVED following fluid resuscitation  - AMS likely 2nd. Opiate use  - Hx. HTN  - Anemia      Plan:      - Cont. IVFs 0.9% n.s. for now  - Edouard to gravity as needed  - Other problems per admitting      No further recommendations Renal-wise  Will sign off the case.

## 2022-05-08 NOTE — NURSING
CHLOE called for clarification on if  MD wants to continue  NS at 100 ml/hr.   Yes, continue NS at 100ml/hr per Dr. James.

## 2022-05-08 NOTE — SUBJECTIVE & OBJECTIVE
Interval History: clinically improved and no longer requiring narcan. BP stable. Is AAO x 3    Review of Systems   Constitutional: Negative.    Respiratory: Negative.     Cardiovascular: Negative.    Gastrointestinal: Negative.    Musculoskeletal:  Positive for back pain.   Objective:     Vital Signs (Most Recent):  Temp: 98.1 °F (36.7 °C) (05/08/22 0715)  Pulse: 90 (05/08/22 0915)  Resp: 20 (05/08/22 0921)  BP: (!) 152/78 (05/08/22 0915)  SpO2: 98 % (05/08/22 0915)   Vital Signs (24h Range):  Temp:  [98.1 °F (36.7 °C)-100.9 °F (38.3 °C)] 98.1 °F (36.7 °C)  Pulse:  [] 90  Resp:  [11-40] 20  SpO2:  [61 %-100 %] 98 %  BP: ()/(42-87) 152/78     Weight: (!) 143.8 kg (317 lb 0.3 oz)  Body mass index is 44.22 kg/m².    Intake/Output Summary (Last 24 hours) at 5/8/2022 1031  Last data filed at 5/8/2022 0921  Gross per 24 hour   Intake 6007.96 ml   Output 3875 ml   Net 2132.96 ml      Physical Exam  Vitals and nursing note reviewed.   Constitutional:       General: He is not in acute distress.     Appearance: He is not toxic-appearing.   Cardiovascular:      Rate and Rhythm: Normal rate and regular rhythm.   Pulmonary:      Breath sounds: Normal breath sounds.   Abdominal:      General: Bowel sounds are normal.      Palpations: Abdomen is soft.   Musculoskeletal:         General: Swelling (right ankle. Non tender) and deformity (right ankle) present.      Right lower leg: No edema.      Left lower leg: No edema.   Skin:     General: Skin is warm.      Findings: No erythema.   Neurological:      Mental Status: He is alert and oriented to person, place, and time.   Psychiatric:         Mood and Affect: Mood normal.         Behavior: Behavior normal.         Thought Content: Thought content normal.         Judgment: Judgment normal.       Significant Labs: All pertinent labs within the past 24 hours have been reviewed.    Significant Imaging: I have reviewed all pertinent imaging results/findings within the past 24  hours.  I have reviewed and interpreted all pertinent imaging results/findings within the past 24 hours.

## 2022-05-08 NOTE — NURSING
[facility] [place]  Nurses Note -- 4 Eyes      5/8/2022       Skin assessed on: Transfer      [x] No Pressure Injuries Present    [x]Prevention Measures Documented    [] Yes LDA Previously documented     [] Yes New Pressure Injury Discovered   [] LDA Added      Attending RN:  Lou Almonte RN     Second RN:  Indu Chandler RN

## 2022-05-08 NOTE — EICU
Rounding (Video Assessment):  No    Intervention Initiated From:  Bedside    Yi Communicated with Bedside Nurse regarding:  Labs    Nurse Notified:  No    Doctor Notified:  Yes    Comments: bedside staff called to inform eictalya prince the lab order for add on urine CR and NA can\t be done, due ot no urine order to add to. They are asking for her to reorder the labs as scheduled for now. Informed Teo Castro

## 2022-05-08 NOTE — RESPIRATORY THERAPY
Latest Reference Range & Units 05/07/22 21:35   POC PH 7.35 - 7.45  7.246 (LL)   POC PCO2 35 - 45 mmHg 50.1 (H)   POC PO2 80 - 100 mmHg 98   POC BE -2 to 2 mmol/L -6   POC HCO3 24 - 28 mmol/L 21.8 (L)   POC SATURATED O2 95 - 100 % 96   POC TCO2 23 - 27 mmol/L 23   Flow  2.5   Sample  ARTERIAL   DelSys  Nasal Can   Allens Test  Pass   Site  RR   Mode  SPONT   (H): Data is abnormally high  (L): Data is abnormally low  (LL): Data is critically low

## 2022-05-08 NOTE — ASSESSMENT & PLAN NOTE
On chronic opioids + gabapentin at home. Concern that these had not been metabolized correctly in setting of acute renal failure. Takes opiates for back and right ankle pain. Is very active at home and perform physical activity quite often. Uses ankle brace but not a back brace. Recommend outpatient PT/OT and eval by PM&R specialist.   As he is at risk for withdrawals and renal function now normalized, will start low dose norco. Monitor

## 2022-05-08 NOTE — PLAN OF CARE
Problem: Adult Inpatient Plan of Care  Goal: Plan of Care Review  Outcome: Ongoing, Progressing  Goal: Patient-Specific Goal (Individualized)  Outcome: Ongoing, Progressing  Goal: Absence of Hospital-Acquired Illness or Injury  Outcome: Ongoing, Progressing  Goal: Optimal Comfort and Wellbeing  Outcome: Ongoing, Progressing  Goal: Readiness for Transition of Care  Outcome: Ongoing, Progressing     Problem: Bariatric Environmental Safety  Goal: Safety Maintained with Care  Outcome: Ongoing, Progressing     Problem: Fluid and Electrolyte Imbalance (Acute Kidney Injury/Impairment)  Goal: Fluid and Electrolyte Balance  Outcome: Ongoing, Progressing     Problem: Oral Intake Inadequate (Acute Kidney Injury/Impairment)  Goal: Optimal Nutrition Intake  Outcome: Ongoing, Progressing     Problem: Renal Function Impairment (Acute Kidney Injury/Impairment)  Goal: Effective Renal Function  Outcome: Ongoing, Progressing     Problem: Infection  Goal: Absence of Infection Signs and Symptoms  Outcome: Ongoing, Progressing      Normal for race

## 2022-05-08 NOTE — EICU
eICU Physician Brief Note    Chart reviewed. On camera, the patient is asleep in bed, in NAD. Jaime Juarez is a 52 year old man admitted  5/7 with DEBRA, chronic opioid use and intermittent hypotension. On 5/7 during the night, the patient became less responsive though arose with significant verbal and physical stimulation. Narcan given wiht improvement in mentation as well as BP. ABG showed hypercapnia.   PMH: HTN, chronic back pain, PAD, seizures, subclinical hyperthyroidism.  Labs and investigations reviewed.  Current medications reviewed.  CT head: no acute findings; old left temporal encephalomalacia.  A/P:  1. Encephalopathy  Suspected to be due to opiates. Acute infection may be contributing.  He was trsf to the ICU and started on a Narcan drip as previous narcan doses improved his mental status and BP.  Monitor respiratory status and mental status closely. Keep RR >10 bpm.  2. DEBRA  Unclear etiology. Possibly pre-renal (decreased po fluids while working in heat), compounded by diuretic and ARB.Patietn has also been hypotensive in ED, which could lead to ATN.   Cre 5 (baseline 0.7).  Check urine electrolytes.  Add on CK level.  3. UTI, possible sepsis  Blood cultures drawn. UCx pending.  Input documented at 5.2L, will not bolus further for now.  Ordered Ceftriaxone 1g q12, follow cultures and adjust accordingly.4.   GI/DVT prophylaxis - diet; Heparin SC, SCDs, mobility protocol.    eICU is available should acute issues arise.

## 2022-05-08 NOTE — NURSING
EICU called, patient now awake, alert and oriented. Patient remains on Narcan drip, complaints of pain 10/10 to back and right ankle, patient states he has chronic back pain and also that he broke ankle 2 times prior last time a year ago, patient states his ankle feels like its fractured and he is requesting an x-ray.Tylenol offered, patient states that due to the severity of his pain tylenol won't help. Patient re-educated  on Narcan drip and also that the  Physician may not prescribed any opioids for pain but will notify the Physician of his concerns.

## 2022-05-08 NOTE — ASSESSMENT & PLAN NOTE
Body mass index is 44.22 kg/m². Morbid obesity complicates all aspects of disease management from diagnostic modalities to treatment. Weight loss encouraged and health benefits explained to patient.

## 2022-05-08 NOTE — PLAN OF CARE
Tinyan gtt dc'd this am. Pt slept most of morning, and then has been aaox4 the rest of shift. Spouse at bedside. Showing symptoms of withdrawal. Md ordered 1 dose iv morphine and increased po pain meds. Bp increasing. Home bp meds restarted.       Problem: Adult Inpatient Plan of Care  Goal: Plan of Care Review  Outcome: Ongoing, Progressing  Goal: Patient-Specific Goal (Individualized)  Outcome: Ongoing, Progressing  Goal: Absence of Hospital-Acquired Illness or Injury  Outcome: Ongoing, Progressing  Goal: Optimal Comfort and Wellbeing  Outcome: Ongoing, Progressing  Goal: Readiness for Transition of Care  Outcome: Ongoing, Progressing     Problem: Bariatric Environmental Safety  Goal: Safety Maintained with Care  Outcome: Ongoing, Progressing     Problem: Fluid and Electrolyte Imbalance (Acute Kidney Injury/Impairment)  Goal: Fluid and Electrolyte Balance  Outcome: Ongoing, Progressing     Problem: Oral Intake Inadequate (Acute Kidney Injury/Impairment)  Goal: Optimal Nutrition Intake  Outcome: Ongoing, Progressing     Problem: Renal Function Impairment (Acute Kidney Injury/Impairment)  Goal: Effective Renal Function  Outcome: Ongoing, Progressing     Problem: Infection  Goal: Absence of Infection Signs and Symptoms  Outcome: Ongoing, Progressing     Problem: Skin Injury Risk Increased  Goal: Skin Health and Integrity  Outcome: Ongoing, Progressing     Problem: Adjustment to Illness (Sepsis/Septic Shock)  Goal: Optimal Coping  Outcome: Ongoing, Progressing     Problem: Bleeding (Sepsis/Septic Shock)  Goal: Absence of Bleeding  Outcome: Ongoing, Progressing     Problem: Glycemic Control Impaired (Sepsis/Septic Shock)  Goal: Blood Glucose Level Within Desired Range  Outcome: Ongoing, Progressing     Problem: Infection Progression (Sepsis/Septic Shock)  Goal: Absence of Infection Signs and Symptoms  Outcome: Ongoing, Progressing     Problem: Nutrition Impaired (Sepsis/Septic Shock)  Goal: Optimal Nutrition  Intake  Outcome: Ongoing, Progressing

## 2022-05-08 NOTE — EICU
Rounding (Video Assessment):  No    Intervention Initiated From:  Bedside    Yi Communicated with Bedside Nurse regarding:  Medication and Pain    Nurse Notified:  No    Doctor Notified:  Yes    Comments:  Nurse reports pt asking for a xray of r ankl.e he says it feels like it did when he broke  it 2 times before. He is requesting pain med. He reports he is  a chronic pain med pt for his back and Tylenol is no enough. bedside nurse did tell him that due to being on Narcan drip, md may not order anything for pain.  Nurse reports he is Awake, alert, and oriented. Informed Dr. James.

## 2022-05-08 NOTE — NURSING TRANSFER
Nursing Transfer Note      5/7/2022     Reason patient is being transferred: medically necessary  Transfer room 316 to [id number]     Transfer via hospital bed    Transfer with heart monitor and oxygen    Transported by RN*    Medicines sent: none  Any special needs or follow-up needed:none  Chart send with patient: YES     Notified: ICU charge nurse    Patient reassessed at: arrival to unit

## 2022-05-08 NOTE — ASSESSMENT & PLAN NOTE
Likely due to use of opiates, muscle relaxants and gabapentin at home in setting of acute renal failure.   Encephalpathy resolved. No longer requires narcan

## 2022-05-08 NOTE — ASSESSMENT & PLAN NOTE
Met criteria with fever, tachycardia and abnormal UA  Aspiration PNA not ruled out. Ceftriaxone can cover for both  F/u Urine and Blood cultures.

## 2022-05-08 NOTE — ASSESSMENT & PLAN NOTE
Likely pre-renal since it resolved with intravenous fluids  No need for renal ultrasound at this time  Start a diet. BMP in AM

## 2022-05-08 NOTE — EICU
Rounding (Video Assessment):  No    Intervention Initiated From:  Bedside    Yi Communicated with Bedside Nurse regarding:  Medication    Nurse Notified:  No    Doctor Notified:  Yes    Comments: bedside nurse called to get clarification on if md wants to continue  NS at 100 ml/hr. Informed Dr. James.

## 2022-05-09 VITALS
SYSTOLIC BLOOD PRESSURE: 143 MMHG | OXYGEN SATURATION: 100 % | RESPIRATION RATE: 20 BRPM | WEIGHT: 315 LBS | HEIGHT: 71 IN | BODY MASS INDEX: 44.1 KG/M2 | TEMPERATURE: 98 F | HEART RATE: 99 BPM | DIASTOLIC BLOOD PRESSURE: 64 MMHG

## 2022-05-09 LAB
ANION GAP SERPL CALC-SCNC: 13 MMOL/L (ref 8–16)
BACTERIA UR CULT: NORMAL
BUN SERPL-MCNC: 11 MG/DL (ref 6–20)
CALCIUM SERPL-MCNC: 9.2 MG/DL (ref 8.7–10.5)
CHLORIDE SERPL-SCNC: 105 MMOL/L (ref 95–110)
CO2 SERPL-SCNC: 20 MMOL/L (ref 23–29)
CREAT SERPL-MCNC: 0.6 MG/DL (ref 0.5–1.4)
EST. GFR  (AFRICAN AMERICAN): >60 ML/MIN/1.73 M^2
EST. GFR  (NON AFRICAN AMERICAN): >60 ML/MIN/1.73 M^2
GLUCOSE SERPL-MCNC: 98 MG/DL (ref 70–110)
POTASSIUM SERPL-SCNC: 4.1 MMOL/L (ref 3.5–5.1)
SODIUM SERPL-SCNC: 138 MMOL/L (ref 136–145)

## 2022-05-09 PROCEDURE — A4216 STERILE WATER/SALINE, 10 ML: HCPCS | Performed by: HOSPITALIST

## 2022-05-09 PROCEDURE — 36415 COLL VENOUS BLD VENIPUNCTURE: CPT | Performed by: HOSPITALIST

## 2022-05-09 PROCEDURE — 25000003 PHARM REV CODE 250: Performed by: HOSPITALIST

## 2022-05-09 PROCEDURE — 63600175 PHARM REV CODE 636 W HCPCS: Performed by: HOSPITALIST

## 2022-05-09 PROCEDURE — 63600175 PHARM REV CODE 636 W HCPCS: Performed by: INTERNAL MEDICINE

## 2022-05-09 PROCEDURE — 25000003 PHARM REV CODE 250: Performed by: INTERNAL MEDICINE

## 2022-05-09 PROCEDURE — 80048 BASIC METABOLIC PNL TOTAL CA: CPT | Performed by: HOSPITALIST

## 2022-05-09 RX ORDER — NALOXONE HYDROCHLORIDE 4 MG/.1ML
1 SPRAY NASAL ONCE
Qty: 1 EACH | Refills: 5 | Status: SHIPPED | OUTPATIENT
Start: 2022-05-09 | End: 2022-05-09

## 2022-05-09 RX ADMIN — OXYCODONE 10 MG: 5 TABLET ORAL at 04:05

## 2022-05-09 RX ADMIN — OXYCODONE 10 MG: 5 TABLET ORAL at 09:05

## 2022-05-09 RX ADMIN — MELOXICAM 15 MG: 7.5 TABLET ORAL at 09:05

## 2022-05-09 RX ADMIN — Medication 10 ML: at 05:05

## 2022-05-09 RX ADMIN — HEPARIN SODIUM 5000 UNITS: 5000 INJECTION INTRAVENOUS; SUBCUTANEOUS at 05:05

## 2022-05-09 RX ADMIN — MUPIROCIN: 20 OINTMENT TOPICAL at 09:05

## 2022-05-09 RX ADMIN — AMLODIPINE BESYLATE 10 MG: 5 TABLET ORAL at 09:05

## 2022-05-09 RX ADMIN — CEFTRIAXONE 1 G: 1 INJECTION, SOLUTION INTRAVENOUS at 01:05

## 2022-05-09 RX ADMIN — GABAPENTIN 400 MG: 400 CAPSULE ORAL at 09:05

## 2022-05-09 NOTE — PLAN OF CARE
Problem: Adult Inpatient Plan of Care  Goal: Plan of Care Review  Outcome: Ongoing, Progressing  Goal: Patient-Specific Goal (Individualized)  Outcome: Ongoing, Progressing  Goal: Absence of Hospital-Acquired Illness or Injury  Outcome: Ongoing, Progressing  Goal: Optimal Comfort and Wellbeing  Outcome: Ongoing, Progressing  Goal: Readiness for Transition of Care  Outcome: Ongoing, Progressing     Problem: Bariatric Environmental Safety  Goal: Safety Maintained with Care  Outcome: Ongoing, Progressing     Problem: Fluid and Electrolyte Imbalance (Acute Kidney Injury/Impairment)  Goal: Fluid and Electrolyte Balance  Outcome: Ongoing, Progressing     Problem: Oral Intake Inadequate (Acute Kidney Injury/Impairment)  Goal: Optimal Nutrition Intake  Outcome: Ongoing, Progressing     Problem: Renal Function Impairment (Acute Kidney Injury/Impairment)  Goal: Effective Renal Function  Outcome: Ongoing, Progressing     Problem: Infection  Goal: Absence of Infection Signs and Symptoms  Outcome: Ongoing, Progressing     Problem: Skin Injury Risk Increased  Goal: Skin Health and Integrity  Outcome: Ongoing, Progressing     Problem: Adjustment to Illness (Sepsis/Septic Shock)  Goal: Optimal Coping  Outcome: Ongoing, Progressing     Problem: Bleeding (Sepsis/Septic Shock)  Goal: Absence of Bleeding  Outcome: Ongoing, Progressing     Problem: Glycemic Control Impaired (Sepsis/Septic Shock)  Goal: Blood Glucose Level Within Desired Range  Outcome: Ongoing, Progressing     Problem: Infection Progression (Sepsis/Septic Shock)  Goal: Absence of Infection Signs and Symptoms  Outcome: Ongoing, Progressing     Problem: Nutrition Impaired (Sepsis/Septic Shock)  Goal: Optimal Nutrition Intake  Outcome: Ongoing, Progressing

## 2022-05-09 NOTE — PLAN OF CARE
[facility] - Intensive Care  Discharge Final Note    Primary Care Provider: Gabby Dean MD    Expected Discharge Date: 5/9/2022    Final Discharge Note (most recent)       Final Note - 05/09/22 1028          Final Note    Assessment Type Final Discharge Note     Anticipated Discharge Disposition Home or Self Care     What phone number can be called within the next 1-3 days to see how you are doing after discharge? [phone]     Hospital Resources/Appts/Education Provided Post-Acute resouces added to AVS;Provided education on problems/symptoms using teachback   Followup appt with PCP already scheduled (5/18/2022) and will printout on AVS.       Post-Acute Status    Discharge Delays None known at this time                     Important Message from Medicare             Contact Info       Gabby Dean MD   Specialty: Internal Medicine, Wound Care   Relationship: PCP - General  [facility] Responsible Provider    [address] [address]  [address]  [place] [id number]   Phone: [phone]       Next Steps: Follow up          SW f/u with patient to verify hospital followup.  Patient's nurse advised that it is ok for patient to discharge from  standpoint.

## 2022-05-09 NOTE — NURSING
DOLORESU called, patient is requesting something for restlessness. He has already had pain medication and melatonin. Patient states that he is not able to get comfortable.

## 2022-05-09 NOTE — PLAN OF CARE
[facility] - Intensive Care  Discharge Assessment    Primary Care Provider: Gabby Dean MD     Discharge Assessment (most recent)       BRIEF DISCHARGE ASSESSMENT - 05/09/22 1027          Discharge Planning    Assessment Type Discharge Planning Brief Assessment     Resource/Environmental Concerns none     Support Systems Spouse/significant other     Current Living Arrangements home/apartment/condo     Patient/Family Anticipates Transition to home with family     Patient/Family Anticipated Services at Transition none     DME Needed Upon Discharge  none     Discharge Plan A Home     Discharge Plan B Home

## 2022-05-09 NOTE — EICU
Rounding (Video Assessment):  No    Intervention Initiated From:  Bedside    Yi Communicated with Bedside Nurse regarding:  Medication    Nurse Notified:  No    Doctor Notified:  Yes    Comments: Bedside nurse called to report pt requesting something for restlessness. He has already had pain med and melatonin. Informed Dr. James.

## 2022-05-09 NOTE — NURSING
Pt discharged home. Left via wc with transport and spouse. Reviewed all discharge instructions. All questions answered. Pt verbalized understanding.

## 2022-05-09 NOTE — EICU
EICU Physician Brief Note - Overnight Events    Patient c/o feeling restless, feels he can't lie in bed therefore moved to the chair. He is not confused, AOx4, no hallucinations. Could possibly be related to withdrawing from opiates. Rec'd Clonidine 0.2 mg at 19:30 without effect. Rec'd oxycodone for pain and melatonin for sleep. He has adverse reaction to benzos (they make him anxious).  Plan:  Haldol 5 mg IVP x1.  Reevaluate if not improving.  Eicu is available should acute issues arise.

## 2022-05-09 NOTE — DISCHARGE SUMMARY
[facility]  Discharge Summary      Patient Name: Jaime Lund  MRN: [id number]  Patient Class: IP- Inpatient  Admission Date: 5/7/2022  Hospital Length of Stay: 2 days  Discharge Date and Time:  05/09/2022 8:18 AM  Attending Physician: Danielle Mccarty MD   Discharging Provider: Danielle Mccarty MD  Primary Care Provider: Gabby Dean MD      HPI:   Mr Jaime Lund is a 52 y.o. man with HTN, chronic pain on chronic opioids who presents with altered mental status. 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 He has been very busy at work and has not been eating or drinking as he should. He says this is because he is so busy. He is working outside in the heat. His wife has noticed his urine becoming more concentrated. Yesterday he barely ate or drank anything. He has continued to take his home opioid medications and gabapentin as prescribed-- long acting morphine 60mg BID, oxycodone 30 Q4, fioricet BID, gabapentin 400 TID. His wife noticed he was altered this morning and brought him in for evaluation. He was found to have severe DEBRA, previously normal renal function 2 years ago, and hypotension with lowest SBP in 60s. He was given multiple L of IVF and still hypotensive. He was then given narcan with improvement in mental status and resolution of hypotension. He now says repeatedly that he is not abusing his medications, and his wife confirms that he is taking them as prescribed with last doses yesterday afternoon. He has not been able to make urine yet. He is having tremors.       * No surgery found *      Hospital Course:   Mr Lund presented with acute encephalopathy and hypotension. Also presented with acute renal failure and shock. Was given a total of 4L of isotonic fluids but remained in shock. Started on abx. CT brain without acute pathology. Once meds were reviewed, realized that he is on large doses of  opioids and gabapentin. A trial of narcan was given with quick improvement in mental status and blood pressure. Patient's shock resolved and no longer encephalopathic. While on floor, patient became encephalopathic again and hypercapnic. Also with fever. Given another dose of narcan which, again, helped with encephalopathy and BP. There was concern for needing more frequent observation in case he would need narcan again. He was therefore transferred to ICU for narcan gtt. Overall, cause of acute encephalopathy and hypotension is unintentional opioid overdose and gabapentin overdose due to decreased renal clearance due to DEBRA from dehydration. Discussed hydration. Prescribed narcan. Recommended discussing decreasing opioids after discussion with prescribing provider.        Goals of Care Treatment Preferences:  Code Status: Full Code      Consults:   Consults (From admission, onward)        Status Ordering Provider     Inpatient consult to Nephrology  Once        Provider:  Jeremías Obrien MD    Acknowledged JOAQUIN RAMIREZ          No new Assessment & Plan notes have been filed under this hospital service since the last note was generated.  Service: Hospital Medicine    Final Active Diagnoses:    Diagnosis Date Noted POA    PRINCIPAL PROBLEM:  DEBRA (acute kidney injury) [N17.9] 05/07/2022 Yes    Encephalopathy, toxic [G92.9] 05/08/2022 Yes    Sepsis secondary to UTI [A41.9, N39.0] 05/08/2022 Yes    Hypotension [I95.9] 05/07/2022 Yes    High anion gap metabolic acidosis [E87.2] 05/07/2022 Yes    Chronic, continuous use of opioids [F11.90] 05/07/2022 Yes    Anemia of chronic disease [D63.8] 11/17/2020 Yes    Morbid obesity [E66.01] 04/28/2017 Yes    Chronic pain syndrome [G89.4] 03/28/2017 Yes     Chronic    Essential hypertension [I10] 03/18/2014 Yes     Chronic      Problems Resolved During this Admission:       Discharged Condition: good    Disposition: Home or Self Care    Follow Up: with  PCP    Patient Instructions:      Diet Adult Regular     Notify your health care provider if you experience any of the following:  temperature >100.4     Notify your health care provider if you experience any of the following:  persistent nausea and vomiting or diarrhea     Notify your health care provider if you experience any of the following:  severe uncontrolled pain     Notify your health care provider if you experience any of the following:  redness, tenderness, or signs of infection (pain, swelling, redness, odor or green/yellow discharge around incision site)     Notify your health care provider if you experience any of the following:  difficulty breathing or increased cough     Notify your health care provider if you experience any of the following:  severe persistent headache     Notify your health care provider if you experience any of the following:  worsening rash     Notify your health care provider if you experience any of the following:  persistent dizziness, light-headedness, or visual disturbances     Notify your health care provider if you experience any of the following:  increased confusion or weakness     Activity as tolerated       Significant Diagnostic Studies: Labs: All labs within the past 24 hours have been reviewed    Pending Diagnostic Studies:     None         Medications:  Reconciled Home Medications:      Medication List      START taking these medications    naloxone 4 mg/actuation Spry  Commonly known as: NARCAN  1 spray (4 mg total) by Nasal route once. for 1 dose        CHANGE how you take these medications    furosemide 40 MG tablet  Commonly known as: LASIX  TAKE ONE TABLET BY MOUTH ONCE DAILY FOR FLUID  What changed: See the new instructions.        CONTINUE taking these medications    amLODIPine 10 MG tablet  Commonly known as: NORVASC  Take 1 tablet (10 mg total) by mouth once daily.     butalbital-acetaminophen-caffeine -40 mg -40 mg per tablet  Commonly known  "as: FIORICET, ESGIC  TAKE ONE TABLET BY MOUTH EVERY 4 HOURS AS NEEDED FOR HEADACHE     DULoxetine 60 MG capsule  Commonly known as: CYMBALTA  TAKE 1 CAPSULE BY MOUTH EVERY EVENING FOR MOOD     gabapentin 400 MG capsule  Commonly known as: NEURONTIN  Take 400 mg by mouth 3 (three) times daily. Takes 1 400mg pill every AM, Takes 2 (400mg) at night     meloxicam 15 MG tablet  Commonly known as: MOBIC  Take 1 tablet (15 mg total) by mouth once daily.     methocarbamoL 750 MG Tab  Commonly known as: ROBAXIN  Take 750 mg by mouth 4 (four) times daily as needed.     morphine 60 MG 12 hr tablet  Commonly known as: MS CONTIN  SMARTSI Tablet(s) By Mouth Every 12 Hours PRN     mupirocin 2 % ointment  Commonly known as: BACTROBAN  APPLY TO AFFECTED AREA THREE TIMES DAILY     olmesartan 40 MG tablet  Commonly known as: BENICAR  Take 1 tablet (40 mg total) by mouth once daily.     oxyCODONE 30 MG Tab  Commonly known as: ROXICODONE  TAKE ONE TABLET BY MOUTH FIVE TIMES DAILY AS NEEDED FOR PAIN.     potassium chloride 10 MEQ Tbsr  Commonly known as: KLOR-CON  TAKE ONE TABLET BY MOUTH ONCE DAILY AS NEEDED WITH FLUID PILL     SYRINGE 3CC/22GX1" 3 mL 22 gauge x 1" Syrg  Generic drug: syringe with needle  1 Units by Misc.(Non-Drug; Combo Route) route every 28 days.     testosterone cypionate 200 mg/mL injection  Commonly known as: DEPOTESTOTERONE CYPIONATE  Inject 2 mLs (400 mg total) into the muscle every 28 days.     tiZANidine 2 MG tablet  Commonly known as: ZANAFLEX  TAKE ONE TABLET BY MOUTH THREE TIMES DAILY FOR MUSCLE SPASMS.        STOP taking these medications    cephALEXin 500 MG capsule  Commonly known as: KEFLEX            Indwelling Lines/Drains at time of discharge:   Lines/Drains/Airways     None                 Time spent on the discharge of patient: 35 minutes      Danielle Mccarty MD  Department of Hospital Medicine  [facility] - Intensive Care  "

## 2022-05-12 LAB
BACTERIA BLD CULT: NORMAL
BACTERIA BLD CULT: NORMAL

## 2022-05-18 ENCOUNTER — OFFICE VISIT (OUTPATIENT)
Dept: FAMILY MEDICINE | Facility: CLINIC | Age: 52
End: 2022-05-18
Payer: COMMERCIAL

## 2022-05-18 VITALS
WEIGHT: 299 LBS | RESPIRATION RATE: 18 BRPM | TEMPERATURE: 98 F | DIASTOLIC BLOOD PRESSURE: 80 MMHG | BODY MASS INDEX: 41.86 KG/M2 | OXYGEN SATURATION: 98 % | SYSTOLIC BLOOD PRESSURE: 126 MMHG | HEART RATE: 77 BPM | HEIGHT: 71 IN

## 2022-05-18 DIAGNOSIS — E86.1 HYPOTENSION DUE TO HYPOVOLEMIA: ICD-10-CM

## 2022-05-18 DIAGNOSIS — G89.4 CHRONIC PAIN SYNDROME: ICD-10-CM

## 2022-05-18 DIAGNOSIS — G92.9 ENCEPHALOPATHY, TOXIC: ICD-10-CM

## 2022-05-18 DIAGNOSIS — N17.9 AKI (ACUTE KIDNEY INJURY): Primary | ICD-10-CM

## 2022-05-18 PROCEDURE — 1159F PR MEDICATION LIST DOCUMENTED IN MEDICAL RECORD: ICD-10-PCS | Mod: CPTII,S$GLB,, | Performed by: INTERNAL MEDICINE

## 2022-05-18 PROCEDURE — 3074F SYST BP LT 130 MM HG: CPT | Mod: CPTII,S$GLB,, | Performed by: INTERNAL MEDICINE

## 2022-05-18 PROCEDURE — 3044F PR MOST RECENT HEMOGLOBIN A1C LEVEL <7.0%: ICD-10-PCS | Mod: CPTII,S$GLB,, | Performed by: INTERNAL MEDICINE

## 2022-05-18 PROCEDURE — 1160F RVW MEDS BY RX/DR IN RCRD: CPT | Mod: CPTII,S$GLB,, | Performed by: INTERNAL MEDICINE

## 2022-05-18 PROCEDURE — 4010F ACE/ARB THERAPY RXD/TAKEN: CPT | Mod: CPTII,S$GLB,, | Performed by: INTERNAL MEDICINE

## 2022-05-18 PROCEDURE — 1160F PR REVIEW ALL MEDS BY PRESCRIBER/CLIN PHARMACIST DOCUMENTED: ICD-10-PCS | Mod: CPTII,S$GLB,, | Performed by: INTERNAL MEDICINE

## 2022-05-18 PROCEDURE — 3074F PR MOST RECENT SYSTOLIC BLOOD PRESSURE < 130 MM HG: ICD-10-PCS | Mod: CPTII,S$GLB,, | Performed by: INTERNAL MEDICINE

## 2022-05-18 PROCEDURE — 1111F PR DISCHARGE MEDS RECONCILED W/ CURRENT OUTPATIENT MED LIST: ICD-10-PCS | Mod: CPTII,S$GLB,, | Performed by: INTERNAL MEDICINE

## 2022-05-18 PROCEDURE — 99999 PR PBB SHADOW E&M-EST. PATIENT-LVL IV: CPT | Mod: PBBFAC,,, | Performed by: INTERNAL MEDICINE

## 2022-05-18 PROCEDURE — 3079F DIAST BP 80-89 MM HG: CPT | Mod: CPTII,S$GLB,, | Performed by: INTERNAL MEDICINE

## 2022-05-18 PROCEDURE — 99214 OFFICE O/P EST MOD 30 MIN: CPT | Mod: S$GLB,,, | Performed by: INTERNAL MEDICINE

## 2022-05-18 PROCEDURE — 3044F HG A1C LEVEL LT 7.0%: CPT | Mod: CPTII,S$GLB,, | Performed by: INTERNAL MEDICINE

## 2022-05-18 PROCEDURE — 3008F BODY MASS INDEX DOCD: CPT | Mod: CPTII,S$GLB,, | Performed by: INTERNAL MEDICINE

## 2022-05-18 PROCEDURE — 99214 PR OFFICE/OUTPT VISIT, EST, LEVL IV, 30-39 MIN: ICD-10-PCS | Mod: S$GLB,,, | Performed by: INTERNAL MEDICINE

## 2022-05-18 PROCEDURE — 4010F PR ACE/ARB THEARPY RXD/TAKEN: ICD-10-PCS | Mod: CPTII,S$GLB,, | Performed by: INTERNAL MEDICINE

## 2022-05-18 PROCEDURE — 3079F PR MOST RECENT DIASTOLIC BLOOD PRESSURE 80-89 MM HG: ICD-10-PCS | Mod: CPTII,S$GLB,, | Performed by: INTERNAL MEDICINE

## 2022-05-18 PROCEDURE — 1111F DSCHRG MED/CURRENT MED MERGE: CPT | Mod: CPTII,S$GLB,, | Performed by: INTERNAL MEDICINE

## 2022-05-18 PROCEDURE — 1159F MED LIST DOCD IN RCRD: CPT | Mod: CPTII,S$GLB,, | Performed by: INTERNAL MEDICINE

## 2022-05-18 PROCEDURE — 99999 PR PBB SHADOW E&M-EST. PATIENT-LVL IV: ICD-10-PCS | Mod: PBBFAC,,, | Performed by: INTERNAL MEDICINE

## 2022-05-18 PROCEDURE — 3008F PR BODY MASS INDEX (BMI) DOCUMENTED: ICD-10-PCS | Mod: CPTII,S$GLB,, | Performed by: INTERNAL MEDICINE

## 2022-05-18 NOTE — PROGRESS NOTES
SUBJECTIVE     Chief Complaint   Patient presents with    Hospital Follow Up       HPI  Jaime Lund is a 52 y.o. male with multiple medical diagnoses as listed in the medical history and problem list that presents for follow-up after hospital discharge for DEBRA with hypoTN and AMS. Pt has been at home for the last week because of pain in the R ankle. Pt has cut back on the pain meds at the recommendation of Pain Management. He is eating and drinking well. Pt does admit to a decreased appetite after his weight loss surgery years ago.     PAST MEDICAL HISTORY:  Past Medical History:   Diagnosis Date    Back pain, chronic     Bulging discs     Chronic pain following surgery or procedure     Degenerative disc disease     Hypertension     Hypokalemia     Obese abdomen     PAD (peripheral artery disease)     Post laminectomy syndrome     Seizures     Severe sepsis     Short-term memory loss     Surgical site infection 11/17/2020    Rt abdomen pain pump site    Thyroid disease     Subclinical hyperthyroidism       PAST SURGICAL HISTORY:  Past Surgical History:   Procedure Laterality Date    CHOLECYSTECTOMY      GASTRIC BYPASS      SLEEVE    gastric sleeve  2011    HERNIA REPAIR      pain pump Right 04/03/2018    inserted at right abdomen    pain pump site washout Right 04/13/2018    REMOVAL OF IMPLANT N/A 11/18/2020    Procedure: REMOVAL, IMPLANT;  Surgeon: Raffy Headley DO;  Location: [facility] OR;  Service: Neurosurgery;  Laterality: N/A;  intrathecal pain pump, leads removed from mid back and generator from abdomen    SKIN SURGERY      EXCESS SKIN REMOVAL    SPINE SURGERY      lumbar fusion       SOCIAL HISTORY:  Social History     Socioeconomic History    Marital status:    Tobacco Use    Smoking status: Never Smoker    Smokeless tobacco: Never Used   Substance and Sexual Activity    Alcohol use: Yes     Comment: socially    Drug use: No    Sexual activity: Yes     Partners:  "Female       FAMILY HISTORY:  Family History   Problem Relation Age of Onset    Heart disease Mother     Breast cancer Mother     Arthritis Mother     Hypertension Mother     Throat cancer Father     Hypertension Father        ALLERGIES AND MEDICATIONS: updated and reviewed.  Review of patient's allergies indicates:   Allergen Reactions    Klonopin [clonazepam] Anxiety and Other (See Comments)     Becomes agitated    Valium [diazepam] Other (See Comments)     Becomes agitated    Xanax [alprazolam] Anxiety and Other (See Comments)     Becomes agitated     Current Outpatient Medications   Medication Sig Dispense Refill    amLODIPine (NORVASC) 10 MG tablet Take 1 tablet (10 mg total) by mouth once daily. 90 tablet 0    butalbital-acetaminophen-caffeine -40 mg (FIORICET, ESGIC) -40 mg per tablet TAKE ONE TABLET BY MOUTH EVERY 4 HOURS AS NEEDED FOR HEADACHE 60 tablet 5    DULoxetine (CYMBALTA) 60 MG capsule TAKE 1 CAPSULE BY MOUTH EVERY EVENING FOR MOOD 90 capsule 3    gabapentin (NEURONTIN) 400 MG capsule Take 400 mg by mouth 3 (three) times daily. Takes 1 400mg pill every AM, Takes 2 (400mg) at night      meloxicam (MOBIC) 15 MG tablet Take 1 tablet (15 mg total) by mouth once daily. 90 tablet 0    methocarbamoL (ROBAXIN) 750 MG Tab Take 750 mg by mouth 4 (four) times daily as needed.      morphine (MS CONTIN) 60 MG 12 hr tablet SMARTSI Tablet(s) By Mouth Every 12 Hours PRN      mupirocin (BACTROBAN) 2 % ointment APPLY TO AFFECTED AREA THREE TIMES DAILY 22 g 0    olmesartan (BENICAR) 40 MG tablet Take 1 tablet (40 mg total) by mouth once daily. 90 tablet 1    oxyCODONE (ROXICODONE) 30 MG Tab TAKE ONE TABLET BY MOUTH FIVE TIMES DAILY AS NEEDED FOR PAIN.      syringe with needle (SYRINGE 3CC/22GX1") 3 mL 22 gauge x 1" Syrg 1 Units by Misc.(Non-Drug; Combo Route) route every 28 days. 100 each 2    testosterone cypionate (DEPOTESTOTERONE CYPIONATE) 200 mg/mL injection Inject 2 mLs (400 " "mg total) into the muscle every 28 days. 10 mL 5    tiZANidine (ZANAFLEX) 2 MG tablet TAKE ONE TABLET BY MOUTH THREE TIMES DAILY FOR MUSCLE SPASMS. 90 tablet 0     No current facility-administered medications for this visit.       ROS  Review of Systems   Constitutional: Negative for chills and fever.   HENT: Negative for hearing loss and sore throat.    Eyes: Negative for visual disturbance.   Respiratory: Negative for cough and shortness of breath.    Cardiovascular: Negative for chest pain, palpitations and leg swelling.   Gastrointestinal: Negative for abdominal pain, constipation, diarrhea, nausea and vomiting.   Genitourinary: Negative for dysuria, frequency and urgency.   Musculoskeletal: Positive for arthralgias (R ankle). Negative for joint swelling and myalgias.   Skin: Negative for rash and wound.   Neurological: Negative for headaches.   Psychiatric/Behavioral: Negative for agitation and confusion. The patient is not nervous/anxious.          OBJECTIVE     Physical Exam  Vitals:    05/18/22 1536   BP: 126/80   Pulse: 77   Resp: 18   Temp: 98.3 °F (36.8 °C)    Body mass index is 41.7 kg/m².  Weight: 135.6 kg (299 lb)   Height: 5' 11" (180.3 cm)     Physical Exam  Constitutional:       General: He is not in acute distress.     Appearance: He is well-developed.   HENT:      Head: Normocephalic and atraumatic.      Right Ear: External ear normal.      Left Ear: External ear normal.      Nose: Nose normal.   Eyes:      General: No scleral icterus.        Right eye: No discharge.         Left eye: No discharge.      Conjunctiva/sclera: Conjunctivae normal.   Neck:      Vascular: No JVD.      Trachea: No tracheal deviation.   Cardiovascular:      Rate and Rhythm: Normal rate and regular rhythm.      Heart sounds: Normal heart sounds. No murmur heard.    No friction rub. No gallop.   Pulmonary:      Effort: Pulmonary effort is normal. No respiratory distress.      Breath sounds: Normal breath sounds. No " wheezing.   Abdominal:      General: Bowel sounds are normal. There is no distension.      Palpations: Abdomen is soft. There is no mass.      Tenderness: There is no abdominal tenderness. There is no guarding or rebound.   Musculoskeletal:         General: No tenderness or deformity. Normal range of motion.      Cervical back: Normal range of motion and neck supple.   Skin:     General: Skin is warm and dry.      Findings: No erythema or rash.   Neurological:      Mental Status: He is alert and oriented to person, place, and time.      Motor: No abnormal muscle tone.      Coordination: Coordination normal.   Psychiatric:         Behavior: Behavior normal.         Thought Content: Thought content normal.         Judgment: Judgment normal.           Health Maintenance       Date Due Completion Date    TETANUS VACCINE Never done ---    Shingles Vaccine (1 of 2) Never done ---    COVID-19 Vaccine (3 - Booster for Moderna series) 08/25/2021 3/25/2021    Influenza Vaccine (Season Ended) 09/01/2022 12/4/2020    Colorectal Cancer Screening 10/16/2023 10/16/2020    Lipid Panel 03/02/2027 3/2/2022            ASSESSMENT     52 y.o. male with     1. DEBRA (acute kidney injury)    2. Hypotension due to hypovolemia    3. Encephalopathy, toxic    4. Chronic pain syndrome        PLAN:     1. DEBRA (acute kidney injury)  - Resolved  - Independently hospital labs/imaging/micro  - Patient encouraged to keep well hydrated; will have him hold Lasix    2. Hypotension due to hypovolemia  - Resolved; likely 2/2 DEBRA    3. Encephalopathy, toxic  - Resolved  - Pt thought to be encephalopathic 2/2 chronic opioid usage exacerbated by inability of kidneys to properly metabolize drug  - Pain meds now decreased and pt has Narcan available    4. Chronic pain syndrome  - As above  - continue management per pain medicine        RTC in 1-2 weeks as needed for any acute worsening of current condition or failure to improve       Gabby Dean,  MD  05/18/2022 3:48 PM        No follow-ups on file.

## 2022-05-26 DIAGNOSIS — M96.1 POSTLAMINECTOMY SYNDROME OF LUMBAR REGION: ICD-10-CM

## 2022-05-26 DIAGNOSIS — G89.4 CHRONIC PAIN SYNDROME: ICD-10-CM

## 2022-05-27 RX ORDER — TIZANIDINE 2 MG/1
TABLET ORAL
Qty: 90 TABLET | Refills: 0 | Status: SHIPPED | OUTPATIENT
Start: 2022-05-27

## 2022-05-27 NOTE — TELEPHONE ENCOUNTER
Last Office Visit Info:   The patient's last visit with Gabby Dean MD was on 5/18/2022.    The patient's last visit in current department was on 5/18/2022.        Last CBC Results:   Lab Results   Component Value Date    WBC 10.17 05/08/2022    HGB 10.7 (L) 05/08/2022    HCT 35.5 (L) 05/08/2022     05/08/2022       Last CMP Results  Lab Results   Component Value Date     05/09/2022    K 4.1 05/09/2022     05/09/2022    CO2 20 (L) 05/09/2022    BUN 11 05/09/2022    CREATININE 0.6 05/09/2022    CALCIUM 9.2 05/09/2022    ALBUMIN 3.4 (L) 05/07/2022    AST 29 05/07/2022    ALT 19 05/07/2022       Last Lipids  Lab Results   Component Value Date    CHOL 186 03/02/2022    TRIG 170 (H) 03/02/2022    HDL 54 03/02/2022    LDLCALC 98.0 03/02/2022       Last A1C  Lab Results   Component Value Date    HGBA1C 4.8 03/02/2022       Last TSH  Lab Results   Component Value Date    TSH 0.406 03/02/2022             Current Med Refills  Medication List with Changes/Refills   Current Medications    AMLODIPINE (NORVASC) 10 MG TABLET    Take 1 tablet (10 mg total) by mouth once daily.       Start Date: 2/25/2022 End Date: 2/25/2023    BUTALBITAL-ACETAMINOPHEN-CAFFEINE -40 MG (FIORICET, ESGIC) -40 MG PER TABLET    TAKE ONE TABLET BY MOUTH EVERY 4 HOURS AS NEEDED FOR HEADACHE       Start Date: 5/21/2021 End Date: --    DULOXETINE (CYMBALTA) 60 MG CAPSULE    TAKE 1 CAPSULE BY MOUTH EVERY EVENING FOR MOOD       Start Date: 3/30/2022 End Date: --    GABAPENTIN (NEURONTIN) 400 MG CAPSULE    Take 400 mg by mouth 3 (three) times daily. Takes 1 400mg pill every AM, Takes 2 (400mg) at night       Start Date: 10/10/2017End Date: --    MELOXICAM (MOBIC) 15 MG TABLET    Take 1 tablet (15 mg total) by mouth once daily.       Start Date: 5/2/2022  End Date: --    METHOCARBAMOL (ROBAXIN) 750 MG TAB    Take 750 mg by mouth 4 (four) times daily as needed.       Start Date: 9/7/2021  End Date: --    MORPHINE (MS CONTIN) 60  "MG 12 HR TABLET    SMARTSI Tablet(s) By Mouth Every 12 Hours PRN       Start Date: 10/4/2021 End Date: --    MUPIROCIN (BACTROBAN) 2 % OINTMENT    APPLY TO AFFECTED AREA THREE TIMES DAILY       Start Date: nd Date: --    OLMESARTAN (BENICAR) 40 MG TABLET    Take 1 tablet (40 mg total) by mouth once daily.       Start Date: 2022 End Date: --    OXYCODONE (ROXICODONE) 30 MG TAB    TAKE ONE TABLET BY MOUTH FIVE TIMES DAILY AS NEEDED FOR PAIN.       Start Date: 2022 End Date: --    SYRINGE WITH NEEDLE (SYRINGE 3CC/22GX1") 3 ML 22 GAUGE X 1" SYRG    1 Units by Misc.(Non-Drug; Combo Route) route every 28 days.       Start Date: 3/7/2022  End Date: --    TESTOSTERONE CYPIONATE (DEPOTESTOTERONE CYPIONATE) 200 MG/ML INJECTION    Inject 2 mLs (400 mg total) into the muscle every 28 days.       Start Date: 3/7/2022  End Date: 2022    TIZANIDINE (ZANAFLEX) 2 MG TABLET    TAKE ONE TABLET BY MOUTH THREE TIMES DAILY FOR MUSCLE SPASMS.       Start Date: 2021 End Date: --                   "

## 2022-05-27 NOTE — TELEPHONE ENCOUNTER
No new care gaps identified.  [facility] Embedded Care Gaps. Reference number: [id number]. 5/26/2022   9:00:10 PM CDT

## 2022-05-31 ENCOUNTER — PATIENT MESSAGE (OUTPATIENT)
Dept: FAMILY MEDICINE | Facility: CLINIC | Age: 52
End: 2022-05-31
Payer: COMMERCIAL

## 2022-06-01 DIAGNOSIS — B35.6 TINEA CRURIS: Primary | ICD-10-CM

## 2022-06-01 RX ORDER — KETOCONAZOLE 20 MG/G
CREAM TOPICAL DAILY
Qty: 30 G | Refills: 2 | Status: SHIPPED | OUTPATIENT
Start: 2022-06-01 | End: 2023-09-20

## 2022-06-03 ENCOUNTER — LAB VISIT (OUTPATIENT)
Dept: LAB | Facility: HOSPITAL | Age: 52
End: 2022-06-03
Attending: UROLOGY
Payer: COMMERCIAL

## 2022-06-03 DIAGNOSIS — R79.89 LOW TESTOSTERONE IN MALE: ICD-10-CM

## 2022-06-03 LAB
COMPLEXED PSA SERPL-MCNC: 0.44 NG/ML (ref 0–4)
TESTOST SERPL-MCNC: 1257 NG/DL (ref 304–1227)

## 2022-06-03 PROCEDURE — 84403 ASSAY OF TOTAL TESTOSTERONE: CPT | Performed by: UROLOGY

## 2022-06-03 PROCEDURE — 36415 COLL VENOUS BLD VENIPUNCTURE: CPT | Performed by: UROLOGY

## 2022-06-03 PROCEDURE — 84153 ASSAY OF PSA TOTAL: CPT | Performed by: UROLOGY

## 2022-06-05 DIAGNOSIS — I10 ESSENTIAL HYPERTENSION: Chronic | ICD-10-CM

## 2022-06-05 NOTE — TELEPHONE ENCOUNTER
No new care gaps identified.  [facility] Embedded Care Gaps. Reference number: [id number]. 6/05/2022   6:56:01 AM JACLYN

## 2022-06-06 ENCOUNTER — OFFICE VISIT (OUTPATIENT)
Dept: UROLOGY | Facility: CLINIC | Age: 52
End: 2022-06-06
Payer: COMMERCIAL

## 2022-06-06 VITALS — HEIGHT: 71 IN | BODY MASS INDEX: 41.85 KG/M2 | WEIGHT: 298.94 LBS

## 2022-06-06 DIAGNOSIS — R79.89 LOW TESTOSTERONE IN MALE: Primary | ICD-10-CM

## 2022-06-06 DIAGNOSIS — I10 ESSENTIAL HYPERTENSION: Chronic | ICD-10-CM

## 2022-06-06 DIAGNOSIS — B37.2 SKIN YEAST INFECTION: ICD-10-CM

## 2022-06-06 PROCEDURE — 99214 PR OFFICE/OUTPT VISIT, EST, LEVL IV, 30-39 MIN: ICD-10-PCS | Mod: S$GLB,,, | Performed by: UROLOGY

## 2022-06-06 PROCEDURE — 4010F ACE/ARB THERAPY RXD/TAKEN: CPT | Mod: CPTII,S$GLB,, | Performed by: UROLOGY

## 2022-06-06 PROCEDURE — 3008F BODY MASS INDEX DOCD: CPT | Mod: CPTII,S$GLB,, | Performed by: UROLOGY

## 2022-06-06 PROCEDURE — 99999 PR PBB SHADOW E&M-EST. PATIENT-LVL IV: CPT | Mod: PBBFAC,,, | Performed by: UROLOGY

## 2022-06-06 PROCEDURE — 1159F MED LIST DOCD IN RCRD: CPT | Mod: CPTII,S$GLB,, | Performed by: UROLOGY

## 2022-06-06 PROCEDURE — 1159F PR MEDICATION LIST DOCUMENTED IN MEDICAL RECORD: ICD-10-PCS | Mod: CPTII,S$GLB,, | Performed by: UROLOGY

## 2022-06-06 PROCEDURE — 1160F RVW MEDS BY RX/DR IN RCRD: CPT | Mod: CPTII,S$GLB,, | Performed by: UROLOGY

## 2022-06-06 PROCEDURE — 1160F PR REVIEW ALL MEDS BY PRESCRIBER/CLIN PHARMACIST DOCUMENTED: ICD-10-PCS | Mod: CPTII,S$GLB,, | Performed by: UROLOGY

## 2022-06-06 PROCEDURE — 1111F PR DISCHARGE MEDS RECONCILED W/ CURRENT OUTPATIENT MED LIST: ICD-10-PCS | Mod: CPTII,S$GLB,, | Performed by: UROLOGY

## 2022-06-06 PROCEDURE — 3044F PR MOST RECENT HEMOGLOBIN A1C LEVEL <7.0%: ICD-10-PCS | Mod: CPTII,S$GLB,, | Performed by: UROLOGY

## 2022-06-06 PROCEDURE — 1111F DSCHRG MED/CURRENT MED MERGE: CPT | Mod: CPTII,S$GLB,, | Performed by: UROLOGY

## 2022-06-06 PROCEDURE — 99214 OFFICE O/P EST MOD 30 MIN: CPT | Mod: S$GLB,,, | Performed by: UROLOGY

## 2022-06-06 PROCEDURE — 99999 PR PBB SHADOW E&M-EST. PATIENT-LVL IV: ICD-10-PCS | Mod: PBBFAC,,, | Performed by: UROLOGY

## 2022-06-06 PROCEDURE — 3008F PR BODY MASS INDEX (BMI) DOCUMENTED: ICD-10-PCS | Mod: CPTII,S$GLB,, | Performed by: UROLOGY

## 2022-06-06 PROCEDURE — 4010F PR ACE/ARB THEARPY RXD/TAKEN: ICD-10-PCS | Mod: CPTII,S$GLB,, | Performed by: UROLOGY

## 2022-06-06 PROCEDURE — 3044F HG A1C LEVEL LT 7.0%: CPT | Mod: CPTII,S$GLB,, | Performed by: UROLOGY

## 2022-06-06 RX ORDER — AMLODIPINE BESYLATE 10 MG/1
10 TABLET ORAL DAILY
Qty: 90 TABLET | Refills: 0 | OUTPATIENT
Start: 2022-06-06 | End: 2023-06-06

## 2022-06-06 RX ORDER — AMLODIPINE BESYLATE 10 MG/1
TABLET ORAL
Qty: 90 TABLET | Refills: 0 | Status: SHIPPED | OUTPATIENT
Start: 2022-06-06 | End: 2022-09-03 | Stop reason: SDUPTHER

## 2022-06-06 RX ORDER — FLUCONAZOLE 150 MG/1
150 TABLET ORAL
Qty: 2 TABLET | Refills: 0 | Status: SHIPPED | OUTPATIENT
Start: 2022-06-06 | End: 2023-01-09 | Stop reason: ALTCHOICE

## 2022-06-06 NOTE — TELEPHONE ENCOUNTER
No new care gaps identified.  [facility] Embedded Care Gaps. Reference number: [id number]. 6/06/2022   8:04:05 AM JACLYN

## 2022-06-06 NOTE — PROGRESS NOTES
"Subjective:       Jaime Lund is a 52 y.o. male who is an established patient who was referred by Dr Dean  for evaluation of low T.      He reported fatigue and muscle ache ("achy all the time" - chronic back pain) to PCP therefore T level checked and noted to be mildly low. No PSA in records. Denies ED or libido issues. Denies prostate issues. Grandfather with prostate cancer.     3/22 T - 260  3/22 T - 160, PSA - 0.17  6/22 T - 1257, PSA - 0.44 (injection done about 1 week before)     PMH:  HTN, obesity, chronic pain - sees pain management.      He has been doing self-injections of TRT (400mg monthly). Response is variable. Recent ankle surgery so unable to work x 1 month. Notes skin yeast infection in skin folds.       The following portions of the patient's history were reviewed and updated as appropriate: allergies, current medications, past family history, past medical history, past social history, past surgical history and problem list.    Review of Systems  Twelve point review of systems completed. Pertinent positive and negatives listed in HPI.      Objective:    Vitals: Ht 5' 11" (1.803 m)   Wt 135.6 kg (298 lb 15.1 oz)   BMI 41.69 kg/m²     Physical Exam   General: well developed, well nourished in no acute distress  Head: normocephalic, atraumatic  Neck: supple, trachea midline, no obvious enlargement of thyroid  HEENT: EOMI, mucus membranes moist, sclera anicteric, no hearing impairment  Lungs: symmetric expansion, non-labored breathing  Skin: no rashes or lesions  Neuro: alert and oriented x 3, no gross deficits  Psych: normal judgment and insight, normal mood/affect and non-anxious  Genitourinary: deferred   ANDREIA: deferred      Lab Review   Urine analysis today in clinic shows +protein trace     Lab Results   Component Value Date    WBC 10.17 05/08/2022    HGB 10.7 (L) 05/08/2022    HCT 35.5 (L) 05/08/2022    MCV 88 05/08/2022     05/08/2022     Lab Results   Component Value Date    " CREATININE 0.6 05/09/2022    BUN 11 05/09/2022     No results found for: PSA  Lab Results   Component Value Date    PSADIAG 0.44 06/03/2022       Imaging  NA     Assessment/Plan:      1. Low testosterone in male    - Discussed hypogonadism, common symptoms, treatment options, and the risks and benefits of treatment.  His symptoms and blood tests support the diagnosis and therefore treatment is appropriate.   - Discussed the various risks associated with TRT, specifically a possible increase in risk of heart disease, MI, CVA, PE, DVT. Also discussed the issues related to testosterone replacement and prostate cancer. TRT does not increase his risk of developing cancer. Recommend annual ANDREIA and PSA for PCa screening. TRT recommended to stop if abnormal ANDREIA or elevated PSA.   - Treatment options reviewed: regular injections, topical treatments including gels and creams, and implants such as TestoPel.  Risks and benefits of each were reviewed specifically T fluctuations with q2-4week injections and risk of transfer of medication to other with topical application.     - TRT started 3/22 - 400mg IM q4 weeks.    - PSA, T, CBC, lipid panel in 9 months.     2. Fatigue, unspecified type     - Discussed multifactorial etiology      3. Skin yeast infection   - No relief from topical medications given by PCP   - Diflucan x 2 tabs q3d given. If no improvement, see PCP.      Follow up in 9 months with labs

## 2022-06-06 NOTE — TELEPHONE ENCOUNTER
Refill Routing Note   Medication(s) are not appropriate for processing by [facility] for the following reason(s):      - Medication is a new start (<3 months)    [facility] action(s):  Defer       Medication Therapy Plan: S/P hospital admission with a complaint of hypotension  Medication reconciliation completed: No     Appointments  past 12m or future 3m with PCP    Date Provider   Last Visit   5/18/2022 Gabby Dean MD   Next Visit   Visit date not found Gabby Dean MD   ED visits in past 90 days: 0        Note composed:6:16 PM 06/06/2022

## 2022-06-06 NOTE — TELEPHONE ENCOUNTER
Last Office Visit Info:   The patient's last visit with Gabby Dean MD was on 5/18/2022.    The patient's last visit in current department was on 5/18/2022.        Last CBC Results:   Lab Results   Component Value Date    WBC 10.17 05/08/2022    HGB 10.7 (L) 05/08/2022    HCT 35.5 (L) 05/08/2022     05/08/2022       Last CMP Results  Lab Results   Component Value Date     05/09/2022    K 4.1 05/09/2022     05/09/2022    CO2 20 (L) 05/09/2022    BUN 11 05/09/2022    CREATININE 0.6 05/09/2022    CALCIUM 9.2 05/09/2022    ALBUMIN 3.4 (L) 05/07/2022    AST 29 05/07/2022    ALT 19 05/07/2022       Last Lipids  Lab Results   Component Value Date    CHOL 186 03/02/2022    TRIG 170 (H) 03/02/2022    HDL 54 03/02/2022    LDLCALC 98.0 03/02/2022       Last A1C  Lab Results   Component Value Date    HGBA1C 4.8 03/02/2022       Last TSH  Lab Results   Component Value Date    TSH 0.406 03/02/2022             Current Med Refills  Medication List with Changes/Refills   Current Medications    AMLODIPINE (NORVASC) 10 MG TABLET    Take 1 tablet (10 mg total) by mouth once daily.       Start Date: 2/25/2022 End Date: 2/25/2023    BUTALBITAL-ACETAMINOPHEN-CAFFEINE -40 MG (FIORICET, ESGIC) -40 MG PER TABLET    TAKE ONE TABLET BY MOUTH EVERY 4 HOURS AS NEEDED FOR HEADACHE       Start Date: 5/21/2021 End Date: --    DULOXETINE (CYMBALTA) 60 MG CAPSULE    TAKE 1 CAPSULE BY MOUTH EVERY EVENING FOR MOOD       Start Date: 3/30/2022 End Date: --    GABAPENTIN (NEURONTIN) 400 MG CAPSULE    Take 400 mg by mouth 3 (three) times daily. Takes 1 400mg pill every AM, Takes 2 (400mg) at night       Start Date: 10/10/2017End Date: --    KETOCONAZOLE (NIZORAL) 2 % CREAM    Apply topically once daily.       Start Date: 6/1/2022  End Date: --    MELOXICAM (MOBIC) 15 MG TABLET    Take 1 tablet (15 mg total) by mouth once daily.       Start Date: 5/2/2022  End Date: --    MORPHINE (MS CONTIN) 60 MG 12 HR TABLET     "SMARTSI Tablet(s) By Mouth Every 12 Hours PRN       Start Date: 10/4/2021 End Date: --    MUPIROCIN (BACTROBAN) 2 % OINTMENT    APPLY TO AFFECTED AREA THREE TIMES DAILY       Start Date: nd Date: --    OLMESARTAN (BENICAR) 40 MG TABLET    TAKE ONE TABLET BY MOUTH ONCE DAILY FOR BLOOD PRESSURE       Start Date: 2022 End Date: --    OXYCODONE (ROXICODONE) 30 MG TAB    TAKE ONE TABLET BY MOUTH FIVE TIMES DAILY AS NEEDED FOR PAIN.       Start Date: 2022 End Date: --    SYRINGE WITH NEEDLE (SYRINGE 3CC/22GX1") 3 ML 22 GAUGE X 1" SYRG    1 Units by Misc.(Non-Drug; Combo Route) route every 28 days.       Start Date: 3/7/2022  End Date: --    TESTOSTERONE CYPIONATE (DEPOTESTOTERONE CYPIONATE) 200 MG/ML INJECTION    Inject 2 mLs (400 mg total) into the muscle every 28 days.       Start Date: 3/7/2022  End Date: 2022    TIZANIDINE (ZANAFLEX) 2 MG TABLET    TAKE ONE TABLET BY MOUTH THREE TIMES DAILY FOR MUSCLE SPASMS.       Start Date: 2022 End Date: --                     "

## 2022-08-15 ENCOUNTER — OFFICE VISIT (OUTPATIENT)
Dept: FAMILY MEDICINE | Facility: CLINIC | Age: 52
End: 2022-08-15
Payer: COMMERCIAL

## 2022-08-15 DIAGNOSIS — R50.9 FEVER, UNSPECIFIED FEVER CAUSE: ICD-10-CM

## 2022-08-15 DIAGNOSIS — U07.1 COVID-19 VIRUS INFECTION: Primary | ICD-10-CM

## 2022-08-15 DIAGNOSIS — J02.9 SORE THROAT: ICD-10-CM

## 2022-08-15 PROCEDURE — 1160F PR REVIEW ALL MEDS BY PRESCRIBER/CLIN PHARMACIST DOCUMENTED: ICD-10-PCS | Mod: CPTII,95,, | Performed by: FAMILY MEDICINE

## 2022-08-15 PROCEDURE — 1159F PR MEDICATION LIST DOCUMENTED IN MEDICAL RECORD: ICD-10-PCS | Mod: CPTII,95,, | Performed by: FAMILY MEDICINE

## 2022-08-15 PROCEDURE — 99214 OFFICE O/P EST MOD 30 MIN: CPT | Mod: 95,,, | Performed by: FAMILY MEDICINE

## 2022-08-15 PROCEDURE — 99214 PR OFFICE/OUTPT VISIT, EST, LEVL IV, 30-39 MIN: ICD-10-PCS | Mod: 95,,, | Performed by: FAMILY MEDICINE

## 2022-08-15 PROCEDURE — 1160F RVW MEDS BY RX/DR IN RCRD: CPT | Mod: CPTII,95,, | Performed by: FAMILY MEDICINE

## 2022-08-15 PROCEDURE — 4010F PR ACE/ARB THEARPY RXD/TAKEN: ICD-10-PCS | Mod: CPTII,95,, | Performed by: FAMILY MEDICINE

## 2022-08-15 PROCEDURE — 3044F PR MOST RECENT HEMOGLOBIN A1C LEVEL <7.0%: ICD-10-PCS | Mod: CPTII,95,, | Performed by: FAMILY MEDICINE

## 2022-08-15 PROCEDURE — 4010F ACE/ARB THERAPY RXD/TAKEN: CPT | Mod: CPTII,95,, | Performed by: FAMILY MEDICINE

## 2022-08-15 PROCEDURE — 1159F MED LIST DOCD IN RCRD: CPT | Mod: CPTII,95,, | Performed by: FAMILY MEDICINE

## 2022-08-15 PROCEDURE — 3044F HG A1C LEVEL LT 7.0%: CPT | Mod: CPTII,95,, | Performed by: FAMILY MEDICINE

## 2022-08-15 NOTE — PROGRESS NOTES
The patient location is: home  The chief complaint leading to consultation is: COVID-19 Concerns    Visit type: Virtual visit with synchronous audio and video  Total time spent with patient: 16 minutes  Each patient to whom he or she provides medical services by telemedicine is:  (1) informed of the relationship between the physician and patient and the respective role of any other health care provider with respect to management of the patient; and (2) notified that he or she may decline to receive medical services by telemedicine and may withdraw from such care at any time.     Office Visit    Patient Name: Jaime Lund    : 1970  MRN: [id number]      Assessment/Plan:  Jaime Lund is a 52 y.o. male who presents today for :    COVID-19 virus infection  Fever, unspecified fever cause  Sore throat  -Mild symptoms, which has mostly resolved, afebrile  -continue with supportive care, Claritin PRN for drainage - Tylenol every 4-6 hours as needed for fever, headaches, sore throat, ear pain, bodyaches, and/or nasal/sinus inflammation  -reassurance provided, as well as self-isolation instructions.      Follow up for worsening Sx. Urgent care/ED precautions provided.        This note was created by combination of typed  and MModal dictation.  Transcription errors may be present.  If there are any questions, please contact me.      ----------------------------------------------------------------------------------------------------------------------      HPI:  Patient Care Team:  Gabby Dean MD as PCP - General (Internal Medicine)  Crystal Hodge as Digital Medicine Health   Gabby Dean MD as Hypertension Digital Medicine Responsible Provider (Internal Medicine)  REX Olivas as Hypertension Digital Medicine Clinician (Physician Assistant)  [facility] - Outcomes Based as Hypertension Digital Medicine Contract    Jaime is a 52 y.o. male with      Patient  Active Problem List   Diagnosis    Essential hypertension    Hypokalemia    Seizure    Postlaminectomy syndrome of lumbar region    Facet arthropathy, lumbar    Chronic pain syndrome    Morbid obesity    Peripheral artery disease    Subclinical hyperthyroidism    Chronic pain    Presence of intrathecal pump    Anemia of chronic disease    Mood disorder    Infection and inflammatory reaction due to other nervous system device, implant or graft, initial encounter    Testosterone deficiency in male    DEBRA (acute kidney injury)    Hypotension    High anion gap metabolic acidosis    Chronic, continuous use of opioids    Encephalopathy, toxic    Sepsis secondary to UTI         Patient presents today for :  COVID-19 Concerns    He started to have sore throat and fever 4 days ago, tested positive with home kit the day after. He had sick contact in his co-worker. Has been taking OTC medications as needed with good relief. He states he has not had any fever for the past 2 days and feeling better overall. Denies any SOB/loss of taste nor smell.          Answers for HPI/ROS submitted by the patient on 8/15/2022  Chronicity: new  Onset: in the past 7 days  Progression since onset: rapidly worsening  Frequency: every few minutes  Cough characteristics: non-productive  chest pain: No  chills: Yes  fever: Yes  headaches: Yes  heartburn: No  hemoptysis: No  myalgias: No  nasal congestion: Yes  postnasal drip: Yes  rash: No  rhinorrhea: Yes  shortness of breath: No  sore throat: Yes  sweats: Yes  weight loss: No  wheezing: No  Aggravated by: nothing  asthma: No  bronchiectasis: No  bronchitis: No  COPD: No  emphysema: No  environmental allergies: No  pneumonia: No  Treatments tried: OTC cough suppressant, body position changes, cool air, rest  Improvement on treatment: mild          Patient Active Problem List   Diagnosis    Essential hypertension    Hypokalemia    Seizure    Postlaminectomy syndrome of  lumbar region    Facet arthropathy, lumbar    Chronic pain syndrome    Morbid obesity    Peripheral artery disease    Subclinical hyperthyroidism    Chronic pain    Presence of intrathecal pump    Anemia of chronic disease    Mood disorder    Infection and inflammatory reaction due to other nervous system device, implant or graft, initial encounter    Testosterone deficiency in male    DEBRA (acute kidney injury)    Hypotension    High anion gap metabolic acidosis    Chronic, continuous use of opioids    Encephalopathy, toxic    Sepsis secondary to UTI       Current Medications  Medications reviewed and updated.       Current Outpatient Medications:     amLODIPine (NORVASC) 10 MG tablet, TAKE ONE TABLET BY MOUTH ONCE DAILY FOR BLOOD PRESSURE, Disp: 90 tablet, Rfl: 0    butalbital-acetaminophen-caffeine -40 mg (FIORICET, ESGIC) -40 mg per tablet, TAKE ONE TABLET BY MOUTH EVERY 4 HOURS AS NEEDED FOR HEADACHE, Disp: 60 tablet, Rfl: 5    DULoxetine (CYMBALTA) 60 MG capsule, TAKE 1 CAPSULE BY MOUTH EVERY EVENING FOR MOOD, Disp: 90 capsule, Rfl: 3    fluconazole (DIFLUCAN) 150 MG Tab, Take 1 tablet (150 mg total) by mouth Every 3 (three) days., Disp: 2 tablet, Rfl: 0    gabapentin (NEURONTIN) 400 MG capsule, Take 400 mg by mouth 3 (three) times daily. Takes 1 400mg pill every AM, Takes 2 (400mg) at night, Disp: , Rfl:     ketoconazole (NIZORAL) 2 % cream, Apply topically once daily., Disp: 30 g, Rfl: 2    meloxicam (MOBIC) 15 MG tablet, Take 1 tablet (15 mg total) by mouth once daily., Disp: 90 tablet, Rfl: 0    morphine (MS CONTIN) 60 MG 12 hr tablet, SMARTSI Tablet(s) By Mouth Every 12 Hours PRN, Disp: , Rfl:     mupirocin (BACTROBAN) 2 % ointment, APPLY TO AFFECTED AREA THREE TIMES DAILY, Disp: 22 g, Rfl: 0    olmesartan (BENICAR) 40 MG tablet, TAKE ONE TABLET BY MOUTH ONCE DAILY FOR BLOOD PRESSURE, Disp: 90 tablet, Rfl: 1    oxyCODONE (ROXICODONE) 30 MG Tab, TAKE ONE TABLET BY  "MOUTH FIVE TIMES DAILY AS NEEDED FOR PAIN., Disp: , Rfl:     syringe with needle (SYRINGE 3CC/22GX1") 3 mL 22 gauge x 1" Syrg, 1 Units by Misc.(Non-Drug; Combo Route) route every 28 days., Disp: 100 each, Rfl: 2    testosterone cypionate (DEPOTESTOTERONE CYPIONATE) 200 mg/mL injection, Inject 2 mLs (400 mg total) into the muscle every 28 days., Disp: 10 mL, Rfl: 5    tiZANidine (ZANAFLEX) 2 MG tablet, TAKE ONE TABLET BY MOUTH THREE TIMES DAILY FOR MUSCLE SPASMS., Disp: 90 tablet, Rfl: 0    Past Surgical History:   Procedure Laterality Date    CHOLECYSTECTOMY      GASTRIC BYPASS      SLEEVE    gastric sleeve  2011    HERNIA REPAIR      pain pump Right 04/03/2018    inserted at right abdomen    pain pump site washout Right 04/13/2018    REMOVAL OF IMPLANT N/A 11/18/2020    Procedure: REMOVAL, IMPLANT;  Surgeon: Raffy Headley DO;  Location: [facility] OR;  Service: Neurosurgery;  Laterality: N/A;  intrathecal pain pump, leads removed from mid back and generator from abdomen    SKIN SURGERY      EXCESS SKIN REMOVAL    SPINE SURGERY      lumbar fusion       Family History   Problem Relation Age of Onset    Heart disease Mother     Breast cancer Mother     Arthritis Mother     Hypertension Mother     Throat cancer Father     Hypertension Father        Social History     Socioeconomic History    Marital status:    Tobacco Use    Smoking status: Never Smoker    Smokeless tobacco: Never Used   Substance and Sexual Activity    Alcohol use: Yes     Comment: socially    Drug use: No    Sexual activity: Yes     Partners: Female             Allergies   Review of patient's allergies indicates:   Allergen Reactions    Klonopin [clonazepam] Anxiety and Other (See Comments)     Becomes agitated    Valium [diazepam] Other (See Comments)     Becomes agitated    Xanax [alprazolam] Anxiety and Other (See Comments)     Becomes agitated             Review of Systems  See HPI      Physical Exam  There were no " vitals taken for this visit.    GEN: NAD

## 2022-08-23 ENCOUNTER — OFFICE VISIT (OUTPATIENT)
Dept: FAMILY MEDICINE | Facility: CLINIC | Age: 52
End: 2022-08-23
Payer: COMMERCIAL

## 2022-08-23 DIAGNOSIS — U07.1 COVID-19 VIRUS INFECTION: Primary | ICD-10-CM

## 2022-08-23 DIAGNOSIS — R09.81 SINUS CONGESTION: ICD-10-CM

## 2022-08-23 DIAGNOSIS — R09.89 CHEST CONGESTION: ICD-10-CM

## 2022-08-23 DIAGNOSIS — R05.9 COUGH: ICD-10-CM

## 2022-08-23 DIAGNOSIS — R09.82 PND (POST-NASAL DRIP): ICD-10-CM

## 2022-08-23 PROCEDURE — 1160F PR REVIEW ALL MEDS BY PRESCRIBER/CLIN PHARMACIST DOCUMENTED: ICD-10-PCS | Mod: CPTII,95,, | Performed by: FAMILY MEDICINE

## 2022-08-23 PROCEDURE — 3044F HG A1C LEVEL LT 7.0%: CPT | Mod: CPTII,95,, | Performed by: FAMILY MEDICINE

## 2022-08-23 PROCEDURE — 1159F PR MEDICATION LIST DOCUMENTED IN MEDICAL RECORD: ICD-10-PCS | Mod: CPTII,95,, | Performed by: FAMILY MEDICINE

## 2022-08-23 PROCEDURE — 99214 OFFICE O/P EST MOD 30 MIN: CPT | Mod: 95,,, | Performed by: FAMILY MEDICINE

## 2022-08-23 PROCEDURE — 4010F PR ACE/ARB THEARPY RXD/TAKEN: ICD-10-PCS | Mod: CPTII,95,, | Performed by: FAMILY MEDICINE

## 2022-08-23 PROCEDURE — 1160F RVW MEDS BY RX/DR IN RCRD: CPT | Mod: CPTII,95,, | Performed by: FAMILY MEDICINE

## 2022-08-23 PROCEDURE — 3044F PR MOST RECENT HEMOGLOBIN A1C LEVEL <7.0%: ICD-10-PCS | Mod: CPTII,95,, | Performed by: FAMILY MEDICINE

## 2022-08-23 PROCEDURE — 99214 PR OFFICE/OUTPT VISIT, EST, LEVL IV, 30-39 MIN: ICD-10-PCS | Mod: 95,,, | Performed by: FAMILY MEDICINE

## 2022-08-23 PROCEDURE — 1159F MED LIST DOCD IN RCRD: CPT | Mod: CPTII,95,, | Performed by: FAMILY MEDICINE

## 2022-08-23 PROCEDURE — 4010F ACE/ARB THERAPY RXD/TAKEN: CPT | Mod: CPTII,95,, | Performed by: FAMILY MEDICINE

## 2022-08-23 RX ORDER — FLUTICASONE PROPIONATE 50 MCG
1 SPRAY, SUSPENSION (ML) NASAL 2 TIMES DAILY PRN
Qty: 16 ML | Refills: 1 | Status: SHIPPED | OUTPATIENT
Start: 2022-08-23 | End: 2023-09-20

## 2022-08-23 RX ORDER — PROMETHAZINE HYDROCHLORIDE AND DEXTROMETHORPHAN HYDROBROMIDE 6.25; 15 MG/5ML; MG/5ML
5 SYRUP ORAL 3 TIMES DAILY PRN
Qty: 118 ML | Refills: 1 | Status: SHIPPED | OUTPATIENT
Start: 2022-08-23 | End: 2023-01-09

## 2022-08-23 RX ORDER — GUAIFENESIN 1200 MG/1
1 TABLET, EXTENDED RELEASE ORAL EVERY 12 HOURS PRN
Qty: 20 TABLET | Refills: 0 | Status: SHIPPED | OUTPATIENT
Start: 2022-08-23 | End: 2022-09-02

## 2022-08-23 NOTE — PROGRESS NOTES
The patient location is: home  The chief complaint leading to consultation is: COVID f/u  Visit type: Virtual visit with synchronous audio and video  Total time spent with patient: 15 minutes  Each patient to whom he or she provides medical services by telemedicine is:  (1) informed of the relationship between the physician and patient and the respective role of any other health care provider with respect to management of the patient; and (2) notified that he or she may decline to receive medical services by telemedicine and may withdraw from such care at any time.     Office Visit    Patient Name: Jaime Lund    : 1970  MRN: [id number]      Assessment/Plan:  Jaime Lund is a 52 y.o. male who presents today for :    COVID-19 virus infection  Cough  -     X-Ray Chest PA And Lateral; Future; Expected date: 2022  -     promethazine-dextromethorphan (PROMETHAZINE-DM) 6.25-15 mg/5 mL Syrp; Take 5 mLs by mouth 3 (three) times daily as needed (cough).  Dispense: 118 mL; Refill: 1  PND (post-nasal drip)  Sinus congestion  -     fluticasone propionate (FLONASE) 50 mcg/actuation nasal spray; 1 spray (50 mcg total) by Each Nostril route 2 (two) times daily as needed for Rhinitis.  Dispense: 16 mL; Refill: 1  Chest congestion  -     guaiFENesin (MUCINEX) 1,200 mg Ta12; Take 1 tablet by mouth every 12 (twelve) hours as needed.  Dispense: 20 tablet; Refill: 0    -still with persistent cough, which we discussed may persist for up to 2-3 weeks. I advised to continue supportive therapy and symptom management, add Flonase/Mucinex/cough suppressant. F/u CXR. Again, stressed hydration (water) and rest, with Tylenol every 4-6 hours as needed for fever, headaches, sore throat, ear pain, bodyaches, and/or nasal/sinus inflammation  -RTC if Sx worsens or call clinic back if pt has any concerns.            This note was created by combination of typed  and MModal dictation.  Transcription errors may be  present.  If there are any questions, please contact me.      ----------------------------------------------------------------------------------------------------------------------      HPI:  Patient Care Team:  Gabby Dean MD as PCP - General (Internal Medicine)  Crystal Hodge as Digital Medicine Health   Gabby Dean MD as Hypertension Digital Medicine Responsible Provider (Internal Medicine)  REX Olivas as Hypertension Digital Medicine Clinician (Physician Assistant)  [facility] - Outcomes Based as Hypertension Digital Medicine Contract    Jaime is a 52 y.o. male with      Patient Active Problem List   Diagnosis    Essential hypertension    Hypokalemia    Seizure    Postlaminectomy syndrome of lumbar region    Facet arthropathy, lumbar    Chronic pain syndrome    Morbid obesity    Peripheral artery disease    Subclinical hyperthyroidism    Chronic pain    Presence of intrathecal pump    Anemia of chronic disease    Mood disorder    Infection and inflammatory reaction due to other nervous system device, implant or graft, initial encounter    Testosterone deficiency in male    DEBRA (acute kidney injury)    Hypotension    High anion gap metabolic acidosis    Chronic, continuous use of opioids    Encephalopathy, toxic    Sepsis secondary to UTI       Patient presents today for COVID f/u. He saw me a week ago after onset of COVID Sx, which was mostly sore throat and fever and has since resolved. He states that he continues to have sensation of mucus stuck in the chest, as well as constant throat and sinus drainage and congestion, which causes him to cough persistently. He has tried Claritin, which seems to have limited relief. Would like to see if he needs to be on any other medications. He denies fever/chills/muscle aches. +fatigue.      Answers for HPI/ROS submitted by the patient on 8/23/2022  Chronicity: new  Onset: 1 to 4 weeks ago  Progression  since onset: gradually worsening  Frequency: every few minutes  Cough characteristics: non-productive  chest pain: No  chills: No  ear congestion: No  ear pain: No  fever: No  headaches: Yes  heartburn: No  hemoptysis: No  myalgias: No  nasal congestion: Yes  postnasal drip: Yes  rash: No  rhinorrhea: Yes  shortness of breath: No  sore throat: No  sweats: No  weight loss: No  wheezing: No  asthma: No  bronchiectasis: No  bronchitis: No  COPD: No  emphysema: No  environmental allergies: No  pneumonia: No  Treatments tried: OTC cough suppressant  Improvement on treatment: mild        Patient Active Problem List   Diagnosis    Essential hypertension    Hypokalemia    Seizure    Postlaminectomy syndrome of lumbar region    Facet arthropathy, lumbar    Chronic pain syndrome    Morbid obesity    Peripheral artery disease    Subclinical hyperthyroidism    Chronic pain    Presence of intrathecal pump    Anemia of chronic disease    Mood disorder    Infection and inflammatory reaction due to other nervous system device, implant or graft, initial encounter    Testosterone deficiency in male    DEBRA (acute kidney injury)    Hypotension    High anion gap metabolic acidosis    Chronic, continuous use of opioids    Encephalopathy, toxic    Sepsis secondary to UTI       Current Medications  Medications reviewed and updated.       Current Outpatient Medications:     amLODIPine (NORVASC) 10 MG tablet, TAKE ONE TABLET BY MOUTH ONCE DAILY FOR BLOOD PRESSURE, Disp: 90 tablet, Rfl: 0    butalbital-acetaminophen-caffeine -40 mg (FIORICET, ESGIC) -40 mg per tablet, TAKE ONE TABLET BY MOUTH EVERY 4 HOURS AS NEEDED FOR HEADACHE, Disp: 60 tablet, Rfl: 5    DULoxetine (CYMBALTA) 60 MG capsule, TAKE 1 CAPSULE BY MOUTH EVERY EVENING FOR MOOD, Disp: 90 capsule, Rfl: 3    fluconazole (DIFLUCAN) 150 MG Tab, Take 1 tablet (150 mg total) by mouth Every 3 (three) days., Disp: 2 tablet, Rfl: 0    fluticasone  "propionate (FLONASE) 50 mcg/actuation nasal spray, 1 spray (50 mcg total) by Each Nostril route 2 (two) times daily as needed for Rhinitis., Disp: 16 mL, Rfl: 1    gabapentin (NEURONTIN) 400 MG capsule, Take 400 mg by mouth 3 (three) times daily. Takes 1 400mg pill every AM, Takes 2 (400mg) at night, Disp: , Rfl:     guaiFENesin (MUCINEX) 1,200 mg Ta12, Take 1 tablet by mouth every 12 (twelve) hours as needed., Disp: 20 tablet, Rfl: 0    ketoconazole (NIZORAL) 2 % cream, Apply topically once daily., Disp: 30 g, Rfl: 2    meloxicam (MOBIC) 15 MG tablet, Take 1 tablet (15 mg total) by mouth once daily., Disp: 90 tablet, Rfl: 0    morphine (MS CONTIN) 60 MG 12 hr tablet, SMARTSI Tablet(s) By Mouth Every 12 Hours PRN, Disp: , Rfl:     mupirocin (BACTROBAN) 2 % ointment, APPLY TO AFFECTED AREA THREE TIMES DAILY, Disp: 22 g, Rfl: 0    olmesartan (BENICAR) 40 MG tablet, TAKE ONE TABLET BY MOUTH ONCE DAILY FOR BLOOD PRESSURE, Disp: 90 tablet, Rfl: 1    oxyCODONE (ROXICODONE) 30 MG Tab, TAKE ONE TABLET BY MOUTH FIVE TIMES DAILY AS NEEDED FOR PAIN., Disp: , Rfl:     promethazine-dextromethorphan (PROMETHAZINE-DM) 6.25-15 mg/5 mL Syrp, Take 5 mLs by mouth 3 (three) times daily as needed (cough)., Disp: 118 mL, Rfl: 1    syringe with needle (SYRINGE 3CC/22GX1") 3 mL 22 gauge x 1" Syrg, 1 Units by Misc.(Non-Drug; Combo Route) route every 28 days., Disp: 100 each, Rfl: 2    testosterone cypionate (DEPOTESTOTERONE CYPIONATE) 200 mg/mL injection, Inject 2 mLs (400 mg total) into the muscle every 28 days., Disp: 10 mL, Rfl: 5    tiZANidine (ZANAFLEX) 2 MG tablet, TAKE ONE TABLET BY MOUTH THREE TIMES DAILY FOR MUSCLE SPASMS., Disp: 90 tablet, Rfl: 0    Past Surgical History:   Procedure Laterality Date    CHOLECYSTECTOMY      GASTRIC BYPASS      SLEEVE    gastric sleeve      HERNIA REPAIR      pain pump Right 2018    inserted at right abdomen    pain pump site washout Right 2018    REMOVAL OF " IMPLANT N/A 11/18/2020    Procedure: REMOVAL, IMPLANT;  Surgeon: Raffy Headley DO;  Location: [facility];  Service: Neurosurgery;  Laterality: N/A;  intrathecal pain pump, leads removed from mid back and generator from abdomen    SKIN SURGERY      EXCESS SKIN REMOVAL    SPINE SURGERY      lumbar fusion       Family History   Problem Relation Age of Onset    Heart disease Mother     Breast cancer Mother     Arthritis Mother     Hypertension Mother     Throat cancer Father     Hypertension Father        Social History     Socioeconomic History    Marital status:    Tobacco Use    Smoking status: Never Smoker    Smokeless tobacco: Never Used   Substance and Sexual Activity    Alcohol use: Yes     Comment: socially    Drug use: No    Sexual activity: Yes     Partners: Female             Allergies   Review of patient's allergies indicates:   Allergen Reactions    Klonopin [clonazepam] Anxiety and Other (See Comments)     Becomes agitated    Valium [diazepam] Other (See Comments)     Becomes agitated    Xanax [alprazolam] Anxiety and Other (See Comments)     Becomes agitated             Review of Systems  See HPI      Physical Exam  There were no vitals taken for this visit.    GEN: NAD

## 2022-09-12 NOTE — PROGRESS NOTES
[facility] Hospital Medicine  Progress Note    Patient Name: Jaime Lund  MRN: [id number]  Patient Class: IP- Inpatient   Admission Date: 11/16/2020  Length of Stay: 2 days  Attending Physician: Rich Martin MD  Primary Care Provider: Gabby Dean MD        Subjective:     Principal Problem:Cellulitis, abdominal wall        HPI:  Mr. Lund is a 49 yo M with chronic back pain. He has a intrathecal pain pump RLQ of abdomen. He had some surgery/adjustment for this about 2 weeks ago. He presents with redness and puss drainage since Friday.  He was started on Vanc and Zosyn. Neurosurgery was consulted. Patient denies shaking chills. Subjective fever on 11/16.  Patient resting comfortably. He is non-ill appearing and has no other complaints.     Overview/Hospital Course:  Mr. Lund is a 49 yo M with chronic back pain. He has a intrathecal pain pump RLQ of abdomen. He had some surgery/adjustment for this about 2 weeks ago. He presents with redness and puss drainage since Friday.  He was started on Vanc and Zosyn. Neurosurgery was consulted. Blood cultures were sent that were NG. Patient grew out Staph A in wound cultures. Patient taken to surgery on 11/18 for removal of pump.     Interval History: No new complaints.     Review of Systems   Constitutional: Negative for activity change, appetite change, chills, diaphoresis, fatigue, fever and unexpected weight change.   HENT: Negative for congestion, dental problem and drooling.    Eyes: Negative for pain, discharge and itching.   Respiratory: Negative for apnea, cough, choking, chest tightness and shortness of breath.    Cardiovascular: Negative for chest pain.   Gastrointestinal: Positive for abdominal pain. Negative for abdominal distention, constipation, diarrhea, nausea and vomiting.   Genitourinary: Negative for difficulty urinating, dysuria and enuresis.   Musculoskeletal: Negative for arthralgias and back pain.   Skin: Positive for  Physical Therapy Evaluation    Referred by: Lotus Hsieh MD; Medical Diagnosis (from order):    Diagnosis Information      Diagnosis    721.3 (ICD-9-CM) - M47.816 (ICD-10-CM) - Osteoarthritis of lumbar spine, unspecified spinal osteoarthritis complication status    355.1 (ICD-9-CM) - G57.12 (ICD-10-CM) - Meralgia paresthetica of left side              Visit: 1    Visit Type: Initial Evaluation  Treatment Diagnosis: lumbar, left: lower extremity, symptoms with increased pain/symptoms, impaired posture, impaired range of motion, impaired muscle length/flexibility, impaired mobility, impaired strength, impaired activity tolerance, impaired coordination, impaired body mechanics  Date of onset: 1/24/2022Date of exacerbation: 9/5/2022    Patient alert and oriented X3.  Chart reviewed at time of initial evaluation (relevant co-morbidities, allergies, tests and medications listed): Past Medical History:  No date: Cardiomyopathy (CMS/HCC)  No date: Diabetes mellitus (CMS/HCC)  No date: ED (erectile dysfunction)  No date: Essential (primary) hypertension  No date: High cholesterol  No date: Morbid obesity (CMS/HCC)  No date: PATRICE (obstructive sleep apnea)  Diagnostic Tests: X-ray: reviewed.  MRI studies: reviewed.     SUBJECTIVE                                                                                                               This is 55 y/o male patient who presented to Physical Therapy with c/o LBP with radiating sx down (L) thigh. Patient reported sudden onset of LBP upon waking up in the morning on  1-, had MRI 3-28-22 showing disc bulge, was seen for 4 visits at [facility] OPPT from 3-31 until 5-23, poor compliance with HEP. Patient reported he had seen the orthopedic doctor and had 2 sessions of acupuncture, latest was 9-6-22 with no pain relief, he is scheduled for pain management consultation 9-20-22. Patient reported recent flare up of pain sx last 9-5-22, unable to lay down on his back, increased c/o  burning aching pain across lower back with numbness, tingling lateral aspect of (L) thigh when sitting longer than 1 hour, standing 1 hour, walking 20 minutes, driving 30 minutes, upon waking up in the mornings. Patient reported he works as an  at the [employer], modified work for now, mostly sitting task, occasional standing/ walking.    Pain / Symptoms:  Pain/symptom is: constant  Pain rating (out of 10): Current: 9 ; Best: 8; Worst: 10  Location: Lumbar (L) thigh  Quality / Description: tingling, burning, pins and needles, pressure, radiating, sharp, shooting, stiff.  Alleviating Factors: avoiding movement in involved area, change in position, massage, heat, rest, relaxation techniques, prescription medications. TENS    Progression since onset: worsening  Function:   Limitations / Exacerbation Factors: pain, difficulty, increased time, bed mobility, sleep disturbed, grooming/hygiene/self-care activities, lower body dressing, meal/food prep, driving/riding in a vehicle, grocery shopping, house/yard work, standing tasks, sitting tasks, work - limited or modified, bending/squatting/lifting, standing, pushing/pulling, lifting/carrying and walking, car transfers, low transfer (toilet/couch) and positional transitions, community distances, stairs  Prior Level of Function: pain free ADLs and IADLs. declining function, therefore referred to therapy,  Patient Goals: decreased pain, increased motion, increased strength and independence with ADLs/IADLs    Prior treatment: acupuncture  Discharged from hospital, home health, or skilled nursing facility in last 30 days: no    Home Environment:   Patient lives with significant other  Type of home: multiple level home  Assistance available: no assist  Feels safe at home/work/school.  2 or more falls or an unexplained fall with injury in the last year:  No    OBJECTIVE                                                                                           color change and rash.   Psychiatric/Behavioral: Negative for agitation and behavioral problems.     Objective:     Vital Signs (Most Recent):  Temp: 98 °F (36.7 °C) (11/18/20 0916)  Pulse: 76 (11/18/20 1022)  Resp: 18 (11/18/20 1032)  BP: (!) 140/58 (11/18/20 1022)  SpO2: 98 % (11/18/20 1022) Vital Signs (24h Range):  Temp:  [98 °F (36.7 °C)-99.3 °F (37.4 °C)] 98 °F (36.7 °C)  Pulse:  [] 76  Resp:  [16-68] 18  SpO2:  [81 %-100 %] 98 %  BP: (101-157)/(46-70) 140/58     Weight: (!) 142.4 kg (313 lb 15 oz)  Body mass index is 43.79 kg/m².    Intake/Output Summary (Last 24 hours) at 11/18/2020 1038  Last data filed at 11/18/2020 0916  Gross per 24 hour   Intake 1100 ml   Output 875 ml   Net 225 ml      Physical Exam  Vitals signs and nursing note reviewed.   Constitutional:       General: He is not in acute distress.     Appearance: Normal appearance. He is obese. He is not ill-appearing, toxic-appearing or diaphoretic.   HENT:      Head: Normocephalic and atraumatic.   Neck:      Musculoskeletal: Normal range of motion.   Cardiovascular:      Rate and Rhythm: Normal rate and regular rhythm.      Heart sounds: No murmur. No gallop.    Pulmonary:      Effort: Pulmonary effort is normal. No respiratory distress.      Breath sounds: No wheezing or rales.   Abdominal:      General: Abdomen is flat. Bowel sounds are normal. There is no distension.      Palpations: There is no mass.      Tenderness: There is no abdominal tenderness. There is no guarding.      Hernia: No hernia is present.   Skin:     General: Skin is warm.      Comments: Cellulitis of abdominal wall RLQ. Bandaged.   Neurological:      Mental Status: He is alert and oriented to person, place, and time.   Psychiatric:         Mood and Affect: Mood normal.         Behavior: Behavior normal.         Thought Content: Thought content normal.         Significant Labs:   BMP:   Recent Labs   Lab 11/18/20  0448   GLU 99      K 3.6   CL 99   CO2 33*   BUN                             Observation:   Cervical: forward head  Shoulder: rounded  Spine: decreased lumbar lordosis  Seated Posture: fair  Standing Posture: poor  Observed Gait:   Assistive Device: no assistive device  Weight bearing: Left: full  Right: full    Analysis: decreased vivek/pace;    • Left: decreased arm swing, decreased stride length, decreased lumbar pelvic rotation and decreased hip extension    • Right: decreased arm swing, decreased stride length, decreased lumbar pelvic rotation and decreased hip extension    Range of Motion (ROM)   (degrees unless noted; active unless noted; norms in ( ); negative=lacking to 0, positive=beyond 0)   WFL: RLE, LLE  Lumbar:    - Flexion (60-80):  30°     - Extension (25):  10°     - Rotation (30-45):        • Left:  10°         • Right:  10°     - Side Bend (25-35):        • Left:  10°         • Right:  10°   Impairment Level:       - Flexion: moderate impairment    - Extension: moderate impairment    - Rotation:        • Left: moderate impairment         • Right: moderate impairment     - Side Bend:        • Left: moderate impairment         • Right: moderate impairment     Strength  (out of 5 unless noted, standard test position unless noted, lbs tested with hand held dynamometer)   Hip:    - Flexion:        • Left: 3+        • Right: 3+    - Extension:        • Left: 3+        • Right: 3+    - Abduction:        • Left: 3+        • Right: 3+    - Adduction:        • Left: 3+        • Right: 3+    - Internal Rotation:        • Left: 3        • Right: 3    - External Rotation:        • Left: 3        • Right: 3  Knee:    - Flexion:        • Left: 3+        • Right: 3+    - Extension:        • Left: 3+        • Right: 3+  Ankle:    - Dorsiflexion:        • Left: 3+        • Right: 3+    - Plantar Flexion:        • Left: 3+        • Right: 3+  Lumbar:    - Upper Abdominals: 3     - Lower Abdominals: 3     - Trunk Extensors: 3      Palpation:   Left Lower  8   CREATININE 0.7   CALCIUM 9.3   MG 2.2     CBC:   Recent Labs   Lab 11/16/20  2122 11/17/20  0630 11/18/20  0448   WBC 15.49* 10.85 7.77   HGB 10.4* 10.0* 10.1*   HCT 31.8* 31.5* 31.4*    275 347       Significant Imaging:   Ultrasound- no abscess.         Assessment/Plan:      * Cellulitis, abdominal wall  With likely abscess. Ultrasound ordered. Sepsis not present. Patient non-ill appearing. Blood cultures are NG. Vanc and Zosyn for now. Neuro surgery consulted.     Staph A growing in wound cultures. Blood cx NG. Likely will be MRSA. DC zosyn. Continue Vanc.         Surgical wound infection  Intrathecal pump site infection- Abx. Neuro surgery       Anemia of chronic disease  No acute issues.       Chronic pain  As noted       Peripheral artery disease  No acute issues       BMI 40.0-44.9, adult  Body mass index is 44.03 kg/m².  Weight loss as out patient       Seizure  Not on seizure meds. Will discuss      Essential hypertension  No acute issues         VTE Risk Mitigation (From admission, onward)         Ordered     enoxaparin injection 40 mg  Every 24 hours      11/17/20 1041     Place GUSTAVO hose  Until discontinued      11/17/20 0006     IP VTE HIGH RISK PATIENT  Once      11/17/20 0006     Place sequential compression device  Until discontinued      11/17/20 0006                Discharge Planning   MIRZA:      Code Status: Full Code   Is the patient medically ready for discharge?:     Reason for patient still in hospital (select all that apply): Patient unstable  Discharge Plan A: Home with family      Patient is a border in ICU.    Surgery today. Continue Abx.     Rich Pascual MD  Department of Hospital Medicine   [facility]     Extremity: Gluteus Bernard: hypotonic, spasm;   Right Lower Extremity: Lumbar Paraspinals: hypotonic, spasm, tender; Quadratus Lumborum: hypotonic, spasm, tender; Gluteus Bernard: hypotonic, spasm, tender;   Spine - Left: Lumbar Paraspinals:hypotonic, spasm and tender; Quadratus Lumborum:hypotonic, spasm and tender;     Muscle Flexibility:   - Hip Abductors: Left: normal Right: normal  - Hip Adductors: Left: normal Right: normal  - Hip Flexors: Left: moderate limitation Right: moderate limitation  - Hip Extensors: Left: moderate limitation Right: moderate limitation  - Hip External Rotators: Left: moderate limitation Right: moderate limitation  - Hip Internal Rotators: Left: moderate limitation Right: moderate limitation  - Knee Flexors: Left: normal Right: normal  - Knee Extensors: Left: normal Right: normal   - Plantar Flexors: Left: normal Right: normal       Bed Mobility:     Rolling left assist level:  Modified independent    Rolling right assist level:  Modified independent    Sit to supine assist level:  Modified independent    Supine to sit assist level:  Modified independent  Transfers:     Sit to stand assist level:  Independent    Stand to sit assist level:  Independent    Balance Tests:   5 Times Sit to Stand: 33 sec    Greater than 16 seconds indicates fall risk in people with Parkinson's  10 Meter Walk Test, comfortable pace:     trial 1: 5 sec, 1 trial: 1.2 M/sec  Outcome Measures:   OSWESTRY Total Scored: 18  OSWESTRY Total Possible Score: 50  OSWESTRY Score Calculated: 36 %  (0-20% = minimal disability; 20-40% = moderate disability; 40-60% = severe disability; 60-80% = crippled; % = bed bound) see flowsheet for additional documentation    TREATMENT                                                                                                                initial evaluation completed    Therapeutic Exercise:  Sit:  Sciatic nerve floss 5 reps 5 SH  Pelvic tilts 10 reps 5 SH  Trunk rotation 5  reps 5 SH    Supine:   LTR 5 reps 5 SH  Sciatic nerve floss 3 reps 10 SH  Bridging 5 reps 5 SH  SKTC 5 reps 5 SH        Therapeutic Activity:  HEP compliance, postural awareness, body mechanics    Skilled input: verbal instruction/cues, tactile instruction/cues and posture correction    Writer verbally educated and received verbal consent for hand placement, positioning of patient, and techniques to be performed today from patient for clothing adjustments for techniques, therapist position for techniques and hand placement and palpation for techniques as described above and how they are pertinent to the patient's plan of care.    Home Exercise Program/Education Materials: HEP printout     ASSESSMENT                                                                                                           54 year old male patient has reported functional limitations listed above impacted by signs and symptoms consistent with below   • Involved: lumbar      - left lower extremity   • Symptoms/impairments: increased pain/symptoms, impaired posture, impaired range of motion, impaired muscle length/flexibility, impaired mobility, impaired strength, impaired activity tolerance, impaired coordination and impaired body mechanics  PT and patient both donned proper PPE  Presented to OPPT with c/o LBP radiating sx (L) thigh. Hx of sudden onset 1-24-22, had seen ortho and had 2 acupuncture sessions without relief, latest 9-6-22, scheduled to see pain management on 9-20-22. MRI 3-28-22 showed IMPRESSION: 1.   Multilevel degenerative changes with mild spinal canal narrowing at L2-L3. 2.   Mild to moderate left neural foraminal stenosis at L5-S1.   Ambulatory without AD, postural deviations mentioned above  Upon evaluation, presented to OPPT with LOM Lumbar all planes of motion with 3/5 trunk, abdominal muscle strength. BLE ROM is WFL with 3+/5 strength. Tenderness to palpation bilateral Lumbar paraspinal, gluteal region. Patient  tolerated POC with rest as needed, difficulties with SLR nerve flossing (+) min assist. Patient will benefit with aquatic therapy as well as land session to address deficits mentioned above. Patient was motivated and wiling to improve.   Prognosis: patient will benefit from skilled therapy  Rehabilitative potential is: good     • Predicted patient presentation: Low (stable) - Patient comorbidities and complexities, as defined above, will have little effect on progress for prescribed plan of care.  Patient Education:   Who will be receiving education: patient  Are they ready to learn: yes  Preferred learning style: written, verbal and demonstration  Barriers to learning: no barriers apparent at this time  Results of above outlined education: Verbalizes understanding and Demonstrates understanding      PLAN                                                                                                                         The following skilled interventions to be implemented to achieve goals listed below:Activities of Daily Living/Self Care (25333)  Gait Training (29988)  Manual Therapy (58430)  Neuromuscular Re-Education (76335)  Therapeutic Activity (08838)  Electrical Stimulation (unattended, 52470 or )  Electrical Stimulation (attended, 98780)  Mechanical Traction (68875)  Aquatic Therapy (59849)  Performance Test or Measure (44094)  Frequency / Duration: 2 times per week tapering as patient progresses for an estimated total of 5 visits for 2 weeks    Patient involved in and agreed to plan of care and goals.  Patient given attendance policy at time of initial evaluation. and Attendance policy reviewed with patient.  Suggestions for next session as indicated: Progress per plan of care      GOALS                                                                                                                           Long Term Goals: to be met by end of plan of care  1. Improve Oswestry score to 30% to  improve ability to perform functional activities  2. Increase ROM Lumbar all planes of motion to WFL to improve bed mobility, ADL, IADL  4. Increase trunk, abdominal muscle strength to 3+/5 to improve tolerance with standing, walking 30 minutes, ADLs  5. Improve 5 sit to stand score to <12 sec to improve transfers in upright position  6. Indept with initial and modified HEP to safely and correctly manage condition at home and in community      Therapy procedure time and total treatment time can be found documented on the Time Entry flowsheet

## 2022-09-17 DIAGNOSIS — R09.81 SINUS CONGESTION: ICD-10-CM

## 2022-09-17 DIAGNOSIS — R09.82 PND (POST-NASAL DRIP): ICD-10-CM

## 2022-09-17 NOTE — TELEPHONE ENCOUNTER
No new care gaps identified.  [facility] Embedded Care Gaps. Reference number: [id number]. 9/17/2022   10:22:54 AM STEPHENT

## 2022-09-18 NOTE — TELEPHONE ENCOUNTER
Refill Routing Note   Medication(s) are not appropriate for processing by [facility] for the following reason(s):      - Medication not previously prescribed by PCP  - Medication is a new start (<3 months)    [facility] action(s):  Defer          Medication reconciliation completed: No     Appointments  past 12m or future 3m with PCP    Date Provider   Last Visit   5/18/2022 Gabby Dean MD   Next Visit   Visit date not found Gabby Dean MD   ED visits in past 90 days: 0        Note composed:8:37 PM 09/17/2022

## 2022-09-19 RX ORDER — FLUTICASONE PROPIONATE 50 MCG
SPRAY, SUSPENSION (ML) NASAL
Qty: 16 G | Refills: 1 | OUTPATIENT
Start: 2022-09-19

## 2023-01-03 DIAGNOSIS — M47.816 FACET ARTHROPATHY, LUMBAR: ICD-10-CM

## 2023-01-04 RX ORDER — MELOXICAM 15 MG/1
15 TABLET ORAL DAILY
Qty: 90 TABLET | Refills: 0 | Status: SHIPPED | OUTPATIENT
Start: 2023-01-04 | End: 2023-04-01 | Stop reason: SDUPTHER

## 2023-01-04 NOTE — TELEPHONE ENCOUNTER
Please redo prescription/ last approved script was on 12/30/2022 by Dr. To but as a print out that patient did not receive/ please send script electronically to [facility]

## 2023-01-05 NOTE — TELEPHONE ENCOUNTER
----- Message from Claudia Jones sent at 1/5/2023 10:28 AM CST -----  Regarding: Rx consult  Contact: JAIME LUND [[id number]]  Name of Who is Calling: Jaime Lund            What is the request in detail: Pt states he will be out of rx   meloxicam (MOBIC) today and is requesting staff to send rx over to [facility] today. Please advise           Can the clinic reply by [facility]:no          What Number to Call Back if not in [facility]:[phone]

## 2023-01-08 ENCOUNTER — PATIENT MESSAGE (OUTPATIENT)
Dept: FAMILY MEDICINE | Facility: CLINIC | Age: 53
End: 2023-01-08
Payer: COMMERCIAL

## 2023-01-09 ENCOUNTER — OFFICE VISIT (OUTPATIENT)
Dept: FAMILY MEDICINE | Facility: CLINIC | Age: 53
End: 2023-01-09
Payer: COMMERCIAL

## 2023-01-09 ENCOUNTER — PATIENT MESSAGE (OUTPATIENT)
Dept: FAMILY MEDICINE | Facility: CLINIC | Age: 53
End: 2023-01-09

## 2023-01-09 ENCOUNTER — LAB VISIT (OUTPATIENT)
Dept: LAB | Facility: HOSPITAL | Age: 53
End: 2023-01-09
Payer: COMMERCIAL

## 2023-01-09 VITALS
WEIGHT: 304 LBS | SYSTOLIC BLOOD PRESSURE: 116 MMHG | RESPIRATION RATE: 16 BRPM | HEIGHT: 71 IN | BODY MASS INDEX: 42.56 KG/M2 | TEMPERATURE: 98 F | HEART RATE: 64 BPM | OXYGEN SATURATION: 98 % | DIASTOLIC BLOOD PRESSURE: 70 MMHG

## 2023-01-09 DIAGNOSIS — E66.01 MORBID OBESITY: ICD-10-CM

## 2023-01-09 DIAGNOSIS — I73.9 PERIPHERAL ARTERY DISEASE: ICD-10-CM

## 2023-01-09 DIAGNOSIS — F11.20 UNCOMPLICATED OPIOID DEPENDENCE: ICD-10-CM

## 2023-01-09 DIAGNOSIS — Z91.89: Primary | ICD-10-CM

## 2023-01-09 DIAGNOSIS — F39 MOOD DISORDER: ICD-10-CM

## 2023-01-09 DIAGNOSIS — Z91.89: ICD-10-CM

## 2023-01-09 PROBLEM — I95.9 HYPOTENSION: Status: RESOLVED | Noted: 2022-05-07 | Resolved: 2023-01-09

## 2023-01-09 PROBLEM — Z86.69 HISTORY OF ENCEPHALOPATHY: Status: ACTIVE | Noted: 2022-05-08

## 2023-01-09 PROBLEM — N17.9 AKI (ACUTE KIDNEY INJURY): Status: RESOLVED | Noted: 2022-05-07 | Resolved: 2023-01-09

## 2023-01-09 PROBLEM — T85.738A: Status: RESOLVED | Noted: 2022-01-21 | Resolved: 2023-01-09

## 2023-01-09 PROBLEM — E87.29 HIGH ANION GAP METABOLIC ACIDOSIS: Status: RESOLVED | Noted: 2022-05-07 | Resolved: 2023-01-09

## 2023-01-09 PROBLEM — M51.369 DEGENERATION OF LUMBAR INTERVERTEBRAL DISC: Status: ACTIVE | Noted: 2017-03-31

## 2023-01-09 PROBLEM — Z86.14 HISTORY OF METHICILLIN RESISTANT STAPHYLOCOCCUS AUREUS INFECTION: Status: ACTIVE | Noted: 2021-09-30

## 2023-01-09 PROBLEM — M51.36 DEGENERATION OF LUMBAR INTERVERTEBRAL DISC: Status: ACTIVE | Noted: 2017-03-31

## 2023-01-09 PROBLEM — Z86.19 HISTORY OF SEPSIS: Status: ACTIVE | Noted: 2022-05-08

## 2023-01-09 PROBLEM — G89.29 CHRONIC PAIN: Status: RESOLVED | Noted: 2018-04-03 | Resolved: 2023-01-09

## 2023-01-09 LAB
ALBUMIN SERPL BCP-MCNC: 3.5 G/DL (ref 3.5–5.2)
ALP SERPL-CCNC: 63 U/L (ref 55–135)
ALT SERPL W/O P-5'-P-CCNC: 12 U/L (ref 10–44)
ANION GAP SERPL CALC-SCNC: 10 MMOL/L (ref 8–16)
AST SERPL-CCNC: 19 U/L (ref 10–40)
BASOPHILS # BLD AUTO: 0.03 K/UL (ref 0–0.2)
BASOPHILS NFR BLD: 0.7 % (ref 0–1.9)
BILIRUB SERPL-MCNC: 0.3 MG/DL (ref 0.1–1)
BUN SERPL-MCNC: 12 MG/DL (ref 6–20)
CALCIUM SERPL-MCNC: 9.5 MG/DL (ref 8.7–10.5)
CHLORIDE SERPL-SCNC: 101 MMOL/L (ref 95–110)
CO2 SERPL-SCNC: 29 MMOL/L (ref 23–29)
CREAT SERPL-MCNC: 0.8 MG/DL (ref 0.5–1.4)
DIFFERENTIAL METHOD: ABNORMAL
EOSINOPHIL # BLD AUTO: 0 K/UL (ref 0–0.5)
EOSINOPHIL NFR BLD: 0.5 % (ref 0–8)
ERYTHROCYTE [DISTWIDTH] IN BLOOD BY AUTOMATED COUNT: 17.2 % (ref 11.5–14.5)
EST. GFR  (NO RACE VARIABLE): >60 ML/MIN/1.73 M^2
GLUCOSE SERPL-MCNC: 96 MG/DL (ref 70–110)
HCT VFR BLD AUTO: 37.3 % (ref 40–54)
HGB BLD-MCNC: 11.1 G/DL (ref 14–18)
IMM GRANULOCYTES # BLD AUTO: 0 K/UL (ref 0–0.04)
IMM GRANULOCYTES NFR BLD AUTO: 0 % (ref 0–0.5)
LYMPHOCYTES # BLD AUTO: 1.4 K/UL (ref 1–4.8)
LYMPHOCYTES NFR BLD: 31.9 % (ref 18–48)
MAGNESIUM SERPL-MCNC: 2 MG/DL (ref 1.6–2.6)
MCH RBC QN AUTO: 24.4 PG (ref 27–31)
MCHC RBC AUTO-ENTMCNC: 29.8 G/DL (ref 32–36)
MCV RBC AUTO: 82 FL (ref 82–98)
MONOCYTES # BLD AUTO: 0.4 K/UL (ref 0.3–1)
MONOCYTES NFR BLD: 8.9 % (ref 4–15)
NEUTROPHILS # BLD AUTO: 2.5 K/UL (ref 1.8–7.7)
NEUTROPHILS NFR BLD: 58 % (ref 38–73)
NRBC BLD-RTO: 0 /100 WBC
PHOSPHATE SERPL-MCNC: 3.4 MG/DL (ref 2.7–4.5)
PLATELET # BLD AUTO: 263 K/UL (ref 150–450)
PMV BLD AUTO: 10.6 FL (ref 9.2–12.9)
POTASSIUM SERPL-SCNC: 3.8 MMOL/L (ref 3.5–5.1)
PROT SERPL-MCNC: 7.2 G/DL (ref 6–8.4)
RBC # BLD AUTO: 4.54 M/UL (ref 4.6–6.2)
SODIUM SERPL-SCNC: 140 MMOL/L (ref 136–145)
WBC # BLD AUTO: 4.26 K/UL (ref 3.9–12.7)

## 2023-01-09 PROCEDURE — 3008F BODY MASS INDEX DOCD: CPT | Mod: CPTII,S$GLB,, | Performed by: NURSE PRACTITIONER

## 2023-01-09 PROCEDURE — 1160F PR REVIEW ALL MEDS BY PRESCRIBER/CLIN PHARMACIST DOCUMENTED: ICD-10-PCS | Mod: CPTII,S$GLB,, | Performed by: NURSE PRACTITIONER

## 2023-01-09 PROCEDURE — 85025 COMPLETE CBC W/AUTO DIFF WBC: CPT | Performed by: NURSE PRACTITIONER

## 2023-01-09 PROCEDURE — 1159F PR MEDICATION LIST DOCUMENTED IN MEDICAL RECORD: ICD-10-PCS | Mod: CPTII,S$GLB,, | Performed by: NURSE PRACTITIONER

## 2023-01-09 PROCEDURE — 82306 VITAMIN D 25 HYDROXY: CPT | Performed by: NURSE PRACTITIONER

## 2023-01-09 PROCEDURE — 84100 ASSAY OF PHOSPHORUS: CPT | Performed by: NURSE PRACTITIONER

## 2023-01-09 PROCEDURE — 1160F RVW MEDS BY RX/DR IN RCRD: CPT | Mod: CPTII,S$GLB,, | Performed by: NURSE PRACTITIONER

## 2023-01-09 PROCEDURE — 83735 ASSAY OF MAGNESIUM: CPT | Performed by: NURSE PRACTITIONER

## 2023-01-09 PROCEDURE — 1159F MED LIST DOCD IN RCRD: CPT | Mod: CPTII,S$GLB,, | Performed by: NURSE PRACTITIONER

## 2023-01-09 PROCEDURE — 3078F PR MOST RECENT DIASTOLIC BLOOD PRESSURE < 80 MM HG: ICD-10-PCS | Mod: CPTII,S$GLB,, | Performed by: NURSE PRACTITIONER

## 2023-01-09 PROCEDURE — 36415 COLL VENOUS BLD VENIPUNCTURE: CPT | Mod: PO | Performed by: NURSE PRACTITIONER

## 2023-01-09 PROCEDURE — 82607 VITAMIN B-12: CPT | Performed by: NURSE PRACTITIONER

## 2023-01-09 PROCEDURE — 80053 COMPREHEN METABOLIC PANEL: CPT | Performed by: NURSE PRACTITIONER

## 2023-01-09 PROCEDURE — 99214 OFFICE O/P EST MOD 30 MIN: CPT | Mod: S$GLB,,, | Performed by: NURSE PRACTITIONER

## 2023-01-09 PROCEDURE — 3074F PR MOST RECENT SYSTOLIC BLOOD PRESSURE < 130 MM HG: ICD-10-PCS | Mod: CPTII,S$GLB,, | Performed by: NURSE PRACTITIONER

## 2023-01-09 PROCEDURE — 99214 PR OFFICE/OUTPT VISIT, EST, LEVL IV, 30-39 MIN: ICD-10-PCS | Mod: S$GLB,,, | Performed by: NURSE PRACTITIONER

## 2023-01-09 PROCEDURE — 99999 PR PBB SHADOW E&M-EST. PATIENT-LVL V: CPT | Mod: PBBFAC,,, | Performed by: NURSE PRACTITIONER

## 2023-01-09 PROCEDURE — 3078F DIAST BP <80 MM HG: CPT | Mod: CPTII,S$GLB,, | Performed by: NURSE PRACTITIONER

## 2023-01-09 PROCEDURE — 3008F PR BODY MASS INDEX (BMI) DOCUMENTED: ICD-10-PCS | Mod: CPTII,S$GLB,, | Performed by: NURSE PRACTITIONER

## 2023-01-09 PROCEDURE — 3074F SYST BP LT 130 MM HG: CPT | Mod: CPTII,S$GLB,, | Performed by: NURSE PRACTITIONER

## 2023-01-09 PROCEDURE — 99999 PR PBB SHADOW E&M-EST. PATIENT-LVL V: ICD-10-PCS | Mod: PBBFAC,,, | Performed by: NURSE PRACTITIONER

## 2023-01-09 RX ORDER — POTASSIUM CHLORIDE 750 MG/1
TABLET, EXTENDED RELEASE ORAL
COMMUNITY

## 2023-01-09 RX ORDER — NALOXONE HYDROCHLORIDE 4 MG/.1ML
SPRAY NASAL
COMMUNITY

## 2023-01-09 RX ORDER — METHOCARBAMOL 750 MG/1
750 TABLET, FILM COATED ORAL 4 TIMES DAILY PRN
COMMUNITY
Start: 2022-12-22 | End: 2023-03-22

## 2023-01-09 RX ORDER — FUROSEMIDE 40 MG/1
TABLET ORAL
COMMUNITY
End: 2023-09-20

## 2023-01-09 NOTE — PROGRESS NOTES
Ate [place] chicken pot pie. Vomiting and diarrhea all night. [facility] come to home last night and gave him IV fluids, 2 Lactated ringers, Vitamin B complex and vitamin C

## 2023-01-09 NOTE — PROGRESS NOTES
Subjective:      Patient ID: Jaime Lund is a 52 y.o. male.  New to me but seen previously in clinic by a fellow provider. Pt presents to clinic with new fatigue and weakness after being treated at home yesterday by YULI[facility] [place] ~1wk ago, began with acute gastro symptoms, feels he had food poisoning, had N/V/D, home covid negative, given 2L LR with lytes. N/V/D resolved over 2 days ago. Today with persistent fatigue and weakness with clamminess. Denies fever or acute pain. Of note, he is under the care of pain management for chronic pain, taking daily opioid therapy without complication.      Fatigue  This is a new problem. The current episode started in the past 7 days. The problem occurs constantly. The problem has been unchanged. Associated symptoms include arthralgias, diaphoresis, fatigue and weakness. Pertinent negatives include no abdominal pain, anorexia, change in bowel habit, chest pain, chills, congestion, coughing, fever, headaches, joint swelling, myalgias, nausea, neck pain, numbness, rash, sore throat, swollen glands, urinary symptoms, vertigo, visual change or vomiting. Nothing aggravates the symptoms. He has tried rest (IV hydration) for the symptoms. The treatment provided no relief.   Review of Systems   Constitutional:  Positive for diaphoresis and fatigue. Negative for activity change, appetite change, chills, fever and unexpected weight change.   HENT:  Negative for nasal congestion, ear pain, postnasal drip, rhinorrhea, sinus pressure/congestion, sneezing, sore throat and voice change.    Eyes:  Negative for pain and visual disturbance.   Respiratory:  Negative for cough, chest tightness and shortness of breath.    Cardiovascular:  Negative for chest pain, palpitations and leg swelling.   Gastrointestinal:  Negative for abdominal pain, anorexia, change in bowel habit, constipation, diarrhea, nausea, vomiting and change in bowel habit.   Endocrine: Negative.    Genitourinary:   "Negative for difficulty urinating.   Musculoskeletal:  Positive for arthralgias and back pain. Negative for gait problem, joint swelling, leg pain, myalgias, neck pain, neck stiffness and joint deformity.   Integumentary:  Negative for color change and rash.   Allergic/Immunologic: Negative.    Neurological:  Positive for weakness. Negative for dizziness, vertigo, tremors, seizures, syncope, facial asymmetry, speech difficulty, light-headedness, numbness, headaches, coordination difficulties, memory loss and coordination difficulties.   Hematological: Negative.    Psychiatric/Behavioral: Negative.     All other systems reviewed and are negative.      Objective:     Vitals:    01/09/23 1045   BP: 116/70   Pulse: 64   Resp: 16   Temp: 98.4 °F (36.9 °C)   TempSrc: Oral   SpO2: 98%   Weight: (!) 137.9 kg (304 lb 0.2 oz)   Height: 5' 11" (1.803 m)     Physical Exam  Vitals and nursing note reviewed.   Constitutional:       General: He is not in acute distress.     Appearance: Normal appearance. He is well-developed and well-groomed. He is obese. He is not ill-appearing.   HENT:      Head: Normocephalic and atraumatic.      Right Ear: External ear normal.      Left Ear: External ear normal.      Nose: Nose normal.      Mouth/Throat:      Lips: Pink.      Mouth: Mucous membranes are moist.   Eyes:      General: Lids are normal. Vision grossly intact. Gaze aligned appropriately.      Conjunctiva/sclera: Conjunctivae normal.      Pupils: Pupils are equal, round, and reactive to light.   Neck:      Trachea: Phonation normal.   Cardiovascular:      Rate and Rhythm: Normal rate and regular rhythm.      Heart sounds: Normal heart sounds.   Pulmonary:      Effort: Pulmonary effort is normal. No accessory muscle usage or respiratory distress.      Breath sounds: Normal breath sounds and air entry. No wheezing or rales.   Abdominal:      General: Abdomen is flat. Bowel sounds are normal. There is no distension.      Palpations: " Abdomen is soft.      Tenderness: There is no abdominal tenderness.   Musculoskeletal:      Cervical back: Neck supple.      Right lower leg: No edema.      Left lower leg: No edema.   Skin:     General: Skin is warm and dry.      Findings: No rash.   Neurological:      General: No focal deficit present.      Mental Status: He is alert and oriented to person, place, and time. Mental status is at baseline.      Sensory: Sensation is intact.      Motor: Motor function is intact.      Coordination: Coordination is intact.      Gait: Gait is intact.   Psychiatric:         Attention and Perception: Attention and perception normal.         Mood and Affect: Mood and affect normal.         Speech: Speech normal.         Behavior: Behavior normal. Behavior is cooperative.         Thought Content: Thought content normal.         Cognition and Memory: Cognition and memory normal.         Judgment: Judgment normal.     Assessment and Plan:     1. Potential for fluid imbalance  Hemodynamically stable and neurologically intact  Written and verbal instructions provided  Further orders pending lab results.   - CBC Auto Differential; Future  - Comprehensive Metabolic Panel; Future  - Vitamin B12; Future  - Vitamin D; Future  - Magnesium; Future  - PHOSPHORUS; Future    2. Morbid obesity  The patient is asked to make an attempt to improve diet and exercise patterns to aid in medical management of this problem.    3. Mood disorder  Stable on SNRI daily without acute exacerbation     4. Peripheral artery disease  Without acute infection or wounds, followed by cards    5. Uncomplicated opioid dependence  On morphine, oxycodone, fiorcet and gabapentin daily, [place] reviewed without discrepancy, closely followed by pain management         KATLYN Baumann, FNP-C  Family/Internal Medicine  [facility]

## 2023-01-10 ENCOUNTER — PATIENT MESSAGE (OUTPATIENT)
Dept: FAMILY MEDICINE | Facility: CLINIC | Age: 53
End: 2023-01-10
Payer: COMMERCIAL

## 2023-01-10 DIAGNOSIS — D64.9 ANEMIA, UNSPECIFIED TYPE: Primary | ICD-10-CM

## 2023-01-10 LAB
25(OH)D3+25(OH)D2 SERPL-MCNC: 30 NG/ML (ref 30–96)
VIT B12 SERPL-MCNC: 409 PG/ML (ref 210–950)

## 2023-02-04 NOTE — PROGRESS NOTES
"Subjective:       Jaime Lund is a 52 y.o. male who is an established patient who was  referred by Dr Dean   for evaluation of low T.      He reported fatigue and muscle ache ("achy all the time" - chronic back pain) to PCP therefore T level checked and noted to be mildly low. No PSA in records. Denies ED or libido issues. Denies prostate issues. Grandfather with prostate cancer.     3/22 T - 260  3/22 T - 160, PSA - 0.17  6/22 T - 1257, PSA - 0.44 (injection done about 1 week before)     PMH:  HTN, obesity, chronic pain - sees pain management.      He has been doing self-injections of TRT (400mg monthly). Response is variable. Recent ankle surgery so unable to work x 1 month. Notes skin yeast infection in skin folds.     2/9/2023  Returns now with urinary issues. Lasix PRN, has not taken it recently. Main issue is weak stream and nocturia x 4-5. Present x 2 months. No LE edema currently (takes Lasix for this mainly in summer). +snoring, no known FABRIZIO. He notes weight changes affect snoring.  AUASS - 14/3  PVR (bladder scan) today - 0cc      The following portions of the patient's history were reviewed and updated as appropriate: allergies, current medications, past family history, past medical history, past social history, past surgical history and problem list.    Review of Systems  Twelve point review of systems completed. Pertinent positive and negatives listed in HPI.      Objective:    Vitals: Wt (!) 138.7 kg (305 lb 14.2 oz)   BMI 42.66 kg/m²     Physical Exam   General: well developed, well nourished in no acute distress  Head: normocephalic, atraumatic  Neck: supple, trachea midline, no obvious enlargement of thyroid  HEENT: EOMI, mucus membranes moist, sclera anicteric, no hearing impairment  Lungs: symmetric expansion, non-labored breathing  Skin: no rashes or lesions  Neuro: alert and oriented x 3, no gross deficits  Psych: normal judgment and insight, normal mood/affect and " non-anxious  Genitourinary: deferred   ANDREIA: deferred      Lab Review   Urine analysis today in clinic shows +protein trace     Lab Results   Component Value Date    WBC 4.26 01/09/2023    HGB 11.1 (L) 01/09/2023    HCT 37.3 (L) 01/09/2023    MCV 82 01/09/2023     01/09/2023     Lab Results   Component Value Date    CREATININE 0.8 01/09/2023    BUN 12 01/09/2023     No results found for: PSA  Lab Results   Component Value Date    PSADIAG 0.44 06/03/2022       Imaging  NA     Assessment/Plan:      1. Low testosterone in male    - Discussed hypogonadism, common symptoms, treatment options, and the risks and benefits of treatment.  His symptoms and blood tests support the diagnosis and therefore treatment is appropriate.   - Discussed the various risks associated with TRT, specifically a possible increase in risk of heart disease, MI, CVA, PE, DVT. Also discussed the issues related to testosterone replacement and prostate cancer. TRT does not increase his risk of developing cancer. Recommend annual ANDREIA and PSA for PCa screening. TRT recommended to stop if abnormal ANDREIA or elevated PSA.   - Treatment options reviewed: regular injections, topical treatments including gels and creams, and implants such as TestoPel.  Risks and benefits of each were reviewed specifically T fluctuations with q2-4week injections and risk of transfer of medication to other with topical application.     - TRT started 3/22 - 400mg IM q4 weeks. Doing well.    - PSA, T, CBC, lipid panel in 3 months     2. Fatigue, unspecified type     - Discussed multifactorial etiology      3. Skin yeast infection   - No relief from topical medications given by PCP   - Diflucan x 2 tabs q3d given. If no improvement, see PCP.    4. Nocturia   - No current LE edema   - Possible FABRIZIO may contribute   - Flomax trial    5. Weak stream   - Flomax        Follow up in 3 months with labs/PVR

## 2023-02-08 ENCOUNTER — TELEPHONE (OUTPATIENT)
Dept: UROLOGY | Facility: CLINIC | Age: 53
End: 2023-02-08
Payer: COMMERCIAL

## 2023-02-09 ENCOUNTER — OFFICE VISIT (OUTPATIENT)
Dept: UROLOGY | Facility: CLINIC | Age: 53
End: 2023-02-09
Payer: COMMERCIAL

## 2023-02-09 VITALS — BODY MASS INDEX: 42.66 KG/M2 | WEIGHT: 305.88 LBS

## 2023-02-09 DIAGNOSIS — R35.1 NOCTURIA: Primary | ICD-10-CM

## 2023-02-09 DIAGNOSIS — R39.12 WEAK URINARY STREAM: ICD-10-CM

## 2023-02-09 DIAGNOSIS — R79.89 LOW TESTOSTERONE IN MALE: ICD-10-CM

## 2023-02-09 PROCEDURE — 1160F RVW MEDS BY RX/DR IN RCRD: CPT | Mod: CPTII,S$GLB,, | Performed by: UROLOGY

## 2023-02-09 PROCEDURE — 99214 OFFICE O/P EST MOD 30 MIN: CPT | Mod: S$GLB,,, | Performed by: UROLOGY

## 2023-02-09 PROCEDURE — 99999 PR PBB SHADOW E&M-EST. PATIENT-LVL IV: ICD-10-PCS | Mod: PBBFAC,,, | Performed by: UROLOGY

## 2023-02-09 PROCEDURE — 99999 PR PBB SHADOW E&M-EST. PATIENT-LVL IV: CPT | Mod: PBBFAC,,, | Performed by: UROLOGY

## 2023-02-09 PROCEDURE — 1159F MED LIST DOCD IN RCRD: CPT | Mod: CPTII,S$GLB,, | Performed by: UROLOGY

## 2023-02-09 PROCEDURE — 3008F BODY MASS INDEX DOCD: CPT | Mod: CPTII,S$GLB,, | Performed by: UROLOGY

## 2023-02-09 PROCEDURE — 99214 PR OFFICE/OUTPT VISIT, EST, LEVL IV, 30-39 MIN: ICD-10-PCS | Mod: S$GLB,,, | Performed by: UROLOGY

## 2023-02-09 PROCEDURE — 1159F PR MEDICATION LIST DOCUMENTED IN MEDICAL RECORD: ICD-10-PCS | Mod: CPTII,S$GLB,, | Performed by: UROLOGY

## 2023-02-09 PROCEDURE — 1160F PR REVIEW ALL MEDS BY PRESCRIBER/CLIN PHARMACIST DOCUMENTED: ICD-10-PCS | Mod: CPTII,S$GLB,, | Performed by: UROLOGY

## 2023-02-09 PROCEDURE — 3008F PR BODY MASS INDEX (BMI) DOCUMENTED: ICD-10-PCS | Mod: CPTII,S$GLB,, | Performed by: UROLOGY

## 2023-02-09 RX ORDER — TAMSULOSIN HYDROCHLORIDE 0.4 MG/1
0.4 CAPSULE ORAL DAILY
Qty: 30 CAPSULE | Refills: 11 | Status: SHIPPED | OUTPATIENT
Start: 2023-02-09 | End: 2023-09-06

## 2023-03-22 ENCOUNTER — HOSPITAL ENCOUNTER (EMERGENCY)
Facility: HOSPITAL | Age: 53
Discharge: HOME | End: 2023-03-22
Attending: STUDENT IN AN ORGANIZED HEALTH CARE EDUCATION/TRAINING PROGRAM
Payer: COMMERCIAL

## 2023-03-22 VITALS
HEART RATE: 78 BPM | SYSTOLIC BLOOD PRESSURE: 172 MMHG | DIASTOLIC BLOOD PRESSURE: 82 MMHG | BODY MASS INDEX: 42.95 KG/M2 | OXYGEN SATURATION: 100 % | WEIGHT: 300 LBS | RESPIRATION RATE: 20 BRPM | HEIGHT: 70 IN | TEMPERATURE: 98 F

## 2023-03-22 DIAGNOSIS — S92.404A CLOSED NONDISPLACED FRACTURE OF PHALANX OF RIGHT GREAT TOE, UNSPECIFIED PHALANX, INITIAL ENCOUNTER: ICD-10-CM

## 2023-03-22 DIAGNOSIS — T14.90XA BLUNT TRAUMA: ICD-10-CM

## 2023-03-22 DIAGNOSIS — T14.8XXA ABRASION: ICD-10-CM
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
## 2023-03-22 DIAGNOSIS — S39.012A BACK STRAIN, INITIAL ENCOUNTER: ICD-10-CM

## 2023-03-22 LAB
ALBUMIN SERPL BCP-MCNC: 4.1 G/DL (ref 3.5–5.2)
ALP SERPL-CCNC: 80 U/L (ref 55–135)
ALT SERPL W/O P-5'-P-CCNC: 22 U/L (ref 10–44)
ANION GAP SERPL CALC-SCNC: 11 MMOL/L (ref 8–16)
AST SERPL-CCNC: 38 U/L (ref 10–40)
BASOPHILS # BLD AUTO: 0.03 K/UL (ref 0–0.2)
BASOPHILS NFR BLD: 0.4 % (ref 0–1.9)
BILIRUB SERPL-MCNC: 0.3 MG/DL (ref 0.1–1)
BUN SERPL-MCNC: 12 MG/DL (ref 6–20)
CALCIUM SERPL-MCNC: 9.1 MG/DL (ref 8.7–10.5)
CHLORIDE SERPL-SCNC: 104 MMOL/L (ref 95–110)
CO2 SERPL-SCNC: 25 MMOL/L (ref 23–29)
CREAT SERPL-MCNC: 0.9 MG/DL (ref 0.5–1.4)
DIFFERENTIAL METHOD: ABNORMAL
EOSINOPHIL # BLD AUTO: 0 K/UL (ref 0–0.5)
EOSINOPHIL NFR BLD: 0.4 % (ref 0–8)
ERYTHROCYTE [DISTWIDTH] IN BLOOD BY AUTOMATED COUNT: 17.2 % (ref 11.5–14.5)
EST. GFR  (NO RACE VARIABLE): >60 ML/MIN/1.73 M^2
GLUCOSE SERPL-MCNC: 91 MG/DL (ref 70–110)
HCT VFR BLD AUTO: 33.6 % (ref 40–54)
HGB BLD-MCNC: 10.6 G/DL (ref 14–18)
IMM GRANULOCYTES # BLD AUTO: 0.03 K/UL (ref 0–0.04)
IMM GRANULOCYTES NFR BLD AUTO: 0.4 % (ref 0–0.5)
INR PPP: 1 (ref 0.8–1.2)
LYMPHOCYTES # BLD AUTO: 1.4 K/UL (ref 1–4.8)
LYMPHOCYTES NFR BLD: 16.9 % (ref 18–48)
MCH RBC QN AUTO: 26 PG (ref 27–31)
MCHC RBC AUTO-ENTMCNC: 31.5 G/DL (ref 32–36)
MCV RBC AUTO: 82 FL (ref 82–98)
MONOCYTES # BLD AUTO: 0.6 K/UL (ref 0.3–1)
MONOCYTES NFR BLD: 7.7 % (ref 4–15)
NEUTROPHILS # BLD AUTO: 6.2 K/UL (ref 1.8–7.7)
NEUTROPHILS NFR BLD: 74.2 % (ref 38–73)
NRBC BLD-RTO: 0 /100 WBC
PLATELET # BLD AUTO: 323 K/UL (ref 150–450)
PMV BLD AUTO: 9.5 FL (ref 9.2–12.9)
POTASSIUM SERPL-SCNC: 3.4 MMOL/L (ref 3.5–5.1)
PROT SERPL-MCNC: 7.1 G/DL (ref 6–8.4)
PROTHROMBIN TIME: 10.7 SEC (ref 9–12.5)
RBC # BLD AUTO: 4.08 M/UL (ref 4.6–6.2)
SODIUM SERPL-SCNC: 140 MMOL/L (ref 136–145)
WBC # BLD AUTO: 8.36 K/UL (ref 3.9–12.7)

## 2023-03-22 PROCEDURE — 25000003 PHARM REV CODE 250: Performed by: STUDENT IN AN ORGANIZED HEALTH CARE EDUCATION/TRAINING PROGRAM

## 2023-03-22 PROCEDURE — 96374 THER/PROPH/DIAG INJ IV PUSH: CPT | Mod: 59

## 2023-03-22 PROCEDURE — 85025 COMPLETE CBC W/AUTO DIFF WBC: CPT | Performed by: STUDENT IN AN ORGANIZED HEALTH CARE EDUCATION/TRAINING PROGRAM

## 2023-03-22 PROCEDURE — 85610 PROTHROMBIN TIME: CPT | Performed by: STUDENT IN AN ORGANIZED HEALTH CARE EDUCATION/TRAINING PROGRAM

## 2023-03-22 PROCEDURE — 25500020 PHARM REV CODE 255: Performed by: STUDENT IN AN ORGANIZED HEALTH CARE EDUCATION/TRAINING PROGRAM

## 2023-03-22 PROCEDURE — 99285 EMERGENCY DEPT VISIT HI MDM: CPT | Mod: 25

## 2023-03-22 PROCEDURE — 96375 TX/PRO/DX INJ NEW DRUG ADDON: CPT

## 2023-03-22 PROCEDURE — 63600175 PHARM REV CODE 636 W HCPCS: Performed by: STUDENT IN AN ORGANIZED HEALTH CARE EDUCATION/TRAINING PROGRAM

## 2023-03-22 PROCEDURE — 80053 COMPREHEN METABOLIC PANEL: CPT | Performed by: STUDENT IN AN ORGANIZED HEALTH CARE EDUCATION/TRAINING PROGRAM

## 2023-03-22 RX ORDER — HYDROMORPHONE HYDROCHLORIDE 1 MG/ML
1 INJECTION, SOLUTION INTRAMUSCULAR; INTRAVENOUS; SUBCUTANEOUS
Status: COMPLETED | OUTPATIENT
Start: 2023-03-22 | End: 2023-03-22

## 2023-03-22 RX ORDER — MORPHINE SULFATE 4 MG/ML
8 INJECTION, SOLUTION INTRAMUSCULAR; INTRAVENOUS
Status: COMPLETED | OUTPATIENT
Start: 2023-03-22 | End: 2023-03-22

## 2023-03-22 RX ORDER — IBUPROFEN 600 MG/1
600 TABLET ORAL EVERY 6 HOURS PRN
Qty: 20 TABLET | Refills: 0 | Status: SHIPPED | OUTPATIENT
Start: 2023-03-22 | End: 2023-06-20

## 2023-03-22 RX ORDER — BACITRACIN ZINC 500 UNIT/G
OINTMENT (GRAM) TOPICAL 2 TIMES DAILY
Qty: 113 G | Refills: 0 | Status: SHIPPED | OUTPATIENT
Start: 2023-03-22 | End: 2023-10-23

## 2023-03-22 RX ORDER — BACITRACIN 500 [USP'U]/G
OINTMENT TOPICAL ONCE
Status: COMPLETED | OUTPATIENT
Start: 2023-03-22 | End: 2023-03-22

## 2023-03-22 RX ORDER — ACETAMINOPHEN 500 MG
500 TABLET ORAL EVERY 6 HOURS PRN
Qty: 20 TABLET | Refills: 0 | Status: SHIPPED | OUTPATIENT
Start: 2023-03-22 | End: 2023-06-20

## 2023-03-22 RX ORDER — METHOCARBAMOL 500 MG/1
1000 TABLET, FILM COATED ORAL 3 TIMES DAILY
Qty: 30 TABLET | Refills: 0 | Status: SHIPPED | OUTPATIENT
Start: 2023-03-22 | End: 2023-03-27

## 2023-03-22 RX ADMIN — MORPHINE SULFATE 8 MG: 4 INJECTION INTRAVENOUS at 07:03

## 2023-03-22 RX ADMIN — IOHEXOL 100 ML: 350 INJECTION, SOLUTION INTRAVENOUS at 08:03

## 2023-03-22 RX ADMIN — HYDROMORPHONE HYDROCHLORIDE 1 MG: 1 INJECTION, SOLUTION INTRAMUSCULAR; INTRAVENOUS; SUBCUTANEOUS at 08:03

## 2023-03-22 RX ADMIN — BACITRACIN: 500 OINTMENT TOPICAL at 09:03

## 2023-03-22 NOTE — Clinical Note
"Jaime"Michael Lund was seen and treated in our emergency department on 3/22/2023.  He may return to work on 03/25/2023.       If you have any questions or concerns, please don't hesitate to call.      Bandar Montiel MD"
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
## 2023-03-23 NOTE — DISCHARGE INSTRUCTIONS
Thank you for coming to our Emergency Department today. It is important to remember that some problems are difficult to diagnose and may not be found during your first visit. Be sure to follow up with your primary care doctor and review any labs/imaging that was performed with them. If you do not have a primary care doctor, you may contact the one listed on your discharge paperwork or you may also call the [facility] Appointment Desk at [phone] to schedule an appointment with one.     All medications may potentially have side effects and it is impossible to predict which medications may give you side effects. If you feel that you are having a negative effect of any medication you should immediately stop taking them and seek medical attention.    Return to the ER with any questions/concerns, new/concerning symptoms, worsening or failure to improve. Do not drive or make any important decisions for 24 hours if you have received any pain medications, sedatives or mood altering drugs during your ER visit.
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
## 2023-03-27 ENCOUNTER — PATIENT MESSAGE (OUTPATIENT)
Dept: FAMILY MEDICINE | Facility: CLINIC | Age: 53
End: 2023-03-27
Payer: COMMERCIAL

## 2023-04-22 ENCOUNTER — PATIENT MESSAGE (OUTPATIENT)
Dept: ADMINISTRATIVE | Facility: OTHER | Age: 53
End: 2023-04-22
Payer: COMMERCIAL

## 2023-04-22 ENCOUNTER — LAB VISIT (OUTPATIENT)
Dept: LAB | Facility: HOSPITAL | Age: 53
End: 2023-04-22
Attending: UROLOGY
Payer: COMMERCIAL

## 2023-04-22 DIAGNOSIS — R79.89 LOW TESTOSTERONE IN MALE: ICD-10-CM

## 2023-04-22 LAB
BASOPHILS # BLD AUTO: 0.04 K/UL (ref 0–0.2)
BASOPHILS NFR BLD: 0.8 % (ref 0–1.9)
CHOLEST SERPL-MCNC: 177 MG/DL (ref 120–199)
CHOLEST/HDLC SERPL: 3.3 {RATIO} (ref 2–5)
COMPLEXED PSA SERPL-MCNC: 0.3 NG/ML (ref 0–4)
DIFFERENTIAL METHOD: ABNORMAL
EOSINOPHIL # BLD AUTO: 0.1 K/UL (ref 0–0.5)
EOSINOPHIL NFR BLD: 1.3 % (ref 0–8)
ERYTHROCYTE [DISTWIDTH] IN BLOOD BY AUTOMATED COUNT: 16 % (ref 11.5–14.5)
HCT VFR BLD AUTO: 35 % (ref 40–54)
HDLC SERPL-MCNC: 53 MG/DL (ref 40–75)
HDLC SERPL: 29.9 % (ref 20–50)
HGB BLD-MCNC: 10.7 G/DL (ref 14–18)
IMM GRANULOCYTES # BLD AUTO: 0.01 K/UL (ref 0–0.04)
IMM GRANULOCYTES NFR BLD AUTO: 0.2 % (ref 0–0.5)
LDLC SERPL CALC-MCNC: 102.6 MG/DL (ref 63–159)
LYMPHOCYTES # BLD AUTO: 1.5 K/UL (ref 1–4.8)
LYMPHOCYTES NFR BLD: 28.9 % (ref 18–48)
MCH RBC QN AUTO: 25.7 PG (ref 27–31)
MCHC RBC AUTO-ENTMCNC: 30.6 G/DL (ref 32–36)
MCV RBC AUTO: 84 FL (ref 82–98)
MONOCYTES # BLD AUTO: 0.4 K/UL (ref 0.3–1)
MONOCYTES NFR BLD: 8 % (ref 4–15)
NEUTROPHILS # BLD AUTO: 3.2 K/UL (ref 1.8–7.7)
NEUTROPHILS NFR BLD: 60.8 % (ref 38–73)
NONHDLC SERPL-MCNC: 124 MG/DL
NRBC BLD-RTO: 0 /100 WBC
PLATELET # BLD AUTO: 309 K/UL (ref 150–450)
PMV BLD AUTO: 9.3 FL (ref 9.2–12.9)
RBC # BLD AUTO: 4.17 M/UL (ref 4.6–6.2)
TESTOST SERPL-MCNC: 1102 NG/DL (ref 304–1227)
TRIGL SERPL-MCNC: 107 MG/DL (ref 30–150)
WBC # BLD AUTO: 5.22 K/UL (ref 3.9–12.7)

## 2023-04-22 PROCEDURE — 85025 COMPLETE CBC W/AUTO DIFF WBC: CPT | Performed by: UROLOGY

## 2023-04-22 PROCEDURE — 80061 LIPID PANEL: CPT | Performed by: UROLOGY

## 2023-04-22 PROCEDURE — 84153 ASSAY OF PSA TOTAL: CPT | Performed by: UROLOGY

## 2023-04-22 PROCEDURE — 36415 COLL VENOUS BLD VENIPUNCTURE: CPT | Performed by: UROLOGY

## 2023-04-22 PROCEDURE — 84403 ASSAY OF TOTAL TESTOSTERONE: CPT | Performed by: UROLOGY

## 2023-04-24 ENCOUNTER — TELEPHONE (OUTPATIENT)
Dept: UROLOGY | Facility: CLINIC | Age: 53
End: 2023-04-24
Payer: COMMERCIAL

## 2023-05-31 RX ORDER — DULOXETIN HYDROCHLORIDE 60 MG/1
60 CAPSULE, DELAYED RELEASE ORAL DAILY
Qty: 90 CAPSULE | Refills: 1 | Status: SHIPPED | OUTPATIENT
Start: 2023-05-31 | End: 2023-09-20 | Stop reason: SDUPTHER

## 2023-05-31 NOTE — TELEPHONE ENCOUNTER
Last Office Visit Info:   The patient's last visit with Gabby Dean MD was on 5/18/2022.    The patient's last visit in current department was on 1/9/2023. With NP Karina        Last CBC Results:   Lab Results   Component Value Date    WBC 5.22 04/22/2023    HGB 10.7 (L) 04/22/2023    HCT 35.0 (L) 04/22/2023     04/22/2023       Last CMP Results  Lab Results   Component Value Date     03/22/2023    K 3.4 (L) 03/22/2023     03/22/2023    CO2 25 03/22/2023    BUN 12 03/22/2023    CREATININE 0.9 03/22/2023    CALCIUM 9.1 03/22/2023    ALBUMIN 4.1 03/22/2023    AST 38 03/22/2023    ALT 22 03/22/2023       Last Lipids  Lab Results   Component Value Date    CHOL 177 04/22/2023    TRIG 107 04/22/2023    HDL 53 04/22/2023    LDLCALC 102.6 04/22/2023       Last A1C  Lab Results   Component Value Date    HGBA1C 4.8 03/02/2022       Last TSH  Lab Results   Component Value Date    TSH 0.406 03/02/2022             Current Med Refills  Medication List with Changes/Refills   Current Medications    ACETAMINOPHEN (TYLENOL) 500 MG TABLET    Take 1 tablet (500 mg total) by mouth every 6 (six) hours as needed for Pain.       Start Date: 3/22/2023 End Date: --    AMLODIPINE (NORVASC) 10 MG TABLET    TAKE ONE TABLET BY MOUTH ONCE DAILY FOR BLOOD PRESSURE.       Start Date: 12/1/2022 End Date: --    BACITRACIN 500 UNIT/GRAM OINT    Apply topically 2 (two) times daily.       Start Date: 3/22/2023 End Date: --    BUTALBITAL-ACETAMINOPHEN-CAFFEINE -40 MG (FIORICET, ESGIC) -40 MG PER TABLET    TAKE ONE TABLET BY MOUTH EVERY 4 HOURS AS NEEDED FOR HEADACHE       Start Date: 8/25/2022 End Date: --    CHLORTHALIDONE (HYGROTEN) 25 MG TAB    TAKE 1/2 TABLET BY MOUTH ONCE DAILY       Start Date: 12/1/2022 End Date: --    DULOXETINE (CYMBALTA) 60 MG CAPSULE    TAKE 1 CAPSULE BY MOUTH EVERY EVENING FOR MOOD       Start Date: 3/30/2022 End Date: --    FLUTICASONE PROPIONATE (FLONASE) 50 MCG/ACTUATION NASAL SPRAY    1  "spray (50 mcg total) by Each Nostril route 2 (two) times daily as needed for Rhinitis.       Start Date: 8/23/2022 End Date: --    FUROSEMIDE (LASIX) 40 MG TABLET    prn       Start Date: --        End Date: --    GABAPENTIN (NEURONTIN) 400 MG CAPSULE    Take 400 mg by mouth 3 (three) times daily. Takes 1 400mg pill every AM, Takes 2 (400mg) at night       Start Date: 10/10/2017End Date: --    IBUPROFEN (ADVIL,MOTRIN) 600 MG TABLET    Take 1 tablet (600 mg total) by mouth every 6 (six) hours as needed for Pain.       Start Date: 3/22/2023 End Date: --    KETOCONAZOLE (NIZORAL) 2 % CREAM    Apply topically once daily.       Start Date: 6/1/2022  End Date: --    MELOXICAM (MOBIC) 15 MG TABLET    Take 1 tablet (15 mg total) by mouth once daily.       Start Date: 4/3/2023  End Date: --    MUPIROCIN (BACTROBAN) 2 % OINTMENT    APPLY TO AFFECTED AREA THREE TIMES DAILY       Start Date: 6/7/2022  End Date: --    NALOXONE (NARCAN) 4 MG/ACTUATION SPRY    Narcan 4 mg/actuation nasal spray   INHALE 1 SPRAY BY NASAL ROUTE ONCE FOR ONE DOSE.       Start Date: --        End Date: --    OLMESARTAN (BENICAR) 40 MG TABLET    TAKE ONE TABLET BY MOUTH ONCE DAILY FOR BLOOD PRESSURE       Start Date: 11/21/2022End Date: --    OXYCODONE (ROXICODONE) 30 MG TAB    TAKE ONE TABLET BY MOUTH FIVE TIMES DAILY AS NEEDED FOR PAIN.       Start Date: 4/26/2022 End Date: --    POTASSIUM CHLORIDE (KLOR-CON) 10 MEQ TBSR    potassium chloride ER 10 mEq tablet,extended release   TAKE ONE TABLET BY MOUTH ONCE DAILY AS NEEDED WITH FLUID PILL       Start Date: --        End Date: --    SYRINGE WITH NEEDLE (SYRINGE 3CC/22GX1") 3 ML 22 GAUGE X 1" SYRG    1 Units by Misc.(Non-Drug; Combo Route) route every 28 days.       Start Date: 3/7/2022  End Date: --    TAMSULOSIN (FLOMAX) 0.4 MG CAP    Take 1 capsule (0.4 mg total) by mouth once daily.       Start Date: 2/9/2023  End Date: 2/4/2024    TESTOSTERONE CYPIONATE (DEPOTESTOTERONE CYPIONATE) 200 MG/ML " INJECTION    INJECT TWO MILLILITERS INTO THE MUSCLE EVERY 28 days.       Start Date: 11/7/2022 End Date: --    TIZANIDINE (ZANAFLEX) 2 MG TABLET    TAKE ONE TABLET BY MOUTH THREE TIMES DAILY FOR MUSCLE SPASMS.       Start Date: 5/27/2022 End Date: --

## 2023-05-31 NOTE — TELEPHONE ENCOUNTER
Care Due:                  Date            Visit Type   Department     Provider  --------------------------------------------------------------------------------                                             [facility]     MEDICINE/INTERN  Last Visit: 05-      FOLLOW UP    AL MED         Gabby Dean  Next Visit: None Scheduled  None         None Found                                                            Last  Test          Frequency    Reason                     Performed    Due Date  --------------------------------------------------------------------------------    Office Visit  12 months..  chlorthalidone...........  05- 05-    [facility] Embedded Care Due Messages. Reference number: [id number].   5/30/2023 7:36:02 PM CDT

## 2023-06-01 RX ORDER — TESTOSTERONE CYPIONATE 200 MG/ML
400 INJECTION, SOLUTION INTRAMUSCULAR
Qty: 10 ML | Refills: 5 | Status: SHIPPED | OUTPATIENT
Start: 2023-06-01 | End: 2023-12-04 | Stop reason: SDUPTHER

## 2023-06-12 DIAGNOSIS — I10 ESSENTIAL HYPERTENSION: Chronic | ICD-10-CM

## 2023-06-12 RX ORDER — OLMESARTAN MEDOXOMIL 40 MG/1
TABLET ORAL
Qty: 90 TABLET | Refills: 0 | OUTPATIENT
Start: 2023-06-12

## 2023-06-12 RX ORDER — AMLODIPINE BESYLATE 10 MG/1
TABLET ORAL
Qty: 90 TABLET | Refills: 0 | OUTPATIENT
Start: 2023-06-12

## 2023-06-12 NOTE — TELEPHONE ENCOUNTER
Refill Routing Note   Medication(s) are not appropriate for processing by [facility] for the following reason(s):      Patient seen in ED/Hospital since LOV with PCP  Required vitals abnormal    [facility] action(s):  Defer None identified          Appointments  past 12m or future 3m with PCP    Date Provider   Last Visit   5/18/2022 Gabby Dean MD   Next Visit   Visit date not found Gabby Dean MD   ED visits in past 90 days: 1        Note composed:10:53 AM 06/12/2023

## 2023-06-12 NOTE — TELEPHONE ENCOUNTER
No care due was identified.  [facility] Embedded Care Due Messages. Reference number: [id number].   6/12/2023 9:33:58 AM CDT

## 2023-06-20 ENCOUNTER — OFFICE VISIT (OUTPATIENT)
Dept: FAMILY MEDICINE | Facility: CLINIC | Age: 53
End: 2023-06-20
Payer: COMMERCIAL

## 2023-06-20 VITALS
SYSTOLIC BLOOD PRESSURE: 130 MMHG | TEMPERATURE: 98 F | WEIGHT: 314.38 LBS | DIASTOLIC BLOOD PRESSURE: 60 MMHG | BODY MASS INDEX: 45.01 KG/M2 | OXYGEN SATURATION: 96 % | HEART RATE: 85 BPM | HEIGHT: 70 IN

## 2023-06-20 DIAGNOSIS — G43.809 HEADACHE, VARIANT MIGRAINE: ICD-10-CM

## 2023-06-20 DIAGNOSIS — M47.816 FACET ARTHROPATHY, LUMBAR: ICD-10-CM

## 2023-06-20 DIAGNOSIS — I10 ESSENTIAL HYPERTENSION: Primary | Chronic | ICD-10-CM

## 2023-06-20 PROCEDURE — 4010F ACE/ARB THERAPY RXD/TAKEN: CPT | Mod: CPTII,S$GLB,, | Performed by: NURSE PRACTITIONER

## 2023-06-20 PROCEDURE — 3008F PR BODY MASS INDEX (BMI) DOCUMENTED: ICD-10-PCS | Mod: CPTII,S$GLB,, | Performed by: NURSE PRACTITIONER

## 2023-06-20 PROCEDURE — 99214 PR OFFICE/OUTPT VISIT, EST, LEVL IV, 30-39 MIN: ICD-10-PCS | Mod: S$GLB,,, | Performed by: NURSE PRACTITIONER

## 2023-06-20 PROCEDURE — 3075F PR MOST RECENT SYSTOLIC BLOOD PRESS GE 130-139MM HG: ICD-10-PCS | Mod: CPTII,S$GLB,, | Performed by: NURSE PRACTITIONER

## 2023-06-20 PROCEDURE — 4010F PR ACE/ARB THEARPY RXD/TAKEN: ICD-10-PCS | Mod: CPTII,S$GLB,, | Performed by: NURSE PRACTITIONER

## 2023-06-20 PROCEDURE — 99214 OFFICE O/P EST MOD 30 MIN: CPT | Mod: S$GLB,,, | Performed by: NURSE PRACTITIONER

## 2023-06-20 PROCEDURE — 1159F PR MEDICATION LIST DOCUMENTED IN MEDICAL RECORD: ICD-10-PCS | Mod: CPTII,S$GLB,, | Performed by: NURSE PRACTITIONER

## 2023-06-20 PROCEDURE — 3078F DIAST BP <80 MM HG: CPT | Mod: CPTII,S$GLB,, | Performed by: NURSE PRACTITIONER

## 2023-06-20 PROCEDURE — 3075F SYST BP GE 130 - 139MM HG: CPT | Mod: CPTII,S$GLB,, | Performed by: NURSE PRACTITIONER

## 2023-06-20 PROCEDURE — 3008F BODY MASS INDEX DOCD: CPT | Mod: CPTII,S$GLB,, | Performed by: NURSE PRACTITIONER

## 2023-06-20 PROCEDURE — 1159F MED LIST DOCD IN RCRD: CPT | Mod: CPTII,S$GLB,, | Performed by: NURSE PRACTITIONER

## 2023-06-20 PROCEDURE — 99999 PR PBB SHADOW E&M-EST. PATIENT-LVL IV: CPT | Mod: PBBFAC,,, | Performed by: NURSE PRACTITIONER

## 2023-06-20 PROCEDURE — 99999 PR PBB SHADOW E&M-EST. PATIENT-LVL IV: ICD-10-PCS | Mod: PBBFAC,,, | Performed by: NURSE PRACTITIONER

## 2023-06-20 PROCEDURE — 3078F PR MOST RECENT DIASTOLIC BLOOD PRESSURE < 80 MM HG: ICD-10-PCS | Mod: CPTII,S$GLB,, | Performed by: NURSE PRACTITIONER

## 2023-06-20 RX ORDER — MORPHINE SULFATE 30 MG/1
30 TABLET, FILM COATED, EXTENDED RELEASE ORAL 2 TIMES DAILY
COMMUNITY
Start: 2023-06-16

## 2023-06-20 RX ORDER — CHLORTHALIDONE 25 MG/1
12.5 TABLET ORAL DAILY
Qty: 45 TABLET | Refills: 0 | Status: SHIPPED | OUTPATIENT
Start: 2023-06-20 | End: 2024-01-07 | Stop reason: SDUPTHER

## 2023-06-20 RX ORDER — BUTALBITAL, ACETAMINOPHEN AND CAFFEINE 50; 325; 40 MG/1; MG/1; MG/1
TABLET ORAL
Qty: 60 TABLET | Refills: 5 | Status: CANCELLED | OUTPATIENT
Start: 2023-06-20

## 2023-06-20 RX ORDER — METHOCARBAMOL 750 MG/1
750 TABLET, FILM COATED ORAL 4 TIMES DAILY PRN
COMMUNITY
Start: 2023-05-23 | End: 2023-09-20 | Stop reason: SDUPTHER

## 2023-06-20 RX ORDER — OLMESARTAN MEDOXOMIL 40 MG/1
40 TABLET ORAL DAILY
Qty: 90 TABLET | Refills: 0 | Status: SHIPPED | OUTPATIENT
Start: 2023-06-20 | End: 2023-10-10 | Stop reason: SDUPTHER

## 2023-06-20 RX ORDER — MELOXICAM 15 MG/1
15 TABLET ORAL DAILY
Qty: 90 TABLET | Refills: 0 | Status: SHIPPED | OUTPATIENT
Start: 2023-06-20 | End: 2023-08-31 | Stop reason: SDUPTHER

## 2023-06-20 RX ORDER — HYDROMORPHONE HYDROCHLORIDE 4 MG/1
4 TABLET ORAL EVERY 4 HOURS PRN
COMMUNITY
Start: 2023-05-20 | End: 2023-09-20

## 2023-06-20 RX ORDER — BUTALBITAL, ACETAMINOPHEN AND CAFFEINE 50; 325; 40 MG/1; MG/1; MG/1
1 TABLET ORAL EVERY 8 HOURS PRN
Qty: 45 TABLET | Refills: 1 | Status: SHIPPED | OUTPATIENT
Start: 2023-06-20 | End: 2023-10-10 | Stop reason: SDUPTHER

## 2023-06-20 RX ORDER — AMLODIPINE BESYLATE 10 MG/1
10 TABLET ORAL DAILY
Qty: 90 TABLET | Refills: 0 | Status: SHIPPED | OUTPATIENT
Start: 2023-06-20 | End: 2023-10-10 | Stop reason: SDUPTHER

## 2023-06-20 NOTE — PROGRESS NOTES
"Subjective:      Patient ID: Jaime Lund is a 53 y.o. male.  Pt presents to clinic with wife for medication refills. He continues under , pain management care for chronic back pain. Occasionally has breakthrough headaches which he uses fiorcet though he doesn't take them very often, wife reports ~2 pills every 2wks. Denies any acute complaints today.     Review of Systems   Constitutional:  Positive for fatigue. Negative for activity change, appetite change, fever and unexpected weight change.   HENT:  Negative for nasal congestion, ear pain, hearing loss, postnasal drip, rhinorrhea, sneezing, sore throat, trouble swallowing and voice change.    Eyes:  Negative for pain and visual disturbance.   Respiratory:  Negative for cough, chest tightness, shortness of breath and wheezing.    Cardiovascular:  Negative for chest pain, palpitations and leg swelling.   Gastrointestinal:  Negative for abdominal pain, change in bowel habit, constipation, diarrhea, nausea, vomiting and change in bowel habit.   Endocrine: Negative.    Genitourinary:  Negative for bladder incontinence and difficulty urinating.   Musculoskeletal:  Positive for back pain. Negative for arthralgias, gait problem and myalgias.   Integumentary:  Negative for color change and rash.   Allergic/Immunologic: Negative.    Neurological:  Positive for headaches. Negative for dizziness, vertigo, tremors, seizures, syncope, facial asymmetry, speech difficulty, weakness, light-headedness, numbness, coordination difficulties, memory loss and coordination difficulties.   Hematological: Negative.    Psychiatric/Behavioral: Negative.     All other systems reviewed and are negative.      Objective:     Vitals:    06/20/23 1520   BP: 130/60   Pulse: 85   Temp: 97.8 °F (36.6 °C)   SpO2: 96%   Weight: (!) 142.6 kg (314 lb 6 oz)   Height: 5' 10" (1.778 m)     Physical Exam  Vitals and nursing note reviewed.   Constitutional:       General: He is not in acute " distress.     Appearance: Normal appearance. He is well-developed and well-groomed. He is obese. He is not ill-appearing.   HENT:      Head: Normocephalic and atraumatic.      Right Ear: External ear normal.      Left Ear: External ear normal.      Nose: Nose normal.      Mouth/Throat:      Lips: Pink.      Mouth: Mucous membranes are moist.   Eyes:      General: Lids are normal. Vision grossly intact. Gaze aligned appropriately.      Conjunctiva/sclera: Conjunctivae normal.      Pupils: Pupils are equal, round, and reactive to light.   Neck:      Trachea: Phonation normal.   Cardiovascular:      Rate and Rhythm: Normal rate and regular rhythm.      Heart sounds: Normal heart sounds.   Pulmonary:      Effort: Pulmonary effort is normal. No accessory muscle usage or respiratory distress.      Breath sounds: Normal breath sounds and air entry. No wheezing or rales.   Abdominal:      General: Abdomen is flat. Bowel sounds are normal. There is no distension.      Palpations: Abdomen is soft.      Tenderness: There is no abdominal tenderness.   Musculoskeletal:      Cervical back: Neck supple.      Right lower leg: No edema.      Left lower leg: No edema.   Skin:     General: Skin is warm and dry.      Findings: No rash.   Neurological:      General: No focal deficit present.      Mental Status: He is alert and oriented to person, place, and time. Mental status is at baseline.      Sensory: Sensation is intact.      Motor: Motor function is intact.      Coordination: Coordination is intact.      Gait: Gait is intact.   Psychiatric:         Attention and Perception: Attention and perception normal.         Mood and Affect: Mood and affect normal.         Speech: Speech normal.         Behavior: Behavior normal. Behavior is cooperative.         Thought Content: Thought content normal.         Cognition and Memory: Cognition and memory normal.         Judgment: Judgment normal.     Assessment and Plan:     1. Essential  hypertension  Continue current treatment regimen.  Dietary sodium restriction.  Regular aerobic exercise.  Weight loss.  Follow up in 3 months.  Patient Education: Reviewed risks of hypertension and principles of treatment.  - amLODIPine (NORVASC) 10 MG tablet; Take 1 tablet (10 mg total) by mouth once daily.  Dispense: 90 tablet; Refill: 0  - olmesartan (BENICAR) 40 MG tablet; Take 1 tablet (40 mg total) by mouth once daily.  Dispense: 90 tablet; Refill: 0  - chlorthalidone (HYGROTEN) 25 MG Tab; Take 0.5 tablets (12.5 mg total) by mouth once daily.  Dispense: 45 tablet; Refill: 0    2. Facet arthropathy, lumbar  Natural history and expected course discussed. Questions answered.  Continue current plan per pain management and neurology  - meloxicam (MOBIC) 15 MG tablet; Take 1 tablet (15 mg total) by mouth once daily.  Dispense: 90 tablet; Refill: 0    3. Headache, variant migraine  Recommendations: continue present treatment and plan, lie in darkened room and apply cold packs prn for pain, episodic therapy with fiorcet due to low frequency of pain, side effect profile discussed in detail, asked to keep headache diary, avoid alcohol, caffeine, fatty or fried foods, spicy foods, heavy lifting, stressful situations as much as practical, and the use of illicit or street drugs, and patient reassured that neurodiagnostic workup not indicated from benign H & P.  - butalbital-acetaminophen-caffeine -40 mg (FIORICET, ESGIC) -40 mg per tablet; Take 1 tablet by mouth every 8 (eight) hours as needed for Headaches.  Dispense: 45 tablet; Refill: 1           KATLYN Baumann, FNP-C  Family/Internal Medicine  [facility]

## 2023-07-25 ENCOUNTER — OFFICE VISIT (OUTPATIENT)
Dept: FAMILY MEDICINE | Facility: CLINIC | Age: 53
End: 2023-07-25
Payer: COMMERCIAL

## 2023-07-25 ENCOUNTER — HOSPITAL ENCOUNTER (OUTPATIENT)
Dept: RADIOLOGY | Facility: HOSPITAL | Age: 53
Discharge: HOME | End: 2023-07-25
Attending: NURSE PRACTITIONER
Payer: COMMERCIAL

## 2023-07-25 VITALS
HEIGHT: 68 IN | DIASTOLIC BLOOD PRESSURE: 78 MMHG | TEMPERATURE: 98 F | HEART RATE: 101 BPM | SYSTOLIC BLOOD PRESSURE: 130 MMHG | BODY MASS INDEX: 47.74 KG/M2 | OXYGEN SATURATION: 97 % | WEIGHT: 315 LBS

## 2023-07-25 DIAGNOSIS — R50.9 FEVER OF UNKNOWN ORIGIN: Primary | ICD-10-CM

## 2023-07-25 DIAGNOSIS — I10 ESSENTIAL HYPERTENSION: Chronic | ICD-10-CM

## 2023-07-25 DIAGNOSIS — R50.9 FEVER OF UNKNOWN ORIGIN: ICD-10-CM

## 2023-07-25 LAB
BILIRUB UR QL STRIP: NEGATIVE
CLARITY UR REFRACT.AUTO: CLEAR
COLOR UR AUTO: YELLOW
CTP QC/QA: YES
GLUCOSE UR QL STRIP: NEGATIVE
HGB UR QL STRIP: NEGATIVE
KETONES UR QL STRIP: NEGATIVE
LEUKOCYTE ESTERASE UR QL STRIP: NEGATIVE
NITRITE UR QL STRIP: NEGATIVE
PH UR STRIP: 5 [PH] (ref 5–8)
POC MOLECULAR INFLUENZA A AGN: NEGATIVE
POC MOLECULAR INFLUENZA B AGN: NEGATIVE
PROT UR QL STRIP: NEGATIVE
SARS-COV-2 RNA RESP QL NAA+PROBE: NOT DETECTED
SP GR UR STRIP: 1.03 (ref 1–1.03)
URN SPEC COLLECT METH UR: NORMAL

## 2023-07-25 PROCEDURE — 3008F PR BODY MASS INDEX (BMI) DOCUMENTED: ICD-10-PCS | Mod: CPTII,S$GLB,, | Performed by: NURSE PRACTITIONER

## 2023-07-25 PROCEDURE — 87186 SC STD MICRODIL/AGAR DIL: CPT | Performed by: NURSE PRACTITIONER

## 2023-07-25 PROCEDURE — 71046 X-RAY EXAM CHEST 2 VIEWS: CPT | Mod: TC,FY,PO

## 2023-07-25 PROCEDURE — 71046 XR CHEST PA AND LATERAL: ICD-10-PCS | Mod: 26,,, | Performed by: RADIOLOGY

## 2023-07-25 PROCEDURE — 3078F DIAST BP <80 MM HG: CPT | Mod: CPTII,S$GLB,, | Performed by: NURSE PRACTITIONER

## 2023-07-25 PROCEDURE — 87077 CULTURE AEROBIC IDENTIFY: CPT | Performed by: NURSE PRACTITIONER

## 2023-07-25 PROCEDURE — 3078F PR MOST RECENT DIASTOLIC BLOOD PRESSURE < 80 MM HG: ICD-10-PCS | Mod: CPTII,S$GLB,, | Performed by: NURSE PRACTITIONER

## 2023-07-25 PROCEDURE — 87086 URINE CULTURE/COLONY COUNT: CPT | Performed by: NURSE PRACTITIONER

## 2023-07-25 PROCEDURE — 1159F PR MEDICATION LIST DOCUMENTED IN MEDICAL RECORD: ICD-10-PCS | Mod: CPTII,S$GLB,, | Performed by: NURSE PRACTITIONER

## 2023-07-25 PROCEDURE — 87502 INFLUENZA DNA AMP PROBE: CPT | Mod: QW,S$GLB,, | Performed by: NURSE PRACTITIONER

## 2023-07-25 PROCEDURE — 99999 PR PBB SHADOW E&M-EST. PATIENT-LVL V: ICD-10-PCS | Mod: PBBFAC,,, | Performed by: NURSE PRACTITIONER

## 2023-07-25 PROCEDURE — 1160F PR REVIEW ALL MEDS BY PRESCRIBER/CLIN PHARMACIST DOCUMENTED: ICD-10-PCS | Mod: CPTII,S$GLB,, | Performed by: NURSE PRACTITIONER

## 2023-07-25 PROCEDURE — 99214 OFFICE O/P EST MOD 30 MIN: CPT | Mod: S$GLB,,, | Performed by: NURSE PRACTITIONER

## 2023-07-25 PROCEDURE — 3075F SYST BP GE 130 - 139MM HG: CPT | Mod: CPTII,S$GLB,, | Performed by: NURSE PRACTITIONER

## 2023-07-25 PROCEDURE — 4010F PR ACE/ARB THEARPY RXD/TAKEN: ICD-10-PCS | Mod: CPTII,S$GLB,, | Performed by: NURSE PRACTITIONER

## 2023-07-25 PROCEDURE — 71046 X-RAY EXAM CHEST 2 VIEWS: CPT | Mod: 26,,, | Performed by: RADIOLOGY

## 2023-07-25 PROCEDURE — 1159F MED LIST DOCD IN RCRD: CPT | Mod: CPTII,S$GLB,, | Performed by: NURSE PRACTITIONER

## 2023-07-25 PROCEDURE — 99214 PR OFFICE/OUTPT VISIT, EST, LEVL IV, 30-39 MIN: ICD-10-PCS | Mod: S$GLB,,, | Performed by: NURSE PRACTITIONER

## 2023-07-25 PROCEDURE — 1160F RVW MEDS BY RX/DR IN RCRD: CPT | Mod: CPTII,S$GLB,, | Performed by: NURSE PRACTITIONER

## 2023-07-25 PROCEDURE — 87088 URINE BACTERIA CULTURE: CPT | Performed by: NURSE PRACTITIONER

## 2023-07-25 PROCEDURE — 3075F PR MOST RECENT SYSTOLIC BLOOD PRESS GE 130-139MM HG: ICD-10-PCS | Mod: CPTII,S$GLB,, | Performed by: NURSE PRACTITIONER

## 2023-07-25 PROCEDURE — 4010F ACE/ARB THERAPY RXD/TAKEN: CPT | Mod: CPTII,S$GLB,, | Performed by: NURSE PRACTITIONER

## 2023-07-25 PROCEDURE — 3008F BODY MASS INDEX DOCD: CPT | Mod: CPTII,S$GLB,, | Performed by: NURSE PRACTITIONER

## 2023-07-25 PROCEDURE — 87635 SARS-COV-2 COVID-19 AMP PRB: CPT | Performed by: NURSE PRACTITIONER

## 2023-07-25 PROCEDURE — 81003 URINALYSIS AUTO W/O SCOPE: CPT | Performed by: NURSE PRACTITIONER

## 2023-07-25 PROCEDURE — 87502 POCT INFLUENZA A/B MOLECULAR: ICD-10-PCS | Mod: QW,S$GLB,, | Performed by: NURSE PRACTITIONER

## 2023-07-25 PROCEDURE — 99999 PR PBB SHADOW E&M-EST. PATIENT-LVL V: CPT | Mod: PBBFAC,,, | Performed by: NURSE PRACTITIONER

## 2023-07-25 NOTE — PROGRESS NOTES
History of Present Illness   Jaime Lund is a 53 y.o. man with medical history as listed below who presents today for evaluation of fever x 4 days. Temperature has ranged 100-104. Associated symptoms include fatigue and body aches. He has tried Tylenol and NSAID which temporarily reduce fever. He had negative home COVID-19 test. He has no additional complaints and is otherwise healthy on today's visit.    Past Medical History:   Diagnosis Date    Back pain, chronic     Bulging discs     Chronic pain following surgery or procedure     Degenerative disc disease     Hypertension     Hypokalemia     Obese abdomen     PAD (peripheral artery disease)     Post laminectomy syndrome     Seizures     Severe sepsis     Short-term memory loss     Surgical site infection 11/17/2020    Rt abdomen pain pump site    Thyroid disease     Subclinical hyperthyroidism       Past Surgical History:   Procedure Laterality Date    CHOLECYSTECTOMY      GASTRIC BYPASS      SLEEVE    gastric sleeve  2011    HERNIA REPAIR      pain pump Right 04/03/2018    inserted at right abdomen    pain pump site washout Right 04/13/2018    REMOVAL OF IMPLANT N/A 11/18/2020    Procedure: REMOVAL, IMPLANT;  Surgeon: Raffy Headley DO;  Location: [facility] OR;  Service: Neurosurgery;  Laterality: N/A;  intrathecal pain pump, leads removed from mid back and generator from abdomen    SKIN SURGERY      EXCESS SKIN REMOVAL    SPINE SURGERY      lumbar fusion       Social History     Socioeconomic History    Marital status:    Tobacco Use    Smoking status: Never     Passive exposure: Never    Smokeless tobacco: Never   Substance and Sexual Activity    Alcohol use: Yes     Comment: socially    Drug use: No    Sexual activity: Yes     Partners: Female       Family History   Problem Relation Age of Onset    Heart disease Mother     Breast cancer Mother     Arthritis Mother     Hypertension Mother     Throat cancer Father     Hypertension Father   "      Review of Systems  Review of Systems   Constitutional:  Positive for chills, fever and malaise/fatigue.   HENT:  Negative for congestion, ear pain, sinus pain and sore throat.    Respiratory:  Negative for cough, shortness of breath and wheezing.    Cardiovascular:  Negative for chest pain and palpitations.   Gastrointestinal:  Negative for abdominal pain, diarrhea, nausea and vomiting.   Genitourinary:  Negative for dysuria, flank pain and hematuria.   Skin:  Negative for itching and rash.   Neurological:  Negative for loss of consciousness and headaches.     A complete review of systems was otherwise negative.    Physical Exam  /78   Pulse 101   Temp 98.1 °F (36.7 °C)   Ht 5' 8" (1.727 m)   Wt (!) 145.3 kg (320 lb 5.3 oz)   SpO2 97%   BMI 48.71 kg/m²   General appearance: alert, appears stated age, cooperative, and no distress  Eyes: negative findings: lids and lashes normal and conjunctivae and sclerae normal  Ears: normal TM's and external ear canals both ears  Nose: Nares normal. Septum midline. Mucosa normal. No drainage or sinus tenderness.  Throat: lips, mucosa, and tongue normal; teeth and gums normal  Neck: no JVD and supple, symmetrical, trachea midline, negative for rigidity  Back: symmetric, no curvature. ROM normal. No CVA tenderness.  Lungs: clear to auscultation bilaterally  Heart: regular rate and rhythm, S1, S2 normal, no murmur, click, rub or gallop  Abdomen: soft, non-tender; bowel sounds normal; no masses,  no organomegaly  Extremities: extremities normal, atraumatic, no cyanosis or edema  Pulses: 2+ and symmetric  Skin: Skin color, texture, turgor normal. No rashes or lesions  Lymph nodes: Cervical, supraclavicular, and axillary nodes normal.  Neurologic: Grossly normal    Assessment/Plan  Fever of unknown origin  POCT COVID-19 and flu testing performed, in process.  X-ray chest and UA/urine culture for further evaluation.  Tylenol. NSAID, rest, and hydration.  ER precautions " discussed, no red flags on exam today.  Further work-up and management as indicated pending results.  RTC PRN.  -     COVID-19 Routine Screening  -     POCT Influenza A/B Molecular  -     X-Ray Chest PA And Lateral; Future; Expected date: 07/25/2023  -     Urinalysis  -     Urine culture; Future; Expected date: 07/25/2023    Essential hypertension  The current medical regimen is effective;  continue present plan and medications.    Patient has verbalized understanding and is in agreement with plan of care.    Follow up if symptoms worsen or fail to improve.  Answers submitted by the patient for this visit:  Fever Questionnaire (Submitted on 7/25/2023)  Chief Complaint: Fever  Chronicity: new  Onset: in the past 7 days  Frequency: 2 to 4 times per day  Progression since onset: gradually worsening  Max temp prior to arrival: 103 to 103.9 F  Temperature source: a tympanic thermometer  muscle aches: Yes  sleepiness: Yes  Treatments tried: NSAIDs, acetaminophen  Improvement on treatment: mild

## 2023-07-26 ENCOUNTER — PATIENT MESSAGE (OUTPATIENT)
Dept: FAMILY MEDICINE | Facility: CLINIC | Age: 53
End: 2023-07-26
Payer: COMMERCIAL

## 2023-07-26 ENCOUNTER — TELEPHONE (OUTPATIENT)
Dept: FAMILY MEDICINE | Facility: CLINIC | Age: 53
End: 2023-07-26
Payer: COMMERCIAL

## 2023-07-26 DIAGNOSIS — J18.9 PRIMARY ATYPICAL PNEUMONIA: Primary | ICD-10-CM

## 2023-07-26 RX ORDER — AMOXICILLIN AND CLAVULANATE POTASSIUM 875; 125 MG/1; MG/1
1 TABLET, FILM COATED ORAL EVERY 12 HOURS
Qty: 20 TABLET | Refills: 0 | Status: SHIPPED | OUTPATIENT
Start: 2023-07-26 | End: 2023-08-05

## 2023-07-26 NOTE — TELEPHONE ENCOUNTER
Please let the patient know his COVID-19 test is negative, and his urine test looks clear with no obvious sign of infection. The chest x-ray does not show a definitive pneumonia but does show some patchy areas, given his high fevers and that all other tests are negative, I am sending in an antibiotic to cover for pneumonia. I want him to keep a close eye on his symptoms and let me know if anything else occurs.  Thanks!  Christi ARREDONDO

## 2023-07-27 ENCOUNTER — PATIENT MESSAGE (OUTPATIENT)
Dept: FAMILY MEDICINE | Facility: CLINIC | Age: 53
End: 2023-07-27
Payer: COMMERCIAL

## 2023-07-27 DIAGNOSIS — J18.9 PRIMARY ATYPICAL PNEUMONIA: Primary | ICD-10-CM

## 2023-07-27 LAB — BACTERIA UR CULT: ABNORMAL

## 2023-07-27 RX ORDER — AZITHROMYCIN 250 MG/1
TABLET, FILM COATED ORAL
Qty: 6 TABLET | Refills: 0 | Status: SHIPPED | OUTPATIENT
Start: 2023-07-27 | End: 2023-09-20

## 2023-07-27 RX ORDER — ALBUTEROL SULFATE 90 UG/1
1-2 AEROSOL, METERED RESPIRATORY (INHALATION)
Qty: 18 G | Refills: 0 | Status: SHIPPED | OUTPATIENT
Start: 2023-07-27 | End: 2023-09-20

## 2023-07-28 ENCOUNTER — PATIENT MESSAGE (OUTPATIENT)
Dept: FAMILY MEDICINE | Facility: CLINIC | Age: 53
End: 2023-07-28
Payer: COMMERCIAL

## 2023-07-29 ENCOUNTER — PATIENT MESSAGE (OUTPATIENT)
Dept: FAMILY MEDICINE | Facility: CLINIC | Age: 53
End: 2023-07-29
Payer: COMMERCIAL

## 2023-07-29 DIAGNOSIS — B37.2 INTERTRIGINOUS CANDIDIASIS: Primary | ICD-10-CM

## 2023-07-29 DIAGNOSIS — B35.6 TINEA CRURIS: ICD-10-CM

## 2023-08-01 RX ORDER — KETOCONAZOLE 20 MG/G
CREAM TOPICAL DAILY
Qty: 30 G | Refills: 2 | OUTPATIENT
Start: 2023-08-01

## 2023-08-01 RX ORDER — NYSTATIN 100000 [USP'U]/G
POWDER TOPICAL 2 TIMES DAILY
Qty: 60 G | Refills: 1 | Status: SHIPPED | OUTPATIENT
Start: 2023-08-01 | End: 2023-12-28

## 2023-08-29 DIAGNOSIS — G43.809 HEADACHE, VARIANT MIGRAINE: ICD-10-CM

## 2023-08-29 DIAGNOSIS — M47.816 FACET ARTHROPATHY, LUMBAR: ICD-10-CM

## 2023-08-29 NOTE — TELEPHONE ENCOUNTER
No care due was identified.  [facility] Embedded Care Due Messages. Reference number: [id number].   8/29/2023 9:02:26 AM CDT

## 2023-08-30 ENCOUNTER — PATIENT MESSAGE (OUTPATIENT)
Dept: ADMINISTRATIVE | Facility: OTHER | Age: 53
End: 2023-08-30
Payer: COMMERCIAL

## 2023-08-30 ENCOUNTER — PATIENT MESSAGE (OUTPATIENT)
Dept: ADMINISTRATIVE | Facility: HOSPITAL | Age: 53
End: 2023-08-30
Payer: COMMERCIAL

## 2023-08-30 RX ORDER — BUTALBITAL, ACETAMINOPHEN AND CAFFEINE 50; 325; 40 MG/1; MG/1; MG/1
1 TABLET ORAL EVERY 8 HOURS PRN
Qty: 45 TABLET | Refills: 1 | OUTPATIENT
Start: 2023-08-30

## 2023-08-30 RX ORDER — MELOXICAM 15 MG/1
15 TABLET ORAL DAILY
Qty: 90 TABLET | Refills: 0 | OUTPATIENT
Start: 2023-08-30

## 2023-08-31 ENCOUNTER — PATIENT MESSAGE (OUTPATIENT)
Dept: FAMILY MEDICINE | Facility: CLINIC | Age: 53
End: 2023-08-31
Payer: COMMERCIAL

## 2023-08-31 DIAGNOSIS — M47.816 FACET ARTHROPATHY, LUMBAR: ICD-10-CM

## 2023-08-31 RX ORDER — MELOXICAM 15 MG/1
15 TABLET ORAL DAILY
Qty: 90 TABLET | Refills: 0 | Status: SHIPPED | OUTPATIENT
Start: 2023-08-31 | End: 2023-09-20 | Stop reason: SDUPTHER

## 2023-09-06 RX ORDER — TAMSULOSIN HYDROCHLORIDE 0.4 MG/1
1 CAPSULE ORAL
Qty: 30 CAPSULE | Refills: 11 | Status: SHIPPED | OUTPATIENT
Start: 2023-09-06

## 2023-09-20 ENCOUNTER — OFFICE VISIT (OUTPATIENT)
Dept: FAMILY MEDICINE | Facility: CLINIC | Age: 53
End: 2023-09-20
Payer: COMMERCIAL

## 2023-09-20 VITALS
SYSTOLIC BLOOD PRESSURE: 116 MMHG | DIASTOLIC BLOOD PRESSURE: 72 MMHG | OXYGEN SATURATION: 97 % | BODY MASS INDEX: 47.74 KG/M2 | HEART RATE: 80 BPM | WEIGHT: 315 LBS | TEMPERATURE: 99 F | HEIGHT: 68 IN

## 2023-09-20 DIAGNOSIS — Z12.11 ENCOUNTER FOR COLORECTAL CANCER SCREENING: ICD-10-CM

## 2023-09-20 DIAGNOSIS — M47.816 FACET ARTHROPATHY, LUMBAR: Primary | ICD-10-CM

## 2023-09-20 DIAGNOSIS — Z12.12 ENCOUNTER FOR COLORECTAL CANCER SCREENING: ICD-10-CM

## 2023-09-20 PROBLEM — E66.01 MORBID OBESITY: Status: RESOLVED | Noted: 2017-04-28 | Resolved: 2023-09-20

## 2023-09-20 PROCEDURE — 99999 PR PBB SHADOW E&M-EST. PATIENT-LVL V: CPT | Mod: PBBFAC,,, | Performed by: INTERNAL MEDICINE

## 2023-09-20 PROCEDURE — 3008F BODY MASS INDEX DOCD: CPT | Mod: CPTII,S$GLB,, | Performed by: INTERNAL MEDICINE

## 2023-09-20 PROCEDURE — 99214 OFFICE O/P EST MOD 30 MIN: CPT | Mod: S$GLB,,, | Performed by: INTERNAL MEDICINE

## 2023-09-20 PROCEDURE — 4010F ACE/ARB THERAPY RXD/TAKEN: CPT | Mod: CPTII,S$GLB,, | Performed by: INTERNAL MEDICINE

## 2023-09-20 PROCEDURE — 1160F RVW MEDS BY RX/DR IN RCRD: CPT | Mod: CPTII,S$GLB,, | Performed by: INTERNAL MEDICINE

## 2023-09-20 PROCEDURE — 99214 PR OFFICE/OUTPT VISIT, EST, LEVL IV, 30-39 MIN: ICD-10-PCS | Mod: S$GLB,,, | Performed by: INTERNAL MEDICINE

## 2023-09-20 PROCEDURE — 99999 PR PBB SHADOW E&M-EST. PATIENT-LVL V: ICD-10-PCS | Mod: PBBFAC,,, | Performed by: INTERNAL MEDICINE

## 2023-09-20 PROCEDURE — 3078F PR MOST RECENT DIASTOLIC BLOOD PRESSURE < 80 MM HG: ICD-10-PCS | Mod: CPTII,S$GLB,, | Performed by: INTERNAL MEDICINE

## 2023-09-20 PROCEDURE — 3078F DIAST BP <80 MM HG: CPT | Mod: CPTII,S$GLB,, | Performed by: INTERNAL MEDICINE

## 2023-09-20 PROCEDURE — 3074F PR MOST RECENT SYSTOLIC BLOOD PRESSURE < 130 MM HG: ICD-10-PCS | Mod: CPTII,S$GLB,, | Performed by: INTERNAL MEDICINE

## 2023-09-20 PROCEDURE — 4010F PR ACE/ARB THEARPY RXD/TAKEN: ICD-10-PCS | Mod: CPTII,S$GLB,, | Performed by: INTERNAL MEDICINE

## 2023-09-20 PROCEDURE — 1159F MED LIST DOCD IN RCRD: CPT | Mod: CPTII,S$GLB,, | Performed by: INTERNAL MEDICINE

## 2023-09-20 PROCEDURE — 3074F SYST BP LT 130 MM HG: CPT | Mod: CPTII,S$GLB,, | Performed by: INTERNAL MEDICINE

## 2023-09-20 PROCEDURE — 3008F PR BODY MASS INDEX (BMI) DOCUMENTED: ICD-10-PCS | Mod: CPTII,S$GLB,, | Performed by: INTERNAL MEDICINE

## 2023-09-20 PROCEDURE — 1159F PR MEDICATION LIST DOCUMENTED IN MEDICAL RECORD: ICD-10-PCS | Mod: CPTII,S$GLB,, | Performed by: INTERNAL MEDICINE

## 2023-09-20 PROCEDURE — 1160F PR REVIEW ALL MEDS BY PRESCRIBER/CLIN PHARMACIST DOCUMENTED: ICD-10-PCS | Mod: CPTII,S$GLB,, | Performed by: INTERNAL MEDICINE

## 2023-09-20 RX ORDER — IBUPROFEN 800 MG/1
TABLET ORAL
COMMUNITY
End: 2023-10-23 | Stop reason: HOSPADM

## 2023-09-20 RX ORDER — MELOXICAM 15 MG/1
15 TABLET ORAL DAILY PRN
Qty: 90 TABLET | Refills: 3 | Status: SHIPPED | OUTPATIENT
Start: 2023-09-20 | End: 2023-10-23 | Stop reason: HOSPADM

## 2023-09-20 RX ORDER — BACITRACIN 500 [USP'U]/G
1 OINTMENT TOPICAL 2 TIMES DAILY
COMMUNITY
End: 2023-10-23

## 2023-09-20 RX ORDER — DULOXETIN HYDROCHLORIDE 60 MG/1
60 CAPSULE, DELAYED RELEASE ORAL DAILY
Qty: 90 CAPSULE | Refills: 1 | Status: SHIPPED | OUTPATIENT
Start: 2023-09-20 | End: 2023-12-04 | Stop reason: SDUPTHER

## 2023-09-20 RX ORDER — TOBRAMYCIN 3 MG/ML
SOLUTION/ DROPS OPHTHALMIC
COMMUNITY
End: 2023-10-23 | Stop reason: CLARIF

## 2023-09-20 RX ORDER — METHOCARBAMOL 750 MG/1
750 TABLET, FILM COATED ORAL 4 TIMES DAILY PRN
Qty: 120 TABLET | Refills: 2 | Status: SHIPPED | OUTPATIENT
Start: 2023-09-20
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
## 2023-10-10 DIAGNOSIS — I10 ESSENTIAL HYPERTENSION: Chronic | ICD-10-CM

## 2023-10-10 DIAGNOSIS — G43.809 HEADACHE, VARIANT MIGRAINE: ICD-10-CM

## 2023-10-11 RX ORDER — BUTALBITAL, ACETAMINOPHEN AND CAFFEINE 50; 325; 40 MG/1; MG/1; MG/1
1 TABLET ORAL EVERY 8 HOURS PRN
Qty: 45 TABLET | Refills: 1 | Status: SHIPPED | OUTPATIENT
Start: 2023-10-11

## 2023-10-11 RX ORDER — OLMESARTAN MEDOXOMIL 40 MG/1
40 TABLET ORAL DAILY
Qty: 90 TABLET | Refills: 0 | Status: SHIPPED | OUTPATIENT
Start: 2023-10-11 | End: 2024-01-07 | Stop reason: SDUPTHER

## 2023-10-11 RX ORDER — AMLODIPINE BESYLATE 10 MG/1
10 TABLET ORAL DAILY
Qty: 90 TABLET | Refills: 0 | Status: SHIPPED | OUTPATIENT
Start: 2023-10-11 | End: 2024-01-07 | Stop reason: SDUPTHER

## 2023-10-11 NOTE — TELEPHONE ENCOUNTER
No care due was identified.  [facility] Embedded Care Due Messages. Reference number: [id number].   10/10/2023 7:52:50 PM CDT

## 2023-10-18 ENCOUNTER — PATIENT MESSAGE (OUTPATIENT)
Dept: ADMINISTRATIVE | Facility: OTHER | Age: 53
End: 2023-10-18
Payer: COMMERCIAL

## 2023-10-18 ENCOUNTER — PATIENT MESSAGE (OUTPATIENT)
Dept: FAMILY MEDICINE | Facility: CLINIC | Age: 53
End: 2023-10-18
Payer: COMMERCIAL

## 2023-10-23 ENCOUNTER — HOSPITAL ENCOUNTER (INPATIENT)
Facility: HOSPITAL | Age: 53
LOS: 1 days | Discharge: HOME | DRG: 682 | End: 2023-10-24
Attending: EMERGENCY MEDICINE | Admitting: EMERGENCY MEDICINE
Payer: COMMERCIAL

## 2023-10-23 DIAGNOSIS — N17.9 AKI (ACUTE KIDNEY INJURY): Primary | ICD-10-CM

## 2023-10-23 DIAGNOSIS — I63.9 STROKE: ICD-10-CM

## 2023-10-23 DIAGNOSIS — A41.9 SEPSIS, DUE TO UNSPECIFIED ORGANISM, UNSPECIFIED WHETHER ACUTE ORGAN DYSFUNCTION PRESENT: ICD-10-CM

## 2023-10-23 DIAGNOSIS — E66.01 CLASS 3 SEVERE OBESITY DUE TO EXCESS CALORIES WITH BODY MASS INDEX (BMI) OF 45.0 TO 49.9 IN ADULT, UNSPECIFIED WHETHER SERIOUS COMORBIDITY PRESENT: ICD-10-CM

## 2023-10-23 DIAGNOSIS — R07.9 CHEST PAIN: ICD-10-CM

## 2023-10-23 DIAGNOSIS — G93.41 ENCEPHALOPATHY, METABOLIC: ICD-10-CM

## 2023-10-23 PROBLEM — R47.81 SLURRED SPEECH: Status: ACTIVE | Noted: 2023-10-23

## 2023-10-23 LAB
ALBUMIN SERPL BCP-MCNC: 3.7 G/DL (ref 3.5–5.2)
ALLENS TEST: ABNORMAL
ALLENS TEST: NORMAL
ALP SERPL-CCNC: 71 U/L (ref 55–135)
ALT SERPL W/O P-5'-P-CCNC: 17 U/L (ref 10–44)
AMPHET+METHAMPHET UR QL: NEGATIVE
ANION GAP SERPL CALC-SCNC: 15 MMOL/L (ref 8–16)
ANION GAP SERPL CALC-SCNC: 15 MMOL/L (ref 8–16)
APAP SERPL-MCNC: <3 UG/ML (ref 10–20)
AST SERPL-CCNC: 30 U/L (ref 10–40)
BACTERIA #/AREA URNS HPF: ABNORMAL /HPF
BARBITURATES UR QL SCN>200 NG/ML: ABNORMAL
BASOPHILS # BLD AUTO: 0.05 K/UL (ref 0–0.2)
BASOPHILS NFR BLD: 0.3 % (ref 0–1.9)
BENZODIAZ UR QL SCN>200 NG/ML: NEGATIVE
BILIRUB SERPL-MCNC: 0.2 MG/DL (ref 0.1–1)
BILIRUB UR QL STRIP: NEGATIVE
BNP SERPL-MCNC: 30 PG/ML (ref 0–99)
BUN SERPL-MCNC: 26 MG/DL (ref 6–20)
BUN SERPL-MCNC: 27 MG/DL (ref 6–30)
BZE UR QL SCN: NEGATIVE
CALCIUM SERPL-MCNC: 8.5 MG/DL (ref 8.7–10.5)
CANNABINOIDS UR QL SCN: NEGATIVE
CHLORIDE SERPL-SCNC: 95 MMOL/L (ref 95–110)
CHLORIDE SERPL-SCNC: 97 MMOL/L (ref 95–110)
CHOLEST SERPL-MCNC: 146 MG/DL (ref 120–199)
CHOLEST/HDLC SERPL: 3.2 {RATIO} (ref 2–5)
CLARITY UR: CLEAR
CO2 SERPL-SCNC: 23 MMOL/L (ref 23–29)
COLOR UR: YELLOW
CREAT SERPL-MCNC: 4.2 MG/DL (ref 0.5–1.4)
CREAT SERPL-MCNC: 4.2 MG/DL (ref 0.5–1.4)
CREAT UR-MCNC: 79.9 MG/DL (ref 23–375)
CRP SERPL-MCNC: 95.3 MG/L (ref 0–8.2)
DIFFERENTIAL METHOD: ABNORMAL
EOSINOPHIL # BLD AUTO: 0.2 K/UL (ref 0–0.5)
EOSINOPHIL NFR BLD: 1.6 % (ref 0–8)
ERYTHROCYTE [DISTWIDTH] IN BLOOD BY AUTOMATED COUNT: 16.8 % (ref 11.5–14.5)
EST. GFR  (NO RACE VARIABLE): 16 ML/MIN/1.73 M^2
ETHANOL SERPL-MCNC: <10 MG/DL
GLUCOSE SERPL-MCNC: 94 MG/DL (ref 70–110)
GLUCOSE SERPL-MCNC: 94 MG/DL (ref 70–110)
GLUCOSE UR QL STRIP: NEGATIVE
HCT VFR BLD AUTO: 32.5 % (ref 40–54)
HCT VFR BLD CALC: 31 %PCV (ref 36–54)
HDLC SERPL-MCNC: 46 MG/DL (ref 40–75)
HDLC SERPL: 31.5 % (ref 20–50)
HGB BLD-MCNC: 9.6 G/DL (ref 14–18)
HGB UR QL STRIP: ABNORMAL
HYALINE CASTS #/AREA URNS LPF: 4 /LPF
IMM GRANULOCYTES # BLD AUTO: 0.05 K/UL (ref 0–0.04)
IMM GRANULOCYTES NFR BLD AUTO: 0.3 % (ref 0–0.5)
INR PPP: 1 (ref 0.8–1.2)
KETONES UR QL STRIP: NEGATIVE
LACTATE SERPL-SCNC: 1.2 MMOL/L (ref 0.5–2.2)
LDLC SERPL CALC-MCNC: 81.8 MG/DL (ref 63–159)
LEUKOCYTE ESTERASE UR QL STRIP: NEGATIVE
LYMPHOCYTES # BLD AUTO: 1.3 K/UL (ref 1–4.8)
LYMPHOCYTES NFR BLD: 9 % (ref 18–48)
MCH RBC QN AUTO: 24.7 PG (ref 27–31)
MCHC RBC AUTO-ENTMCNC: 29.5 G/DL (ref 32–36)
MCV RBC AUTO: 84 FL (ref 82–98)
METHADONE UR QL SCN>300 NG/ML: NEGATIVE
MICROSCOPIC COMMENT: ABNORMAL
MONOCYTES # BLD AUTO: 1.4 K/UL (ref 0.3–1)
MONOCYTES NFR BLD: 9.6 % (ref 4–15)
NEUTROPHILS # BLD AUTO: 11.3 K/UL (ref 1.8–7.7)
NEUTROPHILS NFR BLD: 79.2 % (ref 38–73)
NITRITE UR QL STRIP: NEGATIVE
NONHDLC SERPL-MCNC: 100 MG/DL
NRBC BLD-RTO: 0 /100 WBC
OPIATES UR QL SCN: ABNORMAL
PCP UR QL SCN>25 NG/ML: NEGATIVE
PH UR STRIP: 6 [PH] (ref 5–8)
PLATELET # BLD AUTO: 332 K/UL (ref 150–450)
PMV BLD AUTO: 9.7 FL (ref 9.2–12.9)
POC IONIZED CALCIUM: 1.14 MMOL/L (ref 1.06–1.42)
POC PTINR: 1.2 (ref 0.9–1.2)
POC PTWBT: 13.9 SEC (ref 9.7–14.3)
POC TCO2 (MEASURED): 29 MMOL/L (ref 23–29)
POCT GLUCOSE: 93 MG/DL (ref 70–110)
POCT GLUCOSE: 96 MG/DL (ref 70–110)
POTASSIUM BLD-SCNC: 3.6 MMOL/L (ref 3.5–5.1)
POTASSIUM SERPL-SCNC: 3.8 MMOL/L (ref 3.5–5.1)
PROT SERPL-MCNC: 7.1 G/DL (ref 6–8.4)
PROT UR QL STRIP: ABNORMAL
PROTHROMBIN TIME: 10.3 SEC (ref 9–12.5)
RBC # BLD AUTO: 3.89 M/UL (ref 4.6–6.2)
RBC #/AREA URNS HPF: 0 /HPF (ref 0–4)
SAMPLE: ABNORMAL
SAMPLE: NORMAL
SITE: ABNORMAL
SITE: NORMAL
SODIUM BLD-SCNC: 135 MMOL/L (ref 136–145)
SODIUM SERPL-SCNC: 135 MMOL/L (ref 136–145)
SP GR UR STRIP: 1.01 (ref 1–1.03)
T4 FREE SERPL-MCNC: 0.76 NG/DL (ref 0.71–1.51)
TOXICOLOGY INFORMATION: ABNORMAL
TRIGL SERPL-MCNC: 91 MG/DL (ref 30–150)
TROPONIN I SERPL DL<=0.01 NG/ML-MCNC: <0.006 NG/ML (ref 0–0.03)
TSH SERPL DL<=0.005 MIU/L-ACNC: 0.23 UIU/ML (ref 0.4–4)
URN SPEC COLLECT METH UR: ABNORMAL
UROBILINOGEN UR STRIP-ACNC: NEGATIVE EU/DL
WBC # BLD AUTO: 14.31 K/UL (ref 3.9–12.7)
WBC #/AREA URNS HPF: 4 /HPF (ref 0–5)

## 2023-10-23 PROCEDURE — 84484 ASSAY OF TROPONIN QUANT: CPT | Performed by: PHYSICIAN ASSISTANT

## 2023-10-23 PROCEDURE — 84295 ASSAY OF SERUM SODIUM: CPT

## 2023-10-23 PROCEDURE — 85610 PROTHROMBIN TIME: CPT | Performed by: PHYSICIAN ASSISTANT

## 2023-10-23 PROCEDURE — 93010 ELECTROCARDIOGRAM REPORT: CPT | Mod: ,,, | Performed by: INTERNAL MEDICINE

## 2023-10-23 PROCEDURE — 82962 GLUCOSE BLOOD TEST: CPT

## 2023-10-23 PROCEDURE — 11000001 HC ACUTE MED/SURG PRIVATE ROOM

## 2023-10-23 PROCEDURE — 99900035 HC TECH TIME PER 15 MIN (STAT)

## 2023-10-23 PROCEDURE — 96360 HYDRATION IV INFUSION INIT: CPT

## 2023-10-23 PROCEDURE — 80143 DRUG ASSAY ACETAMINOPHEN: CPT | Performed by: EMERGENCY MEDICINE

## 2023-10-23 PROCEDURE — 93005 ELECTROCARDIOGRAM TRACING: CPT

## 2023-10-23 PROCEDURE — 80061 LIPID PANEL: CPT | Performed by: PHYSICIAN ASSISTANT

## 2023-10-23 PROCEDURE — 25000003 PHARM REV CODE 250: Performed by: EMERGENCY MEDICINE

## 2023-10-23 PROCEDURE — 84439 ASSAY OF FREE THYROXINE: CPT | Performed by: PHYSICIAN ASSISTANT

## 2023-10-23 PROCEDURE — 83880 ASSAY OF NATRIURETIC PEPTIDE: CPT | Performed by: PHYSICIAN ASSISTANT

## 2023-10-23 PROCEDURE — 87040 BLOOD CULTURE FOR BACTERIA: CPT | Mod: 59 | Performed by: EMERGENCY MEDICINE

## 2023-10-23 PROCEDURE — 82330 ASSAY OF CALCIUM: CPT

## 2023-10-23 PROCEDURE — 81000 URINALYSIS NONAUTO W/SCOPE: CPT | Performed by: EMERGENCY MEDICINE

## 2023-10-23 PROCEDURE — 85025 COMPLETE CBC W/AUTO DIFF WBC: CPT | Performed by: PHYSICIAN ASSISTANT

## 2023-10-23 PROCEDURE — 83605 ASSAY OF LACTIC ACID: CPT | Performed by: EMERGENCY MEDICINE

## 2023-10-23 PROCEDURE — 82077 ASSAY SPEC XCP UR&BREATH IA: CPT | Performed by: EMERGENCY MEDICINE

## 2023-10-23 PROCEDURE — 84443 ASSAY THYROID STIM HORMONE: CPT | Performed by: PHYSICIAN ASSISTANT

## 2023-10-23 PROCEDURE — 25000003 PHARM REV CODE 250: Performed by: NURSE PRACTITIONER

## 2023-10-23 PROCEDURE — 86140 C-REACTIVE PROTEIN: CPT | Performed by: PHYSICIAN ASSISTANT

## 2023-10-23 PROCEDURE — 84132 ASSAY OF SERUM POTASSIUM: CPT

## 2023-10-23 PROCEDURE — 93010 EKG 12-LEAD: ICD-10-PCS | Mod: ,,, | Performed by: INTERNAL MEDICINE

## 2023-10-23 PROCEDURE — 99285 EMERGENCY DEPT VISIT HI MDM: CPT | Mod: 25

## 2023-10-23 PROCEDURE — 85610 PROTHROMBIN TIME: CPT

## 2023-10-23 PROCEDURE — 25000003 PHARM REV CODE 250: Performed by: STUDENT IN AN ORGANIZED HEALTH CARE EDUCATION/TRAINING PROGRAM

## 2023-10-23 PROCEDURE — 82565 ASSAY OF CREATININE: CPT

## 2023-10-23 PROCEDURE — 80053 COMPREHEN METABOLIC PANEL: CPT | Performed by: PHYSICIAN ASSISTANT

## 2023-10-23 PROCEDURE — 80307 DRUG TEST PRSMV CHEM ANLYZR: CPT | Performed by: EMERGENCY MEDICINE

## 2023-10-23 PROCEDURE — 63600175 PHARM REV CODE 636 W HCPCS: Performed by: STUDENT IN AN ORGANIZED HEALTH CARE EDUCATION/TRAINING PROGRAM

## 2023-10-23 RX ORDER — OXYCODONE HYDROCHLORIDE 5 MG/1
5 TABLET ORAL EVERY 4 HOURS PRN
Status: DISCONTINUED | OUTPATIENT
Start: 2023-10-23 | End: 2023-10-24 | Stop reason: HOSPADM

## 2023-10-23 RX ORDER — GABAPENTIN 100 MG/1
100 CAPSULE ORAL 2 TIMES DAILY
Status: DISCONTINUED | OUTPATIENT
Start: 2023-10-24 | End: 2023-10-24 | Stop reason: HOSPADM

## 2023-10-23 RX ORDER — POLYETHYLENE GLYCOL 3350 17 G/17G
17 POWDER, FOR SOLUTION ORAL 2 TIMES DAILY PRN
Status: DISCONTINUED | OUTPATIENT
Start: 2023-10-23 | End: 2023-10-24 | Stop reason: HOSPADM

## 2023-10-23 RX ORDER — TALC
6 POWDER (GRAM) TOPICAL NIGHTLY
Status: DISCONTINUED | OUTPATIENT
Start: 2023-10-23 | End: 2023-10-24 | Stop reason: HOSPADM

## 2023-10-23 RX ORDER — ACETAMINOPHEN 325 MG/1
650 TABLET ORAL EVERY 8 HOURS PRN
Status: DISCONTINUED | OUTPATIENT
Start: 2023-10-23 | End: 2023-10-24 | Stop reason: HOSPADM

## 2023-10-23 RX ORDER — SODIUM CHLORIDE 9 MG/ML
INJECTION, SOLUTION INTRAVENOUS CONTINUOUS
Status: ACTIVE | OUTPATIENT
Start: 2023-10-23 | End: 2023-10-24

## 2023-10-23 RX ORDER — GLUCAGON 1 MG
1 KIT INJECTION
Status: DISCONTINUED | OUTPATIENT
Start: 2023-10-23 | End: 2023-10-24 | Stop reason: HOSPADM

## 2023-10-23 RX ORDER — OXYCODONE HYDROCHLORIDE 5 MG/1
10 TABLET ORAL EVERY 4 HOURS PRN
Status: DISCONTINUED | OUTPATIENT
Start: 2023-10-24 | End: 2023-10-23

## 2023-10-23 RX ORDER — IBUPROFEN 200 MG
16 TABLET ORAL
Status: DISCONTINUED | OUTPATIENT
Start: 2023-10-23 | End: 2023-10-24 | Stop reason: HOSPADM

## 2023-10-23 RX ORDER — IBUPROFEN 200 MG
24 TABLET ORAL
Status: DISCONTINUED | OUTPATIENT
Start: 2023-10-23 | End: 2023-10-24 | Stop reason: HOSPADM

## 2023-10-23 RX ORDER — OXYCODONE HYDROCHLORIDE 5 MG/1
10 TABLET ORAL EVERY 4 HOURS PRN
Status: DISCONTINUED | OUTPATIENT
Start: 2023-10-23 | End: 2023-10-24 | Stop reason: HOSPADM

## 2023-10-23 RX ORDER — OXYCODONE HYDROCHLORIDE 5 MG/1
5 TABLET ORAL EVERY 4 HOURS PRN
Status: DISCONTINUED | OUTPATIENT
Start: 2023-10-24 | End: 2023-10-23

## 2023-10-23 RX ORDER — NALOXONE HCL 0.4 MG/ML
0.02 VIAL (ML) INJECTION
Status: DISCONTINUED | OUTPATIENT
Start: 2023-10-23 | End: 2023-10-24 | Stop reason: HOSPADM

## 2023-10-23 RX ADMIN — OXYCODONE HYDROCHLORIDE 10 MG: 5 TABLET ORAL at 11:10

## 2023-10-23 RX ADMIN — PIPERACILLIN AND TAZOBACTAM 4.5 G: 4; .5 INJECTION, POWDER, LYOPHILIZED, FOR SOLUTION INTRAVENOUS; PARENTERAL at 04:10

## 2023-10-23 RX ADMIN — SODIUM CHLORIDE: 9 INJECTION, SOLUTION INTRAVENOUS at 11:10

## 2023-10-23 RX ADMIN — Medication 6 MG: at 09:10

## 2023-10-23 RX ADMIN — SODIUM CHLORIDE: 9 INJECTION, SOLUTION INTRAVENOUS at 04:10

## 2023-10-23 RX ADMIN — SODIUM CHLORIDE 2052 ML: 9 INJECTION, SOLUTION INTRAVENOUS at 02:10
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
## 2023-10-23 NOTE — ASSESSMENT & PLAN NOTE
· BUN is not very elevated, not in a 20:1 fashion seen in prerenal DEBRA eyes, however this is likely been going on for several days and prerenal is now on intrarenal DEBRA  · Likely prerenal dehydration DEBRA/ARF associated with new Ozempic use  · Discontinue Ozempic outpatient  · IV fluids for 2-4 days  · Nephrology consult

## 2023-10-23 NOTE — ED PROVIDER NOTES
"Encounter Date: 10/23/2023       History     Chief Complaint   Patient presents with    Altered Mental Status     [place] and spoke with his wife at 8pm and was found to be in normal state of health. Wife spoke with  this morning at 6am and noticed he was " off". Pt reports increase weakness, confusion, blurred vision, and slurred speech. Pt reports two falls due to weakness. Denies lov or hitting head. Code stroke called in triage. Pt brought ot CT and then roomed      53-year-old male with a history of chronic back pain, hypertension, peripheral artery disease, sepsis presenting with fatigue, feeling "off", mild confusion and slightly slurred speech.  Patient reports he was at his hunting camp and woke up this morning feeling bad.  His wife states he has been slightly confused, easily falling asleep, as well as having some slight slurred speech.  Patient denies any alcohol consumption, any drug use aside from his regularly prescribed prescriptions.  He states he is not been urinating as much as he thinks he should.  He reports he has a history of dehydration which has lead to kidney failure in the past and believes this is a similar presentation.  Notes this episode occurred approximately year and a half ago.  Denies fevers, chills, chest pain, shortness of breath, cough, syncope, numbness, weakness, changes in vision.  Previously in usual state of health.      Review of patient's allergies indicates:   Allergen Reactions    Klonopin [clonazepam] Anxiety and Other (See Comments)     Becomes agitated     Past Medical History:   Diagnosis Date    Back pain, chronic     Bulging discs     Chronic pain following surgery or procedure     Degenerative disc disease     Hypertension     Hypokalemia     Obese abdomen     PAD (peripheral artery disease)     Post laminectomy syndrome     Seizures     Severe sepsis     Short-term memory loss     Surgical site infection 11/17/2020    Rt abdomen " pain pump site    Thyroid disease     Subclinical hyperthyroidism     Past Surgical History:   Procedure Laterality Date    CHOLECYSTECTOMY      GASTRIC BYPASS      SLEEVE    gastric sleeve  2011    HERNIA REPAIR      pain pump Right 04/03/2018    inserted at right abdomen    pain pump site washout Right 04/13/2018    REMOVAL OF IMPLANT N/A 11/18/2020    Procedure: REMOVAL, IMPLANT;  Surgeon: Raffy Headley DO;  Location: [facility] OR;  Service: Neurosurgery;  Laterality: N/A;  intrathecal pain pump, leads removed from mid back and generator from abdomen    SKIN SURGERY      EXCESS SKIN REMOVAL    SPINE SURGERY      lumbar fusion     Family History   Problem Relation Age of Onset    Heart disease Mother     Breast cancer Mother     Arthritis Mother     Hypertension Mother     Cancer Mother     Throat cancer Father     Hypertension Father     Cancer Father      Social History     Tobacco Use    Smoking status: Never     Passive exposure: Never    Smokeless tobacco: Never   Substance Use Topics    Alcohol use: Yes     Comment: socially    Drug use: No     Review of Systems   Constitutional:  Positive for fatigue. Negative for chills and fever.   HENT:  Negative for sore throat.    Respiratory:  Negative for shortness of breath.    Cardiovascular:  Negative for chest pain.   Gastrointestinal:  Negative for nausea and vomiting.   Genitourinary:  Negative for dysuria.   Musculoskeletal:  Negative for back pain.   Skin:  Negative for rash.   Neurological:  Positive for speech difficulty. Negative for weakness.   Hematological:  Does not bruise/bleed easily.   Psychiatric/Behavioral:  Positive for confusion.        Physical Exam     Initial Vitals [10/23/23 0130]   BP Pulse Resp Temp SpO2   (!) 112/50 84 20 98.2 °F (36.8 °C) (!) 94 %      MAP       --         Physical Exam    Nursing note and vitals reviewed.  Constitutional: He appears well-developed and well-nourished. He is not diaphoretic. No distress.   HENT:   Head:  Normocephalic and atraumatic.   Eyes: Conjunctivae and EOM are normal. Pupils are equal, round, and reactive to light. No scleral icterus.   Neck: Neck supple.   Normal range of motion.  Cardiovascular:  Normal rate, regular rhythm, normal heart sounds and intact distal pulses.     Exam reveals no gallop and no friction rub.       No murmur heard.  Pulmonary/Chest: Breath sounds normal. No stridor. No respiratory distress. He has no wheezes. He has no rhonchi. He has no rales.   Abdominal: Abdomen is soft. Bowel sounds are normal. He exhibits no distension. There is no abdominal tenderness. There is no rebound and no guarding.   Musculoskeletal:         General: No tenderness or edema. Normal range of motion.      Cervical back: Normal range of motion and neck supple.     Neurological: He is alert and oriented to person, place, and time. He has normal strength. No cranial nerve deficit. GCS score is 15. GCS eye subscore is 4. GCS verbal subscore is 5. GCS motor subscore is 6.   Skin: Skin is warm and dry. No rash noted.   Psychiatric: He has a normal mood and affect. His behavior is normal.         ED Course   Procedures  Labs Reviewed   CBC W/ AUTO DIFFERENTIAL - Abnormal; Notable for the following components:       Result Value    WBC 14.31 (*)     RBC 3.89 (*)     Hemoglobin 9.6 (*)     Hematocrit 32.5 (*)     MCH 24.7 (*)     MCHC 29.5 (*)     RDW 16.8 (*)     Gran # (ANC) 11.3 (*)     Immature Grans (Abs) 0.05 (*)     Mono # 1.4 (*)     Gran % 79.2 (*)     Lymph % 9.0 (*)     All other components within normal limits   COMPREHENSIVE METABOLIC PANEL - Abnormal; Notable for the following components:    Sodium 135 (*)     BUN 26 (*)     Creatinine 4.2 (*)     Calcium 8.5 (*)     eGFR 16 (*)     All other components within normal limits   TSH - Abnormal; Notable for the following components:    TSH 0.231 (*)     All other components within normal limits   DRUG SCREEN PANEL, URINE EMERGENCY - Abnormal; Notable for  the following components:    Opiate Scrn, Ur Presumptive Positive (*)     Barbiturate Screen, Ur Presumptive Positive (*)     All other components within normal limits    Narrative:     Specimen Source->Urine   ACETAMINOPHEN LEVEL - Abnormal; Notable for the following components:    Acetaminophen (Tylenol), Serum <3.0 (*)     All other components within normal limits   URINALYSIS, REFLEX TO URINE CULTURE - Abnormal; Notable for the following components:    Protein, UA 1+ (*)     Occult Blood UA 1+ (*)     All other components within normal limits    Narrative:     Specimen Source->Urine   C-REACTIVE PROTEIN - Abnormal; Notable for the following components:    CRP 95.3 (*)     All other components within normal limits   URINALYSIS MICROSCOPIC - Abnormal; Notable for the following components:    Hyaline Casts, UA 4 (*)     All other components within normal limits    Narrative:     Specimen Source->Urine   ISTAT PROCEDURE - Abnormal; Notable for the following components:    POC Creatinine 4.2 (*)     POC Sodium 135 (*)     POC Hematocrit 31 (*)     All other components within normal limits   PROTIME-INR   LIPID PANEL   TROPONIN I   B-TYPE NATRIURETIC PEPTIDE   ALCOHOL,MEDICAL (ETHANOL)   LACTIC ACID, PLASMA   T4, FREE   C-REACTIVE PROTEIN   PROTIME-INR   POCT GLUCOSE, HAND-HELD DEVICE   POCT GLUCOSE   ISTAT PROCEDURE   POCT GLUCOSE   ISTAT CHEM8   POCT PROTIME-INR          Imaging Results              US Retroperitoneal Complete (Final result)  Result time 10/23/23 17:30:50      Final result by Harley Mejia MD (10/23/23 17:30:50)                   Impression:      Elevated renal resistive indices, a nonspecific finding which may be seen with medical renal disease.  No hydronephrosis.      Electronically signed by: Harley Mejia MD  Date:    10/23/2023  Time:    17:30               Narrative:    EXAMINATION:  US RETROPERITONEAL COMPLETE    CLINICAL HISTORY:  renal failure, r/o  obstruction;    TECHNIQUE:  Ultrasound of the kidneys and urinary bladder was performed including color flow and Doppler evaluation of the kidneys.    COMPARISON:  None.    FINDINGS:  The kidneys measure 12.1 cm on the right and 10.4 cm on the left. There is preserved corticomedullary differentiation and normal cortical thickness.  No evidence of renal stones or hydronephrosis.  No solid renal mass seen.  Perfusion to the kidneys is normal. Resistive indices are elevated and as follow: 0.77 on the right and 0.75 on the left. The urinary bladder appears normal.                                       X-Ray Chest AP Portable (Final result)  Result time 10/23/23 15:13:25      Final result by Khai Chamorro III, MD (10/23/23 15:13:25)                   Impression:      No acute process seen.      Electronically signed by: Khai Chamorro MD  Date:    10/23/2023  Time:    15:13               Narrative:    EXAMINATION:  XR CHEST AP PORTABLE    CLINICAL HISTORY:  Stroke;    FINDINGS:  Chest one view:    Heart size is normal.  Lungs are clear.  There is aortic plaque.                                       CT Head Without Contrast (Final result)  Result time 10/23/23 13:58:09      Final result by Khai Chamorro III, MD (10/23/23 13:58:09)                   Impression:      No acute process seen.    Remote infarct on the left as above.      Electronically signed by: Khai Chamorro MD  Date:    10/23/2023  Time:    13:58               Narrative:    EXAMINATION:  CT HEAD WITHOUT CONTRAST    CLINICAL HISTORY:  Neuro deficit, acute, stroke suspected;    FINDINGS:  No bleed, mass, or mass effect seen.  The brain parenchyma is unremarkable.  No acute infarct changes are seen.  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  No acute infarct seen.  No acute blood products seen.  No skull lesion or skull fracture seen.                                       Medications   piperacillin-tazobactam (ZOSYN) 4.5 g  in dextrose 5 % in water (D5W) 100 mL IVPB (MB+) (0 g Intravenous Stopped 10/23/23 1816)   0.9%  NaCl infusion ( Intravenous New Bag 10/23/23 1634)   melatonin tablet 6 mg (6 mg Oral Given 10/23/23 2109)   polyethylene glycol packet 17 g (has no administration in time range)   acetaminophen tablet 650 mg (has no administration in time range)   naloxone 0.4 mg/mL injection 0.02 mg (has no administration in time range)   glucose chewable tablet 16 g (has no administration in time range)   glucose chewable tablet 24 g (has no administration in time range)   glucagon (human recombinant) injection 1 mg (has no administration in time range)   gabapentin capsule 100 mg (has no administration in time range)   oxyCODONE immediate release tablet 5 mg (has no administration in time range)   oxyCODONE immediate release tablet 10 mg (has no administration in time range)   sodium chloride 0.9% bolus 2,052 mL 2,052 mL (0 mLs Intravenous Stopped 10/23/23 1621)     Medical Decision Making  Amount and/or Complexity of Data Reviewed  Labs: ordered.    Risk  Decision regarding hospitalization.                          Medical Decision Making:   Initial Assessment:   53-year-old male presenting with fatigue, mildly slurred speech, mild confusion.  On exam he is well-appearing and in no acute distress.  He does seem to fall asleep somewhat easily though is easily awakened by voice.  He is interacting normally with a normal affect.  Speech does not appear to be slurred although he does seem to be a bit drowsy.  On exam he has some mild hypotension, systolic mid 80s to mid 90s.  Neuro exam unremarkable without any evidence of abnormality.  Patient is on multiple pain medications and muscle relaxants which may be contributing.  Denies any changes in his medication regimen.  Denies any alcohol use.  Differential includes dehydration as the patient has suggested, mild kidney failure, metabolic cause, toxidrome.  Will get labs, give IV fluids,  reassess.  Clinical Tests:   Sepsis Perfusion Assessment: "I attest a sepsis perfusion exam was performed within 6 hours of sepsis, severe sepsis, or septic shock presentation, following fluid resuscitation."  ED Management:  This patient does not have evidence of infective focus  My overall impression is Possible sepsis.  Source: Urinary Tract  Antibiotics given- Antibiotics (72h ago, onward)    None      Latest lactate reviewed-  =1.2    Fluid challenge Ideal Body Weight- The patient's ideal body weight is 68.4kg which will be used to calculate fluid bolus of 30 ml/kg for treatment of septic shock.  Fluid bolus given at ideal body weight.  Patient has grade 3 obesity with a BMI of 48.      Post- resuscitation assessment Yes Perfusion exam was performed within 6 hours of septic shock presentation after bolus shows Adequate tissue perfusion assessed by non-invasive monitoring       Will Not start Pressors- Levophed for MAP of 65  Source control achieved by: zosyn        Patient with noted DEBRA.  Could be due to dehydration compounding toxidrome.  Patient will be admitted for further evaluation and treatment.      Clinical Impression:   Final diagnoses:  [I63.9] Stroke  [E66.01, Z68.42] Class 3 severe obesity due to excess calories with body mass index (BMI) of 45.0 to 49.9 in adult, unspecified whether serious comorbidity present (Primary)  [A41.9] Sepsis, due to unspecified organism, unspecified whether acute organ dysfunction present  [N17.9] DEBRA (acute kidney injury)        ED Disposition Condition    Admit Stable                Kevin Virk MD  10/23/23 0011
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
## 2023-10-23 NOTE — ED TRIAGE NOTES
[place]. Had some confusion. Wife brings to ED. Difficulty obtaining b/p in triage. Placed in bed. Mildly slurred speech but A&OX4. Placed on monitor.

## 2023-10-23 NOTE — PHARMACY MED REC
"Admission Medication History     The home medication history was taken by Samantha Mcclellan.    You may go to "Admission" then "Reconcile Home Medications" tabs to review and/or act upon these items.     The home medication list has been updated by the Pharmacy department.   Please read ALL comments highlighted in yellow.   Please address this information as you see fit.    Feel free to contact us if you have any questions or require assistance.        Medications listed below were obtained from: Patient/family and Analytic software- [employer]  (Not in a hospital admission)        Samantha Mcclellan  [phone]                 .          "

## 2023-10-23 NOTE — ASSESSMENT & PLAN NOTE
· Differential diagnosis includes polypharmacy, medication toxicity in the setting of DEBRA, infection, stroke   · Patient's eyes were rolling a bit in between conversation, and pinpoint pupils- most likely scenario is that patient went into renal failure and was no longer clearing his normal dosages of pain medications and became encephalopathic as a result.  I suspect that Ozempic is the cause of his DEBRA/renal failure, given the temporal relationship of starting that medication this month and known side effect.  · Will get an MRI/MRA for completion, to rule out an ischemic stroke or arterial stenosis, etc  · Will give IV fluids and time to metabolize the drugs, he is currently able to carry on conversations and is oriented, with slight confusion and mild impairment.  · Patient is on 60 mg of morphine a day long-acting and 120 mg of Oxy a day, therefore do not want an abrupt removal of these, will put low-dose Oxy on p.r.n. list, in case patient starts to go into withdrawal overnight.  · Patient was on 1200 mg of gabapentin a day as well, will hold off today, and resume 100 mg b.i.d. tomorrow  · BP drop to 80s/50s in ED and elevated WBC count, therefore blood cultures taken and started on Zosyn.  Although this may just be dehydration, follow-up blood cultures.  He does have a history of MRSA, if decompensates overnight would add vancomycin.

## 2023-10-23 NOTE — FIRST PROVIDER EVALUATION
"Medical screening examination initiated.  I have conducted a focused provider triage encounter, findings are as follows:    Brief history of present illness:  Emergency or altered mental status, disorientation, weakness and dizziness onset this morning.  Patient is present with his wife, who states she spoke to him left-sided a p.m. and he was acting like his normal self.  Reports this morning at 6:00 a.m. he was [place] alone when he called his wife on the phone and wife reports he was acting very different than usual.  Wife reports his speech sounds slurred, patient reports he fell down multiple times today due to feeling weak in his legs, but most prominently on the left side.  Denies syncope.  History of hypertension.  Denies any alcohol use or drug use aside from what he is prescribed to take regularly.    Vitals:    10/23/23 0130 10/23/23 1356   BP: (!) 112/50 (!) 91/55   BP Location: Right arm    Patient Position: Sitting    Pulse: 84 84   Resp: 20 (!) 24   Temp: 98.2 °F (36.8 °C)    TempSrc: Oral    SpO2: (!) 94%    Weight: (!) 143.8 kg (317 lb)    Height: 5' 8" (1.727 m)        Pertinent physical exam: Slurred speech, speaking in full sentences, in wheelchair, awake alert and oriented, brief neuro exam with normal strength and sensation      Brief workup plan:  Code stroke    Preliminary workup initiated; this workup will be continued and followed by the physician or advanced practice provider that is assigned to the patient when roomed.  "
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
## 2023-10-24 VITALS
RESPIRATION RATE: 17 BRPM | SYSTOLIC BLOOD PRESSURE: 160 MMHG | DIASTOLIC BLOOD PRESSURE: 75 MMHG | HEART RATE: 85 BPM | TEMPERATURE: 99 F | WEIGHT: 315 LBS | BODY MASS INDEX: 47.74 KG/M2 | OXYGEN SATURATION: 98 % | HEIGHT: 68 IN

## 2023-10-24 LAB
ANION GAP SERPL CALC-SCNC: 8 MMOL/L (ref 8–16)
BASOPHILS # BLD AUTO: 0.04 K/UL (ref 0–0.2)
BASOPHILS NFR BLD: 0.4 % (ref 0–1.9)
BUN SERPL-MCNC: 14 MG/DL (ref 6–20)
CALCIUM SERPL-MCNC: 8.7 MG/DL (ref 8.7–10.5)
CHLORIDE SERPL-SCNC: 106 MMOL/L (ref 95–110)
CO2 SERPL-SCNC: 26 MMOL/L (ref 23–29)
CREAT SERPL-MCNC: 1.3 MG/DL (ref 0.5–1.4)
DIFFERENTIAL METHOD: ABNORMAL
EOSINOPHIL # BLD AUTO: 0.2 K/UL (ref 0–0.5)
EOSINOPHIL NFR BLD: 2.2 % (ref 0–8)
ERYTHROCYTE [DISTWIDTH] IN BLOOD BY AUTOMATED COUNT: 16.8 % (ref 11.5–14.5)
EST. GFR  (NO RACE VARIABLE): >60 ML/MIN/1.73 M^2
GLUCOSE SERPL-MCNC: 91 MG/DL (ref 70–110)
HCT VFR BLD AUTO: 31.8 % (ref 40–54)
HGB BLD-MCNC: 9.4 G/DL (ref 14–18)
IMM GRANULOCYTES # BLD AUTO: 0.03 K/UL (ref 0–0.04)
IMM GRANULOCYTES NFR BLD AUTO: 0.3 % (ref 0–0.5)
LYMPHOCYTES # BLD AUTO: 1.1 K/UL (ref 1–4.8)
LYMPHOCYTES NFR BLD: 10.9 % (ref 18–48)
MCH RBC QN AUTO: 24.2 PG (ref 27–31)
MCHC RBC AUTO-ENTMCNC: 29.6 G/DL (ref 32–36)
MCV RBC AUTO: 82 FL (ref 82–98)
MONOCYTES # BLD AUTO: 0.8 K/UL (ref 0.3–1)
MONOCYTES NFR BLD: 8 % (ref 4–15)
NEUTROPHILS # BLD AUTO: 8.1 K/UL (ref 1.8–7.7)
NEUTROPHILS NFR BLD: 78.2 % (ref 38–73)
NRBC BLD-RTO: 0 /100 WBC
PLATELET # BLD AUTO: 301 K/UL (ref 150–450)
PMV BLD AUTO: 9.5 FL (ref 9.2–12.9)
POTASSIUM SERPL-SCNC: 3.8 MMOL/L (ref 3.5–5.1)
RBC # BLD AUTO: 3.89 M/UL (ref 4.6–6.2)
SODIUM SERPL-SCNC: 140 MMOL/L (ref 136–145)
WBC # BLD AUTO: 10.3 K/UL (ref 3.9–12.7)

## 2023-10-24 PROCEDURE — 63600175 PHARM REV CODE 636 W HCPCS: Performed by: STUDENT IN AN ORGANIZED HEALTH CARE EDUCATION/TRAINING PROGRAM

## 2023-10-24 PROCEDURE — 36415 COLL VENOUS BLD VENIPUNCTURE: CPT | Performed by: STUDENT IN AN ORGANIZED HEALTH CARE EDUCATION/TRAINING PROGRAM

## 2023-10-24 PROCEDURE — 85025 COMPLETE CBC W/AUTO DIFF WBC: CPT | Performed by: STUDENT IN AN ORGANIZED HEALTH CARE EDUCATION/TRAINING PROGRAM

## 2023-10-24 PROCEDURE — 25000003 PHARM REV CODE 250: Performed by: NURSE PRACTITIONER

## 2023-10-24 PROCEDURE — 80048 BASIC METABOLIC PNL TOTAL CA: CPT | Performed by: STUDENT IN AN ORGANIZED HEALTH CARE EDUCATION/TRAINING PROGRAM

## 2023-10-24 PROCEDURE — 25000003 PHARM REV CODE 250: Performed by: STUDENT IN AN ORGANIZED HEALTH CARE EDUCATION/TRAINING PROGRAM

## 2023-10-24 RX ADMIN — GABAPENTIN 100 MG: 100 CAPSULE ORAL at 09:10

## 2023-10-24 RX ADMIN — OXYCODONE HYDROCHLORIDE 10 MG: 5 TABLET ORAL at 07:10

## 2023-10-24 RX ADMIN — PIPERACILLIN AND TAZOBACTAM 4.5 G: 4; .5 INJECTION, POWDER, LYOPHILIZED, FOR SOLUTION INTRAVENOUS; PARENTERAL at 03:10

## 2023-10-24 RX ADMIN — OXYCODONE HYDROCHLORIDE 10 MG: 5 TABLET ORAL at 03:10

## 2023-10-24 NOTE — PLAN OF CARE
Problem: Adult Inpatient Plan of Care  Goal: Plan of Care Review  Outcome: Ongoing, Progressing  Flowsheets (Taken 10/24/2023 0815)  Plan of Care Reviewed With:   patient   spouse  Goal: Patient-Specific Goal (Individualized)  Outcome: Ongoing, Progressing     Problem: Fluid and Electrolyte Imbalance (Acute Kidney Injury/Impairment)  Goal: Fluid and Electrolyte Balance  Outcome: Ongoing, Progressing  Intervention: Monitor and Manage Fluid and Electrolyte Balance  Flowsheets (Taken 10/24/2023 0815)  Fluid/Electrolyte Management:   intravenous fluid replacement initiated   fluids provided     Problem: Adult Inpatient Plan of Care  Goal: Plan of Care Review  Outcome: Ongoing, Progressing  Flowsheets (Taken 10/24/2023 0815)  Plan of Care Reviewed With:   patient   spouse     Problem: Adult Inpatient Plan of Care  Goal: Plan of Care Review  Outcome: Ongoing, Progressing  Flowsheets (Taken 10/24/2023 0815)  Plan of Care Reviewed With:   patient   spouse     Problem: Adult Inpatient Plan of Care  Goal: Patient-Specific Goal (Individualized)  Outcome: Ongoing, Progressing     Problem: Adult Inpatient Plan of Care  Goal: Patient-Specific Goal (Individualized)  Outcome: Ongoing, Progressing     Problem: Fluid and Electrolyte Imbalance (Acute Kidney Injury/Impairment)  Goal: Fluid and Electrolyte Balance  Outcome: Ongoing, Progressing  Intervention: Monitor and Manage Fluid and Electrolyte Balance  Flowsheets (Taken 10/24/2023 0815)  Fluid/Electrolyte Management:   intravenous fluid replacement initiated   fluids provided     Problem: Fluid and Electrolyte Imbalance (Acute Kidney Injury/Impairment)  Goal: Fluid and Electrolyte Balance  Outcome: Ongoing, Progressing     Problem: Fluid and Electrolyte Imbalance (Acute Kidney Injury/Impairment)  Goal: Fluid and Electrolyte Balance  Intervention: Monitor and Manage Fluid and Electrolyte Balance  Flowsheets (Taken 10/24/2023 0815)  Fluid/Electrolyte Management:   intravenous  fluid replacement initiated   fluids provided     Problem: Fluid and Electrolyte Imbalance (Acute Kidney Injury/Impairment)  Goal: Fluid and Electrolyte Balance  Intervention: Monitor and Manage Fluid and Electrolyte Balance  Flowsheets (Taken 10/24/2023 0815)  Fluid/Electrolyte Management:   intravenous fluid replacement initiated   fluids provided

## 2023-10-24 NOTE — NURSING
[facility], [facility] - [place], [place]  Nurses Note -- 4 Eyes      10/24/2023       Skin assessed on: Q Shift      [x] No Pressure Injuries Present    []Prevention Measures Documented    [] Yes LDA  for Pressure Injury Previously documented     [] Yes New Pressure Injury Discovered   [] LDA for New Pressure Injury Added      Attending RN:  Lennie Arriaza RN     Second: COURTNEY Parekh
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
## 2023-10-24 NOTE — NURSING
Patient arrived to floor via stretcher transported from ED. Patient transferred to bed independently.  AAOx4. Patient was oriented to room, information on whiteboard, and medication regimen.  Bed low, adequate lighting provided, side rails x2 up, call bell within reach. Admission assessment completed. VSS. Patient denied having any acute distress at this time.  None observed. Will continue to monitor and follow treatment plan. Spouse at bedside.       [facility]  Nurses Note -- 4 Eyes      10/23/2023       Skin assessed on: Admit      [x] No Pressure Injuries Present    [x]Prevention Measures Documented    [] Yes LDA  for Pressure Injury Previously documented     [] Yes New Pressure Injury Discovered   [] LDA for New Pressure Injury Added      Attending RN:  Izabella Bazzi LPN     Second RN:  Malini Lind, PCT

## 2023-10-24 NOTE — PLAN OF CARE
No acute distress noted, patient free from falls or injury this shift.  Bed in low position, wheels locked, call light in reach for assistance, plan of care continued.       Problem: Fluid and Electrolyte Imbalance (Acute Kidney Injury/Impairment)  Goal: Fluid and Electrolyte Balance  Outcome: Ongoing, Progressing     Problem: Oral Intake Inadequate (Acute Kidney Injury/Impairment)  Goal: Optimal Nutrition Intake  Outcome: Ongoing, Progressing     Problem: Bariatric Environmental Safety  Goal: Safety Maintained with Care  Outcome: Ongoing, Progressing

## 2023-10-24 NOTE — CONSULTS
Reason for consultation:  Acute renal failure    HPI:  54 yo man with h/o chronic back pain, HTN presented with confusion, weakness and slurred speech.  He was also in acute renal failure.  Nephrology was consult for evaluation.    PMH:  As above.    Scheduled Meds:   gabapentin  100 mg Oral BID    melatonin  6 mg Oral Nightly    piperacillin-tazobactam (Zosyn) IV (PEDS and ADULTS) (extended infusion is not appropriate)  4.5 g Intravenous Q12H       Review of patient's allergies indicates:   Allergen Reactions    Klonopin [clonazepam] Anxiety and Other (See Comments)     Becomes agitated     Family history:  Non contributory    Social history:  No tobacco, no alcohol    Vital Signs Range (Last 24H):  Temp:  [98.3 °F (36.8 °C)-98.8 °F (37.1 °C)]   Pulse:  [73-96]   Resp:  [17-26]   BP: ()/(46-73)   SpO2:  [91 %-98 %]     I & O (Last 24H):  Intake/Output Summary (Last 24 hours) at 10/24/2023 0906  Last data filed at 10/24/2023 0800  Gross per 24 hour   Intake 3897.14 ml   Output 2625 ml   Net 1272.14 ml           Physical Exam:  General appearance: well developed, well nourished, no distress  Lungs:  clear to auscultation bilaterally and normal respiratory effort  Heart: regular rate and rhythm  Abdomen:  soft, normal bowel sounds  Extremities: no cyanosis or edema, or clubbing    Laboratory:  I have reviewed all pertinent lab results within the past 24 hours.  CBC:   Recent Labs   Lab 10/24/23  0723   WBC 10.30   RBC 3.89*   HGB 9.4*   HCT 31.8*      MCV 82   MCH 24.2*   MCHC 29.6*     CMP:   Recent Labs   Lab 10/23/23  1355 10/24/23  0723   GLU 94 91   CALCIUM 8.5* 8.7   ALBUMIN 3.7  --    PROT 7.1  --    * 140   K 3.8 3.8   CO2 23 26   CL 97 106   BUN 26* 14   CREATININE 4.2* 1.3   ALKPHOS 71  --    ALT 17  --    AST 30  --    BILITOT 0.2  --      Recent Labs   Lab 10/23/23  1652   COLORU Yellow   SPECGRAV 1.010   PHUR 6.0   PROTEINUA 1+*   BACTERIA Rare   NITRITE Negative   LEUKOCYTESUR  Negative   UROBILINOGEN Negative   HYALINECASTS 4*       Assessment & Plan    DEBRA - prerenal azeotemia - resolved  HTN  Chronic back pain  Altered mental status - resolved, most likely related meds    Continue present RX  Renal status stable  OK for d/c on renal standpoint  We'll follow prn    Thank you for the courtesy of the consultation      Alesha Burk  10/24/2023

## 2023-10-24 NOTE — HOSPITAL COURSE
53-year-old male with history of chronic back pain, hypertension, and obesity admitted on 10/23/2023 for further evaluation of acute encephalopathy.  Found to be in acute renal failure, creatinine up to 4.2, baseline is normal. US renal showed elevated renal resistive indices, a nonspecific finding which may be seen with medical renal disease.  No hydronephrosis.  CT head showed no acute infarct changes are seen.  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  No acute infarct seen. Of note, pt recently started on Ozempic shots this month and was taking Aleve to help with pain control along with his chronic pain medications (morphine, oxycodone, and gabapentin). Blood cx with NGTD. Pt received IV fluids with improvement in ARF. Nephrology consulted-suspect prerenal azeotemia, now resolved. Renal status stable. Per nephrology, OK for d/c on renal standpoint. Mental status back to baseline. Suspect patient went into renal failure and was no longer clearing his normal dosages of pain medications and became encephalopathic as a result.  Suspect that Ozempic is the cause of his DEBRA/renal failure.     Patient admits feeling significantly better. Mental status at baseline. Recommend discontinue ozempic on discharge and to hold BP meds for the next 1-2 days and continue hydration. Also, discussed with patient, wife and daughters to wean down on patient's pain medications for the next couple of days. Spent 15 minutes discussing that in details. Also noted that patient blood cx NGTD. Patient does not want to stay overnight to wait for the 48hr result but is willing to come back for treatment if blood cx is positive. Pt denies any fever, headaches, vision changes, chest pain, shortness of breath, palpitations, abdominal pain, nausea, vomiting, or any new weaknesses. Feels ready to go home. Patient's exam on discharge was as follow: Patient is alert and oriented, appears in no acute distress, heart with  regular rate and rhythm, lungs clear to asculation with non-labored breathing, abdomen soft, and no new weaknesses or focal deficits seen. Bilateral lower extremities without any edema or calf tenderness. Patient alert and oriented x 4. No confusion or hallucinations.     Patient was counseled regarding any abnormal labs, differential diagnosis, treatment options, risk-benefit, lifestyle changes, prognosis, current condition, and medications. Patient was interactive and attentive.  Patient's questions were answered in a respectful and timely manner. Patient was instructed to follow-up with PCP within 1 week and to continue taking medications as prescribed.  Also, extensively discussed the risks, benefits, and side effects of patient's medications. Discussed with patient about any medication changes. Patient verbalized understanding and agrees to treatment plan.  Patient is stable for discharge.  Patient has no other questions or concerns at this time.  ED precautions discussed with the patient.    Vital signs are stable. Ambulating without any difficulty. Tolerating p.o. intake without any nausea or vomiting. Afebrile for over 24 hours. Patient is in stable condition and has no questions or concerns. Patient will be discharge to home once transportation secured.  CM/SW to assist with discharge planning.     Vitals:    10/24/23 0351 10/24/23 0722 10/24/23 0744 10/24/23 1117   BP: (!) 150/70 136/62  (!) 160/75   BP Location:  Left arm  Left arm   Patient Position: Lying Lying  Lying   Pulse: 96 89  85   Resp: 18 17 18 17   Temp: 98.3 °F (36.8 °C) 98.4 °F (36.9 °C)  99.3 °F (37.4 °C)   TempSrc: Oral Oral  Oral   SpO2: 95% 96%  98%   Weight:       Height:

## 2023-10-24 NOTE — NURSING
AVS virtually reviewed with patient and his wife in its entirety with emphasis on medications, follow-up appointments and reasons to return to the ED or contact the [facility] On [facility] Nurse Care Line. Patient also encouraged to utilize their patient portal. Ease and convenience of use reiterated. Education complete and patient voiced understanding. All questions answered. Discharge teaching completed.

## 2023-10-24 NOTE — PLAN OF CARE
Claudia Gandara (Spouse)   [phone] (Home Phone)     10/24/23 1234   Discharge Planning   Assessment Type Discharge Planning Brief Assessment   Resource/Environmental Concerns none   Support Systems Spouse/significant other   Equipment Currently Used at Home none   Current Living Arrangements home   Patient/Family Anticipates Transition to home with family   Patient/Family Anticipated Services at Transition none   DME Needed Upon Discharge  none   Discharge Plan A Home with family   Discharge Plan B Home with family

## 2023-10-25 ENCOUNTER — PATIENT OUTREACH (OUTPATIENT)
Dept: ADMINISTRATIVE | Facility: CLINIC | Age: 53
End: 2023-10-25
Payer: COMMERCIAL

## 2023-10-25 NOTE — PROGRESS NOTES
C3 nurse spoke with Jaime Lund and spouse, Claudia, for a TCC post hospital discharge follow up call. The patient has a scheduled HOSFU appointment with Grazyna Mercer DNP on 10/30/2023 @ 10 AM

## 2023-10-25 NOTE — PROGRESS NOTES
C3 nurse attempted to contact Jaime Lund and patient's spouse, Claudia,  for a TCC post hospital discharge follow up call. No answer. Left voicemail with callback information. The patient has a scheduled HOSPFU appointment with Grazyna Mercer DNP on 10/30/2023 @ 10 AM.

## 2023-10-27 LAB
BACTERIA BLD CULT: NORMAL
BACTERIA BLD CULT: NORMAL

## 2023-10-30 ENCOUNTER — OFFICE VISIT (OUTPATIENT)
Dept: FAMILY MEDICINE | Facility: CLINIC | Age: 53
End: 2023-10-30
Payer: COMMERCIAL

## 2023-10-30 VITALS
OXYGEN SATURATION: 98 % | TEMPERATURE: 98 F | BODY MASS INDEX: 46.77 KG/M2 | HEART RATE: 74 BPM | SYSTOLIC BLOOD PRESSURE: 132 MMHG | HEIGHT: 68 IN | DIASTOLIC BLOOD PRESSURE: 86 MMHG | WEIGHT: 308.63 LBS

## 2023-10-30 DIAGNOSIS — Z09 HOSPITAL DISCHARGE FOLLOW-UP: Primary | ICD-10-CM

## 2023-10-30 DIAGNOSIS — N17.9 AKI (ACUTE KIDNEY INJURY): ICD-10-CM

## 2023-10-30 DIAGNOSIS — M47.816 FACET ARTHROPATHY, LUMBAR: ICD-10-CM

## 2023-10-30 DIAGNOSIS — I10 ESSENTIAL HYPERTENSION: Chronic | ICD-10-CM

## 2023-10-30 PROCEDURE — 3008F BODY MASS INDEX DOCD: CPT | Mod: CPTII,S$GLB,, | Performed by: NURSE PRACTITIONER

## 2023-10-30 PROCEDURE — 1160F RVW MEDS BY RX/DR IN RCRD: CPT | Mod: CPTII,S$GLB,, | Performed by: NURSE PRACTITIONER

## 2023-10-30 PROCEDURE — 99213 OFFICE O/P EST LOW 20 MIN: CPT | Mod: S$GLB,,, | Performed by: NURSE PRACTITIONER

## 2023-10-30 PROCEDURE — 99999 PR PBB SHADOW E&M-EST. PATIENT-LVL IV: CPT | Mod: PBBFAC,,, | Performed by: NURSE PRACTITIONER

## 2023-10-30 PROCEDURE — 4010F ACE/ARB THERAPY RXD/TAKEN: CPT | Mod: CPTII,S$GLB,, | Performed by: NURSE PRACTITIONER

## 2023-10-30 PROCEDURE — 3008F PR BODY MASS INDEX (BMI) DOCUMENTED: ICD-10-PCS | Mod: CPTII,S$GLB,, | Performed by: NURSE PRACTITIONER

## 2023-10-30 PROCEDURE — 3079F DIAST BP 80-89 MM HG: CPT | Mod: CPTII,S$GLB,, | Performed by: NURSE PRACTITIONER

## 2023-10-30 PROCEDURE — 99999 PR PBB SHADOW E&M-EST. PATIENT-LVL IV: ICD-10-PCS | Mod: PBBFAC,,, | Performed by: NURSE PRACTITIONER

## 2023-10-30 PROCEDURE — 1159F MED LIST DOCD IN RCRD: CPT | Mod: CPTII,S$GLB,, | Performed by: NURSE PRACTITIONER

## 2023-10-30 PROCEDURE — 1111F PR DISCHARGE MEDS RECONCILED W/ CURRENT OUTPATIENT MED LIST: ICD-10-PCS | Mod: CPTII,S$GLB,, | Performed by: NURSE PRACTITIONER

## 2023-10-30 PROCEDURE — 1159F PR MEDICATION LIST DOCUMENTED IN MEDICAL RECORD: ICD-10-PCS | Mod: CPTII,S$GLB,, | Performed by: NURSE PRACTITIONER

## 2023-10-30 PROCEDURE — 1111F DSCHRG MED/CURRENT MED MERGE: CPT | Mod: CPTII,S$GLB,, | Performed by: NURSE PRACTITIONER

## 2023-10-30 PROCEDURE — 3075F SYST BP GE 130 - 139MM HG: CPT | Mod: CPTII,S$GLB,, | Performed by: NURSE PRACTITIONER

## 2023-10-30 PROCEDURE — 3075F PR MOST RECENT SYSTOLIC BLOOD PRESS GE 130-139MM HG: ICD-10-PCS | Mod: CPTII,S$GLB,, | Performed by: NURSE PRACTITIONER

## 2023-10-30 PROCEDURE — 99213 PR OFFICE/OUTPT VISIT, EST, LEVL III, 20-29 MIN: ICD-10-PCS | Mod: S$GLB,,, | Performed by: NURSE PRACTITIONER

## 2023-10-30 PROCEDURE — 3079F PR MOST RECENT DIASTOLIC BLOOD PRESSURE 80-89 MM HG: ICD-10-PCS | Mod: CPTII,S$GLB,, | Performed by: NURSE PRACTITIONER

## 2023-10-30 PROCEDURE — 1160F PR REVIEW ALL MEDS BY PRESCRIBER/CLIN PHARMACIST DOCUMENTED: ICD-10-PCS | Mod: CPTII,S$GLB,, | Performed by: NURSE PRACTITIONER

## 2023-10-30 PROCEDURE — 4010F PR ACE/ARB THEARPY RXD/TAKEN: ICD-10-PCS | Mod: CPTII,S$GLB,, | Performed by: NURSE PRACTITIONER

## 2023-10-30 NOTE — PROGRESS NOTES
Subjective:       Patient ID: Jaime Lund is a 53 y.o. male.    Chief Complaint: Hospital Follow Up    Transitional Care Note    Family and/or Caretaker present at visit?  Yes, wife.  Diagnostic tests reviewed/disposition: No diagnosic tests pending after this hospitalization.  Disease/illness education: Yes  Home health/community services discussion/referrals: Patient does not have home health established from hospital visit.  They do not need home health.  If needed, we will set up home health for the patient.   Establishment or re-establishment of referral orders for community resources: No other necessary community resources.   Discussion with other health care providers: No discussion with other health care providers necessary.       Jaime Lund is a 53 y.o. male patient that presents to clinic for hospital discharge summary. Past medical and surgical history reviewed as listed. PCP is Gabby Dean MD , he is  new  to me. Past hospitalization from 10/23-10/24/2023 with a primary diagnosis of metabolic encephalopathy and ARF. Hospital discharge summary reviewed at length and labs reviewed. Discussed with patient and wife at length. DEBRA likely secondary to excessive NSAID use and Ozempic per discharge summary. At present, patient reports he is back at baseline. He reports his blood pressure has been slightly elevated at home ranging 140-170's/'s. He was off all medications while in hospital, but has since restarted amlodipine, chlorthalidone, and olmesartan. He is enrolled in the digital medicine hypertension program. Reports intermittent swelling to bilateral lower extremities.     ROS as listed.     Past Medical History:   Diagnosis Date    Back pain, chronic     Bulging discs     Chronic pain following surgery or procedure     Degenerative disc disease     Hypertension     Hypokalemia     Obese abdomen     PAD (peripheral artery disease)     Post laminectomy syndrome     Seizures      "Severe sepsis     Short-term memory loss     Surgical site infection 11/17/2020    Rt abdomen pain pump site    Thyroid disease     Subclinical hyperthyroidism      Past Surgical History:   Procedure Laterality Date    CHOLECYSTECTOMY      GASTRIC BYPASS      SLEEVE    gastric sleeve  2011    HERNIA REPAIR      pain pump Right 04/03/2018    inserted at right abdomen    pain pump site washout Right 04/13/2018    REMOVAL OF IMPLANT N/A 11/18/2020    Procedure: REMOVAL, IMPLANT;  Surgeon: Raffy Headley DO;  Location: [facility];  Service: Neurosurgery;  Laterality: N/A;  intrathecal pain pump, leads removed from mid back and generator from abdomen    SKIN SURGERY      EXCESS SKIN REMOVAL    SPINE SURGERY      lumbar fusion      Family History   Problem Relation Age of Onset    Heart disease Mother     Breast cancer Mother     Arthritis Mother     Hypertension Mother     Cancer Mother     Throat cancer Father     Hypertension Father     Cancer Father       Review of patient's allergies indicates:   Allergen Reactions    Klonopin [clonazepam] Anxiety and Other (See Comments)     Becomes agitated     Review of Systems   Respiratory:  Negative for shortness of breath and wheezing.    Cardiovascular:  Positive for leg swelling. Negative for chest pain and palpitations.   Psychiatric/Behavioral:  Negative for confusion.        Objective:      Vitals:    10/30/23 0932   BP: 132/86   Pulse: 74   Temp: 98.1 °F (36.7 °C)   TempSrc: Oral   SpO2: 98%   Weight: (!) 140 kg (308 lb 10.3 oz)   Height: 5' 8" (1.727 m)      Physical Exam  Vitals and nursing note reviewed.   Constitutional:       General: He is not in acute distress.     Appearance: Normal appearance.   HENT:      Head: Normocephalic and atraumatic.   Eyes:      Conjunctiva/sclera: Conjunctivae normal.      Pupils: Pupils are equal, round, and reactive to light.   Cardiovascular:      Rate and Rhythm: Normal rate and regular rhythm.      Heart sounds: Normal heart sounds. " No murmur heard.  Pulmonary:      Effort: Pulmonary effort is normal. No respiratory distress.      Breath sounds: Normal breath sounds.   Abdominal:      General: Bowel sounds are normal.      Palpations: Abdomen is soft.      Tenderness: There is no abdominal tenderness.   Musculoskeletal:      Cervical back: Normal range of motion.   Skin:     General: Skin is warm and dry.      Findings: No rash.   Neurological:      Mental Status: He is alert and oriented to person, place, and time.      Gait: Gait normal.   Psychiatric:         Mood and Affect: Mood normal.         Behavior: Behavior normal.         Lab Results   Component Value Date    WBC 10.30 10/24/2023    HGB 9.4 (L) 10/24/2023    HCT 31.8 (L) 10/24/2023     10/24/2023    CHOL 146 10/23/2023    TRIG 91 10/23/2023    HDL 46 10/23/2023    ALT 17 10/23/2023    AST 30 10/23/2023     10/24/2023    K 3.8 10/24/2023     10/24/2023    CREATININE 1.3 10/24/2023    BUN 14 10/24/2023    CO2 26 10/24/2023    TSH 0.231 (L) 10/23/2023    INR 1.2 10/23/2023    HGBA1C 4.8 03/02/2022      Assessment:       1. Hospital discharge follow-up    2. DEBRA (acute kidney injury)    3. Essential hypertension    4. Facet arthropathy, lumbar        Plan:       Hospital discharge follow-up    DEBRA (acute kidney injury)  Resolved.   Avoid NSAID's.  Stay well hydrated.     Essential hypertension  Blood pressure is well controlled in clinic.   The current medical regimen is effective;  continue present plan and medications.  Low sodium diet.   Continue monitoring blood pressure via digital medicine program.   If BP >140/90 consider consulting cardiology.     Facet arthropathy, lumbar  Continue current medications and pain management.       Medication List with Changes/Refills   Current Medications    AMLODIPINE (NORVASC) 10 MG TABLET    Take 1 tablet (10 mg total) by mouth once daily.    BUTALBITAL-ACETAMINOPHEN-CAFFEINE -40 MG (FIORICET, ESGIC) -40 MG PER  "TABLET    Take 1 tablet by mouth every 8 (eight) hours as needed for Headaches.    CHLORTHALIDONE (HYGROTEN) 25 MG TAB    Take 0.5 tablets (12.5 mg total) by mouth once daily.    DULOXETINE (CYMBALTA) 60 MG CAPSULE    Take 1 capsule (60 mg total) by mouth once daily.    GABAPENTIN (NEURONTIN) 400 MG CAPSULE    Take 400 mg by mouth 3 (three) times daily. Takes 1 400mg pill every AM, Takes 2 (400mg) at night    METHOCARBAMOL (ROBAXIN) 750 MG TAB    Take 1 tablet (750 mg total) by mouth 4 (four) times daily as needed.    MORPHINE (MS CONTIN) 30 MG 12 HR TABLET    Take 30 mg by mouth 2 (two) times daily.    NALOXONE (NARCAN) 4 MG/ACTUATION SPRY    Narcan 4 mg/actuation nasal spray   INHALE 1 SPRAY BY NASAL ROUTE ONCE FOR ONE DOSE.    NYSTATIN (MYCOSTATIN) POWDER    Apply topically 2 (two) times daily.    OLMESARTAN (BENICAR) 40 MG TABLET    Take 1 tablet (40 mg total) by mouth once daily.    OXYCODONE (ROXICODONE) 30 MG TAB    Take 30 mg by mouth 6 (six) times daily.    POTASSIUM CHLORIDE (KLOR-CON) 10 MEQ TBSR    potassium chloride ER 10 mEq tablet,extended release   TAKE ONE TABLET BY MOUTH ONCE DAILY AS NEEDED WITH FLUID PILL    SYRINGE WITH NEEDLE (SYRINGE 3CC/22GX1") 3 ML 22 GAUGE X 1" SYRG    1 Units by Misc.(Non-Drug; Combo Route) route every 28 days.    TAMSULOSIN (FLOMAX) 0.4 MG CAP    Take one capsule by mouth daily    TESTOSTERONE CYPIONATE (DEPOTESTOTERONE CYPIONATE) 200 MG/ML INJECTION    Inject 2 mLs (400 mg total) into the muscle every 28 days.    TIZANIDINE (ZANAFLEX) 2 MG TABLET    TAKE ONE TABLET BY MOUTH THREE TIMES DAILY FOR MUSCLE SPASMS.         Follow up in about 3 months (around 1/30/2024) for Dr. Gabby Dean.         Grazyna Mercer, DNP, APRN, FNP-C  Family Medicine [facility]    " cancer

## 2023-11-21 LAB — NONINV COLON CA DNA+OCC BLD SCRN STL QL: NEGATIVE

## 2023-12-04 ENCOUNTER — TELEPHONE (OUTPATIENT)
Dept: UROLOGY | Facility: CLINIC | Age: 53
End: 2023-12-04
Payer: COMMERCIAL

## 2023-12-04 DIAGNOSIS — M47.816 FACET ARTHROPATHY, LUMBAR: ICD-10-CM

## 2023-12-04 DIAGNOSIS — R79.89 LOW TESTOSTERONE IN MALE: Primary | ICD-10-CM

## 2023-12-05 RX ORDER — TESTOSTERONE CYPIONATE 200 MG/ML
400 INJECTION, SOLUTION INTRAMUSCULAR
Qty: 10 ML | Refills: 5 | Status: SHIPPED | OUTPATIENT
Start: 2023-12-05

## 2023-12-05 RX ORDER — DULOXETIN HYDROCHLORIDE 60 MG/1
60 CAPSULE, DELAYED RELEASE ORAL DAILY
Qty: 90 CAPSULE | Refills: 1 | Status: SHIPPED | OUTPATIENT
Start: 2023-12-05 | End: 2024-01-07 | Stop reason: SDUPTHER

## 2023-12-05 NOTE — TELEPHONE ENCOUNTER
No care due was identified.  Health [facility] Embedded Care Due Messages. Reference number: [id number].   12/04/2023 7:18:51 PM CST

## 2023-12-05 NOTE — TELEPHONE ENCOUNTER
Last Office Visit Info:   The patient's last visit with Gabby Dean MD was on 9/20/2023.    The patient's last visit in current department was on 10/30/2023.

## 2023-12-23 NOTE — NURSING
"  History of Present Illness   Raimundo Munguia is a 53 yo male who presents for follow up pertaining to the increase of his blood pressure medication which was prescribed four weeks ago. He has DM, type 2, HTN, GERD, Rheumatoid Arthritis and Dyslipidemia. In addition, Raimundo has Insomnia and Vitamin deficiency.    Hypertension   The current episode started more than 1 year ago.His BP is adequately controlled. His hypertensive medication is Metoprolol  50 mg which has improved his blood pressure. Pertinent negatives include no anxiety, blurred vision, chest pain, headaches, palpitations, peripheral edema, or sweats.   He does report \"heart beating fast\".     Lab Results   Component Value Date    CREATININE 0.80 06/04/2020     Diabetes   He has type 2 diabetes mellitus. His disease course has been fluctuating. Pertinent negatives for diabetes include no blurred vision, no chest pain, and no polyphagia. He does share he has noticed some intermittent neuropathy in his left lower extremity.  Risk factors for coronary artery disease include dyslipidemia, hypertension, male sex, sedentary lifestyle and tobacco exposure. Current diabetic treatment includes oral agent (monotherapy). He is compliant with medication regimen most of the time. He has had a previous visit with a dietitian. An ACE inhibitor/angiotensin II receptor blocker is not being taken due to S/e. Eye exam is scheduled for March 2020. .     Lab Results   Component Value Date    HGBA1C 7.30 (H) 06/04/2020   Dyslipidemia   This is a chronic problem. The current episode started more than 1 year ago. The problem is controlled. Factors aggravating his hyperlipidemia include fatty foods. Current antihyperlipidemic treatment includes statins. The current treatment provides significant improvement of lipids. Compliance problems include adherence to diet and adherence to exercise.  Risk factors for coronary artery disease include diabetes mellitus, dyslipidemia, " Pt wife, yony called clinic. Call returned, reached voicemail, message left w/ call back #    "hypertension, male sex and obesity.   Lab Results   Component Value Date    CHOL 85 06/04/2020    TRIG 172 (H) 06/04/2020    HDL 23 (L) 06/04/2020    LDL 28 06/04/2020     Vitals:    07/09/20 1132   BP: 120/78   Pulse: 92   Resp: 14   Temp: 97.1 °F (36.2 °C)   TempSrc: Temporal   SpO2: 95%   Weight: 104 kg (228 lb 9.6 oz)   Height: 172.7 cm (68\")      The following portions of the patient's history were reviewed and updated as appropriate: allergies, current medications, past family history, past medical history, past social history, past surgical history and problem list.    Review of Systems   Constitutional: Negative for activity change, appetite change, chills, fatigue, fever and unexpected weight change.   HENT: Positive for hearing loss (ENT referral pending).    Eyes: Negative for visual disturbance.   Respiratory: Negative for cough, shortness of breath and wheezing.    Cardiovascular: Positive for palpitations. Negative for chest pain and leg swelling.   Gastrointestinal: Negative for abdominal pain, constipation, diarrhea, nausea and vomiting.   Endocrine: Negative for cold intolerance, heat intolerance, polydipsia, polyphagia and polyuria.   Genitourinary: Negative for difficulty urinating.   Musculoskeletal: Positive for arthralgias, back pain and joint swelling.   Skin: Negative for color change and rash.   Neurological: Positive for numbness. Negative for dizziness, tremors, speech difficulty, weakness, light-headedness and headaches.   Hematological: Negative for adenopathy.   Psychiatric/Behavioral: Positive for sleep disturbance. Negative for confusion, decreased concentration and suicidal ideas. The patient is not nervous/anxious.    All other systems reviewed and are negative.    Physical Exam   Constitutional: He is oriented to person, place, and time. He appears well-developed and well-nourished. No distress.   HENT:   Head: Normocephalic.   Right Ear: Decreased hearing is noted.   Left Ear: " Decreased hearing is noted.   Nose: Nose normal.   Wearing appropriate face mask.    Eyes: Pupils are equal, round, and reactive to light. Conjunctivae are normal. Right eye exhibits no discharge. Left eye exhibits no discharge. No scleral icterus.   Neck: Neck supple. No JVD present.   Cardiovascular: Normal rate, regular rhythm and normal heart sounds. Exam reveals no friction rub.   No murmur heard.  Pulmonary/Chest: Effort normal. No respiratory distress. He has decreased breath sounds. He has no wheezes. He has no rales.   Abdominal: Soft. Bowel sounds are normal. He exhibits no distension. There is no tenderness. There is no rebound and no guarding.   Musculoskeletal: He exhibits no edema or tenderness.   Lymphadenopathy:     He has no cervical adenopathy.   Neurological: He is alert and oriented to person, place, and time.   Skin: Skin is warm and dry. No rash noted. No erythema.   Psychiatric: He has a normal mood and affect. His speech is normal and behavior is normal. Judgment and thought content normal. Cognition and memory are normal.   Nursing note and vitals reviewed.      Assessment/Plan   Patient's Body mass index is 34.76 kg/m². BMI is above normal parameters. Recommendations include: exercise counseling and nutrition counseling.   (Normal BMI:  18.5-24.9, OW 25-29.9, Obesity 30 or greater)    Problems Addressed this Visit        Cardiovascular and Mediastinum    Benign essential hypertension    Relevant Medications    metoprolol succinate XL (TOPROL-XL) 100 MG 24 hr tablet    Hyperlipidemia    Palpitations - Primary    Relevant Orders    Holter Monitor - 48 Hour       Respiratory    Restrictive lung disease       Digestive    Class 1 obesity with serious comorbidity and body mass index (BMI) of 32.0 to 32.9 in adult       Endocrine    Controlled type 2 diabetes with neuropathy (CMS/HCC)       Musculoskeletal and Integument    Rheumatoid arthritis with positive rheumatoid factor (CMS/HCC)      Other  Visit Diagnoses     Bilateral hearing loss, unspecified hearing loss type        ENT appt pending      Findings and recommendations discussed with Raimundo. Will order a 48 hour Holter regarding Palpitations and intermittent Tachycardia. Encouraged tobacco cessation. Blood pressure is well Controlled. Diabetes is close to target. Continue Metformin Cardiovascular risk reduction encouraged including tobacco cessation reinforced. F/U with Pulmonologist in August. He will f/u with me in late July; sooner if problems/concerns occur.        This document has been electronically signed by ABDIRAHMAN Braswell, RICCO-BC, CDE  July 9, 2020 11:53       good balance

## 2023-12-28 DIAGNOSIS — B37.2 INTERTRIGINOUS CANDIDIASIS: ICD-10-CM

## 2023-12-28 RX ORDER — NYSTATIN 100000 [USP'U]/G
POWDER TOPICAL 2 TIMES DAILY
Qty: 60 G | Refills: 1 | Status: SHIPPED | OUTPATIENT
Start: 2023-12-28

## 2024-01-07 DIAGNOSIS — I10 ESSENTIAL HYPERTENSION: Chronic | ICD-10-CM

## 2024-01-07 DIAGNOSIS — M47.816 FACET ARTHROPATHY, LUMBAR: ICD-10-CM

## 2024-01-07 NOTE — TELEPHONE ENCOUNTER
No care due was identified.  Health [facility] Embedded Care Due Messages. Reference number: [id number].   1/07/2024 9:08:20 AM CST

## 2024-01-08 RX ORDER — AMLODIPINE BESYLATE 10 MG/1
10 TABLET ORAL DAILY
Qty: 90 TABLET | Refills: 0 | Status: SHIPPED | OUTPATIENT
Start: 2024-01-08

## 2024-01-08 RX ORDER — DULOXETIN HYDROCHLORIDE 60 MG/1
60 CAPSULE, DELAYED RELEASE ORAL DAILY
Qty: 90 CAPSULE | Refills: 1 | Status: SHIPPED | OUTPATIENT
Start: 2024-01-08

## 2024-01-08 RX ORDER — CHLORTHALIDONE 25 MG/1
12.5 TABLET ORAL DAILY
Qty: 90 TABLET | Refills: 0 | Status: SHIPPED | OUTPATIENT
Start: 2024-01-08

## 2024-01-08 RX ORDER — OLMESARTAN MEDOXOMIL 40 MG/1
40 TABLET ORAL DAILY
Qty: 90 TABLET | Refills: 0 | Status: SHIPPED | OUTPATIENT
Start: 2024-01-08

## 2024-01-22 PROBLEM — N17.9 ARF (ACUTE RENAL FAILURE): Status: RESOLVED | Noted: 2022-05-07 | Resolved: 2024-01-22

## 2024-02-07 ENCOUNTER — LAB VISIT (OUTPATIENT)
Dept: LAB | Facility: HOSPITAL | Age: 54
End: 2024-02-07
Attending: UROLOGY
Payer: COMMERCIAL

## 2024-02-07 DIAGNOSIS — R79.89 LOW TESTOSTERONE IN MALE: ICD-10-CM

## 2024-02-07 PROCEDURE — 84403 ASSAY OF TOTAL TESTOSTERONE: CPT | Performed by: UROLOGY

## 2024-02-07 PROCEDURE — 84153 ASSAY OF PSA TOTAL: CPT | Performed by: UROLOGY

## 2024-02-07 PROCEDURE — 36415 COLL VENOUS BLD VENIPUNCTURE: CPT | Performed by: UROLOGY

## 2024-02-08 LAB
COMPLEXED PSA SERPL-MCNC: 0.19 NG/ML (ref 0–4)
TESTOST SERPL-MCNC: 1232 NG/DL (ref 304–1227)

## 2024-03-22 DIAGNOSIS — I10 ESSENTIAL HYPERTENSION: Chronic | ICD-10-CM

## 2024-03-24 RX ORDER — OLMESARTAN MEDOXOMIL 40 MG/1
40 TABLET ORAL DAILY
Qty: 30 TABLET | Refills: 0 | Status: SHIPPED | OUTPATIENT
Start: 2024-03-24 | End: 2024-05-23 | Stop reason: SDUPTHER

## 2024-03-24 RX ORDER — AMLODIPINE BESYLATE 10 MG/1
10 TABLET ORAL DAILY
Qty: 30 TABLET | Refills: 0 | Status: SHIPPED | OUTPATIENT
Start: 2024-03-24 | End: 2024-05-23 | Stop reason: SDUPTHER

## 2024-04-14 ENCOUNTER — PATIENT MESSAGE (OUTPATIENT)
Dept: FAMILY MEDICINE | Facility: CLINIC | Age: 54
End: 2024-04-14
Payer: COMMERCIAL

## 2024-04-14 DIAGNOSIS — R21 RASH AND NONSPECIFIC SKIN ERUPTION: Primary | ICD-10-CM

## 2024-04-14 DIAGNOSIS — G43.809 HEADACHE, VARIANT MIGRAINE: ICD-10-CM

## 2024-04-14 DIAGNOSIS — Z76.0 ENCOUNTER FOR MEDICATION REFILL: ICD-10-CM

## 2024-04-15 RX ORDER — MUPIROCIN 20 MG/G
OINTMENT TOPICAL 2 TIMES DAILY
Qty: 30 G | Refills: 0 | Status: SHIPPED | OUTPATIENT
Start: 2024-04-15 | End: 2024-04-26

## 2024-04-16 RX ORDER — BUTALBITAL, ACETAMINOPHEN AND CAFFEINE 50; 325; 40 MG/1; MG/1; MG/1
1 TABLET ORAL EVERY 8 HOURS PRN
Qty: 45 TABLET | Refills: 1 | Status: SHIPPED | OUTPATIENT
Start: 2024-04-16

## 2024-04-26 ENCOUNTER — ON-DEMAND VIRTUAL (OUTPATIENT)
Dept: URGENT CARE | Facility: CLINIC | Age: 54
End: 2024-04-26
Payer: COMMERCIAL

## 2024-04-26 DIAGNOSIS — L03.90 CELLULITIS, UNSPECIFIED CELLULITIS SITE: Primary | ICD-10-CM

## 2024-04-26 PROCEDURE — 99204 OFFICE O/P NEW MOD 45 MIN: CPT | Mod: 95,,,

## 2024-04-26 RX ORDER — DOXYCYCLINE 100 MG/1
100 CAPSULE ORAL 2 TIMES DAILY
Qty: 14 CAPSULE | Refills: 0 | Status: SHIPPED | OUTPATIENT
Start: 2024-04-26 | End: 2024-05-03

## 2024-04-26 RX ORDER — MUPIROCIN 20 MG/G
OINTMENT TOPICAL 3 TIMES DAILY
Qty: 22 G | Refills: 0 | Status: SHIPPED | OUTPATIENT
Start: 2024-04-26

## 2024-04-26 NOTE — PROGRESS NOTES
Subjective:      Patient ID: Jaime Lund is a 54 y.o. male at home    Vitals:  vitals were not taken for this visit.     Chief Complaint: Recurrent Skin Infections      Visit Type: TELE AUDIOVISUAL    Present with the patient at the time of consultation: TELEMED PRESENT WITH PATIENT: None    Past Medical History:   Diagnosis Date    Back pain, chronic     Bulging discs     Chronic pain following surgery or procedure     Degenerative disc disease     Hypertension     Hypokalemia     Obese abdomen     PAD (peripheral artery disease)     Post laminectomy syndrome     Seizures     Severe sepsis     Short-term memory loss     Surgical site infection 11/17/2020    Rt abdomen pain pump site    Thyroid disease     Subclinical hyperthyroidism     Past Surgical History:   Procedure Laterality Date    CHOLECYSTECTOMY      GASTRIC BYPASS      SLEEVE    gastric sleeve  2011    HERNIA REPAIR      pain pump Right 04/03/2018    inserted at right abdomen    pain pump site washout Right 04/13/2018    REMOVAL OF IMPLANT N/A 11/18/2020    Procedure: REMOVAL, IMPLANT;  Surgeon: Raffy Headley DO;  Location: [facility] OR;  Service: Neurosurgery;  Laterality: N/A;  intrathecal pain pump, leads removed from mid back and generator from abdomen    SKIN SURGERY      EXCESS SKIN REMOVAL    SPINE SURGERY      lumbar fusion     Review of patient's allergies indicates:   Allergen Reactions    Klonopin [clonazepam] Anxiety and Other (See Comments)     Becomes agitated     Current Outpatient Medications on File Prior to Visit   Medication Sig Dispense Refill    amLODIPine (NORVASC) 10 MG tablet Take 1 tablet (10 mg total) by mouth once daily. 30 tablet 0    butalbital-acetaminophen-caffeine -40 mg (FIORICET, ESGIC) -40 mg per tablet Take 1 tablet by mouth every 8 (eight) hours as needed for Headaches. 45 tablet 1    chlorthalidone (HYGROTEN) 25 MG Tab Take 0.5 tablets (12.5 mg total) by mouth once daily. 90 tablet 0    DULoxetine  "(CYMBALTA) 60 MG capsule Take 1 capsule (60 mg total) by mouth once daily. 90 capsule 1    gabapentin (NEURONTIN) 400 MG capsule Take 400 mg by mouth 3 (three) times daily. Takes 1 400mg pill every AM, Takes 2 (400mg) at night      methocarbamoL (ROBAXIN) 750 MG Tab Take 1 tablet (750 mg total) by mouth 4 (four) times daily as needed. 120 tablet 2    morphine (MS CONTIN) 30 MG 12 hr tablet Take 30 mg by mouth 2 (two) times daily.      naloxone (NARCAN) 4 mg/actuation Spry Narcan 4 mg/actuation nasal spray   INHALE 1 SPRAY BY NASAL ROUTE ONCE FOR ONE DOSE.      NYAMYC powder Apply topically 2 (two) times daily. 60 g 1    olmesartan (BENICAR) 40 MG tablet Take 1 tablet (40 mg total) by mouth once daily. 30 tablet 0    oxyCODONE (ROXICODONE) 30 MG Tab Take 30 mg by mouth 6 (six) times daily.      potassium chloride (KLOR-CON) 10 MEQ TbSR potassium chloride ER 10 mEq tablet,extended release   TAKE ONE TABLET BY MOUTH ONCE DAILY AS NEEDED WITH FLUID PILL      syringe with needle (SYRINGE 3CC/22GX1") 3 mL 22 gauge x 1" Syrg 1 Units by Misc.(Non-Drug; Combo Route) route every 28 days. 100 each 2    tamsulosin (FLOMAX) 0.4 mg Cap Take one capsule by mouth daily 30 capsule 11    testosterone cypionate (DEPOTESTOTERONE CYPIONATE) 200 mg/mL injection Inject 2 mLs (400 mg total) into the muscle every 28 days. 10 mL 5    tiZANidine (ZANAFLEX) 2 MG tablet TAKE ONE TABLET BY MOUTH THREE TIMES DAILY FOR MUSCLE SPASMS. 90 tablet 0    [DISCONTINUED] mupirocin (BACTROBAN) 2 % ointment Apply topically 2 (two) times daily. 30 g 0     No current facility-administered medications on file prior to visit.     Family History   Problem Relation Name Age of Onset    Heart disease Mother REGGIE     Breast cancer Mother REGGIE     Arthritis Mother REGGIE     Hypertension Mother REGGIE     Cancer Mother REGGIE     Throat cancer Father Rissell     Hypertension Father Rissell     Cancer Father Rissell        Medications Ordered                [facility] [place] - [address]   [address], [place] [address]    Telephone: [phone]   Fax: [phone]   Hours: Not open 24 hours                         E-Prescribed (2 of 2)              doxycycline (VIBRAMYCIN) 100 MG Cap    Sig: Take 1 capsule (100 mg total) by mouth 2 (two) times daily. for 7 days       Start: 4/26/24     Quantity: 14 capsule Refills: 0                         mupirocin (BACTROBAN) 2 % ointment    Sig: Apply topically 3 (three) times daily.       Start: 4/26/24     Quantity: 22 g Refills: 0                           Ohs Peq Odvv Intake    4/26/2024  4:56 PM CDT - Filed by Patient   What is your current physical address in the event of a medical emergency? [address]   [place] [address]   Are you able to take your vital signs? No   Please attach any relevant images or files          Patient states that about 2 days he cut his left hand welding. Patient states that yesterday he began to notice that he had swelling, warmth and erythema. Patient states that it has continued to get worse. Patient states that he has been putting drawing sab on the area. Patient denies any finger. Patient denies any numbness or tingling to fingers. See image for details.         Constitution: Negative.   HENT: Negative.     Neck: neck negative.   Cardiovascular: Negative.    Eyes: Negative.    Respiratory: Negative.     Gastrointestinal: Negative.    Endocrine: negative.   Genitourinary: Negative.    Musculoskeletal: Negative.    Skin:  Positive for wound and erythema.   Allergic/Immunologic: Negative.    Neurological: Negative.    Hematologic/Lymphatic: Negative.    Psychiatric/Behavioral: Negative.          Objective:   The physical exam was conducted virtually.  Physical Exam   Constitutional: He is oriented to person, place, and time.   HENT:   Head: Normocephalic and atraumatic.   Nose: Nose normal.   Eyes: Conjunctivae are normal. Pupils are equal, round, and reactive to light.  Extraocular movement intact   Neck: Neck supple.   Pulmonary/Chest: Effort normal.   Abdominal: Normal appearance.   Musculoskeletal: Normal range of motion.         General: Normal range of motion.   Neurological: no focal deficit. He is alert, oriented to person, place, and time and at baseline.   Skin: erythema   Psychiatric: His behavior is normal. Mood, judgment and thought content normal.       Assessment:     1. Cellulitis, unspecified cellulitis site        Plan:       Cellulitis, unspecified cellulitis site  -     mupirocin (BACTROBAN) 2 % ointment; Apply topically 3 (three) times daily.  Dispense: 22 g; Refill: 0  -     doxycycline (VIBRAMYCIN) 100 MG Cap; Take 1 capsule (100 mg total) by mouth 2 (two) times daily. for 7 days  Dispense: 14 capsule; Refill: 0

## 2024-04-26 NOTE — PATIENT INSTRUCTIONS
Keep areas clean and dry.  Use antibacterial soap  Avoid scratching areas and perform good hand hygiene.     Keep the areas covered and avoid contact with others.  Follow-up with PCP or dermatology if no improvement in symptoms or for any worsening of symptoms.

## 2024-05-15 ENCOUNTER — PATIENT MESSAGE (OUTPATIENT)
Dept: ADMINISTRATIVE | Facility: OTHER | Age: 54
End: 2024-05-15
Payer: COMMERCIAL

## 2024-05-22 DIAGNOSIS — I10 ESSENTIAL HYPERTENSION: Chronic | ICD-10-CM

## 2024-05-22 NOTE — TELEPHONE ENCOUNTER
No care due was identified.  [facility] Embedded Care Due Messages. Reference number: [id number].   5/22/2024 6:26:55 PM CDT

## 2024-05-23 RX ORDER — CHLORTHALIDONE 25 MG/1
12.5 TABLET ORAL DAILY
Qty: 90 TABLET | Refills: 0 | OUTPATIENT
Start: 2024-05-23

## 2024-06-01 DIAGNOSIS — G43.809 HEADACHE, VARIANT MIGRAINE: ICD-10-CM

## 2024-06-03 RX ORDER — BUTALBITAL, ACETAMINOPHEN AND CAFFEINE 50; 325; 40 MG/1; MG/1; MG/1
TABLET ORAL
Qty: 45 TABLET | Refills: 1 | OUTPATIENT
Start: 2024-06-03

## 2024-06-05 DIAGNOSIS — M47.816 FACET ARTHROPATHY, LUMBAR: ICD-10-CM

## 2024-06-05 RX ORDER — MELOXICAM 15 MG/1
15 TABLET ORAL DAILY PRN
Qty: 90 TABLET | Refills: 0 | Status: SHIPPED | OUTPATIENT
Start: 2024-06-05

## 2024-06-05 RX ORDER — DULOXETIN HYDROCHLORIDE 60 MG/1
60 CAPSULE, DELAYED RELEASE ORAL
Qty: 90 CAPSULE | Refills: 0 | Status: SHIPPED | OUTPATIENT
Start: 2024-06-05

## 2024-06-05 NOTE — TELEPHONE ENCOUNTER
No care due was identified.  [facility] Embedded Care Due Messages. Reference number: [id number].   6/05/2024 9:47:42 AM CDT

## 2024-07-17 ENCOUNTER — ON-DEMAND VIRTUAL (OUTPATIENT)
Dept: URGENT CARE | Facility: CLINIC | Age: 54
End: 2024-07-17
Payer: COMMERCIAL

## 2024-07-17 DIAGNOSIS — L73.9 FOLLICULITIS: Primary | ICD-10-CM

## 2024-07-17 PROCEDURE — 99212 OFFICE O/P EST SF 10 MIN: CPT | Mod: 95,,, | Performed by: INTERNAL MEDICINE

## 2024-07-17 RX ORDER — DOXYCYCLINE 100 MG/1
100 CAPSULE ORAL 2 TIMES DAILY
Qty: 14 CAPSULE | Refills: 0 | Status: SHIPPED | OUTPATIENT
Start: 2024-07-17 | End: 2024-07-24

## 2024-07-17 NOTE — PROGRESS NOTES
Subjective:      Patient ID: Jaime Lund is a 54 y.o. male.    Vitals:  vitals were not taken for this visit.     Chief Complaint: Rash      Visit Type: TELE AUDIOVISUAL    Present with the patient at the time of consultation: TELEMED PRESENT WITH PATIENT: spouse    Past Medical History:   Diagnosis Date    Back pain, chronic     Bulging discs     Chronic pain following surgery or procedure     Degenerative disc disease     Hypertension     Hypokalemia     Obese abdomen     PAD (peripheral artery disease)     Post laminectomy syndrome     Seizures     Severe sepsis     Short-term memory loss     Surgical site infection 11/17/2020    Rt abdomen pain pump site    Thyroid disease     Subclinical hyperthyroidism     Past Surgical History:   Procedure Laterality Date    CHOLECYSTECTOMY      GASTRIC BYPASS      SLEEVE    gastric sleeve  2011    HERNIA REPAIR      pain pump Right 04/03/2018    inserted at right abdomen    pain pump site washout Right 04/13/2018    REMOVAL OF IMPLANT N/A 11/18/2020    Procedure: REMOVAL, IMPLANT;  Surgeon: Raffy Headley DO;  Location: [facility] OR;  Service: Neurosurgery;  Laterality: N/A;  intrathecal pain pump, leads removed from mid back and generator from abdomen    SKIN SURGERY      EXCESS SKIN REMOVAL    SPINE SURGERY      lumbar fusion     Review of patient's allergies indicates:   Allergen Reactions    Klonopin [clonazepam] Anxiety and Other (See Comments)     Becomes agitated     Current Outpatient Medications on File Prior to Visit   Medication Sig Dispense Refill    amLODIPine (NORVASC) 10 MG tablet Take 1 tablet (10 mg total) by mouth once daily. 30 tablet 5    butalbital-acetaminophen-caffeine -40 mg (FIORICET, ESGIC) -40 mg per tablet Take 1 tablet by mouth every 8 (eight) hours as needed for Headaches. 45 tablet 1    chlorthalidone (HYGROTEN) 25 MG Tab Take 0.5 tablets (12.5 mg total) by mouth once daily. 30 tablet 2    DULoxetine (CYMBALTA) 60 MG capsule  "Take one capsule by mouth daily 90 capsule 0    gabapentin (NEURONTIN) 400 MG capsule Take 400 mg by mouth 3 (three) times daily. Takes 1 400mg pill every AM, Takes 2 (400mg) at night      meloxicam (MOBIC) 15 MG tablet Take 1 tablet (15 mg total) by mouth daily as needed for Pain. 90 tablet 0    methocarbamoL (ROBAXIN) 750 MG Tab Take 1 tablet (750 mg total) by mouth 4 (four) times daily as needed. 120 tablet 2    morphine (MS CONTIN) 30 MG 12 hr tablet Take 30 mg by mouth 2 (two) times daily.      mupirocin (BACTROBAN) 2 % ointment Apply topically 3 (three) times daily. 22 g 0    naloxone (NARCAN) 4 mg/actuation Spry Narcan 4 mg/actuation nasal spray   INHALE 1 SPRAY BY NASAL ROUTE ONCE FOR ONE DOSE.      NYAMYC powder Apply topically 2 (two) times daily. 60 g 1    olmesartan (BENICAR) 40 MG tablet Take 1 tablet (40 mg total) by mouth once daily. 30 tablet 5    oxyCODONE (ROXICODONE) 30 MG Tab Take 30 mg by mouth 6 (six) times daily.      potassium chloride (KLOR-CON) 10 MEQ TbSR potassium chloride ER 10 mEq tablet,extended release   TAKE ONE TABLET BY MOUTH ONCE DAILY AS NEEDED WITH FLUID PILL      syringe with needle (SYRINGE 3CC/22GX1") 3 mL 22 gauge x 1" Syrg 1 Units by Misc.(Non-Drug; Combo Route) route every 28 days. 100 each 2    tamsulosin (FLOMAX) 0.4 mg Cap Take one capsule by mouth daily 30 capsule 11    testosterone cypionate (DEPOTESTOTERONE CYPIONATE) 200 mg/mL injection Inject 2 mLs (400 mg total) into the muscle every 28 days. 10 mL 5    tiZANidine (ZANAFLEX) 2 MG tablet TAKE ONE TABLET BY MOUTH THREE TIMES DAILY FOR MUSCLE SPASMS. 90 tablet 0     No current facility-administered medications on file prior to visit.     Family History   Problem Relation Name Age of Onset    Heart disease Mother REGGIE     Breast cancer Mother REGGIE     Arthritis Mother REGGIE     Hypertension Mother REGGIE     Cancer Mother REGGIE     Throat cancer Father Rissell     Hypertension Father Rissell     Cancer Father Rissell "        Medications Ordered                [facility] - [place] - [address]   [address] [address]    Telephone: [phone]   Fax: [phone]   Hours: Not open 24 hours                         E-Prescribed (1 of 1)              doxycycline (VIBRAMYCIN) 100 MG Cap    Sig: Take 1 capsule (100 mg total) by mouth 2 (two) times daily. for 7 days       Start: 7/17/24     Quantity: 14 capsule Refills: 0                           Ohs Peq Odvv Intake    7/17/2024  6:24 PM CDT - Filed by Patient   What is your current physical address in the event of a medical emergency? [address] [place] [address]   Are you able to take your vital signs? No   Please attach any relevant images or files          In [place] via video conference.     53 y/o man with history of staph infection presents with a possible recurrence. He has a lesion on his right buttock and one on his left upper arm. No fever.     Rash  Pertinent negatives include no fever.       Constitution: Negative for fever.   Skin:  Positive for rash.        Objective:   The physical exam was conducted virtually.  Physical Exam   HENT:   Nose: No congestion.   Pulmonary/Chest: Effort normal. No stridor. No respiratory distress.   Neurological: He is alert.   Skin:         Comments: See attached pictures consistent with possible MRSA/staph infection.        Assessment:     1. Folliculitis        Plan:       Folliculitis    Other orders  -     doxycycline (VIBRAMYCIN) 100 MG Cap; Take 1 capsule (100 mg total) by mouth 2 (two) times daily. for 7 days  Dispense: 14 capsule; Refill: 0      If no better in a few days or worsens go in for an in person visit.

## 2024-08-27 ENCOUNTER — E-VISIT (OUTPATIENT)
Dept: FAMILY MEDICINE | Facility: CLINIC | Age: 54
End: 2024-08-27
Payer: COMMERCIAL

## 2024-08-27 DIAGNOSIS — L73.9 FOLLICULITIS: Primary | ICD-10-CM

## 2024-08-27 DIAGNOSIS — F11.20 UNCOMPLICATED OPIOID DEPENDENCE: ICD-10-CM

## 2024-08-27 DIAGNOSIS — F39 MOOD DISORDER: ICD-10-CM

## 2024-08-27 DIAGNOSIS — I73.9 PERIPHERAL ARTERY DISEASE: ICD-10-CM

## 2024-08-27 RX ORDER — DOXYCYCLINE HYCLATE 100 MG
100 TABLET ORAL 2 TIMES DAILY
Qty: 14 TABLET | Refills: 0 | Status: SHIPPED | OUTPATIENT
Start: 2024-08-27 | End: 2024-09-03

## 2024-08-27 NOTE — PROGRESS NOTES
Patient ID: Jaime Lund is a 54 y.o. male.    Chief Complaint: General Illness (Entered automatically based on patient selection in [employer].)    The patient initiated a request through [employer] on 8/27/2024 for evaluation and management with a chief complaint of General Illness (Entered automatically based on patient selection in [employer].)     I evaluated the questionnaire submission on 8/27/2024.    Ohs Peq Evisit [facility]-Common Problems    8/27/2024  6:56 AM CDT - Filed by Patient   What do you need help with? Rash   Do you agree to participate in an E-Visit? Yes   If you have any of the following symptoms, please present to your local emergency room or call 911:  I acknowledge   What is the main issue you would like addressed today? 1inch round spot on right forearm. Swollen and painfil   How would you describe your skin problem? Lump or bump   When did your symptoms first appear? 8/25/2024   Where is it located?  Arm(s)   Does it itch? No   Does it hurt? Yes   Where is the pain located? Where the skin change is noted   The pain came on: Gradually   The pain has the character of: Aching   Frequency of the pain (How often does it appear)? This is the first time   Please select the face that most closely captures your pain level: 4   Is there discharge or drainage? No   Is there bleeding? No   Describe the character Raised;  Solid or firm;  Closed   Describe the color Purple   Has it changed over time? Grown in size   Frequency of skin problem Fluctuates at random   Duration of the skin problem (how long does it stay when it is present) Never goes away   I have had a new exposure to No new exposures   I have had a new exposure to No new exposures   What have you used to treat the skin problem? Mupriocin   If you have used anything for treatment, has it helped the symptoms? No   Other generalized symptoms that you associate with the rash No other symptoms   Provide any additional information you feel is  important. Im sure it is my usual staph problem. Possible in grown hair caused it or i scratched my arm.   At least one photo is required for treatment to be provided. You can upload a maximum of three photos of the affected area.     Are you able to take your vital signs? No         Encounter Diagnoses   Name Primary?    Folliculitis Yes    Uncomplicated opioid dependence     Mood disorder     Peripheral artery disease         No orders of the defined types were placed in this encounter.     Medications Ordered This Encounter   Medications    doxycycline (VIBRA-TABS) 100 MG tablet     Sig: Take 1 tablet (100 mg total) by mouth 2 (two) times daily. for 7 days     Dispense:  14 tablet     Refill:  0        No follow-ups on file.      E-Visit Time Tracking:    Day 1 Time (in minutes): 5    Total Time (in minutes): 5

## 2024-08-30 RX ORDER — TAMSULOSIN HYDROCHLORIDE 0.4 MG/1
1 CAPSULE ORAL
Qty: 30 CAPSULE | Refills: 11 | OUTPATIENT
Start: 2024-08-30

## 2024-09-20 ENCOUNTER — OFFICE VISIT (OUTPATIENT)
Dept: FAMILY MEDICINE | Facility: CLINIC | Age: 54
End: 2024-09-20
Payer: COMMERCIAL

## 2024-09-20 VITALS
DIASTOLIC BLOOD PRESSURE: 82 MMHG | HEART RATE: 71 BPM | SYSTOLIC BLOOD PRESSURE: 122 MMHG | HEIGHT: 68 IN | BODY MASS INDEX: 47.74 KG/M2 | OXYGEN SATURATION: 96 % | WEIGHT: 315 LBS | TEMPERATURE: 98 F

## 2024-09-20 DIAGNOSIS — I73.9 PERIPHERAL ARTERY DISEASE: ICD-10-CM

## 2024-09-20 DIAGNOSIS — I10 ESSENTIAL HYPERTENSION: Chronic | ICD-10-CM

## 2024-09-20 DIAGNOSIS — F39 MOOD DISORDER: ICD-10-CM

## 2024-09-20 DIAGNOSIS — Z00.00 ANNUAL PHYSICAL EXAM: Primary | ICD-10-CM

## 2024-09-20 DIAGNOSIS — F11.20 UNCOMPLICATED OPIOID DEPENDENCE: ICD-10-CM

## 2024-09-20 DIAGNOSIS — M47.816 FACET ARTHROPATHY, LUMBAR: ICD-10-CM

## 2024-09-20 DIAGNOSIS — G43.809 HEADACHE, VARIANT MIGRAINE: ICD-10-CM

## 2024-09-20 PROCEDURE — 99999 PR PBB SHADOW E&M-EST. PATIENT-LVL IV: CPT | Mod: PBBFAC,,, | Performed by: INTERNAL MEDICINE

## 2024-09-20 RX ORDER — OLMESARTAN MEDOXOMIL 40 MG/1
40 TABLET ORAL DAILY
Qty: 90 TABLET | Refills: 3 | Status: SHIPPED | OUTPATIENT
Start: 2024-09-20

## 2024-09-20 RX ORDER — CHLORTHALIDONE 25 MG/1
12.5 TABLET ORAL DAILY
Qty: 90 TABLET | Refills: 0 | Status: SHIPPED | OUTPATIENT
Start: 2024-09-20

## 2024-09-20 RX ORDER — MELOXICAM 15 MG/1
15 TABLET ORAL DAILY PRN
Qty: 90 TABLET | Refills: 3 | Status: SHIPPED | OUTPATIENT
Start: 2024-09-20

## 2024-09-20 RX ORDER — DULOXETIN HYDROCHLORIDE 60 MG/1
60 CAPSULE, DELAYED RELEASE ORAL DAILY
Qty: 90 CAPSULE | Refills: 3 | Status: SHIPPED | OUTPATIENT
Start: 2024-09-20

## 2024-09-20 RX ORDER — TIZANIDINE 4 MG/1
4 TABLET ORAL 2 TIMES DAILY
COMMUNITY
Start: 2024-08-17

## 2024-09-20 RX ORDER — AMLODIPINE BESYLATE 10 MG/1
10 TABLET ORAL DAILY
Qty: 90 TABLET | Refills: 3 | Status: SHIPPED | OUTPATIENT
Start: 2024-09-20

## 2024-09-20 RX ORDER — BUTALBITAL, ACETAMINOPHEN AND CAFFEINE 50; 325; 40 MG/1; MG/1; MG/1
1 TABLET ORAL EVERY 8 HOURS PRN
Qty: 45 TABLET | Refills: 2 | Status: SHIPPED | OUTPATIENT
Start: 2024-09-20

## 2024-09-20 RX ORDER — METHOCARBAMOL 750 MG/1
750 TABLET, FILM COATED ORAL 4 TIMES DAILY PRN
Qty: 120 TABLET | Refills: 2 | Status: SHIPPED | OUTPATIENT
Start: 2024-09-20

## 2024-09-20 NOTE — PROGRESS NOTES
SUBJECTIVE     Chief Complaint   Patient presents with    Annual Exam       HPI  Jaime Lund is a 54 y.o. male with multiple medical diagnoses as listed in the medical history and problem list that presents for annual exam. Pt has been doing well since his last visit. He has a good appetite and eats junk food. He does not exercise 2/2 back pain. He sleeps for ~8-10 hours nightly. Pt does take OTC supplements. He does have any current stressors and does not have an outlet. Pt is UTD on age appropriate CA screening.    PAST MEDICAL HISTORY:  Past Medical History:   Diagnosis Date    Back pain, chronic     Bulging discs     Chronic pain following surgery or procedure     Degenerative disc disease     Hypertension     Hypokalemia     Obese abdomen     PAD (peripheral artery disease)     Post laminectomy syndrome     Seizures     Severe sepsis     Short-term memory loss     Surgical site infection 11/17/2020    Rt abdomen pain pump site    Thyroid disease     Subclinical hyperthyroidism       PAST SURGICAL HISTORY:  Past Surgical History:   Procedure Laterality Date    CHOLECYSTECTOMY      GASTRIC BYPASS      SLEEVE    gastric sleeve  2011    HERNIA REPAIR      pain pump Right 04/03/2018    inserted at right abdomen    pain pump site washout Right 04/13/2018    REMOVAL OF IMPLANT N/A 11/18/2020    Procedure: REMOVAL, IMPLANT;  Surgeon: Raffy Headley DO;  Location: [facility] OR;  Service: Neurosurgery;  Laterality: N/A;  intrathecal pain pump, leads removed from mid back and generator from abdomen    SKIN SURGERY      EXCESS SKIN REMOVAL    SPINE SURGERY      lumbar fusion       SOCIAL HISTORY:  Social History     Socioeconomic History    Marital status:    Tobacco Use    Smoking status: Never     Passive exposure: Never    Smokeless tobacco: Never   Substance and Sexual Activity    Alcohol use: Yes     Comment: socially    Drug use: No    Sexual activity: Yes     Partners: Female     Birth control/protection:  "None     Social Determinants of Health     Stress: Stress Concern Present (6/19/2019)    [employer] [place] [employer] - Occupational Stress Questionnaire     Feeling of Stress : To some extent       FAMILY HISTORY:  Family History   Problem Relation Name Age of Onset    Heart disease Mother REGGIE     Breast cancer Mother REGGIE     Arthritis Mother REGGIE     Hypertension Mother REGGIE     Cancer Mother REGGIE     Throat cancer Father Rissell     Hypertension Father Rissell     Cancer Father Rissell        ALLERGIES AND MEDICATIONS: updated and reviewed.  Review of patient's allergies indicates:   Allergen Reactions    Klonopin [clonazepam] Anxiety and Other (See Comments)     Becomes agitated     Current Outpatient Medications   Medication Sig Dispense Refill    gabapentin (NEURONTIN) 400 MG capsule Take 400 mg by mouth 3 (three) times daily. Takes 1 400mg pill every AM, Takes 2 (400mg) at night      morphine (MS CONTIN) 30 MG 12 hr tablet Take 30 mg by mouth 2 (two) times daily.      mupirocin (BACTROBAN) 2 % ointment Apply topically 3 (three) times daily. 22 g 0    naloxone (NARCAN) 4 mg/actuation Spry Narcan 4 mg/actuation nasal spray   INHALE 1 SPRAY BY NASAL ROUTE ONCE FOR ONE DOSE.      NYAMYC powder Apply topically 2 (two) times daily. 60 g 1    oxyCODONE (ROXICODONE) 30 MG Tab Take 30 mg by mouth 6 (six) times daily.      potassium chloride (KLOR-CON) 10 MEQ TbSR potassium chloride ER 10 mEq tablet,extended release   TAKE ONE TABLET BY MOUTH ONCE DAILY AS NEEDED WITH FLUID PILL      syringe with needle (SYRINGE 3CC/22GX1") 3 mL 22 gauge x 1" Syrg 1 Units by Misc.(Non-Drug; Combo Route) route every 28 days. 100 each 2    tamsulosin (FLOMAX) 0.4 mg Cap Take one capsule by mouth daily 30 capsule 11    testosterone cypionate (DEPOTESTOTERONE CYPIONATE) 200 mg/mL injection Inject 2 mLs (400 mg total) into the muscle every 28 days. 10 mL 5    tiZANidine (ZANAFLEX) 4 MG tablet Take 4 mg by mouth 2 (two) " "times daily.      amLODIPine (NORVASC) 10 MG tablet Take 1 tablet (10 mg total) by mouth once daily. 90 tablet 3    butalbital-acetaminophen-caffeine -40 mg (FIORICET, ESGIC) -40 mg per tablet Take 1 tablet by mouth every 8 (eight) hours as needed for Headaches. 45 tablet 2    chlorthalidone (HYGROTEN) 25 MG Tab Take 0.5 tablets (12.5 mg total) by mouth once daily. 90 tablet 0    DULoxetine (CYMBALTA) 60 MG capsule Take 1 capsule (60 mg total) by mouth once daily. 90 capsule 3    meloxicam (MOBIC) 15 MG tablet Take 1 tablet (15 mg total) by mouth daily as needed for Pain. 90 tablet 3    methocarbamoL (ROBAXIN) 750 MG Tab Take 1 tablet (750 mg total) by mouth 4 (four) times daily as needed. 120 tablet 2    olmesartan (BENICAR) 40 MG tablet Take 1 tablet (40 mg total) by mouth once daily. 90 tablet 3     No current facility-administered medications for this visit.       ROS  Review of Systems   Constitutional:  Negative for chills and fever.   HENT:  Negative for hearing loss and sore throat.    Eyes:  Negative for visual disturbance.   Respiratory:  Negative for cough and shortness of breath.    Cardiovascular:  Negative for chest pain, palpitations and leg swelling.   Gastrointestinal:  Negative for abdominal pain, constipation, diarrhea, nausea and vomiting.   Genitourinary:  Negative for dysuria, frequency and urgency.   Musculoskeletal:  Positive for back pain. Negative for arthralgias, joint swelling and myalgias.   Skin:  Negative for rash and wound.   Neurological:  Positive for headaches.   Psychiatric/Behavioral:  Negative for agitation and confusion. The patient is not nervous/anxious.          OBJECTIVE     Physical Exam  Vitals:    09/20/24 1508   BP: 122/82   Pulse: 71   Temp: 97.9 °F (36.6 °C)    Body mass index is 49.64 kg/m².  Weight: (!) 148.1 kg (326 lb 8 oz)   Height: 5' 8" (172.7 cm)     Physical Exam  Constitutional:       General: He is not in acute distress.     Appearance: He is " well-developed.   HENT:      Head: Normocephalic and atraumatic.      Right Ear: External ear normal.      Left Ear: External ear normal.      Nose: Nose normal.   Eyes:      General: No scleral icterus.        Right eye: No discharge.         Left eye: No discharge.      Conjunctiva/sclera: Conjunctivae normal.   Neck:      Vascular: No JVD.      Trachea: No tracheal deviation.   Cardiovascular:      Rate and Rhythm: Normal rate and regular rhythm.      Heart sounds: Normal heart sounds. No murmur heard.     No friction rub. No gallop.   Pulmonary:      Effort: Pulmonary effort is normal. No respiratory distress.      Breath sounds: Normal breath sounds. No wheezing.   Abdominal:      General: Bowel sounds are normal. There is no distension.      Palpations: Abdomen is soft. There is no mass.      Tenderness: There is no abdominal tenderness. There is no guarding or rebound.   Musculoskeletal:         General: No tenderness or deformity. Normal range of motion.      Cervical back: Normal range of motion and neck supple.   Skin:     General: Skin is warm and dry.      Findings: No erythema or rash.   Neurological:      Mental Status: He is alert and oriented to person, place, and time.      Motor: No abnormal muscle tone.      Coordination: Coordination normal.   Psychiatric:         Behavior: Behavior normal.         Thought Content: Thought content normal.         Judgment: Judgment normal.           Health Maintenance         Date Due Completion Date    TETANUS VACCINE Never done ---    Shingles Vaccine (1 of 2) Never done ---    Influenza Vaccine (1) 09/01/2024 12/4/2020    COVID-19 Vaccine (3 - 2023-24 season) 09/01/2024 3/25/2021    Hemoglobin A1c (Diabetic Prevention Screening) 03/02/2025 3/2/2022    Colorectal Cancer Screening 11/12/2026 11/12/2023    Lipid Panel 10/23/2028 10/23/2023              ASSESSMENT     54 y.o. male with     1. Annual physical exam    2. Essential hypertension    3. Uncomplicated  opioid dependence    4. Mood disorder    5. Peripheral artery disease    6. Headache, variant migraine    7. Facet arthropathy, lumbar        PLAN:     1. Annual physical exam  - Counseled on age appropriate medical preventative services, including age appropriate cancer screenings, over all nutritional health, need for a consistent exercise regimen and an over all push towards maintaining a vigorous and active lifestyle.  Counseled on age appropriate vaccines and discussed upcoming health care needs based on age/gender.  Spent time with patient counseling on need for a good patient/doctor relationship moving forward.  Discussed use of common OTC medications and supplements.  Discussed common dietary aids and use of caffeine and the need for good sleep hygiene and stress management.  - CBC Auto Differential; Future  - Comprehensive Metabolic Panel; Future  - Hemoglobin A1C; Future  - TSH; Future  - Lipid Panel; Future    2. Essential hypertension  - BP well controlled; at goal of <140/90  - The current medical regimen is effective;  continue present plan and medications.  - amLODIPine (NORVASC) 10 MG tablet; Take 1 tablet (10 mg total) by mouth once daily.  Dispense: 90 tablet; Refill: 3  - chlorthalidone (HYGROTEN) 25 MG Tab; Take 0.5 tablets (12.5 mg total) by mouth once daily.  Dispense: 90 tablet; Refill: 0  - olmesartan (BENICAR) 40 MG tablet; Take 1 tablet (40 mg total) by mouth once daily.  Dispense: 90 tablet; Refill: 3    3. Uncomplicated opioid dependence  - Stable; no acute issues    4. Mood disorder  - Stable; no acute issues    5. Peripheral artery disease  - Stable; no acute issues    6. Headache, variant migraine  - Stable; no acute issues  - The current medical regimen is effective;  continue present plan and medications.  - butalbital-acetaminophen-caffeine -40 mg (FIORICET, ESGIC) -40 mg per tablet; Take 1 tablet by mouth every 8 (eight) hours as needed for Headaches.  Dispense: 45  tablet; Refill: 2    7. Facet arthropathy, lumbar  - Stable; no acute issues  - The current medical regimen is effective;  continue present plan and medications.  - meloxicam (MOBIC) 15 MG tablet; Take 1 tablet (15 mg total) by mouth daily as needed for Pain.  Dispense: 90 tablet; Refill: 3  - DULoxetine (CYMBALTA) 60 MG capsule; Take 1 capsule (60 mg total) by mouth once daily.  Dispense: 90 capsule; Refill: 3  - methocarbamoL (ROBAXIN) 750 MG Tab; Take 1 tablet (750 mg total) by mouth 4 (four) times daily as needed.  Dispense: 120 tablet; Refill: 2        RTC in 6 months     Gabby Dean MD  09/20/2024 3:20 PM        No follow-ups on file.

## 2024-10-02 ENCOUNTER — PATIENT MESSAGE (OUTPATIENT)
Dept: FAMILY MEDICINE | Facility: CLINIC | Age: 54
End: 2024-10-02
Payer: COMMERCIAL

## 2024-10-24 ENCOUNTER — LAB VISIT (OUTPATIENT)
Dept: LAB | Facility: HOSPITAL | Age: 54
End: 2024-10-24
Attending: INTERNAL MEDICINE
Payer: COMMERCIAL

## 2024-10-24 ENCOUNTER — OFFICE VISIT (OUTPATIENT)
Dept: UROLOGY | Facility: CLINIC | Age: 54
End: 2024-10-24
Payer: COMMERCIAL

## 2024-10-24 VITALS — WEIGHT: 315 LBS | BODY MASS INDEX: 49.74 KG/M2

## 2024-10-24 DIAGNOSIS — R39.12 WEAK URINARY STREAM: ICD-10-CM

## 2024-10-24 DIAGNOSIS — R35.1 NOCTURIA: ICD-10-CM

## 2024-10-24 DIAGNOSIS — Z00.00 ANNUAL PHYSICAL EXAM: ICD-10-CM

## 2024-10-24 DIAGNOSIS — R79.89 LOW TESTOSTERONE IN MALE: Primary | ICD-10-CM

## 2024-10-24 LAB
ALBUMIN SERPL BCP-MCNC: 3.7 G/DL (ref 3.5–5.2)
ALP SERPL-CCNC: 76 U/L (ref 40–150)
ALT SERPL W/O P-5'-P-CCNC: 16 U/L (ref 10–44)
ANION GAP SERPL CALC-SCNC: 11 MMOL/L (ref 8–16)
AST SERPL-CCNC: 24 U/L (ref 10–40)
BASOPHILS # BLD AUTO: 0.07 K/UL (ref 0–0.2)
BASOPHILS NFR BLD: 1.3 % (ref 0–1.9)
BILIRUB SERPL-MCNC: 0.3 MG/DL (ref 0.1–1)
BUN SERPL-MCNC: 12 MG/DL (ref 6–20)
CALCIUM SERPL-MCNC: 9.2 MG/DL (ref 8.7–10.5)
CHLORIDE SERPL-SCNC: 103 MMOL/L (ref 95–110)
CHOLEST SERPL-MCNC: 172 MG/DL (ref 120–199)
CHOLEST/HDLC SERPL: 3.2 {RATIO} (ref 2–5)
CO2 SERPL-SCNC: 26 MMOL/L (ref 23–29)
CREAT SERPL-MCNC: 0.9 MG/DL (ref 0.5–1.4)
DIFFERENTIAL METHOD BLD: ABNORMAL
EOSINOPHIL # BLD AUTO: 0.2 K/UL (ref 0–0.5)
EOSINOPHIL NFR BLD: 3.3 % (ref 0–8)
ERYTHROCYTE [DISTWIDTH] IN BLOOD BY AUTOMATED COUNT: 16 % (ref 11.5–14.5)
EST. GFR  (NO RACE VARIABLE): >60 ML/MIN/1.73 M^2
ESTIMATED AVG GLUCOSE: 94 MG/DL (ref 68–131)
GLUCOSE SERPL-MCNC: 77 MG/DL (ref 70–110)
HBA1C MFR BLD: 4.9 % (ref 4–5.6)
HCT VFR BLD AUTO: 36.2 % (ref 40–54)
HDLC SERPL-MCNC: 54 MG/DL (ref 40–75)
HDLC SERPL: 31.4 % (ref 20–50)
HGB BLD-MCNC: 10.5 G/DL (ref 14–18)
IMM GRANULOCYTES # BLD AUTO: 0.01 K/UL (ref 0–0.04)
IMM GRANULOCYTES NFR BLD AUTO: 0.2 % (ref 0–0.5)
LDLC SERPL CALC-MCNC: 90.8 MG/DL (ref 63–159)
LYMPHOCYTES # BLD AUTO: 1.3 K/UL (ref 1–4.8)
LYMPHOCYTES NFR BLD: 23.1 % (ref 18–48)
MCH RBC QN AUTO: 24.5 PG (ref 27–31)
MCHC RBC AUTO-ENTMCNC: 29 G/DL (ref 32–36)
MCV RBC AUTO: 84 FL (ref 82–98)
MONOCYTES # BLD AUTO: 0.5 K/UL (ref 0.3–1)
MONOCYTES NFR BLD: 9.2 % (ref 4–15)
NEUTROPHILS # BLD AUTO: 3.4 K/UL (ref 1.8–7.7)
NEUTROPHILS NFR BLD: 62.9 % (ref 38–73)
NONHDLC SERPL-MCNC: 118 MG/DL
NRBC BLD-RTO: 0 /100 WBC
PLATELET # BLD AUTO: 452 K/UL (ref 150–450)
PMV BLD AUTO: 10.2 FL (ref 9.2–12.9)
POTASSIUM SERPL-SCNC: 3.8 MMOL/L (ref 3.5–5.1)
PROT SERPL-MCNC: 7.1 G/DL (ref 6–8.4)
RBC # BLD AUTO: 4.29 M/UL (ref 4.6–6.2)
SODIUM SERPL-SCNC: 140 MMOL/L (ref 136–145)
T4 FREE SERPL-MCNC: 0.82 NG/DL (ref 0.71–1.51)
TRIGL SERPL-MCNC: 136 MG/DL (ref 30–150)
TSH SERPL DL<=0.005 MIU/L-ACNC: 0.38 UIU/ML (ref 0.4–4)
WBC # BLD AUTO: 5.41 K/UL (ref 3.9–12.7)

## 2024-10-24 PROCEDURE — 36415 COLL VENOUS BLD VENIPUNCTURE: CPT | Mod: PO | Performed by: INTERNAL MEDICINE

## 2024-10-24 PROCEDURE — 3008F BODY MASS INDEX DOCD: CPT | Mod: CPTII,S$GLB,, | Performed by: UROLOGY

## 2024-10-24 PROCEDURE — 4010F ACE/ARB THERAPY RXD/TAKEN: CPT | Mod: CPTII,S$GLB,, | Performed by: UROLOGY

## 2024-10-24 PROCEDURE — 83036 HEMOGLOBIN GLYCOSYLATED A1C: CPT | Performed by: INTERNAL MEDICINE

## 2024-10-24 PROCEDURE — 85025 COMPLETE CBC W/AUTO DIFF WBC: CPT | Performed by: INTERNAL MEDICINE

## 2024-10-24 PROCEDURE — 80053 COMPREHEN METABOLIC PANEL: CPT | Performed by: INTERNAL MEDICINE

## 2024-10-24 PROCEDURE — 84443 ASSAY THYROID STIM HORMONE: CPT | Performed by: INTERNAL MEDICINE

## 2024-10-24 PROCEDURE — 99214 OFFICE O/P EST MOD 30 MIN: CPT | Mod: S$GLB,,, | Performed by: UROLOGY

## 2024-10-24 PROCEDURE — 1159F MED LIST DOCD IN RCRD: CPT | Mod: CPTII,S$GLB,, | Performed by: UROLOGY

## 2024-10-24 PROCEDURE — 80061 LIPID PANEL: CPT | Performed by: INTERNAL MEDICINE

## 2024-10-24 PROCEDURE — 99999 PR PBB SHADOW E&M-EST. PATIENT-LVL III: CPT | Mod: PBBFAC,,, | Performed by: UROLOGY

## 2024-10-24 PROCEDURE — 1160F RVW MEDS BY RX/DR IN RCRD: CPT | Mod: CPTII,S$GLB,, | Performed by: UROLOGY

## 2024-10-24 PROCEDURE — 84439 ASSAY OF FREE THYROXINE: CPT | Performed by: INTERNAL MEDICINE

## 2024-10-24 PROCEDURE — 3044F HG A1C LEVEL LT 7.0%: CPT | Mod: CPTII,S$GLB,, | Performed by: UROLOGY

## 2024-10-24 RX ORDER — TESTOSTERONE CYPIONATE 200 MG/ML
400 INJECTION, SOLUTION INTRAMUSCULAR
Qty: 10 ML | Refills: 5 | Status: SHIPPED | OUTPATIENT
Start: 2024-10-24

## 2024-10-24 RX ORDER — TAMSULOSIN HYDROCHLORIDE 0.4 MG/1
0.8 CAPSULE ORAL DAILY
Qty: 30 CAPSULE | Refills: 11 | Status: SHIPPED | OUTPATIENT
Start: 2024-10-24

## 2024-10-24 NOTE — PROGRESS NOTES
"Subjective:       Jaime Lund is a 54 y.o. male who is an established patient who was  referred by Dr Dean   for evaluation of low T.      He reported fatigue and muscle ache ("achy all the time" - chronic back pain) to PCP therefore T level checked and noted to be mildly low. No PSA in records. Denies ED or libido issues. Denies prostate issues. Grandfather with prostate cancer.     3/22 T - 260  3/22 T - 160, PSA - 0.17  6/22 T - 1257, PSA - 0.44 (injection done about 1 week before)   2/24 T - 1232, PSA - 0.19    PMH:  HTN, obesity, chronic pain - sees pain management.      He has been doing self-injections of TRT (400mg monthly). Response is variable. Recent ankle surgery so unable to work x 1 month. Notes skin yeast infection in skin folds.     2/9/2023  Returns now with urinary issues. Lasix PRN, has not taken it recently. Main issue is weak stream and nocturia x 4-5. Present x 2 months. No LE edema currently (takes Lasix for this mainly in summer). +snoring, no known FABRIZIO. He notes weight changes affect snoring.  AUASS - 14/3  PVR (bladder scan) today - 0cc    10/24/2024  Last seen 1.5 years ago. Returns with voiding issues - variable slow stream and intermittent stream. He also reports significant fatigue at all times. Remains on monthly T injections. T injections do not improve fatigue. Also with decreased libido. Last T level was supratherapeutic.  PVR (bladder scan) today - 0cc      The following portions of the patient's history were reviewed and updated as appropriate: allergies, current medications, past family history, past medical history, past social history, past surgical history and problem list.    Review of Systems  Twelve point review of systems completed. Pertinent positive and negatives listed in HPI.      Objective:    Vitals: Wt (!) 148.4 kg (327 lb 2.6 oz)   BMI 49.74 kg/m²     Physical Exam   General: well developed, well nourished in no acute distress  Head: normocephalic, " "atraumatic  Neck: supple, trachea midline, no obvious enlargement of thyroid  HEENT: EOMI, mucus membranes moist, sclera anicteric, no hearing impairment  Lungs: symmetric expansion, non-labored breathing  Skin: no rashes or lesions  Neuro: alert and oriented x 3, no gross deficits  Psych: normal judgment and insight, normal mood/affect and non-anxious  Genitourinary: deferred   ANDREIA: deferred      Lab Review   Urine analysis today in clinic shows +protein trace     Lab Results   Component Value Date    WBC 5.41 10/24/2024    HGB 10.5 (L) 10/24/2024    HCT 36.2 (L) 10/24/2024    HCT 31 (L) 10/23/2023    MCV 84 10/24/2024     (H) 10/24/2024     Lab Results   Component Value Date    CREATININE 0.9 10/24/2024    BUN 12 10/24/2024     No results found for: "PSA"  Lab Results   Component Value Date    PSADIAG 0.19 02/07/2024       Imaging  NA     Assessment/Plan:      1. Low testosterone in male    - Discussed hypogonadism, common symptoms, treatment options, and the risks and benefits of treatment.  His symptoms and blood tests support the diagnosis and therefore treatment is appropriate.   - Discussed the various risks associated with TRT, specifically a possible increase in risk of heart disease, MI, CVA, PE, DVT. Also discussed the issues related to testosterone replacement and prostate cancer. TRT does not increase his risk of developing cancer. Recommend annual ANDREIA and PSA for PCa screening. TRT recommended to stop if abnormal ANDREIA or elevated PSA.   - Treatment options reviewed: regular injections, topical treatments including gels and creams, and implants such as TestoPel.  Risks and benefits of each were reviewed specifically T fluctuations with q2-4week injections and risk of transfer of medication to other with topical application.     - TRT started 3/22 - 400mg IM q4 weeks. Initially with improvement but now with fatigue/low libido. Does not note change in injection - may be something unrelated to T " level then.    - T reordered   - T level now in AM    - T and PSA in 6mths     2. Fatigue, unspecified type     - Discussed multifactorial etiology      3. Skin yeast infection   - No relief from topical medications given by PCP   - Diflucan x 2 tabs q3d given. If no improvement, see PCP.    4. Nocturia   - No current LE edema   - Possible FABRIZIO may contribute   - Flomax 0.8mg     5. Weak stream   - Flomax increased to 0.8mg         Follow up in 6 months

## 2024-10-25 ENCOUNTER — LAB VISIT (OUTPATIENT)
Dept: LAB | Facility: HOSPITAL | Age: 54
End: 2024-10-25
Attending: UROLOGY
Payer: COMMERCIAL

## 2024-10-25 DIAGNOSIS — R35.1 NOCTURIA: ICD-10-CM

## 2024-10-25 DIAGNOSIS — R79.89 LOW TESTOSTERONE IN MALE: ICD-10-CM

## 2024-10-25 PROBLEM — D75.839 THROMBOCYTOSIS: Status: ACTIVE | Noted: 2024-10-25

## 2024-10-25 LAB
COMPLEXED PSA SERPL-MCNC: 0.3 NG/ML (ref 0–4)
TESTOST SERPL-MCNC: 468 NG/DL (ref 304–1227)

## 2024-10-25 PROCEDURE — 84403 ASSAY OF TOTAL TESTOSTERONE: CPT | Performed by: UROLOGY

## 2024-10-25 PROCEDURE — 84153 ASSAY OF PSA TOTAL: CPT | Performed by: UROLOGY

## 2024-10-25 PROCEDURE — 36415 COLL VENOUS BLD VENIPUNCTURE: CPT | Mod: PO | Performed by: UROLOGY

## 2024-11-20 DIAGNOSIS — M47.816 FACET ARTHROPATHY, LUMBAR: ICD-10-CM

## 2024-11-20 RX ORDER — DULOXETIN HYDROCHLORIDE 60 MG/1
60 CAPSULE, DELAYED RELEASE ORAL DAILY
Qty: 90 CAPSULE | Refills: 2 | Status: SHIPPED | OUTPATIENT
Start: 2024-11-20

## 2024-11-20 RX ORDER — METHOCARBAMOL 750 MG/1
750 TABLET, FILM COATED ORAL 4 TIMES DAILY PRN
Qty: 120 TABLET | Refills: 2 | Status: SHIPPED | OUTPATIENT
Start: 2024-11-20

## 2024-11-20 NOTE — TELEPHONE ENCOUNTER
No care due was identified.  Health [facility] Embedded Care Due Messages. Reference number: [id number].   11/20/2024 9:46:10 AM CST

## 2024-11-20 NOTE — TELEPHONE ENCOUNTER
Refill Routing Note   Medication(s) are not appropriate for processing by [facility] for the following reason(s):        Outside of protocol    [facility] action(s):  Route  Approve               Appointments  past 12m or future 3m with PCP    Date Provider   Last Visit   9/20/2024 Gabby Dean MD   Next Visit   Visit date not found Gabby Dean MD   ED visits in past 90 days: 0        Note composed:3:15 PM 11/20/2024

## 2024-11-23 ENCOUNTER — PATIENT MESSAGE (OUTPATIENT)
Dept: FAMILY MEDICINE | Facility: CLINIC | Age: 54
End: 2024-11-23
Payer: COMMERCIAL

## 2024-11-23 DIAGNOSIS — L03.90 CELLULITIS, UNSPECIFIED CELLULITIS SITE: ICD-10-CM

## 2024-11-25 RX ORDER — MUPIROCIN 20 MG/G
OINTMENT TOPICAL 3 TIMES DAILY
Qty: 22 G | Refills: 0 | Status: SHIPPED | OUTPATIENT
Start: 2024-11-25

## 2025-01-05 RX ORDER — TAMSULOSIN HYDROCHLORIDE 0.4 MG/1
0.8 CAPSULE ORAL DAILY
Qty: 30 CAPSULE | Refills: 11 | Status: SHIPPED | OUTPATIENT
Start: 2025-01-05

## 2025-05-07 DIAGNOSIS — G43.809 HEADACHE, VARIANT MIGRAINE: ICD-10-CM

## 2025-05-07 RX ORDER — BUTALBITAL, ACETAMINOPHEN AND CAFFEINE 50; 325; 40 MG/1; MG/1; MG/1
1 TABLET ORAL EVERY 8 HOURS PRN
Qty: 45 TABLET | Refills: 1 | Status: SHIPPED | OUTPATIENT
Start: 2025-05-07

## 2025-05-07 NOTE — TELEPHONE ENCOUNTER
No care due was identified.  Health [facility] Embedded Care Due Messages. Reference number: [id number].   5/07/2025 11:07:29 AM CDT

## 2025-05-22 ENCOUNTER — OFFICE VISIT (OUTPATIENT)
Dept: FAMILY MEDICINE | Facility: CLINIC | Age: 55
End: 2025-05-22
Payer: COMMERCIAL

## 2025-05-22 VITALS
HEART RATE: 69 BPM | DIASTOLIC BLOOD PRESSURE: 66 MMHG | OXYGEN SATURATION: 96 % | TEMPERATURE: 98 F | BODY MASS INDEX: 41.36 KG/M2 | HEIGHT: 68 IN | SYSTOLIC BLOOD PRESSURE: 114 MMHG | WEIGHT: 272.94 LBS

## 2025-05-22 DIAGNOSIS — E66.01 CLASS 3 SEVERE OBESITY DUE TO EXCESS CALORIES WITH SERIOUS COMORBIDITY AND BODY MASS INDEX (BMI) OF 40.0 TO 44.9 IN ADULT: ICD-10-CM

## 2025-05-22 DIAGNOSIS — F11.20 UNCOMPLICATED OPIOID DEPENDENCE: ICD-10-CM

## 2025-05-22 DIAGNOSIS — G43.809 HEADACHE, VARIANT MIGRAINE: Primary | ICD-10-CM

## 2025-05-22 DIAGNOSIS — E66.813 CLASS 3 SEVERE OBESITY DUE TO EXCESS CALORIES WITH SERIOUS COMORBIDITY AND BODY MASS INDEX (BMI) OF 40.0 TO 44.9 IN ADULT: ICD-10-CM

## 2025-05-22 PROCEDURE — 4010F ACE/ARB THERAPY RXD/TAKEN: CPT | Mod: CPTII,S$GLB,, | Performed by: NURSE PRACTITIONER

## 2025-05-22 PROCEDURE — 3008F BODY MASS INDEX DOCD: CPT | Mod: CPTII,S$GLB,, | Performed by: NURSE PRACTITIONER

## 2025-05-22 PROCEDURE — 3078F DIAST BP <80 MM HG: CPT | Mod: CPTII,S$GLB,, | Performed by: NURSE PRACTITIONER

## 2025-05-22 PROCEDURE — 99999 PR PBB SHADOW E&M-EST. PATIENT-LVL V: CPT | Mod: PBBFAC,,, | Performed by: NURSE PRACTITIONER

## 2025-05-22 PROCEDURE — 3074F SYST BP LT 130 MM HG: CPT | Mod: CPTII,S$GLB,, | Performed by: NURSE PRACTITIONER

## 2025-05-22 PROCEDURE — 99213 OFFICE O/P EST LOW 20 MIN: CPT | Mod: S$GLB,,, | Performed by: NURSE PRACTITIONER

## 2025-05-22 PROCEDURE — 1160F RVW MEDS BY RX/DR IN RCRD: CPT | Mod: CPTII,S$GLB,, | Performed by: NURSE PRACTITIONER

## 2025-05-22 PROCEDURE — 1159F MED LIST DOCD IN RCRD: CPT | Mod: CPTII,S$GLB,, | Performed by: NURSE PRACTITIONER

## 2025-05-22 RX ORDER — IBUPROFEN 800 MG/1
800 TABLET, FILM COATED ORAL 3 TIMES DAILY
COMMUNITY
Start: 2025-04-24

## 2025-05-22 RX ORDER — GABAPENTIN 600 MG/1
TABLET ORAL
COMMUNITY

## 2025-05-22 RX ORDER — BUTALBITAL, ACETAMINOPHEN AND CAFFEINE 50; 325; 40 MG/1; MG/1; MG/1
1 TABLET ORAL EVERY 8 HOURS PRN
Qty: 45 TABLET | Refills: 1 | Status: SHIPPED | OUTPATIENT
Start: 2025-05-22

## 2025-05-22 RX ORDER — BUTALBITAL, ACETAMINOPHEN AND CAFFEINE 50; 325; 40 MG/1; MG/1; MG/1
1 TABLET ORAL EVERY 8 HOURS PRN
Qty: 45 TABLET | Refills: 1 | Status: SHIPPED | OUTPATIENT
Start: 2025-05-22 | End: 2025-05-22 | Stop reason: SDUPTHER

## 2025-05-22 RX ORDER — DOCUSATE SODIUM 100 MG/1
CAPSULE, LIQUID FILLED ORAL
COMMUNITY

## 2025-05-22 RX ORDER — ACETAMINOPHEN 500 MG
TABLET ORAL
COMMUNITY
Start: 2024-11-21

## 2025-05-22 NOTE — PROGRESS NOTES
Subjective:       Patient ID: Jaime Lund is a 55 y.o. male.    Chief Complaint: Medication Refill    HPI     Jaime Lund is a 55 y.o. male patient that presents to clinic with a chief complaint of medication refill. Past medical and surgical history reviewed as listed. PCP is Gabby Dean MD , he is known to me.     Reports weight loss with GLP-1. Lost 64 lbs.   States he weaned himself off antihypertensives.   Needs refill for Fioricet for headaches.   Reports he's been physically active with work, but still limited with exercise.     Wt Readings from Last 3 Encounters:   05/22/25 1206 123.8 kg (272 lb 14.9 oz)   10/24/24 1617 (!) 148.4 kg (327 lb 2.6 oz)   09/20/24 1508 (!) 148.1 kg (326 lb 8 oz)       Past Medical History:   Diagnosis Date    Back pain, chronic     Bulging discs     Chronic pain following surgery or procedure     Degenerative disc disease     Hypertension     Hypokalemia     Obese abdomen     PAD (peripheral artery disease)     Post laminectomy syndrome     Seizures     Severe sepsis     Short-term memory loss     Surgical site infection 11/17/2020    Rt abdomen pain pump site    Thyroid disease     Subclinical hyperthyroidism      Past Surgical History:   Procedure Laterality Date    CHOLECYSTECTOMY      GASTRIC BYPASS      SLEEVE    gastric sleeve  2011    HERNIA REPAIR      pain pump Right 04/03/2018    inserted at right abdomen    pain pump site washout Right 04/13/2018    REMOVAL OF IMPLANT N/A 11/18/2020    Procedure: REMOVAL, IMPLANT;  Surgeon: Raffy Headley DO;  Location: [facility] OR;  Service: Neurosurgery;  Laterality: N/A;  intrathecal pain pump, leads removed from mid back and generator from abdomen    SKIN SURGERY      EXCESS SKIN REMOVAL    SPINE SURGERY      lumbar fusion      Family History   Problem Relation Name Age of Onset    Heart disease Mother REGGIE     Breast cancer Mother REGGIE     Arthritis Mother REGGIE     Hypertension Mother REGGIE     Cancer Mother  "REGGIE     Throat cancer Father Rissejennifer     Hypertension Father Rissejennifer     Cancer Father Jeri       Review of patient's allergies indicates:   Allergen Reactions    Alprazolam Anxiety and Other (See Comments)     Becomes agitated    Other Reaction(s): agitation    Diazepam Other (See Comments)     Becomes agitated    Klonopin [clonazepam] Anxiety and Other (See Comments)     Becomes agitated    Benzodiazepines Anxiety     Review of Systems    Objective:      Vitals:    05/22/25 1206   BP: 114/66   Pulse: 69   Temp: 97.8 °F (36.6 °C)   TempSrc: Oral   SpO2: 96%   Weight: 123.8 kg (272 lb 14.9 oz)   Height: 5' 8" (1.727 m)      Physical Exam  Vitals and nursing note reviewed.   Constitutional:       General: He is not in acute distress.     Appearance: Normal appearance. He is obese.   HENT:      Head: Normocephalic and atraumatic.   Eyes:      Conjunctiva/sclera: Conjunctivae normal.      Pupils: Pupils are equal, round, and reactive to light.   Pulmonary:      Effort: Pulmonary effort is normal. No respiratory distress.   Musculoskeletal:      Cervical back: Normal range of motion.   Skin:     General: Skin is warm and dry.   Neurological:      Mental Status: He is alert and oriented to person, place, and time.   Psychiatric:         Mood and Affect: Mood normal.         Behavior: Behavior normal.         Lab Results   Component Value Date    WBC 5.41 10/24/2024    HGB 10.5 (L) 10/24/2024    HCT 36.2 (L) 10/24/2024     (H) 10/24/2024    CHOL 172 10/24/2024    TRIG 136 10/24/2024    HDL 54 10/24/2024    ALT 16 10/24/2024    AST 24 10/24/2024     10/24/2024    K 3.8 10/24/2024     10/24/2024    CREATININE 0.9 10/24/2024    BUN 12 10/24/2024    CO2 26 10/24/2024    TSH 0.379 (L) 10/24/2024    INR 1.2 10/23/2023    HGBA1C 4.9 10/24/2024      Assessment:       1. Headache, variant migraine    2. Uncomplicated opioid dependence    3. Class 3 severe obesity due to excess calories with serious " comorbidity and body mass index (BMI) of 40.0 to 44.9 in adult        Plan:       Headache, variant migraine  Medication refill.   -     butalbital-acetaminophen-caffeine -40 mg (FIORICET, ESGIC) -40 mg per tablet; Take 1 tablet by mouth every 8 (eight) hours as needed for Headaches.  Dispense: 45 tablet; Refill: 1    Uncomplicated opioid dependence  Stable.   Followed by pain management.     Class 3 severe obesity due to excess calories with serious comorbidity and body mass index (BMI) of 40.0 to 44.9 in adult  Continue GLP-1 as directed under supervision of ordering provider.   Low calorie diet.   Exercise as physically tolerated.     Medication List with Changes/Refills   Current Medications    ACETAMINOPHEN (TYLENOL EXTRA STRENGTH) 500 MG TABLET    Take 2 tablets every 8 hours by oral route as needed for 30 days.    CHLORTHALIDONE (HYGROTEN) 25 MG TAB    Take 0.5 tablets (12.5 mg total) by mouth once daily.    DOCUSATE SODIUM (COLACE) 100 MG CAPSULE    1 capsule as needed Orally twice a day    DULOXETINE (CYMBALTA) 60 MG CAPSULE    Take 1 capsule (60 mg total) by mouth once daily.    GABAPENTIN (NEURONTIN) 400 MG CAPSULE    Take 400 mg by mouth 3 (three) times daily. Takes 1 400mg pill every AM, Takes 2 (400mg) at night    GABAPENTIN (NEURONTIN) 600 MG TABLET    Orally Three times a day    IBUPROFEN (ADVIL,MOTRIN) 800 MG TABLET    Take 800 mg by mouth 3 (three) times daily.    IBUPROFEN ORAL    Take 800 mg by mouth 3 (three) times daily.    MELOXICAM (MOBIC) 15 MG TABLET    Take 1 tablet (15 mg total) by mouth daily as needed for Pain.    METHOCARBAMOL (ROBAXIN) 750 MG TAB    Take 1 tablet by mouth 4 times daily AS NEEDED    MORPHINE (MS CONTIN) 30 MG 12 HR TABLET    Take 30 mg by mouth 2 (two) times daily.    MUPIROCIN (BACTROBAN) 2 % OINTMENT    Apply topically 3 (three) times daily.    NALOXONE (NARCAN) 4 MG/ACTUATION SPRY    Narcan 4 mg/actuation nasal spray   INHALE 1 SPRAY BY NASAL ROUTE ONCE  "FOR ONE DOSE.    NYAMYC POWDER    Apply topically 2 (two) times daily.    OXYCODONE (ROXICODONE) 30 MG TAB    Take 30 mg by mouth 6 (six) times daily.    POTASSIUM CHLORIDE (KLOR-CON) 10 MEQ TBSR    potassium chloride ER 10 mEq tablet,extended release   TAKE ONE TABLET BY MOUTH ONCE DAILY AS NEEDED WITH FLUID PILL    SYRINGE WITH NEEDLE (SYRINGE 3CC/22GX1") 3 ML 22 GAUGE X 1" SYRG    1 Units by Misc.(Non-Drug; Combo Route) route every 28 days.    TAMSULOSIN (FLOMAX) 0.4 MG CAP    Take 2 capsules (0.8 mg total) by mouth once daily.    TESTOSTERONE CYPIONATE (DEPOTESTOTERONE CYPIONATE) 200 MG/ML INJECTION    Inject 2 mLs (400 mg total) into the muscle every 28 days.    TIRZEPATIDE, WEIGHT LOSS, SUBQ    INJECT 150 UNITS BENEATH THE SKIN ONCE WEEKLY, IN TWO SEPARATE INJECTIONS OF 75 UNITS. REFRIGERATE    TIZANIDINE (ZANAFLEX) 4 MG TABLET    Take 4 mg by mouth 2 (two) times daily.    VITAMIN B COMPLEX ORAL    Orally daily   Changed and/or Refilled Medications    Modified Medication Previous Medication    BUTALBITAL-ACETAMINOPHEN-CAFFEINE -40 MG (FIORICET, ESGIC) -40 MG PER TABLET butalbital-acetaminophen-caffeine -40 mg (FIORICET, ESGIC) -40 mg per tablet       Take 1 tablet by mouth every 8 (eight) hours as needed for Headaches.    TAKE 1 TABLET BY MOUTH EVERY 8 HOURS AS NEEDED FOR HEADACHE   Discontinued Medications    AMLODIPINE (NORVASC) 10 MG TABLET    Take 1 tablet (10 mg total) by mouth once daily.    OLMESARTAN (BENICAR) 40 MG TABLET    Take 1 tablet (40 mg total) by mouth once daily.                Grazyna Mercer, DNP, APRN, FNP-C  Family [facility]  05/22/2025 12:24 PM    "

## 2025-06-24 ENCOUNTER — TELEPHONE (OUTPATIENT)
Dept: UROLOGY | Facility: CLINIC | Age: 55
End: 2025-06-24
Payer: COMMERCIAL

## 2025-06-24 DIAGNOSIS — E29.1 HYPOGONADISM MALE: Primary | ICD-10-CM

## 2025-06-24 RX ORDER — TESTOSTERONE CYPIONATE 200 MG/ML
400 INJECTION, SOLUTION INTRAMUSCULAR
Qty: 10 ML | Refills: 5 | OUTPATIENT
Start: 2025-06-24

## 2025-06-24 NOTE — TELEPHONE ENCOUNTER
Pt did not come for his f/u appt in 4/2025. Labs also not done. He needs to be seen in clinic with labs prior to being able to send testosterone refill.     Please schedule routine f/u with labs (PSA, T, CBC). New lab orders placed today.

## 2025-07-02 RX ORDER — TESTOSTERONE CYPIONATE 200 MG/ML
400 INJECTION, SOLUTION INTRAMUSCULAR
Qty: 10 ML | Refills: 5 | OUTPATIENT
Start: 2025-07-02

## 2025-07-24 DIAGNOSIS — G43.809 HEADACHE, VARIANT MIGRAINE: ICD-10-CM

## 2025-07-24 DIAGNOSIS — I10 ESSENTIAL HYPERTENSION: Chronic | ICD-10-CM

## 2025-07-24 RX ORDER — BUTALBITAL, ACETAMINOPHEN AND CAFFEINE 50; 325; 40 MG/1; MG/1; MG/1
TABLET ORAL
Qty: 45 TABLET | Refills: 1 | Status: SHIPPED | OUTPATIENT
Start: 2025-07-24

## 2025-07-24 RX ORDER — OLMESARTAN MEDOXOMIL 40 MG/1
40 TABLET ORAL
Qty: 30 TABLET | Refills: 0 | OUTPATIENT
Start: 2025-07-24

## 2025-07-24 NOTE — TELEPHONE ENCOUNTER
Provider Staff:  Action required for this patient    Requires labs      Please see care gap opportunities below in Care Due Message.    Thanks!  [facility]     Appointments      Date Provider   Last Visit   9/20/2024 Gabby Dean MD   Next Visit   9/22/2025 Gabby Dean MD     Refill Decision Note   Jaime Lund  is requesting a refill authorization.  Brief Assessment and Rationale for Refill:  Quick Discontinue     Medication Therapy Plan:  OLMESARTAN 40MG WAS DISCONTINUED ON 05/22/25      Comments:     Note composed:11:45 AM 07/24/2025

## 2025-07-24 NOTE — TELEPHONE ENCOUNTER
Care Due:                  Date            Visit Type   Department     Provider  --------------------------------------------------------------------------------                                [facility]      [facility]/OF  MEDICINE /  Last Visit: 09-      FICE VISIT   INTERNAL MED   Gabby Dean                              EP -         [facility]                              PRIMARY      MEDICINE /  Next Visit: 09-      CARE ([facility])   INTERNAL CELESTINA Dean                                                            Last  Test          Frequency    Reason                     Performed    Due Date  --------------------------------------------------------------------------------    CBC.........  12 months..  meloxicam................  10-   10-    CMP.........  12 months..  DULoxetine, meloxicam....  10-   10-    [facility] Embedded Care Due Messages. Reference number: [id number].   7/24/2025 10:17:11 AM CDT

## 2025-08-26 RX ORDER — TAMSULOSIN HYDROCHLORIDE 0.4 MG/1
2 CAPSULE ORAL DAILY
Qty: 30 CAPSULE | Refills: 11 | Status: SHIPPED | OUTPATIENT
Start: 2025-08-26

## (undated) DEVICE — GAUZE SPONGE 4X4 12PLY

## (undated) DEVICE — KIT EVACUATOR 3-SPRING 1/8 DRN

## (undated) DEVICE — BINDER AB SIZE XL 12X72-96IN

## (undated) DEVICE — SEE MEDLINE ITEM 146417

## (undated) DEVICE — KIT COLLECTION E SWAB MINI

## (undated) DEVICE — GLOVE BIOGEL PI MICRO SZ 7.5

## (undated) DEVICE — NDL STRAIGHT 4CM LEIBINGER

## (undated) DEVICE — DURAPREP SURG SCRUB 26ML

## (undated) DEVICE — SEE MEDLINE ITEM 157131

## (undated) DEVICE — SUT SILK 2-0 SH 18IN BLACK

## (undated) DEVICE — DRAIN TLS 7MM POREX

## (undated) DEVICE — SYR ONLY LUER LOCK 20CC

## (undated) DEVICE — SUT MONOCRYL 4-0 PS-2

## (undated) DEVICE — BLANKET LOWER BODY 55.9X40.2IN

## (undated) DEVICE — PACK DRAPE UNIVERSAL CONVERTOR

## (undated) DEVICE — SUT VICRYL PLUS 2-0

## (undated) DEVICE — DRAPE STERI INSTRUMENT 1018

## (undated) DEVICE — SUT VICRYL 2 0 CT 2

## (undated) DEVICE — DRAPE C ARM 42 X 120 10/BX

## (undated) DEVICE — SOL 9P NACL IRR PIC IL

## (undated) DEVICE — CATH ASCENDA 86.4X114.3 4FR
Type: IMPLANTABLE DEVICE | Site: BACK | Status: NON-FUNCTIONAL
Removed: 2018-04-03

## (undated) DEVICE — DRESSING TRANS 4X4 TEGADERM

## (undated) DEVICE — TUBE FRAZIER 5MM 2FT SOFT TIP

## (undated) DEVICE — ELECTRODE REM PLYHSV RETURN 9

## (undated) DEVICE — CATH PASSER DISPOSABLE

## (undated) DEVICE — PERSONAL THERAPY MANAGER

## (undated) DEVICE — SEE MEDLINE ITEM 152487

## (undated) DEVICE — DRESSING ABSRBNT ISLAND 3.6X8

## (undated) DEVICE — NDL HYPO REG 25G X 1 1/2

## (undated) DEVICE — KIT IRR SUCTION HND PIECE

## (undated) DEVICE — SUT 3/0 18IN COATED VICRYLP

## (undated) DEVICE — DIFFUSER

## (undated) DEVICE — SUPPORT ULNA NERVE PROTECTOR

## (undated) DEVICE — COLLECTOR SPECIMEN ANAEROBIC

## (undated) DEVICE — BLADE SURG CARBON STEEL #10

## (undated) DEVICE — SWAB CULTURETTE II DUAL

## (undated) DEVICE — CONTAINER SPECIMEN STRL 4OZ

## (undated) DEVICE — SUT BONE WAX 2.5 GRMS 12/BX

## (undated) DEVICE — SUT 4/0 18IN NUROLON BLK B

## (undated) DEVICE — SEE MEDLINE ITEM 152512

## (undated) DEVICE — TOWEL OR NONABSORB ADH 17X26

## (undated) DEVICE — TAPE MEDIPORE 4IN X 2YDS

## (undated) DEVICE — ADHESIVE MASTISOL VIAL 48/BX

## (undated) DEVICE — DRAPE INCISE IOBAN 2 23X17IN

## (undated) DEVICE — SYS LABLNG CORECT MED 4 FLG

## (undated) DEVICE — SYR 3CC LUER LOC

## (undated) DEVICE — SEE MEDLINE ITEM 154981

## (undated) DEVICE — CORD BIPOLAR 12 FOOT

## (undated) DEVICE — SUT PROLENE 3-0 30SH

## (undated) DEVICE — COVER OVERHEAD SURG LT BLUE

## (undated) DEVICE — ACCESSORY KIT

## (undated) DEVICE — SUT VICRYL PLUS 2-0 CT1 18

## (undated) DEVICE — CORD FOR BIPOLAR FORCEPS 12

## (undated) DEVICE — ELECTRODE BLADE INSULATED 1 IN

## (undated) DEVICE — DRAPE STERI-DRAPE 1000 17X11IN

## (undated) DEVICE — TAPE SILK 3IN

## (undated) DEVICE — CANISTER SUCTION 2 LTR

## (undated) DEVICE — SUT 3-0 CTD VICRYL 27IN PS

## (undated) DEVICE — SUT VICRYL PLUS 3-0 SH 18IN

## (undated) DEVICE — KIT SURGIFLO EVITHROM

## (undated) DEVICE — SEE MEDLINE ITEM 146292

## (undated) DEVICE — SEE MEDLINE ITEM 157117

## (undated) DEVICE — CHLORAPREP 10.5 ML APPLICATOR

## (undated) DEVICE — GLOVE BIOGEL PI MICRO SZ 6.5

## (undated) DEVICE — SEE MEDLINE ITEM 156905

## (undated) DEVICE — DRAPE LAP TIBURON 77X122IN

## (undated) DEVICE — SYR 10CC LUER LOCK

## (undated) DEVICE — KIT SPINAL PATIENT CARE JACK

## (undated) DEVICE — SUT PROLENE 0 CT1 30IN BLUE

## (undated) DEVICE — SUT 0 8-27IN VICRYL PL CT-1

## (undated) DEVICE — STAPLER SKIN PROXIMATE WIDE

## (undated) DEVICE — DRESSING AQUACEL AG ADV 3.5X6

## (undated) DEVICE — SUT CTD VICRYL 0 UND BR CT

## (undated) DEVICE — SEE MEDLINE ITEM 157150

## (undated) DEVICE — POSITIONER IV ARMBOARD FOAM

## (undated) DEVICE — CLOSURE SKIN STERI STRIP 1/2X4

## (undated) DEVICE — UNDERGLOVE BIOGEL PI SZ 6.5 LF

## (undated) DEVICE — UNDERGLOVES BIOGEL PI SIZE 8

## (undated) DEVICE — SEE MEDLINE ITEM 152622

## (undated) DEVICE — DRESSING COVER AQUACEL AG SURG

## (undated) DEVICE — NDL SPINAL 18GX3.5 SPINOCAN

## (undated) DEVICE — SUT VICRYL 2-0 36 CT-1

## (undated) DEVICE — SUT VICRYL PLUS ANTIBACT

## (undated) DEVICE — SOL NACL 0.9% INJ 250ML BG

## (undated) DEVICE — CLIPPER BLADE MOD 4406 (CAREF)